# Patient Record
Sex: FEMALE | Race: WHITE | URBAN - METROPOLITAN AREA
[De-identification: names, ages, dates, MRNs, and addresses within clinical notes are randomized per-mention and may not be internally consistent; named-entity substitution may affect disease eponyms.]

---

## 2021-08-11 ENCOUNTER — INPATIENT (INPATIENT)
Facility: HOSPITAL | Age: 87
LOS: 4 days | Discharge: NURSING, REHAB OR LONG-TERM CARE | DRG: 183 | End: 2021-08-16
Attending: SURGERY | Admitting: SURGERY
Payer: MEDICARE

## 2021-08-11 VITALS — HEIGHT: 65 IN

## 2021-08-11 DIAGNOSIS — Z85.038 PERSONAL HISTORY OF OTHER MALIGNANT NEOPLASM OF LARGE INTESTINE: Chronic | ICD-10-CM

## 2021-08-11 DIAGNOSIS — Z87.898 PERSONAL HISTORY OF OTHER SPECIFIED CONDITIONS: Chronic | ICD-10-CM

## 2021-08-11 LAB
ALBUMIN SERPL ELPH-MCNC: 3.5 G/DL — SIGNIFICANT CHANGE UP (ref 3.3–5)
ALP SERPL-CCNC: 67 U/L — SIGNIFICANT CHANGE UP (ref 40–120)
ALT FLD-CCNC: 43 U/L — SIGNIFICANT CHANGE UP (ref 10–45)
ANION GAP SERPL CALC-SCNC: 14 MMOL/L — SIGNIFICANT CHANGE UP (ref 5–17)
APTT BLD: 27.7 SEC — SIGNIFICANT CHANGE UP (ref 27.5–35.5)
AST SERPL-CCNC: 112 U/L — HIGH (ref 10–40)
BASOPHILS # BLD AUTO: 0 K/UL — SIGNIFICANT CHANGE UP (ref 0–0.2)
BASOPHILS NFR BLD AUTO: 0 % — SIGNIFICANT CHANGE UP (ref 0–2)
BILIRUB SERPL-MCNC: 1.3 MG/DL — HIGH (ref 0.2–1.2)
BUN SERPL-MCNC: 22 MG/DL — SIGNIFICANT CHANGE UP (ref 7–23)
CALCIUM SERPL-MCNC: 10.3 MG/DL — SIGNIFICANT CHANGE UP (ref 8.4–10.5)
CHLORIDE SERPL-SCNC: 100 MMOL/L — SIGNIFICANT CHANGE UP (ref 96–108)
CK SERPL-CCNC: 4509 U/L — HIGH (ref 25–170)
CO2 SERPL-SCNC: 21 MMOL/L — LOW (ref 22–31)
CREAT SERPL-MCNC: 0.94 MG/DL — SIGNIFICANT CHANGE UP (ref 0.5–1.3)
EOSINOPHIL # BLD AUTO: 0 K/UL — SIGNIFICANT CHANGE UP (ref 0–0.5)
EOSINOPHIL NFR BLD AUTO: 0 % — SIGNIFICANT CHANGE UP (ref 0–6)
GAS PNL BLDV: SIGNIFICANT CHANGE UP
GLUCOSE SERPL-MCNC: 118 MG/DL — HIGH (ref 70–99)
HCT VFR BLD CALC: 39.1 % — SIGNIFICANT CHANGE UP (ref 34.5–45)
HGB BLD-MCNC: 12.9 G/DL — SIGNIFICANT CHANGE UP (ref 11.5–15.5)
INR BLD: 1.07 RATIO — SIGNIFICANT CHANGE UP (ref 0.88–1.16)
LYMPHOCYTES # BLD AUTO: 0.45 K/UL — LOW (ref 1–3.3)
LYMPHOCYTES # BLD AUTO: 3.5 % — LOW (ref 13–44)
MCHC RBC-ENTMCNC: 30.8 PG — SIGNIFICANT CHANGE UP (ref 27–34)
MCHC RBC-ENTMCNC: 33 GM/DL — SIGNIFICANT CHANGE UP (ref 32–36)
MCV RBC AUTO: 93.3 FL — SIGNIFICANT CHANGE UP (ref 80–100)
MONOCYTES # BLD AUTO: 0 K/UL — SIGNIFICANT CHANGE UP (ref 0–0.9)
MONOCYTES NFR BLD AUTO: 0 % — LOW (ref 2–14)
NEUTROPHILS # BLD AUTO: 12.52 K/UL — HIGH (ref 1.8–7.4)
NEUTROPHILS NFR BLD AUTO: 96.5 % — HIGH (ref 43–77)
PLATELET # BLD AUTO: 204 K/UL — SIGNIFICANT CHANGE UP (ref 150–400)
POTASSIUM SERPL-MCNC: 3.8 MMOL/L — SIGNIFICANT CHANGE UP (ref 3.5–5.3)
POTASSIUM SERPL-SCNC: 3.8 MMOL/L — SIGNIFICANT CHANGE UP (ref 3.5–5.3)
PROT SERPL-MCNC: 7.7 G/DL — SIGNIFICANT CHANGE UP (ref 6–8.3)
PROTHROM AB SERPL-ACNC: 12.8 SEC — SIGNIFICANT CHANGE UP (ref 10.6–13.6)
RBC # BLD: 4.19 M/UL — SIGNIFICANT CHANGE UP (ref 3.8–5.2)
RBC # FLD: 13.5 % — SIGNIFICANT CHANGE UP (ref 10.3–14.5)
SODIUM SERPL-SCNC: 135 MMOL/L — SIGNIFICANT CHANGE UP (ref 135–145)
WBC # BLD: 12.97 K/UL — HIGH (ref 3.8–10.5)
WBC # FLD AUTO: 12.97 K/UL — HIGH (ref 3.8–10.5)

## 2021-08-11 PROCEDURE — 72125 CT NECK SPINE W/O DYE: CPT | Mod: 26,MH

## 2021-08-11 PROCEDURE — 74177 CT ABD & PELVIS W/CONTRAST: CPT | Mod: 26,MA

## 2021-08-11 PROCEDURE — 70450 CT HEAD/BRAIN W/O DYE: CPT | Mod: 26,MH

## 2021-08-11 PROCEDURE — 99291 CRITICAL CARE FIRST HOUR: CPT | Mod: GC

## 2021-08-11 PROCEDURE — 93010 ELECTROCARDIOGRAM REPORT: CPT | Mod: GC

## 2021-08-11 PROCEDURE — 72170 X-RAY EXAM OF PELVIS: CPT | Mod: 26

## 2021-08-11 PROCEDURE — 71260 CT THORAX DX C+: CPT | Mod: 26,MA

## 2021-08-11 PROCEDURE — 71045 X-RAY EXAM CHEST 1 VIEW: CPT | Mod: 26

## 2021-08-11 RX ORDER — SODIUM CHLORIDE 9 MG/ML
1000 INJECTION INTRAMUSCULAR; INTRAVENOUS; SUBCUTANEOUS ONCE
Refills: 0 | Status: COMPLETED | OUTPATIENT
Start: 2021-08-11 | End: 2021-08-11

## 2021-08-11 RX ADMIN — SODIUM CHLORIDE 1000 MILLILITER(S): 9 INJECTION INTRAMUSCULAR; INTRAVENOUS; SUBCUTANEOUS at 22:44

## 2021-08-11 NOTE — ED ADULT NURSE NOTE - OBJECTIVE STATEMENT
89y F arrived to the ED via EMS s/p fall. As per EMS " pt was found down on the floor today in her apartment by Son after not being able to get in touch with her. Pt son last saw pt at 1 pm yesterday. Pt is currently altered and not her baseline." Upon arrival to ED Pt A&Ox1, Pt placed in c-collar prior to arrival. Pt able to move all extremities at present. Pt denies pain. Abd soft, nontender. VSS. Pt placed in position of comfort. Pt educated on call bell system and provided call bell. Bed in lowest position, wheels locked, appropriate side rails raised. Pt denies needs at this time.

## 2021-08-11 NOTE — ED PROVIDER NOTE - ATTENDING CONTRIBUTION TO CARE
attending Jigar: 89yF remote h/o brain cancer with resection BIBEMS from home after being found on the floor, unknown downtime. Family last spoke with patient yesterday >24 hours ago. Pt with increased confusion, unable to provide reliable history. No AC. Pt protecting her airway on initial exam. Will obtain FSG, ekg, place on tele, labs including CPK given concern for prolonged downtime, CT imaging eval for traumatic injury, UA, will require admission

## 2021-08-11 NOTE — ED ADULT NURSE NOTE - NSICDXPASTSURGICALHX_GEN_ALL_CORE_FT
PAST SURGICAL HISTORY:  H/O brain tumor History of brain tumor resection in 1961.    History of colon cancer s/p Resection in 2009.

## 2021-08-11 NOTE — ED PROVIDER NOTE - CLINICAL SUMMARY MEDICAL DECISION MAKING FREE TEXT BOX
Concern for rhabdo, brain mass, brain bleed, stroke, electrolyte abnormality, injuries secondary to fall. Labs, imaging, EKG, IVF. Will reassess.

## 2021-08-11 NOTE — ED PROVIDER NOTE - PHYSICAL EXAMINATION
Gen: non toxic appearing, NAD   Head: NC/NT  Eyes: MARIA VICTORIA, anicteric  ENT: airway patent, mmm  CV: RRR, +S1/S2   Resp: CTAB, symmetric breath sounds   GI:  abdomen soft non-distended, NTTP   Back: no spinal stepoffs/ttp  Extremities - FROM,  no edema  Skin: +mild erythema to LT elbow with ?old healing abrasion  Neuro: A&Ox0, not following commands,  moving all four extremities spontaneously

## 2021-08-11 NOTE — ED ADULT NURSE NOTE - NSIMPLEMENTINTERV_GEN_ALL_ED
Implemented All Fall with Harm Risk Interventions:  [place] to call system. Call bell, personal items and telephone within reach. Instruct patient to call for assistance. Room bathroom lighting operational. Non-slip footwear when patient is off stretcher. Physically safe environment: no spills, clutter or unnecessary equipment. Stretcher in lowest position, wheels locked, appropriate side rails in place. Provide visual cue, wrist band, yellow gown, etc. Monitor gait and stability. Monitor for mental status changes and reorient to person, place, and time. Review medications for side effects contributing to fall risk. Reinforce activity limits and safety measures with patient and family. Provide visual clues: red socks.

## 2021-08-11 NOTE — ED PROVIDER NOTE - OBJECTIVE STATEMENT
89 yo F with unk pmhx presents with EMS s/p fall. EMS states pts son saw the patient at 1pm yesterday. Attempted to call the patient few times today, no answer. Went to check on her. Found her on the floor, altered, with pants down to her knees. States the house was a mess compared to yesterday 89 yo F with  presents with EMS s/p fall. EMS states pts son saw the patient at 1pm yesterday. Attempted to call the patient few times today, no answer. Went to check on her. Found her on the floor, altered, with pants down to her knees. States the house was a mess compared to yesterday.   Pt is unable to provide any history  pmhx of brain cancer s/p resection as per the chart

## 2021-08-12 DIAGNOSIS — E03.9 HYPOTHYROIDISM, UNSPECIFIED: ICD-10-CM

## 2021-08-12 DIAGNOSIS — Z79.899 OTHER LONG TERM (CURRENT) DRUG THERAPY: ICD-10-CM

## 2021-08-12 DIAGNOSIS — R41.0 DISORIENTATION, UNSPECIFIED: ICD-10-CM

## 2021-08-12 DIAGNOSIS — Z29.9 ENCOUNTER FOR PROPHYLACTIC MEASURES, UNSPECIFIED: ICD-10-CM

## 2021-08-12 DIAGNOSIS — R55 SYNCOPE AND COLLAPSE: ICD-10-CM

## 2021-08-12 DIAGNOSIS — S22.41XA MULTIPLE FRACTURES OF RIBS, RIGHT SIDE, INITIAL ENCOUNTER FOR CLOSED FRACTURE: ICD-10-CM

## 2021-08-12 DIAGNOSIS — I10 ESSENTIAL (PRIMARY) HYPERTENSION: ICD-10-CM

## 2021-08-12 LAB
A1C WITH ESTIMATED AVERAGE GLUCOSE RESULT: 5.7 % — HIGH (ref 4–5.6)
ALBUMIN SERPL ELPH-MCNC: 2.8 G/DL — LOW (ref 3.3–5)
ALBUMIN SERPL ELPH-MCNC: 3.2 G/DL — LOW (ref 3.3–5)
ALP SERPL-CCNC: 57 U/L — SIGNIFICANT CHANGE UP (ref 40–120)
ALP SERPL-CCNC: 57 U/L — SIGNIFICANT CHANGE UP (ref 40–120)
ALT FLD-CCNC: 47 U/L — HIGH (ref 10–45)
ALT FLD-CCNC: 51 U/L — HIGH (ref 10–45)
ANION GAP SERPL CALC-SCNC: 12 MMOL/L — SIGNIFICANT CHANGE UP (ref 5–17)
ANION GAP SERPL CALC-SCNC: 12 MMOL/L — SIGNIFICANT CHANGE UP (ref 5–17)
APPEARANCE UR: CLEAR — SIGNIFICANT CHANGE UP
AST SERPL-CCNC: 103 U/L — HIGH (ref 10–40)
AST SERPL-CCNC: 120 U/L — HIGH (ref 10–40)
BACTERIA # UR AUTO: NEGATIVE — SIGNIFICANT CHANGE UP
BASOPHILS # BLD AUTO: 0.05 K/UL — SIGNIFICANT CHANGE UP (ref 0–0.2)
BASOPHILS NFR BLD AUTO: 0.4 % — SIGNIFICANT CHANGE UP (ref 0–2)
BILIRUB DIRECT SERPL-MCNC: 0.2 MG/DL — SIGNIFICANT CHANGE UP (ref 0–0.2)
BILIRUB INDIRECT FLD-MCNC: 0.7 MG/DL — SIGNIFICANT CHANGE UP (ref 0.2–1)
BILIRUB SERPL-MCNC: 0.9 MG/DL — SIGNIFICANT CHANGE UP (ref 0.2–1.2)
BILIRUB SERPL-MCNC: 1.2 MG/DL — SIGNIFICANT CHANGE UP (ref 0.2–1.2)
BILIRUB UR-MCNC: NEGATIVE — SIGNIFICANT CHANGE UP
BLD GP AB SCN SERPL QL: NEGATIVE — SIGNIFICANT CHANGE UP
BUN SERPL-MCNC: 18 MG/DL — SIGNIFICANT CHANGE UP (ref 7–23)
BUN SERPL-MCNC: 20 MG/DL — SIGNIFICANT CHANGE UP (ref 7–23)
CALCIUM SERPL-MCNC: 9.5 MG/DL — SIGNIFICANT CHANGE UP (ref 8.4–10.5)
CALCIUM SERPL-MCNC: 9.6 MG/DL — SIGNIFICANT CHANGE UP (ref 8.4–10.5)
CHLORIDE SERPL-SCNC: 101 MMOL/L — SIGNIFICANT CHANGE UP (ref 96–108)
CHLORIDE SERPL-SCNC: 102 MMOL/L — SIGNIFICANT CHANGE UP (ref 96–108)
CHOLEST SERPL-MCNC: 153 MG/DL — SIGNIFICANT CHANGE UP
CK MB BLD-MCNC: 1.2 % — SIGNIFICANT CHANGE UP (ref 0–3.5)
CK MB BLD-MCNC: 1.6 % — SIGNIFICANT CHANGE UP (ref 0–3.5)
CK MB CFR SERPL CALC: 27.1 NG/ML — HIGH (ref 0–3.8)
CK MB CFR SERPL CALC: 43.8 NG/ML — HIGH (ref 0–3.8)
CK MB CFR SERPL CALC: 44.7 NG/ML — HIGH (ref 0–3.8)
CK SERPL-CCNC: 1704 U/L — HIGH (ref 25–170)
CK SERPL-CCNC: 3688 U/L — HIGH (ref 25–170)
CO2 SERPL-SCNC: 18 MMOL/L — LOW (ref 22–31)
CO2 SERPL-SCNC: 20 MMOL/L — LOW (ref 22–31)
COLOR SPEC: SIGNIFICANT CHANGE UP
CREAT SERPL-MCNC: 0.85 MG/DL — SIGNIFICANT CHANGE UP (ref 0.5–1.3)
CREAT SERPL-MCNC: 0.89 MG/DL — SIGNIFICANT CHANGE UP (ref 0.5–1.3)
DIFF PNL FLD: ABNORMAL
EOSINOPHIL # BLD AUTO: 0.01 K/UL — SIGNIFICANT CHANGE UP (ref 0–0.5)
EOSINOPHIL NFR BLD AUTO: 0.1 % — SIGNIFICANT CHANGE UP (ref 0–6)
EPI CELLS # UR: 3 /HPF — SIGNIFICANT CHANGE UP
ESTIMATED AVERAGE GLUCOSE: 117 MG/DL — HIGH (ref 68–114)
GAS PNL BLDV: SIGNIFICANT CHANGE UP
GLUCOSE SERPL-MCNC: 100 MG/DL — HIGH (ref 70–99)
GLUCOSE SERPL-MCNC: 96 MG/DL — SIGNIFICANT CHANGE UP (ref 70–99)
GLUCOSE UR QL: NEGATIVE — SIGNIFICANT CHANGE UP
HCT VFR BLD CALC: 36.5 % — SIGNIFICANT CHANGE UP (ref 34.5–45)
HCT VFR BLD CALC: 37.4 % — SIGNIFICANT CHANGE UP (ref 34.5–45)
HDLC SERPL-MCNC: 68 MG/DL — SIGNIFICANT CHANGE UP
HGB BLD-MCNC: 12.1 G/DL — SIGNIFICANT CHANGE UP (ref 11.5–15.5)
HGB BLD-MCNC: 12.5 G/DL — SIGNIFICANT CHANGE UP (ref 11.5–15.5)
HYALINE CASTS # UR AUTO: 1 /LPF — SIGNIFICANT CHANGE UP (ref 0–2)
IMM GRANULOCYTES NFR BLD AUTO: 0.4 % — SIGNIFICANT CHANGE UP (ref 0–1.5)
KETONES UR-MCNC: ABNORMAL
LEUKOCYTE ESTERASE UR-ACNC: ABNORMAL
LIPID PNL WITH DIRECT LDL SERPL: 72 MG/DL — SIGNIFICANT CHANGE UP
LYMPHOCYTES # BLD AUTO: 1.02 K/UL — SIGNIFICANT CHANGE UP (ref 1–3.3)
LYMPHOCYTES # BLD AUTO: 8.7 % — LOW (ref 13–44)
MAGNESIUM SERPL-MCNC: 1.8 MG/DL — SIGNIFICANT CHANGE UP (ref 1.6–2.6)
MAGNESIUM SERPL-MCNC: 2.2 MG/DL — SIGNIFICANT CHANGE UP (ref 1.6–2.6)
MCHC RBC-ENTMCNC: 30.7 PG — SIGNIFICANT CHANGE UP (ref 27–34)
MCHC RBC-ENTMCNC: 30.9 PG — SIGNIFICANT CHANGE UP (ref 27–34)
MCHC RBC-ENTMCNC: 33.2 GM/DL — SIGNIFICANT CHANGE UP (ref 32–36)
MCHC RBC-ENTMCNC: 33.4 GM/DL — SIGNIFICANT CHANGE UP (ref 32–36)
MCV RBC AUTO: 91.9 FL — SIGNIFICANT CHANGE UP (ref 80–100)
MCV RBC AUTO: 93.4 FL — SIGNIFICANT CHANGE UP (ref 80–100)
MONOCYTES # BLD AUTO: 0.65 K/UL — SIGNIFICANT CHANGE UP (ref 0–0.9)
MONOCYTES NFR BLD AUTO: 5.5 % — SIGNIFICANT CHANGE UP (ref 2–14)
NEUTROPHILS # BLD AUTO: 10.01 K/UL — HIGH (ref 1.8–7.4)
NEUTROPHILS NFR BLD AUTO: 84.9 % — HIGH (ref 43–77)
NITRITE UR-MCNC: NEGATIVE — SIGNIFICANT CHANGE UP
NON HDL CHOLESTEROL: 85 MG/DL — SIGNIFICANT CHANGE UP
NRBC # BLD: 0 /100 WBCS — SIGNIFICANT CHANGE UP (ref 0–0)
NRBC # BLD: 0 /100 WBCS — SIGNIFICANT CHANGE UP (ref 0–0)
PH UR: 6.5 — SIGNIFICANT CHANGE UP (ref 5–8)
PHOSPHATE SERPL-MCNC: 2.4 MG/DL — LOW (ref 2.5–4.5)
PHOSPHATE SERPL-MCNC: 2.9 MG/DL — SIGNIFICANT CHANGE UP (ref 2.5–4.5)
PLATELET # BLD AUTO: 193 K/UL — SIGNIFICANT CHANGE UP (ref 150–400)
PLATELET # BLD AUTO: 222 K/UL — SIGNIFICANT CHANGE UP (ref 150–400)
POTASSIUM SERPL-MCNC: 3.9 MMOL/L — SIGNIFICANT CHANGE UP (ref 3.5–5.3)
POTASSIUM SERPL-MCNC: 4.3 MMOL/L — SIGNIFICANT CHANGE UP (ref 3.5–5.3)
POTASSIUM SERPL-SCNC: 3.9 MMOL/L — SIGNIFICANT CHANGE UP (ref 3.5–5.3)
POTASSIUM SERPL-SCNC: 4.3 MMOL/L — SIGNIFICANT CHANGE UP (ref 3.5–5.3)
PROT SERPL-MCNC: 6.8 G/DL — SIGNIFICANT CHANGE UP (ref 6–8.3)
PROT SERPL-MCNC: 7.2 G/DL — SIGNIFICANT CHANGE UP (ref 6–8.3)
PROT UR-MCNC: ABNORMAL
RBC # BLD: 3.91 M/UL — SIGNIFICANT CHANGE UP (ref 3.8–5.2)
RBC # BLD: 4.07 M/UL — SIGNIFICANT CHANGE UP (ref 3.8–5.2)
RBC # FLD: 13.6 % — SIGNIFICANT CHANGE UP (ref 10.3–14.5)
RBC # FLD: 13.6 % — SIGNIFICANT CHANGE UP (ref 10.3–14.5)
RBC CASTS # UR COMP ASSIST: 6 /HPF — HIGH (ref 0–4)
RH IG SCN BLD-IMP: POSITIVE — SIGNIFICANT CHANGE UP
SARS-COV-2 RNA SPEC QL NAA+PROBE: SIGNIFICANT CHANGE UP
SODIUM SERPL-SCNC: 132 MMOL/L — LOW (ref 135–145)
SODIUM SERPL-SCNC: 133 MMOL/L — LOW (ref 135–145)
SP GR SPEC: 1.04 — HIGH (ref 1.01–1.02)
T3 SERPL-MCNC: 58 NG/DL — LOW (ref 80–200)
T4 AB SER-ACNC: 5.9 UG/DL — SIGNIFICANT CHANGE UP (ref 4.6–12)
TRIGL SERPL-MCNC: 68 MG/DL — SIGNIFICANT CHANGE UP
TROPONIN T, HIGH SENSITIVITY RESULT: 58 NG/L — HIGH (ref 0–51)
TROPONIN T, HIGH SENSITIVITY RESULT: 65 NG/L — HIGH (ref 0–51)
TROPONIN T, HIGH SENSITIVITY RESULT: 69 NG/L — HIGH (ref 0–51)
TSH SERPL-MCNC: 4.66 UIU/ML — HIGH (ref 0.27–4.2)
UROBILINOGEN FLD QL: NEGATIVE — SIGNIFICANT CHANGE UP
WBC # BLD: 11.31 K/UL — HIGH (ref 3.8–10.5)
WBC # BLD: 11.79 K/UL — HIGH (ref 3.8–10.5)
WBC # FLD AUTO: 11.31 K/UL — HIGH (ref 3.8–10.5)
WBC # FLD AUTO: 11.79 K/UL — HIGH (ref 3.8–10.5)
WBC UR QL: 5 /HPF — SIGNIFICANT CHANGE UP (ref 0–5)

## 2021-08-12 PROCEDURE — 93306 TTE W/DOPPLER COMPLETE: CPT | Mod: 26

## 2021-08-12 PROCEDURE — 99222 1ST HOSP IP/OBS MODERATE 55: CPT

## 2021-08-12 PROCEDURE — 99223 1ST HOSP IP/OBS HIGH 75: CPT

## 2021-08-12 PROCEDURE — 51703 INSERT BLADDER CATH COMPLEX: CPT

## 2021-08-12 PROCEDURE — 72128 CT CHEST SPINE W/O DYE: CPT | Mod: 26

## 2021-08-12 PROCEDURE — 93010 ELECTROCARDIOGRAM REPORT: CPT

## 2021-08-12 PROCEDURE — 93880 EXTRACRANIAL BILAT STUDY: CPT | Mod: 26

## 2021-08-12 PROCEDURE — 93010 ELECTROCARDIOGRAM REPORT: CPT | Mod: 77

## 2021-08-12 RX ORDER — LIDOCAINE 4 G/100G
1 CREAM TOPICAL EVERY 24 HOURS
Refills: 0 | Status: DISCONTINUED | OUTPATIENT
Start: 2021-08-12 | End: 2021-08-16

## 2021-08-12 RX ORDER — METOPROLOL TARTRATE 50 MG
5 TABLET ORAL EVERY 6 HOURS
Refills: 0 | Status: DISCONTINUED | OUTPATIENT
Start: 2021-08-12 | End: 2021-08-13

## 2021-08-12 RX ORDER — SODIUM CHLORIDE 9 MG/ML
1000 INJECTION, SOLUTION INTRAVENOUS
Refills: 0 | Status: DISCONTINUED | OUTPATIENT
Start: 2021-08-12 | End: 2021-08-13

## 2021-08-12 RX ORDER — TRAMADOL HYDROCHLORIDE 50 MG/1
50 TABLET ORAL EVERY 4 HOURS
Refills: 0 | Status: DISCONTINUED | OUTPATIENT
Start: 2021-08-12 | End: 2021-08-12

## 2021-08-12 RX ORDER — OLANZAPINE 15 MG/1
5 TABLET, FILM COATED ORAL ONCE
Refills: 0 | Status: COMPLETED | OUTPATIENT
Start: 2021-08-12 | End: 2021-08-12

## 2021-08-12 RX ORDER — DILTIAZEM HCL 120 MG
5 CAPSULE, EXT RELEASE 24 HR ORAL ONCE
Refills: 0 | Status: COMPLETED | OUTPATIENT
Start: 2021-08-12 | End: 2021-08-12

## 2021-08-12 RX ORDER — TRAMADOL HYDROCHLORIDE 50 MG/1
25 TABLET ORAL EVERY 4 HOURS
Refills: 0 | Status: DISCONTINUED | OUTPATIENT
Start: 2021-08-12 | End: 2021-08-12

## 2021-08-12 RX ORDER — METOPROLOL TARTRATE 50 MG
5 TABLET ORAL ONCE
Refills: 0 | Status: COMPLETED | OUTPATIENT
Start: 2021-08-12 | End: 2021-08-12

## 2021-08-12 RX ORDER — LEVOTHYROXINE SODIUM 125 MCG
56 TABLET ORAL AT BEDTIME
Refills: 0 | Status: DISCONTINUED | OUTPATIENT
Start: 2021-08-12 | End: 2021-08-15

## 2021-08-12 RX ORDER — HALOPERIDOL DECANOATE 100 MG/ML
5 INJECTION INTRAMUSCULAR ONCE
Refills: 0 | Status: COMPLETED | OUTPATIENT
Start: 2021-08-12 | End: 2021-08-12

## 2021-08-12 RX ORDER — LOSARTAN POTASSIUM 100 MG/1
100 TABLET, FILM COATED ORAL DAILY
Refills: 0 | Status: DISCONTINUED | OUTPATIENT
Start: 2021-08-12 | End: 2021-08-12

## 2021-08-12 RX ORDER — MAGNESIUM SULFATE 500 MG/ML
2 VIAL (ML) INJECTION ONCE
Refills: 0 | Status: COMPLETED | OUTPATIENT
Start: 2021-08-12 | End: 2021-08-12

## 2021-08-12 RX ORDER — DILTIAZEM HCL 120 MG
5 CAPSULE, EXT RELEASE 24 HR ORAL
Qty: 125 | Refills: 0 | Status: DISCONTINUED | OUTPATIENT
Start: 2021-08-12 | End: 2021-08-13

## 2021-08-12 RX ORDER — CHLORHEXIDINE GLUCONATE 213 G/1000ML
1 SOLUTION TOPICAL
Refills: 0 | Status: DISCONTINUED | OUTPATIENT
Start: 2021-08-12 | End: 2021-08-16

## 2021-08-12 RX ORDER — HYDROMORPHONE HYDROCHLORIDE 2 MG/ML
0.5 INJECTION INTRAMUSCULAR; INTRAVENOUS; SUBCUTANEOUS EVERY 4 HOURS
Refills: 0 | Status: DISCONTINUED | OUTPATIENT
Start: 2021-08-12 | End: 2021-08-12

## 2021-08-12 RX ORDER — ACETAMINOPHEN 500 MG
1000 TABLET ORAL EVERY 6 HOURS
Refills: 0 | Status: COMPLETED | OUTPATIENT
Start: 2021-08-12 | End: 2021-08-12

## 2021-08-12 RX ORDER — AMLODIPINE BESYLATE 2.5 MG/1
10 TABLET ORAL
Refills: 0 | Status: DISCONTINUED | OUTPATIENT
Start: 2021-08-12 | End: 2021-08-12

## 2021-08-12 RX ORDER — LEVOTHYROXINE SODIUM 125 MCG
75 TABLET ORAL DAILY
Refills: 0 | Status: DISCONTINUED | OUTPATIENT
Start: 2021-08-12 | End: 2021-08-12

## 2021-08-12 RX ORDER — ENOXAPARIN SODIUM 100 MG/ML
40 INJECTION SUBCUTANEOUS EVERY 24 HOURS
Refills: 0 | Status: DISCONTINUED | OUTPATIENT
Start: 2021-08-12 | End: 2021-08-16

## 2021-08-12 RX ORDER — NEBIVOLOL HYDROCHLORIDE 5 MG/1
10 TABLET ORAL DAILY
Refills: 0 | Status: DISCONTINUED | OUTPATIENT
Start: 2021-08-12 | End: 2021-08-12

## 2021-08-12 RX ORDER — HYDROMORPHONE HYDROCHLORIDE 2 MG/ML
0.25 INJECTION INTRAMUSCULAR; INTRAVENOUS; SUBCUTANEOUS EVERY 4 HOURS
Refills: 0 | Status: DISCONTINUED | OUTPATIENT
Start: 2021-08-12 | End: 2021-08-12

## 2021-08-12 RX ORDER — ACETAMINOPHEN 500 MG
975 TABLET ORAL EVERY 6 HOURS
Refills: 0 | Status: DISCONTINUED | OUTPATIENT
Start: 2021-08-12 | End: 2021-08-12

## 2021-08-12 RX ADMIN — Medication 400 MILLIGRAM(S): at 12:08

## 2021-08-12 RX ADMIN — ENOXAPARIN SODIUM 40 MILLIGRAM(S): 100 INJECTION SUBCUTANEOUS at 09:16

## 2021-08-12 RX ADMIN — LIDOCAINE 1 PATCH: 4 CREAM TOPICAL at 15:29

## 2021-08-12 RX ADMIN — CHLORHEXIDINE GLUCONATE 1 APPLICATION(S): 213 SOLUTION TOPICAL at 06:35

## 2021-08-12 RX ADMIN — Medication 56 MICROGRAM(S): at 22:00

## 2021-08-12 RX ADMIN — Medication 5 MILLIGRAM(S): at 22:43

## 2021-08-12 RX ADMIN — Medication 5 MILLIGRAM(S): at 20:52

## 2021-08-12 RX ADMIN — Medication 5 MILLIGRAM(S): at 17:58

## 2021-08-12 RX ADMIN — LIDOCAINE 1 PATCH: 4 CREAM TOPICAL at 03:37

## 2021-08-12 RX ADMIN — HALOPERIDOL DECANOATE 5 MILLIGRAM(S): 100 INJECTION INTRAMUSCULAR at 21:59

## 2021-08-12 RX ADMIN — LIDOCAINE 1 PATCH: 4 CREAM TOPICAL at 07:20

## 2021-08-12 RX ADMIN — OLANZAPINE 5 MILLIGRAM(S): 15 TABLET, FILM COATED ORAL at 17:44

## 2021-08-12 RX ADMIN — Medication 1000 MILLIGRAM(S): at 12:08

## 2021-08-12 RX ADMIN — Medication 5 MILLIGRAM(S): at 17:16

## 2021-08-12 RX ADMIN — Medication 50 GRAM(S): at 07:11

## 2021-08-12 RX ADMIN — Medication 5 MILLIGRAM(S): at 17:10

## 2021-08-12 RX ADMIN — SODIUM CHLORIDE 100 MILLILITER(S): 9 INJECTION, SOLUTION INTRAVENOUS at 09:16

## 2021-08-12 RX ADMIN — Medication 5 MG/HR: at 20:53

## 2021-08-12 RX ADMIN — Medication 1000 MILLIGRAM(S): at 06:54

## 2021-08-12 RX ADMIN — AMLODIPINE BESYLATE 10 MILLIGRAM(S): 2.5 TABLET ORAL at 09:16

## 2021-08-12 RX ADMIN — Medication 400 MILLIGRAM(S): at 06:35

## 2021-08-12 RX ADMIN — Medication 62.5 MILLIMOLE(S): at 07:11

## 2021-08-12 RX ADMIN — Medication 400 MILLIGRAM(S): at 17:58

## 2021-08-12 RX ADMIN — Medication 1000 MILLIGRAM(S): at 17:58

## 2021-08-12 RX ADMIN — Medication 50 GRAM(S): at 17:10

## 2021-08-12 RX ADMIN — Medication 400 MILLIGRAM(S): at 23:37

## 2021-08-12 NOTE — H&P ADULT - NSICDXPASTSURGICALHX_GEN_ALL_CORE_FT
PAST SURGICAL HISTORY:  H/O brain tumor History of brain tumor resection in 1961.    History of colon cancer s/p Resection in 2009, Dr. David

## 2021-08-12 NOTE — CONSULT NOTE ADULT - SUBJECTIVE AND OBJECTIVE BOX
[facility] Surgical ICU Consult Note    HPI:  89 yo F with presents with EMS s/p fall. EMS states pts son saw the patient at 1pm yesterday. Attempted to call the patient few times today, no answer. Went to check on her. Found her on the floor, altered, with pants down to her knees. States the house was a mess compared to yesterday.     Allergies:   clindamycin (Unknown)  IV Contrast (Unknown)  shellfish (Unknown)  strawberry (Unknown)    PAST MEDICAL & SURGICAL HISTORY:  Hypertension  History of colon cancer s/p Resection in 2009.  H/O brain tumor History of brain tumor resection in 1961.      HOME MEDICATIONS:    amLODIPine 10 mg oral tablet: 1 tab(s) orally once a day (07 Jul 2016 11:16)  Bystolic 10 mg oral tablet: 1 tab(s) orally once a day (07 Jul 2016 11:16)  levothyroxine 75 mcg (0.075 mg) oral capsule: 1 cap(s) orally once a day (07 Jul 2016 11:16)  losartan 100 mg oral tablet: 1 tab(s) orally once a day (07 Jul 2016 11:16)      SUBJECTIVE/ROS:  A ten-point review of systems was otherwise negative except as noted.    NEURO  Exam: awake, alert, oriented x2, no acute distress, no acute focal deficits,  follows simple commands, moving all four extremities    Meds:     RESPIRATORY  RR: 16 (08-12-21 @ 00:55) (16 - 17)  SpO2: 98% (08-12-21 @ 00:55) (96% - 98%)  Wt(kg): --    Exam: clear to auscultation bilaterally    CARDIOVASCULAR  HR: 100 (08-12-21 @ 01:47) (89 - 100)  BP: 145/78 (08-12-21 @ 00:55) (145/78 - 153/98)  BP(mean): --  ABP: --  ABP(mean): --  Wt(kg): --  CVP(cm H2O): --  VBG - ( 11 Aug 2021 21:13 )  pH: 7.38  /  pCO2: 46    /  pO2: 26    / HCO3: 27    / Base Excess: 1.6   /  SaO2: 41     Lactate: 2.2      Exam: regular rate and rhythm  Perfusion     [ x]Adequate   [ ]Inadequate  Mentation   [ x]Normal       [ ]Reduced  Extremities  [x ]Warm         [ ]Cool  Volume Status [ ]Hypervolemic [ x]Euvolemic [ ]Hypovolemic    Meds:     GI/NUTRITION  Exam: soft, nontender, nondistended  Meds:     GENITOURINARY  I&O's Detail      08-11    135  |  100  |  22  ----------------------------<  118<H>  3.8   |  21<L>  |  0.94    Ca    10.3      11 Aug 2021 21:13  Mg     1.9     08-11    TPro  7.7  /  Alb  3.5  /  TBili  1.3<H>  /  DBili  x   /  AST  112<H>  /  ALT  43  /  AlkPhos  67  08-11    [ ] Crooks catheter  Meds:     HEMATOLOGIC  Meds:   [ ] VTE Prophylaxis - held due to                         12.9 12.97 )-----------( 204      ( 11 Aug 2021 21:13 )             39.1     PT/INR - ( 11 Aug 2021 21:13 )   PT: 12.8 sec;   INR: 1.07 ratio         PTT - ( 11 Aug 2021 21:13 )  PTT:27.7 sec    INFECTIOUS DISEASES  T(C): 36.7 (08-12-21 @ 01:47), Max: 36.7 (08-12-21 @ 00:55)  Wt(kg): --  WBC Count: 12.97 K/uL (08-11 @ 21:13)    Recent Cultures:    Meds:     ENDOCRINE  Capillary Blood Glucose    Meds:     ACCESS DEVICES:  [x ] Peripheral IV  [ ] Central Venous Line	[ ] R	[ ] L	[ ] IJ	[ ] Fem	[ ] SC	Placed:   [ ] Arterial Line		[ ] R	[ ] L	[ ] Fem	[ ] Rad	[ ] Ax	Placed:   [ ] PICC:					[ ] Mediport  [ ] Urinary Catheter, Date Placed:       IMAGING RESULTS  < from: CT Abdomen and Pelvis w/ IV Cont (08.11.21 @ 23:31) >  IMPRESSION:    Acute right fifth through eleventh rib fractures, as detailed above.    Trace rightpleural effusion. No pneumothorax.    No CT evidence of acute traumatic sequelae within the abdomen or pelvis.    Indeterminant left lower pole renal lesion. Further evaluation with nonemergent renal sonogram may be obtained.    These findings were discussed with Dr. Roberts at 8/12/2021 12:17 AM by Dr. Nova of Radiology with read back confirmation.    < end of copied text >  < from: CT Head No Cont (08.11.21 @ 23:24) >  IMPRESSION:    Head CT: No acute intracranial hemorrhage or calvarial fracture.    Cervical spine CT: No acute cervical spine fracture or evidence of traumatic malalignment.    < end of copied text >    < from: Xray Pelvis AP only (08.11.21 @ 21:29) >  IMPRESSION:    No acute fracture or dislocation.    < end of copied text >  < from: Xray Chest 1 View- PORTABLE-Urgent (Xray Chest 1 View- PORTABLE-Urgent .) (08.11.21 @ 21:29) >    IMPRESSION:    Few fracture deformity of the right lower lateral ribs, new since 7/2/2016. Dedicated rib series and/or cross-sectional imaging can be obtained for further evaluation as clinically indicated.    Clear lungs.    < end of copied text >    --------------------------------------------------------------------------------------       [facility] Surgical ICU Consult Note    HPI:  Patient is a 89yo F with a history of brain surgery (1960s with residual right-sided facial droop), R hemicolectomy (2009, Dr. David), HTN, hypothyroidism presenting after she was found down at home. Per patient's son, he saw her on Tuesday at 1pm then did not hear from her after repeated phone calls. Son went to home and found her down in the kitchen at around 7pm Wednesday. Patient had bruising over her lip and loss of urine. After event, patient has been AAOx1-2 but she is AAOx4 at baseline. Pulling 500ml on IS.    Allergies:   clindamycin (Unknown)  IV Contrast (Unknown)  shellfish (Unknown)  strawberry (Unknown)    PAST MEDICAL & SURGICAL HISTORY:  Hypertension  History of colon cancer s/p Resection in 2009.  H/O brain tumor History of brain tumor resection in 1961.      HOME MEDICATIONS:    amLODIPine 10 mg oral tablet: 1 tab(s) orally once a day (07 Jul 2016 11:16)  Bystolic 10 mg oral tablet: 1 tab(s) orally once a day (07 Jul 2016 11:16)  levothyroxine 75 mcg (0.075 mg) oral capsule: 1 cap(s) orally once a day (07 Jul 2016 11:16)  losartan 100 mg oral tablet: 1 tab(s) orally once a day (07 Jul 2016 11:16)      SUBJECTIVE/ROS:  A ten-point review of systems was otherwise negative except as noted.    NEURO  Exam: awake, alert, oriented x2, no acute distress, no acute focal deficits,  follows simple commands, moving all four extremities; disoriented but redirectable    Meds:     RESPIRATORY  RR: 16 (08-12-21 @ 00:55) (16 - 17)  SpO2: 98% (08-12-21 @ 00:55) (96% - 98%)  Wt(kg): --    Exam: clear to auscultation bilaterally    CARDIOVASCULAR  HR: 100 (08-12-21 @ 01:47) (89 - 100)  BP: 145/78 (08-12-21 @ 00:55) (145/78 - 153/98)  BP(mean): --  ABP: --  ABP(mean): --  Wt(kg): --  CVP(cm H2O): --  VBG - ( 11 Aug 2021 21:13 )  pH: 7.38  /  pCO2: 46    /  pO2: 26    / HCO3: 27    / Base Excess: 1.6   /  SaO2: 41     Lactate: 2.2      Exam: regular rate and rhythm  Perfusion     [ x]Adequate   [ ]Inadequate  Mentation   [ x]Normal       [ ]Reduced  Extremities  [x ]Warm         [ ]Cool  Volume Status [ ]Hypervolemic [ x]Euvolemic [ ]Hypovolemic    Meds:     GI/NUTRITION  Exam: soft, nontender, nondistended  Meds:     GENITOURINARY  I&O's Detail      08-11    135  |  100  |  22  ----------------------------<  118<H>  3.8   |  21<L>  |  0.94    Ca    10.3      11 Aug 2021 21:13  Mg     1.9     08-11    TPro  7.7  /  Alb  3.5  /  TBili  1.3<H>  /  DBili  x   /  AST  112<H>  /  ALT  43  /  AlkPhos  67  08-11    [ ] Crooks catheter  Meds:     HEMATOLOGIC  Meds:   [ ] VTE Prophylaxis - held due to                         12.9   12.97 )-----------( 204      ( 11 Aug 2021 21:13 )             39.1     PT/INR - ( 11 Aug 2021 21:13 )   PT: 12.8 sec;   INR: 1.07 ratio         PTT - ( 11 Aug 2021 21:13 )  PTT:27.7 sec    INFECTIOUS DISEASES  T(C): 36.7 (08-12-21 @ 01:47), Max: 36.7 (08-12-21 @ 00:55)  Wt(kg): --  WBC Count: 12.97 K/uL (08-11 @ 21:13)    Recent Cultures:    Meds:     ENDOCRINE  Capillary Blood Glucose    Meds:     ACCESS DEVICES:  [x ] Peripheral IV  [ ] Central Venous Line	[ ] R	[ ] L	[ ] IJ	[ ] Fem	[ ] SC	Placed:   [ ] Arterial Line		[ ] R	[ ] L	[ ] Fem	[ ] Rad	[ ] Ax	Placed:   [ ] PICC:					[ ] Mediport  [ ] Urinary Catheter, Date Placed:       IMAGING RESULTS  < from: CT Abdomen and Pelvis w/ IV Cont (08.11.21 @ 23:31) >  IMPRESSION:    Acute right fifth through eleventh rib fractures, as detailed above.    Trace rightpleural effusion. No pneumothorax.    No CT evidence of acute traumatic sequelae within the abdomen or pelvis.    Indeterminant left lower pole renal lesion. Further evaluation with nonemergent renal sonogram may be obtained.    These findings were discussed with Dr. Roberts at 8/12/2021 12:17 AM by Dr. Nova of Radiology with read back confirmation.    < end of copied text >  < from: CT Head No Cont (08.11.21 @ 23:24) >  IMPRESSION:    Head CT: No acute intracranial hemorrhage or calvarial fracture.    Cervical spine CT: No acute cervical spine fracture or evidence of traumatic malalignment.    < end of copied text >    < from: Xray Pelvis AP only (08.11.21 @ 21:29) >  IMPRESSION:    No acute fracture or dislocation.    < end of copied text >  < from: Xray Chest 1 View- PORTABLE-Urgent (Xray Chest 1 View- PORTABLE-Urgent .) (08.11.21 @ 21:29) >    IMPRESSION:    Few fracture deformity of the right lower lateral ribs, new since 7/2/2016. Dedicated rib series and/or cross-sectional imaging can be obtained for further evaluation as clinically indicated.    Clear lungs.    < end of copied text >    --------------------------------------------------------------------------------------

## 2021-08-12 NOTE — CONSULT NOTE ADULT - ASSESSMENT
89 yo F unwitnessed fall of unknown duration found by son who last saw her in baseline condition the day prior to presentation. Injuries include rib fractures Right 5-11. Rib score = 2.     Plan:  Neurologic:   - AAOx 1-2; monitor mental status for deterioration of condition  - Initial head imaging negative for any intracranial injuries  - Pain control as needed IV Tylenol    Respiratory:   - spo2 adequate; NC as needed; wean as tolerated    Cardiovascular:   - NSR  - No pressor requirements  - Monitor vitals     Gastrointestinal/Nutrition:   - NPO    Renal/Genitourinary:   - Monitor and replete electrolytes as needed  - Monitor UO  - IV Fluids     Hematologic:   - Treng H&H and transfuse Hgb < 7    Infectious Disease:   - No issues; monitor WBC    Endocrine:   - Monitor BMP  - F/U A1C    Tubes/Lines/Drains:     Disposition: SICU    --------------------------------------------------------------------------------------  Case Discussed with ICU attending Dr. Hernandez  89 yo F unwitnessed fall of unknown duration found by son who last saw her in baseline condition the day prior to presentation. Injuries include rib fractures Right 5-11. Rib score = 2.     Plan:  Neurologic:   - AAOx 1-2; monitor mental status for deterioration of condition  - Initial head imaging negative for any intracranial injuries  - Spinal imaging negative for injuries   - Pain control as needed IV Tylenol, Dilaudid, Lidocaine patch    Respiratory:   - spo2 adequate; NC as needed; wean as tolerated  - Rib fractures Right 5-11 lower lateral    Cardiovascular:   - NSR  - No pressor requirements  - Monitor vitals   - Troponin and CKMB elevation; monitor trend   - Lactate mildly elevated; monitor trend     Gastrointestinal/Nutrition:   - NPO    Renal/Genitourinary:   - Monitor and replete electrolytes as needed  - Monitor UO  - IV Fluids   - Creatine Kinase elevation; monitor trend and renal function for concern of  rhabdomyolysis     Hematologic:   - Treng H&H and transfuse Hgb < 7  - Hold DVT ppx following traumatic injury workup     Infectious Disease:   - No issues; monitor WBC    Endocrine:   - Monitor BMP  - F/U A1C  - Hx of Hypothyroid with elevated TSH; restart home medications when able     Tubes/Lines/Drains:     Disposition: SICU    --------------------------------------------------------------------------------------  Case Discussed with ICU attending Dr. Hernandez  89 yo F unwitnessed fall of unknown duration found by son who last saw her in baseline condition the day prior to presentation. Injuries include rib fractures Right 5-11. Rib score = 2. PSgx Brain surgery and R hemicolectomy     Plan:  Neurologic:   - AAOx 1-2; monitor mental status for deterioration of condition  - Initial head imaging negative for any intracranial injuries  - Spinal imaging negative for injuries   - Pain control as needed IV Tylenol, Dilaudid, Lidocaine patch    Respiratory:   - spo2 adequate; NC as needed; wean as tolerated  - Rib fractures Right 5-11 lower lateral Rib Score: First rib fracture, 7 rib fractures  -  Baseline ; encourage use     Cardiovascular:   - NSR  - No pressor requirements  - Monitor vitals   - Troponin and CKMB elevation; monitor trend   - Lactate mildly elevated; monitor trend     Gastrointestinal/Nutrition:   - NPO    Renal/Genitourinary:   - Monitor and replete electrolytes as needed  - Monitor UO  - IV Fluids   - Creatine Kinase elevation; monitor trend and renal function for concern of  rhabdomyolysis     Hematologic:   - Treng H&H and transfuse Hgb < 7  - Hold DVT ppx following traumatic injury workup     Infectious Disease:   - No issues; monitor WBC    Endocrine:   - Monitor BMP  - F/U A1C  - Hx of Hypothyroid with elevated TSH; restart home medications when able     Tubes/Lines/Drains:     Disposition: SICU  - PT Evaluation   --------------------------------------------------------------------------------------  Case Discussed with ICU attending Dr. Hernandez

## 2021-08-12 NOTE — CONSULT NOTE ADULT - SUBJECTIVE AND OBJECTIVE BOX
HPI:  Patient is a 89yo F with a history of brain surgery (1960s with residual right-sided facial droop), R hemicolectomy (, Dr. David), HTN, hypothyroidism presenting after she was found down at home. Per patient's son, he saw her on Tuesday at 1pm then did not hear from her after repeated phone calls. Son went to home and found her down in the kitchen at around 7pm Wednesday. Patient had bruising over her lip and loss of urine. After event, patient has been AAOx1-2 but she is AAOx4 at baseline. Pulling 500ml on IS.              Review of Systems:   CONSTITUTIONAL: No fever, weight loss, or fatigue  EYES: No eye pain, visual disturbances, or discharge  ENMT:  No difficulty hearing, tinnitus, vertigo; No sinus or throat pain  NECK: No pain or stiffness  BREASTS: No pain, masses, or nipple discharge  RESPIRATORY: No cough, wheezing, chills or hemoptysis; No shortness of breath  CARDIOVASCULAR: No chest pain, palpitations, dizziness, or leg swelling  GASTROINTESTINAL: No abdominal or epigastric pain. No nausea, vomiting, or hematemesis; No diarrhea or constipation. No melena or hematochezia.  GENITOURINARY: No dysuria, frequency, hematuria, or incontinence  NEUROLOGICAL: No headaches, memory loss, loss of strength, numbness, or tremors  SKIN: No itching, burning, rashes, or lesions   LYMPH NODES: No enlarged glands  ENDOCRINE: No heat or cold intolerance; No hair loss  MUSCULOSKELETAL: No joint pain or swelling; No muscle, back, or extremity pain  PSYCHIATRIC: No depression, anxiety, mood swings, or difficulty sleeping  HEME/LYMPH: No easy bruising, or bleeding gums  ALLERY AND IMMUNOLOGIC: No hives or eczema    Allergies    clindamycin (Unknown)  IV Contrast (Unknown)  shellfish (Unknown)  strawberry (Unknown)    Intolerances  PAST MEDICAL & SURGICAL HISTORY:  Hypertension    Hypothyroidism    History of colon cancer  s/p Resection in , Dr. David    H/O brain tumor  History of brain tumor resection in .          Social History:     FAMILY HISTORY:      MEDICATIONS  (STANDING):  acetaminophen  IVPB .. 1000 milliGRAM(s) IV Intermittent every 6 hours  chlorhexidine 2% Cloths 1 Application(s) Topical <User Schedule>  enoxaparin Injectable 40 milliGRAM(s) SubCutaneous every 24 hours  lactated ringers. 1000 milliLiter(s) (100 mL/Hr) IV Continuous <Continuous>  levothyroxine Injectable 56 MICROGram(s) IV Push at bedtime  lidocaine   4% Patch 1 Patch Transdermal every 24 hours    MEDICATIONS  (PRN):      Vital Signs Last 24 Hrs  T(C): 36.5 (12 Aug 2021 11:00), Max: 36.7 (12 Aug 2021 00:55)  T(F): 97.7 (12 Aug 2021 11:00), Max: 98.1 (12 Aug 2021 01:47)  HR: 85 (12 Aug 2021 13:00) (68 - 100)  BP: 120/87 (12 Aug 2021 13:00) (120/87 - 171/73)  BP(mean): 99 (12 Aug 2021 13:) (91 - 111)  RR: 18 (12 Aug 2021 13:00) (16 - 31)  SpO2: 100% (12 Aug 2021 13:00) (95% - 110%)  CAPILLARY BLOOD GLUCOSE      POCT Blood Glucose.: 112 mg/dL (11 Aug 2021 21:05)    I&O's Summary    11 Aug 2021 07:01  -  12 Aug 2021 07:00  --------------------------------------------------------  IN: 400 mL / OUT: 100 mL / NET: 300 mL    12 Aug 2021 07:01  -  12 Aug 2021 13:59  --------------------------------------------------------  IN: 700 mL / OUT: 400 mL / NET: 300 mL        PHYSICAL EXAM:  GENERAL: NAD, well-developed  HEAD:  Atraumatic, Normocephalic  EYES: EOMI, PERRLA, conjunctiva and sclera clear  NECK: Supple, No JVD  CHEST/LUNG: Clear to auscultation bilaterally; No wheeze  HEART: Regular rate and rhythm; No murmurs, rubs, or gallops  ABDOMEN: Soft, Nontender, Nondistended; Bowel sounds present  EXTREMITIES:  2+ Peripheral Pulses, No clubbing, cyanosis, or edema  PSYCH: AAOx3  NEUROLOGY: non-focal  SKIN: No rashes or lesions    LABS:                        12.5   11.79 )-----------( 222      ( 12 Aug 2021 03:43 )             37.4     08-12    133<L>  |  101  |  20  ----------------------------<  100<H>  4.3   |  20<L>  |  0.85    Ca    9.5      12 Aug 2021 03:43  Phos  2.9     08-12  Mg     1.8     08-12    TPro  7.2  /  Alb  3.2<L>  /  TBili  1.2  /  DBili  x   /  AST  120<H>  /  ALT  47<H>  /  AlkPhos  57  08-12    PT/INR - ( 11 Aug 2021 21:13 )   PT: 12.8 sec;   INR: 1.07 ratio         PTT - ( 11 Aug 2021 21:13 )  PTT:27.7 sec  CARDIAC MARKERS ( 12 Aug 2021 03:43 )  x     / x     / 3688 U/L / x     / 43.8 ng/mL  CARDIAC MARKERS ( 12 Aug 2021 01:27 )  x     / x     / x     / x     / 44.7 ng/mL  CARDIAC MARKERS ( 11 Aug 2021 21:13 )  x     / x     / 4509 U/L / x     / 54.0 ng/mL      Urinalysis Basic - ( 12 Aug 2021 01:44 )    Color: Light Yellow / Appearance: Clear / S.043 / pH: x  Gluc: x / Ketone: Small  / Bili: Negative / Urobili: Negative   Blood: x / Protein: 30 mg/dL / Nitrite: Negative   Leuk Esterase: Small / RBC: 6 /hpf / WBC 5 /HPF   Sq Epi: x / Non Sq Epi: 3 /hpf / Bacteria: Negative        RADIOLOGY & ADDITIONAL TESTS:    Imaging Personally Reviewed:    Consultant(s) Notes Reviewed:      Care Discussed with Consultants/Other Providers:   TRAUMA/ACUTE CARE SURGERY - HOSPITAL MEDICINE CO-MANAGEMENT INITIAL VISIT NOTE  Spectralink: [id number]    CHIEF COMPLAINT: Patient is a 89y old  Female who presents with a chief complaint of Found down at home with multiple rib fractures (12 Aug 2021 13:58)      HPI: 89F with h/o HTN, Hypothyroidism , history of brain surgery (1960s with residual right-sided facial droop) presenting after she was found down at home, found to have acute right 5-7th rib fractures with an altered mental status. Unclear how long pt was down ; may have been up to 24 hrs ; she was found conscious but incoherent. Per family baseline is AAOX 4  ; they have noticed some cognitive impairment over the past year.  Unable to obtain ROS on account of AMS.       History limited due to: [ ] Dementia  [ x ] Delirium  [ ] Condition    Allergies    clindamycin (Unknown)  IV Contrast (Unknown)  shellfish (Unknown)  strawberry (Unknown)    Intolerances        HOME MEDICATIONS: [ ] Reviewed  Home Medications:  amLODIPine 10 mg oral tablet: 1 tab(s) orally once a day (2016 11:16)  Bystolic 10 mg oral tablet: 1 tab(s) orally once a day (2016 11:16)  levothyroxine 75 mcg (0.075 mg) oral capsule: 1 cap(s) orally once a day (2016 11:16)  losartan 100 mg oral tablet: 1 tab(s) orally once a day (2016 11:16)      MEDICATIONS  (STANDING):  acetaminophen  IVPB .. 1000 milliGRAM(s) IV Intermittent every 6 hours  chlorhexidine 2% Cloths 1 Application(s) Topical <User Schedule>  enoxaparin Injectable 40 milliGRAM(s) SubCutaneous every 24 hours  lactated ringers. 1000 milliLiter(s) (100 mL/Hr) IV Continuous <Continuous>  levothyroxine Injectable 56 MICROGram(s) IV Push at bedtime  lidocaine   4% Patch 1 Patch Transdermal every 24 hours    MEDICATIONS  (PRN):      PAST MEDICAL & SURGICAL HISTORY:  Hypertension    Hypothyroidism    History of colon cancer  s/p Resection in , Dr. David    H/O brain tumor  History of brain tumor resection in .    [ ] Reviewed     Functional Assessment: [x ] Independent  [ ] Assistance  [ ] Total care  [ ] Non-ambulatory    SOCIAL HISTORY:  Residence: [ ] [facility]  [ ] SNF  [x ] Community  Lives alone ; fairly ADL independent  Family assists with iADLs    FAMILY HISTORY:  [x ] No pertinent family history in first degree relatives     REVIEW OF SYSTEMS:     [x ] Unable to obtain due to poor mental status    PHYSICAL EXAM:    Vital Signs Last 24 Hrs  T(C): 36.4 (12 Aug 2021 15:00), Max: 36.7 (12 Aug 2021 00:55)  T(F): 97.5 (12 Aug 2021 15:00), Max: 98.1 (12 Aug 2021 01:47)  HR: 91 (12 Aug 2021 15:00) (68 - 100)  BP: 153/72 (12 Aug 2021 15:00) (120/87 - 171/73)  BP(mean): 103 (12 Aug 2021 15:00) (88 - 111)  RR: 23 (12 Aug 2021 15:00) (16 - 31)  SpO2: 99% (12 Aug 2021 15:00) (95% - 110%)    GENERAL: NAD, frail  HEAD:  +lip swelling  EYES: conjunctiva and sclera clear  ENMT: Moist mucous membranes  NECK: Supple, No JVD  RESPIRATORY: Clear to auscultation bilaterally; No rales, rhonchi, wheezing, or rubs  CARDIOVASCULAR: Regular rate and rhythm; No murmurs, rubs, or gallops  GASTROINTESTINAL: Soft, Nontender, Nondistended; Bowel sounds present  GENITOURINARY: cruz in place draining clear urine  EXTREMITIES:  2+ Peripheral Pulses, No clubbing, cyanosis, or edema  NERVOUS SYSTEM:  Alert & Oriented X 1 ( self only) ; right sided facial droop (baseline), Moving all 4 extremities  SKIN: No rashes or lesions    LABS:                        12.5   11.79 )-----------( 222      ( 12 Aug 2021 03:43 )             37.4     Hemoglobin: 12.5 g/dL ( @ 03:43)  Hemoglobin: 12.9 g/dL ( @ 21:13)        133<L>  |  101  |  20  ----------------------------<  100<H>  4.3   |  20<L>  |  0.85    Ca    9.5      12 Aug 2021 03:43  Phos  2.9       Mg     1.8         TPro  7.2  /  Alb  3.2<L>  /  TBili  1.2  /  DBili  x   /  AST  120<H>  /  ALT  47<H>  /  AlkPhos  57  08-12    PT/INR - ( 11 Aug 2021 21:13 )   PT: 12.8 sec;   INR: 1.07 ratio         PTT - ( 11 Aug 2021 21:13 )  PTT:27.7 sec  Urinalysis Basic - ( 12 Aug 2021 01:44 )    Color: Light Yellow / Appearance: Clear / S.043 / pH: x  Gluc: x / Ketone: Small  / Bili: Negative / Urobili: Negative   Blood: x / Protein: 30 mg/dL / Nitrite: Negative   Leuk Esterase: Small / RBC: 6 /hpf / WBC 5 /HPF   Sq Epi: x / Non Sq Epi: 3 /hpf / Bacteria: Negative      CAPILLARY BLOOD GLUCOSE      POCT Blood Glucose.: 112 mg/dL (11 Aug 2021 21:05)        RADIOLOGY & ADDITIONAL STUDIES:    EKG:   Personally Reviewed:  [ x ] YES     Imaging:   Personally Reviewed:  [x ] YES               [ ] Consultant(s) Notes Reviewed  [x] Care Discussed with Consultants/Other Providers: Trauma Team    [x ] Fall risks identified:      [x ] Increased delirium risk    [x ] Delirium and other risks can be reduced by:          -early ambulation          -minimizing "tethers" - IV, oxygen, catheters, etc          -avoiding hypnotics and sedatives          -maintaining hydration/nutrition          -avoid anticholinergics - diphenhydramine, etc          -pain control          -supportive environment

## 2021-08-12 NOTE — PROVIDER CONTACT NOTE (CHANGE IN STATUS NOTIFICATION) - ASSESSMENT
Pt hemodynamically stable, saturating well on RA. Following commands (showing 2 fingers, moving feet, lifting legs). But incomprehensibly rambling, and not answering orientation questions, pupils 2mm R sluggish. Pt hemodynamically stable, saturating well on RA. Following commands. But incomprehensibly rambling, and not making eye contact. not answering orientation questions, pupils 2mm R sluggish equal.

## 2021-08-12 NOTE — PHYSICAL THERAPY INITIAL EVALUATION ADULT - TRANSFER TRAINING, PT EVAL
GOAL: Patient will transfer between sitting/standing with Mati with use of rolling walker within 2 weeks.

## 2021-08-12 NOTE — CONSULT NOTE ADULT - ASSESSMENT
89F with h/o HTN, Hypothyroidism presenting after she was found down at home, found to have acute right 5-7th rib fractures with an altered mental status. Unclear how long pt was down ; will need syncope work up.

## 2021-08-12 NOTE — PHYSICAL THERAPY INITIAL EVALUATION ADULT - ADDITIONAL COMMENTS
Per Care Coordination Assessment 8/12/21 by CHUCK, social hx/PLOF gathered from son (Maykel). Patient lives in an apt with +elevator. Independent with ADLs, ambulates with small shopping cart. No HHA. Son does grocery shopping, visits 1-2x/week. Patient goes to salon 1x/week around corner.

## 2021-08-12 NOTE — PHYSICAL THERAPY INITIAL EVALUATION ADULT - PERTINENT HX OF CURRENT PROBLEM, REHAB EVAL
Patient is a 89yo F with a history of brain surgery (1960s with residual right-sided facial droop), R hemicolectomy (2009, Dr. David), HTN, hypothyroidism presenting after she was found down at home, found to have acute right 5-7th rib fractures with an altered mental status. Patient hemodynamically stable not requiring supplemental oxygen.

## 2021-08-12 NOTE — OCCUPATIONAL THERAPY INITIAL EVALUATION ADULT - PERTINENT HX OF CURRENT PROBLEM, REHAB EVAL
89F with a history of brain surgery (1960s with residual right-sided facial droop), R hemicolectomy (2009, Dr. David), HTN, hypothyroidism presenting after she was found down at home. Per patient's son, he saw her on Tuesday at 1pm then did not hear from her after repeated phone calls. Son went to home and found her down in the kitchen at around 7pm Wednesday.

## 2021-08-12 NOTE — H&P ADULT - HISTORY OF PRESENT ILLNESS
Patient is a 89yo F with a history of brain surgery (1960s with residual right-sided facial droop), R hemicolectomy (2009, Dr. David), HTN, hypothyroidism presenting after she was found down at home. Per patient's son, he saw her on Tuesday at 1pm then did not hear from her after repeated phone calls. Son went to home and found her down in the kitchen at around 7pm Wednesday. Patient had bruising over her lip and loss of urine. After event, patient has been AAOx1-2 but she is AAOx4 at baseline. Pulling 500ml on IS.    VITALS:  Vital Signs Last 24 Hrs  T(C): 36.7 (12 Aug 2021 01:47), Max: 36.7 (12 Aug 2021 00:55)  T(F): 98.1 (12 Aug 2021 01:47), Max: 98.1 (12 Aug 2021 01:47)  HR: 100 (12 Aug 2021 01:47) (89 - 100)  BP: 145/78 (12 Aug 2021 00:55) (145/78 - 153/98)  RR: 16 (12 Aug 2021 00:55) (16 - 17)  SpO2: 98% (12 Aug 2021 00:55) (96% - 98%)    PRIMARY SURVEY:  A - Airway intact.  B - Bilateral breath sounds and equal chest rise. SpO2 100% on RA.  C - Initially BP: 145/78 (08-12-21 @ 00:55), palpable pulses in all extremities.  D - GCS 14 (E4 V4 M6)  E - Exposure obtained.    SECONDARY SURVEY:  Gen: confused, trying to get off stretcher but easily redirected  HEENT: Normocephalic, atraumatic. Small right lateral lip bruising. No midline cervical tenderness. Trachea midline. Cervical collar cleared.  Pulm: No respiratory distress.   Chest: No tenderness to palpation. No crepitus. No ecchymosis or abrasions.  Abd: Soft, nontender, nondistended, no rebound, no guarding.  Hips: Stable.   Ext: No gross deformities. Sensation and strength intact. Palpable radial pulses bilaterally, palpable dorsalis pedis pulses bilaterally. Chronic-appearing L ankle edema.  Back: mild tenderness of thoracic spine at level of T3-T4, no paraspinal tenderness, no palpable runoff, stepoff, or deformity.

## 2021-08-12 NOTE — H&P ADULT - ASSESSMENT
Patient is a 89yo F with a history of brain surgery (1960s with residual right-sided facial droop), R hemicolectomy (2009, Dr. David), HTN, hypothyroidism presenting after she was found down at home, found to have acute right 5-7th rib fractures with an altered mental status. Patient hemodynamically stable not requiring supplemental oxygen.    Plan:  - Admit to Acute Care Surgery, under Dr. Roque/Chelly, SICU bed  - RIB score 2 (>6 rib fractures; anterior, lateral and posterior rib fractures)  - Resume home meds  - Cardiac markers elevated but stable. Will consider TTE for further evaluation.  - PT eval  - Encourage IS    Seen and discussed with attending.    CHAPINCITO Baron, PGY2  Acute Care Surgery [id number]  Patient is a 89yo F with a history of brain surgery (1960s with residual right-sided facial droop), R hemicolectomy (2009, Dr. David), HTN, hypothyroidism presenting after she was found down at home, found to have acute right 5-7th rib fractures with an altered mental status. Patient hemodynamically stable not requiring supplemental oxygen.    Plan:  - Admit to Acute Care Surgery, under Dr. Roque/Chelly, SICU bed  - RIB score 2 (>6 rib fractures; anterior, lateral and posterior rib fractures)  - Resume home meds  - PT eval  - Encourage IS    Seen and discussed with attending.    CHAPINCITO Baron, PGY2  Acute Care Surgery [phone]

## 2021-08-12 NOTE — CONSULT NOTE ADULT - PROBLEM SELECTOR RECOMMENDATION 9
- found to have  acute right 5-7th rib fractures  - pain control  - would avoid NSAIDs and opiates in this patient  - can try Tramadol prn  - continue with  IS   - mgt per primary team

## 2021-08-12 NOTE — OCCUPATIONAL THERAPY INITIAL EVALUATION ADULT - LIVES WITH, PROFILE
Pt lives in apartment with elevator access. Pt was independent with self care and uses shopping cart for ambulation. Pt's son involved, visits 1-2x week and assists with IADLs.

## 2021-08-12 NOTE — CONSULT NOTE ADULT - PROBLEM SELECTOR RECOMMENDATION 3
- per son pt likely had a mechanical fall due to an unsteady shopping cart which she uses for balance ; has been refusing to use a walker  - given that pt was down for an unknown period of time w/possible LOC will need syncope w/up which is now in progress   - f/up TTE   - f/up Carotid duplex US  - per son pt likely had a mechanical fall due to an unsteady shopping cart which she uses for balance ; has been refusing to use a walker

## 2021-08-12 NOTE — OCCUPATIONAL THERAPY INITIAL EVALUATION ADULT - ADDITIONAL COMMENTS
CT Abdomen and Pelvis w/ IV Cont: IMPRESSION: Acute right fifth through eleventh rib fractures, as detailed above. Trace rightpleural effusion. No pneumothorax. No CT evidence of acute traumatic sequelae within the abdomen or pelvis. Indeterminant left lower pole renal lesion. Further evaluation with nonemergent renal sonogram may be obtained. CTH (-). Xray Pelvis AP: (-) Xray Chest: Few fracture deformity of the right lower lateral ribs, new since 7/2/2016. Dedicated rib series and/or cross-sectional imaging can be obtained for further evaluation as clinically indicated. Clear lungs.

## 2021-08-12 NOTE — CONSULT NOTE ADULT - ATTENDING COMMENTS
Patient seen and examined in SICU  89 year old s/p likely low level fall  Evaluation in ED showing 5 - 11 rib fractures  Brought to SICU for close monitoring, pain control.

## 2021-08-12 NOTE — H&P ADULT - ATTENDING COMMENTS
low energy fall - unwitnessed  trauma workup most significant for multiple rib fractures  altered mental status but no traumatic injury appreciated on CT   extreme of age at 89  rib score calculated to be 2 - SICU consultation obtained  multimodal pain control, medical co-management, physical therapy evaluation

## 2021-08-12 NOTE — OCCUPATIONAL THERAPY INITIAL EVALUATION ADULT - PRECAUTIONS/LIMITATIONS, REHAB EVAL
Patient had bruising over her lip and loss of urine. After event, patient has been AAOx1-2 but she is AAOx4 at baseline. Pulling 500ml on IS. Injuries include rib fractures R5-11

## 2021-08-12 NOTE — CONSULT NOTE ADULT - PROBLEM SELECTOR RECOMMENDATION 4
- on Losartan 100mg/Amlodipine 10mg / Bystolic 10mg po daily at home  - would restart Bystolic to prevent reflex tachycardia

## 2021-08-12 NOTE — OCCUPATIONAL THERAPY INITIAL EVALUATION ADULT - DIAGNOSIS, OT EVAL
Pt p/w impaired balance, strength, endurance, coordination, motor control, vision, cognition, safety awareness impacting ind with ADLs and fxnl mobility.

## 2021-08-12 NOTE — PROVIDER CONTACT NOTE (CHANGE IN STATUS NOTIFICATION) - ACTION/TREATMENT ORDERED:
Continue to monitor. CTA ordered and then cancelled by health care proxy. SICU team aware. Continue to monitor. CTA ordered. Consent not attained from health care proxy. CTA currently on hold. Continue to monitor pt mental status, SICU team aware.

## 2021-08-12 NOTE — CONSULT NOTE ADULT - PROBLEM SELECTOR RECOMMENDATION 2
- unclear etiology  - CT head unremarkable  - UA negative for UTI , CXR negative for infilterate  - can check RVP  - fall precautions  - avoid benzos, opiates  - pt is currently NPO  - would obtain SLP eval  - aspiration precautions

## 2021-08-12 NOTE — H&P ADULT - NSHPLABSRESULTS_GEN_ALL_CORE
LABS:                          12.9   12.97 )-----------( 204      ( 11 Aug 2021 21:13 )             39.1       08-    135  |  100  |  22  ----------------------------<  118<H>  3.8   |  21<L>  |  0.94    Ca    10.3      11 Aug 2021 21:13  Mg     1.9         TPro  7.7  /  Alb  3.5  /  TBili  1.3<H>  /  DBili  x   /  AST  112<H>  /  ALT  43  /  AlkPhos  67            Urinalysis Basic - ( 12 Aug 2021 01:44 )    Color: Light Yellow / Appearance: Clear / S.043 / pH: x  Gluc: x / Ketone: Small  / Bili: Negative / Urobili: Negative   Blood: x / Protein: 30 mg/dL / Nitrite: Negative   Leuk Esterase: Small / RBC: 6 /hpf / WBC 5 /HPF   Sq Epi: x / Non Sq Epi: 3 /hpf / Bacteria: Negative      PT/INR - ( 11 Aug 2021 21:13 )   PT: 12.8 sec;   INR: 1.07 ratio      PTT - ( 11 Aug 2021 21:13 )  PTT:27.7 sec      CARDIAC MARKERS ( 12 Aug 2021 01:27 )  x     / x     / x     / x     / 44.7 ng/mL  CARDIAC MARKERS ( 11 Aug 2021 21:13 )  x     / x     / 4509 U/L / x     / 54.0 ng/mL    CAPILLARY BLOOD GLUCOSE  POCT Blood Glucose.: 112 mg/dL (11 Aug 2021 21:05)    _______________________________________  RADIOLOGY & ADDITIONAL STUDIES:    < from: CT Head No Cont (21 @ 23:24) >    IMPRESSION:    Head CT: No acute intracranial hemorrhage or calvarial fracture.    Cervical spine CT: No acute cervical spine fracture or evidence of traumatic malalignment.    < end of copied text >    ===    < from: CT Chest w/ IV Cont (21 @ 23:31) >    IMPRESSION:    Acute right fifth through eleventh rib fractures, as detailed above.    Trace rightpleural effusion. No pneumothorax.    No CT evidence of acute traumatic sequelae within the abdomen or pelvis.    Indeterminant left lower pole renal lesion. Further evaluation with nonemergent renal sonogram may be obtained.    < end of copied text >    ===    < from: Xray Chest 1 View- PORTABLE-Urgent (Xray Chest 1 View- PORTABLE-Urgent .) (21 @ 21:29) >    IMPRESSION:    Few fracture deformity of the right lower lateral ribs, new since 2016. Dedicated rib series and/or cross-sectional imaging can be obtained for further evaluation as clinically indicated.    < end of copied text >    ===    < from: Xray Pelvis AP only (21 @ 21:29) >    IMPRESSION:    No acute fracture or dislocation.    < end of copied text >

## 2021-08-12 NOTE — PROCEDURE NOTE - ADDITIONAL PROCEDURE DETAILS
called for difficult cruz for patient with urinary retention. prior attempts by SICU RNs and PA unsuccessful, unable to visualize urethra.     14f coude placed using sterile technique using a sterile finger to guide cruz into place. Urethra unable to be visualized due to vaginal atrophy. clear urine drained. pt tolerated well.     cruz per primary team. please record output once done draining.

## 2021-08-12 NOTE — H&P ADULT - NSHPPHYSICALEXAM_GEN_ALL_CORE
ICU Vital Signs Last 24 Hrs  T(C): 36.7 (12 Aug 2021 02:46), Max: 36.7 (12 Aug 2021 00:55)  T(F): 98 (12 Aug 2021 02:46), Max: 98.1 (12 Aug 2021 01:47)  HR: 68 (12 Aug 2021 02:46) (68 - 100)  BP: 161/78 (12 Aug 2021 02:46) (145/78 - 161/78)  RR: 18 (12 Aug 2021 02:46) (16 - 18)  SpO2: 95% (12 Aug 2021 02:46) (95% - 98%)      SECONDARY SURVEY:  Gen: confused, trying to get off stretcher but easily redirected  HEENT: Normocephalic, atraumatic. Small right lateral lip bruising. No midline cervical tenderness. Trachea midline. Cervical collar cleared.  Pulm: No respiratory distress.   Chest: No tenderness to palpation. No crepitus. No ecchymosis or abrasions.  Abd: Soft, nontender, nondistended, no rebound, no guarding.  Hips: Stable.   Ext: No gross deformities. Sensation and strength intact. Palpable radial pulses bilaterally, palpable dorsalis pedis pulses bilaterally. Chronic-appearing L ankle edema.  Back: mild tenderness of thoracic spine at level of T3-T4, no paraspinal tenderness, no palpable runoff, stepoff, or deformity.

## 2021-08-13 ENCOUNTER — TRANSCRIPTION ENCOUNTER (OUTPATIENT)
Age: 87
End: 2021-08-13

## 2021-08-13 DIAGNOSIS — E04.1 NONTOXIC SINGLE THYROID NODULE: ICD-10-CM

## 2021-08-13 DIAGNOSIS — Z71.89 OTHER SPECIFIED COUNSELING: ICD-10-CM

## 2021-08-13 LAB
ALBUMIN SERPL ELPH-MCNC: 3 G/DL — LOW (ref 3.3–5)
ALP SERPL-CCNC: 54 U/L — SIGNIFICANT CHANGE UP (ref 40–120)
ALT FLD-CCNC: 50 U/L — HIGH (ref 10–45)
ANION GAP SERPL CALC-SCNC: 14 MMOL/L — SIGNIFICANT CHANGE UP (ref 5–17)
AST SERPL-CCNC: 94 U/L — HIGH (ref 10–40)
BILIRUB DIRECT SERPL-MCNC: 0.2 MG/DL — SIGNIFICANT CHANGE UP (ref 0–0.2)
BILIRUB INDIRECT FLD-MCNC: 0.6 MG/DL — SIGNIFICANT CHANGE UP (ref 0.2–1)
BILIRUB SERPL-MCNC: 0.8 MG/DL — SIGNIFICANT CHANGE UP (ref 0.2–1.2)
BUN SERPL-MCNC: 20 MG/DL — SIGNIFICANT CHANGE UP (ref 7–23)
CALCIUM SERPL-MCNC: 9.5 MG/DL — SIGNIFICANT CHANGE UP (ref 8.4–10.5)
CHLORIDE SERPL-SCNC: 101 MMOL/L — SIGNIFICANT CHANGE UP (ref 96–108)
CK SERPL-CCNC: 1391 U/L — HIGH (ref 25–170)
CO2 SERPL-SCNC: 20 MMOL/L — LOW (ref 22–31)
COVID-19 SPIKE DOMAIN AB INTERP: NEGATIVE — SIGNIFICANT CHANGE UP
COVID-19 SPIKE DOMAIN ANTIBODY RESULT: 0.4 U/ML — SIGNIFICANT CHANGE UP
CREAT SERPL-MCNC: 0.92 MG/DL — SIGNIFICANT CHANGE UP (ref 0.5–1.3)
CULTURE RESULTS: SIGNIFICANT CHANGE UP
FOLATE SERPL-MCNC: 4.6 NG/ML — LOW
GLUCOSE SERPL-MCNC: 102 MG/DL — HIGH (ref 70–99)
HCT VFR BLD CALC: 34.7 % — SIGNIFICANT CHANGE UP (ref 34.5–45)
HGB BLD-MCNC: 11.5 G/DL — SIGNIFICANT CHANGE UP (ref 11.5–15.5)
MAGNESIUM SERPL-MCNC: 2.2 MG/DL — SIGNIFICANT CHANGE UP (ref 1.6–2.6)
MCHC RBC-ENTMCNC: 30.9 PG — SIGNIFICANT CHANGE UP (ref 27–34)
MCHC RBC-ENTMCNC: 33.1 GM/DL — SIGNIFICANT CHANGE UP (ref 32–36)
MCV RBC AUTO: 93.3 FL — SIGNIFICANT CHANGE UP (ref 80–100)
NRBC # BLD: 0 /100 WBCS — SIGNIFICANT CHANGE UP (ref 0–0)
PHOSPHATE SERPL-MCNC: 2.4 MG/DL — LOW (ref 2.5–4.5)
PLATELET # BLD AUTO: 187 K/UL — SIGNIFICANT CHANGE UP (ref 150–400)
POTASSIUM SERPL-MCNC: 3.8 MMOL/L — SIGNIFICANT CHANGE UP (ref 3.5–5.3)
POTASSIUM SERPL-SCNC: 3.8 MMOL/L — SIGNIFICANT CHANGE UP (ref 3.5–5.3)
PROT SERPL-MCNC: 6.6 G/DL — SIGNIFICANT CHANGE UP (ref 6–8.3)
RBC # BLD: 3.72 M/UL — LOW (ref 3.8–5.2)
RBC # FLD: 13.7 % — SIGNIFICANT CHANGE UP (ref 10.3–14.5)
SARS-COV-2 IGG+IGM SERPL QL IA: 0.4 U/ML — SIGNIFICANT CHANGE UP
SARS-COV-2 IGG+IGM SERPL QL IA: NEGATIVE — SIGNIFICANT CHANGE UP
SODIUM SERPL-SCNC: 135 MMOL/L — SIGNIFICANT CHANGE UP (ref 135–145)
SPECIMEN SOURCE: SIGNIFICANT CHANGE UP
VIT B12 SERPL-MCNC: 295 PG/ML — SIGNIFICANT CHANGE UP (ref 232–1245)
WBC # BLD: 9.4 K/UL — SIGNIFICANT CHANGE UP (ref 3.8–10.5)
WBC # FLD AUTO: 9.4 K/UL — SIGNIFICANT CHANGE UP (ref 3.8–10.5)

## 2021-08-13 PROCEDURE — 71045 X-RAY EXAM CHEST 1 VIEW: CPT | Mod: 26

## 2021-08-13 PROCEDURE — 99233 SBSQ HOSP IP/OBS HIGH 50: CPT

## 2021-08-13 RX ORDER — OLANZAPINE 15 MG/1
5 TABLET, FILM COATED ORAL AT BEDTIME
Refills: 0 | Status: DISCONTINUED | OUTPATIENT
Start: 2021-08-13 | End: 2021-08-14

## 2021-08-13 RX ORDER — SODIUM CHLORIDE 9 MG/ML
500 INJECTION, SOLUTION INTRAVENOUS ONCE
Refills: 0 | Status: COMPLETED | OUTPATIENT
Start: 2021-08-13 | End: 2021-08-13

## 2021-08-13 RX ORDER — SODIUM CHLORIDE 9 MG/ML
1000 INJECTION, SOLUTION INTRAVENOUS
Refills: 0 | Status: DISCONTINUED | OUTPATIENT
Start: 2021-08-13 | End: 2021-08-15

## 2021-08-13 RX ORDER — FUROSEMIDE 40 MG
20 TABLET ORAL ONCE
Refills: 0 | Status: COMPLETED | OUTPATIENT
Start: 2021-08-13 | End: 2021-08-13

## 2021-08-13 RX ORDER — HALOPERIDOL DECANOATE 100 MG/ML
5 INJECTION INTRAMUSCULAR ONCE
Refills: 0 | Status: COMPLETED | OUTPATIENT
Start: 2021-08-13 | End: 2021-08-13

## 2021-08-13 RX ORDER — POTASSIUM PHOSPHATE, MONOBASIC POTASSIUM PHOSPHATE, DIBASIC 236; 224 MG/ML; MG/ML
30 INJECTION, SOLUTION INTRAVENOUS ONCE
Refills: 0 | Status: COMPLETED | OUTPATIENT
Start: 2021-08-13 | End: 2021-08-13

## 2021-08-13 RX ORDER — METOPROLOL TARTRATE 50 MG
5 TABLET ORAL EVERY 4 HOURS
Refills: 0 | Status: DISCONTINUED | OUTPATIENT
Start: 2021-08-13 | End: 2021-08-14

## 2021-08-13 RX ORDER — HALOPERIDOL DECANOATE 100 MG/ML
5 INJECTION INTRAMUSCULAR ONCE
Refills: 0 | Status: DISCONTINUED | OUTPATIENT
Start: 2021-08-13 | End: 2021-08-13

## 2021-08-13 RX ORDER — SODIUM CHLORIDE 9 MG/ML
1000 INJECTION, SOLUTION INTRAVENOUS
Refills: 0 | Status: DISCONTINUED | OUTPATIENT
Start: 2021-08-13 | End: 2021-08-13

## 2021-08-13 RX ADMIN — SODIUM CHLORIDE 30 MILLILITER(S): 9 INJECTION, SOLUTION INTRAVENOUS at 21:44

## 2021-08-13 RX ADMIN — Medication 20 MILLIGRAM(S): at 11:01

## 2021-08-13 RX ADMIN — LIDOCAINE 1 PATCH: 4 CREAM TOPICAL at 02:49

## 2021-08-13 RX ADMIN — Medication 5 MILLIGRAM(S): at 10:26

## 2021-08-13 RX ADMIN — POTASSIUM PHOSPHATE, MONOBASIC POTASSIUM PHOSPHATE, DIBASIC 83.33 MILLIMOLE(S): 236; 224 INJECTION, SOLUTION INTRAVENOUS at 03:14

## 2021-08-13 RX ADMIN — Medication 5 MILLIGRAM(S): at 02:49

## 2021-08-13 RX ADMIN — ENOXAPARIN SODIUM 40 MILLIGRAM(S): 100 INJECTION SUBCUTANEOUS at 10:26

## 2021-08-13 RX ADMIN — Medication 5 MILLIGRAM(S): at 19:50

## 2021-08-13 RX ADMIN — Medication 5 MILLIGRAM(S): at 22:58

## 2021-08-13 RX ADMIN — Medication 20 MILLIGRAM(S): at 13:10

## 2021-08-13 RX ADMIN — OLANZAPINE 5 MILLIGRAM(S): 15 TABLET, FILM COATED ORAL at 21:43

## 2021-08-13 RX ADMIN — SODIUM CHLORIDE 500 MILLILITER(S): 9 INJECTION, SOLUTION INTRAVENOUS at 02:48

## 2021-08-13 RX ADMIN — SODIUM CHLORIDE 100 MILLILITER(S): 9 INJECTION, SOLUTION INTRAVENOUS at 06:32

## 2021-08-13 RX ADMIN — Medication 5 MILLIGRAM(S): at 15:53

## 2021-08-13 RX ADMIN — CHLORHEXIDINE GLUCONATE 1 APPLICATION(S): 213 SOLUTION TOPICAL at 06:31

## 2021-08-13 RX ADMIN — LIDOCAINE 1 PATCH: 4 CREAM TOPICAL at 14:00

## 2021-08-13 RX ADMIN — Medication 56 MICROGRAM(S): at 21:44

## 2021-08-13 RX ADMIN — HALOPERIDOL DECANOATE 5 MILLIGRAM(S): 100 INJECTION INTRAMUSCULAR at 00:23

## 2021-08-13 RX ADMIN — Medication 5 MILLIGRAM(S): at 06:31

## 2021-08-13 NOTE — PROGRESS NOTE ADULT - PROBLEM SELECTOR PLAN 4
on Losartan 100mg/Amlodipine 10mg / Bystolic 10mg po daily at home  - started on IV Lopressor to prevent reflex tachycardia.

## 2021-08-13 NOTE — DISCHARGE NOTE PROVIDER - NSDCMRMEDTOKEN_GEN_ALL_CORE_FT
amLODIPine 10 mg oral tablet: 1 tab(s) orally once a day  Bystolic 10 mg oral tablet: 1 tab(s) orally once a day  levothyroxine 75 mcg (0.075 mg) oral capsule: 1 cap(s) orally once a day  losartan 100 mg oral tablet: 1 tab(s) orally once a day   acetaminophen 325 mg oral tablet: 2 tab(s) orally every 6 hours, As needed, Mild Pain (1 - 3)  ipratropium-albuterol 0.5 mg-2.5 mg/3 mL inhalation solution: 3 milliliter(s) inhaled every 6 hours  levothyroxine 75 mcg (0.075 mg) oral capsule: 1 cap(s) orally once a day  Metoprolol Tartrate 25 mg oral tablet: 1 tab(s) orally every 12 hours

## 2021-08-13 NOTE — PROGRESS NOTE ADULT - PROBLEM SELECTOR PLAN 5
On Synthroid 75mcg PO   - c/w IV Synthroid while NPO. On Synthroid 75mcg PO   - c/w IV Synthroid while NPO.  - Free T3 58 ; TSH 4.6  - would repeat TFTs in 4 weeks

## 2021-08-13 NOTE — PROVIDER CONTACT NOTE (CHANGE IN STATUS NOTIFICATION) - ASSESSMENT
VSS, pt appears restless, disoriented and delirious. Not following commands but reacting to stimulus and withdrawing from pain.

## 2021-08-13 NOTE — PROGRESS NOTE ADULT - PROBLEM SELECTOR PLAN 8
Discussed pt's advanced directives with her son Maykel. He is also her HCP ( will bring in the paperwork).   Pt is full code at this time and would not want a PEG tube.

## 2021-08-13 NOTE — CHART NOTE - NSCHARTNOTEFT_GEN_A_CORE
Tertiary Trauma Survey (TTS)    Date of TTS: 2021                             Time: 2:30 PM  Admit Date: 2021                             Trauma Activation: Consult  Admit GCS: E-4     V-4     M-6     HPI:  Patient is a 89yo F with a history of brain surgery ( with residual right-sided facial droop), R hemicolectomy (, Dr. David), HTN, hypothyroidism presenting after she was found down at home. Per patient's son, he saw her on Tuesday at 1pm then did not hear from her after repeated phone calls. Son went to home and found her down in the kitchen at around 7pm Wednesday. Patient had bruising over her lip and loss of urine. After event, patient has been AAOx1-2 but she is AAOx4 at baseline. Pulling 500ml on IS.      PAST MEDICAL & SURGICAL HISTORY:  Hypertension    Hypothyroidism    History of colon cancer  s/p Resection in , Dr. David    H/O brain tumor  History of brain tumor resection in .      PRIMARY SURVEY:  A - Airway intact.  B - Bilateral breath sounds and equal chest rise. SpO2 100% on RA.  C - Initially BP: 145/78 (21 @ 00:55), palpable pulses in all extremities.  D - GCS 14 (E4 V4 M6)  E - Exposure obtained.    SECONDARY SURVEY:  Gen: confused, trying to get off stretcher but easily redirected  HEENT: Normocephalic, atraumatic. Small right lateral lip bruising. No midline cervical tenderness. Trachea midline. Cervical collar cleared.  Pulm: No respiratory distress.   Chest: No tenderness to palpation. No crepitus. No ecchymosis or abrasions.  Abd: Soft, nontender, nondistended, no rebound, no guarding.  Hips: Stable.   Ext: No gross deformities. Sensation and strength intact. Palpable radial pulses bilaterally, palpable dorsalis pedis pulses bilaterally. Chronic-appearing L ankle edema.  Back: mild tenderness of thoracic spine at level of T3-T4, no paraspinal tenderness, no palpable runoff, stepoff, or deformity.   (12 Aug 2021 02:34)      TERTIARY SURVEY:   GEN: resting comfortably in bed, in NAD  HEENT: normocephalic, non-tender to palpation, no abrasions visible, no step-offs palpated  NECK: no JVD, non-tender to palpation at posterior midline, no pain with flexion, extension, and bilateral neck rotation  CHEST: non-tender to palpation across clavicles and b/l anterior ribs  BACK: non-tender to palpation along cervical, thoracic, lumbar spine midline and b/l posterior ribs; no palpable step-offs or hematomas  ABD: soft, non-distended, non-tender to palpation in all quadrants without rebound tenderness or guarding  LUE: non-tender to palpation across upper and lower arm, 5/5  strength, fingers warm, well-perfused, full ROM in shoulder, elbow, wrist, and fingers, palpable radial + ulnar pulses  RUE: non-tender to palpation across upper and lower arm, 5/5  strength, fingers warm, well-perfused, full ROM in shoulder, elbow, wrist, and fingers, palpable radial + ulnar pulses  LLE: non-tender to palpation across upper and lower leg; full ROM in hip, knee, ankle, and toes, 5/5 dorsiflexion + plantarflexion, palpable DP + PT pulses; warm, well-perfused  RLE: non-tender to palpation across upper and lower leg; full ROM in hip, knee, ankle, and toes, 5/5 dorsiflexion + plantarflexion, palpable DP + PT pulses; warm, well-perfused  NEURO: AAOx4, no focal neuro deficits; CN II-IX intact     Medications (inpatient): chlorhexidine 2% Cloths 1 Application(s) Topical <User Schedule>  dextrose 5% + lactated ringers. 1000 milliLiter(s) IV Continuous <Continuous>  enoxaparin Injectable 40 milliGRAM(s) SubCutaneous every 24 hours  levothyroxine Injectable 56 MICROGram(s) IV Push at bedtime  lidocaine   4% Patch 1 Patch Transdermal every 24 hours  metoprolol tartrate Injectable 5 milliGRAM(s) IV Push every 4 hours  OLANZapine Disintegrating Tablet 5 milliGRAM(s) Oral at bedtime    Medications (PRN):  Allergies: clindamycin (Unknown)  IV Contrast (Unknown)  shellfish (Unknown)  strawberry (Unknown)  (Intolerances: )    Vital Signs Last 24 Hrs  T(C): 36.5 (13 Aug 2021 11:00), Max: 37 (12 Aug 2021 19:00)  T(F): 97.7 (13 Aug 2021 11:00), Max: 98.6 (12 Aug 2021 19:00)  HR: 116 (13 Aug 2021 13:00) (81 - 154)  BP: 174/111 (13 Aug 2021 13:00) (92/47 - 183/88)  BP(mean): 130 (13 Aug 2021 13:00) (67 - 132)  RR: 40 (13 Aug 2021 13:00) (15 - 40)  SpO2: 97% (13 Aug 2021 13:00) (92% - 99%)  Drug Dosing Weight  Height (cm): 165.1 (11 Aug 2021 20:36)  Weight (kg): 68.3 (12 Aug 2021 03:10)  BMI (kg/m2): 25.1 (12 Aug 2021 03:10)  BSA (m2): 1.75 (12 Aug 2021 03:10)                          11.5   9.40  )-----------( 187      ( 13 Aug 2021 03:12 )             34.7     08-13    135  |  101  |  20  ----------------------------<  102<H>  3.8   |  20<L>  |  0.92    Ca    9.5      13 Aug 2021 03:12  Phos  2.4     08-13  Mg     2.2     08-13    TPro  6.6  /  Alb  3.0<L>  /  TBili  0.8  /  DBili  0.2  /  AST  94<H>  /  ALT  50<H>  /  AlkPhos  54  08-13    PT/INR - ( 11 Aug 2021 21:13 )   PT: 12.8 sec;   INR: 1.07 ratio         PTT - ( 11 Aug 2021 21:13 )  PTT:27.7 sec  Urinalysis Basic - ( 12 Aug 2021 01:44 )    Color: Light Yellow / Appearance: Clear / S.043 / pH: x  Gluc: x / Ketone: Small  / Bili: Negative / Urobili: Negative   Blood: x / Protein: 30 mg/dL / Nitrite: Negative   Leuk Esterase: Small / RBC: 6 /hpf / WBC 5 /HPF   Sq Epi: x / Non Sq Epi: 3 /hpf / Bacteria: Negative        List Operative and Interventional Radiological Procedures:     Consults (Date):  [  ] Neurosurgery   [  ] Orthopedics  [  ] Plastics  [  ] Urology  [  ] PM&R  [  ] Social Work    RADIOLOGICAL FINDINGS REVIEW:  CXR:    Pelvis Films:     C-Spine Films:    T/L/S Spine Films:    Extremity Films:    Head CT:    C-Spine CT:    Neck CT:    Chest CT:    ABD/Pelvis CT:    Other:    ASSESSMENT:   89yo Female with Hx     PLAN:       Known Injuries: Tertiary Trauma Survey (TTS)    Date of TTS: 2021                             Time: 2:30 PM  Admit Date: 2021                             Trauma Activation: Consult  Admit GCS: E-4     V-4     M-6     HPI:  Patient is a 89yo F with a history of brain surgery ( with residual right-sided facial droop), R hemicolectomy (, Dr. David), HTN, hypothyroidism presenting after she was found down at home. Per patient's son, he saw her on Tuesday at 1pm then did not hear from her after repeated phone calls. Son went to home and found her down in the kitchen at around 7pm Wednesday. Patient had bruising over her lip and loss of urine. After event, patient has been AAOx1-2 but she is AAOx4 at baseline. Pulling 500ml on IS.      PAST MEDICAL & SURGICAL HISTORY:  Hypertension    Hypothyroidism    History of colon cancer  s/p Resection in , Dr. David    H/O brain tumor  History of brain tumor resection in .      PRIMARY SURVEY:  A - Airway intact.  B - Bilateral breath sounds and equal chest rise. SpO2 100% on RA.  C - Initially BP: 145/78 (21 @ 00:55), palpable pulses in all extremities.  D - GCS 14 (E4 V4 M6)  E - Exposure obtained.    SECONDARY SURVEY:  Gen: confused, trying to get off stretcher but easily redirected  HEENT: Normocephalic, atraumatic. Small right lateral lip bruising. No midline cervical tenderness. Trachea midline. Cervical collar cleared.  Pulm: No respiratory distress.   Chest: No tenderness to palpation. No crepitus. No ecchymosis or abrasions.  Abd: Soft, nontender, nondistended, no rebound, no guarding.  Hips: Stable.   Ext: No gross deformities. Sensation and strength intact. Palpable radial pulses bilaterally, palpable dorsalis pedis pulses bilaterally. Chronic-appearing L ankle edema.  Back: mild tenderness of thoracic spine at level of T3-T4, no paraspinal tenderness, no palpable runoff, stepoff, or deformity.   (12 Aug 2021 02:34)      TERTIARY SURVEY:   GEN: resting comfortably in bed, in NAD  HEENT: normocephalic, non-tender to palpation, no abrasions visible, no step-offs palpated  NECK: no JVD, non-tender to palpation at posterior midline, no pain with flexion, extension, and bilateral neck rotation  CHEST: non-tender to palpation across clavicles and b/l anterior ribs  BACK: non-tender to palpation along cervical, thoracic, lumbar spine midline and b/l posterior ribs; no palpable step-offs or hematomas  ABD: soft, non-distended, non-tender to palpation in all quadrants without rebound tenderness or guarding  LUE: non-tender to palpation across upper and lower arm, 5/5  strength, fingers warm, well-perfused, full ROM in shoulder, elbow, wrist, and fingers, palpable radial + ulnar pulses  RUE: non-tender to palpation across upper and lower arm, 5/5  strength, fingers warm, well-perfused, full ROM in shoulder, elbow, wrist, and fingers, palpable radial + ulnar pulses  LLE: non-tender to palpation across upper and lower leg; full ROM in hip, knee, ankle, and toes, 5/5 dorsiflexion + plantarflexion, palpable DP + PT pulses; warm, well-perfused  RLE: non-tender to palpation across upper and lower leg; full ROM in hip, knee, ankle, and toes, 5/5 dorsiflexion + plantarflexion, palpable DP + PT pulses; warm, well-perfused  NEURO: AAOx4, no focal neuro deficits; CN II-IX intact     Medications (inpatient): chlorhexidine 2% Cloths 1 Application(s) Topical <User Schedule>  dextrose 5% + lactated ringers. 1000 milliLiter(s) IV Continuous <Continuous>  enoxaparin Injectable 40 milliGRAM(s) SubCutaneous every 24 hours  levothyroxine Injectable 56 MICROGram(s) IV Push at bedtime  lidocaine   4% Patch 1 Patch Transdermal every 24 hours  metoprolol tartrate Injectable 5 milliGRAM(s) IV Push every 4 hours  OLANZapine Disintegrating Tablet 5 milliGRAM(s) Oral at bedtime    Medications (PRN):  Allergies: clindamycin (Unknown)  IV Contrast (Unknown)  shellfish (Unknown)  strawberry (Unknown)  (Intolerances: )    Vital Signs Last 24 Hrs  T(C): 36.5 (13 Aug 2021 11:00), Max: 37 (12 Aug 2021 19:00)  T(F): 97.7 (13 Aug 2021 11:00), Max: 98.6 (12 Aug 2021 19:00)  HR: 116 (13 Aug 2021 13:00) (81 - 154)  BP: 174/111 (13 Aug 2021 13:00) (92/47 - 183/88)  BP(mean): 130 (13 Aug 2021 13:00) (67 - 132)  RR: 40 (13 Aug 2021 13:00) (15 - 40)  SpO2: 97% (13 Aug 2021 13:00) (92% - 99%)  Drug Dosing Weight  Height (cm): 165.1 (11 Aug 2021 20:36)  Weight (kg): 68.3 (12 Aug 2021 03:10)  BMI (kg/m2): 25.1 (12 Aug 2021 03:10)  BSA (m2): 1.75 (12 Aug 2021 03:10)                          11.5   9.40  )-----------( 187      ( 13 Aug 2021 03:12 )             34.7     08-13    135  |  101  |  20  ----------------------------<  102<H>  3.8   |  20<L>  |  0.92    Ca    9.5      13 Aug 2021 03:12  Phos  2.4     08-13  Mg     2.2     08-13    TPro  6.6  /  Alb  3.0<L>  /  TBili  0.8  /  DBili  0.2  /  AST  94<H>  /  ALT  50<H>  /  AlkPhos  54  08-13    PT/INR - ( 11 Aug 2021 21:13 )   PT: 12.8 sec;   INR: 1.07 ratio         PTT - ( 11 Aug 2021 21:13 )  PTT:27.7 sec  Urinalysis Basic - ( 12 Aug 2021 01:44 )    Color: Light Yellow / Appearance: Clear / S.043 / pH: x  Gluc: x / Ketone: Small  / Bili: Negative / Urobili: Negative   Blood: x / Protein: 30 mg/dL / Nitrite: Negative   Leuk Esterase: Small / RBC: 6 /hpf / WBC 5 /HPF   Sq Epi: x / Non Sq Epi: 3 /hpf / Bacteria: Negative        List Operative and Interventional Radiological Procedures:     Consults (Date):  [  ] Neurosurgery   [  ] Orthopedics  [  ] Plastics  [  ] Urology  [  ] PM&R  [  ] Social Work  [ x ] SICU 2021  [ x ] Hospitalist 2021    RADIOLOGICAL FINDINGS REVIEW:  EXAM:  XR CHEST PORTABLE URGENT 1V                            PROCEDURE DATE:  2021            INTERPRETATION:  CLINICAL INFORMATION: Status post fall.    EXAM: AP portable chest    COMPARISON: Chest radiograph from 2016.    FINDINGS:    The heart is not enlarged.    The lungs are clear. No pleural effusions or pneumothorax.    New fracture deformity of the right lower lateral ribs, new since 2016. Degenerative changes of the bones.    IMPRESSION:    New fracture deformity of the right lower lateral ribs, new since 2016. Dedicated rib series and/or cross-sectional imaging can be obtained for further evaluation as clinically indicated.    Clear lungs.      --- End of Report ---    EXAM:  PELVIS AP ONLY                            PROCEDURE DATE:  2021            INTERPRETATION:  CLINICAL INFORMATION: Status post fall. Pelvic pain.    TECHNIQUE: Frontal view of the pelvis..    COMPARISON: CT abdomen and pelvis 2016.    FINDINGS:    No acute fracture or dislocation. Mild bilateral hip arthrosis with joint space narrowing. Evaluation of the sacrum is limited due to overlying bowel gas. Unchanged sclerotic focus within the right intertrochanteric lesion, likely boneisland.    Vascular calcifications.    Soft tissues are intact.    IMPRESSION:    No acute fracture or dislocation.    --- End of Report ---    EXAM:  CT CERVICAL SPINE                          EXAM:  CT BRAIN                            PROCEDURE DATE:  2021            INTERPRETATION:  CLINICAL INFORMATION: Status post fall. Head injury.    TECHNIQUE: Noncontrast CT scan of the head and cervical spine was performed. The head CT portion was performed with 5 mm axial images with sagittal and coronal reformations. The cervical spine was performed with thin section axial images with sagittal and coronal reformations.    COMPARISON: CT head 2016..    FINDINGS:    HEAD:    Right cerebellar encephalomalacia/gliosis. No acute intracranial hemorrhage, extra axial fluid collection, hydrocephalus, mass effect or midline shift. The basal cisterns are patent.    Mild patchy hypodensities within the periventricular and subcortical white matter, although nonspecific, likely reflect chronic microvascular disease. Cerebral volume loss contributes to prominence of the ventricles and sulci.    Status post left lens replacement surgery. Status post right suboccipital craniectomy. Right parietal cuca hole. No calvarial fracture.    CERVICAL SPINE:    There is no evidence for acute fracture, subluxation, or prevertebral soft tissue widening. The craniocervical junction is unremarkable.Maintained atlantodental interval. Ossification of the transverse ligament. Preservation of the normal cervical lordosis.    Multilevel degenerative changes of the cervical spine with vertebral bodies/disc complexes osteophytes, uncovertebral/ligamentous hypertrophy and facet arthropathy with varying degrees of severe neuroforaminal and spinal canal narrowing. Severe intervertebral disc loss at C4-C5. Mild intervertebral disc loss at C5-C6 and C6-C7.    The lung apices are clear. Enlarged thyroidgland.    IMPRESSION:    Head CT: No acute intracranial hemorrhage or calvarial fracture.    Cervical spine CT: No acute cervical spine fracture or evidence of traumatic malalignment.    --- End of Report ---    EXAM:  CT ABDOMEN AND PELVIS IC                          EXAM:  CT CHEST IC                            PROCEDURE DATE:  2021            INTERPRETATION:  CLINICAL INFORMATION: Trauma. Status post fall.    COMPARISON: MR of the abdomen from 7/15/2016, CT abdomen/pelvis from 2016.    CONTRAST/COMPLICATIONS:  IV Contrast: Omnipaque 350  90 cc administered   10 cc discarded  Oral Contrast: None  Complications: None    PROCEDURE:  CT of the Chest, Abdomen and Pelvis was performed.  Imaging was performed through the chest in the arterial phase followed by imaging of the abdomen and pelvis in the portal venous phase.  Sagittal and coronal reformats were performed.    FINDINGS:  CHEST:  LUNGS AND LARGE AIRWAYS: Patent central airways. Bibasilar linear atelectasis.  PLEURA: Trace right-sided pleural effusion. No pneumothorax.  VESSELS: Atherosclerotic changes of the aorta and coronary arteries.  HEART: Heart size is normal. No pericardial effusion.  MEDIASTINUM AND SWETHA: No lymphadenopathy.  CHEST WALL AND LOWER NECK: Heterogeneous enlarged thyroid gland. 1.3 cm heterogeneously hypodense right thyroid lobe nodule not well characterized secondary to streak artifact.    ABDOMEN AND PELVIS:  LIVER: Within normal limits.  BILE DUCTS: Within normal limits.  GALLBLADDER: Cholelithiasis.  SPLEEN: Within normal limits.  PANCREAS: Within normal limits.  ADRENALS: Within normal limits.  KIDNEYS/URETERS: Subcentimeter hypodensities, too small to characterize. Left lower pole 1.1 cm hyperdense lesion, indeterminate.    BLADDER: Within normal limits.  REPRODUCTIVE ORGANS: Uterine calcifications which may be related to fibroids.    BOWEL: Small hiatal hernia. No bowel obstruction. Right hemicolectomy and ileocolic anastomosis.  PERITONEUM: No ascites. No retroperitoneal hematoma.  VESSELS: Atherosclerotic changes.  RETROPERITONEUM/LYMPH NODES: No lymphadenopathy.  ABDOMINAL WALL: Small fat-containing umbilical and nonobstructive small bowel loop containing right inguinal hernias. Right gluteal region subcutaneous calcified granulomas.  BONES: Dextroscoliosis of the lumbar spine with associated multilevel degenerative change. Acute nondisplaced fractures of the right anterior ribs 5-7th, minimally displaced lateral and posterior fracture of the right eighth and ninth ribs, mildly displaced fracture of the right 10th rib and  question nondisplaced fracture of the right 11th rib.    IMPRESSION:    Acute right fifth through eleventh rib fractures, as detailed above.    Trace rightpleural effusion. No pneumothorax.    No CT evidence of acute traumatic sequelae within the abdomen or pelvis.    Indeterminant left lower pole renal lesion. Further evaluation with nonemergent renal sonogram may be obtained.    These findings were discussed with Dr. Roberts at 2021 12:17 AM by Dr. Nova of Radiology with read back confirmation.          --- End of Report ---    EXAM:  CT REFORM SPINE T                            PROCEDURE DATE:  2021            INTERPRETATION:  .    CLINICAL INFORMATION: Reconstructed from the CT chest please. Tenderness to palpation.    TECHNIQUE: Better reformatted transaxial CT images of the thoracic spine was obtained from a concurrent contrast enhanced CT examination of the chest, abdomen, and pelvis. Axial, sagittal and coronal reformatted images were also reviewed.    COMPARISON: No prior thoracic spine CT studies are available for comparison.    FINDINGS: No acute fractures or dislocations are seen. There is mild exaggeration of the thoracic kyphotic curvature. A mild scoliotic curvature is seen to the thoracolumbar spine with a very mild leftward convex curvature tothe thoracic spine and a compensatory rightward convex curvature to the upper lumbar spine. No loss of vertebral body height is seen. No aggressive osteoblastic or osteolytic lesions are notable. Scattered degenerative lucencies and areas of sclerosis are seen throughout the vertebral bodies and facets. The thoracic disc spaces are grossly maintained.    Evaluation of the intraspinal contents is limited on CT modality. No gross spinal canal or foraminal narrowing is appreciated within the thoracic canal.    Please refer to the report for the concurrent dedicated chest, abdomen, and pelvis CT for findings regarding the thoracic, abdominal, and pelvic structures inclusive of right-sided rib fractures.    IMPRESSION: No acute fractures or dislocations within the thoracic spine.    Please refer to the report for the concurrent dedicated chest, abdomen, and pelvis CT for findings regarding the thoracic, abdominal, and pelvic structures inclusive of right-sided rib fractures.    --- End of Report ---    EXAM:  XR CHEST PORTABLE ROUTINE 1V                            PROCEDURE DATE:  2021            INTERPRETATION:  CLINICAL INFORMATION: Rib fractures status post fall.    TECHNIQUE: Single frontal view of the chest.    COMPARISON: Chest CT 1121    FINDINGS:    Multiple mildly displaced right rib fractures are better visualized on prior chest CT.  Right basilar subsegmental atelectasis. No pneumothorax.  The heart size is normal.    IMPRESSION:  Right basilar subsegmental atelectasis.  No pneumothorax.    --- End of Report ---        ASSESSMENT:   89yo Female with Hx     PLAN:       Known Injuries: Tertiary Trauma Survey (TTS)    Date of TTS: 2021                             Time: 2:30 PM  Admit Date: 2021                             Trauma Activation: Consult  Admit GCS: E-4     V-4     M-6     HPI:  Patient is a 89yo F with a history of brain surgery ( with residual right-sided facial droop), R hemicolectomy (, Dr. David), HTN, hypothyroidism presenting after she was found down at home. Per patient's son, he saw her on Tuesday at 1pm then did not hear from her after repeated phone calls. Son went to home and found her down in the kitchen at around 7pm Wednesday. Patient had bruising over her lip and loss of urine. After event, patient has been AAOx1-2 but she is AAOx4 at baseline. Pulling 500ml on IS.      PAST MEDICAL & SURGICAL HISTORY:  Hypertension    Hypothyroidism    History of colon cancer  s/p Resection in , Dr. David    H/O brain tumor  History of brain tumor resection in .      PRIMARY SURVEY:  A - Airway intact.  B - Bilateral breath sounds and equal chest rise. SpO2 100% on RA.  C - Initially BP: 145/78 (21 @ 00:55), palpable pulses in all extremities.  D - GCS 14 (E4 V4 M6)  E - Exposure obtained.    SECONDARY SURVEY:  Gen: confused, trying to get off stretcher but easily redirected  HEENT: Normocephalic, atraumatic. Small right lateral lip bruising. No midline cervical tenderness. Trachea midline. Cervical collar cleared.  Pulm: No respiratory distress.   Chest: No tenderness to palpation. No crepitus. No ecchymosis or abrasions.  Abd: Soft, nontender, nondistended, no rebound, no guarding.  Hips: Stable.   Ext: No gross deformities. Sensation and strength intact. Palpable radial pulses bilaterally, palpable dorsalis pedis pulses bilaterally. Chronic-appearing L ankle edema.  Back: mild tenderness of thoracic spine at level of T3-T4, no paraspinal tenderness, no palpable runoff, stepoff, or deformity.   (12 Aug 2021 02:34)      TERTIARY SURVEY:   GEN: resting comfortably in bed, in NAD  HEENT: normocephalic, non-tender to palpation, no abrasions visible, no step-offs palpated  NECK: no JVD, non-tender to palpation at posterior midline, no pain with flexion, extension, and bilateral neck rotation  CHEST: non-tender to palpation across clavicles and b/l anterior ribs  BACK: non-tender to palpation along cervical, thoracic, lumbar spine midline and b/l posterior ribs; no palpable step-offs or hematomas  ABD: soft, non-distended, non-tender to palpation in all quadrants without rebound tenderness or guarding  LUE: non-tender to palpation across upper and lower arm, 5/5  strength, fingers warm, well-perfused, full ROM in shoulder, elbow, wrist, and fingers, palpable radial + ulnar pulses  RUE: non-tender to palpation across upper and lower arm, 5/5  strength, fingers warm, well-perfused, full ROM in shoulder, elbow, wrist, and fingers, palpable radial + ulnar pulses  LLE: non-tender to palpation across upper and lower leg; full ROM in hip, knee, ankle, and toes, 5/5 dorsiflexion + plantarflexion, palpable DP + PT pulses; warm, well-perfused  RLE: non-tender to palpation across upper and lower leg; full ROM in hip, knee, ankle, and toes, 5/5 dorsiflexion + plantarflexion, palpable DP + PT pulses; warm, well-perfused  NEURO: AAOx4, no focal neuro deficits; CN II-IX intact     Medications (inpatient): chlorhexidine 2% Cloths 1 Application(s) Topical <User Schedule>  dextrose 5% + lactated ringers. 1000 milliLiter(s) IV Continuous <Continuous>  enoxaparin Injectable 40 milliGRAM(s) SubCutaneous every 24 hours  levothyroxine Injectable 56 MICROGram(s) IV Push at bedtime  lidocaine   4% Patch 1 Patch Transdermal every 24 hours  metoprolol tartrate Injectable 5 milliGRAM(s) IV Push every 4 hours  OLANZapine Disintegrating Tablet 5 milliGRAM(s) Oral at bedtime    Medications (PRN):  Allergies: clindamycin (Unknown)  IV Contrast (Unknown)  shellfish (Unknown)  strawberry (Unknown)  (Intolerances: )    Vital Signs Last 24 Hrs  T(C): 36.5 (13 Aug 2021 11:00), Max: 37 (12 Aug 2021 19:00)  T(F): 97.7 (13 Aug 2021 11:00), Max: 98.6 (12 Aug 2021 19:00)  HR: 116 (13 Aug 2021 13:00) (81 - 154)  BP: 174/111 (13 Aug 2021 13:00) (92/47 - 183/88)  BP(mean): 130 (13 Aug 2021 13:00) (67 - 132)  RR: 40 (13 Aug 2021 13:00) (15 - 40)  SpO2: 97% (13 Aug 2021 13:00) (92% - 99%)  Drug Dosing Weight  Height (cm): 165.1 (11 Aug 2021 20:36)  Weight (kg): 68.3 (12 Aug 2021 03:10)  BMI (kg/m2): 25.1 (12 Aug 2021 03:10)  BSA (m2): 1.75 (12 Aug 2021 03:10)                          11.5   9.40  )-----------( 187      ( 13 Aug 2021 03:12 )             34.7     08-13    135  |  101  |  20  ----------------------------<  102<H>  3.8   |  20<L>  |  0.92    Ca    9.5      13 Aug 2021 03:12  Phos  2.4     08-13  Mg     2.2     08-13    TPro  6.6  /  Alb  3.0<L>  /  TBili  0.8  /  DBili  0.2  /  AST  94<H>  /  ALT  50<H>  /  AlkPhos  54  08-13    PT/INR - ( 11 Aug 2021 21:13 )   PT: 12.8 sec;   INR: 1.07 ratio         PTT - ( 11 Aug 2021 21:13 )  PTT:27.7 sec  Urinalysis Basic - ( 12 Aug 2021 01:44 )    Color: Light Yellow / Appearance: Clear / S.043 / pH: x  Gluc: x / Ketone: Small  / Bili: Negative / Urobili: Negative   Blood: x / Protein: 30 mg/dL / Nitrite: Negative   Leuk Esterase: Small / RBC: 6 /hpf / WBC 5 /HPF   Sq Epi: x / Non Sq Epi: 3 /hpf / Bacteria: Negative        List Operative and Interventional Radiological Procedures:     Consults (Date):  [  ] Neurosurgery   [  ] Orthopedics  [  ] Plastics  [  ] Urology  [  ] PM&R  [  ] Social Work  [ x ] SICU 2021  [ x ] Hospitalist 2021    RADIOLOGICAL FINDINGS REVIEW:  EXAM:  XR CHEST PORTABLE URGENT 1V                            PROCEDURE DATE:  2021            INTERPRETATION:  CLINICAL INFORMATION: Status post fall.    EXAM: AP portable chest    COMPARISON: Chest radiograph from 2016.    FINDINGS:    The heart is not enlarged.    The lungs are clear. No pleural effusions or pneumothorax.    New fracture deformity of the right lower lateral ribs, new since 2016. Degenerative changes of the bones.    IMPRESSION:    New fracture deformity of the right lower lateral ribs, new since 2016. Dedicated rib series and/or cross-sectional imaging can be obtained for further evaluation as clinically indicated.    Clear lungs.      --- End of Report ---    EXAM:  PELVIS AP ONLY                            PROCEDURE DATE:  2021            INTERPRETATION:  CLINICAL INFORMATION: Status post fall. Pelvic pain.    TECHNIQUE: Frontal view of the pelvis..    COMPARISON: CT abdomen and pelvis 2016.    FINDINGS:    No acute fracture or dislocation. Mild bilateral hip arthrosis with joint space narrowing. Evaluation of the sacrum is limited due to overlying bowel gas. Unchanged sclerotic focus within the right intertrochanteric lesion, likely boneisland.    Vascular calcifications.    Soft tissues are intact.    IMPRESSION:    No acute fracture or dislocation.    --- End of Report ---    EXAM:  CT CERVICAL SPINE                          EXAM:  CT BRAIN                            PROCEDURE DATE:  2021            INTERPRETATION:  CLINICAL INFORMATION: Status post fall. Head injury.    TECHNIQUE: Noncontrast CT scan of the head and cervical spine was performed. The head CT portion was performed with 5 mm axial images with sagittal and coronal reformations. The cervical spine was performed with thin section axial images with sagittal and coronal reformations.    COMPARISON: CT head 2016..    FINDINGS:    HEAD:    Right cerebellar encephalomalacia/gliosis. No acute intracranial hemorrhage, extra axial fluid collection, hydrocephalus, mass effect or midline shift. The basal cisterns are patent.    Mild patchy hypodensities within the periventricular and subcortical white matter, although nonspecific, likely reflect chronic microvascular disease. Cerebral volume loss contributes to prominence of the ventricles and sulci.    Status post left lens replacement surgery. Status post right suboccipital craniectomy. Right parietal cuca hole. No calvarial fracture.    CERVICAL SPINE:    There is no evidence for acute fracture, subluxation, or prevertebral soft tissue widening. The craniocervical junction is unremarkable.Maintained atlantodental interval. Ossification of the transverse ligament. Preservation of the normal cervical lordosis.    Multilevel degenerative changes of the cervical spine with vertebral bodies/disc complexes osteophytes, uncovertebral/ligamentous hypertrophy and facet arthropathy with varying degrees of severe neuroforaminal and spinal canal narrowing. Severe intervertebral disc loss at C4-C5. Mild intervertebral disc loss at C5-C6 and C6-C7.    The lung apices are clear. Enlarged thyroidgland.    IMPRESSION:    Head CT: No acute intracranial hemorrhage or calvarial fracture.    Cervical spine CT: No acute cervical spine fracture or evidence of traumatic malalignment.    --- End of Report ---    EXAM:  CT ABDOMEN AND PELVIS IC                          EXAM:  CT CHEST IC                            PROCEDURE DATE:  2021            INTERPRETATION:  CLINICAL INFORMATION: Trauma. Status post fall.    COMPARISON: MR of the abdomen from 7/15/2016, CT abdomen/pelvis from 2016.    CONTRAST/COMPLICATIONS:  IV Contrast: Omnipaque 350  90 cc administered   10 cc discarded  Oral Contrast: None  Complications: None    PROCEDURE:  CT of the Chest, Abdomen and Pelvis was performed.  Imaging was performed through the chest in the arterial phase followed by imaging of the abdomen and pelvis in the portal venous phase.  Sagittal and coronal reformats were performed.    FINDINGS:  CHEST:  LUNGS AND LARGE AIRWAYS: Patent central airways. Bibasilar linear atelectasis.  PLEURA: Trace right-sided pleural effusion. No pneumothorax.  VESSELS: Atherosclerotic changes of the aorta and coronary arteries.  HEART: Heart size is normal. No pericardial effusion.  MEDIASTINUM AND SWETHA: No lymphadenopathy.  CHEST WALL AND LOWER NECK: Heterogeneous enlarged thyroid gland. 1.3 cm heterogeneously hypodense right thyroid lobe nodule not well characterized secondary to streak artifact.    ABDOMEN AND PELVIS:  LIVER: Within normal limits.  BILE DUCTS: Within normal limits.  GALLBLADDER: Cholelithiasis.  SPLEEN: Within normal limits.  PANCREAS: Within normal limits.  ADRENALS: Within normal limits.  KIDNEYS/URETERS: Subcentimeter hypodensities, too small to characterize. Left lower pole 1.1 cm hyperdense lesion, indeterminate.    BLADDER: Within normal limits.  REPRODUCTIVE ORGANS: Uterine calcifications which may be related to fibroids.    BOWEL: Small hiatal hernia. No bowel obstruction. Right hemicolectomy and ileocolic anastomosis.  PERITONEUM: No ascites. No retroperitoneal hematoma.  VESSELS: Atherosclerotic changes.  RETROPERITONEUM/LYMPH NODES: No lymphadenopathy.  ABDOMINAL WALL: Small fat-containing umbilical and nonobstructive small bowel loop containing right inguinal hernias. Right gluteal region subcutaneous calcified granulomas.  BONES: Dextroscoliosis of the lumbar spine with associated multilevel degenerative change. Acute nondisplaced fractures of the right anterior ribs 5-7th, minimally displaced lateral and posterior fracture of the right eighth and ninth ribs, mildly displaced fracture of the right 10th rib and  question nondisplaced fracture of the right 11th rib.    IMPRESSION:    Acute right fifth through eleventh rib fractures, as detailed above.    Trace rightpleural effusion. No pneumothorax.    No CT evidence of acute traumatic sequelae within the abdomen or pelvis.    Indeterminant left lower pole renal lesion. Further evaluation with nonemergent renal sonogram may be obtained.    These findings were discussed with Dr. Roberts at 2021 12:17 AM by Dr. Nova of Radiology with read back confirmation.          --- End of Report ---    EXAM:  CT REFORM SPINE T                            PROCEDURE DATE:  2021            INTERPRETATION:  .    CLINICAL INFORMATION: Reconstructed from the CT chest please. Tenderness to palpation.    TECHNIQUE: Better reformatted transaxial CT images of the thoracic spine was obtained from a concurrent contrast enhanced CT examination of the chest, abdomen, and pelvis. Axial, sagittal and coronal reformatted images were also reviewed.    COMPARISON: No prior thoracic spine CT studies are available for comparison.    FINDINGS: No acute fractures or dislocations are seen. There is mild exaggeration of the thoracic kyphotic curvature. A mild scoliotic curvature is seen to the thoracolumbar spine with a very mild leftward convex curvature tothe thoracic spine and a compensatory rightward convex curvature to the upper lumbar spine. No loss of vertebral body height is seen. No aggressive osteoblastic or osteolytic lesions are notable. Scattered degenerative lucencies and areas of sclerosis are seen throughout the vertebral bodies and facets. The thoracic disc spaces are grossly maintained.    Evaluation of the intraspinal contents is limited on CT modality. No gross spinal canal or foraminal narrowing is appreciated within the thoracic canal.    Please refer to the report for the concurrent dedicated chest, abdomen, and pelvis CT for findings regarding the thoracic, abdominal, and pelvic structures inclusive of right-sided rib fractures.    IMPRESSION: No acute fractures or dislocations within the thoracic spine.    Please refer to the report for the concurrent dedicated chest, abdomen, and pelvis CT for findings regarding the thoracic, abdominal, and pelvic structures inclusive of right-sided rib fractures.    --- End of Report ---    EXAM:  XR CHEST PORTABLE ROUTINE 1V                            PROCEDURE DATE:  2021            INTERPRETATION:  CLINICAL INFORMATION: Rib fractures status post fall.    TECHNIQUE: Single frontal view of the chest.    COMPARISON: Chest CT 1121    FINDINGS:    Multiple mildly displaced right rib fractures are better visualized on prior chest CT.  Right basilar subsegmental atelectasis. No pneumothorax.  The heart size is normal.    IMPRESSION:  Right basilar subsegmental atelectasis.  No pneumothorax.    --- End of Report ---        ASSESSMENT:   Patient is a 89yo F with a history of brain surgery (1960s with residual right-sided facial droop), R hemicolectomy (, Dr. David), HTN, hypothyroidism presenting after she was found down at home, found to have acute right 5-7th rib fractures with an altered mental status.    PLAN:   - Continue home meds  - Medicine comanagement  - PT consult and eval  - Continue to monitor urine output, Crooks in place  - Appreciate excellent SICU care    Known Injuries:  - Acute right fifth through eleventh rib fractures Tertiary Trauma Survey (TTS)    Date of TTS: 2021                             Time: 2:30 PM  Admit Date: 2021                             Trauma Activation: Consult  Admit GCS: E-4     V-4     M-6     HPI:  Patient is a 89yo F with a history of brain surgery ( with residual right-sided facial droop), R hemicolectomy (, Dr. David), HTN, hypothyroidism presenting after she was found down at home. Per patient's son, he saw her on Tuesday at 1pm then did not hear from her after repeated phone calls. Son went to home and found her down in the kitchen at around 7pm Wednesday. Patient had bruising over her lip and loss of urine. After event, patient has been AAOx1-2 but she is AAOx4 at baseline. Pulling 500ml on IS.      PAST MEDICAL & SURGICAL HISTORY:  Hypertension    Hypothyroidism    History of colon cancer  s/p Resection in , Dr. David    H/O brain tumor  History of brain tumor resection in .      PRIMARY SURVEY:  A - Airway intact.  B - Bilateral breath sounds and equal chest rise. SpO2 100% on RA.  C - Initially BP: 145/78 (21 @ 00:55), palpable pulses in all extremities.  D - GCS 14 (E4 V4 M6)  E - Exposure obtained.    SECONDARY SURVEY:  Gen: confused, trying to get off stretcher but easily redirected  HEENT: Normocephalic, atraumatic. Small right lateral lip bruising. No midline cervical tenderness. Trachea midline. Cervical collar cleared.  Pulm: No respiratory distress.   Chest: No tenderness to palpation. No crepitus. No ecchymosis or abrasions.  Abd: Soft, nontender, nondistended, no rebound, no guarding.  Hips: Stable.   Ext: No gross deformities. Sensation and strength intact. Palpable radial pulses bilaterally, palpable dorsalis pedis pulses bilaterally. Chronic-appearing L ankle edema.  Back: mild tenderness of thoracic spine at level of T3-T4, no paraspinal tenderness, no palpable runoff, stepoff, or deformity.   (12 Aug 2021 02:34)      TERTIARY SURVEY: Physical exam difficult to perform 2/2 patient mental status and agitation   GEN: jittery in bed  HEENT: normocephalic, non-tender to palpation, no abrasions visible, non-rebreather mask in place  NECK: no JVD, trachea midline  CHEST: non-tender to palpation across clavicles and left anterior ribs, TTP along R anterior ribs  ABD: soft, non-distended, non-tender to palpation in all quadrants without rebound tenderness or guarding  LUE: non-tender to palpation across upper and lower arm,  strength in tact, fingers warm, well-perfused, mittens in place, palpable radial pulse  RUE: non-tender to palpation across upper and lower arm,  strength in tact, fingers warm, well-perfused, mittens in place, palpable radial pulse  LLE: non-tender to palpation across upper and lower leg; unable to evaluate ROM of LLE; warm, well-perfused  RLE: non-tender to palpation across upper and lower leg; unable to evaluate ROM of RLE; warm, well-perfused  NEURO: unable to perform neuro exam as patient is agitated and not responsive to questions    Medications (inpatient): chlorhexidine 2% Cloths 1 Application(s) Topical <User Schedule>  dextrose 5% + lactated ringers. 1000 milliLiter(s) IV Continuous <Continuous>  enoxaparin Injectable 40 milliGRAM(s) SubCutaneous every 24 hours  levothyroxine Injectable 56 MICROGram(s) IV Push at bedtime  lidocaine   4% Patch 1 Patch Transdermal every 24 hours  metoprolol tartrate Injectable 5 milliGRAM(s) IV Push every 4 hours  OLANZapine Disintegrating Tablet 5 milliGRAM(s) Oral at bedtime    Medications (PRN):  Allergies: clindamycin (Unknown)  IV Contrast (Unknown)  shellfish (Unknown)  strawberry (Unknown)  (Intolerances: )    Vital Signs Last 24 Hrs  T(C): 36.5 (13 Aug 2021 11:00), Max: 37 (12 Aug 2021 19:00)  T(F): 97.7 (13 Aug 2021 11:00), Max: 98.6 (12 Aug 2021 19:00)  HR: 116 (13 Aug 2021 13:00) (81 - 154)  BP: 174/111 (13 Aug 2021 13:00) (92/47 - 183/88)  BP(mean): 130 (13 Aug 2021 13:00) (67 - 132)  RR: 40 (13 Aug 2021 13:00) (15 - 40)  SpO2: 97% (13 Aug 2021 13:00) (92% - 99%)  Drug Dosing Weight  Height (cm): 165.1 (11 Aug 2021 20:36)  Weight (kg): 68.3 (12 Aug 2021 03:10)  BMI (kg/m2): 25.1 (12 Aug 2021 03:10)  BSA (m2): 1.75 (12 Aug 2021 03:10)                          11.5   9.40  )-----------( 187      ( 13 Aug 2021 03:12 )             34.7     08-13    135  |  101  |  20  ----------------------------<  102<H>  3.8   |  20<L>  |  0.92    Ca    9.5      13 Aug 2021 03:12  Phos  2.4     08-13  Mg     2.2     08-13    TPro  6.6  /  Alb  3.0<L>  /  TBili  0.8  /  DBili  0.2  /  AST  94<H>  /  ALT  50<H>  /  AlkPhos  54  08-13    PT/INR - ( 11 Aug 2021 21:13 )   PT: 12.8 sec;   INR: 1.07 ratio         PTT - ( 11 Aug 2021 21:13 )  PTT:27.7 sec  Urinalysis Basic - ( 12 Aug 2021 01:44 )    Color: Light Yellow / Appearance: Clear / S.043 / pH: x  Gluc: x / Ketone: Small  / Bili: Negative / Urobili: Negative   Blood: x / Protein: 30 mg/dL / Nitrite: Negative   Leuk Esterase: Small / RBC: 6 /hpf / WBC 5 /HPF   Sq Epi: x / Non Sq Epi: 3 /hpf / Bacteria: Negative        List Operative and Interventional Radiological Procedures:     Consults (Date):  [  ] Neurosurgery   [  ] Orthopedics  [  ] Plastics  [  ] Urology  [  ] PM&R  [  ] Social Work  [ x ] SICU 2021  [ x ] Hospitalist 2021    RADIOLOGICAL FINDINGS REVIEW:  EXAM:  XR CHEST PORTABLE URGENT 1V                            PROCEDURE DATE:  2021            INTERPRETATION:  CLINICAL INFORMATION: Status post fall.    EXAM: AP portable chest    COMPARISON: Chest radiograph from 2016.    FINDINGS:    The heart is not enlarged.    The lungs are clear. No pleural effusions or pneumothorax.    New fracture deformity of the right lower lateral ribs, new since 2016. Degenerative changes of the bones.    IMPRESSION:    New fracture deformity of the right lower lateral ribs, new since 2016. Dedicated rib series and/or cross-sectional imaging can be obtained for further evaluation as clinically indicated.    Clear lungs.      --- End of Report ---    EXAM:  PELVIS AP ONLY                            PROCEDURE DATE:  2021            INTERPRETATION:  CLINICAL INFORMATION: Status post fall. Pelvic pain.    TECHNIQUE: Frontal view of the pelvis..    COMPARISON: CT abdomen and pelvis 2016.    FINDINGS:    No acute fracture or dislocation. Mild bilateral hip arthrosis with joint space narrowing. Evaluation of the sacrum is limited due to overlying bowel gas. Unchanged sclerotic focus within the right intertrochanteric lesion, likely boneisland.    Vascular calcifications.    Soft tissues are intact.    IMPRESSION:    No acute fracture or dislocation.    --- End of Report ---    EXAM:  CT CERVICAL SPINE                          EXAM:  CT BRAIN                            PROCEDURE DATE:  2021            INTERPRETATION:  CLINICAL INFORMATION: Status post fall. Head injury.    TECHNIQUE: Noncontrast CT scan of the head and cervical spine was performed. The head CT portion was performed with 5 mm axial images with sagittal and coronal reformations. The cervical spine was performed with thin section axial images with sagittal and coronal reformations.    COMPARISON: CT head 2016..    FINDINGS:    HEAD:    Right cerebellar encephalomalacia/gliosis. No acute intracranial hemorrhage, extra axial fluid collection, hydrocephalus, mass effect or midline shift. The basal cisterns are patent.    Mild patchy hypodensities within the periventricular and subcortical white matter, although nonspecific, likely reflect chronic microvascular disease. Cerebral volume loss contributes to prominence of the ventricles and sulci.    Status post left lens replacement surgery. Status post right suboccipital craniectomy. Right parietal cuca hole. No calvarial fracture.    CERVICAL SPINE:    There is no evidence for acute fracture, subluxation, or prevertebral soft tissue widening. The craniocervical junction is unremarkable.Maintained atlantodental interval. Ossification of the transverse ligament. Preservation of the normal cervical lordosis.    Multilevel degenerative changes of the cervical spine with vertebral bodies/disc complexes osteophytes, uncovertebral/ligamentous hypertrophy and facet arthropathy with varying degrees of severe neuroforaminal and spinal canal narrowing. Severe intervertebral disc loss at C4-C5. Mild intervertebral disc loss at C5-C6 and C6-C7.    The lung apices are clear. Enlarged thyroidgland.    IMPRESSION:    Head CT: No acute intracranial hemorrhage or calvarial fracture.    Cervical spine CT: No acute cervical spine fracture or evidence of traumatic malalignment.    --- End of Report ---    EXAM:  CT ABDOMEN AND PELVIS IC                          EXAM:  CT CHEST IC                            PROCEDURE DATE:  2021            INTERPRETATION:  CLINICAL INFORMATION: Trauma. Status post fall.    COMPARISON: MR of the abdomen from 7/15/2016, CT abdomen/pelvis from 2016.    CONTRAST/COMPLICATIONS:  IV Contrast: Omnipaque 350  90 cc administered   10 cc discarded  Oral Contrast: None  Complications: None    PROCEDURE:  CT of the Chest, Abdomen and Pelvis was performed.  Imaging was performed through the chest in the arterial phase followed by imaging of the abdomen and pelvis in the portal venous phase.  Sagittal and coronal reformats were performed.    FINDINGS:  CHEST:  LUNGS AND LARGE AIRWAYS: Patent central airways. Bibasilar linear atelectasis.  PLEURA: Trace right-sided pleural effusion. No pneumothorax.  VESSELS: Atherosclerotic changes of the aorta and coronary arteries.  HEART: Heart size is normal. No pericardial effusion.  MEDIASTINUM AND SWETHA: No lymphadenopathy.  CHEST WALL AND LOWER NECK: Heterogeneous enlarged thyroid gland. 1.3 cm heterogeneously hypodense right thyroid lobe nodule not well characterized secondary to streak artifact.    ABDOMEN AND PELVIS:  LIVER: Within normal limits.  BILE DUCTS: Within normal limits.  GALLBLADDER: Cholelithiasis.  SPLEEN: Within normal limits.  PANCREAS: Within normal limits.  ADRENALS: Within normal limits.  KIDNEYS/URETERS: Subcentimeter hypodensities, too small to characterize. Left lower pole 1.1 cm hyperdense lesion, indeterminate.    BLADDER: Within normal limits.  REPRODUCTIVE ORGANS: Uterine calcifications which may be related to fibroids.    BOWEL: Small hiatal hernia. No bowel obstruction. Right hemicolectomy and ileocolic anastomosis.  PERITONEUM: No ascites. No retroperitoneal hematoma.  VESSELS: Atherosclerotic changes.  RETROPERITONEUM/LYMPH NODES: No lymphadenopathy.  ABDOMINAL WALL: Small fat-containing umbilical and nonobstructive small bowel loop containing right inguinal hernias. Right gluteal region subcutaneous calcified granulomas.  BONES: Dextroscoliosis of the lumbar spine with associated multilevel degenerative change. Acute nondisplaced fractures of the right anterior ribs 5-7th, minimally displaced lateral and posterior fracture of the right eighth and ninth ribs, mildly displaced fracture of the right 10th rib and  question nondisplaced fracture of the right 11th rib.    IMPRESSION:    Acute right fifth through eleventh rib fractures, as detailed above.    Trace rightpleural effusion. No pneumothorax.    No CT evidence of acute traumatic sequelae within the abdomen or pelvis.    Indeterminant left lower pole renal lesion. Further evaluation with nonemergent renal sonogram may be obtained.    These findings were discussed with Dr. Roberts at 2021 12:17 AM by Dr. Nova of Radiology with read back confirmation.          --- End of Report ---    EXAM:  CT REFORM SPINE T                            PROCEDURE DATE:  2021            INTERPRETATION:  .    CLINICAL INFORMATION: Reconstructed from the CT chest please. Tenderness to palpation.    TECHNIQUE: Better reformatted transaxial CT images of the thoracic spine was obtained from a concurrent contrast enhanced CT examination of the chest, abdomen, and pelvis. Axial, sagittal and coronal reformatted images were also reviewed.    COMPARISON: No prior thoracic spine CT studies are available for comparison.    FINDINGS: No acute fractures or dislocations are seen. There is mild exaggeration of the thoracic kyphotic curvature. A mild scoliotic curvature is seen to the thoracolumbar spine with a very mild leftward convex curvature tothe thoracic spine and a compensatory rightward convex curvature to the upper lumbar spine. No loss of vertebral body height is seen. No aggressive osteoblastic or osteolytic lesions are notable. Scattered degenerative lucencies and areas of sclerosis are seen throughout the vertebral bodies and facets. The thoracic disc spaces are grossly maintained.    Evaluation of the intraspinal contents is limited on CT modality. No gross spinal canal or foraminal narrowing is appreciated within the thoracic canal.    Please refer to the report for the concurrent dedicated chest, abdomen, and pelvis CT for findings regarding the thoracic, abdominal, and pelvic structures inclusive of right-sided rib fractures.    IMPRESSION: No acute fractures or dislocations within the thoracic spine.    Please refer to the report for the concurrent dedicated chest, abdomen, and pelvis CT for findings regarding the thoracic, abdominal, and pelvic structures inclusive of right-sided rib fractures.    --- End of Report ---    EXAM:  XR CHEST PORTABLE ROUTINE 1V                            PROCEDURE DATE:  2021            INTERPRETATION:  CLINICAL INFORMATION: Rib fractures status post fall.    TECHNIQUE: Single frontal view of the chest.    COMPARISON: Chest CT 1121    FINDINGS:    Multiple mildly displaced right rib fractures are better visualized on prior chest CT.  Right basilar subsegmental atelectasis. No pneumothorax.  The heart size is normal.    IMPRESSION:  Right basilar subsegmental atelectasis.  No pneumothorax.    --- End of Report ---        ASSESSMENT:   Patient is a 89yo F with a history of brain surgery (1960s with residual right-sided facial droop), R hemicolectomy (, Dr. David), HTN, hypothyroidism presenting after she was found down at home, found to have acute right 5-7th rib fractures with an altered mental status.    PLAN:   - Continue home meds  - Medicine comanagement  - PT consult and eval  - Continue to monitor urine output, Crooks in place  - Appreciate excellent SICU care    Known Injuries:  - Acute right fifth through eleventh rib fractures

## 2021-08-13 NOTE — PROVIDER CONTACT NOTE (OTHER) - ASSESSMENT
Pt confused, AO0 VSS on dilitiazem gtt for Afib rate control. Crooks in place, bladder scan revealed decompressed bladder.

## 2021-08-13 NOTE — PROGRESS NOTE ADULT - SUBJECTIVE AND OBJECTIVE BOX
TRAUMA SURGERY DAILY PROGRESS NOTE:     24 HOUR EVENTS:  AAOx0  Agitated, given zyprexa and haldol  Cruz placed for retention  New onset A fib with RVR  Started on Diltiazem gtt    SUBJECTIVE/ROS: Patient unable to comment on ros/complaints.     MEDICATIONS  (STANDING):  chlorhexidine 2% Cloths 1 Application(s) Topical <User Schedule>  enoxaparin Injectable 40 milliGRAM(s) SubCutaneous every 24 hours  haloperidol    Injectable 5 milliGRAM(s) IV Push once  lactated ringers. 1000 milliLiter(s) (100 mL/Hr) IV Continuous <Continuous>  levothyroxine Injectable 56 MICROGram(s) IV Push at bedtime  lidocaine   4% Patch 1 Patch Transdermal every 24 hours  metoprolol tartrate Injectable 5 milliGRAM(s) IV Push every 4 hours  OLANZapine Disintegrating Tablet 5 milliGRAM(s) Oral at bedtime    MEDICATIONS  (PRN):    OBJECTIVE:  Vital Signs Last 24 Hrs  T(C): 36.6 (13 Aug 2021 07:00), Max: 37 (12 Aug 2021 19:00)  T(F): 97.9 (13 Aug 2021 07:00), Max: 98.6 (12 Aug 2021 19:00)  HR: 84 (13 Aug 2021 07:00) (78 - 154)  BP: 148/60 (13 Aug 2021 07:00) (92/47 - 183/88)  BP(mean): 86 (13 Aug 2021 07:00) (67 - 132)  RR: 15 (13 Aug 2021 07:00) (15 - 39)  SpO2: 97% (13 Aug 2021 07:00) (93% - 110%)      I&O's Detail  12 Aug 2021 07:01  -  13 Aug 2021 07:00  --------------------------------------------------------  IN:    Diltiazem: 22.5 mL    IV PiggyBack: 583.2 mL    Lactated Ringers: 2400 mL    Lactated Ringers Bolus: 500 mL  Total IN: 3505.7 mL    OUT:    Indwelling Catheter - Urethral (mL): 955 mL    Intermittent Catheterization - Urethral (mL): 0 mL  Total OUT: 955 mL  Total NET: 2550.7 mL      Daily     Daily Weight in k.6 (13 Aug 2021 00:10)    LABS:                        11.5   9.40  )-----------( 187      ( 13 Aug 2021 03:12 )             34.7     08-13    135  |  101  |  20  ----------------------------<  102<H>  3.8   |  20<L>  |  0.92    Ca    9.5      13 Aug 2021 03:12  Phos  2.4     08-13  Mg     2.2     08-13    TPro  6.6  /  Alb  3.0<L>  /  TBili  0.8  /  DBili  0.2  /  AST  94<H>  /  ALT  50<H>  /  AlkPhos  54  08-13  PT/INR - ( 11 Aug 2021 21:13 )   PT: 12.8 sec;   INR: 1.07 ratio    PTT - ( 11 Aug 2021 21:13 )  PTT:27.7 sec    Urinalysis Basic - ( 12 Aug 2021 01:44 )  Color: Light Yellow / Appearance: Clear / S.043 / pH: x  Gluc: x / Ketone: Small  / Bili: Negative / Urobili: Negative   Blood: x / Protein: 30 mg/dL / Nitrite: Negative   Leuk Esterase: Small / RBC: 6 /hpf / WBC 5 /HPF   Sq Epi: x / Non Sq Epi: 3 /hpf / Bacteria: Negative    Physical Exam:  GENERAL: NAD, frail  HEAD:  +lip swelling  NECK: Supple, No JVD  GASTROINTESTINAL: Soft, Nontender, Nondistended;   GENITOURINARY: cruz in place  EXTREMITIES:  2+ Peripheral Pulses, No clubbing, cyanosis, or edema  NERVOUS SYSTEM:  Alert & Oriented X 1 ( self only) ; right sided facial droop (baseline), Moving all 4 extremities  SKIN: No rashes or lesions

## 2021-08-13 NOTE — DISCHARGE NOTE PROVIDER - CARE PROVIDER_API CALL
Cathleen Roque (MD)  Surgery; Surgical Critical Care  [address] [address]  Phone: [phone]  Fax: [phone]  Follow Up Time:

## 2021-08-13 NOTE — PROGRESS NOTE ADULT - PROBLEM SELECTOR PLAN 7
- found to have heterogenously enlarged thyroid with a nodule in right thyroid lobe  - also found to have indeterminate left lower pole renal lesion  - will need outpt f/up  - pt's son Maykel updated  - Primary team will give him a copy of imaging and will inform the PCP Dr Jeffrey Montalvo. [phone]

## 2021-08-13 NOTE — PROGRESS NOTE ADULT - ASSESSMENT
Patient is a 89yo F with a history of brain surgery (1960s with residual right-sided facial droop), R hemicolectomy (2009, Dr. David), HTN, hypothyroidism presenting after she was found down at home, found to have acute right 5-7th rib fractures with an altered mental status. Patient hemodynamically stable not requiring supplemental oxygen.     Plan:  - RIB score 2 (>6 rib fractures; anterior, lateral and posterior rib fractures), pain control  - Resume home meds  - PT eval  - Medicine comanagement  - Crooks - continue to monitor urine output    [id number]  Trauma Surgery

## 2021-08-13 NOTE — PROGRESS NOTE ADULT - SUBJECTIVE AND OBJECTIVE BOX
HISTORY  Patient is a 89yo F with a history of brain surgery (1960s with residual right-sided facial droop), R hemicolectomy (2009, Dr. David), HTN, hypothyroidism presenting after she was found down at home. Per patient's son, he saw her on Tuesday at 1pm then did not hear from her after repeated phone calls. Son went to home and found her down in the kitchen at around 7pm Wednesday. Patient had bruising over her lip and loss of urine. After event, patient has been AAOx1-2 but she is AAOx4 at baseline. Pulling 500ml on IS.    24 HOUR EVENTS:  AAOx0  Agitated, given zyprexa and haldol  Crooks placed for retention  New onset A fib with RVR  Started on Diltiazem gtt    NEURO  RASS:  0   GCS:   13 (incomprehensible) CAM ICU: negative  Exam:   Meds: haloperidol    Injectable 5 milliGRAM(s) IV Push once    [x] Adequacy of sedation and pain control has been assessed and adjusted      RESPIRATORY  RR: 27 (08-13-21 @ 01:15) (17 - 39)  SpO2: 97% (08-13-21 @ 01:15) (93% - 110%)  Wt(kg): --  Exam: B/L chest wall rise, satting well rm air, no increased work of breathing  Meds:       CARDIOVASCULAR  HR: 95 (08-13-21 @ 01:15) (68 - 154)  BP: 104/63 (08-13-21 @ 01:15) (92/47 - 183/88)  BP(mean): 72 (08-13-21 @ 01:15) (67 - 132)  ABP: --  ABP(mean): --  Wt(kg): --  CVP(cm H2O): --  VBG - ( 12 Aug 2021 03:32 )  pH: 7.37  /  pCO2: 47    /  pO2: <20   / HCO3: 27    / Base Excess: 1.4   /  SaO2: 25     Lactate: 1.2                Exam: RRR, Appears well perfused  Perfusion     [x ]Adequate   [ ]Inadequate  Mentation   [ ]Normal       [x ]Reduced  Extremities  [x ]Warm         [ ]Cool  Volume Status [ ]Hypervolemic [x ]Euvolemic [ ]Hypovolemic  Meds: diltiazem Infusion 5 mG/Hr IV Continuous <Continuous>  metoprolol tartrate Injectable 5 milliGRAM(s) IV Push every 6 hours        GI/NUTRITION  Exam: Soft, NT, ND  Diet: NPO  Meds:     GENITOURINARY  I&O's Detail    08-11 @ 07:01  -  08-12 @ 07:00  --------------------------------------------------------  IN:    Lactated Ringers: 400 mL  Total IN: 400 mL    OUT:    Voided (mL): 100 mL  Total OUT: 100 mL    Total NET: 300 mL      08-12 @ 07:01 - 08-13 @ 01:43  --------------------------------------------------------  IN:    Diltiazem: 20 mL    IV PiggyBack: 250 mL    Lactated Ringers: 1800 mL  Total IN: 2070 mL    OUT:    Indwelling Catheter - Urethral (mL): 825 mL    Intermittent Catheterization - Urethral (mL): 0 mL  Total OUT: 825 mL    Total NET: 1245 mL        Weight (kg): 68.3 (08-12 @ 03:10)  08-12    132<L>  |  102  |  18  ----------------------------<  96  3.9   |  18<L>  |  0.89    Ca    9.6      12 Aug 2021 21:11  Phos  2.4     08-12  Mg     2.2     08-12    TPro  6.8  /  Alb  2.8<L>  /  TBili  0.9  /  DBili  0.2  /  AST  103<H>  /  ALT  51<H>  /  AlkPhos  57  08-12    [x ] Crooks catheter, indication:  Urinary Retention  Meds: lactated ringers Bolus 500 milliLiter(s) IV Bolus once  lactated ringers. 1000 milliLiter(s) IV Continuous <Continuous>        HEMATOLOGIC  Meds: enoxaparin Injectable 40 milliGRAM(s) SubCutaneous every 24 hours    [x] VTE Prophylaxis                        12.1   11.31 )-----------( 193      ( 12 Aug 2021 21:11 )             36.5     PT/INR - ( 11 Aug 2021 21:13 )   PT: 12.8 sec;   INR: 1.07 ratio         PTT - ( 11 Aug 2021 21:13 )  PTT:27.7 sec  Transfusion     [ ] PRBC   [ ] Platelets   [ ] FFP   [ ] Cryoprecipitate      INFECTIOUS DISEASES  T(C): 36 (08-12-21 @ 23:00), Max: 37 (08-12-21 @ 19:00)  Wt(kg): --  WBC Count: 11.31 K/uL (08-12 @ 21:11)  WBC Count: 11.79 K/uL (08-12 @ 03:43)    Recent Cultures:    Meds:       ENDOCRINE  Capillary Blood Glucose    Meds: levothyroxine Injectable 56 MICROGram(s) IV Push at bedtime        ACCESS DEVICES:  [x] Peripheral IV  [ ] Central Venous Line	[ ] R	[ ] L	[ ] IJ	[ ] Fem	[ ] SC	Placed:   [ ] Arterial Line		[ ] R	[ ] L	[ ] Fem	[ ] Rad	[ ] Ax	Placed:   [ ] PICC:					[ ] Mediport  [ ] Urinary Catheter, Date Placed:   [x] Necessity of urinary, arterial, and venous catheters discussed    OTHER MEDICATIONS:  chlorhexidine 2% Cloths 1 Application(s) Topical <User Schedule>  lidocaine   4% Patch 1 Patch Transdermal every 24 hours      CODE STATUS: Full Code    IMAGING:  EXAM:  CAROTID DUPLEX BILATERAL                            PROCEDURE DATE:  08/12/2021            INTERPRETATION:  CLINICAL INFORMATION: Syncope. Evaluate for carotid stenosis.    COMPARISON: None available.    TECHNIQUE: Grayscale, color and spectral Doppler examination of both carotid arteries was performed.    FINDINGS:    Mild calcific plaque is present in both carotid bulbs and bifurcations.    No elevated velocities or abnormal waveforms are encountered.    Peak systolic velocities are as follows:    RIGHT:  PROX CCA = 65 cm/s  DIST CCA = 64 cm/s  PROX ICA = 107 cm/s  DIST ICA = 115 cm/s  ECA = 132 cm/s    LEFT:  PROX CCA = 77 cm/s  DIST CCA = 77 cm/s  PROX ICA = 77 cm/s  DIST ICA = 68 cm/s  ECA = 94 cm/s    Antegrade flow is noted within both vertebral arteries.    IMPRESSION: Calcified atheromatous plaque without duplex evidence of hemodynamically significant stenosis of either carotid artery.    Measurement of carotid stenosis is based on velocity parameters that correlate the residual internal carotid diameter with that of the more distal vessel in accordance with a method such as the [employer] Symptomatic Carotid Endarterectomy Trial (NASCET).    EXAM:  CT REFORM SPINE T                            PROCEDURE DATE:  08/12/2021            INTERPRETATION:  .    CLINICAL INFORMATION: Reconstructed from the CT chest please. Tenderness to palpation.    TECHNIQUE: Better reformatted transaxial CT images of the thoracic spine was obtained from a concurrent contrast enhanced CT examination of the chest, abdomen, and pelvis. Axial, sagittal and coronal reformatted images were also reviewed.    COMPARISON: No prior thoracic spine CT studies are available for comparison.    FINDINGS: No acute fractures or dislocations are seen. There is mild exaggeration of the thoracic kyphotic curvature. A mild scoliotic curvature is seen to the thoracolumbar spine with a very mild leftward convex curvature to the thoracic spine and a compensatory rightward convex curvature to the upper lumbar spine. No loss of vertebral body height is seen. No aggressive osteoblastic or osteolytic lesions are notable. Scattered degenerative lucencies and areas of sclerosis are seen throughout the vertebral bodies and facets. The thoracic disc spaces are grossly maintained.    Evaluation of the intraspinal contents is limited on CT modality. No gross spinal canal or foraminal narrowing is appreciated within the thoracic canal.    Please refer to the report for the concurrent dedicated chest, abdomen, and pelvis CT for findings regarding the thoracic, abdominal, and pelvic structures inclusive of right-sided rib fractures.    IMPRESSION: No acute fractures or dislocations within the thoracic spine.    Please refer to the report for the concurrent dedicated chest, abdomen, and pelvis CT for findings regarding the thoracic, abdominal, and pelvic structures inclusive of right-sided rib fractures.

## 2021-08-13 NOTE — PROGRESS NOTE ADULT - PROBLEM SELECTOR PLAN 1
- found to have  acute right 5-7th rib fractures  - pain control  - would avoid NSAIDs and opiates in this patient  - continue with  IS   - mgt per primary team.

## 2021-08-13 NOTE — PROGRESS NOTE ADULT - ASSESSMENT
89 yo F unwitnessed fall of unknown duration found by son who last saw her in baseline condition the day prior to presentation. Injuries include rib fractures Right 5-11. Rib score = 2. PSgx Brain surgery and R hemicolectomy     Plan:  Neurologic:   - AAOx 0; monitor mental status   - Initial head imaging negative for any intracranial injuries  - Spinal imaging negative for injuries   - Pain control as needed IV Tylenol, Dilaudid, Lidocaine patch  - Haldol for agitation    Respiratory:   - spo2 adequate; NC as needed; wean as tolerated  - Rib fractures Right 5-11 lower lateral Rib Score: First rib fracture, 7 rib fractures  -  Baseline ; encourage use     Cardiovascular:   - A fib with RVR, diltiazem gtt  - No pressor requirements  - Monitor vitals   - Troponin and CKMB elevation; monitor trend   - Lactate mildly elevated; monitor trend     Gastrointestinal/Nutrition:   - NPO    Renal/Genitourinary:   - Monitor and replete electrolytes as needed  - Crooks  - Monitor UOP  - IV Fluids   - Creatine Kinase elevation; monitor trend and renal function for concern of  rhabdomyolysis     Hematologic:   - Treng H&H and transfuse Hgb < 7  - Hold DVT ppx following traumatic injury workup     Infectious Disease:   - No issues; monitor WBC    Endocrine:   - Monitor BMP  - F/U A1C  - Hx of Hypothyroid with elevated TSH; restart home medications when able     Tubes/Lines/Drains:   Crooks    Disposition: SICU  - PT Evaluation    89 yo F unwitnessed fall of unknown duration found by son who last saw her in baseline condition the day prior to presentation. Injuries include rib fractures Right 5-11. Rib score = 2. PSgx Brain surgery and R hemicolectomy     Plan:  Neurologic:   - AAOx 0; monitor mental status   - Initial head imaging negative for any intracranial injuries  - Spinal imaging negative for injuries   - Pain control as needed IV Tylenol, Dilaudid, Lidocaine patch  - Haldol for agitation    Respiratory:   - spo2 adequate; NC as needed; wean as tolerated  - Rib fractures Right 5-11 lower lateral Rib Score: First rib fracture, 7 rib fractures  -  Baseline ; encourage use     Cardiovascular:   - A fib with RVR, diltiazem gtt  - No pressor requirements  - Monitor vitals   - Troponin and CKMB elevation; monitor trend   - Lactate mildly elevated; monitor trend     Gastrointestinal/Nutrition:   - NPO    Renal/Genitourinary:   - Monitor and replete electrolytes as needed  - Crooks  - Monitor UOP  - IV Fluids   - Creatine Kinase elevation; monitor trend and renal function for concern of  rhabdomyolysis     Hematologic:   - Treng H&H and transfuse Hgb < 7  - Hold DVT ppx following traumatic injury workup     Infectious Disease:   - No issues; monitor WBC    Endocrine:   - Monitor BMP  - A1C 5.7  - Hx of Hypothyroid with elevated TSH; restart home medications when able     Tubes/Lines/Drains:   Crooks    Disposition: SICU  - PT Evaluation

## 2021-08-13 NOTE — DISCHARGE NOTE PROVIDER - NSDCFUADDINST_GEN_ALL_CORE_FT
Please follow up with your primary doctor as soon as possible for routine evaluation.   Avoid any anticoagulation medication due to your recent history of a fall.

## 2021-08-13 NOTE — PROVIDER CONTACT NOTE (CHANGE IN STATUS NOTIFICATION) - ACTION/TREATMENT ORDERED:
EKG, Electrolyte panel drawn. EKG shown to be AFib w/ RVR. Cardizem IVP + Cardizem gtt started and titrated as per protocol to HR < 120. Continue to monitor and assess as per protocol.

## 2021-08-13 NOTE — DISCHARGE NOTE PROVIDER - HOSPITAL COURSE
Incidental findings:   Heterogeneous enlarged thyroid gland. 1.3 cm heterogeneously hypodense right thyroid lobe nodule not well characterized secondary to streak artifact.    Indeterminant left lower pole renal lesion. Further evaluation with nonemergent renal sonogram may be obtained    Follow up with PMD about these findings 89 y/o female w/ a PMHx of brain surgery (1960s w/ residual right facial droop), colon CA s/p right hemicolectomy (2009 w/ Dr. David), HTN, and hypothyroidism who presented on 8/12 after being found down Per the son, patient was last seen normal on 8/10 and did not hear from her despite multiple phone calls so he went over and found her down in the kitchen confused and covered in urine w/ bruising over her lip. Labs significant for elevated CK in the 4500s. Imaging revealed right 5th-11th rib fractures, a 1.3 cm heterogeneously hypodense right thyroid lobe nodule, and an indeterminate left lower pole lesion. Patient was subsequently admitted to SICU for close respiratory monitoring. Hospital course has been complicated by delirium w/ a waxing & waning mental status requiring doses of olanzapine & haloperidol, new onset atrial fibrillation w/ RVR, and urinary retention requiring Crooks catheter placement. Patient was seen by PT and OT, who recommended subacute rehabilitation. Patient was transferred to the floors on 8/16. Crooks was discontinued and patient ? 89 y/o female w/ a PMHx of brain surgery (1960s w/ residual right facial droop), colon CA s/p right hemicolectomy (2009 w/ Dr. David), HTN, and hypothyroidism who presented on 8/12 after being found down Per the son, patient was last seen normal on 8/10 and did not hear from her despite multiple phone calls so he went over and found her down in the kitchen confused and covered in urine w/ bruising over her lip. Labs significant for elevated CK in the 4500s. Imaging revealed right 5th-11th rib fractures, a 1.3 cm heterogeneously hypodense right thyroid lobe nodule, and an indeterminate left lower pole lesion. Patient was subsequently admitted to SICU for close respiratory monitoring. Hospital course has been complicated by delirium w/ a waxing & waning mental status requiring doses of olanzapine & haloperidol, new onset atrial fibrillation w/ RVR, and urinary retention requiring Crooks catheter placement. Patient was seen by PT and OT, who recommended subacute rehabilitation. Patient was transferred to the subacute rehab on 8/16.          Crooks was discontinued and patient ? 89 y/o female w/ a PMHx of brain surgery (1960s w/ residual right facial droop), colon CA s/p right hemicolectomy (2009 w/ Dr. David), HTN, and hypothyroidism who presented on 8/12 after being found down Per the son, patient was last seen normal on 8/10 and did not hear from her despite multiple phone calls so he went over and found her down in the kitchen confused and covered in urine w/ bruising over her lip. Labs significant for elevated CK in the 4500s. Imaging revealed right 5th-11th rib fractures, a 1.3 cm heterogeneously hypodense right thyroid lobe nodule, and an indeterminate left lower pole lesion. Patient was subsequently admitted to SICU for close respiratory monitoring. Hospital course has been complicated by delirium w/ a waxing & waning mental status requiring doses of olanzapine & haloperidol, new onset atrial fibrillation w/ RVR, and urinary retention requiring Crooks catheter placement. Patients mental status subsequently improved to her baseline without intervention and Crooks was discontinued with adequate trial of void. Patient was seen by PT and OT, who recommended subacute rehabilitation. Patient was transferred to the subacute rehab on 8/16.

## 2021-08-13 NOTE — PROGRESS NOTE ADULT - SUBJECTIVE AND OBJECTIVE BOX
Patient is a 89y old  Female who presents with a chief complaint of Found down at home with multiple rib fractures (13 Aug 2021 08:45)      SUBJECTIVE / OVERNIGHT EVENTS: Pt seen and examined. Noted to be agitated overnight requiring Haldol 5mg IVP x 1. Also noted to have new AFib w/ RVR. Was given Cardizem IVP 5mg x 2  and Cardizem gtt started ; now d/ash.  Unable to obtain ROS on account of AMS.    MEDICATIONS  (STANDING):  chlorhexidine 2% Cloths 1 Application(s) Topical <User Schedule>  dextrose 5% + lactated ringers. 1000 milliLiter(s) (30 mL/Hr) IV Continuous <Continuous>  enoxaparin Injectable 40 milliGRAM(s) SubCutaneous every 24 hours  levothyroxine Injectable 56 MICROGram(s) IV Push at bedtime  lidocaine   4% Patch 1 Patch Transdermal every 24 hours  metoprolol tartrate Injectable 5 milliGRAM(s) IV Push every 4 hours  OLANZapine Disintegrating Tablet 5 milliGRAM(s) Oral at bedtime    MEDICATIONS  (PRN):      Vital Signs Last 24 Hrs  T(C): 36.5 (13 Aug 2021 11:00), Max: 37 (12 Aug 2021 19:00)  T(F): 97.7 (13 Aug 2021 11:00), Max: 98.6 (12 Aug 2021 19:00)  HR: 114 (13 Aug 2021 12:00) (80 - 154)  BP: 162/79 (13 Aug 2021 12:00) (92/47 - 183/88)  BP(mean): 113 (13 Aug 2021 12:00) (67 - 132)  RR: 34 (13 Aug 2021 12:00) (15 - 39)  SpO2: 92% (13 Aug 2021 12:00) (92% - 100%)  CAPILLARY BLOOD GLUCOSE        I&O's Summary    12 Aug 2021 07:01  -  13 Aug 2021 07:00  --------------------------------------------------------  IN: 3505.7 mL / OUT: 955 mL / NET: 2550.7 mL    13 Aug 2021 07:01  -  13 Aug 2021 12:47  --------------------------------------------------------  IN: 443.3 mL / OUT: 195 mL / NET: 248.3 mL        PHYSICAL EXAM:  GENERAL: NAD, frail  HEAD:  +lip swelling  EYES: conjunctiva and sclera clear  ENMT: Moist mucous membranes  NECK: Supple, No JVD  RESPIRATORY: Clear to auscultation bilaterally; No rales, rhonchi, wheezing, or rubs  CARDIOVASCULAR: Regular rate and rhythm; No murmurs, rubs, or gallops  GASTROINTESTINAL: Soft, Nontender, Nondistended; Bowel sounds present  GENITOURINARY: cruz in place draining clear urine  EXTREMITIES:  2+ Peripheral Pulses, No clubbing, cyanosis, or edema  NERVOUS SYSTEM:  somnolent but arousable,  Oriented X 0  ; right sided facial droop (baseline), Moving all 4 extremities  SKIN: No rashes or lesions      LABS:                        11.5   9.40  )-----------( 187      ( 13 Aug 2021 03:12 )             34.7     08-13    135  |  101  |  20  ----------------------------<  102<H>  3.8   |  20<L>  |  0.92    Ca    9.5      13 Aug 2021 03:12  Phos  2.4     08-13  Mg     2.2     08-13    TPro  6.6  /  Alb  3.0<L>  /  TBili  0.8  /  DBili  0.2  /  AST  94<H>  /  ALT  50<H>  /  AlkPhos  54  08-13    PT/INR - ( 11 Aug 2021 21:13 )   PT: 12.8 sec;   INR: 1.07 ratio         PTT - ( 11 Aug 2021 21:13 )  PTT:27.7 sec  CARDIAC MARKERS ( 13 Aug 2021 03:12 )  x     / x     / 1391 U/L / x     / x      CARDIAC MARKERS ( 12 Aug 2021 21:11 )  x     / x     / 1704 U/L / x     / 27.1 ng/mL  CARDIAC MARKERS ( 12 Aug 2021 03:43 )  x     / x     / 3688 U/L / x     / 43.8 ng/mL  CARDIAC MARKERS ( 12 Aug 2021 01:27 )  x     / x     / x     / x     / 44.7 ng/mL  CARDIAC MARKERS ( 11 Aug 2021 21:13 )  x     / x     / 4509 U/L / x     / 54.0 ng/mL      Urinalysis Basic - ( 12 Aug 2021 01:44 )    Color: Light Yellow / Appearance: Clear / S.043 / pH: x  Gluc: x / Ketone: Small  / Bili: Negative / Urobili: Negative   Blood: x / Protein: 30 mg/dL / Nitrite: Negative   Leuk Esterase: Small / RBC: 6 /hpf / WBC 5 /HPF   Sq Epi: x / Non Sq Epi: 3 /hpf / Bacteria: Negative

## 2021-08-13 NOTE — PROGRESS NOTE ADULT - PROBLEM SELECTOR PLAN 3
per son pt likely had a mechanical fall due to an unsteady shopping cart which she uses for balance ; has been refusing to use a walker  - given that pt was down for an unknown period of time w/possible LOC syncope w/up was done   -  TTE and Carotid duplex US unremarkable  - per son pt likely had a mechanical fall due to an unsteady shopping cart which she uses for balance ; has been refusing to use a walker. per son pt likely had a mechanical fall due to an unsteady shopping cart which she uses for balance ; has been refusing to use a walker  - given that pt was down for an unknown period of time w/possible LOC syncope w/up in progress  -  TTE and Carotid duplex US unremarkable  - per son pt likely had a mechanical fall due to an unsteady shopping cart which she uses for balance ; has been refusing to use a walker.

## 2021-08-13 NOTE — DISCHARGE NOTE PROVIDER - NSDCCPCAREPLAN_GEN_ALL_CORE_FT
PRINCIPAL DISCHARGE DIAGNOSIS  Diagnosis: Multiple closed fractures of ribs of right side  Assessment and Plan of Treatment:       SECONDARY DISCHARGE DIAGNOSES  Diagnosis: Thyroid nodule  Assessment and Plan of Treatment: Please follow up with your primary care provider regarding this finding seen on your CT scans.    Diagnosis: Renal lesion  Assessment and Plan of Treatment: Please follow up with your primary care provider regarding this finding seen on your CT scans.

## 2021-08-13 NOTE — PROGRESS NOTE ADULT - PROBLEM SELECTOR PLAN 2
unclear etiology  - noted to be agitated overnight requiring Haldol ; QTc 8/12 : 488 ; would avoid further Haldol  - c/w Zyprexa 5mg q bedtime ; can add 2.5mg q 6 prn for agitation  - CT head unremarkable  - UA negative for UTI , CXR negative for infilterate  - fall precautions  - avoid benzos, opiates  - pt is currently NPO on account of aspiration  - aspiration precautions  - can obtain SLP when mental status improves

## 2021-08-14 LAB
ALBUMIN SERPL ELPH-MCNC: 2.9 G/DL — LOW (ref 3.3–5)
ALP SERPL-CCNC: 44 U/L — SIGNIFICANT CHANGE UP (ref 40–120)
ALT FLD-CCNC: 51 U/L — HIGH (ref 10–45)
ANION GAP SERPL CALC-SCNC: 13 MMOL/L — SIGNIFICANT CHANGE UP (ref 5–17)
AST SERPL-CCNC: 72 U/L — HIGH (ref 10–40)
BILIRUB DIRECT SERPL-MCNC: 0.1 MG/DL — SIGNIFICANT CHANGE UP (ref 0–0.2)
BILIRUB INDIRECT FLD-MCNC: 0.4 MG/DL — SIGNIFICANT CHANGE UP (ref 0.2–1)
BILIRUB SERPL-MCNC: 0.5 MG/DL — SIGNIFICANT CHANGE UP (ref 0.2–1.2)
BUN SERPL-MCNC: 21 MG/DL — SIGNIFICANT CHANGE UP (ref 7–23)
CALCIUM SERPL-MCNC: 8.8 MG/DL — SIGNIFICANT CHANGE UP (ref 8.4–10.5)
CHLORIDE SERPL-SCNC: 100 MMOL/L — SIGNIFICANT CHANGE UP (ref 96–108)
CK SERPL-CCNC: 1109 U/L — HIGH (ref 25–170)
CO2 SERPL-SCNC: 23 MMOL/L — SIGNIFICANT CHANGE UP (ref 22–31)
CREAT SERPL-MCNC: 1.15 MG/DL — SIGNIFICANT CHANGE UP (ref 0.5–1.3)
GAS PNL BLDV: SIGNIFICANT CHANGE UP
GLUCOSE SERPL-MCNC: 140 MG/DL — HIGH (ref 70–99)
HCT VFR BLD CALC: 31.2 % — LOW (ref 34.5–45)
HGB BLD-MCNC: 10.4 G/DL — LOW (ref 11.5–15.5)
MAGNESIUM SERPL-MCNC: 1.8 MG/DL — SIGNIFICANT CHANGE UP (ref 1.6–2.6)
MCHC RBC-ENTMCNC: 31 PG — SIGNIFICANT CHANGE UP (ref 27–34)
MCHC RBC-ENTMCNC: 33.3 GM/DL — SIGNIFICANT CHANGE UP (ref 32–36)
MCV RBC AUTO: 92.9 FL — SIGNIFICANT CHANGE UP (ref 80–100)
NRBC # BLD: 0 /100 WBCS — SIGNIFICANT CHANGE UP (ref 0–0)
PHOSPHATE SERPL-MCNC: 2.8 MG/DL — SIGNIFICANT CHANGE UP (ref 2.5–4.5)
PLATELET # BLD AUTO: 152 K/UL — SIGNIFICANT CHANGE UP (ref 150–400)
POTASSIUM SERPL-MCNC: 3 MMOL/L — LOW (ref 3.5–5.3)
POTASSIUM SERPL-SCNC: 3 MMOL/L — LOW (ref 3.5–5.3)
PROCALCITONIN SERPL-MCNC: 0.67 NG/ML — HIGH (ref 0.02–0.1)
PROT SERPL-MCNC: 6.2 G/DL — SIGNIFICANT CHANGE UP (ref 6–8.3)
RBC # BLD: 3.36 M/UL — LOW (ref 3.8–5.2)
RBC # FLD: 13.7 % — SIGNIFICANT CHANGE UP (ref 10.3–14.5)
SODIUM SERPL-SCNC: 136 MMOL/L — SIGNIFICANT CHANGE UP (ref 135–145)
WBC # BLD: 8.09 K/UL — SIGNIFICANT CHANGE UP (ref 3.8–10.5)
WBC # FLD AUTO: 8.09 K/UL — SIGNIFICANT CHANGE UP (ref 3.8–10.5)

## 2021-08-14 PROCEDURE — 99233 SBSQ HOSP IP/OBS HIGH 50: CPT

## 2021-08-14 PROCEDURE — 71045 X-RAY EXAM CHEST 1 VIEW: CPT | Mod: 26

## 2021-08-14 RX ORDER — FUROSEMIDE 40 MG
20 TABLET ORAL ONCE
Refills: 0 | Status: COMPLETED | OUTPATIENT
Start: 2021-08-14 | End: 2021-08-14

## 2021-08-14 RX ORDER — METOPROLOL TARTRATE 50 MG
5 TABLET ORAL EVERY 6 HOURS
Refills: 0 | Status: DISCONTINUED | OUTPATIENT
Start: 2021-08-14 | End: 2021-08-15

## 2021-08-14 RX ORDER — POTASSIUM PHOSPHATE, MONOBASIC POTASSIUM PHOSPHATE, DIBASIC 236; 224 MG/ML; MG/ML
30 INJECTION, SOLUTION INTRAVENOUS ONCE
Refills: 0 | Status: COMPLETED | OUTPATIENT
Start: 2021-08-14 | End: 2021-08-14

## 2021-08-14 RX ORDER — MAGNESIUM SULFATE 500 MG/ML
2 VIAL (ML) INJECTION ONCE
Refills: 0 | Status: COMPLETED | OUTPATIENT
Start: 2021-08-14 | End: 2021-08-14

## 2021-08-14 RX ORDER — POTASSIUM CHLORIDE 20 MEQ
10 PACKET (EA) ORAL
Refills: 0 | Status: COMPLETED | OUTPATIENT
Start: 2021-08-14 | End: 2021-08-14

## 2021-08-14 RX ORDER — ACETYLCYSTEINE 200 MG/ML
3 VIAL (ML) MISCELLANEOUS EVERY 6 HOURS
Refills: 0 | Status: DISCONTINUED | OUTPATIENT
Start: 2021-08-14 | End: 2021-08-16

## 2021-08-14 RX ORDER — ACETAMINOPHEN 500 MG
500 TABLET ORAL EVERY 6 HOURS
Refills: 0 | Status: COMPLETED | OUTPATIENT
Start: 2021-08-14 | End: 2021-08-14

## 2021-08-14 RX ORDER — IPRATROPIUM/ALBUTEROL SULFATE 18-103MCG
3 AEROSOL WITH ADAPTER (GRAM) INHALATION EVERY 6 HOURS
Refills: 0 | Status: DISCONTINUED | OUTPATIENT
Start: 2021-08-14 | End: 2021-08-16

## 2021-08-14 RX ADMIN — Medication 5 MILLIGRAM(S): at 13:06

## 2021-08-14 RX ADMIN — Medication 5 MILLIGRAM(S): at 17:01

## 2021-08-14 RX ADMIN — Medication 5 MILLIGRAM(S): at 06:14

## 2021-08-14 RX ADMIN — Medication 200 MILLIGRAM(S): at 23:12

## 2021-08-14 RX ADMIN — POTASSIUM PHOSPHATE, MONOBASIC POTASSIUM PHOSPHATE, DIBASIC 83.33 MILLIMOLE(S): 236; 224 INJECTION, SOLUTION INTRAVENOUS at 06:07

## 2021-08-14 RX ADMIN — Medication 100 MILLIEQUIVALENT(S): at 04:31

## 2021-08-14 RX ADMIN — LIDOCAINE 1 PATCH: 4 CREAM TOPICAL at 03:27

## 2021-08-14 RX ADMIN — Medication 200 MILLIGRAM(S): at 13:06

## 2021-08-14 RX ADMIN — LIDOCAINE 1 PATCH: 4 CREAM TOPICAL at 15:58

## 2021-08-14 RX ADMIN — Medication 3 MILLILITER(S): at 11:11

## 2021-08-14 RX ADMIN — LIDOCAINE 1 PATCH: 4 CREAM TOPICAL at 03:54

## 2021-08-14 RX ADMIN — Medication 200 MILLIGRAM(S): at 06:07

## 2021-08-14 RX ADMIN — Medication 200 MILLIGRAM(S): at 17:43

## 2021-08-14 RX ADMIN — Medication 50 GRAM(S): at 06:14

## 2021-08-14 RX ADMIN — Medication 20 MILLIGRAM(S): at 17:43

## 2021-08-14 RX ADMIN — Medication 56 MICROGRAM(S): at 21:43

## 2021-08-14 RX ADMIN — Medication 5 MILLIGRAM(S): at 02:14

## 2021-08-14 RX ADMIN — CHLORHEXIDINE GLUCONATE 1 APPLICATION(S): 213 SOLUTION TOPICAL at 06:15

## 2021-08-14 RX ADMIN — LIDOCAINE 1 PATCH: 4 CREAM TOPICAL at 07:35

## 2021-08-14 RX ADMIN — Medication 3 MILLILITER(S): at 17:06

## 2021-08-14 RX ADMIN — Medication 100 MILLIEQUIVALENT(S): at 06:07

## 2021-08-14 RX ADMIN — ENOXAPARIN SODIUM 40 MILLIGRAM(S): 100 INJECTION SUBCUTANEOUS at 10:38

## 2021-08-14 RX ADMIN — Medication 500 MILLIGRAM(S): at 06:37

## 2021-08-14 RX ADMIN — SODIUM CHLORIDE 30 MILLILITER(S): 9 INJECTION, SOLUTION INTRAVENOUS at 19:45

## 2021-08-14 RX ADMIN — Medication 100 MILLIEQUIVALENT(S): at 02:41

## 2021-08-14 RX ADMIN — Medication 20 MILLIGRAM(S): at 11:30

## 2021-08-14 NOTE — PROGRESS NOTE ADULT - SUBJECTIVE AND OBJECTIVE BOX
Surgery Progress Note     Subjective/24hour Events:     Vital Signs:  Vital Signs Last 24 Hrs  T(C): 36.1 (13 Aug 2021 23:00), Max: 36.6 (13 Aug 2021 07:00)  T(F): 97 (13 Aug 2021 23:00), Max: 97.9 (13 Aug 2021 07:00)  HR: 79 (14 Aug 2021 01:00) (79 - 120)  BP: 112/55 (14 Aug 2021 01:00) (111/66 - 183/83)  BP(mean): 79 (14 Aug 2021 01:00) (79 - 130)  RR: 16 (14 Aug 2021 01:00) (15 - 40)  SpO2: 96% (14 Aug 2021 01:00) (92% - 100%)    Physical Exam:  General:   Lungs:   CV:   Abdomen:  Extremities:    Surgery Progress Note     Subjective/24hour Events: Patient seen and examined at the bedside this morning. No acute events overnight. Pain controlled.     Vital Signs:  Vital Signs Last 24 Hrs  T(C): 36.4 (14 Aug 2021 15:00), Max: 36.4 (14 Aug 2021 15:00)  T(F): 97.5 (14 Aug 2021 15:00), Max: 97.5 (14 Aug 2021 15:00)  HR: 71 (14 Aug 2021 16:00) (65 - 116)  BP: 116/56 (14 Aug 2021 16:00) (97/52 - 177/78)  BP(mean): 81 (14 Aug 2021 16:00) (69 - 112)  RR: 12 (14 Aug 2021 16:00) (12 - 34)  SpO2: 99% (14 Aug 2021 16:00) (93% - 100%)        I&O's Detail    13 Aug 2021 07:01  -  14 Aug 2021 07:00  --------------------------------------------------------  IN:    dextrose 5% + lactated ringers: 630 mL    IV PiggyBack: 483.3 mL    Lactated Ringers: 300 mL  Total IN: 1413.3 mL    OUT:    Indwelling Catheter - Urethral (mL): 2270 mL  Total OUT: 2270 mL    Total NET: -856.7 mL      14 Aug 2021 07:01  -  14 Aug 2021 17:25  --------------------------------------------------------  IN:    dextrose 5% + lactated ringers: 300 mL    IV PiggyBack: 549.8 mL  Total IN: 849.8 mL    OUT:    Indwelling Catheter - Urethral (mL): 835 mL  Total OUT: 835 mL    Total NET: 14.8 mL            Physical Exam:  Gen: NAD.  Lungs: Non labored breathing.   Ab: Soft, nontender, nondistended.   Ext: Moves all 4 spontaneously.     Labs:    08-14    136  |  100  |  21  ----------------------------<  140<H>  3.0<L>   |  23  |  1.15    Ca    8.8      14 Aug 2021 02:11  Phos  2.8     08-14  Mg     1.8     08-14    TPro  6.2  /  Alb  2.9<L>  /  TBili  0.5  /  DBili  0.1  /  AST  72<H>  /  ALT  51<H>  /  AlkPhos  44  08-14    CAPILLARY BLOOD GLUCOSE        LIVER FUNCTIONS - ( 14 Aug 2021 02:11 )  Alb: 2.9 g/dL / Pro: 6.2 g/dL / ALK PHOS: 44 U/L / ALT: 51 U/L / AST: 72 U/L / GGT: x                                 10.4   8.09  )-----------( 152      ( 14 Aug 2021 02:11 )             31.2

## 2021-08-14 NOTE — PROVIDER CONTACT NOTE (CHANGE IN STATUS NOTIFICATION) - SITUATION
PT AO-0, with incomprehensible rambling and upward gaze.
Pt has notable mental status deterioration. Previously alert, disoriented x4, not following commands but withdrawing from pain. Pt currently somnolent, without sustained eye opening, not following commands. No gag reflex when suctioned with yankaeur.
Pt disoriented x4, and not following commands. Acute change from yesterday where pt was following commands but remained disoriented x4.
Pt HR ~140 appearing as AFib w/ RVR

## 2021-08-14 NOTE — PROGRESS NOTE ADULT - ASSESSMENT
Assessment:      Plan:      ACS/Trauma Surgery  [id number] Patient is a 89yo F with a history of brain surgery (1960s with residual right-sided facial droop), R hemicolectomy (2009, Dr. David), HTN, hypothyroidism presenting after she was found down at home, found to have acute right 5-7th rib fractures with an altered mental status. Patient hemodynamically stable not requiring supplemental oxygen.     Plan:  - PT eval  - Crooks - continue to monitor urine output  - appreciate medicine recs  - appreciate excellent sicu care       ACS/Trauma Surgery  [id number]

## 2021-08-14 NOTE — CONSULT NOTE ADULT - ASSESSMENT
89 y/o female w/ a PMHx of brain surgery (1960s w/ residual right facial droop), colon CA s/p right hemicolectomy, HTN, and hypothyroidism presenting after being found down with an elevated CK and and right 5th-11th rib fractures with  a waxing & waning mental status and new onset atrial fibrillation w/ RVR       acute traumatic pain,    pain control with acetaminophen and lidocaine patch; avoiding narcotics and NSAIDs   right 5th-11th rib fractures  - Out of bed to chair and  PT   atrial fibrillation  - Metoprolol 5 mg q4hrs for rate control of atrial fibrillation  - TTE from 8/12 with LVH, normal LV systolic function w/ EF of 60%, and a small pericardial effusion  cards eval?     dysphagia  - NPO for now   CKD stage II  - Monitor I&Os and electrolytes   mild fluids    dvt proph    ID: no acute issues  - Monitor for clinical evidence of active infection    : hypothyroidism   levothyroxine for hypothyroidism  f/u tsh

## 2021-08-14 NOTE — CONSULT NOTE ADULT - SUBJECTIVE AND OBJECTIVE BOX
Patient is a 89y old  Female who presents with a chief complaint of Found down at home with multiple rib fractures (14 Aug 2021 01:55)    hpi reviewed  INTERVAL HPI/OVERNIGHT EVENTS:    Medications:MEDICATIONS  (STANDING):  acetaminophen  IVPB .. 500 milliGRAM(s) IV Intermittent every 6 hours  acetylcysteine 20%  Inhalation 3 milliLiter(s) Inhalation every 6 hours  albuterol/ipratropium for Nebulization 3 milliLiter(s) Nebulizer every 6 hours  chlorhexidine 2% Cloths 1 Application(s) Topical <User Schedule>  dextrose 5% + lactated ringers. 1000 milliLiter(s) (30 mL/Hr) IV Continuous <Continuous>  enoxaparin Injectable 40 milliGRAM(s) SubCutaneous every 24 hours  furosemide   Injectable 20 milliGRAM(s) IV Push once  levothyroxine Injectable 56 MICROGram(s) IV Push at bedtime  lidocaine   4% Patch 1 Patch Transdermal every 24 hours  metoprolol tartrate Injectable 5 milliGRAM(s) IV Push every 6 hours    MEDICATIONS  (PRN):      Allergies: Allergies    clindamycin (Unknown)  IV Contrast (Unknown)  shellfish (Unknown)  strawberry (Unknown)    Intolerances          FAMILY HISTORY:        PAST MEDICAL & SURGICAL HISTORY:  Hypertension    Hypothyroidism    History of colon cancer  s/p Resection in 2009, Dr. David    H/O brain tumor  History of brain tumor resection in 1961.        REVIEW OF SYSTEMS:  lethargic   no apparent cp or sob  no vomiting    T(C): 36.3 (08-14-21 @ 11:00), Max: 36.3 (08-14-21 @ 07:00)  HR: 92 (08-14-21 @ 12:00) (65 - 120)  BP: 140/65 (08-14-21 @ 12:00) (97/52 - 177/78)  RR: 17 (08-14-21 @ 12:00) (14 - 35)  SpO2: 98% (08-14-21 @ 12:00) (93% - 100%)  Wt(kg): --Vital Signs Last 24 Hrs  T(C): 36.3 (14 Aug 2021 11:00), Max: 36.3 (14 Aug 2021 07:00)  T(F): 97.3 (14 Aug 2021 11:00), Max: 97.3 (14 Aug 2021 07:00)  HR: 92 (14 Aug 2021 12:00) (65 - 120)  BP: 140/65 (14 Aug 2021 12:00) (97/52 - 177/78)  BP(mean): 93 (14 Aug 2021 12:00) (69 - 116)  RR: 17 (14 Aug 2021 12:00) (14 - 35)  SpO2: 98% (14 Aug 2021 12:00) (93% - 100%)  I&O's Summary    13 Aug 2021 07:01  -  14 Aug 2021 07:00  --------------------------------------------------------  IN: 1413.3 mL / OUT: 2270 mL / NET: -856.7 mL    14 Aug 2021 07:01  -  14 Aug 2021 13:10  --------------------------------------------------------  IN: 679.8 mL / OUT: 125 mL / NET: 554.8 mL        PHYSICAL EXAM:    EYES: EOMI, PERRLA,  NECK: Supple, No JVD,   NERVOUS SYSTEM:  unable to fully assess  CHEST/LUNG: Clear to percussion bilaterally; .dec b/l  HEART: Regular/irreg  ABDOMEN: Soft, Nontender, Nondistended; Bowel sounds present  EXTREMITIES: dec rom       Consultant(s) Notes Reviewed:  [x ] YES  [ ] NO  Care Discussed with Consultants/Other Providerscpk [ x] YES  [ ] NO    LABS:                    CBC Full  -  ( 14 Aug 2021 02:11 )  WBC Count : 8.09 K/uL  RBC Count : 3.36 M/uL  Hemoglobin : 10.4 g/dL  Hematocrit : 31.2 %  Platelet Count - Automated : 152 K/uL  Mean Cell Volume : 92.9 fl  Mean Cell Hemoglobin : 31.0 pg  Mean Cell Hemoglobin Concentration : 33.3 gm/dL  Auto Neutrophil # : x  Auto Lymphocyte # : x  Auto Monocyte # : x  Auto Eosinophil # : x  Auto Basophil # : x  Auto Neutrophil % : x  Auto Lymphocyte % : x  Auto Monocyte % : x  Auto Eosinophil % : x  Auto Basophil % : x      08-14    136  |  100  |  21  ----------------------------<  140<H>  3.0<L>   |  23  |  1.15    Ca    8.8      14 Aug 2021 02:11  Phos  2.8     08-14  Mg     1.8     08-14    TPro  6.2  /  Alb  2.9<L>  /  TBili  0.5  /  DBili  0.1  /  AST  72<H>  /  ALT  51<H>  /  AlkPhos  44  08-14            RADIOLOGY & ADDITIONAL TESTS:    Imaging Personally Reviewed:  [ ] YES  [ ] NO

## 2021-08-14 NOTE — PROVIDER CONTACT NOTE (CHANGE IN STATUS NOTIFICATION) - BACKGROUND
Pt admitted sp fall and rib fx.
Pt admitted sp fall and rib fx
Pt admitted sp fall, found down at home. Found to have R 5-11 rib fx, sent to SICU for hemodynamic and respiratory monitoring ISO rib fx.
Pt admitted sp fall and rib fx.

## 2021-08-14 NOTE — PROVIDER CONTACT NOTE (CHANGE IN STATUS NOTIFICATION) - ACTION/TREATMENT ORDERED:
VBG. BRITTA Peters, and MD Harmon at bedside. Decision made to hold off on intubation. Continue to monitor patient. SICU team aware.

## 2021-08-14 NOTE — PROGRESS NOTE ADULT - ASSESSMENT
89 y/o female w/ a PMHx of brain surgery (1960s w/ residual right facial droop), colon CA s/p right hemicolectomy, HTN, and hypothyroidism presenting after being found down with an elevated CK and and right 5th-11th rib fractures with a hospital course complicated by delirium w/ a waxing & waning mental status and new onset atrial fibrillation w/ RVR    PLAN:    Neuro: acute traumatic pain, delirium  - Frequent reorientation, sleep hygiene, and olanzapine 5 mg at bedtime for delirium  - Multimodal pain control with acetaminophen and lidocaine patch; avoiding narcotics and NSAIDs given her age    Resp: right 5th-11th rib fractures  - Out of bed to chair and aggressive chest PT to prevent atelectasis; patient too delirious to participate in incentive spirometry    CV: atrial fibrillation  - Metoprolol 5 mg q4hrs for rate control of atrial fibrillation  - TTE from 8/12 with LVH, normal LV systolic function w/ EF of 60%, and a small pericardial effusion    GI: dysphagia  - NPO as patient is too lethargic; will need SLP when mental status improves    Renal: CKD stage II  - Monitor I&Os and electrolytes w/ repletions as necessary  - D5LR at 30 mL/hr while NPO  - Will assess fluid status and need for diuresis daily    Heme: no acute issues  - Lovenox for VTE prophylaxis    ID: no acute issues  - Monitor for clinical evidence of active infection    Endo: hypothyroidism  - Home levothyroxine for hypothyroidism    Code Status: Full code; palliative consult placed given patient's advanced age and unknown cause of fall w/ delirium    Disposition: Will remain in SICU    Ofelia Peters PA-C     [id number]

## 2021-08-14 NOTE — PROGRESS NOTE ADULT - SUBJECTIVE AND OBJECTIVE BOX
HISTORY:  89 y/o female w/ a PMHx of brain surgery (1960s w/ residual right facial droop),  HISTORY:  89 y/o female w/ a PMHx of brain surgery (1960s w/ residual right facial droop),     HISTORY  Patient currently denies headache, dizziness, weakness, fevers, chills, shortness of breath, chest pain, abdominal pain, or nausea/vomiting.    24 HOUR EVENTS:  - Added olanzapine 5 mg at bedtime for agitated delirium  - Changed LR at 100 mL/hr -> D5LR at 30 mL/hr  - Diuresis w/ Lasix 20 mg IV x1 dose  - Incidental findings reported    SUBJECTIVE/ROS: Due to altered mental status/intubation, subjective information were not able to be obtained from the patient. History was obtained, to the extent possible, from review of the chart and collateral sources of information.    NEURO  Exam: lethargic, garbled speech, moving all four extremities, intermittently following commands  Meds:  - lidocaine   4% Patch 1 Patch Transdermal every 24 hours  - OLANZapine Disintegrating Tablet 5 milliGRAM(s) Oral at bedtime    RESPIRATORY  RR: 24 (08-14-21 @ 03:00) (15 - 40)  SpO2: 98% (08-14-21 @ 03:00) (92% - 100%)  Exam: coarse rhonchi in all lung fields    CARDIOVASCULAR  HR: 80 (08-14-21 @ 03:00) (79 - 120)  BP: 141/74 (08-14-21 @ 03:00) (111/66 - 183/83)  BP(mean): 102 (08-14-21 @ 03:00) (79 - 130)  VBG - ( 14 Aug 2021 02:03 )  pH: 7.41  /  pCO2: 45    /  pO2: 40    / HCO3: 28    / Base Excess: 3.1   /  SaO2: 72   /    Lactate: 1.2    Exam: regular rate and rhythm  Cardiac Rhythm: sinus  Perfusion    [x]Adequate    [ ]Inadequate  Mentation   [x]Normal       [ ]Reduced  Extremities  [x]Warm         [ ]Cool  Volume Status [ ]Hypervolemic [x]Euvolemic [ ]Hypovolemic  Meds: metoprolol tartrate Injectable 5 milliGRAM(s) IV Push every 4 hours    GI/NUTRITION  Exam: soft, nontender, nondistended  Diet: NPO    GENITOURINARY  I&O's Detail  08-13 @ 07:01  -  08-14 @ 05:02  --------------------------------------------------------  IN:    dextrose 5% + lactated ringers: 480 mL    IV PiggyBack: 83.3 mL    Lactated Ringers: 300 mL  Total IN: 863.3 mL    OUT:    Indwelling Catheter - Urethral (mL): 2105 mL  Total OUT: 2105 mL    Total NET: -1241.7 mL    136  |  100  |  21  ----------------------------<  140<H>  3.0<L>   |  23  |  1.15    Ca    8.8      14 Aug 2021 02:11  Phos  2.8  Mg     1.8  TPro  6.2  /  Alb  2.9<L>  /  TBili  0.5  /  DBili  0.1  /  AST  72<H>  /  ALT  51<H>  /  AlkPhos  44    [x] Crooks catheter, indication: Urinary Retention / Obstruction  Meds: dextrose 5% + lactated ringers infuse at 30 mL/hr    HEMATOLOGIC  Meds: enoxaparin Injectable 40 milliGRAM(s) SubCutaneous every 24 hours                       10.4   8.09  )-----------( 152      ( 14 Aug 2021 02:11 )             31.2     INFECTIOUS DISEASES  T(C): 36 (08-14-21 @ 03:00), Max: 36.6 (08-13-21 @ 07:00)  WBC Count: 8.09 K/uL (08-14 @ 02:11)  Recent Cultures:  - Specimen Source: Catheterized Catheterized, 08-12 @ 04:36  Results: >=3 organisms. Probable collection contamination.  Gram Stain: --  Organism: --    ENDOCRINE  Capillary Blood Glucose: 140 mg/dL (08-14-21 @ 02:11)  Meds: levothyroxine Injectable 56 MICROGram(s) IV Push at bedtime    ACCESS DEVICES:  [x] Peripheral IV  [ ] Central Venous Line	[ ] R	[ ] L	[ ] IJ	[ ] Fem	[ ] SC	Placed:   [ ] Arterial Line		[ ] R	[ ] L	[ ] Fem	[ ] Rad	[ ] Ax	Placed:   [ ] PICC:					[ ] Mediport  [x] Urinary Catheter, Date Placed:   [x] Necessity of urinary, arterial, and venous catheters discussed    OTHER MEDICATIONS: chlorhexidine 2% Cloths 1 Application(s) Topical <User Schedule>    IMAGING: HISTORY:  89 y/o female w/ a PMHx of brain surgery (1960s w/ residual right facial droop), colon CA s/p right hemicolectomy (2009 w/ Dr. David), HTN, and hypothyroidism who presented on 8/12 after being found down Per the son, patient was last seen normal on 8/10 and did not hear from her despite multiple phone calls so he went over and found her down in the kitchen confused and covered in urine w/ bruising over her lip. Labs significant for elevated CK in the 4500s. Imaging revealed right 5th-11th rib fractures. Patient was subsequently admitted to SICU for close respiratory monitoring. Hospital course has been complicated by delirium w/ a waxing & waning mental status and new onset atrial fibrillation w/ RVR. Unable to obtain ROS as patient is confused & somnolent    24 HOUR EVENTS:  - Added olanzapine 5 mg at bedtime for agitated delirium  - Changed LR at 100 mL/hr -> D5LR at 30 mL/hr  - Diuresis w/ Lasix 20 mg IV x1 dose  - Incidental findings reported    SUBJECTIVE/ROS: Due to altered mental status/intubation, subjective information were not able to be obtained from the patient. History was obtained, to the extent possible, from review of the chart and collateral sources of information.    NEURO  Exam: lethargic, garbled speech, moving all four extremities, intermittently following commands  Meds:  - acetaminophen  IVPB .. 500 milliGRAM(s) IV Intermittent every 6 hours  - lidocaine   4% Patch 1 Patch Transdermal every 24 hours  - OLANZapine Disintegrating Tablet 5 milliGRAM(s) Oral at bedtime    RESPIRATORY  RR: 24 (08-14-21 @ 03:00) (15 - 40)  SpO2: 98% (08-14-21 @ 03:00) (92% - 100%)  Exam: coarse rhonchi in all lung fields    CARDIOVASCULAR  HR: 80 (08-14-21 @ 03:00) (79 - 120)  BP: 141/74 (08-14-21 @ 03:00) (111/66 - 183/83)  BP(mean): 102 (08-14-21 @ 03:00) (79 - 130)  VBG - ( 14 Aug 2021 02:03 )  pH: 7.41  /  pCO2: 45    /  pO2: 40    / HCO3: 28    / Base Excess: 3.1   /  SaO2: 72   /    Lactate: 1.2    Exam: regular rate and rhythm  Cardiac Rhythm: sinus  Perfusion    [x]Adequate    [ ]Inadequate  Mentation   [x]Normal       [ ]Reduced  Extremities  [x]Warm         [ ]Cool  Volume Status [ ]Hypervolemic [x]Euvolemic [ ]Hypovolemic  Meds: metoprolol tartrate Injectable 5 milliGRAM(s) IV Push every 4 hours    GI/NUTRITION  Exam: soft, nontender, nondistended  Diet: NPO    GENITOURINARY  I&O's Detail  08-13 @ 07:01  -  08-14 @ 05:02  --------------------------------------------------------  IN:    dextrose 5% + lactated ringers: 480 mL    IV PiggyBack: 83.3 mL    Lactated Ringers: 300 mL  Total IN: 863.3 mL    OUT:    Indwelling Catheter - Urethral (mL): 2105 mL  Total OUT: 2105 mL    Total NET: -1241.7 mL    136  |  100  |  21  ----------------------------<  140<H>  3.0<L>   |  23  |  1.15    Ca    8.8      14 Aug 2021 02:11  Phos  2.8  Mg     1.8  TPro  6.2  /  Alb  2.9<L>  /  TBili  0.5  /  DBili  0.1  /  AST  72<H>  /  ALT  51<H>  /  AlkPhos  44    [x] Crooks catheter, indication: Urinary Retention / Obstruction  Meds: dextrose 5% + lactated ringers infuse at 30 mL/hr    HEMATOLOGIC  Meds: enoxaparin Injectable 40 milliGRAM(s) SubCutaneous every 24 hours                       10.4   8.09  )-----------( 152      ( 14 Aug 2021 02:11 )             31.2     INFECTIOUS DISEASES  T(C): 36 (08-14-21 @ 03:00), Max: 36.6 (08-13-21 @ 07:00)  WBC Count: 8.09 K/uL (08-14 @ 02:11)  Recent Cultures:  - Specimen Source: Catheterized Catheterized, 08-12 @ 04:36  Results: >=3 organisms. Probable collection contamination.  Gram Stain: --  Organism: --    ENDOCRINE  Capillary Blood Glucose: 140 mg/dL (08-14-21 @ 02:11)  Meds: levothyroxine Injectable 56 MICROGram(s) IV Push at bedtime    ACCESS DEVICES:  [x] Peripheral IV  [ ] Central Venous Line	[ ] R	[ ] L	[ ] IJ	[ ] Fem	[ ] SC	Placed:   [ ] Arterial Line		[ ] R	[ ] L	[ ] Fem	[ ] Rad	[ ] Ax	Placed:   [ ] PICC:					[ ] Mediport  [x] Urinary Catheter, Date Placed:   [x] Necessity of urinary, arterial, and venous catheters discussed    OTHER MEDICATIONS: chlorhexidine 2% Cloths 1 Application(s) Topical <User Schedule>    IMAGING:

## 2021-08-14 NOTE — PROVIDER CONTACT NOTE (CHANGE IN STATUS NOTIFICATION) - ASSESSMENT
PT VSS, on NC + NRB for oxygenation. Pt somnolent without any sustained eye opening or response to vigorous stimulation. Brief eye opening and movement. Not following commands. No gag reflex when suctioned. Acute change noted.

## 2021-08-14 NOTE — PROVIDER CONTACT NOTE (CHANGE IN STATUS NOTIFICATION) - RECOMMENDATIONS
Provider assessment
Provider assessment.
Provider assessment. VBG, possible intubation if clinically indicated.
Provider assessment

## 2021-08-15 LAB
ALBUMIN SERPL ELPH-MCNC: 2.6 G/DL — LOW (ref 3.3–5)
ALBUMIN SERPL ELPH-MCNC: 2.9 G/DL — LOW (ref 3.3–5)
ALP SERPL-CCNC: 53 U/L — SIGNIFICANT CHANGE UP (ref 40–120)
ALP SERPL-CCNC: 55 U/L — SIGNIFICANT CHANGE UP (ref 40–120)
ALT FLD-CCNC: 39 U/L — SIGNIFICANT CHANGE UP (ref 10–45)
ALT FLD-CCNC: 49 U/L — HIGH (ref 10–45)
ANION GAP SERPL CALC-SCNC: 11 MMOL/L — SIGNIFICANT CHANGE UP (ref 5–17)
ANION GAP SERPL CALC-SCNC: 14 MMOL/L — SIGNIFICANT CHANGE UP (ref 5–17)
AST SERPL-CCNC: 32 U/L — SIGNIFICANT CHANGE UP (ref 10–40)
AST SERPL-CCNC: 56 U/L — HIGH (ref 10–40)
BILIRUB DIRECT SERPL-MCNC: 0.1 MG/DL — SIGNIFICANT CHANGE UP (ref 0–0.2)
BILIRUB DIRECT SERPL-MCNC: <0.1 MG/DL — SIGNIFICANT CHANGE UP (ref 0–0.2)
BILIRUB INDIRECT FLD-MCNC: 0.3 MG/DL — SIGNIFICANT CHANGE UP (ref 0.2–1)
BILIRUB INDIRECT FLD-MCNC: >0.4 MG/DL — SIGNIFICANT CHANGE UP (ref 0.2–1)
BILIRUB SERPL-MCNC: 0.4 MG/DL — SIGNIFICANT CHANGE UP (ref 0.2–1.2)
BILIRUB SERPL-MCNC: 0.5 MG/DL — SIGNIFICANT CHANGE UP (ref 0.2–1.2)
BUN SERPL-MCNC: 19 MG/DL — SIGNIFICANT CHANGE UP (ref 7–23)
BUN SERPL-MCNC: 24 MG/DL — HIGH (ref 7–23)
CALCIUM SERPL-MCNC: 8.5 MG/DL — SIGNIFICANT CHANGE UP (ref 8.4–10.5)
CALCIUM SERPL-MCNC: 8.9 MG/DL — SIGNIFICANT CHANGE UP (ref 8.4–10.5)
CHLORIDE SERPL-SCNC: 100 MMOL/L — SIGNIFICANT CHANGE UP (ref 96–108)
CHLORIDE SERPL-SCNC: 100 MMOL/L — SIGNIFICANT CHANGE UP (ref 96–108)
CO2 SERPL-SCNC: 25 MMOL/L — SIGNIFICANT CHANGE UP (ref 22–31)
CO2 SERPL-SCNC: 25 MMOL/L — SIGNIFICANT CHANGE UP (ref 22–31)
CREAT SERPL-MCNC: 1.01 MG/DL — SIGNIFICANT CHANGE UP (ref 0.5–1.3)
CREAT SERPL-MCNC: 1.15 MG/DL — SIGNIFICANT CHANGE UP (ref 0.5–1.3)
GLUCOSE SERPL-MCNC: 106 MG/DL — HIGH (ref 70–99)
GLUCOSE SERPL-MCNC: 106 MG/DL — HIGH (ref 70–99)
HCT VFR BLD CALC: 30 % — LOW (ref 34.5–45)
HCT VFR BLD CALC: 34.7 % — SIGNIFICANT CHANGE UP (ref 34.5–45)
HGB BLD-MCNC: 10 G/DL — LOW (ref 11.5–15.5)
HGB BLD-MCNC: 11.5 G/DL — SIGNIFICANT CHANGE UP (ref 11.5–15.5)
MAGNESIUM SERPL-MCNC: 2.1 MG/DL — SIGNIFICANT CHANGE UP (ref 1.6–2.6)
MAGNESIUM SERPL-MCNC: 2.1 MG/DL — SIGNIFICANT CHANGE UP (ref 1.6–2.6)
MCHC RBC-ENTMCNC: 30.9 PG — SIGNIFICANT CHANGE UP (ref 27–34)
MCHC RBC-ENTMCNC: 31.2 PG — SIGNIFICANT CHANGE UP (ref 27–34)
MCHC RBC-ENTMCNC: 33.1 GM/DL — SIGNIFICANT CHANGE UP (ref 32–36)
MCHC RBC-ENTMCNC: 33.3 GM/DL — SIGNIFICANT CHANGE UP (ref 32–36)
MCV RBC AUTO: 92.6 FL — SIGNIFICANT CHANGE UP (ref 80–100)
MCV RBC AUTO: 94 FL — SIGNIFICANT CHANGE UP (ref 80–100)
NRBC # BLD: 0 /100 WBCS — SIGNIFICANT CHANGE UP (ref 0–0)
NRBC # BLD: 0 /100 WBCS — SIGNIFICANT CHANGE UP (ref 0–0)
PHOSPHATE SERPL-MCNC: 2 MG/DL — LOW (ref 2.5–4.5)
PHOSPHATE SERPL-MCNC: 2.8 MG/DL — SIGNIFICANT CHANGE UP (ref 2.5–4.5)
PLATELET # BLD AUTO: 171 K/UL — SIGNIFICANT CHANGE UP (ref 150–400)
PLATELET # BLD AUTO: 181 K/UL — SIGNIFICANT CHANGE UP (ref 150–400)
POTASSIUM SERPL-MCNC: 3.2 MMOL/L — LOW (ref 3.5–5.3)
POTASSIUM SERPL-MCNC: 3.8 MMOL/L — SIGNIFICANT CHANGE UP (ref 3.5–5.3)
POTASSIUM SERPL-SCNC: 3.2 MMOL/L — LOW (ref 3.5–5.3)
POTASSIUM SERPL-SCNC: 3.8 MMOL/L — SIGNIFICANT CHANGE UP (ref 3.5–5.3)
PROT SERPL-MCNC: 6 G/DL — SIGNIFICANT CHANGE UP (ref 6–8.3)
PROT SERPL-MCNC: 7 G/DL — SIGNIFICANT CHANGE UP (ref 6–8.3)
RBC # BLD: 3.24 M/UL — LOW (ref 3.8–5.2)
RBC # BLD: 3.69 M/UL — LOW (ref 3.8–5.2)
RBC # FLD: 13.6 % — SIGNIFICANT CHANGE UP (ref 10.3–14.5)
RBC # FLD: 13.7 % — SIGNIFICANT CHANGE UP (ref 10.3–14.5)
SODIUM SERPL-SCNC: 136 MMOL/L — SIGNIFICANT CHANGE UP (ref 135–145)
SODIUM SERPL-SCNC: 139 MMOL/L — SIGNIFICANT CHANGE UP (ref 135–145)
WBC # BLD: 7.42 K/UL — SIGNIFICANT CHANGE UP (ref 3.8–10.5)
WBC # BLD: 9.3 K/UL — SIGNIFICANT CHANGE UP (ref 3.8–10.5)
WBC # FLD AUTO: 7.42 K/UL — SIGNIFICANT CHANGE UP (ref 3.8–10.5)
WBC # FLD AUTO: 9.3 K/UL — SIGNIFICANT CHANGE UP (ref 3.8–10.5)

## 2021-08-15 PROCEDURE — 99232 SBSQ HOSP IP/OBS MODERATE 35: CPT

## 2021-08-15 PROCEDURE — 71045 X-RAY EXAM CHEST 1 VIEW: CPT | Mod: 26

## 2021-08-15 RX ORDER — POTASSIUM CHLORIDE 20 MEQ
40 PACKET (EA) ORAL ONCE
Refills: 0 | Status: COMPLETED | OUTPATIENT
Start: 2021-08-15 | End: 2021-08-16

## 2021-08-15 RX ORDER — POTASSIUM CHLORIDE 20 MEQ
20 PACKET (EA) ORAL ONCE
Refills: 0 | Status: DISCONTINUED | OUTPATIENT
Start: 2021-08-15 | End: 2021-08-15

## 2021-08-15 RX ORDER — FOLIC ACID 0.8 MG
1 TABLET ORAL DAILY
Refills: 0 | Status: DISCONTINUED | OUTPATIENT
Start: 2021-08-15 | End: 2021-08-16

## 2021-08-15 RX ORDER — POTASSIUM CHLORIDE 20 MEQ
20 PACKET (EA) ORAL ONCE
Refills: 0 | Status: COMPLETED | OUTPATIENT
Start: 2021-08-15 | End: 2021-08-15

## 2021-08-15 RX ORDER — LEVOTHYROXINE SODIUM 125 MCG
75 TABLET ORAL DAILY
Refills: 0 | Status: DISCONTINUED | OUTPATIENT
Start: 2021-08-15 | End: 2021-08-16

## 2021-08-15 RX ORDER — POTASSIUM PHOSPHATE, MONOBASIC POTASSIUM PHOSPHATE, DIBASIC 236; 224 MG/ML; MG/ML
15 INJECTION, SOLUTION INTRAVENOUS ONCE
Refills: 0 | Status: COMPLETED | OUTPATIENT
Start: 2021-08-15 | End: 2021-08-15

## 2021-08-15 RX ORDER — METOPROLOL TARTRATE 50 MG
25 TABLET ORAL EVERY 12 HOURS
Refills: 0 | Status: DISCONTINUED | OUTPATIENT
Start: 2021-08-15 | End: 2021-08-16

## 2021-08-15 RX ORDER — ACETAMINOPHEN 500 MG
650 TABLET ORAL EVERY 6 HOURS
Refills: 0 | Status: DISCONTINUED | OUTPATIENT
Start: 2021-08-15 | End: 2021-08-16

## 2021-08-15 RX ORDER — METOPROLOL TARTRATE 50 MG
25 TABLET ORAL EVERY 12 HOURS
Refills: 0 | Status: DISCONTINUED | OUTPATIENT
Start: 2021-08-15 | End: 2021-08-15

## 2021-08-15 RX ORDER — POTASSIUM PHOSPHATE, MONOBASIC POTASSIUM PHOSPHATE, DIBASIC 236; 224 MG/ML; MG/ML
30 INJECTION, SOLUTION INTRAVENOUS ONCE
Refills: 0 | Status: COMPLETED | OUTPATIENT
Start: 2021-08-15 | End: 2021-08-16

## 2021-08-15 RX ORDER — HYDROMORPHONE HYDROCHLORIDE 2 MG/ML
0.25 INJECTION INTRAMUSCULAR; INTRAVENOUS; SUBCUTANEOUS ONCE
Refills: 0 | Status: DISCONTINUED | OUTPATIENT
Start: 2021-08-15 | End: 2021-08-15

## 2021-08-15 RX ADMIN — SODIUM CHLORIDE 30 MILLILITER(S): 9 INJECTION, SOLUTION INTRAVENOUS at 02:55

## 2021-08-15 RX ADMIN — Medication 650 MILLIGRAM(S): at 19:45

## 2021-08-15 RX ADMIN — Medication 650 MILLIGRAM(S): at 11:37

## 2021-08-15 RX ADMIN — HYDROMORPHONE HYDROCHLORIDE 0.25 MILLIGRAM(S): 2 INJECTION INTRAMUSCULAR; INTRAVENOUS; SUBCUTANEOUS at 00:35

## 2021-08-15 RX ADMIN — LIDOCAINE 1 PATCH: 4 CREAM TOPICAL at 02:55

## 2021-08-15 RX ADMIN — HYDROMORPHONE HYDROCHLORIDE 0.25 MILLIGRAM(S): 2 INJECTION INTRAMUSCULAR; INTRAVENOUS; SUBCUTANEOUS at 00:19

## 2021-08-15 RX ADMIN — LIDOCAINE 1 PATCH: 4 CREAM TOPICAL at 13:41

## 2021-08-15 RX ADMIN — Medication 3 MILLILITER(S): at 18:37

## 2021-08-15 RX ADMIN — LIDOCAINE 1 PATCH: 4 CREAM TOPICAL at 06:51

## 2021-08-15 RX ADMIN — Medication 5 MILLIGRAM(S): at 00:05

## 2021-08-15 RX ADMIN — ENOXAPARIN SODIUM 40 MILLIGRAM(S): 100 INJECTION SUBCUTANEOUS at 08:49

## 2021-08-15 RX ADMIN — Medication 1 MILLIGRAM(S): at 11:09

## 2021-08-15 RX ADMIN — SODIUM CHLORIDE 30 MILLILITER(S): 9 INJECTION, SOLUTION INTRAVENOUS at 07:24

## 2021-08-15 RX ADMIN — Medication 650 MILLIGRAM(S): at 19:16

## 2021-08-15 RX ADMIN — Medication 25 MILLIGRAM(S): at 11:10

## 2021-08-15 RX ADMIN — Medication 3 MILLILITER(S): at 00:33

## 2021-08-15 RX ADMIN — Medication 3 MILLILITER(S): at 11:56

## 2021-08-15 RX ADMIN — Medication 3 MILLILITER(S): at 05:13

## 2021-08-15 RX ADMIN — Medication 650 MILLIGRAM(S): at 11:09

## 2021-08-15 RX ADMIN — POTASSIUM PHOSPHATE, MONOBASIC POTASSIUM PHOSPHATE, DIBASIC 62.5 MILLIMOLE(S): 236; 224 INJECTION, SOLUTION INTRAVENOUS at 04:02

## 2021-08-15 RX ADMIN — Medication 3 MILLILITER(S): at 18:36

## 2021-08-15 RX ADMIN — CHLORHEXIDINE GLUCONATE 1 APPLICATION(S): 213 SOLUTION TOPICAL at 02:30

## 2021-08-15 RX ADMIN — Medication 20 MILLIEQUIVALENT(S): at 11:09

## 2021-08-15 RX ADMIN — Medication 25 MILLIGRAM(S): at 17:40

## 2021-08-15 RX ADMIN — Medication 75 MICROGRAM(S): at 11:09

## 2021-08-15 NOTE — PROGRESS NOTE ADULT - SUBJECTIVE AND OBJECTIVE BOX
DATE OF SERVICE: 08-15-21 @ 09:55  CHIEF COMPLAINT:Patient is a 89y old  Female who presents with a chief complaint of Found down at home with multiple rib fractures (15 Aug 2021 04:40)    	        PAST MEDICAL & SURGICAL HISTORY:  Hypertension    Hypothyroidism    History of colon cancer  s/p Resection in 2009,     H/O brain tumor  History of brain tumor resection in 1961.            REVIEW OF SYSTEMS:  awake alert  some confusion  RESPIRATORY: No cough, wheezing, chills or hemoptysis; No Shortness of Breath  CARDIOVASCULAR: No chest pain, palpitations, passing out,   GASTROINTESTINAL: No abdominal or epigastric pain.   GENITOURINARY: No dysuria, frequency,  Medications:  MEDICATIONS  (STANDING):  acetylcysteine 20%  Inhalation 3 milliLiter(s) Inhalation every 6 hours  albuterol/ipratropium for Nebulization 3 milliLiter(s) Nebulizer every 6 hours  chlorhexidine 2% Cloths 1 Application(s) Topical <User Schedule>  dextrose 5% + lactated ringers. 1000 milliLiter(s) (30 mL/Hr) IV Continuous <Continuous>  enoxaparin Injectable 40 milliGRAM(s) SubCutaneous every 24 hours  levothyroxine Injectable 56 MICROGram(s) IV Push at bedtime  lidocaine   4% Patch 1 Patch Transdermal every 24 hours  metoprolol tartrate Injectable 5 milliGRAM(s) IV Push every 6 hours    MEDICATIONS  (PRN):  acetaminophen   Tablet .. 650 milliGRAM(s) Oral every 6 hours PRN Mild Pain (1 - 3)    	    PHYSICAL EXAM:  T(C): 36.4 (08-15-21 @ 07:00), Max: 37.8 (08-14-21 @ 23:00)  HR: 90 (08-15-21 @ 09:00) (68 - 156)  BP: 90/42 (08-15-21 @ 09:00) (80/55 - 164/88)  RR: 10 (08-15-21 @ 09:00) (10 - 36)  SpO2: 98% (08-15-21 @ 09:00) (94% - 100%)  Wt(kg): --  I&O's Summary    14 Aug 2021 07:01  -  15 Aug 2021 07:00  --------------------------------------------------------  IN: 1569.8 mL / OUT: 1665 mL / NET: -95.2 mL    15 Aug 2021 07:01  -  15 Aug 2021 09:55  --------------------------------------------------------  IN: 285 mL / OUT: 20 mL / NET: 265 mL        Appearance: Normal	  HEENT:   Normal oral mucosa, PERRL, EOMI	  Cardiovascular: Normal S1 S2, No JVD,  Respiratory: dec bs   Psychiatry: A & O  Gastrointestinal:  Soft, Non-tender, + BS	    Neurologic: Non-focal  Extremities: dec rom   Vascular: Peripheral pulses palpable     TELEMETRY: 	    ECG:  	  RADIOLOGY:  OTHER: 	  	  LABS:	 	    CARDIAC MARKERS:  CARDIAC MARKERS ( 14 Aug 2021 02:11 )  x     / x     / 1109 U/L / x     / x                                    11.5   9.30  )-----------( 181      ( 15 Aug 2021 02:52 )             34.7     08-15    139  |  100  |  19  ----------------------------<  106<H>  3.8   |  25  |  1.01    Ca    8.9      15 Aug 2021 02:52  Phos  2.8     08-15  Mg     2.1     08-15    TPro  7.0  /  Alb  2.9<L>  /  TBili  0.5  /  DBili  <0.1  /  AST  56<H>  /  ALT  49<H>  /  AlkPhos  55  08-15    proBNP:   Lipid Profile:   HgA1c:   TSH:

## 2021-08-15 NOTE — PROGRESS NOTE ADULT - ASSESSMENT
89 y/o female w/ a PMHx of brain surgery (1960s w/ residual right facial droop), colon CA s/p right hemicolectomy (2009 w/ Dr. David), HTN, and hypothyroidism who presented on 8/12 after being found down, s/p fall unwitnessed. Patient with  right 5th-11th rib fractures. Patient was subsequently admitted to SICU for close respiratory monitoring. Hospital course has been complicated by delirium w/ a waxing & waning mental status and new onset atrial fibrillation w/ RVR.     Neuro:  acute traumatic pain 2/2 rib fx, delirium  - Frequent reorientation, sleep hygiene,   -  olanzapine 5 mg at bedtime for delirium is d/c'd  - Multimodal pain control with acetaminophen and lidocaine patch; avoiding narcotics and NSAIDs given her age    Resp: right 5th-11th rib fractures  -CXR: LLL infilrates, monitor  - Out of bed to chair and aggressive chest PT to prevent atelectasis;  - patient too delirious to participate in incentive spirometry  -will try to encourage IS use    CV: atrial fibrillation (new onset)  - Metoprolol 5 mg q4hrs for rate control of atrial fibrillation  -remain off cardizem drip  - TTE from 8/12 with LVH, normal LV systolic function w/ EF of 60%, and a small pericardial effusion  -holding AC, recent fall with rib fx    GI: dysphagia  - NPO as patient is too lethargic; will need swallow eval when mental status improves    Renal: CKD stage II  - Monitor I&Os and electrolytes w/ repletions as necessary  - D5LR at 30 mL/hr while NPO    Heme: H/H stable  - Lovenox for VTE prophylaxis    ID: no abx  - Monitor for clinical evidence of active infection    Endo: Hx of  hypothyroidism  - Home levothyroxine IVP for hypothyroidism    GOC:  - son wants full code  -f/u palliative consult

## 2021-08-15 NOTE — PROGRESS NOTE ADULT - ASSESSMENT
Patient is a 89yo F with a history of brain surgery (1960s with residual right-sided facial droop), R hemicolectomy (2009, Dr. David), HTN, hypothyroidism presenting after she was found down at home, found to have acute right 5-7th rib fractures with an altered mental status. Patient hemodynamically stable not requiring supplemental oxygen.     Plan:      ACS/Trauma Surgery  [id number] Patient is a 89yo F with a history of brain surgery (1960s with residual right-sided facial droop), R hemicolectomy (2009, Dr. David), HTN, hypothyroidism presenting after she was found down at home, found to have acute right 5-7th rib fractures with an altered mental status. Patient hemodynamically stable not requiring supplemental oxygen.     Plan:  - multimodal pain control   - PT eval   - I&O monitoring   - DVT ppx: lovenox   - appreciate excellent SICU care     ACS/Trauma Surgery  [id number]

## 2021-08-15 NOTE — PROGRESS NOTE ADULT - ASSESSMENT
89 y/o female w/ a PMHx of brain surgery (1960s w/ residual right facial droop), colon CA s/p right hemicolectomy, HTN, and hypothyroidism presenting after being found down with an elevated CK and and right 5th-11th rib fractures with  a waxing & waning mental status and new onset atrial fibrillation w/ RVR      delerium /much improved     acute traumatic pain,    pain control with acetaminophen and lidocaine patch; avoiding narcotics and NSAIDs   right 5th-11th rib fractures  - Out of bed to chair and  PT   atrial fibrillation  meds for a fib  - TTE from 8/12 with LVH, normal LV systolic function w/ EF of 60%, and a small pericardial effusion     dysphagia  diet as per sicu   CKD stage II  - Monitor I&Os and electrolytes   mild fluids prn    dvt proph    ID: no acute issues  - Monitor for clinical evidence of active infection    : hypothyroidism   levothyroxine for hypothyroidism

## 2021-08-15 NOTE — PROGRESS NOTE ADULT - SUBJECTIVE AND OBJECTIVE BOX
HISTORY  89 y/o female w/ a PMHx of brain surgery (1960s w/ residual right facial droop), colon CA s/p right hemicolectomy (2009 w/ Dr. David), HTN, and hypothyroidism who presented on 8/12 after being found down Per the son, patient was last seen normal on 8/10 and did not hear from her despite multiple phone calls so he went over and found her down in the kitchen confused and covered in urine w/ bruising over her lip. Labs significant for elevated CK in the 4500s. Imaging revealed right 5th-11th rib fractures. Patient was subsequently admitted to SICU for close respiratory monitoring. Hospital course has been complicated by delirium w/ a waxing & waning mental status and new onset atrial fibrillation w/ RVR. Unable to obtain ROS as patient is confused & somnolent    24 HOUR EVENTS:  D/C'd Zyprexa  3L NC  Passed bedside swallow eval  Lasix X2, responded  Began Lovenox  Duonebs  NEURO  RASS:     GCS:     CAM ICU:  Exam:   Meds:   [x] Adequacy of sedation and pain control has been assessed and adjusted      RESPIRATORY  RR: 24 (08-15-21 @ 05:00) (12 - 36)  SpO2: 97% (08-15-21 @ 05:00) (94% - 100%)  Wt(kg): --  Exam:   Mechanical Ventilation:     [ ] Extubation Readiness Assessed  Meds: acetylcysteine 20%  Inhalation 3 milliLiter(s) Inhalation every 6 hours  albuterol/ipratropium for Nebulization 3 milliLiter(s) Nebulizer every 6 hours        CARDIOVASCULAR  HR: 96 (08-15-21 @ 05:00) (65 - 156)  BP: 96/54 (08-15-21 @ 05:00) (96/54 - 164/88)  BP(mean): 68 (08-15-21 @ 05:00) (68 - 120)  ABP: --  ABP(mean): --  Wt(kg): --  CVP(cm H2O): --  VBG - ( 14 Aug 2021 02:03 )  pH: 7.41  /  pCO2: 45    /  pO2: 40    / HCO3: 28    / Base Excess: 3.1   /  SaO2: 72     Lactate: 1.2                Exam:  Cardiac Rhythm:  Perfusion     [ ]Adequate   [ ]Inadequate  Mentation   [ ]Normal       [ ]Reduced  Extremities  [ ]Warm         [ ]Cool  Volume Status [ ]Hypervolemic [ ]Euvolemic [ ]Hypovolemic  Meds: metoprolol tartrate Injectable 5 milliGRAM(s) IV Push every 6 hours        GI/NUTRITION  Exam:  Diet:  Meds:     GENITOURINARY  I&O's Detail    08-13 @ 07:01 - 08-14 @ 07:00  --------------------------------------------------------  IN:    dextrose 5% + lactated ringers: 630 mL    IV PiggyBack: 483.3 mL    Lactated Ringers: 300 mL  Total IN: 1413.3 mL    OUT:    Indwelling Catheter - Urethral (mL): 2270 mL  Total OUT: 2270 mL    Total NET: -856.7 mL      08-14 @ 07:01  -  08-15 @ 05:27  --------------------------------------------------------  IN:    dextrose 5% + lactated ringers: 630 mL    IV PiggyBack: 662.3 mL  Total IN: 1292.3 mL    OUT:    Indwelling Catheter - Urethral (mL): 1595 mL  Total OUT: 1595 mL    Total NET: -302.7 mL          08-15    139  |  100  |  19  ----------------------------<  106<H>  3.8   |  25  |  1.01    Ca    8.9      15 Aug 2021 02:52  Phos  2.8     08-15  Mg     2.1     08-15    TPro  7.0  /  Alb  2.9<L>  /  TBili  0.5  /  DBili  <0.1  /  AST  56<H>  /  ALT  49<H>  /  AlkPhos  55  08-15    [ ] Crooks catheter, indication:   Meds: dextrose 5% + lactated ringers. 1000 milliLiter(s) IV Continuous <Continuous>        HEMATOLOGIC  Meds: enoxaparin Injectable 40 milliGRAM(s) SubCutaneous every 24 hours    [x] VTE Prophylaxis                        11.5   9.30  )-----------( 181      ( 15 Aug 2021 02:52 )             34.7       Transfusion     [ ] PRBC   [ ] Platelets   [ ] FFP   [ ] Cryoprecipitate      INFECTIOUS DISEASES  T(C): 36.4 (08-15-21 @ 03:00), Max: 37.8 (08-14-21 @ 23:00)  Wt(kg): --  WBC Count: 9.30 K/uL (08-15 @ 02:52)    Recent Cultures:  Specimen Source: Catheterized Catheterized, 08-12 @ 04:36; Results   >=3 organisms. Probable collection contamination.; Gram Stain: --; Organism: --    Meds:       ENDOCRINE  Capillary Blood Glucose    Meds: levothyroxine Injectable 56 MICROGram(s) IV Push at bedtime        ACCESS DEVICES:  [x] Peripheral IV  [ ] Central Venous Line	[ ] R	[ ] L	[ ] IJ	[ ] Fem	[ ] SC	Placed:   [ ] Arterial Line		[ ] R	[ ] L	[ ] Fem	[ ] Rad	[ ] Ax	Placed:   [ ] PICC:					[ ] Mediport  [ ] Urinary Catheter, Date Placed:   [x] Necessity of urinary, arterial, and venous catheters discussed    OTHER MEDICATIONS:  chlorhexidine 2% Cloths 1 Application(s) Topical <User Schedule>  lidocaine   4% Patch 1 Patch Transdermal every 24 hours      CODE STATUS:     IMAGING:

## 2021-08-15 NOTE — PROGRESS NOTE ADULT - SUBJECTIVE AND OBJECTIVE BOX
Surgery Progress Note     Subjective/24hour Events: Patient seen and examined at the bedside this morning. No acute events overnight. Pain controlled.     Vital Signs:  Vital Signs Last 24 Hrs  T(C): 36.4 (15 Aug 2021 03:00), Max: 37.8 (14 Aug 2021 23:00)  T(F): 97.5 (15 Aug 2021 03:00), Max: 100 (14 Aug 2021 23:00)  HR: 156 (15 Aug 2021 04:00) (65 - 156)  BP: 164/88 (15 Aug 2021 04:00) (97/52 - 164/88)  BP(mean): 114 (15 Aug 2021 04:00) (69 - 120)  RR: 36 (15 Aug 2021 04:00) (12 - 36)  SpO2: 95% (15 Aug 2021 04:00) (94% - 100%)        I&O's Detail    13 Aug 2021 07:01  -  14 Aug 2021 07:00  --------------------------------------------------------  IN:    dextrose 5% + lactated ringers: 630 mL    IV PiggyBack: 483.3 mL    Lactated Ringers: 300 mL  Total IN: 1413.3 mL    OUT:    Indwelling Catheter - Urethral (mL): 2270 mL  Total OUT: 2270 mL    Total NET: -856.7 mL      14 Aug 2021 07:01  -  15 Aug 2021 04:41  --------------------------------------------------------  IN:    dextrose 5% + lactated ringers: 630 mL    IV PiggyBack: 662.3 mL  Total IN: 1292.3 mL    OUT:    Indwelling Catheter - Urethral (mL): 1595 mL  Total OUT: 1595 mL    Total NET: -302.7 mL            Physical Exam:  Gen: NAD.  Lungs: Non labored breathing.   Ab: Soft, nontender, nondistended.   Ext: Moves all 4 spontaneously.     Labs:    08-15    139  |  100  |  19  ----------------------------<  106<H>  3.8   |  25  |  1.01    Ca    8.9      15 Aug 2021 02:52  Phos  2.8     08-15  Mg     2.1     08-15    TPro  7.0  /  Alb  2.9<L>  /  TBili  0.5  /  DBili  <0.1  /  AST  56<H>  /  ALT  49<H>  /  AlkPhos  55  08-15    CAPILLARY BLOOD GLUCOSE        LIVER FUNCTIONS - ( 15 Aug 2021 02:52 )  Alb: 2.9 g/dL / Pro: 7.0 g/dL / ALK PHOS: 55 U/L / ALT: 49 U/L / AST: 56 U/L / GGT: x                                 11.5   9.30  )-----------( 181      ( 15 Aug 2021 02:52 )             34.7

## 2021-08-16 ENCOUNTER — TRANSCRIPTION ENCOUNTER (OUTPATIENT)
Age: 87
End: 2021-08-16

## 2021-08-16 VITALS
HEART RATE: 92 BPM | DIASTOLIC BLOOD PRESSURE: 78 MMHG | SYSTOLIC BLOOD PRESSURE: 169 MMHG | RESPIRATION RATE: 23 BRPM | OXYGEN SATURATION: 94 %

## 2021-08-16 LAB — SARS-COV-2 RNA SPEC QL NAA+PROBE: SIGNIFICANT CHANGE UP

## 2021-08-16 PROCEDURE — 82553 CREATINE MB FRACTION: CPT

## 2021-08-16 PROCEDURE — 86900 BLOOD TYPING SEROLOGIC ABO: CPT

## 2021-08-16 PROCEDURE — 84436 ASSAY OF TOTAL THYROXINE: CPT

## 2021-08-16 PROCEDURE — 71260 CT THORAX DX C+: CPT | Mod: MA

## 2021-08-16 PROCEDURE — 72125 CT NECK SPINE W/O DYE: CPT

## 2021-08-16 PROCEDURE — 84480 ASSAY TRIIODOTHYRONINE (T3): CPT

## 2021-08-16 PROCEDURE — 97110 THERAPEUTIC EXERCISES: CPT

## 2021-08-16 PROCEDURE — 84295 ASSAY OF SERUM SODIUM: CPT

## 2021-08-16 PROCEDURE — 97166 OT EVAL MOD COMPLEX 45 MIN: CPT

## 2021-08-16 PROCEDURE — 82565 ASSAY OF CREATININE: CPT

## 2021-08-16 PROCEDURE — 82746 ASSAY OF FOLIC ACID SERUM: CPT

## 2021-08-16 PROCEDURE — 74177 CT ABD & PELVIS W/CONTRAST: CPT | Mod: MA

## 2021-08-16 PROCEDURE — 99223 1ST HOSP IP/OBS HIGH 75: CPT

## 2021-08-16 PROCEDURE — U0005: CPT

## 2021-08-16 PROCEDURE — 83605 ASSAY OF LACTIC ACID: CPT

## 2021-08-16 PROCEDURE — 86769 SARS-COV-2 COVID-19 ANTIBODY: CPT

## 2021-08-16 PROCEDURE — 99291 CRITICAL CARE FIRST HOUR: CPT

## 2021-08-16 PROCEDURE — 82962 GLUCOSE BLOOD TEST: CPT

## 2021-08-16 PROCEDURE — 82607 VITAMIN B-12: CPT

## 2021-08-16 PROCEDURE — 83880 ASSAY OF NATRIURETIC PEPTIDE: CPT

## 2021-08-16 PROCEDURE — 85610 PROTHROMBIN TIME: CPT

## 2021-08-16 PROCEDURE — 85014 HEMATOCRIT: CPT

## 2021-08-16 PROCEDURE — 71045 X-RAY EXAM CHEST 1 VIEW: CPT

## 2021-08-16 PROCEDURE — 84132 ASSAY OF SERUM POTASSIUM: CPT

## 2021-08-16 PROCEDURE — 82550 ASSAY OF CK (CPK): CPT

## 2021-08-16 PROCEDURE — 85027 COMPLETE CBC AUTOMATED: CPT

## 2021-08-16 PROCEDURE — 80061 LIPID PANEL: CPT

## 2021-08-16 PROCEDURE — 80053 COMPREHEN METABOLIC PANEL: CPT

## 2021-08-16 PROCEDURE — G1004: CPT

## 2021-08-16 PROCEDURE — 83036 HEMOGLOBIN GLYCOSYLATED A1C: CPT

## 2021-08-16 PROCEDURE — 97162 PT EVAL MOD COMPLEX 30 MIN: CPT

## 2021-08-16 PROCEDURE — 84443 ASSAY THYROID STIM HORMONE: CPT

## 2021-08-16 PROCEDURE — 87086 URINE CULTURE/COLONY COUNT: CPT

## 2021-08-16 PROCEDURE — 80048 BASIC METABOLIC PNL TOTAL CA: CPT

## 2021-08-16 PROCEDURE — 71045 X-RAY EXAM CHEST 1 VIEW: CPT | Mod: 26

## 2021-08-16 PROCEDURE — 84100 ASSAY OF PHOSPHORUS: CPT

## 2021-08-16 PROCEDURE — 85730 THROMBOPLASTIN TIME PARTIAL: CPT

## 2021-08-16 PROCEDURE — 70450 CT HEAD/BRAIN W/O DYE: CPT

## 2021-08-16 PROCEDURE — U0003: CPT

## 2021-08-16 PROCEDURE — 86901 BLOOD TYPING SEROLOGIC RH(D): CPT

## 2021-08-16 PROCEDURE — 72170 X-RAY EXAM OF PELVIS: CPT

## 2021-08-16 PROCEDURE — 93880 EXTRACRANIAL BILAT STUDY: CPT

## 2021-08-16 PROCEDURE — 82803 BLOOD GASES ANY COMBINATION: CPT

## 2021-08-16 PROCEDURE — 85025 COMPLETE CBC W/AUTO DIFF WBC: CPT

## 2021-08-16 PROCEDURE — 83735 ASSAY OF MAGNESIUM: CPT

## 2021-08-16 PROCEDURE — 82947 ASSAY GLUCOSE BLOOD QUANT: CPT

## 2021-08-16 PROCEDURE — 94640 AIRWAY INHALATION TREATMENT: CPT

## 2021-08-16 PROCEDURE — 84145 PROCALCITONIN (PCT): CPT

## 2021-08-16 PROCEDURE — 85018 HEMOGLOBIN: CPT

## 2021-08-16 PROCEDURE — 86850 RBC ANTIBODY SCREEN: CPT

## 2021-08-16 PROCEDURE — 93005 ELECTROCARDIOGRAM TRACING: CPT

## 2021-08-16 PROCEDURE — 82330 ASSAY OF CALCIUM: CPT

## 2021-08-16 PROCEDURE — 84484 ASSAY OF TROPONIN QUANT: CPT

## 2021-08-16 PROCEDURE — 82435 ASSAY OF BLOOD CHLORIDE: CPT

## 2021-08-16 PROCEDURE — 93306 TTE W/DOPPLER COMPLETE: CPT

## 2021-08-16 PROCEDURE — 80076 HEPATIC FUNCTION PANEL: CPT

## 2021-08-16 PROCEDURE — 81001 URINALYSIS AUTO W/SCOPE: CPT

## 2021-08-16 RX ORDER — AMLODIPINE BESYLATE 2.5 MG/1
10 TABLET ORAL ONCE
Refills: 0 | Status: COMPLETED | OUTPATIENT
Start: 2021-08-16 | End: 2021-08-16

## 2021-08-16 RX ORDER — IPRATROPIUM/ALBUTEROL SULFATE 18-103MCG
3 AEROSOL WITH ADAPTER (GRAM) INHALATION
Qty: 0 | Refills: 0 | DISCHARGE
Start: 2021-08-16

## 2021-08-16 RX ORDER — FUROSEMIDE 40 MG
20 TABLET ORAL ONCE
Refills: 0 | Status: COMPLETED | OUTPATIENT
Start: 2021-08-16 | End: 2021-08-16

## 2021-08-16 RX ORDER — METOPROLOL TARTRATE 50 MG
1 TABLET ORAL
Qty: 0 | Refills: 0 | DISCHARGE
Start: 2021-08-16

## 2021-08-16 RX ORDER — ACETAMINOPHEN 500 MG
2 TABLET ORAL
Qty: 0 | Refills: 0 | DISCHARGE
Start: 2021-08-16

## 2021-08-16 RX ORDER — LANOLIN ALCOHOL/MO/W.PET/CERES
3 CREAM (GRAM) TOPICAL ONCE
Refills: 0 | Status: DISCONTINUED | OUTPATIENT
Start: 2021-08-16 | End: 2021-08-16

## 2021-08-16 RX ORDER — LANOLIN ALCOHOL/MO/W.PET/CERES
3 CREAM (GRAM) TOPICAL ONCE
Refills: 0 | Status: COMPLETED | OUTPATIENT
Start: 2021-08-16 | End: 2021-08-16

## 2021-08-16 RX ADMIN — Medication 20 MILLIGRAM(S): at 07:19

## 2021-08-16 RX ADMIN — Medication 3 MILLILITER(S): at 00:51

## 2021-08-16 RX ADMIN — Medication 40 MILLIEQUIVALENT(S): at 05:04

## 2021-08-16 RX ADMIN — LIDOCAINE 1 PATCH: 4 CREAM TOPICAL at 05:06

## 2021-08-16 RX ADMIN — POTASSIUM PHOSPHATE, MONOBASIC POTASSIUM PHOSPHATE, DIBASIC 83.33 MILLIMOLE(S): 236; 224 INJECTION, SOLUTION INTRAVENOUS at 00:09

## 2021-08-16 RX ADMIN — LIDOCAINE 1 PATCH: 4 CREAM TOPICAL at 17:03

## 2021-08-16 RX ADMIN — LIDOCAINE 1 PATCH: 4 CREAM TOPICAL at 07:39

## 2021-08-16 RX ADMIN — Medication 1 MILLIGRAM(S): at 11:42

## 2021-08-16 RX ADMIN — Medication 25 MILLIGRAM(S): at 17:03

## 2021-08-16 RX ADMIN — Medication 3 MILLILITER(S): at 05:46

## 2021-08-16 RX ADMIN — ENOXAPARIN SODIUM 40 MILLIGRAM(S): 100 INJECTION SUBCUTANEOUS at 08:31

## 2021-08-16 RX ADMIN — Medication 3 MILLIGRAM(S): at 02:25

## 2021-08-16 RX ADMIN — CHLORHEXIDINE GLUCONATE 1 APPLICATION(S): 213 SOLUTION TOPICAL at 05:05

## 2021-08-16 RX ADMIN — Medication 75 MICROGRAM(S): at 05:05

## 2021-08-16 RX ADMIN — Medication 25 MILLIGRAM(S): at 05:05

## 2021-08-16 NOTE — PROGRESS NOTE ADULT - ASSESSMENT
89 y/o female w/ a PMHx of brain surgery (1960s w/ residual right facial droop), colon CA s/p right hemicolectomy, HTN, and hypothyroidism presenting after being found down with an elevated CK and and right 5th-11th rib fractures with  a waxing & waning mental status and new onset atrial fibrillation w/ RVR      delerium /much improved     acute traumatic pain,    pain control with acetaminophen and lidocaine patch; avoiding narcotics and NSAIDs   right 5th-11th rib fractures  - Out of bed to chair and  PT   atrial fibrillation  cards to ana / dr manoj gallo called  ?a/c  - TTE from 8/12 with LVH, normal LV systolic function w/ EF of 60%, and a small pericardial effusion     dysphagia  diet as per sicu   CKD stage II  - Monitor I&Os and electrolytes   mild fluids prn    dvt proph    ID: no acute issues  - Monitor for clinical evidence of active infection    : hypothyroidism   levothyroxine for hypothyroidism

## 2021-08-16 NOTE — PROGRESS NOTE ADULT - ASSESSMENT
Assessment:      Plan:      ACS/Trauma Surgery  [id number] Patient is a 89yo F with a history of brain surgery (1960s with residual right-sided facial droop), R hemicolectomy (2009, Dr. David), HTN, hypothyroidism presenting after she was found down at home, found to have acute right 5-7th rib fractures with an altered mental status. Patient hemodynamically stable not requiring supplemental oxygen.     Plan:  - multimodal pain control   - I&O monitoring   - DVT ppx: lovenox   - encourage IS use  - appreciate excellent SICU care       ACS/Trauma Surgery  [id number]

## 2021-08-16 NOTE — CONSULT NOTE ADULT - ASSESSMENT
Patient s/p probable mechanical fall rib fxhad PAF now in NSR  There are no acute cardiac issues and no further afib    Recommend  proceed as per trauma service  baseline echo  monitor HR    Wu gallo

## 2021-08-16 NOTE — PROGRESS NOTE ADULT - SUBJECTIVE AND OBJECTIVE BOX
DATE OF SERVICE: 08-16-21 @ 11:37  CHIEF COMPLAINT:Patient is a 89y old  Female who presents with a chief complaint of Multiple rib fractures (16 Aug 2021 03:53)    	        PAST MEDICAL & SURGICAL HISTORY:  Hypertension    Hypothyroidism    History of colon cancer  s/p Resection in 2009, Dr. David    H/O brain tumor  History of brain tumor resection in 1961.            REVIEW OF SYSTEMS:    awake  alert  occ pain  no sob  no abd pain    Medications:  MEDICATIONS  (STANDING):  acetylcysteine 20%  Inhalation 3 milliLiter(s) Inhalation every 6 hours  albuterol/ipratropium for Nebulization 3 milliLiter(s) Nebulizer every 6 hours  chlorhexidine 2% Cloths 1 Application(s) Topical <User Schedule>  enoxaparin Injectable 40 milliGRAM(s) SubCutaneous every 24 hours  folic acid 1 milliGRAM(s) Oral daily  levothyroxine 75 MICROGram(s) Oral daily  lidocaine   4% Patch 1 Patch Transdermal every 24 hours  metoprolol tartrate 25 milliGRAM(s) Oral every 12 hours    MEDICATIONS  (PRN):  acetaminophen   Tablet .. 650 milliGRAM(s) Oral every 6 hours PRN Mild Pain (1 - 3)    	    PHYSICAL EXAM:  T(C): 36.4 (08-16-21 @ 11:00), Max: 36.6 (08-16-21 @ 07:00)  HR: 94 (08-16-21 @ 11:00) (75 - 106)  BP: 139/76 (08-16-21 @ 11:00) (90/54 - 171/79)  RR: 26 (08-16-21 @ 11:00) (8 - 32)  SpO2: 97% (08-16-21 @ 11:00) (92% - 100%)  Wt(kg): --  I&O's Summary    15 Aug 2021 07:01  -  16 Aug 2021 07:00  --------------------------------------------------------  IN: 1314.8 mL / OUT: 175 mL / NET: 1139.8 mL    16 Aug 2021 07:01  -  16 Aug 2021 11:37  --------------------------------------------------------  IN: 0 mL / OUT: 200 mL / NET: -200 mL        Appearance: Normal	  HEENT:   Normal oral mucosa, PERRL, EOMI	  Lymphatic: No lymphadenopathy  Cardiovascular:reg irreg  Respiratory: dec bs   Gastrointestinal:  Soft, Non-tender, + BS	    Neurologic: Non-focal  Extremities: dec rom    TELEMETRY: 	    ECG:  	  RADIOLOGY:  OTHER: 	  	  LABS:	 	    CARDIAC MARKERS:                                10.0   7.42  )-----------( 171      ( 15 Aug 2021 22:31 )             30.0     08-15    136  |  100  |  24<H>  ----------------------------<  106<H>  3.2<L>   |  25  |  1.15    Ca    8.5      15 Aug 2021 22:31  Phos  2.0     08-15  Mg     2.1     08-15    TPro  6.0  /  Alb  2.6<L>  /  TBili  0.4  /  DBili  0.1  /  AST  32  /  ALT  39  /  AlkPhos  53  08-15    proBNP:   Lipid Profile:   HgA1c:   TSH:

## 2021-08-16 NOTE — CONSULT NOTE ADULT - ASSESSMENT
Impression:    Sacral/bilateral buttocks suspected deep tissue injury in evolution present on admission  Incontinence of bowel  Incontinence dermatitis    Recommend:  1.) topical therapy: sacral/bilateral buttocks - cleanse with incontinence cleanser, pat dry, apply cavilon advanced daily  2.) Incontinence management - incontinence cleanser, pads, pericare BID  3.) Maintain on an alternating air with low air loss surface  4.) Turn and reposition q 2 hours  5.) Nutrition optimization  6.) Offload heels/feet with pillows  7.) Chair cushion for chair sitting    Care as per medicine. Will not actively follow but will remain available. Please recall for deterioration or new issues.  Upon [facility] [address] [place] [address] [phone]  Seen and discussed with clinical nurse  Thank you for this consult  Marina Lam, GERARD-C, CWOCN [id number]

## 2021-08-16 NOTE — DISCHARGE NOTE NURSING/CASE MANAGEMENT/SOCIAL WORK - PATIENT PORTAL LINK FT
You can access the FollowMyHealth Patient Portal offered by [facility] by registering at the following website: [email or web address]. By joining [employer]’s FollowMyHealth portal, you will also be able to view your health information using other applications (apps) compatible with our system.

## 2021-08-16 NOTE — PROGRESS NOTE ADULT - REASON FOR ADMISSION
Found down at home with multiple rib fractures
Multiple rib fractures
Found down at home with multiple rib fractures
Multiple rib fractures
Found down at home with multiple rib fractures

## 2021-08-16 NOTE — CONSULT NOTE ADULT - SUBJECTIVE AND OBJECTIVE BOX
Wound Surgery Consult Note:    HPI:  Patient is a 89yo F with a history of brain surgery (1960s with residual right-sided facial droop), R hemicolectomy (2009, Dr. David), HTN, hypothyroidism presenting after she was found down at home. Per patient's son, he saw her on Tuesday at 1pm then did not hear from her after repeated phone calls. Son went to home and found her down in the kitchen at around 7pm Wednesday. Patient had bruising over her lip and loss of urine. After event, patient has been AAOx1-2 but she is AAOx4 at baseline. Pulling 500ml on IS.    Request for wound care consult for sacral/bilateral buttocks skin received. Ms. Carvalho was encountered on an alternating air with low air loss surface. She was seen with her clinical nurse and was pleasantly confused. She denied pain or discomfort in the buttock/sacral area. However, in the setting of being found down for an unknown length of time, the skin discoloration and erosion may represent the cutaneous manifestation of deep tissue damage incurred prior to admission and now in evolution. Therefore, this injury is considered present on admission since we know that deep tissue damage may take up to 72 hours or longer to become visible at skin level.    PAST MEDICAL & SURGICAL HISTORY:  Hypertension  Hypothyroidism  History of colon cancer, s/p Resection in 2009, Dr. David  H/O brain tumor, History of brain tumor resection in 1961.    MEDICATIONS  (STANDING):  acetylcysteine 20%  Inhalation 3 milliLiter(s) Inhalation every 6 hours  albuterol/ipratropium for Nebulization 3 milliLiter(s) Nebulizer every 6 hours  chlorhexidine 2% Cloths 1 Application(s) Topical <User Schedule>  enoxaparin Injectable 40 milliGRAM(s) SubCutaneous every 24 hours  folic acid 1 milliGRAM(s) Oral daily  levothyroxine 75 MICROGram(s) Oral daily  lidocaine   4% Patch 1 Patch Transdermal every 24 hours  metoprolol tartrate 25 milliGRAM(s) Oral every 12 hours    MEDICATIONS  (PRN):  acetaminophen   Tablet .. 650 milliGRAM(s) Oral every 6 hours PRN Mild Pain (1 - 3)    Allergies    clindamycin (Unknown)  IV Contrast (Unknown)  shellfish (Unknown)  strawberry (Unknown)    Intolerances    Vital Signs Last 24 Hrs  T(C): 36.6 (16 Aug 2021 15:00), Max: 36.6 (16 Aug 2021 07:00)  T(F): 97.9 (16 Aug 2021 15:00), Max: 97.9 (16 Aug 2021 07:00)  HR: 98 (16 Aug 2021 15:00) (75 - 106)  BP: 163/82 (16 Aug 2021 15:00) (90/54 - 171/79)  BP(mean): 115 (16 Aug 2021 15:00) (67 - 118)  RR: 19 (16 Aug 2021 15:00) (9 - 32)  SpO2: 97% (16 Aug 2021 15:00) (92% - 100%)    Physical Exam:  General: Alert, thin, frail   Respiratory: no SOB on room air  Gastrointestinal: soft NT/ND   Neurology: weakened strength & sensation grossly intact  Musculoskeletal: no contractures  Vascular: BLE edema equal  Skin: Sacral/bilateral buttocks with dusky red discoloration and small superficially eroded area L 2cm x W 1cm at upper aspect of sacral/gluteal,  cleft, scant serosanguinous drainage, No odor, erythema, increased warmth, tenderness, induration, fluctuance    LABS:  08-15    136  |  100  |  24<H>  ----------------------------<  106<H>  3.2<L>   |  25  |  1.15    Ca    8.5      15 Aug 2021 22:31  Phos  2.0     08-15  Mg     2.1     08-15    TPro  6.0  /  Alb  2.6<L>  /  TBili  0.4  /  DBili  0.1  /  AST  32  /  ALT  39  /  AlkPhos  53  08-15                          10.0   7.42  )-----------( 171      ( 15 Aug 2021 22:31 )             30.0           RADIOLOGY & ADDITIONAL STUDIES:  Cultures:

## 2021-08-16 NOTE — CONSULT NOTE ADULT - REASON FOR ADMISSION
Found down at home with multiple rib fractures

## 2021-08-16 NOTE — CONSULT NOTE ADULT - SUBJECTIVE AND OBJECTIVE BOX
CHIEF COMPLAINT:  fall PAF  HISTORY OF PRESENT ILLNESS:  Patient is poor historian and somwhat confused  HPI:  Patient is a 89yo F with a history of brain surgery (1960s with residual right-sided facial droop), R hemicolectomy (2009, Dr. David), HTN, hypothyroidism presenting after she was found down at home. Per patient's son, he saw her on Tuesday at 1pm then did not hear from her after repeated phone calls. Son went to home and found her down in the kitchen at around 7pm Wednesday. Patient had bruising over her lip and loss of urine. After event, patient has been AAOx1-2 but she is AAOx4 at baseline. Pulling 500ml on IS.    Patient ua2niol any prior cardiac history  on admission afib rapid VR now NSR    VITALS:  Vital Signs Last 24 Hrs  T(C): 36.7 (12 Aug 2021 01:47), Max: 36.7 (12 Aug 2021 00:55)  T(F): 98.1 (12 Aug 2021 01:47), Max: 98.1 (12 Aug 2021 01:47)  HR: 100 (12 Aug 2021 01:47) (89 - 100)  BP: 145/78 (12 Aug 2021 00:55) (145/78 - 153/98)  RR: 16 (12 Aug 2021 00:55) (16 - 17)  SpO2: 98% (12 Aug 2021 00:55) (96% - 98%)          PAST MEDICAL & SURGICAL HISTORY:  Hypertension    Hypothyroidism    History of colon cancer  s/p Resection in 2009, Dr. David    H/O brain tumor  History of brain tumor resection in 1961.            MEDICATIONS:  enoxaparin Injectable 40 milliGRAM(s) SubCutaneous every 24 hours  metoprolol tartrate 25 milliGRAM(s) Oral every 12 hours      acetylcysteine 20%  Inhalation 3 milliLiter(s) Inhalation every 6 hours  albuterol/ipratropium for Nebulization 3 milliLiter(s) Nebulizer every 6 hours    acetaminophen   Tablet .. 650 milliGRAM(s) Oral every 6 hours PRN      levothyroxine 75 MICROGram(s) Oral daily    chlorhexidine 2% Cloths 1 Application(s) Topical <User Schedule>  folic acid 1 milliGRAM(s) Oral daily  lidocaine   4% Patch 1 Patch Transdermal every 24 hours      FAMILY HISTORY:      SOCIAL HISTORY:    [ ] Non-smoker  [ ] Smoker  [ ] Alcohol    Allergies    clindamycin (Unknown)  IV Contrast (Unknown)  shellfish (Unknown)  strawberry (Unknown)    Intolerances    PHYSICAL EXAM:  T(C): 36.4 (08-16-21 @ 11:00), Max: 36.6 (08-16-21 @ 07:00)  HR: 106 (08-16-21 @ 12:20) (75 - 106)  BP: 138/85 (08-16-21 @ 12:20) (90/54 - 171/79)  RR: 25 (08-16-21 @ 12:20) (8 - 32)  SpO2: 96% (08-16-21 @ 12:20) (92% - 100%)  Wt(kg): --  I&O's Summary    15 Aug 2021 07:01  -  16 Aug 2021 07:00  --------------------------------------------------------  IN: 1314.8 mL / OUT: 175 mL / NET: 1139.8 mL    16 Aug 2021 07:01  -  16 Aug 2021 12:38  --------------------------------------------------------  IN: 100 mL / OUT: 300 mL / NET: -200 mL        Appearance: Normal	confused NAD  HEENT:   Normal oral mucosa, PERRL, EOMI	  Cardiovascular: Normal S1 S2, No JVD, No murmurs no s3  Respiratory: Lungs clear to auscultation	  Gastrointestinal:  Soft, Non-tender, + BS	    Vascular: Peripheral pulses palpable 2+ bilaterally    TELEMETRY: 	    ECG:  NSR now	< from: 12 Lead ECG (08.12.21 @ 20:48) >  Diagnosis Line ATRIAL FIBRILLATION WITH RAPID VENTRICULAR RESPONSE  LOW VOLTAGE QRS  ABNORMAL ECG  WHEN COMPARED WITH ECG OF 12-AUG-2021 20:46, (UNCONFIRMED)    < from: 12 Lead ECG (08.12.21 @ 05:47) >  Diagnosis Line NORMAL SINUS RHYTHM  SEPTAL INFARCT , AGE UNDETERMINED  ABNORMAL ECG  WHEN COMPARED WITH ECG OF 11-AUG-2021 22:06, (UNCONFIRMED)  NO SIGNIFICANT CHANGE WAS FOUND  Confirmed by MD Adam Jeffrey ([id number]) on 8/12/2021 6:13:42 PM    < end of copied text >    THE RATE IS SLIGHTLY LOWER NOW  Confirmed by MD DOOLEY CHRISTOS ([id number]) on 8/13/2021 9:09:57 PM      RADIOLOGY:  < from: CT Head No Cont (08.11.21 @ 23:24) >  HEAD:    Right cerebellar encephalomalacia/gliosis. No acute intracranial hemorrhage, extra axial fluid collection, hydrocephalus, mass effect or midline shift. The basal cisterns are patent.    Mild patchy hypodensities within the periventricular and subcortical white matter, although nonspecific, likely reflect chronic microvascular disease. Cerebral volume loss contributes to prominence of the ventricles and sulci.    Status post left lens replacement surgery. Status post right suboccipital craniectomy. Right parietal cuca hole. No calvarial fracture.    CERVICAL SPINE:    There is no evidence for acute fracture, subluxation, or prevertebral soft tissue widening. The craniocervical junction is unremarkable.Maintained atlantodental interval. Ossification of the transverse ligament. Preservation of the normal cervical lordosis.    Multilevel degenerative changes of the cervical spine with vertebral bodies/disc complexes osteophytes, uncovertebral/ligamentous hypertrophy and facet arthropathy with varying degrees of severe neuroforaminal and spinal canal narrowing. Severe intervertebral disc loss at C4-C5. Mild intervertebral disc loss at C5-C6 and C6-C7.    The lung apices are clear. Enlarged thyroidgland.    IMPRESSION:    Head CT: No acute intracranial hemorrhage or calvarial fracture.    Cervical spine CT: No acute cervical spine fracture or evidence of traumatic malalignment.    	  	  LABS:	 	    CARDIAC MARKERS:      c                            10.0   7.42  )-----------( 171      ( 15 Aug 2021 22:31 )             30.0     08-15    136  |  100  |  24<H>  ----------------------------<  106<H>  3.2<L>   |  25  |  1.15    Ca    8.5      15 Aug 2021 22:31  Phos  2.0     08-15  Mg     2.1     08-15    TPro  6.0  /  Alb  2.6<L>  /  TBili  0.4  /  DBili  0.1  /  AST  32  /  ALT  39  /  AlkPhos  53  08-15    proBNP: Serum Pro-Brain Natriuretic Peptide: 3124 pg/mL (08-11 @ 21:13)    Lipid Profile:   HgA1c:   TSH:

## 2021-08-16 NOTE — CHART NOTE - NSCHARTNOTEFT_GEN_A_CORE
spoke with 8ICU PA. Consult cancelled. Patient being considered for d/c to GE[facility].  please reconsult if needed.  [phone]

## 2021-08-16 NOTE — PROGRESS NOTE ADULT - PROVIDER SPECIALTY LIST ADULT
Internal Medicine
Internal Medicine
SICU
SICU
Trauma Surgery
SICU
SICU
Trauma Surgery
Hospitalist

## 2021-08-16 NOTE — PROGRESS NOTE ADULT - SUBJECTIVE AND OBJECTIVE BOX
Surgery Progress Note     Subjective/24hour Events:     Vital Signs:  Vital Signs Last 24 Hrs  T(C): 36.1 (15 Aug 2021 23:00), Max: 36.6 (15 Aug 2021 11:00)  T(F): 97 (15 Aug 2021 23:00), Max: 97.9 (15 Aug 2021 11:00)  HR: 83 (16 Aug 2021 03:00) (75 - 156)  BP: 149/66 (16 Aug 2021 03:00) (80/55 - 167/61)  BP(mean): 95 (16 Aug 2021 03:00) (60 - 114)  RR: 16 (16 Aug 2021 03:00) (8 - 36)  SpO2: 95% (16 Aug 2021 03:00) (92% - 100%)    Physical Exam:  General:   Lungs:   CV:   Abdomen:  Extremities:      Surgery Progress Note     Subjective/24hour Events: Patient seen and examined at the bedside this morning. No acute events overnight. Pain controlled.     Vital Signs:  Vital Signs Last 24 Hrs  T(C): 36.6 (16 Aug 2021 15:00), Max: 36.6 (16 Aug 2021 07:00)  T(F): 97.9 (16 Aug 2021 15:00), Max: 97.9 (16 Aug 2021 07:00)  HR: 98 (16 Aug 2021 15:00) (75 - 106)  BP: 163/82 (16 Aug 2021 15:00) (90/54 - 171/79)  BP(mean): 115 (16 Aug 2021 15:00) (67 - 118)  RR: 19 (16 Aug 2021 15:00) (9 - 32)  SpO2: 97% (16 Aug 2021 15:00) (92% - 100%)        I&O's Detail    15 Aug 2021 07:01  -  16 Aug 2021 07:00  --------------------------------------------------------  IN:    dextrose 5% + lactated ringers: 90 mL    IV PiggyBack: 624.8 mL    Oral Fluid: 600 mL  Total IN: 1314.8 mL    OUT:    Indwelling Catheter - Urethral (mL): 175 mL    Voided (mL): 0 mL  Total OUT: 175 mL    Total NET: 1139.8 mL      16 Aug 2021 07:01  -  16 Aug 2021 16:24  --------------------------------------------------------  IN:    Oral Fluid: 200 mL  Total IN: 200 mL    OUT:    Voided (mL): 700 mL  Total OUT: 700 mL    Total NET: -500 mL            Physical Exam:  Gen: NAD.  Lungs: Non labored breathing.   Ab: Soft, nontender, nondistended.   Ext: Moves all 4 spontaneously.     Labs:    08-15    136  |  100  |  24<H>  ----------------------------<  106<H>  3.2<L>   |  25  |  1.15    Ca    8.5      15 Aug 2021 22:31  Phos  2.0     08-15  Mg     2.1     08-15    TPro  6.0  /  Alb  2.6<L>  /  TBili  0.4  /  DBili  0.1  /  AST  32  /  ALT  39  /  AlkPhos  53  08-15    CAPILLARY BLOOD GLUCOSE        LIVER FUNCTIONS - ( 15 Aug 2021 22:31 )  Alb: 2.6 g/dL / Pro: 6.0 g/dL / ALK PHOS: 53 U/L / ALT: 39 U/L / AST: 32 U/L / GGT: x                                 10.0   7.42  )-----------( 171      ( 15 Aug 2021 22:31 )             30.0

## 2021-08-16 NOTE — PROGRESS NOTE ADULT - SUBJECTIVE AND OBJECTIVE BOX
HISTORY  89 y/o female w/ a PMHx of brain surgery (1960s w/ residual right facial droop), colon CA s/p right hemicolectomy (2009 w/ Dr. David), HTN, and hypothyroidism who presented on 8/12 after being found down Per the son, patient was last seen normal on 8/10 and did not hear from her despite multiple phone calls so he went over and found her down in the kitchen confused and covered in urine w/ bruising over her lip. Labs significant for elevated CK in the 4500s. Imaging revealed right 5th-11th rib fractures. Patient was subsequently admitted to SICU for close respiratory monitoring. Hospital course has been complicated by delirium w/ a waxing & waning mental status and new onset atrial fibrillation w/ RVR. Unable to obtain ROS as patient is confused & somnolent.0    Patient unable to participate in subjective exam given mental status.     24 HOUR EVENTS:  - IV lopressor transitioned to PO Lopressor 25 Q12H  - Home Synthroid initiated  - Valeriy cuellar'ed, f/u TOV    SUBJECTIVE/ROS:  A ten-point review of systems was otherwise negative except as noted.  Due to altered mental status/intubation, subjective information were not able to be obtained from the patient. History was obtained, to the extent possible, from review of the chart and collateral sources of information.    NEURO  RASS: -1     GCS: 13     CAM ICU: Negative  Exam: awake, alert, oriented x1, no acute distress, no acute focal deficits, occasionally follows simple commands, moving all four extremities  Meds: acetaminophen   Tablet .. 650 milliGRAM(s) Oral every 6 hours PRN Mild Pain (1 - 3)  melatonin 3 milliGRAM(s) Oral once      RESPIRATORY  RR: 13 (08-16-21 @ 01:00) (8 - 36)  SpO2: 97% (08-16-21 @ 01:00) (92% - 100%)  Wt(kg): --  Exam: nonlabored respirations on RA    Meds: acetylcysteine 20%  Inhalation 3 milliLiter(s) Inhalation every 6 hours  albuterol/ipratropium for Nebulization 3 milliLiter(s) Nebulizer every 6 hours      CARDIOVASCULAR  HR: 81 (08-16-21 @ 01:00) (75 - 156)  BP: 137/63 (08-16-21 @ 01:00) (80/55 - 167/61)  BP(mean): 91 (08-16-21 @ 01:00) (60 - 114)  ABP: --  ABP(mean): --  Wt(kg): --  CVP(cm H2O): --  VBG - ( 14 Aug 2021 02:03 )  pH: 7.41  /  pCO2: 45    /  pO2: 40    / HCO3: 28    / Base Excess: 3.1   /  SaO2: 72     Lactate: 1.2                Exam: regular rate and rhythm  Cardiac Rhythm: sinus  Perfusion     [x]Adequate   [ ]Inadequate  Mentation   [x]Normal       [ ]Reduced  Extremities  [x]Warm         [ ]Cool  Volume Status [ ]Hypervolemic [x]Euvolemic [ ]Hypovolemic  Meds: metoprolol tartrate 25 milliGRAM(s) Oral every 12 hours      GI/NUTRITION  Exam: soft, nondistended  Diet: Dysphagia  Meds: None    GENITOURINARY  I&O's Detail    08-14 @ 07:01  -  08-15 @ 07:00  --------------------------------------------------------  IN:    dextrose 5% + lactated ringers: 720 mL    IV PiggyBack: 849.8 mL  Total IN: 1569.8 mL    OUT:    Indwelling Catheter - Urethral (mL): 1665 mL  Total OUT: 1665 mL    Total NET: -95.2 mL      08-15 @ 07:01  -  08-16 @ 01:29  --------------------------------------------------------  IN:    dextrose 5% + lactated ringers: 90 mL    IV PiggyBack: 125 mL    Oral Fluid: 300 mL  Total IN: 515 mL    OUT:    Indwelling Catheter - Urethral (mL): 175 mL  Total OUT: 175 mL    Total NET: 340 mL          08-15    136  |  100  |  24<H>  ----------------------------<  106<H>  3.2<L>   |  25  |  1.15    Ca    8.5      15 Aug 2021 22:31  Phos  2.0     08-15  Mg     2.1     08-15    TPro  6.0  /  Alb  2.6<L>  /  TBili  0.4  /  DBili  0.1  /  AST  32  /  ALT  39  /  AlkPhos  53  08-15    [ ] Crooks catheter, indication:   Meds: folic acid 1 milliGRAM(s) Oral daily  potassium chloride   Powder 40 milliEquivalent(s) Oral once      HEMATOLOGIC  Meds: enoxaparin Injectable 40 milliGRAM(s) SubCutaneous every 24 hours                          10.0   7.42  )-----------( 171      ( 15 Aug 2021 22:31 )             30.0         INFECTIOUS DISEASES  T(C): 36.1 (08-15-21 @ 23:00), Max: 36.6 (08-15-21 @ 11:00)  Wt(kg): --  WBC Count: 7.42 K/uL (08-15 @ 22:31)  WBC Count: 9.30 K/uL (08-15 @ 02:52)    Recent Cultures:  Specimen Source: Catheterized Catheterized, 08-12 @ 04:36; Results   >=3 organisms. Probable collection contamination.; Gram Stain: --; Organism: --    Meds:     ENDOCRINE  Capillary Blood Glucose    Meds: levothyroxine 75 MICROGram(s) Oral daily      ACCESS DEVICES:  [x] Peripheral IV  [ ] Central Venous Line	[ ] R	[ ] L	[ ] IJ	[ ] Fem	[ ] SC	Placed:   [ ] Arterial Line		[ ] R	[ ] L	[ ] Fem	[ ] Rad	[ ] Ax	Placed:   [ ] PICC:					[ ] Mediport  [ ] Urinary Catheter, Date Placed:   [x] Necessity of urinary, arterial, and venous catheters discussed    OTHER MEDICATIONS:  chlorhexidine 2% Cloths 1 Application(s) Topical <User Schedule>  lidocaine   4% Patch 1 Patch Transdermal every 24 hours      IMAGING:  < from: Xray Chest 1 View- PORTABLE-Routine (Xray Chest 1 View- PORTABLE-Routine in AM.) (08.15.21 @ 06:46) >    Findings/  Impression: Heart size unremarkable. Left base patchy opacity, slightly improved compared to prior.    --- End of Report ---    < end of copied text >

## 2021-08-16 NOTE — PROGRESS NOTE ADULT - ATTENDING COMMENTS
89 yo F unwitnessed fall of unknown duration found by son who last saw her in baseline condition the day prior to presentation. Injuries include rib fractures Right 5-11. Rib score = 2. PSgx Brain surgery and R hemicolectomy     Awake and awake. No significant carotid stenosis on b/l carotid duplex. Head CT neg  Resolving Acute hypoxemic resp failure, will wean down FiO2 from HF to NC as tolerated.. CXR reviewed, further decrease in effusions congestion LLL infiltrate. Hold diuresis,  ECHO wnl  A fib with RVR resolved, in NSR, continue BB   Passed swallow eval on dysphasia diet  Spoke to her son in detail, updated him with her condition and plan. Pt is full code  PT
Patient seen and examined and agree with above.  s/p Right rib fractures 5-11  Patient has been delirious which has improved.   Pain controlled with current regimen and will continue.   Bilateral pleural effusion - lasix 20 mg  given today and will monitor diuresis.  Currently on room air.  Atrial fibrillation with RVR- continue metoprolol.  Passed trial of void.   Hypothyroidism - continue levothyroxine.   Pending subacute rehab placement and will also be seen by our trauma team hospitalist as her current antihypertensives are held due to BP 97/54 mmHg.
89 yo F unwitnessed fall of unknown duration found by son who last saw her in baseline condition the day prior to presentation. Injuries include rib fractures Right 5-11. Rib score = 2. PSgx Brain surgery and R hemicolectomy     Pt confused, CTA to r/o CVA on hold sec to worsening hypoxemia. No significant carotid stenosis on b/l carotid duplex. Initial head CT neg  Acute hypoxemic resp failure, CXR reviewed, developing b/l effusion, will Rx with IV Lasix, decrease IVF rate, O2 support requirement increasing  Dev A fib with RVR on Cardizem drip gtt, start intermittent BB and add digoxin if HR remains uncontrolled  NPO, IVF with dextrose for borderline hypoglycemia  Palliative care called to established goal of care. Spoke to her son on phone, will have a face to face discussion
89 yo F unwitnessed fall of unknown duration found by son who last saw her in baseline condition the day prior to presentation. Injuries include rib fractures Right 5-11. Rib score = 2. PSgx Brain surgery and R hemicolectomy     Slightly more awakeNo significant carotid stenosis on b/l carotid duplex. Initial head CT neg  Resolving Acute hypoxemic resp failure, will wean down FiO2 from HF to NC as tolerated.. CXR reviewed, mild decrease in effusions congestion, ?developing LLL infiltrate. Continue gentle diuresis today. ECHO wnl  A fib with RVR HR controlled in 60s, decrease intermittent BB dose, remains off Cardizem drip  NPO, IVF with dextrose. Renal function electrolytes wnl.  Hypoglycemia resolved  Palliative care called to established goal of care.   Spoke to her son in detail yesterday, updated him with her condition and plan. He wants her to be full code  PT

## 2021-08-16 NOTE — PROGRESS NOTE ADULT - ASSESSMENT
89 yo F unwitnessed fall of unknown duration found by son who last saw her in baseline condition the day prior to presentation. Injuries include rib fractures Right 5-11. Rib score = 2. PSgx Brain surgery and R hemicolectomy       PLAN:  Neuro: acute traumatic pain, delirium  - AAOx1 on arrival   - Frequent reorientation, sleep hygiene  - Multimodal pain control with acetaminophen and lidocaine patch; avoiding narcotics and NSAIDs given her age  - Folic acid    Resp: Right 5th-11th rib fractures  - Out of bed to chair and aggressive chest PT to prevent atelectasis; patient too delirious to participate in incentive spirometry  - Duonebs and Mucomyst    CV: new-onset atrial fibrillation, now rate controlled  - Lopressor 25mg PO Q12H  - TTE from 8/12 with LVH, normal LV systolic function w/ EF of 60%, and a small pericardial effusion    GI: dysphagia, passed bedside s/s on 08/14  - Dysphagia diet      Renal: CKD stage II, oliguric  - IVL  - Valeriy cuellar'ed, follow-up TOV  - Will assess fluid and need for diuresis daily  - Monitor I&Os and electrolytes w/ repletions as necessary       Heme: no acute issues  - Lovenox for VTE prophylaxis    ID: no acute issues  - Monitor for clinical evidence of active infection    Endo: hypothyroidism  - Home levothyroxine for hypothyroidism    Code Status: Full code per son    Disposition: Will remain in SICU

## 2021-08-17 PROBLEM — E03.9 HYPOTHYROIDISM, UNSPECIFIED: Chronic | Status: ACTIVE | Noted: 2021-08-12

## 2021-11-04 ENCOUNTER — OUTPATIENT (OUTPATIENT)
Dept: OUTPATIENT SERVICES | Facility: HOSPITAL | Age: 87
LOS: 1 days | Discharge: HOME | End: 2021-11-04

## 2021-11-04 DIAGNOSIS — Z87.898 PERSONAL HISTORY OF OTHER SPECIFIED CONDITIONS: Chronic | ICD-10-CM

## 2021-11-04 DIAGNOSIS — Z85.038 PERSONAL HISTORY OF OTHER MALIGNANT NEOPLASM OF LARGE INTESTINE: Chronic | ICD-10-CM

## 2021-11-10 ENCOUNTER — INPATIENT (INPATIENT)
Facility: HOSPITAL | Age: 87
LOS: 6 days | Discharge: NURSING, REHAB OR LONG-TERM CARE | DRG: 884 | End: 2021-11-17
Attending: INTERNAL MEDICINE | Admitting: STUDENT IN AN ORGANIZED HEALTH CARE EDUCATION/TRAINING PROGRAM
Payer: MEDICARE

## 2021-11-10 VITALS
TEMPERATURE: 98 F | HEART RATE: 95 BPM | RESPIRATION RATE: 18 BRPM | DIASTOLIC BLOOD PRESSURE: 86 MMHG | OXYGEN SATURATION: 93 % | HEIGHT: 65 IN | WEIGHT: 169.98 LBS | SYSTOLIC BLOOD PRESSURE: 155 MMHG

## 2021-11-10 DIAGNOSIS — Z87.898 PERSONAL HISTORY OF OTHER SPECIFIED CONDITIONS: Chronic | ICD-10-CM

## 2021-11-10 DIAGNOSIS — Z85.038 PERSONAL HISTORY OF OTHER MALIGNANT NEOPLASM OF LARGE INTESTINE: Chronic | ICD-10-CM

## 2021-11-10 LAB
BASE EXCESS BLDV CALC-SCNC: -1 MMOL/L — SIGNIFICANT CHANGE UP (ref -2–2)
CA-I SERPL-SCNC: 1.31 MMOL/L — SIGNIFICANT CHANGE UP (ref 1.15–1.33)
CHLORIDE BLDV-SCNC: 104 MMOL/L — SIGNIFICANT CHANGE UP (ref 96–108)
CO2 BLDV-SCNC: 26 MMOL/L — SIGNIFICANT CHANGE UP (ref 22–26)
GAS PNL BLDV: 135 MMOL/L — LOW (ref 136–145)
GAS PNL BLDV: SIGNIFICANT CHANGE UP
GAS PNL BLDV: SIGNIFICANT CHANGE UP
GLUCOSE BLDV-MCNC: 89 MG/DL — SIGNIFICANT CHANGE UP (ref 70–99)
HCO3 BLDV-SCNC: 25 MMOL/L — SIGNIFICANT CHANGE UP (ref 22–29)
HCT VFR BLDA CALC: 32 % — LOW (ref 34.5–46.5)
HGB BLD CALC-MCNC: 10.5 G/DL — LOW (ref 11.7–16.1)
LACTATE BLDV-MCNC: 0.9 MMOL/L — SIGNIFICANT CHANGE UP (ref 0.7–2)
PCO2 BLDV: 45 MMHG — HIGH (ref 39–42)
PH BLDV: 7.35 — SIGNIFICANT CHANGE UP (ref 7.32–7.43)
PO2 BLDV: 26 MMHG — SIGNIFICANT CHANGE UP (ref 25–45)
POTASSIUM BLDV-SCNC: 4.1 MMOL/L — SIGNIFICANT CHANGE UP (ref 3.5–5.1)
SAO2 % BLDV: 45 % — LOW (ref 67–88)

## 2021-11-10 PROCEDURE — 99285 EMERGENCY DEPT VISIT HI MDM: CPT

## 2021-11-10 PROCEDURE — 93308 TTE F-UP OR LMTD: CPT | Mod: 26

## 2021-11-10 PROCEDURE — 93010 ELECTROCARDIOGRAM REPORT: CPT

## 2021-11-10 PROCEDURE — 70450 CT HEAD/BRAIN W/O DYE: CPT | Mod: 26,MH

## 2021-11-10 PROCEDURE — 71045 X-RAY EXAM CHEST 1 VIEW: CPT | Mod: 26

## 2021-11-10 NOTE — ED PROVIDER NOTE - PRO INTERPRETER NEED 2
Problem: Adult Inpatient Plan of Care  Goal: Plan of Care Review  Outcome: Ongoing (interventions implemented as appropriate)  POC reviewed with patient and family. All questions and concerns reviewed. VSS throughout shift. Fall/safety precautions implemented and maintained. In good spirits. Bed locked in lowest position. Call bell within reach. Will continue to monitor.       English

## 2021-11-10 NOTE — ED PROVIDER NOTE - CLINICAL SUMMARY MEDICAL DECISION MAKING FREE TEXT BOX
paper chart Attending Bushra Collazo: 89 yo female with multiple medical issues presenting with AMS. upon arrival pt altered and confused. per family h/o dementia but more confused then baseline. no reports of falls or trauma. consider polypharmacy. no fever on exam. abdomen soft and nontender. unclear cause of symptoms. will check thyroid studies, evaluate for infectious or metabolic derrangements. pt moving all extremities, no reports of falls or trauma. d dimer sent and mildly elevated. RV does not appear grossly enlarged on pocus. will obtain ct scans to further evaluate. less likely cva however limited neuro exam.labs showed evidence of mild troponin leak. ekg without st elevation. will continue tele. plan to admit

## 2021-11-10 NOTE — PROCEDURE NOTE - ADDITIONAL PROCEDURE DETAILS
Urology called to place cruz after unsuccessful attempts in ED. 14f coude placed, cruz plan per ED or primary team if admitted.

## 2021-11-10 NOTE — ED PROVIDER NOTE - PHYSICAL EXAMINATION
Attending Bushra Collazo: Gen: frail appearing, heent: atrauamtic, eomi, perrla, dry  mucous memrbanes, op pink, uvula midline, neck; nttp, no nuchal rigidity, chest: nttp, no crepitus, cv: rrr, no murmurs, lungs: ctab, abd: soft, nontender, nondistended, no guarding, ext: wwp, bl leededma, skin: no rash, neuro: awake intermittently following commands

## 2021-11-10 NOTE — ED PROVIDER NOTE - OBJECTIVE STATEMENT
Attending Bushra Collazo: 89 yo female with h/o hypotension, and hypothyroid presenting with AMS. per family and ems pt normally ao3 and now more confused. symptoms started 2 days ago. no new medications. no reports of falls. has been on quitiapine and mirtazpine for months. did have a couple of episodes of diarrhea. Is eating and drinking. no vomiting. Attending Bushra Collazo: 89 yo female with h/o hypotension,dementia,  and hypothyroid presenting with AMS. per family pt does have a h/o dementia. fall a few months ago. has been slowly declining.  and now more confused. symptoms started 2 days ago.was started on abx for possible lower extremity edema a few days ago and had duplex performed that were negative for clots. per family did have an increase dose of remeron due to to not sleeping as much.  no new falls. has been on quitiapine and mirtazpine for months. did have a couple of episodes of diarrhea. Is eating and drinking. no vomiting. is moving all extremities

## 2021-11-10 NOTE — ED PROVIDER NOTE - PROGRESS NOTE DETAILS
Attending Bushra Collazo: blood work performed without clear etiology of infectious or metabolic abnormalities. troponin is elevated. pt with sig dementia, altered. ekg without st elevation, is on a monitor. will continue to trend. pt is afebrile. less likely meningitis. no rash on exam. Attending Bushra Collazo: ct ordered and negative. will continue to trend troponin

## 2021-11-10 NOTE — ED PROVIDER NOTE - ATTENDING CONTRIBUTION TO CARE
Attending MD Bushra Collazo:  I personally have seen and examined this patient.  Resident note reviewed and agree on plan of care and except where noted.  See HPI, PE, and MDM for details.

## 2021-11-11 DIAGNOSIS — J98.11 ATELECTASIS: ICD-10-CM

## 2021-11-11 DIAGNOSIS — R41.82 ALTERED MENTAL STATUS, UNSPECIFIED: ICD-10-CM

## 2021-11-11 LAB
AMMONIA BLD-MCNC: 12 UMOL/L — SIGNIFICANT CHANGE UP (ref 11–55)
FOLATE SERPL-MCNC: 3.2 NG/ML — LOW
TSH SERPL-MCNC: 7.96 UIU/ML — HIGH (ref 0.27–4.2)
VIT B12 SERPL-MCNC: 241 PG/ML — SIGNIFICANT CHANGE UP (ref 232–1245)

## 2021-11-11 PROCEDURE — 93970 EXTREMITY STUDY: CPT | Mod: 26

## 2021-11-11 PROCEDURE — 74176 CT ABD & PELVIS W/O CONTRAST: CPT | Mod: 26,MA

## 2021-11-11 PROCEDURE — 71250 CT THORAX DX C-: CPT | Mod: 26,MA

## 2021-11-11 RX ORDER — SODIUM CHLORIDE 9 MG/ML
1000 INJECTION, SOLUTION INTRAVENOUS
Refills: 0 | Status: DISCONTINUED | OUTPATIENT
Start: 2021-11-11 | End: 2021-11-17

## 2021-11-11 RX ORDER — HEPARIN SODIUM 5000 [USP'U]/ML
5000 INJECTION INTRAVENOUS; SUBCUTANEOUS EVERY 12 HOURS
Refills: 0 | Status: DISCONTINUED | OUTPATIENT
Start: 2021-11-11 | End: 2021-11-17

## 2021-11-11 RX ORDER — QUETIAPINE FUMARATE 200 MG/1
25 TABLET, FILM COATED ORAL AT BEDTIME
Refills: 0 | Status: DISCONTINUED | OUTPATIENT
Start: 2021-11-11 | End: 2021-11-11

## 2021-11-11 RX ORDER — ALBUTEROL 90 UG/1
1.25 AEROSOL, METERED ORAL EVERY 8 HOURS
Refills: 0 | Status: DISCONTINUED | OUTPATIENT
Start: 2021-11-11 | End: 2021-11-17

## 2021-11-11 RX ORDER — QUETIAPINE FUMARATE 200 MG/1
25 TABLET, FILM COATED ORAL ONCE
Refills: 0 | Status: COMPLETED | OUTPATIENT
Start: 2021-11-11 | End: 2021-11-11

## 2021-11-11 RX ORDER — SENNA PLUS 8.6 MG/1
2 TABLET ORAL AT BEDTIME
Refills: 0 | Status: DISCONTINUED | OUTPATIENT
Start: 2021-11-11 | End: 2021-11-17

## 2021-11-11 RX ADMIN — QUETIAPINE FUMARATE 25 MILLIGRAM(S): 200 TABLET, FILM COATED ORAL at 17:55

## 2021-11-11 RX ADMIN — HEPARIN SODIUM 5000 UNIT(S): 5000 INJECTION INTRAVENOUS; SUBCUTANEOUS at 16:51

## 2021-11-11 RX ADMIN — SODIUM CHLORIDE 50 MILLILITER(S): 9 INJECTION, SOLUTION INTRAVENOUS at 12:13

## 2021-11-11 RX ADMIN — ALBUTEROL 1.25 MILLIGRAM(S): 90 AEROSOL, METERED ORAL at 22:58

## 2021-11-11 RX ADMIN — ALBUTEROL 1.25 MILLIGRAM(S): 90 AEROSOL, METERED ORAL at 16:51

## 2021-11-11 NOTE — SWALLOW BEDSIDE ASSESSMENT ADULT - ORAL PREPARATORY PHASE
Refuses to accept bolus into oral cavity/Reduced oral grading mild/Reduced oral grading improved oral labial seal with straw drinking; limited trials given decreased Pt acceptance/Refuses to accept bolus into oral cavity/Reduced oral grading/Anterior loss of bolus

## 2021-11-11 NOTE — CONSULT NOTE ADULT - SUBJECTIVE AND OBJECTIVE BOX
PULMONARY CONSULT    HPI: 89 y/o F with PMH HTN, hypothyroid, dementia. Presents with AMS. Per neuro recs, pt had a fall out of bed 2-3 months ago, was confused when went to the hospital then to [facility]. While she improved, she started to be combative agitated and placed on Seroquel. She was sent home in Oct but starting having episodes where she would leave the house at night, knock on neighbors door, look for her dead . She then had a 24 hr aide and was placed on Remeron. Saw geriatric psych at [facility] - reportedly had leg edema and was given Bactrim and Remeron dosage was changed. Called to consult for CT chest findings with RLL atelectasis and possible mucoid impacted distal airways RLL. No fevers or leukocytosis. ROS unable to be obtained 2nd to mental status - no reported congested cough or coughing with PO intake per staff.       PAST MEDICAL & SURGICAL HISTORY:  HTN (hypertension)  Dementia  Other dementia    Allergies  penicillin (Unknown)    FAMILY HISTORY: unable to obtain 2nd to mental status     Social history: unable to obtain 2nd to mental status     Review of Systems: unable to obtain 2nd to mental status     Medications:  MEDICATIONS  (STANDING):  ALBUTerol   0.042% 1.25 milliGRAM(s) Nebulizer every 8 hours  dextrose 5% + sodium chloride 0.9%. 1000 milliLiter(s) (50 mL/Hr) IV Continuous <Continuous>  heparin   Injectable 5000 Unit(s) SubCutaneous every 12 hours  senna 2 Tablet(s) Oral at bedtime        Vital Signs Last 24 Hrs  T(C): 36.3 (2021 08:00), Max: 36.8 (2021 00:32)  T(F): 97.4 (2021 08:00), Max: 98.2 (2021 00:32)  HR: 86 (2021 08:00) (86 - 100)  BP: 147/79 (2021 08:00) (144/78 - 156/88)  BP(mean): --  RR: 18 (2021 08:00) (18 - 19)  SpO2: 97% (2021 08:00) (93% - 97%)      VBG pH 7.35 -10 @ 23:06  VBG pCO2 45 -10 @ 23:06  VBG O2 sat 45.0 11-10 @ 23:06  VBG lactate 0.9 11-10 @ 23:06  VBG pH 7.35 11-10 @ 18:10  VBG pCO2 48 11-10 @ 18:10  VBG O2 sat 27.6 11-10 @ 18:10  VBG lactate 1.4 11-10 @ 18:10          LABS:                        10.4   8.15  )-----------( 229      ( 10 Nov 2021 23:07 )             31.0     11-10    138  |  103  |  23  ----------------------------<  90  4.1   |  23  |  1.81<H>    Ca    9.8      10 Nov 2021 23:07  Mg     2.0     11-10    TPro  6.9  /  Alb  3.6  /  TBili  0.6  /  DBili  x   /  AST  25  /  ALT  24  /  AlkPhos  80  11-10      CARDIAC MARKERS ( 10 Nov 2021 23:07 )  x     / x     / 378 U/L / x     / 8.4 ng/mL      CAPILLARY BLOOD GLUCOSE      POCT Blood Glucose.: 134 mg/dL (10 Nov 2021 16:39)    PT/INR - ( 10 Nov 2021 18:24 )   PT: 12.4 sec;   INR: 1.04 ratio         PTT - ( 10 Nov 2021 18:24 )  PTT:30.1 sec  Urinalysis Basic - ( 10 Nov 2021 21:09 )    Color: Light Yellow / Appearance: Clear / S.009 / pH: x  Gluc: x / Ketone: Negative  / Bili: Negative / Urobili: Negative   Blood: x / Protein: Negative / Nitrite: Negative   Leuk Esterase: Negative / RBC: x / WBC x   Sq Epi: x / Non Sq Epi: x / Bacteria: x        Serum Pro-Brain Natriuretic Peptide: 894 pg/mL (11-10-21 @ 23:07)  Serum Pro-Brain Natriuretic Peptide: 1004 pg/mL (11-10-21 @ 18:25)            Physical Examination:    General: No acute distress.      HEENT: Pupils equal, reactive to light.  Symmetric.    PULM: Clear to auscultation bilaterally, no significant sputum production    CVS: S1, S2    ABD: Soft, nondistended, nontender, normoactive bowel sounds, no masses    EXT: No edema, nontender    SKIN: Warm and well perfused, no rashes noted.    NEURO: A&O x1, follows some commands     RADIOLOGY REVIEWED  CT chest: < from: CT Chest No Cont (21 @ 00:12) >  FINDINGS:  CHEST:  LUNGS AND LARGE AIRWAYS: Evaluation limited by motion artifact. Right lower lung atelectasis. Questionable mucoid impacted distal airways right lower lobe. No definite evidence of pneumonia. No central endobronchial lesion.  PLEURA: Trace right pleural effusion question.  VESSELS: Aortic valve and coronary artery calcifications.  HEART: Heart size is normal. Trace pericardial effusion.  MEDIASTINUM AND SWETHA: No lymphadenopathy.  CHEST WALL AND LOWER NECK: 1 heterogeneous enlargement of the right lobe of thyroid gland with a dominant heterogeneous 2 cm nodule.    ABDOMEN AND PELVIS:  LIVER: Within normal limits.  BILE DUCTS: Normal caliber.  GALLBLADDER: Cholelithiasis.  SPLEEN: Within normal limits.  PANCREAS: Within normal limits.  ADRENALS: Within normal limits.  KIDNEYS/URETERS: Within normal limits.    BLADDER: Collapsed around a Crooks balloon catheter limiting evaluation.  REPRODUCTIVE ORGANS: Uterus and adnexa within normal limits. Nonspecific fluid in the vagina.    BOWEL: Partial right hemicolectomy. No bowel obstruction. Moderately distended fecal filled rectum with slight wall thickening and hazy appearance to the perirectal and presacral fat suggesting a component of inflammation. No additional bowel wall thickening.  PERITONEUM: No ascitesor free air. No abscess.  VESSELS: Atherosclerotic changes.  RETROPERITONEUM/LYMPH NODES: No lymphadenopathy.  ABDOMINAL WALL: Within normal limits.  BONES: Degenerative changes. Scoliosis. Multiple chronic posterior right-sided ribs including the 8th through 10th ribs.    IMPRESSION:  CT chest: Motion limited study. No definite evidence of pneumonia. Questionable mucoid impacted right lower lobe distal airways.    CT abdomen and pelvis: Motion degraded study. Distended fecal filled rectum with surrounding inflammatory change is suggested concerning for an early stercoral colitis.    Cholelithiasis. No secondary signs of acute cholecystitis.        < end of copied text >    
[facility]  Jourdan Tatum PA-C  [address] [place] [address]  [phone]      Chief Complaint:  Patient is a 89y old  Female who presents with a chief complaint of AMS (2021 08:53)      HPI:  89 yo female with h/o hypotension, and hypothyroid presenting with AMS. per family and ems pt normally ao3 and now more confused. symptoms started 2 days ago. no new medications. no reports of falls. has been on quitiapine and mirtazpine for months. Did have a couple of episodes of diarrhea. Is eating and drinking. no vomiting    Allergies:  penicillin (Unknown)      Medications:  ALBUTerol   0.042% 1.25 milliGRAM(s) Nebulizer every 8 hours  dextrose 5% + sodium chloride 0.9%. 1000 milliLiter(s) IV Continuous <Continuous>  heparin   Injectable 5000 Unit(s) SubCutaneous every 12 hours  senna 2 Tablet(s) Oral at bedtime  sorbitol 70%/mineral oil/magnesium hydroxide/glycerin Enema 120 milliLiter(s) Rectal once      PMHX/PSHX:  HTN (hypertension)    Dementia    Other dementia        Family history:      Social History:     ROS:     unable to obtain     PHYSICAL EXAM:   Vital Signs:  Vital Signs Last 24 Hrs  T(C): 36.3 (2021 08:00), Max: 36.8 (2021 00:32)  T(F): 97.4 (2021 08:00), Max: 98.2 (2021 00:32)  HR: 86 (2021 08:00) (86 - 100)  BP: 147/79 (2021 08:00) (144/78 - 156/88)  BP(mean): --  RR: 18 (2021 08:00) (18 - 19)  SpO2: 97% (2021 08:00) (93% - 97%)  Daily Height in cm: 165.1 (10 Nov 2021 16:38)    Daily     GENERAL:  Appears stated age,   HEENT:  NC/AT,    CHEST:  Full & symmetric excursion,   HEART:  Regular rhythm  ABDOMEN:  Soft, non-tender, non-distended,   EXTEREMITIES:  no cyanosis,clubbing or edema  SKIN:  No rash  NEURO:  Alert,    LABS:                        10.4   8.15  )-----------( 229      ( 10 Nov 2021 23:07 )             31.0     11-10    138  |  103  |  23  ----------------------------<  90  4.1   |  23  |  1.81<H>    Ca    9.8      10 Nov 2021 23:07  Mg     2.0     11-10    TPro  6.9  /  Alb  3.6  /  TBili  0.6  /  DBili  x   /  AST  25  /  ALT  24  /  AlkPhos  80  11-10    LIVER FUNCTIONS - ( 10 Nov 2021 23:07 )  Alb: 3.6 g/dL / Pro: 6.9 g/dL / ALK PHOS: 80 U/L / ALT: 24 U/L / AST: 25 U/L / GGT: x           PT/INR - ( 10 Nov 2021 18:24 )   PT: 12.4 sec;   INR: 1.04 ratio         PTT - ( 10 Nov 2021 18:24 )  PTT:30.1 sec  Urinalysis Basic - ( 10 Nov 2021 21:09 )    Color: Light Yellow / Appearance: Clear / S.009 / pH: x  Gluc: x / Ketone: Negative  / Bili: Negative / Urobili: Negative   Blood: x / Protein: Negative / Nitrite: Negative   Leuk Esterase: Negative / RBC: x / WBC x   Sq Epi: x / Non Sq Epi: x / Bacteria: x      Amylase Serum--      Lipase serum--       [id number]      Imaging:    < from: CT Abdomen and Pelvis No Cont (21 @ 00:12) >    EXAM:  CT ABDOMEN AND PELVIS                          EXAM:  CT CHEST                            PROCEDURE DATE:  2021            INTERPRETATION:  CLINICAL INFORMATION: Cough and abdominal pain.    COMPARISON: None.    CONTRAST/COMPLICATIONS:  IV Contrast: NONE  Oral Contrast: NONE  Complications: None reported at time of study completion    PROCEDURE:  CT of the Chest, Abdomen and Pelvis was performed.  Sagittal and coronal reformats were performed.    FINDINGS:  CHEST:  LUNGS AND LARGE AIRWAYS: Evaluation limited by motion artifact. Right lower lung atelectasis. Questionable mucoid impacted distal airways right lower lobe. No definite evidence of pneumonia. No central endobronchial lesion.  PLEURA: Trace right pleural effusion question.  VESSELS: Aortic valve and coronary artery calcifications.  HEART: Heart size is normal. Trace pericardial effusion.  MEDIASTINUM AND SWETHA: No lymphadenopathy.  CHEST WALL AND LOWER NECK: 1 heterogeneous enlargement of the right lobe of thyroid gland with a dominant heterogeneous 2 cm nodule.    ABDOMEN AND PELVIS:  LIVER: Within normal limits.  BILE DUCTS: Normal caliber.  GALLBLADDER: Cholelithiasis.  SPLEEN: Within normal limits.  PANCREAS: Within normal limits.  ADRENALS: Within normal limits.  KIDNEYS/URETERS: Within normal limits.    BLADDER: Collapsed around a Crooks balloon catheter limiting evaluation.  REPRODUCTIVE ORGANS: Uterus and adnexa within normal limits. Nonspecific fluid in the vagina.    BOWEL: Partial right hemicolectomy. No bowel obstruction. Moderately distended fecal filled rectum with slight wall thickening and hazy appearance to the perirectal and presacral fat suggesting a component of inflammation. No additional bowel wall thickening.  PERITONEUM: No ascitesor free air. No abscess.  VESSELS: Atherosclerotic changes.  RETROPERITONEUM/LYMPH NODES: No lymphadenopathy.  ABDOMINAL WALL: Within normal limits.  BONES: Degenerative changes. Scoliosis. Multiple chronic posterior right-sided ribs including the 8th through 10th ribs.    IMPRESSION:  CT chest: Motion limited study. No definite evidence of pneumonia. Questionable mucoid impacted right lower lobe distal airways.    CT abdomen and pelvis: Motion degraded study. Distended fecal filled rectum with surrounding inflammatory change is suggested concerning for an early stercoral colitis.    Cholelithiasis. No secondary signs of acute cholecystitis.          
Admitting Diagnosis:  Altered mental status [R41.82]  ALTERED MENTAL STATUS, UNSPECIFIED        HPI:  This is a 89y year old Female with the below past medical history who presents with the chief complaint of AMS.  She had a fall out of bed 2-3 months ago, had trauma, was confused when went to the hospital then to [facility].  While she improved, she started to be combative agitated and placed on seroquel.  Sent home in Oct but starting having episodes where she would leave the house at night, knock on neighbors door, look for her dead .  Son got 24 hr help and went to dr franks with dr sebastian office, placed on remeron.  On that she was calmer, eating more.  Saw dr barraza, geriatric psych at [facility].  One week prior to admission, had leg edema, given bactrim.  There was also a change in remeron dosage.  As per son, was not sleeping at night, could not get up, falling down and came to ER.    Pt cannot provide hx        Past Medical History:      Past Surgical History:      Social History:  No toxic habits    Family History:  FAMILY HISTORY:      Allergies:  penicillin (Unknown)      ROS:  Constitutional: Patient offers no complaints of fevers or significant weight loss  Ears, Nose, Mouth and Throat: The patient presents with no abnormalities of the head, ears, eyes, nose or throat  Skin: Patient offers no concerns of new rashes or lesions  Respiratory: The patient presents with no abnormalities of the respiratory tract  Cardiovascular: The patient presents with no cardiac abnormalities  Gastrointestinal: The patient presents with no abnormalities of the GI system  Genitourinary: The patient presents with no dysuria, hematuria or frequent urination  Neurological: See HPI  Endocrine: Patient offers no complaints of excessive thirst, urination, or heat/cold intolerance    Advanced care planning reviewed and noted in the chart.    Medications:  ALBUTerol   0.042% 1.25 milliGRAM(s) Nebulizer every 8 hours  dextrose 5% + sodium chloride 0.9%. 1000 milliLiter(s) IV Continuous <Continuous>  heparin   Injectable 5000 Unit(s) SubCutaneous every 12 hours  QUEtiapine 25 milliGRAM(s) Oral at bedtime  senna 2 Tablet(s) Oral at bedtime      Labs:  CBC Full  -  ( 10 Nov 2021 23:07 )  WBC Count : 8.15 K/uL  RBC Count : 3.33 M/uL  Hemoglobin : 10.4 g/dL  Hematocrit : 31.0 %  Platelet Count - Automated : 229 K/uL  Mean Cell Volume : 93.1 fl  Mean Cell Hemoglobin : 31.2 pg  Mean Cell Hemoglobin Concentration : 33.5 gm/dL  Auto Neutrophil # : x  Auto Lymphocyte # : x  Auto Monocyte # : x  Auto Eosinophil # : x  Auto Basophil # : x  Auto Neutrophil % : x  Auto Lymphocyte % : x  Auto Monocyte % : x  Auto Eosinophil % : x  Auto Basophil % : x    11-10    138  |  103  |  23  ----------------------------<  90  4.1   |  23  |  1.81<H>    Ca    9.8      10 Nov 2021 23:07  Mg     2.0     11-10    TPro  6.9  /  Alb  3.6  /  TBili  0.6  /  DBili  x   /  AST  25  /  ALT  24  /  AlkPhos  80  11-10    CAPILLARY BLOOD GLUCOSE      POCT Blood Glucose.: 134 mg/dL (10 Nov 2021 16:39)    LIVER FUNCTIONS - ( 10 Nov 2021 23:07 )  Alb: 3.6 g/dL / Pro: 6.9 g/dL / ALK PHOS: 80 U/L / ALT: 24 U/L / AST: 25 U/L / GGT: x           PT/INR - ( 10 Nov 2021 18:24 )   PT: 12.4 sec;   INR: 1.04 ratio         PTT - ( 10 Nov 2021 18:24 )  PTT:30.1 sec  Urinalysis Basic - ( 10 Nov 2021 21:09 )    Color: Light Yellow / Appearance: Clear / S.009 / pH: x  Gluc: x / Ketone: Negative  / Bili: Negative / Urobili: Negative   Blood: x / Protein: Negative / Nitrite: Negative   Leuk Esterase: Negative / RBC: x / WBC x   Sq Epi: x / Non Sq Epi: x / Bacteria: x      Female    Vitals:  Vital Signs Last 24 Hrs  T(C): 36.3 (2021 08:00), Max: 36.8 (2021 00:32)  T(F): 97.4 (2021 08:00), Max: 98.2 (2021 00:32)  HR: 86 (2021 08:00) (86 - 100)  BP: 147/79 (2021 08:00) (144/78 - 156/88)  BP(mean): --  RR: 18 (2021 08:00) (18 - 19)  SpO2: 97% (2021 08:00) (93% - 97%)    NEUROLOGICAL EXAM:    Mental status: opens eyes to stimuli, not look much, not follow, not verbal.     Cranial Nerves: Pupils were equal, round, reactive to light. Extraocular movements were intact. Visual field to confrontation.  Fundoscopic exam was deferred. Facial sensation was intact to light touch. There was no facial asymmetry.cannot asssess palate, tongue, hearing    Motor exam: Bulk and tone were normal. moves all ext to stimuli    Reflexes: 2+ in the bilateral upper extremities. 1 in the bilateral lower extremities. Toes were downgoing bilaterally.     Sensation: moves to stimuli    Coordination: cannot assess    Gait: cannot assess    < from: CT Head No Cont (11.10.21 @ 18:51) >    EXAM:  CT BRAIN                            PROCEDURE DATE:  11/10/2021            INTERPRETATION:  CLINICAL INFORMATION: Altered mental status. Evaluate for CVA.    TECHNIQUE: Noncontrast axial CT images were acquired through the head. Two-dimensional sagittal and coronal reformats were obtained    COMPARISON: None    FINDINGS:    Status post right suboccipital craniectomy. There is an underlying right posterior fossa resection cavity.  There has been prior right parietal cuca hole. There is underlying right parietal encephalomalacia/gliosis.      No acute intracranial hemorrhage, abnormal extra axial fluid collection, distal first, mass effect or midline shift.    Ventricles and sulci are normal in size for the patient's age. Basal cisterns are patent.    Minimal mucosal thickening of the bilateral ethmoid and maxillary sinuses. Right frontal sinus appears underpneumatized. Remaining paranasal sinuses and mastoid air cells appear clear.  Status post left lens replacement surgery.    IMPRESSION:  No acute intracranial hemorrhage or acute territorial infarction. Chronic findings as above.    --- End of Report ---              ATIYA GRIFFIN MD; Resident Radiology  This document has been electronically signed.  JOHNNY SALTER MD; Attending Radiologist  This document has been electronically signed. Nov 10 2021  7:38PM    < end of copied text >

## 2021-11-11 NOTE — SWALLOW BEDSIDE ASSESSMENT ADULT - SLP PERTINENT HISTORY OF CURRENT PROBLEM
89y year old Female with the below past medical history who presents with the chief complaint of AMS.  She had a fall out of bed 2-3 months ago, had trauma, was confused when went to the hospital then to [facility].  While she improved, she started to be combative agitated and placed on seroquel.  Sent home in Oct but starting having episodes where she would leave the house at night, knock on neighbors door, look for her dead .  Son got 24 hr help and went to dr franks with dr sebastian office, placed on remeron.  On that she was calmer, eating more.  Saw dr barraza, geriatric psych at [facility].  One week prior to admission, had leg edema, given bactrim.  There was also a change in remeron dosage.  As per son, was not sleeping at night, could not get up, falling down and came to ER. 89y year old Femal with the chief complaint of AMS.  She had a fall out of bed 2-3 months ago, had trauma, was confused when went to the hospital then to [facility].  While she improved, she started to be combative agitated and placed on seroquel.  Sent home in Oct but starting having episodes where she would leave the house at night, knock on neighbors door, look for her dead .  Son got 24 hr help and went to dr franks with dr sebastian office, placed on remeron.  On that she was calmer, eating more.  Saw dr barraza, geriatric psych at [facility].  One week prior to admission, had leg edema, given bactrim.  There was also a change in remeron dosage.  As per son, was not sleeping at night, could not get up, falling down and came to ER.

## 2021-11-11 NOTE — PHYSICAL THERAPY INITIAL EVALUATION ADULT - GAIT TRAINING, PT EVAL
GOAL: Patient will ambulate 100 feet with appropriate assistive device as needed with min Ax1, in 4 weeks.

## 2021-11-11 NOTE — SWALLOW BEDSIDE ASSESSMENT ADULT - ASR SWALLOW LABIAL MOBILITY
R>L; +asymmetrical movements with informal tasks/impaired retraction/impaired pursing/impaired seal/impaired coordination

## 2021-11-11 NOTE — PHYSICAL THERAPY INITIAL EVALUATION ADULT - PERTINENT HX OF CURRENT PROBLEM, REHAB EVAL
Pt is a 89 y.o. female with h/o hypotension, and hypothyroid presenting with AMS. per family and ems pt normally ao3 and now more confused. symptoms started 2 days ago. (-) CXR 11/10/21. (-) Head CT 11/10/21. Continued below.

## 2021-11-11 NOTE — SWALLOW BEDSIDE ASSESSMENT ADULT - SWALLOW EVAL: THERAPY FREQUENCY
tbd; consider reevaluation of swallowing to determine candidacy for diet upgrade should Pt's status improve

## 2021-11-11 NOTE — H&P ADULT - HISTORY OF PRESENT ILLNESS
89 yo female with h/o hypertension /hypothyroid presenting with AMS. per family and ems pt normally ao3 and now more confused. symptoms started 2 days ago  . no reports of falls  pt as been on quitiapine and mirtazpine for months. did have a couple of episodes of diarrhea

## 2021-11-11 NOTE — H&P ADULT - NSHPLABSRESULTS_GEN_ALL_CORE
10.4   8.15  )-----------( 229      ( 10 Nov 2021 23:07 )             31.0       11-10    138  |  103  |  23  ----------------------------<  90  4.1   |  23  |  1.81<H>    Ca    9.8      10 Nov 2021 23:07  Mg     2.0     11-10    TPro  6.9  /  Alb  3.6  /  TBili  0.6  /  DBili  x   /  AST  25  /  ALT  24  /  AlkPhos  80  11-10              Urinalysis Basic - ( 10 Nov 2021 21:09 )    Color: Light Yellow / Appearance: Clear / S.009 / pH: x  Gluc: x / Ketone: Negative  / Bili: Negative / Urobili: Negative   Blood: x / Protein: Negative / Nitrite: Negative   Leuk Esterase: Negative / RBC: x / WBC x   Sq Epi: x / Non Sq Epi: x / Bacteria: x        PT/INR - ( 10 Nov 2021 18:24 )   PT: 12.4 sec;   INR: 1.04 ratio         PTT - ( 10 Nov 2021 18:24 )  PTT:30.1 sec    Lactate Trend      CARDIAC MARKERS ( 10 Nov 2021 23:07 )  x     / x     / 378 U/L / x     / 8.4 ng/mL        CAPILLARY BLOOD GLUCOSE      POCT Blood Glucose.: 134 mg/dL (10 Nov 2021 16:39)

## 2021-11-11 NOTE — SWALLOW BEDSIDE ASSESSMENT ADULT - COMMENTS
In ED: exam notable for diminished state of mentation, but no gross focality; ct neg of head for acute pathology; Per MD d/w son, appears many changes of medications in last week, this could be a consequence of that.    11/11/21 GI Consult: CT a/p suggesting early stercoral colitis; no BM since admitted; bowel regimen; will order smog enema x 1.  Pulm consult: CTA with RLL atelectasis and possible mucoid impacted RLL distal airways; -No clear PNA. 11/10/21 In ED: exam notable for diminished state of mentation, but no gross focality; ct neg of head for acute pathology; Per MD d/w son, appears many changes of medications in last week, this could be a consequence of that.    CT Head: Status post right suboccipital craniectomy. There is an underlying right posterior fossa resection cavity.  There has been prior right parietal cuca hole. There is underlying right parietal encephalomalacia/gliosis.  No acute intracranial hemorrhage, abnormal extra axial fluid collection, distal first, mass effect or midline shift.  Ventricles and sulci are normal in size for the patient's age. Basal cisterns are patent.  Minimal mucosal thickening of the bilateral ethmoid and maxillary sinuses. Right frontal sinus appears underpneumatized. Remaining paranasal sinuses and mastoid air cells appear clear.  Status post left lens replacement surgery.  IMPRESSION:  No acute intracranial hemorrhage or acute territorial infarction. Chronic findings as above.      11/11/21 GI Consult: CT a/p suggesting early stercoral colitis; no BM since admitted; bowel regimen; will order smog enema x 1.  Pulm consult: CTA with RLL atelectasis and possible mucoid impacted RLL distal airways; -No clear PNA.

## 2021-11-11 NOTE — SWALLOW BEDSIDE ASSESSMENT ADULT - PHARYNGEAL PHASE
n/a no s/s of aspiration; clear vocal quality post swallow; timely trigger no s/s of aspiration; timely pharyngeal swallow; adequate laryngeal elevation; clear vocal quality post swallow

## 2021-11-11 NOTE — ED ADULT NURSE NOTE - CHIEF COMPLAINT QUOTE
CHIEF COMPLAINT:  Chief Complaint   Patient presents with   • Generalized Body Aches   • fatigue         HPI  Petra Lehman is a 43 year old female who presents with a little over a week of intermittent headaches, fatigue, chills, and body aches. She has a history of mild anemia and asthma with seasonal allergies. She has not seen her primary care provider since the symptoms started. No chest pain or trouble breathing. Headache waxes and wanes, not sudden onset.  When she gets the headache she feels like her thoughts are slowed, but then they come back to normal.      ALLERGIES:  ALLERGIES:   Allergen Reactions   • Hydrocortisone RASH   • Penicillin G HIVES       CURRENT MEDICATIONS:  Current Facility-Administered Medications   Medication Dose Route Frequency Provider Last Rate Last Dose   • sodium chloride (NORMAL SALINE) 0.9 % bolus 1,000 mL  1,000 mL Intravenous Once Rich Gracia MD         Current Outpatient Prescriptions   Medication Sig Dispense Refill   • Loratadine (CLARITIN PO)      • citalopram (CELEXA) 20 MG tablet Take 20 mg by mouth daily.     • Iron Combinations (IRON COMPLEX) Cap      • FOLIC ACID PO      • fluticasone (FLONASE) 50 MCG/ACT nasal spray Spray 2 sprays in each nostril 2 times daily. 25 g 5   • topiramate (TOPAMAX) 50 MG tablet TAKE 1 TABLET BY MOUTH EVERY  tablet 0   • levothyroxine (SYNTHROID, LEVOTHROID) 25 MCG tablet Take 1 tablet by mouth daily. 30 tablet 3   • aspirin (ASPIRIN LOW DOSE) 81 MG tablet Take 1 tablet by mouth daily. 30 tablet 0   • benzonatate (TESSALON) 200 MG capsule Take 1 capsule by mouth 3 times daily as needed for Cough. 21 capsule 0   • DULoxetine (CYMBALTA) 60 MG capsule Take 1 capsule by mouth 2 times daily. 60 capsule 6   • fexofenadine (ALLEGRA) 180 MG tablet Take 180 mg by mouth daily. OTC         PAST MEDICAL HISTORY:  Past Medical History   Diagnosis Date   • Allergy      seasonal    • Anxiety    • Depressive disorder    • History of hypoglycemia     • RAD (reactive airway disease)    • Stroke      mild end of    • Thyroid disease        SURGICAL HISTORY:  Past Surgical History   Procedure Laterality Date   •  section, classic       4 of them    • Appendectomy         SOCIAL HISTORY:  Social History     Social History   • Marital status:      Spouse name: N/A   • Number of children: N/A   • Years of education: N/A     Social History Main Topics   • Smoking status: Never Smoker   • Smokeless tobacco: None   • Alcohol use Yes      Comment: \"seldom\"   • Drug use: No   • Sexual activity: Not Asked     Other Topics Concern   • None     Social History Narrative       REVIEW OF SYSTEMS:    Constitutional:  Denies fever, + chills.   Eyes:  Denies change in visual acuity. No eye pain  HENT:  Denies nasal congestion or sore throat.   Respiratory:  Denies cough or shortness of breath.   Cardiovascular:  Denies chest pain or edema.   GI:  No abdominal pain, no nausea or vomiting, no diarrhea  :  No dysuria/hematuria, no frequency/urgency  Musculoskeletal:  Denies back pain or joint pain. +muscle aches  Integument:  Denies rash or skin lesions  Neurologic:  +headache, denies focal weakness or sensory changes.        PHYSICAL EXAM:  ED Triage Vitals   BP 17 0728 132/83   Pulse 17 0728 76   Resp 17 0728 16   Temp 17 0728 98.4 °F (36.9 °C)   SpO2 17 0728 100 %     Vitals reviewed per nursing notes.    General:  Patient is in no acute distress, non toxic appearing, alert, pleasant & appropriate.  Head: Atraumatic, normal cephalic.  Eyes: No ptosis, edema or lesions of the lid.  Mouth:   Clear. No pooled secretions, uvular deviation or trismus.  No erythema or tonsillar exudate.  Neck: Supple, no lymphadenopathy.  Lungs:   Clear to auscultation bilaterally.  There are no chest wall lesions or tenderness.  Heart: Regular rate without appreciable murmur  Abdomen:  Soft, nontender, nondistended, without rebound, guarding or  rigidity.  Skin: Warm, dry and intact without rash or petechiae.  Extremities:  Without clubbing, cyanosis, or edema.  Musculoskeletal:  No focal bony or joint tenderness.  Neuro:  Moves all extremities spontaneously. Able to understand and respond normally to questions.  Psych:  Mood and affect are anxious, judgement is normal.    Lab Results  Results for orders placed or performed during the hospital encounter of 01/16/17   CBC & Auto Differential   Result Value Ref Range    WBC 7.9 4.2 - 11.0 K/mcL    RBC 4.17 4.00 - 5.20 mil/mcL    HGB 12.7 12.0 - 15.5 g/dL    HCT 37.6 36.0 - 46.5 %    MCV 90.2 78.0 - 100.0 fl    MCH 30.5 26.0 - 34.0 pg    MCHC 33.8 32.0 - 36.5 g/dL    RDW-CV 12.7 11.0 - 15.0 %     140 - 450 K/mcL    DIFF TYPE AUTOMATED DIFFERENTIAL     Neutrophil 63 %    LYMPH 25 %    MONO 9 %    EOSIN 3 %    BASO 0 %    Absolute Neutrophil 5.0 1.8 - 7.7 K/mcL    Absolute Lymph 2.0 1.0 - 4.8 K/mcL    Absolute Mono 0.7 0.3 - 0.9 K/mcL    Absolute Eos 0.2 0.1 - 0.5 K/mcL    Absolute Baso 0.0 0.0 - 0.3 K/mcL   Comprehensive Metabolic Panel   Result Value Ref Range    Sodium 135 135 - 145 mmol/L    Potassium 3.9 3.4 - 5.1 mmol/L    Chloride 101 98 - 107 mmol/L    Carbon Dioxide 27 21 - 32 mmol/L    Anion Gap 11 10 - 20 mmol/L    Glucose 80 65 - 99 mg/dL    BUN 9 (L) 10 - 20 mg/dL    Creatinine 0.59 0.51 - 0.95 mg/dL    GFR Estimate,  >90     GFR Estimate, Non African American >90     BUN/Creatinine Ratio 15 7 - 25    CALCIUM 8.5 8.4 - 10.2 mg/dL    TOTAL BILIRUBIN 0.3 0.2 - 1.0 mg/dL    AST/SGOT 23 <38 Units/L    ALT/SGPT 31 <79 Units/L    ALK PHOSPHATASE 46 45 - 117 Units/L    TOTAL PROTEIN 7.8 6.4 - 8.2 g/dL    Albumin 3.9 3.6 - 5.1 g/dL    GLOBULIN 3.9 2.0 - 4.0 g/dL    A/G Ratio, Serum 1.0 1.0 - 2.4   Urinalysis with Micro & Culture if Indicated   Result Value Ref Range    COLOR YELLOW YELLOW    APPEARANCE CLEAR     GLUCOSE(URINE) NEGATIVE NEGATIVE mg/dL    BILIRUBIN NEGATIVE NEGATIVE     KETONES NEGATIVE NEGATIVE mg/dL    SPECIFIC GRAVITY 1.015 1.005 - 1.030    BLOOD NEGATIVE NEGATIVE    pH 6.0 5.0 - 7.0 Units    PROTEIN(URINE) NEGATIVE NEGATIVE mg/dL    UROBILINOGEN 0.2 0.0 - 1.0 mg/dL    NITRITE NEGATIVE NEGATIVE    LEUKOCYTE ESTERASE NEGATIVE NEGATIVE    Squamous EPI'S 1 to 5 0 - 5 /hpf    RBC NONE SEEN 0 - 3 /hpf    WBC NONE SEEN 0 - 5 /hpf    BACTERIA NONE SEEN NONE SEEN /hpf    Hyaline Casts NONE SEEN 0 - 5 /lpf    SPECIMEN TYPE URINE, CLEAN CATCH/MIDSTREAM    Thyroid Stimulating Hormone Reflex   Result Value Ref Range    TSH 2.819 0.350 - 5.000 mcUnits/mL   Carbon Monoxide   Result Value Ref Range    Carbon Monoxide 0.7 <1.5 %       Radiology  XR Chest PA and Lateral    (Results Pending)   CXR without acute process    Last Vital Signs  Vitals:    01/16/17 0728   BP: 132/83   Pulse: 76   Resp: 16   Temp: 98.4 °F (36.9 °C)   TempSrc: Oral   SpO2: 100%   Weight: 87.1 kg   Height: 5' 6\" (1.676 m)       Treatment in ED:  ED Medication Orders     Start Ordered     Status Ordering Provider    01/16/17 0738 01/16/17 0738  sodium chloride (NORMAL SALINE) 0.9 % bolus 1,000 mL  ONCE      Acknowledged MICHAEL GRACIA S          Patient possibly with viral syndrome causing a week of aches and fatigue, labs as this point are normal, recommend close follow-up with her primary physician/APNP for further evaluation.    Diagnosis:  1. Viral syndrome        Follow Up:  No Pcp           Please understand this is a provisional diagnosis that can and may change. The diagnosis that you are discharged with is based on the symptoms you described and the diagnostic information available today. If any new symptoms occur or worsen, you should seek immediate re-evaluation at the nearest ED. If any symptoms persist, please follow up for reassessment.    Treatment:  New Prescriptions    No medications on file          Michael Gracia MD  01/16/17 0853     confusion x2 days. speaking 'gibberish' as per family

## 2021-11-11 NOTE — SWALLOW BEDSIDE ASSESSMENT ADULT - SWALLOW EVAL: RECOMMENDED FEEDING/EATING TECHNIQUES
encourage PO intake/crush medication (when feasible)/maintain upright posture during/after eating for 30 mins/oral hygiene

## 2021-11-11 NOTE — SWALLOW BEDSIDE ASSESSMENT ADULT - SLP GENERAL OBSERVATIONS
Pt asleep; easily aroused although confused with impaired orientation; speech is dysarthric with decreased speech intelligibility vs jargon; Pt required max cues to accept PO trials; +cognitive linguistic deficits.

## 2021-11-11 NOTE — CONSULT NOTE ADULT - ASSESSMENT
fecal impaction   stercoral colitis  AMS    CT a/p suggesting early stercoral colitis  no BM since admitted  bowel regimen  will order smog enema x 1  monitor stool counts  neuro following   will follow      Advanced care planning was discussed with patient and family.  Advanced care planning forms were reviewed and discussed.  Risks, benefits and alternatives of gastroenterologic procedures were discussed in detail and all questions were answered.    30 minutes spent.   
89 y/o F with PMH HTN, hypothyroid, dementia. Presents with AMS. Recently saw geriatric psych at [facility] - reportedly had leg edema and was given Bactrim and Remeron dosage was changed. Called to consult for CT chest findings with RLL atelectasis and possible mucoid impacted distal airways RLL. Currently breathing comfortably on RA. 
This is a 89y year old Female with the below past medical history who presents with the chief complaint of AMS.  She had a fall out of bed 2-3 months ago, had trauma, was confused when went to the hospital then to [facility].  While she improved, she started to be combative agitated and placed on seroquel.  Sent home in Oct but starting having episodes where she would leave the house at night, knock on neighbors door, look for her dead .  Son got 24 hr help and went to dr franks with dr sebastian office, placed on remeron.  On that she was calmer, eating more.  Saw dr barraza, geriatric psych at [facility].  One week prior to admission, had leg edema, given bactrim.  There was also a change in remeron dosage.  As per son, was not sleeping at night, could not get up, falling down and came to ER.    exam notable for diminsihed state of mentation, but no gross focality  ct neg of head for acute pathology  in speaking to son, appears many changes of medications in last week, this could be a consequence of that  She has no fever, no elevated wbc, less likely infection, no meningeal sign  would monitor closely, clarify meds taken but hold on meds for now  fluids  close monitoring    spent over 50min, over 1/2 the time discussing case with son on phone  reviewed with dr gayle

## 2021-11-11 NOTE — H&P ADULT - ASSESSMENT
pt w/ hx htn / hypothyroidism / dementia / recent trauma and d/c a couple of months ago / w/ altered mental status  /? med related   mucoid plug   pulm eval  nebs  colitis / sterococal  gi eval  stop all psych meds for now   dvt / gi proph   pt  neuro checks  iv fluids  swallow eval  discussed w/ son

## 2021-11-11 NOTE — CONSULT NOTE ADULT - PROBLEM SELECTOR RECOMMENDATION 9
CTA with RLL atelectasis and possible mucoid impacted RLL distal airways  -No clear PNA  -Normoxic   -No congested cough or coughing while eating per staff  -Unable to use incentive spirometry at this time   -C/w nebs q8h for now

## 2021-11-11 NOTE — SWALLOW BEDSIDE ASSESSMENT ADULT - ORAL PHASE
Pt expelled bolus from oral cavity./Decreased anterior-posterior movement of the bolus/Delayed oral transit time mild/Delayed oral transit time Within functional limits

## 2021-11-11 NOTE — SWALLOW BEDSIDE ASSESSMENT ADULT - ASR SWALLOW ASPIRATION MONITOR
*If evident, report to MD immediately and d/c oral diet./change of breathing pattern/cough/gurgly voice/fever/pneumonia/throat clearing/upper respiratory infection

## 2021-11-11 NOTE — SWALLOW BEDSIDE ASSESSMENT ADULT - SWALLOW EVAL: DIAGNOSIS
Pt is a 89 y/o female with h/o right suboccipital craniectomy and dementia who presents with moderate oral stage dysphagia which appears to be superimposed upon by underlying cognitive linguistic deficits.   Acceptance, formation and control of bolus are impaired.  Improved labial seal noted with straw drinking.  Pt expelled minced and pieces of hard solids from oral cavity.   No signs or symptoms of laryngeal penetration/aspiration evident with purees or thin liquids.  Pharyngeal swallow judged to be timely with adequate laryngeal elevation.

## 2021-11-11 NOTE — CHART NOTE - NSCHARTNOTEFT_GEN_A_CORE
Maykel Carvalho (son)    Home phone ([phone])  Cell phone ([phone]) Received phone call from Son, requests that his contact information be placed in chart. To be emergency contact.    Maykel Carvalho (son)    Home phone ([phone])  Cell phone ([phone])

## 2021-11-11 NOTE — PHYSICAL THERAPY INITIAL EVALUATION ADULT - PRECAUTIONS/LIMITATIONS, REHAB EVAL
CT chest 11/11/21: No definite evidence of pneumonia. Questionable mucoid impacted right lower lobe distal airways. CT abdomen and pelvis 11/11/21: Distended fecal filled rectum with surrounding inflammatory change is suggested concerning for an early stercoral colitis. Cholelithiasis. No secondary signs of acute cholecystitis. TTE US 11/10/21: A trace Pericardial Effusion was present. IVC was flat./fall precautions (-) VA Duplex BLE Vein Scan 11/11/21. CT chest 11/11/21: No definite evidence of pneumonia. Questionable mucoid impacted right lower lobe distal airways. CT abdomen and pelvis 11/11/21: Distended fecal filled rectum with surrounding inflammatory change is suggested concerning for an early stercoral colitis. Cholelithiasis. No secondary signs of acute cholecystitis. TTE US 11/10/21: A trace Pericardial Effusion was present. IVC was flat./fall precautions

## 2021-11-11 NOTE — SWALLOW BEDSIDE ASSESSMENT ADULT - SWALLOW EVAL: ORAL MUSCULATURE
Pt's aide reports difficulty with dentures at times/unable to assess due to poor participation/comprehension

## 2021-11-11 NOTE — ED ADULT NURSE REASSESSMENT NOTE - NS ED NURSE REASSESS COMMENT FT1
Attempted to give reports to the floor twice. Initially was told that the nurse is busy and to call back- after calling back over 10 mins later rep not answering call. During initial call nurse made aware by rep that transport has been put in

## 2021-11-11 NOTE — CONSULT NOTE ADULT - NSCONSULTADDITIONALINFOA_GEN_ALL_CORE
I have seen and examine the pt and agree with above: likely mucus plugging : pt has no fever and no wbc: high risk for aspiration; follow speech and swallow:     tenzin np

## 2021-11-11 NOTE — PHYSICAL THERAPY INITIAL EVALUATION ADULT - IMPAIRMENTS CONTRIBUTING IMPAIRED BED MOBILITY, REHAB EVAL
impaired balance/cognition/decreased flexibility/narrow base of support/impaired postural control/decreased strength

## 2021-11-11 NOTE — CONSULT NOTE ADULT - PROBLEM SELECTOR RECOMMENDATION 2
-Possibly 2nd to recent medication adjustment  -Normal WBC, afebrile   -UA normal, no clear PNA on CT chest - although is aspiration risk given mental status.   -No CO2 retention on VBG

## 2021-11-11 NOTE — PHYSICAL THERAPY INITIAL EVALUATION ADULT - ADDITIONAL COMMENTS
Pt lives alone in a apt with +elevator. Pt used a RW for ambulation PTA. Pt has 24 hrs/7 day HHA who assists with all functional mobility and ADLs PTA.

## 2021-11-11 NOTE — H&P ADULT - ALLERGIC/IMMUNOLOGIC
[facility] Ophthalmology Consult Note    HPI: 69 yo male with hx of left cataract surgery in [facility] on 11/20 Dr Garcia. Seen in office for follow up 11/21 and doing well. Yesterday, he developed redness, swelling, and itching the left eye. Today the swelling worsened, and the patient presented to the ED. He denies pain, blurry vision, pain on eye movements, diplopia, flashes/floaters, recent trauma, fever, nausea/vomiting. He states that using the bromfenec     PMH: Anemia    Diverticulitis    DM (diabetes mellitus)  type 11  HTN (hypertension)    Hyperlipidemia    Obesity    Osteoarthritis    Vitamin D deficiency.  Meds: None  POcHx (including surgeries/lasers/trauma):  Recent left cataract surgery left eye, currently on   Drops: None  FamHx: None  Social Hx: None  Allergies: NKDA    ROS:  General (neg), Vision (per HPI), Head and Neck (neg), Pulm (neg), CV (neg), GI (neg),  (neg), Musculoskeletal (neg), Skin/Integ (neg), Neuro (neg), Endocrine (neg), Heme (neg), All/Immuno (neg)    Mood and Affect Appropriate ( x ),  Oriented to Time, Place, and Person x 3 ( x )    Ophthalmology Exam    Visual acuity (sc): 20/40 OU via 3.00 D reading glasses and near-card  Pupils: PERRL OU, no APD  Ttono: 15, 19  Extraocular movements (EOMs): Full OU, no pain, no diplopia  Color Plates: 11/12 OU    Slit Lamp Exam (SLE)  External:  2-3+ perioribtal edema of left eye   Lids/Lashes/Lacrimal Ducts: 2-3+ edema of CUCO and LLL, no proptosis, enopthalmos  Sclera/Conjunctiva:  W+Q OU  Cornea: Cl OU  Anterior Chamber: 2+ cell OS, no hypopyon, quiet OD  Iris:  Flat OU  Lens:  2+ NS OD, PCIOL OD    Fundus Exam: dilated with 1% tropicamide and 2.5% phenylephrine  Approval obtained from primary team for dilation  Patient aware that pupils can remained dilated for at least 4-6 hours  Exam performed with 20D lens    Vitreous: wnl OU  Disc, cup/disc: sharp and pink, 0.4 OU  Macula:  wnl OU  Vessels:  wnl OU  Periphery: wnl OU    Diagnostic Testing:    Assessment:      Plan:  - Anti-histamine per primary team  - Stop bromfenac (NSAID eye drop)  - Switch Ofloxacin gtt to Polytrim gtt QID left eye  - c/w Pred Forte QID left eye  - Cool compresses to affected area QID  - Follow up in Dr. Barbosa's office this Monday      Follow-Up:  Patient should follow up his/her ophthalmologist or in the [facility] within 1 week of discharge  [address] [place] [address]  [phone]    D/W Dr. Barbosa [facility] Ophthalmology Consult Note    HPI: 69 yo male with hx of left cataract surgery in [facility] on 11/20 Dr Barbosa. Seen in office for follow up 11/21 and doing well. Yesterday, he developed redness, swelling, and itching the left eye. Today the swelling worsened, and the patient presented to the ED. He denies pain, blurry vision, pain on eye movements, diplopia, flashes/floaters, recent trauma, fever, nausea/vomiting. He states that using the bromfenac BID has caused him discomfort for the past two days.     PMH: Anemia    Diverticulitis    DM (diabetes mellitus)  type 11  HTN (hypertension)    Hyperlipidemia    Obesity    Osteoarthritis    Vitamin D deficiency.  Meds: None  POcHx (including surgeries/lasers/trauma):  Recent left cataract surgery left eye, currently on Bromfenac BID; Pred Forte QID; Ofloxacin QID - all left eye  FamHx: None  Social Hx: None  Allergies: NKDA    ROS:  General (neg), Vision (per HPI), Head and Neck (neg), Pulm (neg), CV (neg), GI (neg),  (neg), Musculoskeletal (neg), Skin/Integ (neg), Neuro (neg), Endocrine (neg), Heme (neg), All/Immuno (neg)    Mood and Affect Appropriate ( x ),  Oriented to Time, Place, and Person x 3 ( x )    Ophthalmology Exam    Visual acuity (sc): 20/40 OU via 3.00 D reading glasses and near-card  Pupils: PERRL OU, no APD  Ttono: 15, 19  Extraocular movements (EOMs): Full OU, no pain, no diplopia  Color Plates: 11/12 OU    Slit Lamp Exam (SLE)  External:  2-3+ perioribtal edema of left eye   Lids/Lashes/Lacrimal Ducts: 1+ edema of CUCO; 2-3+ LLL, no proptosis, enopthalmos, hypesthesia. Gentle massage of left medial canthal area yields no fluid or pus.   Sclera/Conjunctiva:  W+Q OU  Cornea: Cl OU.  Anterior Chamber: 2+ cell OS, no hypopyon, quiet OD  Iris:  Flat OU  Lens:  2+NS, 2+ CS, 2+ PSC OD, PCIOL OD    Fundus Exam: dilated with 1% tropicamide and 2.5% phenylephrine  Approval obtained from primary team for dilation  Patient aware that pupils can remained dilated for at least 4-6 hours  Exam performed with 20D lens    Vitreous: wnl OU  Disc, cup/disc: sharp and pink, 0.3 OU  Macula:  wnl OU  Vessels:  wnl OU  Periphery: wnl OU    Diagnostic Testing: None    Assessment: 67 y/o M recent cataract surgery OS presents with erythema, edema, itching of the left eye. VA 20/40 OU, PERRLA, IOP WNL, no pain on eye movements or restriction. Exam notable for 1+ edema of CUCO; 2-3+ LLL, no proptosis, enopthalmos, hypesthesia. Gentle massage of left medial canthal area yields no fluid or pus. Bruno negative. Anterior chamber OS reveals no hypopyon. Posterior segment exam unremarkable. Differential includes allergic reaction, likely 2/2 to NSAID eye drop vs tape used to hold eye-shield in place.       Plan:  - Anti-histamine per primary team  - Stop bromfenac (NSAID eye drop)  - Switch Ofloxacin gtt to Polytrim gtt QID left eye  - c/w Pred Forte QID left eye  - Cool compresses to affected area QID  - Follow up in Dr. Barbosa's office this Monday  - d/w primary team and patient    Follow-Up:  Patient should follow up his/her ophthalmologist or in the [facility] Practice within 1 week of discharge  [address] [place] [id number] [phone]    D/W Dr. Barbosa negative

## 2021-11-12 LAB
ANION GAP SERPL CALC-SCNC: 9 MMOL/L — SIGNIFICANT CHANGE UP (ref 5–17)
BUN SERPL-MCNC: 14 MG/DL — SIGNIFICANT CHANGE UP (ref 7–23)
CALCIUM SERPL-MCNC: 9 MG/DL — SIGNIFICANT CHANGE UP (ref 8.4–10.5)
CHLORIDE SERPL-SCNC: 105 MMOL/L — SIGNIFICANT CHANGE UP (ref 96–108)
CO2 SERPL-SCNC: 22 MMOL/L — SIGNIFICANT CHANGE UP (ref 22–31)
COVID-19 NUCLEOCAPSID GAM AB INTERP: NEGATIVE — SIGNIFICANT CHANGE UP
COVID-19 NUCLEOCAPSID TOTAL GAM ANTIBODY RESULT: 0.1 INDEX — SIGNIFICANT CHANGE UP
COVID-19 SPIKE DOMAIN AB INTERP: NEGATIVE — SIGNIFICANT CHANGE UP
COVID-19 SPIKE DOMAIN ANTIBODY RESULT: 0.4 U/ML — SIGNIFICANT CHANGE UP
CREAT SERPL-MCNC: 1.18 MG/DL — SIGNIFICANT CHANGE UP (ref 0.5–1.3)
GLUCOSE SERPL-MCNC: 99 MG/DL — SIGNIFICANT CHANGE UP (ref 70–99)
HCT VFR BLD CALC: 30.9 % — LOW (ref 34.5–45)
HGB BLD-MCNC: 10.1 G/DL — LOW (ref 11.5–15.5)
MCHC RBC-ENTMCNC: 31.6 PG — SIGNIFICANT CHANGE UP (ref 27–34)
MCHC RBC-ENTMCNC: 32.7 GM/DL — SIGNIFICANT CHANGE UP (ref 32–36)
MCV RBC AUTO: 96.6 FL — SIGNIFICANT CHANGE UP (ref 80–100)
NRBC # BLD: 0 /100 WBCS — SIGNIFICANT CHANGE UP (ref 0–0)
PLATELET # BLD AUTO: 206 K/UL — SIGNIFICANT CHANGE UP (ref 150–400)
POTASSIUM SERPL-MCNC: 3.9 MMOL/L — SIGNIFICANT CHANGE UP (ref 3.5–5.3)
POTASSIUM SERPL-SCNC: 3.9 MMOL/L — SIGNIFICANT CHANGE UP (ref 3.5–5.3)
RBC # BLD: 3.2 M/UL — LOW (ref 3.8–5.2)
RBC # FLD: 14.6 % — HIGH (ref 10.3–14.5)
SARS-COV-2 IGG+IGM SERPL QL IA: 0.1 INDEX — SIGNIFICANT CHANGE UP
SARS-COV-2 IGG+IGM SERPL QL IA: 0.4 U/ML — SIGNIFICANT CHANGE UP
SARS-COV-2 IGG+IGM SERPL QL IA: NEGATIVE — SIGNIFICANT CHANGE UP
SARS-COV-2 IGG+IGM SERPL QL IA: NEGATIVE — SIGNIFICANT CHANGE UP
SODIUM SERPL-SCNC: 136 MMOL/L — SIGNIFICANT CHANGE UP (ref 135–145)
WBC # BLD: 6.37 K/UL — SIGNIFICANT CHANGE UP (ref 3.8–10.5)
WBC # FLD AUTO: 6.37 K/UL — SIGNIFICANT CHANGE UP (ref 3.8–10.5)

## 2021-11-12 PROCEDURE — 99223 1ST HOSP IP/OBS HIGH 75: CPT

## 2021-11-12 RX ORDER — QUETIAPINE FUMARATE 200 MG/1
12.5 TABLET, FILM COATED ORAL AT BEDTIME
Refills: 0 | Status: DISCONTINUED | OUTPATIENT
Start: 2021-11-12 | End: 2021-11-17

## 2021-11-12 RX ORDER — POLYETHYLENE GLYCOL 3350 17 G/17G
17 POWDER, FOR SOLUTION ORAL
Refills: 0 | Status: DISCONTINUED | OUTPATIENT
Start: 2021-11-12 | End: 2021-11-17

## 2021-11-12 RX ORDER — FOLIC ACID 0.8 MG
1 TABLET ORAL DAILY
Refills: 0 | Status: DISCONTINUED | OUTPATIENT
Start: 2021-11-12 | End: 2021-11-17

## 2021-11-12 RX ADMIN — HEPARIN SODIUM 5000 UNIT(S): 5000 INJECTION INTRAVENOUS; SUBCUTANEOUS at 18:29

## 2021-11-12 RX ADMIN — ALBUTEROL 1.25 MILLIGRAM(S): 90 AEROSOL, METERED ORAL at 13:31

## 2021-11-12 RX ADMIN — Medication 1 MILLIGRAM(S): at 13:31

## 2021-11-12 RX ADMIN — SENNA PLUS 2 TABLET(S): 8.6 TABLET ORAL at 21:43

## 2021-11-12 RX ADMIN — HEPARIN SODIUM 5000 UNIT(S): 5000 INJECTION INTRAVENOUS; SUBCUTANEOUS at 06:50

## 2021-11-12 RX ADMIN — QUETIAPINE FUMARATE 12.5 MILLIGRAM(S): 200 TABLET, FILM COATED ORAL at 21:43

## 2021-11-12 RX ADMIN — ALBUTEROL 1.25 MILLIGRAM(S): 90 AEROSOL, METERED ORAL at 21:44

## 2021-11-12 RX ADMIN — ALBUTEROL 1.25 MILLIGRAM(S): 90 AEROSOL, METERED ORAL at 06:50

## 2021-11-12 NOTE — GOALS OF CARE CONVERSATION - ADVANCED CARE PLANNING - CONVERSATION DETAILS
Due to capacity son Maykel Carvalho was called at [phone].  Per family conversation documents have been on file.  Alpha search found documents in MR [id number] 5/4/2009 beginning page 224.  Son discussed mother is healthy in her mind and body.  Her admission is for confusion.  She will remain a full code even at advanced age.  Son discussed use of Mertazipine as a new medication that may have caused confusion and would like followup call from Physicians.  Concerns given to Ofelia Huizar NP and Unit Manager Aye haile on MR #s.  Son does not want video codes or MOLST form at this time.  He will participate in caregiver survey.

## 2021-11-12 NOTE — BH CONSULTATION LIAISON ASSESSMENT NOTE - ORIENTATION
The patient is altered and cannot tell me where she is located, who the president is, the day of the week, and whether or not she has a son.

## 2021-11-12 NOTE — BH CONSULTATION LIAISON ASSESSMENT NOTE - CASE SUMMARY
This is a 89-y.o. CF patient, domiciled alone with a 24/7 aid, with a pphx of dementia and a pmhx of hypothyroidism presenting with AMS. Per son pt does had a fall a few months ago (August) and since the fall her cognition has been slowly declining. Per son she was able to walk and hold a conversation up until 2 days ago when she became very altered and couldn't even stand. In addition, she didn't sleep for 48 hours. She was started on Bactrim for lower extremity edema a few days ago and had duplex performed that were negative for clots. In addition, she had some recent changes in her Remeron scheduling. She normally took it during the day, however, within the last couple weeks she has been taking it before bed. The patient is continuing her quetiapine dose as normal. No new falls. Did have a couple of episodes of diarrhea. Is eating and drinking. no vomiting. is moving all extremities. Psych was consulted because the patient seemed agitated earlier in the day and for a medication recommendation.    I have seen and evaluated this patient myself. Chart, labs, meds reviewed. I agree with trainee's assessment and plan.

## 2021-11-12 NOTE — PROGRESS NOTE ADULT - ASSESSMENT
89 y/o F with PMH HTN, hypothyroid, dementia. Presents with AMS. Recently saw geriatric psych at [facility] - reportedly had leg edema and was given Bactrim and Remeron dosage was changed. Called to consult for CT chest findings with RLL atelectasis and possible mucoid impacted distal airways RLL. Currently breathing comfortably on RA.

## 2021-11-12 NOTE — BH CONSULTATION LIAISON ASSESSMENT NOTE - HPI (INCLUDE ILLNESS QUALITY, SEVERITY, DURATION, TIMING, CONTEXT, MODIFYING FACTORS, ASSOCIATED SIGNS AND SYMPTOMS)
The patient is a 89 yo female domiciled alone with a 24/7 aid, with a pphx of dementia and a pmhx of hypothyroidism presenting with AMS. Per son pt does had a fall a few months ago (august) and since the fall her cognition has been slowly declining. Per son she was able to walk and hold a conversation up until 2 days ago when she became very altered and couldn't even stand. In addition, she didn't sleep for 48 hours. She was started on Bactrim for lower extremity edema a few days ago and had duplex performed that were negative for clots. In addition, she had some recent changes in her Remeron scheduling. She normally took it during the day, however, within the last couple weeks she has been taking it before bed. The patient is continuing her quetiapine dose as normal. No new falls. Did have a couple of episodes of diarrhea. Is eating and drinking. no vomiting. is moving all extremities. Psych was consulted because the patietn seemed agitated earlier in the day and for a medication recommendation.    Collateral (son) [phone]: Her son Maykel states that she has a history of agoraphobia when she was younger, but no other psychiatric history and has never received treatment. She has no history of substance abuse including alcohol of tobacco. She has no auditory or visual hallucinations and no symptoms of vega. She has no intent to hurt herself or others.

## 2021-11-12 NOTE — BH CONSULTATION LIAISON ASSESSMENT NOTE - SUMMARY
The patient is a 89 yo female domiciled alone with a 24/7 aid, with a pphx of dementia and a pmhx of hypothyroidism presenting with AMS. Per son pt does had a fall a few months ago (august) and since the fall her cognition has been slowly declining. Per son she was able to walk and hold a conversation up until 2 days ago when she became very altered and couldn't even stand. In addition, she didn't sleep for 48 hours. She was started on Bactrim for lower extremity edema a few days ago and had duplex performed that were negative for clots. In addition, she had some recent changes in her Remeron scheduling. She normally took it during the day, however, within the last couple weeks she has been taking it before bed. The patient is continuing her quetiapine dose as normal. No new falls. Did have a couple of episodes of diarrhea. Is eating and drinking. no vomiting. is moving all extremities. Psych was consulted because the patient seemed agitated earlier in the day and for a medication recommendation.    Collateral (son) [phone]: Her son Maykel states that she has a history of agoraphobia when she was younger, but no other psychiatric history and has never received treatment. She has no history of substance abuse including alcohol of tobacco. She has no auditory or visual hallucinations and no symptoms of vega. She has no intent to hurt herself or others.

## 2021-11-12 NOTE — BH CONSULTATION LIAISON ASSESSMENT NOTE - CURRENT MEDICATION
MEDICATIONS  (STANDING):  ALBUTerol   0.042% 1.25 milliGRAM(s) Nebulizer every 8 hours  dextrose 5% + sodium chloride 0.9%. 1000 milliLiter(s) (50 mL/Hr) IV Continuous <Continuous>  folic acid 1 milliGRAM(s) Oral daily  heparin   Injectable 5000 Unit(s) SubCutaneous every 12 hours  polyethylene glycol 3350 17 Gram(s) Oral two times a day  QUEtiapine 12.5 milliGRAM(s) Oral at bedtime  senna 2 Tablet(s) Oral at bedtime    MEDICATIONS  (PRN):

## 2021-11-12 NOTE — BH CONSULTATION LIAISON ASSESSMENT NOTE - ADDITIONAL DETAILS / COMMENTS
The patient has a history of dementia, and the patient is very altered today. She cannot tell me where she is located, the date, the president, and who her family members are. The patient repeatedly states "I am very tired".

## 2021-11-12 NOTE — CHART NOTE - NSCHARTNOTEFT_GEN_A_CORE
Pt is a 89 y/o female with h/o HTN, hypothyroid, right suboccipital craniectomy and dementia who presents with  dysphagia which appears to be superimposed upon by underlying cognitive linguistic deficits.    PMH dementia. Presents with AMS. Recently saw geriatric psych at [facility] - reportedly had leg edema and was given Bactrim and Remeron dosage was changed. Pulm consult for CT chest findings with RLL atelectasis and possible mucoid impacted distal airways RLL; CTA with RLL atelectasis and possible mucoid impacted RLL distal airways; -No clear PNA  -Normoxic ; -No congested cough or coughing while eating per staff.  Per Neuro: exam notable for diminished state of mentation, but no gross focality; ct neg of head for acute pathology  in speaking to son, appears many changes of medications in last week, this could be a consequence of that; She has no fever, no elevated wbc, less likely infection, no meningeal sign; substantial improvement from prior exam on 11/11 am, will continue to monitor.  Per GI f/u: CT a/p suggesting early stercoral colitis; no BM since admitted; cont bowel regimen; monitor stool counts.    Pt seen for reevaluation of swallowing to determine candidacy for diet upgrade.  Pt asleep in bed and upon encounter private aide reports the Pt has not been eating or drinking much today as Pt has been sleeping.  Pt aroused to clinician for brief period and stated name; Pt inconsistently responding to y/n questions.  Pt presented with tspn of purees and thin/mildly thick liquids however anterior spillage noted with liquids and Pt intentionally expelled pureed cheesecake from mouth. Pharyngeal swallow was not triggered.  Pt presents with oral stage dysphagia which appears to be superimposed upon by fluctuation in mentation and possible behavioral preferences.   Per initial assessment, no s/s of aspiration evident with purees or thin liquids.  Recommendations; Feed only when totally awake/alert; puree with thin liquids as tolerated; total assistance with strict aspiration precautions. Consider MD f/u for GOC discussion vs possible need for non-oral means of nutrition, hydration and meds. Suggest Dietitian consult.      Sybil Santos, MS CCC-SLP   Pgr # [phone]

## 2021-11-12 NOTE — BH CONSULTATION LIAISON ASSESSMENT NOTE - RISK ASSESSMENT
Risk factors: unable to care for self 2/2 psychiatric illness    Protective factors: no current SIIP/HIIP, no h/o SA/SIB, no h/o psych admissions, no access to weapons, no active substance abuse, engaged in work or school, dependent children, spirituality, domiciled, intact marriage, social supports, positive therapeutic relationship, engaged in treatment, compliant with treatment, help-seeking behaviors    Overall, pt is at low risk of harm and she does not meet criteria for psychiatric admission.

## 2021-11-12 NOTE — BH CONSULTATION LIAISON ASSESSMENT NOTE - NSBHCONSULTFOLLOWAFTERCARE_PSY_A_CORE FT
English
Patient may not require follow up if at baseline at discharge. Otherwise, refer to [facility] [phone].

## 2021-11-12 NOTE — BH CONSULTATION LIAISON ASSESSMENT NOTE - NSBHCHARTREVIEWVS_PSY_A_CORE FT
Vital Signs Last 24 Hrs  T(C): 36.7 (12 Nov 2021 15:45), Max: 37.1 (11 Nov 2021 22:20)  T(F): 98.1 (12 Nov 2021 15:45), Max: 98.8 (11 Nov 2021 22:20)  HR: 93 (12 Nov 2021 15:45) (88 - 98)  BP: 162/87 (12 Nov 2021 15:45) (159/77 - 170/76)  BP(mean): --  RR: 18 (12 Nov 2021 15:45) (18 - 18)  SpO2: 97% (12 Nov 2021 15:45) (96% - 97%)

## 2021-11-12 NOTE — PROGRESS NOTE ADULT - ASSESSMENT
fecal impaction   stercoral colitis  AMS    CT a/p suggesting early stercoral colitis  no BM since admitted  cont bowel regimen  monitor stool counts  neuro following   will follow      Advanced care planning was discussed with patient and family.  Advanced care planning forms were reviewed and discussed.  Risks, benefits and alternatives of gastroenterologic procedures were discussed in detail and all questions were answered.    30 minutes spent.

## 2021-11-12 NOTE — PROGRESS NOTE ADULT - ASSESSMENT
pt w/ hx htn / hypothyroidism / dementia / recent trauma and d/c a couple of months ago / w/ altered mental status  /? med related   mucoid plug   pulm eval noted  nebs  colitis / sterococal  gi eval noted  stop all psych meds for now   dvt / gi proph   pt  neuro checks  iv fluids prn  swallow eval noted  c/w puree diet  sun downing ?  psych f/u  discussed w/ son

## 2021-11-12 NOTE — BH CONSULTATION LIAISON ASSESSMENT NOTE - NSBHCHARTREVIEWLAB_PSY_A_CORE FT
.  LABS:                         10.1   6.37  )-----------( 206      ( 2021 06:57 )             30.9     11-12    136  |  105  |  14  ----------------------------<  99  3.9   |  22  |  1.18    Ca    9.0      2021 06:57  Mg     2.0     11-10    TPro  6.9  /  Alb  3.6  /  TBili  0.6  /  DBili  x   /  AST  25  /  ALT  24  /  AlkPhos  80  11-10    PT/INR - ( 10 Nov 2021 18:24 )   PT: 12.4 sec;   INR: 1.04 ratio         PTT - ( 10 Nov 2021 18:24 )  PTT:30.1 sec  Urinalysis Basic - ( 10 Nov 2021 21:09 )    Color: Light Yellow / Appearance: Clear / S.009 / pH: x  Gluc: x / Ketone: Negative  / Bili: Negative / Urobili: Negative   Blood: x / Protein: Negative / Nitrite: Negative   Leuk Esterase: Negative / RBC: x / WBC x   Sq Epi: x / Non Sq Epi: x / Bacteria: x

## 2021-11-12 NOTE — BH CONSULTATION LIAISON ASSESSMENT NOTE - DESCRIPTION
The patient lives alone in an apartment in [place] and has a 24/7 nurse aid. Her son visits her every 3 days and helps with ADLs. The patient has no history of any substance abuse.

## 2021-11-12 NOTE — PROGRESS NOTE ADULT - ASSESSMENT
This is a 89y year old Female with the below past medical history who presents with the chief complaint of AMS.  She had a fall out of bed 2-3 months ago, had trauma, was confused when went to the hospital then to [facility].  While she improved, she started to be combative agitated and placed on seroquel.  Sent home in Oct but starting having episodes where she would leave the house at night, knock on neighbors door, look for her dead .  Son got 24 hr help and went to dr franks with dr sebastian office, placed on remeron.  On that she was calmer, eating more.  Saw dr barraza, geriatric psych at [facility].  One week prior to admission, had leg edema, given bactrim.  There was also a change in remeron dosage.  As per son, was not sleeping at night, could not get up, falling down and came to ER.    exam notable for diminsihed state of mentation, but no gross focality  ct neg of head for acute pathology  in speaking to son, appears many changes of medications in last week, this could be a consequence of that  She has no fever, no elevated wbc, less likely infection, no meningeal sign    there is substantial improvement from prior exam on 11/11 am, will continue to monitor

## 2021-11-13 RX ORDER — HALOPERIDOL DECANOATE 100 MG/ML
1 INJECTION INTRAMUSCULAR EVERY 6 HOURS
Refills: 0 | Status: DISCONTINUED | OUTPATIENT
Start: 2021-11-13 | End: 2021-11-17

## 2021-11-13 RX ORDER — CHLORHEXIDINE GLUCONATE 213 G/1000ML
1 SOLUTION TOPICAL DAILY
Refills: 0 | Status: DISCONTINUED | OUTPATIENT
Start: 2021-11-13 | End: 2021-11-17

## 2021-11-13 RX ADMIN — SENNA PLUS 2 TABLET(S): 8.6 TABLET ORAL at 20:58

## 2021-11-13 RX ADMIN — HEPARIN SODIUM 5000 UNIT(S): 5000 INJECTION INTRAVENOUS; SUBCUTANEOUS at 17:59

## 2021-11-13 RX ADMIN — HEPARIN SODIUM 5000 UNIT(S): 5000 INJECTION INTRAVENOUS; SUBCUTANEOUS at 06:13

## 2021-11-13 RX ADMIN — SODIUM CHLORIDE 50 MILLILITER(S): 9 INJECTION, SOLUTION INTRAVENOUS at 06:14

## 2021-11-13 RX ADMIN — ALBUTEROL 1.25 MILLIGRAM(S): 90 AEROSOL, METERED ORAL at 13:31

## 2021-11-13 RX ADMIN — Medication 1 MILLIGRAM(S): at 12:50

## 2021-11-13 RX ADMIN — ALBUTEROL 1.25 MILLIGRAM(S): 90 AEROSOL, METERED ORAL at 20:59

## 2021-11-13 RX ADMIN — SODIUM CHLORIDE 50 MILLILITER(S): 9 INJECTION, SOLUTION INTRAVENOUS at 12:50

## 2021-11-13 RX ADMIN — ALBUTEROL 1.25 MILLIGRAM(S): 90 AEROSOL, METERED ORAL at 06:13

## 2021-11-13 RX ADMIN — QUETIAPINE FUMARATE 12.5 MILLIGRAM(S): 200 TABLET, FILM COATED ORAL at 20:58

## 2021-11-13 RX ADMIN — POLYETHYLENE GLYCOL 3350 17 GRAM(S): 17 POWDER, FOR SOLUTION ORAL at 17:59

## 2021-11-13 RX ADMIN — CHLORHEXIDINE GLUCONATE 1 APPLICATION(S): 213 SOLUTION TOPICAL at 18:02

## 2021-11-13 NOTE — PROGRESS NOTE ADULT - ASSESSMENT
pt w/ hx htn / hypothyroidism / dementia / recent trauma and d/c a couple of months ago / w/ altered mental status  /? med related /worsening dementia  mucoid plug   pulm eval noted  nebs  colitis / sterococal  gi eval noted  dvt / gi proph   pt  neuro checks  iv fluids prn  swallow eval noted  c/w puree diet  psych f/u noted    discussed w/ son   pt is full code   left message for outpt pmd

## 2021-11-13 NOTE — PROGRESS NOTE ADULT - ASSESSMENT
fecal impaction   stercoral colitis  AMS    CT a/p suggesting early stercoral colitis  3 BMs after smog  cont bowel regimen  monitor stool counts  neuro following   will follow      Advanced care planning was discussed with patient and family.  Advanced care planning forms were reviewed and discussed.  Risks, benefits and alternatives of gastroenterologic procedures were discussed in detail and all questions were answered.    30 minutes spent.  fecal impaction   stercoral colitis  AMS    CT a/p suggesting early stercoral colitis  3 BMs after smog  cont bowel regimen  monitor stool counts  diet per SLP  GOC noted  neuro following   will follow      Advanced care planning was discussed with patient and family.  Advanced care planning forms were reviewed and discussed.  Risks, benefits and alternatives of gastroenterologic procedures were discussed in detail and all questions were answered.    30 minutes spent.

## 2021-11-14 RX ADMIN — HEPARIN SODIUM 5000 UNIT(S): 5000 INJECTION INTRAVENOUS; SUBCUTANEOUS at 05:32

## 2021-11-14 RX ADMIN — ALBUTEROL 1.25 MILLIGRAM(S): 90 AEROSOL, METERED ORAL at 13:13

## 2021-11-14 RX ADMIN — POLYETHYLENE GLYCOL 3350 17 GRAM(S): 17 POWDER, FOR SOLUTION ORAL at 05:33

## 2021-11-14 RX ADMIN — HEPARIN SODIUM 5000 UNIT(S): 5000 INJECTION INTRAVENOUS; SUBCUTANEOUS at 18:06

## 2021-11-14 RX ADMIN — SENNA PLUS 2 TABLET(S): 8.6 TABLET ORAL at 22:56

## 2021-11-14 RX ADMIN — POLYETHYLENE GLYCOL 3350 17 GRAM(S): 17 POWDER, FOR SOLUTION ORAL at 18:06

## 2021-11-14 RX ADMIN — Medication 1 MILLIGRAM(S): at 13:13

## 2021-11-14 RX ADMIN — ALBUTEROL 1.25 MILLIGRAM(S): 90 AEROSOL, METERED ORAL at 05:33

## 2021-11-14 RX ADMIN — SODIUM CHLORIDE 50 MILLILITER(S): 9 INJECTION, SOLUTION INTRAVENOUS at 05:33

## 2021-11-14 RX ADMIN — QUETIAPINE FUMARATE 12.5 MILLIGRAM(S): 200 TABLET, FILM COATED ORAL at 22:55

## 2021-11-14 RX ADMIN — HALOPERIDOL DECANOATE 1 MILLIGRAM(S): 100 INJECTION INTRAMUSCULAR at 19:30

## 2021-11-14 RX ADMIN — ALBUTEROL 1.25 MILLIGRAM(S): 90 AEROSOL, METERED ORAL at 22:57

## 2021-11-14 NOTE — DIETITIAN INITIAL EVALUATION ADULT. - CHIEF COMPLAINT
The patient is a 89y F with PMH: hypertension, hypothyroid, dementia, recent trama. Presented with AMS and increased confusion. CT A/P suggesting early stercoral colitis. S/P SMOG enema.

## 2021-11-14 NOTE — PROGRESS NOTE ADULT - ASSESSMENT
pt w/ hx htn / hypothyroidism / dementia / recent trauma and d/c a couple of months ago / w/ altered mental status  /? med related /worsening dementia  mucoid plug   pulm eval noted  nebs  colitis / sterococal  gi eval noted  dvt / gi proph   pt  iv fluids prn  swallow eval noted  c/w puree diet  psych f/u noted    pt is full code   left message for outpt pmd   d/c planning in am

## 2021-11-14 NOTE — PROGRESS NOTE ADULT - ASSESSMENT
fecal impaction   stercoral colitis  AMS    CT a/p suggesting early stercoral colitis  3 BMs after smog  cont bowel regimen  monitor stool counts  diet per SLP  GOC noted  neuro following   will follow      Advanced care planning was discussed with patient and family.  Advanced care planning forms were reviewed and discussed.  Risks, benefits and alternatives of gastroenterologic procedures were discussed in detail and all questions were answered.    30 minutes spent.

## 2021-11-14 NOTE — DIETITIAN INITIAL EVALUATION ADULT. - OBTAIN CURRENT WEIGHT
Problem: Communication  Goal: The ability to communicate needs accurately and effectively will improve  Outcome: PROGRESSING AS EXPECTED  Patient has been alert and oriented x4, able to make needs known.  Patient has been sitting up at edge of bed all day today.  She has had some back pain today, given oxycodone prn x2, effective.  PICC line flushing well with brisk blood return.  Patient has had no complaints of nava today, draining with no issues.  No nausea/vomiting today.         yes

## 2021-11-14 NOTE — DIETITIAN INITIAL EVALUATION ADULT. - OTHER INFO
Per SLP note 11/12, "upon encounter private aide reports the Pt has not been eating or drinking much today as Pt has been sleeping". SLP recommended for pt to continue on a Pureed diet. About 25% of breakfast meal consumed at time of RD visit. Pt able to endorse preference for coffee. Otherwise, non-communicable re: dietary preferences.  Continues on D5+NaCl 0.9% for hydration. Continues on folic acid as ordered.     Dosing wt (11/11): 141.6 lbs.   UBW unclear. Will monitor.     Skin: no pressure injuries noted  Edema: 1+ left ankle; right ankle  GI: (11/14): x 1; (11/13): x 1. On bowel regimen (senna, Miralax). S/P enema 11/11.

## 2021-11-14 NOTE — DIETITIAN INITIAL EVALUATION ADULT. - ADD RECOMMEND
1. Continue PO diet as per discretion of medical team. Consider no therapeutic restrictions and defer consistency to medical team, SLP. 2. Staff to provide encouragement and assistance with meals. Honor dietary preferences as expressed as able. 3. Monitor wt trends/labs/skin integrity/hydration status. 4. Consider multivitamin if no medication contraindications noted. 5. RD to add Mighty Shakes TID with meals; Magic Cups 2x daily. Monitor acceptance/tolerance.

## 2021-11-14 NOTE — DIETITIAN INITIAL EVALUATION ADULT. - ORAL INTAKE PTA/DIET HISTORY
Pt with confusion/altered mental status; illogical responses to questions asked by RD despite redirection. Baseline tolerance to chewing/swallowing, baseline provision of energy/nutrient intake unclear. Noted with allergy to shellfish and strawberry. No indication of vitamin/supplement intake PTA.

## 2021-11-15 LAB — SARS-COV-2 RNA SPEC QL NAA+PROBE: SIGNIFICANT CHANGE UP

## 2021-11-15 RX ORDER — LOSARTAN POTASSIUM 100 MG/1
50 TABLET, FILM COATED ORAL DAILY
Refills: 0 | Status: DISCONTINUED | OUTPATIENT
Start: 2021-11-15 | End: 2021-11-17

## 2021-11-15 RX ADMIN — SENNA PLUS 2 TABLET(S): 8.6 TABLET ORAL at 22:47

## 2021-11-15 RX ADMIN — ALBUTEROL 1.25 MILLIGRAM(S): 90 AEROSOL, METERED ORAL at 15:28

## 2021-11-15 RX ADMIN — HALOPERIDOL DECANOATE 1 MILLIGRAM(S): 100 INJECTION INTRAMUSCULAR at 09:16

## 2021-11-15 RX ADMIN — ALBUTEROL 1.25 MILLIGRAM(S): 90 AEROSOL, METERED ORAL at 22:47

## 2021-11-15 RX ADMIN — Medication 1 MILLIGRAM(S): at 12:23

## 2021-11-15 RX ADMIN — ALBUTEROL 1.25 MILLIGRAM(S): 90 AEROSOL, METERED ORAL at 06:03

## 2021-11-15 RX ADMIN — HEPARIN SODIUM 5000 UNIT(S): 5000 INJECTION INTRAVENOUS; SUBCUTANEOUS at 06:02

## 2021-11-15 RX ADMIN — QUETIAPINE FUMARATE 12.5 MILLIGRAM(S): 200 TABLET, FILM COATED ORAL at 22:46

## 2021-11-15 RX ADMIN — SODIUM CHLORIDE 50 MILLILITER(S): 9 INJECTION, SOLUTION INTRAVENOUS at 09:16

## 2021-11-15 RX ADMIN — POLYETHYLENE GLYCOL 3350 17 GRAM(S): 17 POWDER, FOR SOLUTION ORAL at 06:02

## 2021-11-15 RX ADMIN — HEPARIN SODIUM 5000 UNIT(S): 5000 INJECTION INTRAVENOUS; SUBCUTANEOUS at 17:32

## 2021-11-15 RX ADMIN — POLYETHYLENE GLYCOL 3350 17 GRAM(S): 17 POWDER, FOR SOLUTION ORAL at 17:32

## 2021-11-15 RX ADMIN — CHLORHEXIDINE GLUCONATE 1 APPLICATION(S): 213 SOLUTION TOPICAL at 12:24

## 2021-11-15 RX ADMIN — LOSARTAN POTASSIUM 50 MILLIGRAM(S): 100 TABLET, FILM COATED ORAL at 15:28

## 2021-11-15 RX ADMIN — HALOPERIDOL DECANOATE 1 MILLIGRAM(S): 100 INJECTION INTRAMUSCULAR at 17:36

## 2021-11-15 NOTE — PROGRESS NOTE ADULT - ASSESSMENT
89 y/o F with PMH HTN, hypothyroid, dementia. Presents with AMS. Recently saw geriatric psych at [facility] - reportedly had leg edema and was given Bactrim and Remeron dosage was changed. Called to consult for CT chest findings with RLL atelectasis and possible mucoid impacted distal airways RLL. Currently breathing comfortably on RA.

## 2021-11-15 NOTE — PROGRESS NOTE ADULT - ASSESSMENT
fecal impaction   stercoral colitis  AMS    CT a/p suggesting early stercoral colitis  3 BMs after smog  cont bowel regimen  +daily BMs  monitor stool counts  diet per SLP  GOC noted  neuro following   dc planning as per primary   will follow      Advanced care planning was discussed with patient and family.  Advanced care planning forms were reviewed and discussed.  Risks, benefits and alternatives of gastroenterologic procedures were discussed in detail and all questions were answered.    30 minutes spent.

## 2021-11-15 NOTE — PROGRESS NOTE ADULT - ASSESSMENT
pt w/ hx htn / hypothyroidism / dementia / recent trauma and d/c a couple of months ago / w/ altered mental status  /? med related /worsening dementia  mucoid plug   pulm eval noted  nebs  colitis / sterococal  gi eval noted  dvt / gi proph   pt  iv fluids prn  swallow eval noted  c/w puree diet  psych f/u noted    pt is full code   spoke w/ son at length  d/c planning

## 2021-11-15 NOTE — PROGRESS NOTE ADULT - ASSESSMENT
This is a 89y year old Female with the below past medical history who presents with the chief complaint of AMS.  She had a fall out of bed 2-3 months ago, had trauma, was confused when went to the hospital then to [facility].  While she improved, she started to be combative agitated and placed on seroquel.  Sent home in Oct but starting having episodes where she would leave the house at night, knock on neighbors door, look for her dead .  Son got 24 hr help and went to dr franks with dr sebastian office, placed on remeron.  On that she was calmer, eating more.  Saw dr barraza, geriatric psych at [facility].  One week prior to admission, had leg edema, given bactrim.  There was also a change in remeron dosage.  As per son, was not sleeping at night, could not get up, falling down and came to ER.    exam notable for diminsihed state of mentation, but no gross focality  ct neg of head for acute pathology  in speaking to son, appears many changes of medications in last week, this could be a consequence of that  She has no fever, no elevated wbc, less likely infection, no meningeal sign    there is substantial improvement from prior exam on 11/11 am  more oriented than on 11/12  appeciate GI note  will follow

## 2021-11-16 LAB
ANION GAP SERPL CALC-SCNC: 11 MMOL/L — SIGNIFICANT CHANGE UP (ref 5–17)
BUN SERPL-MCNC: 11 MG/DL — SIGNIFICANT CHANGE UP (ref 7–23)
CALCIUM SERPL-MCNC: 9.5 MG/DL — SIGNIFICANT CHANGE UP (ref 8.4–10.5)
CHLORIDE SERPL-SCNC: 104 MMOL/L — SIGNIFICANT CHANGE UP (ref 96–108)
CO2 SERPL-SCNC: 22 MMOL/L — SIGNIFICANT CHANGE UP (ref 22–31)
CREAT SERPL-MCNC: 1.03 MG/DL — SIGNIFICANT CHANGE UP (ref 0.5–1.3)
GLUCOSE SERPL-MCNC: 103 MG/DL — HIGH (ref 70–99)
HCT VFR BLD CALC: 32.9 % — LOW (ref 34.5–45)
HGB BLD-MCNC: 10.9 G/DL — LOW (ref 11.5–15.5)
MCHC RBC-ENTMCNC: 31.1 PG — SIGNIFICANT CHANGE UP (ref 27–34)
MCHC RBC-ENTMCNC: 33.1 GM/DL — SIGNIFICANT CHANGE UP (ref 32–36)
MCV RBC AUTO: 94 FL — SIGNIFICANT CHANGE UP (ref 80–100)
NRBC # BLD: 0 /100 WBCS — SIGNIFICANT CHANGE UP (ref 0–0)
PLATELET # BLD AUTO: 259 K/UL — SIGNIFICANT CHANGE UP (ref 150–400)
POTASSIUM SERPL-MCNC: 3.6 MMOL/L — SIGNIFICANT CHANGE UP (ref 3.5–5.3)
POTASSIUM SERPL-SCNC: 3.6 MMOL/L — SIGNIFICANT CHANGE UP (ref 3.5–5.3)
RBC # BLD: 3.5 M/UL — LOW (ref 3.8–5.2)
RBC # FLD: 14.9 % — HIGH (ref 10.3–14.5)
SODIUM SERPL-SCNC: 137 MMOL/L — SIGNIFICANT CHANGE UP (ref 135–145)
WBC # BLD: 6.91 K/UL — SIGNIFICANT CHANGE UP (ref 3.8–10.5)
WBC # FLD AUTO: 6.91 K/UL — SIGNIFICANT CHANGE UP (ref 3.8–10.5)

## 2021-11-16 RX ORDER — HYDRALAZINE HCL 50 MG
10 TABLET ORAL ONCE
Refills: 0 | Status: COMPLETED | OUTPATIENT
Start: 2021-11-16 | End: 2021-11-16

## 2021-11-16 RX ADMIN — LOSARTAN POTASSIUM 50 MILLIGRAM(S): 100 TABLET, FILM COATED ORAL at 06:31

## 2021-11-16 RX ADMIN — HEPARIN SODIUM 5000 UNIT(S): 5000 INJECTION INTRAVENOUS; SUBCUTANEOUS at 06:31

## 2021-11-16 RX ADMIN — ALBUTEROL 1.25 MILLIGRAM(S): 90 AEROSOL, METERED ORAL at 13:18

## 2021-11-16 RX ADMIN — ALBUTEROL 1.25 MILLIGRAM(S): 90 AEROSOL, METERED ORAL at 06:31

## 2021-11-16 RX ADMIN — SODIUM CHLORIDE 50 MILLILITER(S): 9 INJECTION, SOLUTION INTRAVENOUS at 21:24

## 2021-11-16 RX ADMIN — HEPARIN SODIUM 5000 UNIT(S): 5000 INJECTION INTRAVENOUS; SUBCUTANEOUS at 17:41

## 2021-11-16 RX ADMIN — CHLORHEXIDINE GLUCONATE 1 APPLICATION(S): 213 SOLUTION TOPICAL at 13:18

## 2021-11-16 RX ADMIN — ALBUTEROL 1.25 MILLIGRAM(S): 90 AEROSOL, METERED ORAL at 21:21

## 2021-11-16 RX ADMIN — Medication 1 MILLIGRAM(S): at 13:18

## 2021-11-16 RX ADMIN — QUETIAPINE FUMARATE 12.5 MILLIGRAM(S): 200 TABLET, FILM COATED ORAL at 21:21

## 2021-11-16 RX ADMIN — SENNA PLUS 2 TABLET(S): 8.6 TABLET ORAL at 21:21

## 2021-11-16 RX ADMIN — POLYETHYLENE GLYCOL 3350 17 GRAM(S): 17 POWDER, FOR SOLUTION ORAL at 06:31

## 2021-11-16 RX ADMIN — POLYETHYLENE GLYCOL 3350 17 GRAM(S): 17 POWDER, FOR SOLUTION ORAL at 17:41

## 2021-11-16 RX ADMIN — Medication 10 MILLIGRAM(S): at 21:20

## 2021-11-16 NOTE — PROVIDER CONTACT NOTE (OTHER) - ASSESSMENT
Patient in bed confused with no visible s/s of distress. Noted with elevated B/P provider made aware, she said to repeat B/P manually. Manual B/P still elevated patient is asymptomatic, provider made aware she said she will review patient's chart and order will follow.

## 2021-11-16 NOTE — PROGRESS NOTE ADULT - ASSESSMENT
Impression:  This is a 89 year old woman with the below past medical history who presents with the chief complaint of AMS.  She had a fall out of bed 2-3 months ago, had trauma, was confused when went to the hospital then to [facility].  While she improved, she started to be combative agitated and placed on seroquel.  Sent home in Oct but starting having episodes where she would leave the house at night, knock on neighbors door, look for her dead .  Son got 24 hr help and went to dr franks with dr sebastian office, placed on remeron.  On that she was calmer, eating more.  Saw dr barraza, geriatric psych at [facility].  One week prior to admission, had leg edema, given bactrim.  There was also a change in remeron dosage.  As per son, was not sleeping at night, could not get up, falling down and came to ER.    Diagnosis:  exam notable for confusion and disorientation, but no gross focality  ct neg of head for acute pathology  appears many changes of medications in last week, this could be a consequence of that  She has no fever, no elevated wbc, less likely infection, no meningeal signs    Recommendations:  continue excellent medical management including of her early stercoral colitis  frequent re-orientation  blinds open  optimize sleep/wake cycle  minimize polypharmacy as able  OOB

## 2021-11-16 NOTE — PROGRESS NOTE ADULT - ASSESSMENT
89 y/o F with PMH HTN, hypothyroid, dementia. Presents with AMS. Recently saw geriatric psych at [facility] - reportedly had leg edema and was given Bactrim and Remeron dosage was changed. Called to consult for CT chest findings with RLL atelectasis and possible mucoid impacted distal airways RLL. Currently breathing comfortably on RA.

## 2021-11-17 ENCOUNTER — TRANSCRIPTION ENCOUNTER (OUTPATIENT)
Age: 87
End: 2021-11-17

## 2021-11-17 VITALS
HEART RATE: 108 BPM | OXYGEN SATURATION: 97 % | TEMPERATURE: 98 F | RESPIRATION RATE: 18 BRPM | SYSTOLIC BLOOD PRESSURE: 148 MMHG | DIASTOLIC BLOOD PRESSURE: 77 MMHG

## 2021-11-17 DIAGNOSIS — K52.89 OTHER SPECIFIED NONINFECTIVE GASTROENTERITIS AND COLITIS: ICD-10-CM

## 2021-11-17 DIAGNOSIS — E03.9 HYPOTHYROIDISM, UNSPECIFIED: ICD-10-CM

## 2021-11-17 DIAGNOSIS — R33.9 RETENTION OF URINE, UNSPECIFIED: ICD-10-CM

## 2021-11-17 DIAGNOSIS — F03.90 UNSPECIFIED DEMENTIA, UNSPECIFIED SEVERITY, WITHOUT BEHAVIORAL DISTURBANCE, PSYCHOTIC DISTURBANCE, MOOD DISTURBANCE, AND ANXIETY: ICD-10-CM

## 2021-11-17 RX ORDER — QUETIAPINE FUMARATE 200 MG/1
1 TABLET, FILM COATED ORAL
Qty: 0 | Refills: 0 | DISCHARGE
Start: 2021-11-17

## 2021-11-17 RX ORDER — POLYETHYLENE GLYCOL 3350 17 G/17G
17 POWDER, FOR SOLUTION ORAL
Qty: 0 | Refills: 0 | DISCHARGE
Start: 2021-11-17

## 2021-11-17 RX ORDER — SENNA PLUS 8.6 MG/1
2 TABLET ORAL
Qty: 0 | Refills: 0 | DISCHARGE
Start: 2021-11-17

## 2021-11-17 RX ORDER — FOLIC ACID 0.8 MG
1 TABLET ORAL
Qty: 0 | Refills: 0 | DISCHARGE
Start: 2021-11-17

## 2021-11-17 RX ORDER — ALBUTEROL 90 UG/1
1.25 AEROSOL, METERED ORAL
Qty: 0 | Refills: 0 | DISCHARGE
Start: 2021-11-17

## 2021-11-17 RX ORDER — LOSARTAN POTASSIUM 100 MG/1
1 TABLET, FILM COATED ORAL
Qty: 0 | Refills: 0 | DISCHARGE
Start: 2021-11-17

## 2021-11-17 RX ORDER — MIRTAZAPINE 45 MG/1
1 TABLET, ORALLY DISINTEGRATING ORAL
Qty: 0 | Refills: 0 | DISCHARGE
Start: 2021-11-17

## 2021-11-17 RX ORDER — QUETIAPINE FUMARATE 200 MG/1
12.5 TABLET, FILM COATED ORAL
Qty: 0 | Refills: 0 | DISCHARGE
Start: 2021-11-17

## 2021-11-17 RX ADMIN — ALBUTEROL 1.25 MILLIGRAM(S): 90 AEROSOL, METERED ORAL at 05:27

## 2021-11-17 RX ADMIN — Medication 1 MILLIGRAM(S): at 12:13

## 2021-11-17 RX ADMIN — HEPARIN SODIUM 5000 UNIT(S): 5000 INJECTION INTRAVENOUS; SUBCUTANEOUS at 05:27

## 2021-11-17 RX ADMIN — LOSARTAN POTASSIUM 50 MILLIGRAM(S): 100 TABLET, FILM COATED ORAL at 05:26

## 2021-11-17 RX ADMIN — POLYETHYLENE GLYCOL 3350 17 GRAM(S): 17 POWDER, FOR SOLUTION ORAL at 05:27

## 2021-11-17 RX ADMIN — CHLORHEXIDINE GLUCONATE 1 APPLICATION(S): 213 SOLUTION TOPICAL at 12:14

## 2021-11-17 NOTE — PROGRESS NOTE ADULT - PROBLEM SELECTOR PROBLEM 2
AMS (altered mental status)

## 2021-11-17 NOTE — PROGRESS NOTE ADULT - ASSESSMENT
Impression:  This is a 89 year old woman with the below past medical history who presents with the chief complaint of AMS.  She had a fall out of bed 2-3 months ago, had trauma, was confused when went to the hospital then to [facility].  While she improved, she started to be combative agitated and placed on seroquel.  Sent home in Oct but starting having episodes where she would leave the house at night, knock on neighbors door, look for her dead .  Son got 24 hr help and went to dr franks with dr sebastian office, placed on remeron.  On that she was calmer, eating more.  Saw dr barraza, geriatric psych at [facility].  One week prior to admission, had leg edema, given bactrim.  There was also a change in remeron dosage.  As per son, was not sleeping at night, could not get up, falling down and came to ER.    Diagnosis:  exam notable for confusion and disorientation, but no gross focality  ct neg of head for acute pathology  appears many changes of medications in last week, this could be a consequence of that  She has no fever, no elevated wbc, less likely infection, no meningeal signs    Recommendations:  pt exhibiting delirium, some fluctation of mentation, this is common in demented patients who are hospitalized  frequent re-orientation  blinds open  optimize sleep/wake cycle  minimize polypharmacy as able  OOB  would advocate for as quick a discharge as possible

## 2021-11-17 NOTE — DISCHARGE NOTE NURSING/CASE MANAGEMENT/SOCIAL WORK - PATIENT PORTAL LINK FT
You can access the FollowMyHealth Patient Portal offered by [facility] by registering at the following website: [email or web address]. By joining [employer]’s FollowMyHealth portal, you will also be able to view your health information using other applications (apps) compatible with our system.

## 2021-11-17 NOTE — DISCHARGE NOTE PROVIDER - HOSPITAL COURSE
89y year old Female with the below past medical history who presents with the chief complaint of AMS.  She had a fall out of bed 2-3 months ago, had trauma, was confused when went to the hospital then to [facility].  While she improved, she started to be combative agitated and placed on seroquel.  Sent home in Oct but starting having episodes where she would leave the house at night, knock on neighbors door, look for her dead .  Son got 24 hr help and went to dr franks with dr sebastian office, placed on remeron.  On that she was calmer, eating more.  Saw dr barraza, geriatric psych at [facility].  One week prior to admission, had leg edema, given bactrim.  There was also a change in remeron dosage.  As per son, was not sleeping at night, could not get up, falling down and came to ER.  She was seen by neurol, pulm, GI, S/S and  PT who recomm GEO. She is hemodynamically stable to be discharged to rehab today, spoke to Attending.

## 2021-11-17 NOTE — DISCHARGE NOTE PROVIDER - PROVIDER TOKENS
PROVIDER:[TOKEN:[[id number]]],PROVIDER:[TOKEN:[[id number]]],PROVIDER:[TOKEN:[[id number]]]

## 2021-11-17 NOTE — PROGRESS NOTE ADULT - PROBLEM SELECTOR PLAN 1
CTA with RLL atelectasis and possible mucoid impacted RLL distal airways  -No clear PNA  -Normoxic   -No congested cough or coughing while eating per staff  -Unable to use incentive spirometry at this time   -C/w nebs q8h for now
CTA with RLL atelectasis and possible mucoid impacted RLL distal airways  -No clear PNA  -Normoxic   -No congested cough or coughing while eating per staff  -Unable to use incentive spirometry at this time   -C/w nebs q8h for now, can d/c nebs on discharge  -D/c planning to [facility] per primary team
CTA with RLL atelectasis and possible mucoid impacted RLL distal airways  -No clear PNA  -Normoxic   -No congested cough or coughing while eating per staff  -Unable to use incentive spirometry at this time   -C/w nebs q8h for now, can d/c nebs on discharge  -D/c planning per primary team
CTA with RLL atelectasis and possible mucoid impacted RLL distal airways  -No clear PNA  -Normoxic   -No congested cough or coughing while eating per staff  -Unable to use incentive spirometry at this time   -C/w nebs q8h for now, can d/c nebs on discharge  -D/c planning per primary team

## 2021-11-17 NOTE — PROGRESS NOTE ADULT - PROVIDER SPECIALTY LIST ADULT
Gastroenterology
Gastroenterology
Internal Medicine
Internal Medicine
Neurology
Gastroenterology
Internal Medicine
Neurology
Gastroenterology
Internal Medicine
Pulmonology

## 2021-11-17 NOTE — PROGRESS NOTE ADULT - ASSESSMENT
89 y/o F with PMH HTN, hypothyroid, dementia. Presents with AMS. Recently saw geriatric psych at [facility] - reportedly had leg edema and was given Bactrim and Remeron dosage was changed. Called to consult for CT chest findings with RLL atelectasis and possible mucoid impacted distal airways RLL. Currently breathing comfortably on RA.

## 2021-11-17 NOTE — PROGRESS NOTE ADULT - PROBLEM SELECTOR PLAN 2
-Possibly 2nd to recent medication adjustment  -Normal WBC, afebrile   -UA normal, no clear PNA on CT chest - although is aspiration risk given mental status.   -No CO2 retention on VBG.  -Seems to be more alert today
-Possibly 2nd to recent medication adjustment  -Normal WBC, afebrile   -UA normal, no clear PNA on CT chest - although is aspiration risk given mental status.   -No CO2 retention on VBG.  -Pt more alert now

## 2021-11-17 NOTE — DISCHARGE NOTE PROVIDER - NSDCCPCAREPLAN_GEN_ALL_CORE_FT
PRINCIPAL DISCHARGE DIAGNOSIS  Diagnosis: Altered mental status  Assessment and Plan of Treatment: resolved, follow up with Neurologist      SECONDARY DISCHARGE DIAGNOSES  Diagnosis: Urinary retention  Assessment and Plan of Treatment: resolved, Crooks cath removed    Diagnosis: Dementia  Assessment and Plan of Treatment: stable, cont seroquel as ordered    Diagnosis: Stercoral colitis  Assessment and Plan of Treatment: resolved, treated with ABs     PRINCIPAL DISCHARGE DIAGNOSIS  Diagnosis: Altered mental status  Assessment and Plan of Treatment: resolved, follow up with Neurologist      SECONDARY DISCHARGE DIAGNOSES  Diagnosis: Hypothyroidism  Assessment and Plan of Treatment: stable, cont current home med    Diagnosis: Urinary retention  Assessment and Plan of Treatment: resolved, Crooks cath removed    Diagnosis: Dementia  Assessment and Plan of Treatment: stable, cont seroquel as ordered    Diagnosis: Stercoral colitis  Assessment and Plan of Treatment: resolved, treated with ABs

## 2021-11-17 NOTE — PROGRESS NOTE ADULT - SUBJECTIVE AND OBJECTIVE BOX
Admitting Diagnosis:  Altered mental status [R41.82]  ALTERED MENTAL STATUS, UNSPECIFIED        HPI:  This is a 89y year old Female with the below past medical history who presents with the chief complaint of AMS.  She had a fall out of bed 2-3 months ago, had trauma, was confused when went to the hospital then to [facility].  While she improved, she started to be combative agitated and placed on seroquel.  Sent home in Oct but starting having episodes where she would leave the house at night, knock on neighbors door, look for her dead .  Son got 24 hr help and went to dr franks with dr sebastian office, placed on remeron.  On that she was calmer, eating more.  Saw dr barraza, geriatric psych at [facility].  One week prior to admission, had leg edema, given bactrim.  There was also a change in remeron dosage.  As per son, was not sleeping at night, could not get up, falling down and came to ER.    more awake today          Past Medical History:      Past Surgical History:      Social History:  No toxic habits    Family History:  FAMILY HISTORY:      Allergies:  penicillin (Unknown)      ROS:  Constitutional: Patient offers no complaints of fevers or significant weight loss  Ears, Nose, Mouth and Throat: The patient presents with no abnormalities of the head, ears, eyes, nose or throat  Skin: Patient offers no concerns of new rashes or lesions  Respiratory: The patient presents with no abnormalities of the respiratory tract  Cardiovascular: The patient presents with no cardiac abnormalities  Gastrointestinal: The patient presents with no abnormalities of the GI system  Genitourinary: The patient presents with no dysuria, hematuria or frequent urination  Neurological: See HPI  Endocrine: Patient offers no complaints of excessive thirst, urination, or heat/cold intolerance    Advanced care planning reviewed and noted in the chart.    Medications:  ALBUTerol   0.042% 1.25 milliGRAM(s) Nebulizer every 8 hours  dextrose 5% + sodium chloride 0.9%. 1000 milliLiter(s) IV Continuous <Continuous>  heparin   Injectable 5000 Unit(s) SubCutaneous every 12 hours  senna 2 Tablet(s) Oral at bedtime      Labs:  CBC Full  -  ( 2021 06:57 )  WBC Count : 6.37 K/uL  RBC Count : 3.20 M/uL  Hemoglobin : 10.1 g/dL  Hematocrit : 30.9 %  Platelet Count - Automated : 206 K/uL  Mean Cell Volume : 96.6 fl  Mean Cell Hemoglobin : 31.6 pg  Mean Cell Hemoglobin Concentration : 32.7 gm/dL  Auto Neutrophil # : x  Auto Lymphocyte # : x  Auto Monocyte # : x  Auto Eosinophil # : x  Auto Basophil # : x  Auto Neutrophil % : x  Auto Lymphocyte % : x  Auto Monocyte % : x  Auto Eosinophil % : x  Auto Basophil % : x        136  |  105  |  14  ----------------------------<  99  3.9   |  22  |  1.18    Ca    9.0      2021 06:57  Mg     2.0     11-10    TPro  6.9  /  Alb  3.6  /  TBili  0.6  /  DBili  x   /  AST  25  /  ALT  24  /  AlkPhos  80  11-10    CAPILLARY BLOOD GLUCOSE        LIVER FUNCTIONS - ( 10 Nov 2021 23:07 )  Alb: 3.6 g/dL / Pro: 6.9 g/dL / ALK PHOS: 80 U/L / ALT: 24 U/L / AST: 25 U/L / GGT: x           PT/INR - ( 10 Nov 2021 18:24 )   PT: 12.4 sec;   INR: 1.04 ratio         PTT - ( 10 Nov 2021 18:24 )  PTT:30.1 sec  Urinalysis Basic - ( 10 Nov 2021 21:09 )    Color: Light Yellow / Appearance: Clear / S.009 / pH: x  Gluc: x / Ketone: Negative  / Bili: Negative / Urobili: Negative   Blood: x / Protein: Negative / Nitrite: Negative   Leuk Esterase: Negative / RBC: x / WBC x   Sq Epi: x / Non Sq Epi: x / Bacteria: x      Vitamin B12: 241 pg/mL [232 - 1245] 21 @ 14:45  Folate: 3.2 ng/mL<L> 21 @ 14:45  Thyroid Function: -- 21 @ 14:45  Ammonia: -- 21 @ 14:45  Dilantin: -- 21 @ 14:45  Vitamin B12: -- 11-10-21 @ 23:04  Folate: -- 11-10-21 @ 23:04  Thyroid Function: -- 11-10-21 @ 23:04  Ammonia: 12 umol/L [11 - 55] 11-10-21 @ 23:04  Dilantin: -- 11-10-21 @ 23:04      Vitals:  Vital Signs Last 24 Hrs  T(C): 36.4 (2021 06:29), Max: 37.1 (2021 22:20)  T(F): 97.6 (2021 06:29), Max: 98.8 (2021 22:20)  HR: 88 (2021 06:29) (88 - 105)  BP: 159/77 (2021 06:29) (103/65 - 170/76)  BP(mean): --  RR: 18 (2021 06:29) (18 - 18)  SpO2: 96% (2021 06:29) (94% - 97%)    NEUROLOGICAL EXAM:    Mental status: opens eyes looks when called, says her name, answers other questions by saying I do not know, can follow simple commands such as stick out tongue    Cranial Nerves: Pupils were equal, round, reactive to light. Extraocular movements were intact. Visual field to confrontation.  Fundoscopic exam was deferred. Facial sensation was intact to light touch. There was no facial asymmetry. cannot asssess palate, tongue, hearing    Motor exam: Bulk and tone were normal. moves all ext to stimuli    Reflexes: 2+ in the bilateral upper extremities. 1 in the bilateral lower extremities. Toes were downgoing bilaterally.     Sensation: moves to stimuli    Coordination: no gross dysmetria    Gait: cannot assess    < from: CT Head No Cont (11.10.21 @ 18:51) >    EXAM:  CT BRAIN                            PROCEDURE DATE:  11/10/2021            INTERPRETATION:  CLINICAL INFORMATION: Altered mental status. Evaluate for CVA.    TECHNIQUE: Noncontrast axial CT images were acquired through the head. Two-dimensional sagittal and coronal reformats were obtained    COMPARISON: None    FINDINGS:    Status post right suboccipital craniectomy. There is an underlying right posterior fossa resection cavity.  There has been prior right parietal cuca hole. There is underlying right parietal encephalomalacia/gliosis.      No acute intracranial hemorrhage, abnormal extra axial fluid collection, distal first, mass effect or midline shift.    Ventricles and sulci are normal in size for the patient's age. Basal cisterns are patent.    Minimal mucosal thickening of the bilateral ethmoid and maxillary sinuses. Right frontal sinus appears underpneumatized. Remaining paranasal sinuses and mastoid air cells appear clear.  Status post left lens replacement surgery.    IMPRESSION:  No acute intracranial hemorrhage or acute territorial infarction. Chronic findings as above.    --- End of Report ---              ATIYA GRIFFIN MD; Resident Radiology  This document has been electronically signed.  JOHNNY SALTER MD; Attending Radiologist  This document has been electronically signed. Nov 10 2021  7:38PM    < end of copied text >    
[facility]  Jourdan Tatum PA-C  [facility] [place] [address]  [phone]      INTERVAL HPI/OVERNIGHT EVENTS:  pt seen and examined, no new events  +BMs daily     MEDICATIONS  (STANDING):  ALBUTerol   0.042% 1.25 milliGRAM(s) Nebulizer every 8 hours  dextrose 5% + sodium chloride 0.9%. 1000 milliLiter(s) (50 mL/Hr) IV Continuous <Continuous>  folic acid 1 milliGRAM(s) Oral daily  heparin   Injectable 5000 Unit(s) SubCutaneous every 12 hours  senna 2 Tablet(s) Oral at bedtime    MEDICATIONS  (PRN):      Allergies    penicillin (Unknown)    Intolerances        ROS:   General:  No wt loss, fevers, chills, night sweats, fatigue,   Eyes:  Good vision, no reported pain  ENT:  No sore throat, pain, runny nose, dysphagia  CV:  No pain, palpitations, hypo/hypertension  Resp:  No dyspnea, cough, tachypnea, wheezing  GI:  No pain, No nausea, No vomiting, No diarrhea, No constipation, No weight loss, No fever, No pruritis, No rectal bleeding, No tarry stools, No dysphagia,  :  No pain, bleeding, incontinence, nocturia  Muscle:  No pain, weakness  Neuro:  No weakness, tingling, memory problems  Psych:  No fatigue, insomnia, mood problems, depression  Endocrine:  No polyuria, polydipsia, cold/heat intolerance  Heme:  No petechiae, ecchymosis, easy bruisability  Skin:  No rash, tattoos, scars, edema      PHYSICAL EXAM:   Vital Signs:  Vital Signs Last 24 Hrs  T(C): 36.4 (2021 06:29), Max: 37.1 (2021 22:20)  T(F): 97.6 (2021 06:29), Max: 98.8 (2021 22:20)  HR: 88 (2021 06:29) (88 - 105)  BP: 159/77 (2021 06:29) (103/65 - 170/76)  BP(mean): --  RR: 18 (2021 06:29) (18 - 18)  SpO2: 96% (2021 06:29) (94% - 97%)  Daily     Daily     GENERAL:  Appears stated age,   HEENT:  NC/AT,    CHEST:  Full & symmetric excursion,   HEART:  Regular rhythm,  ABDOMEN:  Soft, non-tender, non-distended,  EXTEREMITIES:  no cyanosis  SKIN:  No rash  NEURO:  Alert,       LABS:                        10.1   6.37  )-----------( 206      ( 2021 06:57 )             30.9     11-12    136  |  105  |  14  ----------------------------<  99  3.9   |  22  |  1.18    Ca    9.0      2021 06:57  Mg     2.0     11-10    TPro  6.9  /  Alb  3.6  /  TBili  0.6  /  DBili  x   /  AST  25  /  ALT  24  /  AlkPhos  80  11-10    PT/INR - ( 10 Nov 2021 18:24 )   PT: 12.4 sec;   INR: 1.04 ratio         PTT - ( 10 Nov 2021 18:24 )  PTT:30.1 sec  Urinalysis Basic - ( 10 Nov 2021 21:09 )    Color: Light Yellow / Appearance: Clear / S.009 / pH: x  Gluc: x / Ketone: Negative  / Bili: Negative / Urobili: Negative   Blood: x / Protein: Negative / Nitrite: Negative   Leuk Esterase: Negative / RBC: x / WBC x   Sq Epi: x / Non Sq Epi: x / Bacteria: x        RADIOLOGY & ADDITIONAL TESTS:  
DATE OF SERVICE: 11-13-21 @ 09:20  CHIEF COMPLAINT:Patient is a 89y old  Female who presents with a chief complaint of AMS (12 Nov 2021 08:54)    	        PAST MEDICAL & SURGICAL HISTORY:  HTN (hypertension)    Dementia    Other dementia            sleepy  arousable  no cp or sob  no chills  no vomiting    Medications:  MEDICATIONS  (STANDING):  ALBUTerol   0.042% 1.25 milliGRAM(s) Nebulizer every 8 hours  dextrose 5% + sodium chloride 0.9%. 1000 milliLiter(s) (50 mL/Hr) IV Continuous <Continuous>  folic acid 1 milliGRAM(s) Oral daily  heparin   Injectable 5000 Unit(s) SubCutaneous every 12 hours  polyethylene glycol 3350 17 Gram(s) Oral two times a day  QUEtiapine 12.5 milliGRAM(s) Oral at bedtime  senna 2 Tablet(s) Oral at bedtime    MEDICATIONS  (PRN):    	    PHYSICAL EXAM:  T(C): 36.5 (11-13-21 @ 05:26), Max: 36.9 (11-12-21 @ 23:48)  HR: 81 (11-13-21 @ 05:26) (81 - 93)  BP: 156/79 (11-13-21 @ 05:26) (118/65 - 162/87)  RR: 18 (11-13-21 @ 05:26) (18 - 18)  SpO2: 97% (11-13-21 @ 05:26) (95% - 97%)  Wt(kg): --  I&O's Summary    12 Nov 2021 07:01  -  13 Nov 2021 07:00  --------------------------------------------------------  IN: 1270 mL / OUT: 1300 mL / NET: -30 mL        Appearance: Normal	  HEENT:   Normal oral mucosa, PERRL, EOMI	  Lymphatic: No lymphadenopathy  Cardiovascular: Normal S1 S2, No JVD, No murmurs, No edema  Respiratory: dec bs  Gastrointestinal:  Soft, Non-tender, + BS	  	  dec rom      TELEMETRY: 	    ECG:  	  RADIOLOGY:  OTHER: 	  	  LABS:	 	    CARDIAC MARKERS:                                10.1   6.37  )-----------( 206      ( 12 Nov 2021 06:57 )             30.9     11-12    136  |  105  |  14  ----------------------------<  99  3.9   |  22  |  1.18    Ca    9.0      12 Nov 2021 06:57      proBNP:   Lipid Profile:   HgA1c:   TSH:     	        
DATE OF SERVICE: 11-14-21 @ 13:20  CHIEF COMPLAINT:Patient is a 89y old  Female who presents with a chief complaint of AMS (14 Nov 2021 10:50)    	        PAST MEDICAL & SURGICAL HISTORY:  HTN (hypertension)    Dementia    Other dementia            awake  alert  calm  some confusion  no cp or sob  no abd pain   no fever or chills    Medications:  MEDICATIONS  (STANDING):  ALBUTerol   0.042% 1.25 milliGRAM(s) Nebulizer every 8 hours  chlorhexidine 2% Cloths 1 Application(s) Topical daily  dextrose 5% + sodium chloride 0.9%. 1000 milliLiter(s) (50 mL/Hr) IV Continuous <Continuous>  folic acid 1 milliGRAM(s) Oral daily  heparin   Injectable 5000 Unit(s) SubCutaneous every 12 hours  polyethylene glycol 3350 17 Gram(s) Oral two times a day  QUEtiapine 12.5 milliGRAM(s) Oral at bedtime  senna 2 Tablet(s) Oral at bedtime    MEDICATIONS  (PRN):  haloperidol    Injectable 1 milliGRAM(s) IV Push every 6 hours PRN Agitation    	    PHYSICAL EXAM:  T(C): 36.4 (11-14-21 @ 10:24), Max: 37.3 (11-13-21 @ 16:12)  HR: 94 (11-14-21 @ 10:24) (94 - 97)  BP: 171/84 (11-14-21 @ 10:24) (141/88 - 171/84)  RR: 18 (11-14-21 @ 10:24) (18 - 18)  SpO2: 97% (11-14-21 @ 10:24) (93% - 98%)  Wt(kg): --  I&O's Summary    13 Nov 2021 07:01  -  14 Nov 2021 07:00  --------------------------------------------------------  IN: 1550 mL / OUT: 1450 mL / NET: 100 mL        Appearance: Normal	  HEENT:   Normal oral mucosa, PERRL, EOMI	    Cardiovascular: Normal S1 S2, No JVD, No murmurs, No edema  Respiratory: Lungs clear to auscultation	    Gastrointestinal:  Soft, Non-tender, + BS	    Neurologic: Non-focal  Extremities: dec rom     TELEMETRY: 	    ECG:  	  RADIOLOGY:  OTHER: 	  	  LABS:	 	    CARDIAC MARKERS:                  proBNP:   Lipid Profile:   HgA1c:   TSH:     	        
DATE OF SERVICE: 11-17-21 @ 12:22  CHIEF COMPLAINT:Patient is a 89y old  Female who presents with a chief complaint of AMS (17 Nov 2021 08:29)    	        PAST MEDICAL & SURGICAL HISTORY:  HTN (hypertension)    Dementia    Other dementia            REVIEW OF SYSTEMS:  alert   confused at times  RESPIRATORY: No cough, wheezing, chills or hemoptysis; No Shortness of Breath  CARDIOVASCULAR: No chest pain, palpitations, passing out, dizziness,  GASTROINTESTINAL: No abdominal or epigastric pain. No nausea, vomiting, or hematemesis;  GENITOURINARY: No dysuria, frequency, hematuria, or incontinence  NEUROLOGICAL: No headaches,    Medications:  MEDICATIONS  (STANDING):  ALBUTerol   0.042% 1.25 milliGRAM(s) Nebulizer every 8 hours  chlorhexidine 2% Cloths 1 Application(s) Topical daily  folic acid 1 milliGRAM(s) Oral daily  heparin   Injectable 5000 Unit(s) SubCutaneous every 12 hours  losartan 50 milliGRAM(s) Oral daily  polyethylene glycol 3350 17 Gram(s) Oral two times a day  QUEtiapine 12.5 milliGRAM(s) Oral at bedtime  senna 2 Tablet(s) Oral at bedtime    MEDICATIONS  (PRN):  haloperidol    Injectable 1 milliGRAM(s) IV Push every 6 hours PRN Agitation    	    PHYSICAL EXAM:  T(C): 37 (11-17-21 @ 05:16), Max: 37 (11-17-21 @ 05:16)  HR: 104 (11-17-21 @ 05:16) (99 - 118)  BP: 131/74 (11-17-21 @ 05:16) (131/74 - 186/83)  RR: 18 (11-17-21 @ 05:16) (18 - 18)  SpO2: 96% (11-17-21 @ 05:16) (96% - 96%)  Wt(kg): --  I&O's Summary    16 Nov 2021 07:01  -  17 Nov 2021 07:00  --------------------------------------------------------  IN: 0 mL / OUT: 400 mL / NET: -400 mL        Appearance: Normal	  HEENT:   Normal oral mucosa, PERRL, EOMI	  Lymphatic: No lymphadenopathy  Cardiovascular: Normal S1 S2, No JVD, No murmurs, No edema  Respiratory: Lungs clear to auscultation	    Gastrointestinal:  Soft, Non-tender, + BS	  Skin: No rashes, No ecchymoses, No cyanosis	  Neurologic: Non-focal  Extremities: dec rom  TELEMETRY: 	    ECG:  	  RADIOLOGY:  OTHER: 	  	  LABS:	 	    CARDIAC MARKERS:                                10.9   6.91  )-----------( 259      ( 16 Nov 2021 16:06 )             32.9     11-16    137  |  104  |  11  ----------------------------<  103<H>  3.6   |  22  |  1.03    Ca    9.5      16 Nov 2021 16:06      proBNP:   Lipid Profile:   HgA1c:   TSH:     	        
[facility]  Jourdan Tatum PA-C  [facility], [facility] [place] [address]  [phone]      INTERVAL HPI/OVERNIGHT EVENTS:  pt seen and examined, no new events, more awake today   +BMs daily     MEDICATIONS  (STANDING):  ALBUTerol   0.042% 1.25 milliGRAM(s) Nebulizer every 8 hours  dextrose 5% + sodium chloride 0.9%. 1000 milliLiter(s) (50 mL/Hr) IV Continuous <Continuous>  folic acid 1 milliGRAM(s) Oral daily  heparin   Injectable 5000 Unit(s) SubCutaneous every 12 hours  senna 2 Tablet(s) Oral at bedtime    MEDICATIONS  (PRN):      Allergies    penicillin (Unknown)    Intolerances        ROS:   General:  No wt loss, fevers, chills, night sweats, fatigue,   Eyes:  Good vision, no reported pain  ENT:  No sore throat, pain, runny nose, dysphagia  CV:  No pain, palpitations, hypo/hypertension  Resp:  No dyspnea, cough, tachypnea, wheezing  GI:  No pain, No nausea, No vomiting, No diarrhea, No constipation, No weight loss, No fever, No pruritis, No rectal bleeding, No tarry stools, No dysphagia,  :  No pain, bleeding, incontinence, nocturia  Muscle:  No pain, weakness  Neuro:  No weakness, tingling, memory problems  Psych:  No fatigue, insomnia, mood problems, depression  Endocrine:  No polyuria, polydipsia, cold/heat intolerance  Heme:  No petechiae, ecchymosis, easy bruisability  Skin:  No rash, tattoos, scars, edema      PHYSICAL EXAM:   Vital Signs:  Vital Signs Last 24 Hrs  T(C): 36.4 (2021 06:29), Max: 37.1 (2021 22:20)  T(F): 97.6 (2021 06:29), Max: 98.8 (2021 22:20)  HR: 88 (2021 06:29) (88 - 105)  BP: 159/77 (2021 06:29) (103/65 - 170/76)  BP(mean): --  RR: 18 (2021 06:29) (18 - 18)  SpO2: 96% (2021 06:29) (94% - 97%)  Daily     Daily     GENERAL:  Appears stated age,   HEENT:  NC/AT,    CHEST:  Full & symmetric excursion,   HEART:  Regular rhythm,  ABDOMEN:  Soft, non-tender, non-distended,  EXTEREMITIES:  no cyanosis  SKIN:  No rash  NEURO:  Alert,       LABS:                        10.1   6.37  )-----------( 206      ( 2021 06:57 )             30.9     11-12    136  |  105  |  14  ----------------------------<  99  3.9   |  22  |  1.18    Ca    9.0      2021 06:57  Mg     2.0     11-10    TPro  6.9  /  Alb  3.6  /  TBili  0.6  /  DBili  x   /  AST  25  /  ALT  24  /  AlkPhos  80  11-10    PT/INR - ( 10 Nov 2021 18:24 )   PT: 12.4 sec;   INR: 1.04 ratio         PTT - ( 10 Nov 2021 18:24 )  PTT:30.1 sec  Urinalysis Basic - ( 10 Nov 2021 21:09 )    Color: Light Yellow / Appearance: Clear / S.009 / pH: x  Gluc: x / Ketone: Negative  / Bili: Negative / Urobili: Negative   Blood: x / Protein: Negative / Nitrite: Negative   Leuk Esterase: Negative / RBC: x / WBC x   Sq Epi: x / Non Sq Epi: x / Bacteria: x        RADIOLOGY & ADDITIONAL TESTS:  
[facility]  Jourdan Tatum PA-C  [facility] [place] [address]  [phone]      INTERVAL HPI/OVERNIGHT EVENTS:  pt seen and examined, no new events, continue with +BMs daily     MEDICATIONS  (STANDING):  ALBUTerol   0.042% 1.25 milliGRAM(s) Nebulizer every 8 hours  dextrose 5% + sodium chloride 0.9%. 1000 milliLiter(s) (50 mL/Hr) IV Continuous <Continuous>  folic acid 1 milliGRAM(s) Oral daily  heparin   Injectable 5000 Unit(s) SubCutaneous every 12 hours  senna 2 Tablet(s) Oral at bedtime    MEDICATIONS  (PRN):      Allergies    penicillin (Unknown)    Intolerances        ROS:   General:  No wt loss, fevers, chills, night sweats, fatigue,   Eyes:  Good vision, no reported pain  ENT:  No sore throat, pain, runny nose, dysphagia  CV:  No pain, palpitations, hypo/hypertension  Resp:  No dyspnea, cough, tachypnea, wheezing  GI:  No pain, No nausea, No vomiting, No diarrhea, No constipation, No weight loss, No fever, No pruritis, No rectal bleeding, No tarry stools, No dysphagia,  :  No pain, bleeding, incontinence, nocturia  Muscle:  No pain, weakness  Neuro:  No weakness, tingling, memory problems  Psych:  No fatigue, insomnia, mood problems, depression  Endocrine:  No polyuria, polydipsia, cold/heat intolerance  Heme:  No petechiae, ecchymosis, easy bruisability  Skin:  No rash, tattoos, scars, edema      PHYSICAL EXAM:   Vital Signs:  Vital Signs Last 24 Hrs  T(C): 36.4 (2021 06:29), Max: 37.1 (2021 22:20)  T(F): 97.6 (2021 06:29), Max: 98.8 (2021 22:20)  HR: 88 (2021 06:29) (88 - 105)  BP: 159/77 (2021 06:29) (103/65 - 170/76)  BP(mean): --  RR: 18 (2021 06:29) (18 - 18)  SpO2: 96% (2021 06:29) (94% - 97%)  Daily     Daily     GENERAL:  Appears stated age,   HEENT:  NC/AT,    CHEST:  Full & symmetric excursion,   HEART:  Regular rhythm,  ABDOMEN:  Soft, non-tender, non-distended,  EXTEREMITIES:  no cyanosis  SKIN:  No rash  NEURO:  Alert,       LABS:                        10.1   6.37  )-----------( 206      ( 2021 06:57 )             30.9     11-12    136  |  105  |  14  ----------------------------<  99  3.9   |  22  |  1.18    Ca    9.0      2021 06:57  Mg     2.0     11-10    TPro  6.9  /  Alb  3.6  /  TBili  0.6  /  DBili  x   /  AST  25  /  ALT  24  /  AlkPhos  80  11-10    PT/INR - ( 10 Nov 2021 18:24 )   PT: 12.4 sec;   INR: 1.04 ratio         PTT - ( 10 Nov 2021 18:24 )  PTT:30.1 sec  Urinalysis Basic - ( 10 Nov 2021 21:09 )    Color: Light Yellow / Appearance: Clear / S.009 / pH: x  Gluc: x / Ketone: Negative  / Bili: Negative / Urobili: Negative   Blood: x / Protein: Negative / Nitrite: Negative   Leuk Esterase: Negative / RBC: x / WBC x   Sq Epi: x / Non Sq Epi: x / Bacteria: x        RADIOLOGY & ADDITIONAL TESTS:  
Admitting Diagnosis:  Altered mental status [R41.82]  ALTERED MENTAL STATUS, UNSPECIFIED        HPI:  This is a 89y year old Female with the below past medical history who presents with the chief complaint of AMS.  She had a fall out of bed 2-3 months ago, had trauma, was confused when went to the hospital then to [facility].  While she improved, she started to be combative agitated and placed on seroquel.  Sent home in Oct but starting having episodes where she would leave the house at night, knock on neighbors door, look for her dead .  Son got 24 hr help and went to dr franks with dr sebastian office, placed on remeron.  On that she was calmer, eating more.  Saw dr barraza, geriatric psych at [facility].  One week prior to admission, had leg edema, given bactrim.  There was also a change in remeron dosage.  As per son, was not sleeping at night, could not get up, falling down and came to ER.    more awake today  somewhat upset but able to calm down          Past Medical History:      Past Surgical History:      Social History:  No toxic habits    Family History:  FAMILY HISTORY:      Allergies:  penicillin (Unknown)      ROS:  Constitutional: Patient offers no complaints of fevers or significant weight loss  Ears, Nose, Mouth and Throat: The patient presents with no abnormalities of the head, ears, eyes, nose or throat  Skin: Patient offers no concerns of new rashes or lesions  Respiratory: The patient presents with no abnormalities of the respiratory tract  Cardiovascular: The patient presents with no cardiac abnormalities  Gastrointestinal: The patient presents with no abnormalities of the GI system  Genitourinary: The patient presents with no dysuria, hematuria or frequent urination  Neurological: See HPI  Endocrine: Patient offers no complaints of excessive thirst, urination, or heat/cold intolerance    Advanced care planning reviewed and noted in the chart.    Medications:  ALBUTerol   0.042% 1.25 milliGRAM(s) Nebulizer every 8 hours  chlorhexidine 2% Cloths 1 Application(s) Topical daily  dextrose 5% + sodium chloride 0.9%. 1000 milliLiter(s) IV Continuous <Continuous>  folic acid 1 milliGRAM(s) Oral daily  haloperidol    Injectable 1 milliGRAM(s) IV Push every 6 hours PRN  heparin   Injectable 5000 Unit(s) SubCutaneous every 12 hours  polyethylene glycol 3350 17 Gram(s) Oral two times a day  QUEtiapine 12.5 milliGRAM(s) Oral at bedtime  senna 2 Tablet(s) Oral at bedtime      Labs:          CAPILLARY BLOOD GLUCOSE                  Vitals:  Vital Signs Last 24 Hrs  T(C): 36.4 (15 Nov 2021 04:45), Max: 36.7 (14 Nov 2021 15:35)  T(F): 97.6 (15 Nov 2021 04:45), Max: 98 (14 Nov 2021 15:35)  HR: 100 (15 Nov 2021 04:46) (86 - 100)  BP: 179/98 (15 Nov 2021 04:46) (136/79 - 185/89)  BP(mean): --  RR: 18 (15 Nov 2021 04:45) (18 - 18)  SpO2: 95% (15 Nov 2021 04:45) (95% - 98%)  NEUROLOGICAL EXAM:    Mental status: opens eyes looks when called, says her name, said was home but agreed was in hospital, got presidents from a list can follow simple commands such as stick out tongue    Cranial Nerves: Pupils were equal, round, reactive to light. Extraocular movements were intact. Visual field to confrontation.  Fundoscopic exam was deferred. Facial sensation was intact to light touch. There was no facial asymmetry. cannot asssess palate, tongue, hearing    Motor exam: Bulk and tone were normal. moves all ext to stimuli    Reflexes: 2+ in the bilateral upper extremities. 1 in the bilateral lower extremities. Toes were downgoing bilaterally.     Sensation: moves to stimuli    Coordination: no gross dysmetria    Gait: cannot assess    < from: CT Head No Cont (11.10.21 @ 18:51) >    EXAM:  CT BRAIN                            PROCEDURE DATE:  11/10/2021            INTERPRETATION:  CLINICAL INFORMATION: Altered mental status. Evaluate for CVA.    TECHNIQUE: Noncontrast axial CT images were acquired through the head. Two-dimensional sagittal and coronal reformats were obtained    COMPARISON: None    FINDINGS:    Status post right suboccipital craniectomy. There is an underlying right posterior fossa resection cavity.  There has been prior right parietal cuca hole. There is underlying right parietal encephalomalacia/gliosis.      No acute intracranial hemorrhage, abnormal extra axial fluid collection, distal first, mass effect or midline shift.    Ventricles and sulci are normal in size for the patient's age. Basal cisterns are patent.    Minimal mucosal thickening of the bilateral ethmoid and maxillary sinuses. Right frontal sinus appears underpneumatized. Remaining paranasal sinuses and mastoid air cells appear clear.  Status post left lens replacement surgery.    IMPRESSION:  No acute intracranial hemorrhage or acute territorial infarction. Chronic findings as above.    --- End of Report ---              ATIYA GRIFFIN MD; Resident Radiology  This document has been electronically signed.  JOHNNY SALTER MD; Attending Radiologist  This document has been electronically signed. Nov 10 2021  7:38PM    < end of copied text >    
DATE OF SERVICE: 11-12-21 @ 14:10  CHIEF COMPLAINT:Patient is a 89y old  Female who presents with a chief complaint of AMS (12 Nov 2021 08:54)    	        PAST MEDICAL & SURGICAL HISTORY:  HTN (hypertension)    Dementia    Other dementia        pt more alert  responsive  no cp or sob  no fever or chills  no vomiting    Medications:  MEDICATIONS  (STANDING):  ALBUTerol   0.042% 1.25 milliGRAM(s) Nebulizer every 8 hours  dextrose 5% + sodium chloride 0.9%. 1000 milliLiter(s) (50 mL/Hr) IV Continuous <Continuous>  folic acid 1 milliGRAM(s) Oral daily  heparin   Injectable 5000 Unit(s) SubCutaneous every 12 hours  polyethylene glycol 3350 17 Gram(s) Oral two times a day  QUEtiapine 12.5 milliGRAM(s) Oral at bedtime  senna 2 Tablet(s) Oral at bedtime    MEDICATIONS  (PRN):    	    PHYSICAL EXAM:  T(C): 36.4 (11-12-21 @ 06:29), Max: 37.1 (11-11-21 @ 22:20)  HR: 88 (11-12-21 @ 06:29) (88 - 105)  BP: 159/77 (11-12-21 @ 06:29) (103/65 - 170/76)  RR: 18 (11-12-21 @ 06:29) (18 - 18)  SpO2: 96% (11-12-21 @ 06:29) (94% - 97%)  Wt(kg): --  I&O's Summary    11 Nov 2021 07:01  -  12 Nov 2021 07:00  --------------------------------------------------------  IN: 1299 mL / OUT: 1950 mL / NET: -651 mL    12 Nov 2021 07:01  -  12 Nov 2021 14:10  --------------------------------------------------------  IN: 0 mL / OUT: 300 mL / NET: -300 mL        Appearance: Normal	  HEENT:   Normal oral mucosa,   Cardiovascular: Normal S1 S2, No JVD, No murmurs, No edema  Respiratory: dec bs   Gastrointestinal:  Soft, Non-tender, + BS	  Skin: No rashes, No ecchymoses, No cyanosis	  Neurologic: Non-focal/unable to fully assess  Extremities: dec rom  TELEMETRY: 	    ECG:  	  RADIOLOGY:  OTHER: 	  	  LABS:	 	    CARDIAC MARKERS:  CARDIAC MARKERS ( 10 Nov 2021 23:07 )  x     / x     / 378 U/L / x     / 8.4 ng/mL                                10.1   6.37  )-----------( 206      ( 12 Nov 2021 06:57 )             30.9     11-12    136  |  105  |  14  ----------------------------<  99  3.9   |  22  |  1.18    Ca    9.0      12 Nov 2021 06:57  Mg     2.0     11-10    TPro  6.9  /  Alb  3.6  /  TBili  0.6  /  DBili  x   /  AST  25  /  ALT  24  /  AlkPhos  80  11-10    proBNP:   Lipid Profile:   HgA1c:   TSH: Thyroid Stimulating Hormone, Serum: 7.96 uIU/mL (11-11 @ 14:45)      	        
DATE OF SERVICE: 11-15-21 @ 15:32  CHIEF COMPLAINT:Patient is a 89y old  Female who presents with a chief complaint of AMS (15 Nov 2021 09:13)    	        PAST MEDICAL & SURGICAL HISTORY:  HTN (hypertension)    Dementia    Other dementia            REVIEW OF SYSTEMS:    NECK: No pain or stiffness  RESPIRATORY: No cough, wheezing, chills or hemoptysis; No Shortness of Breath  CARDIOVASCULAR: No chest pain, palpitations, passing out, d  GASTROINTESTINAL: No abdominal or epigastric pain. No nausea, vomiting, or hematemesis;  NEUROLOGICAL: No headaches,     Medications:  MEDICATIONS  (STANDING):  ALBUTerol   0.042% 1.25 milliGRAM(s) Nebulizer every 8 hours  chlorhexidine 2% Cloths 1 Application(s) Topical daily  dextrose 5% + sodium chloride 0.9%. 1000 milliLiter(s) (50 mL/Hr) IV Continuous <Continuous>  folic acid 1 milliGRAM(s) Oral daily  heparin   Injectable 5000 Unit(s) SubCutaneous every 12 hours  losartan 50 milliGRAM(s) Oral daily  polyethylene glycol 3350 17 Gram(s) Oral two times a day  QUEtiapine 12.5 milliGRAM(s) Oral at bedtime  senna 2 Tablet(s) Oral at bedtime    MEDICATIONS  (PRN):  haloperidol    Injectable 1 milliGRAM(s) IV Push every 6 hours PRN Agitation    	    PHYSICAL EXAM:  T(C): 36.7 (11-15-21 @ 14:45), Max: 36.8 (11-15-21 @ 08:54)  HR: 100 (11-15-21 @ 14:45) (95 - 100)  BP: 191/84 (11-15-21 @ 14:45) (154/79 - 191/84)  RR: 18 (11-15-21 @ 14:45) (18 - 18)  SpO2: 95% (11-15-21 @ 14:45) (95% - 98%)  Wt(kg): --  I&O's Summary    14 Nov 2021 07:01  -  15 Nov 2021 07:00  --------------------------------------------------------  IN: 600 mL / OUT: 1800 mL / NET: -1200 mL    15 Nov 2021 07:01  -  15 Nov 2021 15:32  --------------------------------------------------------  IN: 237 mL / OUT: 900 mL / NET: -663 mL        Appearance: Normal	  HEENT:   Normal oral mucosa, PERRL, EOMI	  Lymphatic: No lymphadenopathy  Cardiovascular: Normal S1 S2, No JVD, No murmurs, No edema  Respiratory: Lungs clear to auscultation	  Gastrointestinal:  Soft, Non-tender, + BS	  	  Neurologic: Non-focal  Extremities: dec rom  Vascular: Peripheral pulses palpable 2+ bilaterally    TELEMETRY: 	    ECG:  	  RADIOLOGY:  OTHER: 	  	  LABS:	 	    CARDIAC MARKERS:                  proBNP:   Lipid Profile:   HgA1c:   TSH:     	        
DATE OF SERVICE: 11-16-21 @ 12:08  CHIEF COMPLAINT:Patient is a 89y old  Female who presents with a chief complaint of AMS (16 Nov 2021 11:29)    	        PAST MEDICAL & SURGICAL HISTORY:  HTN (hypertension)    Dementia    Other dementia            REVIEW OF SYSTEMS:  alert/ occasional  confusion  RESPIRATORY: No cough, wheezing, chills or hemoptysis; No Shortness of Breath  CARDIOVASCULAR: No chest pain, palpitations, passing out,   GASTROINTESTINAL: No abdominal or epigastric pain. No nausea, vomiting, or hematemesis;       Medications:  MEDICATIONS  (STANDING):  ALBUTerol   0.042% 1.25 milliGRAM(s) Nebulizer every 8 hours  chlorhexidine 2% Cloths 1 Application(s) Topical daily  dextrose 5% + sodium chloride 0.9%. 1000 milliLiter(s) (50 mL/Hr) IV Continuous <Continuous>  folic acid 1 milliGRAM(s) Oral daily  heparin   Injectable 5000 Unit(s) SubCutaneous every 12 hours  losartan 50 milliGRAM(s) Oral daily  polyethylene glycol 3350 17 Gram(s) Oral two times a day  QUEtiapine 12.5 milliGRAM(s) Oral at bedtime  senna 2 Tablet(s) Oral at bedtime    MEDICATIONS  (PRN):  haloperidol    Injectable 1 milliGRAM(s) IV Push every 6 hours PRN Agitation    	    PHYSICAL EXAM:  T(C): 36.4 (11-16-21 @ 08:52), Max: 36.7 (11-15-21 @ 14:45)  HR: 98 (11-16-21 @ 08:52) (98 - 102)  BP: 133/81 (11-16-21 @ 08:52) (133/81 - 191/84)  RR: 18 (11-16-21 @ 08:52) (18 - 18)  SpO2: 96% (11-16-21 @ 08:52) (95% - 99%)  Wt(kg): --  I&O's Summary    15 Nov 2021 07:01  -  16 Nov 2021 07:00  --------------------------------------------------------  IN: 437 mL / OUT: 950 mL / NET: -513 mL        Appearance: Normal	  HEENT:   Normal oral mucosa, PERRL, EOMI	  Lymphatic: No lymphadenopathy  Cardiovascular: Normal S1 S2, No JVD, No murmurs, No edema  Respiratory: Lungs clear to auscultation	    Gastrointestinal:  Soft, Non-tender, + BS	  Skin: No rashes, No ecchymoses, No cyanosis	  Neurologic: Non-focal  Extremities: dec rom    TELEMETRY: 	    ECG:  	  RADIOLOGY:  OTHER: 	  	  LABS:	 	    CARDIAC MARKERS:                  proBNP:   Lipid Profile:   HgA1c:   TSH:     	        
Follow-up Pulm Progress Note    Alert, confused  No new respiratory events overnight     Medications:  MEDICATIONS  (STANDING):  ALBUTerol   0.042% 1.25 milliGRAM(s) Nebulizer every 8 hours  chlorhexidine 2% Cloths 1 Application(s) Topical daily  dextrose 5% + sodium chloride 0.9%. 1000 milliLiter(s) (50 mL/Hr) IV Continuous <Continuous>  folic acid 1 milliGRAM(s) Oral daily  heparin   Injectable 5000 Unit(s) SubCutaneous every 12 hours  losartan 50 milliGRAM(s) Oral daily  polyethylene glycol 3350 17 Gram(s) Oral two times a day  QUEtiapine 12.5 milliGRAM(s) Oral at bedtime  senna 2 Tablet(s) Oral at bedtime    MEDICATIONS  (PRN):  haloperidol    Injectable 1 milliGRAM(s) IV Push every 6 hours PRN Agitation          Vital Signs Last 24 Hrs  T(C): 36.4 (16 Nov 2021 08:52), Max: 36.7 (15 Nov 2021 14:45)  T(F): 97.6 (16 Nov 2021 08:52), Max: 98 (15 Nov 2021 14:45)  HR: 98 (16 Nov 2021 08:52) (98 - 102)  BP: 133/81 (16 Nov 2021 08:52) (133/81 - 191/84)  BP(mean): --  RR: 18 (16 Nov 2021 08:52) (18 - 18)  SpO2: 96% (16 Nov 2021 08:52) (95% - 99%)          11-15 @ 07:01  -  11-16 @ 07:00  --------------------------------------------------------  IN: 437 mL / OUT: 950 mL / NET: -513 mL                CULTURES:     Culture - Blood (collected 11-11-21 @ 00:30)  Source: .Blood  Final Report (11-16-21 @ 01:00):    No Growth Final    Culture - Blood (collected 11-11-21 @ 00:30)  Source: .Blood  Final Report (11-16-21 @ 01:00):    No Growth Final        Culture - Urine (collected 11-11-21 @ 00:41)  Source: .Urine catheterization  Final Report (11-11-21 @ 22:28):    No growth            Physical Examination:  PULM: Clear to auscultation bilaterally, no significant sputum production  CVS: S1, S2 heard        RADIOLOGY REVIEWED  CT chest: < from: CT Chest No Cont (11.11.21 @ 00:12) >    FINDINGS:  CHEST:  LUNGS AND LARGE AIRWAYS: Evaluation limited by motion artifact. Right lower lung atelectasis. Questionable mucoid impacted distal airways right lower lobe. No definite evidence of pneumonia. No central endobronchial lesion.  PLEURA: Trace right pleural effusion question.  VESSELS: Aortic valve and coronary artery calcifications.  HEART: Heart size is normal. Trace pericardial effusion.  MEDIASTINUM AND SWETHA: No lymphadenopathy.  CHEST WALL AND LOWER NECK: 1 heterogeneous enlargement of the right lobe of thyroid gland with a dominant heterogeneous 2 cm nodule.    ABDOMEN AND PELVIS:  LIVER: Within normal limits.  BILE DUCTS: Normal caliber.  GALLBLADDER: Cholelithiasis.  SPLEEN: Within normal limits.  PANCREAS: Within normal limits.  ADRENALS: Within normal limits.  KIDNEYS/URETERS: Within normal limits.    BLADDER: Collapsed around a Crooks balloon catheter limiting evaluation.  REPRODUCTIVE ORGANS: Uterus and adnexa within normal limits. Nonspecific fluid in the vagina.    BOWEL: Partial right hemicolectomy. No bowel obstruction. Moderately distended fecal filled rectum with slight wall thickening and hazy appearance to the perirectal and presacral fat suggesting a component of inflammation. No additional bowel wall thickening.  PERITONEUM: No ascitesor free air. No abscess.  VESSELS: Atherosclerotic changes.  RETROPERITONEUM/LYMPH NODES: No lymphadenopathy.  ABDOMINAL WALL: Within normal limits.  BONES: Degenerative changes. Scoliosis. Multiple chronic posterior right-sided ribs including the 8th through 10th ribs.    IMPRESSION:  CT chest: Motion limited study. No definite evidence of pneumonia. Questionable mucoid impacted right lower lobe distal airways.    CT abdomen and pelvis: Motion degraded study. Distended fecal filled rectum with surrounding inflammatory change is suggested concerning for an early stercoral colitis.    Cholelithiasis. No secondary signs of acute cholecystitis.    < end of copied text >  
[facility]  Jourdan Tatum PA-C  [facility] [place] [address]  [phone]      INTERVAL HPI/OVERNIGHT EVENTS:  pt seen and examined, no new events  + 3Bms after smog enema     MEDICATIONS  (STANDING):  ALBUTerol   0.042% 1.25 milliGRAM(s) Nebulizer every 8 hours  dextrose 5% + sodium chloride 0.9%. 1000 milliLiter(s) (50 mL/Hr) IV Continuous <Continuous>  folic acid 1 milliGRAM(s) Oral daily  heparin   Injectable 5000 Unit(s) SubCutaneous every 12 hours  senna 2 Tablet(s) Oral at bedtime    MEDICATIONS  (PRN):      Allergies    penicillin (Unknown)    Intolerances        ROS:   General:  No wt loss, fevers, chills, night sweats, fatigue,   Eyes:  Good vision, no reported pain  ENT:  No sore throat, pain, runny nose, dysphagia  CV:  No pain, palpitations, hypo/hypertension  Resp:  No dyspnea, cough, tachypnea, wheezing  GI:  No pain, No nausea, No vomiting, No diarrhea, No constipation, No weight loss, No fever, No pruritis, No rectal bleeding, No tarry stools, No dysphagia,  :  No pain, bleeding, incontinence, nocturia  Muscle:  No pain, weakness  Neuro:  No weakness, tingling, memory problems  Psych:  No fatigue, insomnia, mood problems, depression  Endocrine:  No polyuria, polydipsia, cold/heat intolerance  Heme:  No petechiae, ecchymosis, easy bruisability  Skin:  No rash, tattoos, scars, edema      PHYSICAL EXAM:   Vital Signs:  Vital Signs Last 24 Hrs  T(C): 36.4 (2021 06:29), Max: 37.1 (2021 22:20)  T(F): 97.6 (2021 06:29), Max: 98.8 (2021 22:20)  HR: 88 (2021 06:29) (88 - 105)  BP: 159/77 (2021 06:29) (103/65 - 170/76)  BP(mean): --  RR: 18 (2021 06:29) (18 - 18)  SpO2: 96% (2021 06:29) (94% - 97%)  Daily     Daily     GENERAL:  Appears stated age,   HEENT:  NC/AT,    CHEST:  Full & symmetric excursion,   HEART:  Regular rhythm,  ABDOMEN:  Soft, non-tender, non-distended,  EXTEREMITIES:  no cyanosis  SKIN:  No rash  NEURO:  Alert,       LABS:                        10.1   6.37  )-----------( 206      ( 2021 06:57 )             30.9     11-12    136  |  105  |  14  ----------------------------<  99  3.9   |  22  |  1.18    Ca    9.0      2021 06:57  Mg     2.0     11-10    TPro  6.9  /  Alb  3.6  /  TBili  0.6  /  DBili  x   /  AST  25  /  ALT  24  /  AlkPhos  80  11-10    PT/INR - ( 10 Nov 2021 18:24 )   PT: 12.4 sec;   INR: 1.04 ratio         PTT - ( 10 Nov 2021 18:24 )  PTT:30.1 sec  Urinalysis Basic - ( 10 Nov 2021 21:09 )    Color: Light Yellow / Appearance: Clear / S.009 / pH: x  Gluc: x / Ketone: Negative  / Bili: Negative / Urobili: Negative   Blood: x / Protein: Negative / Nitrite: Negative   Leuk Esterase: Negative / RBC: x / WBC x   Sq Epi: x / Non Sq Epi: x / Bacteria: x        RADIOLOGY & ADDITIONAL TESTS:  
Admitting Diagnosis:  Altered mental status [R41.82]  ALTERED MENTAL STATUS, UNSPECIFIED      Background:  This is a 89 year old woman with the below past medical history who presents with the chief complaint of AMS.  She had a fall out of bed 2-3 months ago, had trauma, was confused when went to the hospital then to [facility].  While she improved, she started to be combative agitated and placed on seroquel.  Sent home in Oct but starting having episodes where she would leave the house at night, knock on neighbors door, look for her dead .  Son got 24 hr help and went to dr franks with dr sebastian office, placed on remeron.  On that she was calmer, eating more.  Saw dr barraza, geriatric psych at [facility].  One week prior to admission, had leg edema, given bactrim.  There was also a change in remeron dosage.  As per son, was not sleeping at night, could not get up, falling down and came to ER.    Interval Hx:  more awake today, at times speaking gibberish but other times answers questions      Past Medical History:      Past Surgical History:      Social History:  No toxic habits    Family History:  FAMILY HISTORY:      Allergies:  penicillin (Unknown)      ROS:  Constitutional: Patient offers no complaints of fevers or significant weight loss  Ears, Nose, Mouth and Throat: The patient presents with no abnormalities of the head, ears, eyes, nose or throat  Skin: Patient offers no concerns of new rashes or lesions  Respiratory: The patient presents with no abnormalities of the respiratory tract  Cardiovascular: The patient presents with no cardiac abnormalities  Gastrointestinal: The patient presents with no abnormalities of the GI system  Genitourinary: The patient presents with no dysuria, hematuria or frequent urination  Neurological: See HPI  Endocrine: Patient offers no complaints of excessive thirst, urination, or heat/cold intolerance    Advanced care planning reviewed and noted in the chart.      Medications:  ALBUTerol   0.042% 1.25 milliGRAM(s) Nebulizer every 8 hours  chlorhexidine 2% Cloths 1 Application(s) Topical daily  dextrose 5% + sodium chloride 0.9%. 1000 milliLiter(s) IV Continuous <Continuous>  folic acid 1 milliGRAM(s) Oral daily  haloperidol    Injectable 1 milliGRAM(s) IV Push every 6 hours PRN  heparin   Injectable 5000 Unit(s) SubCutaneous every 12 hours  losartan 50 milliGRAM(s) Oral daily  polyethylene glycol 3350 17 Gram(s) Oral two times a day  QUEtiapine 12.5 milliGRAM(s) Oral at bedtime  senna 2 Tablet(s) Oral at bedtime      Labs:  CBC Full  -  ( 16 Nov 2021 16:06 )  WBC Count : 6.91 K/uL  RBC Count : 3.50 M/uL  Hemoglobin : 10.9 g/dL  Hematocrit : 32.9 %  Platelet Count - Automated : 259 K/uL  Mean Cell Volume : 94.0 fl  Mean Cell Hemoglobin : 31.1 pg  Mean Cell Hemoglobin Concentration : 33.1 gm/dL  Auto Neutrophil # : x  Auto Lymphocyte # : x  Auto Monocyte # : x  Auto Eosinophil # : x  Auto Basophil # : x  Auto Neutrophil % : x  Auto Lymphocyte % : x  Auto Monocyte % : x  Auto Eosinophil % : x  Auto Basophil % : x    11-16    137  |  104  |  11  ----------------------------<  103<H>  3.6   |  22  |  1.03    Ca    9.5      16 Nov 2021 16:06      CAPILLARY BLOOD GLUCOSE                  Vitals:  Vital Signs Last 24 Hrs  T(C): 37 (17 Nov 2021 05:16), Max: 37 (17 Nov 2021 05:16)  T(F): 98.6 (17 Nov 2021 05:16), Max: 98.6 (17 Nov 2021 05:16)  HR: 104 (17 Nov 2021 05:16) (98 - 118)  BP: 131/74 (17 Nov 2021 05:16) (131/74 - 186/83)  BP(mean): --  RR: 18 (17 Nov 2021 05:16) (18 - 18)  SpO2: 96% (17 Nov 2021 05:16) (96% - 96%)    NEUROLOGICAL EXAM:    Mental status: Awakens to voice.  Oriented to person only but re-directable to understand she is in a hospital.  Names simple objects, follows simple commands, repeats simple phrases.  No obvious neglect.  at times speaks gibberish    Cranial Nerves: Pupils were equal, round, reactive to light. Extraocular movements were intact. Visual field to confrontation.  Fundoscopic exam was deferred. Facial sensation was intact to light touch. There was no facial asymmetry. cannot asssess palate, tongue, hearing    Motor exam: Bulk and tone were normal. moves all ext to stimuli    Reflexes: 2+ in the bilateral upper extremities. 1 in the bilateral lower extremities. Toes were downgoing bilaterally.     Sensation: moves to stimuli    Coordination: no gross dysmetria    Gait: cannot assess    < from: CT Head No Cont (11.10.21 @ 18:51) >    EXAM:  CT BRAIN                            PROCEDURE DATE:  11/10/2021            INTERPRETATION:  CLINICAL INFORMATION: Altered mental status. Evaluate for CVA.    TECHNIQUE: Noncontrast axial CT images were acquired through the head. Two-dimensional sagittal and coronal reformats were obtained    COMPARISON: None    FINDINGS:    Status post right suboccipital craniectomy. There is an underlying right posterior fossa resection cavity.  There has been prior right parietal cuca hole. There is underlying right parietal encephalomalacia/gliosis.      No acute intracranial hemorrhage, abnormal extra axial fluid collection, distal first, mass effect or midline shift.    Ventricles and sulci are normal in size for the patient's age. Basal cisterns are patent.    Minimal mucosal thickening of the bilateral ethmoid and maxillary sinuses. Right frontal sinus appears underpneumatized. Remaining paranasal sinuses and mastoid air cells appear clear.  Status post left lens replacement surgery.    IMPRESSION:  No acute intracranial hemorrhage or acute territorial infarction. Chronic findings as above.    --- End of Report ---              ATIYA GRIFFIN MD; Resident Radiology  This document has been electronically signed.  JOHNNY SALTER MD; Attending Radiologist  This document has been electronically signed. Nov 10 2021  7:38PM    < end of copied text >    
[facility]  Jourdan Tatum PA-C  [facility] [place] [address]  [phone]      INTERVAL HPI/OVERNIGHT EVENTS:  pt seen and examined, no new events, continues to have +BMs daily     MEDICATIONS  (STANDING):  ALBUTerol   0.042% 1.25 milliGRAM(s) Nebulizer every 8 hours  dextrose 5% + sodium chloride 0.9%. 1000 milliLiter(s) (50 mL/Hr) IV Continuous <Continuous>  folic acid 1 milliGRAM(s) Oral daily  heparin   Injectable 5000 Unit(s) SubCutaneous every 12 hours  senna 2 Tablet(s) Oral at bedtime    MEDICATIONS  (PRN):      Allergies    penicillin (Unknown)    Intolerances        ROS:   General:  No wt loss, fevers, chills, night sweats, fatigue,   Eyes:  Good vision, no reported pain  ENT:  No sore throat, pain, runny nose, dysphagia  CV:  No pain, palpitations, hypo/hypertension  Resp:  No dyspnea, cough, tachypnea, wheezing  GI:  No pain, No nausea, No vomiting, No diarrhea, No constipation, No weight loss, No fever, No pruritis, No rectal bleeding, No tarry stools, No dysphagia,  :  No pain, bleeding, incontinence, nocturia  Muscle:  No pain, weakness  Neuro:  No weakness, tingling, memory problems  Psych:  No fatigue, insomnia, mood problems, depression  Endocrine:  No polyuria, polydipsia, cold/heat intolerance  Heme:  No petechiae, ecchymosis, easy bruisability  Skin:  No rash, tattoos, scars, edema      PHYSICAL EXAM:   Vital Signs:  Vital Signs Last 24 Hrs  T(C): 36.4 (2021 06:29), Max: 37.1 (2021 22:20)  T(F): 97.6 (2021 06:29), Max: 98.8 (2021 22:20)  HR: 88 (2021 06:29) (88 - 105)  BP: 159/77 (2021 06:29) (103/65 - 170/76)  BP(mean): --  RR: 18 (2021 06:29) (18 - 18)  SpO2: 96% (2021 06:29) (94% - 97%)  Daily     Daily     GENERAL:  Appears stated age,   HEENT:  NC/AT,    CHEST:  Full & symmetric excursion,   HEART:  Regular rhythm,  ABDOMEN:  Soft, non-tender, non-distended,  EXTEREMITIES:  no cyanosis  SKIN:  No rash  NEURO:  Alert,       LABS:                        10.1   6.37  )-----------( 206      ( 2021 06:57 )             30.9     11-12    136  |  105  |  14  ----------------------------<  99  3.9   |  22  |  1.18    Ca    9.0      2021 06:57  Mg     2.0     11-10    TPro  6.9  /  Alb  3.6  /  TBili  0.6  /  DBili  x   /  AST  25  /  ALT  24  /  AlkPhos  80  11-10    PT/INR - ( 10 Nov 2021 18:24 )   PT: 12.4 sec;   INR: 1.04 ratio         PTT - ( 10 Nov 2021 18:24 )  PTT:30.1 sec  Urinalysis Basic - ( 10 Nov 2021 21:09 )    Color: Light Yellow / Appearance: Clear / S.009 / pH: x  Gluc: x / Ketone: Negative  / Bili: Negative / Urobili: Negative   Blood: x / Protein: Negative / Nitrite: Negative   Leuk Esterase: Negative / RBC: x / WBC x   Sq Epi: x / Non Sq Epi: x / Bacteria: x        RADIOLOGY & ADDITIONAL TESTS:  
[facility]  Jourdan Tatum PA-C  [facility] [place] [address]  [phone]      INTERVAL HPI/OVERNIGHT EVENTS:  pt seen and examined, no new events  1 BM yesterday     MEDICATIONS  (STANDING):  ALBUTerol   0.042% 1.25 milliGRAM(s) Nebulizer every 8 hours  dextrose 5% + sodium chloride 0.9%. 1000 milliLiter(s) (50 mL/Hr) IV Continuous <Continuous>  folic acid 1 milliGRAM(s) Oral daily  heparin   Injectable 5000 Unit(s) SubCutaneous every 12 hours  senna 2 Tablet(s) Oral at bedtime    MEDICATIONS  (PRN):      Allergies    penicillin (Unknown)    Intolerances        ROS:   General:  No wt loss, fevers, chills, night sweats, fatigue,   Eyes:  Good vision, no reported pain  ENT:  No sore throat, pain, runny nose, dysphagia  CV:  No pain, palpitations, hypo/hypertension  Resp:  No dyspnea, cough, tachypnea, wheezing  GI:  No pain, No nausea, No vomiting, No diarrhea, No constipation, No weight loss, No fever, No pruritis, No rectal bleeding, No tarry stools, No dysphagia,  :  No pain, bleeding, incontinence, nocturia  Muscle:  No pain, weakness  Neuro:  No weakness, tingling, memory problems  Psych:  No fatigue, insomnia, mood problems, depression  Endocrine:  No polyuria, polydipsia, cold/heat intolerance  Heme:  No petechiae, ecchymosis, easy bruisability  Skin:  No rash, tattoos, scars, edema      PHYSICAL EXAM:   Vital Signs:  Vital Signs Last 24 Hrs  T(C): 36.4 (2021 06:29), Max: 37.1 (2021 22:20)  T(F): 97.6 (2021 06:29), Max: 98.8 (2021 22:20)  HR: 88 (2021 06:29) (88 - 105)  BP: 159/77 (2021 06:29) (103/65 - 170/76)  BP(mean): --  RR: 18 (2021 06:29) (18 - 18)  SpO2: 96% (2021 06:29) (94% - 97%)  Daily     Daily     GENERAL:  Appears stated age,   HEENT:  NC/AT,    CHEST:  Full & symmetric excursion,   HEART:  Regular rhythm,  ABDOMEN:  Soft, non-tender, non-distended,  EXTEREMITIES:  no cyanosis  SKIN:  No rash  NEURO:  Alert,       LABS:                        10.1   6.37  )-----------( 206      ( 2021 06:57 )             30.9     11-12    136  |  105  |  14  ----------------------------<  99  3.9   |  22  |  1.18    Ca    9.0      2021 06:57  Mg     2.0     11-10    TPro  6.9  /  Alb  3.6  /  TBili  0.6  /  DBili  x   /  AST  25  /  ALT  24  /  AlkPhos  80  11-10    PT/INR - ( 10 Nov 2021 18:24 )   PT: 12.4 sec;   INR: 1.04 ratio         PTT - ( 10 Nov 2021 18:24 )  PTT:30.1 sec  Urinalysis Basic - ( 10 Nov 2021 21:09 )    Color: Light Yellow / Appearance: Clear / S.009 / pH: x  Gluc: x / Ketone: Negative  / Bili: Negative / Urobili: Negative   Blood: x / Protein: Negative / Nitrite: Negative   Leuk Esterase: Negative / RBC: x / WBC x   Sq Epi: x / Non Sq Epi: x / Bacteria: x        RADIOLOGY & ADDITIONAL TESTS:  
Admitting Diagnosis:  Altered mental status [R41.82]  ALTERED MENTAL STATUS, UNSPECIFIED      Background:  This is a 89 year old woman with the below past medical history who presents with the chief complaint of AMS.  She had a fall out of bed 2-3 months ago, had trauma, was confused when went to the hospital then to [facility].  While she improved, she started to be combative agitated and placed on seroquel.  Sent home in Oct but starting having episodes where she would leave the house at night, knock on neighbors door, look for her dead .  Son got 24 hr help and went to dr franks with dr sebastian office, placed on remeron.  On that she was calmer, eating more.  Saw dr barraza, geriatric psych at [facility].  One week prior to admission, had leg edema, given bactrim.  There was also a change in remeron dosage.  As per son, was not sleeping at night, could not get up, falling down and came to ER.    Interval Hx:  more awake today, cooperative, oriented to person only.  [place] and she agreed.       Past Medical History:      Past Surgical History:      Social History:  No toxic habits    Family History:  FAMILY HISTORY:      Allergies:  penicillin (Unknown)      ROS:  Constitutional: Patient offers no complaints of fevers or significant weight loss  Ears, Nose, Mouth and Throat: The patient presents with no abnormalities of the head, ears, eyes, nose or throat  Skin: Patient offers no concerns of new rashes or lesions  Respiratory: The patient presents with no abnormalities of the respiratory tract  Cardiovascular: The patient presents with no cardiac abnormalities  Gastrointestinal: The patient presents with no abnormalities of the GI system  Genitourinary: The patient presents with no dysuria, hematuria or frequent urination  Neurological: See HPI  Endocrine: Patient offers no complaints of excessive thirst, urination, or heat/cold intolerance    Advanced care planning reviewed and noted in the chart.    Medications:  ALBUTerol   0.042% 1.25 milliGRAM(s) Nebulizer every 8 hours  chlorhexidine 2% Cloths 1 Application(s) Topical daily  dextrose 5% + sodium chloride 0.9%. 1000 milliLiter(s) IV Continuous <Continuous>  folic acid 1 milliGRAM(s) Oral daily  haloperidol    Injectable 1 milliGRAM(s) IV Push every 6 hours PRN  heparin   Injectable 5000 Unit(s) SubCutaneous every 12 hours  losartan 50 milliGRAM(s) Oral daily  polyethylene glycol 3350 17 Gram(s) Oral two times a day  QUEtiapine 12.5 milliGRAM(s) Oral at bedtime  senna 2 Tablet(s) Oral at bedtime      Labs:          CAPILLARY BLOOD GLUCOSE                  Vitals:  Vital Signs Last 24 Hrs  T(C): 36.4 (16 Nov 2021 08:52), Max: 36.7 (15 Nov 2021 14:45)  T(F): 97.6 (16 Nov 2021 08:52), Max: 98 (15 Nov 2021 14:45)  HR: 98 (16 Nov 2021 08:52) (98 - 102)  BP: 133/81 (16 Nov 2021 08:52) (133/81 - 191/84)  BP(mean): --  RR: 18 (16 Nov 2021 08:52) (18 - 18)  SpO2: 96% (16 Nov 2021 08:52) (95% - 99%)    NEUROLOGICAL EXAM:    Mental status: Awakens to voice.  Oriented to person only but re-directable to understand she is in a hospital.  Names simple objects, follows simple commands, repeats simple phrases.  No obvious neglect.  Poor attention    Cranial Nerves: Pupils were equal, round, reactive to light. Extraocular movements were intact. Visual field to confrontation.  Fundoscopic exam was deferred. Facial sensation was intact to light touch. There was no facial asymmetry. cannot asssess palate, tongue, hearing    Motor exam: Bulk and tone were normal. moves all ext to stimuli    Reflexes: 2+ in the bilateral upper extremities. 1 in the bilateral lower extremities. Toes were downgoing bilaterally.     Sensation: moves to stimuli    Coordination: no gross dysmetria    Gait: cannot assess    < from: CT Head No Cont (11.10.21 @ 18:51) >    EXAM:  CT BRAIN                            PROCEDURE DATE:  11/10/2021            INTERPRETATION:  CLINICAL INFORMATION: Altered mental status. Evaluate for CVA.    TECHNIQUE: Noncontrast axial CT images were acquired through the head. Two-dimensional sagittal and coronal reformats were obtained    COMPARISON: None    FINDINGS:    Status post right suboccipital craniectomy. There is an underlying right posterior fossa resection cavity.  There has been prior right parietal cuca hole. There is underlying right parietal encephalomalacia/gliosis.      No acute intracranial hemorrhage, abnormal extra axial fluid collection, distal first, mass effect or midline shift.    Ventricles and sulci are normal in size for the patient's age. Basal cisterns are patent.    Minimal mucosal thickening of the bilateral ethmoid and maxillary sinuses. Right frontal sinus appears underpneumatized. Remaining paranasal sinuses and mastoid air cells appear clear.  Status post left lens replacement surgery.    IMPRESSION:  No acute intracranial hemorrhage or acute territorial infarction. Chronic findings as above.    --- End of Report ---              ATIYA GRIFFIN MD; Resident Radiology  This document has been electronically signed.  JOHNNY SALTER MD; Attending Radiologist  This document has been electronically signed. Nov 10 2021  7:38PM    < end of copied text >    
Follow-up Pulm Progress Note    Alert, confused   Sats 97% RA    Medications:  MEDICATIONS  (STANDING):  ALBUTerol   0.042% 1.25 milliGRAM(s) Nebulizer every 8 hours  dextrose 5% + sodium chloride 0.9%. 1000 milliLiter(s) (50 mL/Hr) IV Continuous <Continuous>  folic acid 1 milliGRAM(s) Oral daily  heparin   Injectable 5000 Unit(s) SubCutaneous every 12 hours  senna 2 Tablet(s) Oral at bedtime        Vital Signs Last 24 Hrs  T(C): 36.4 (2021 06:29), Max: 37.1 (2021 22:20)  T(F): 97.6 (:), Max: 98.8 (2021 22:20)  HR: 88 (:) (88 - 105)  BP: 159/77 (:29) (103/65 - 170/76)  BP(mean): --  RR: 18 (:29) (18 - 18)  SpO2: 96% (2021 06:29) (94% - 97%)      VBG pH 7.35 11-10 @ 23:06    VBG pCO2 45 11-10 @ 23:06    VBG O2 sat 45.0 -10 @ 23:06    VBG lactate 0.9 -10 @ 23:06  VBG pH 7.35 11-10 @ 18:10    VBG pCO2 48 11-10 @ 18:10    VBG O2 sat 27.6 -10 @ 18:10    VBG lactate 1.4 11-10 @ 18:10      11 @ 07:01  -  12 @ 07:00  --------------------------------------------------------  IN: 1299 mL / OUT: 1950 mL / NET: -651 mL          LABS:                        10.1   6.37  )-----------( 206      ( 2021 06:57 )             30.9         136  |  105  |  14  ----------------------------<  99  3.9   |  22  |  1.18    Ca    9.0      2021 06:57  Mg     2.0     -10    TPro  6.9  /  Alb  3.6  /  TBili  0.6  /  DBili  x   /  AST  25  /  ALT  24  /  AlkPhos  80  11-10      CARDIAC MARKERS ( 10 Nov 2021 23:07 )  x     / x     / 378 U/L / x     / 8.4 ng/mL      CAPILLARY BLOOD GLUCOSE      POCT Blood Glucose.: 134 mg/dL (10 Nov 2021 16:39)    PT/INR - ( 10 Nov 2021 18:24 )   PT: 12.4 sec;   INR: 1.04 ratio         PTT - ( 10 Nov 2021 18:24 )  PTT:30.1 sec  Urinalysis Basic - ( 10 Nov 2021 21:09 )    Color: Light Yellow / Appearance: Clear / S.009 / pH: x  Gluc: x / Ketone: Negative  / Bili: Negative / Urobili: Negative   Blood: x / Protein: Negative / Nitrite: Negative   Leuk Esterase: Negative / RBC: x / WBC x   Sq Epi: x / Non Sq Epi: x / Bacteria: x        Serum Pro-Brain Natriuretic Peptide: 894 pg/mL (11-10-21 @ 23:07)  Serum Pro-Brain Natriuretic Peptide: 1004 pg/mL (11-10-21 @ 18:25)                CULTURES:     Culture - Blood (collected 21 @ 00:30)  Source: .Blood  Preliminary Report (21 @ 01:02):    No growth to date.    Culture - Blood (collected 21 @ 00:30)  Source: .Blood  Preliminary Report (21 @ 01:02):    No growth to date.        Culture - Urine (collected 21 @ 00:41)  Source: .Urine catheterization  Final Report (21 @ 22:28):    No growth                Physical Examination:  PULM: Clear to auscultation bilaterally, no significant sputum production  CVS: S1, S2 heard    RADIOLOGY REVIEWED  CT chest: < from: CT Chest No Cont (21 @ 00:12) >    FINDINGS:  CHEST:  LUNGS AND LARGE AIRWAYS: Evaluation limited by motion artifact. Right lower lung atelectasis. Questionable mucoid impacted distal airways right lower lobe. No definite evidence of pneumonia. No central endobronchial lesion.  PLEURA: Trace right pleural effusion question.  VESSELS: Aortic valve and coronary artery calcifications.  HEART: Heart size is normal. Trace pericardial effusion.  MEDIASTINUM AND SWETHA: No lymphadenopathy.  CHEST WALL AND LOWER NECK: 1 heterogeneous enlargement of the right lobe of thyroid gland with a dominant heterogeneous 2 cm nodule.    ABDOMEN AND PELVIS:  LIVER: Within normal limits.  BILE DUCTS: Normal caliber.  GALLBLADDER: Cholelithiasis.  SPLEEN: Within normal limits.  PANCREAS: Within normal limits.  ADRENALS: Within normal limits.  KIDNEYS/URETERS: Within normal limits.    BLADDER: Collapsed around a Crooks balloon catheter limiting evaluation.  REPRODUCTIVE ORGANS: Uterus and adnexa within normal limits. Nonspecific fluid in the vagina.    BOWEL: Partial right hemicolectomy. No bowel obstruction. Moderately distended fecal filled rectum with slight wall thickening and hazy appearance to the perirectal and presacral fat suggesting a component of inflammation. No additional bowel wall thickening.  PERITONEUM: No ascitesor free air. No abscess.  VESSELS: Atherosclerotic changes.  RETROPERITONEUM/LYMPH NODES: No lymphadenopathy.  ABDOMINAL WALL: Within normal limits.  BONES: Degenerative changes. Scoliosis. Multiple chronic posterior right-sided ribs including the 8th through 10th ribs.    IMPRESSION:  CT chest: Motion limited study. No definite evidence of pneumonia. Questionable mucoid impacted right lower lobe distal airways.    CT abdomen and pelvis: Motion degraded study. Distended fecal filled rectum with surrounding inflammatory change is suggested concerning for an early stercoral colitis.    Cholelithiasis. No secondary signs of acute cholecystitis.    < end of copied text >  
Follow-up Pulm Progress Note    Alert, restless      Medications:  MEDICATIONS  (STANDING):  ALBUTerol   0.042% 1.25 milliGRAM(s) Nebulizer every 8 hours  chlorhexidine 2% Cloths 1 Application(s) Topical daily  dextrose 5% + sodium chloride 0.9%. 1000 milliLiter(s) (50 mL/Hr) IV Continuous <Continuous>  folic acid 1 milliGRAM(s) Oral daily  heparin   Injectable 5000 Unit(s) SubCutaneous every 12 hours  polyethylene glycol 3350 17 Gram(s) Oral two times a day  QUEtiapine 12.5 milliGRAM(s) Oral at bedtime  senna 2 Tablet(s) Oral at bedtime    MEDICATIONS  (PRN):  haloperidol    Injectable 1 milliGRAM(s) IV Push every 6 hours PRN Agitation          Vital Signs Last 24 Hrs  T(C): 36.4 (15 Nov 2021 04:45), Max: 36.7 (14 Nov 2021 15:35)  T(F): 97.6 (15 Nov 2021 04:45), Max: 98 (14 Nov 2021 15:35)  HR: 100 (15 Nov 2021 04:46) (86 - 100)  BP: 179/98 (15 Nov 2021 04:46) (136/79 - 185/89)  BP(mean): --  RR: 18 (15 Nov 2021 04:45) (18 - 18)  SpO2: 95% (15 Nov 2021 04:45) (95% - 98%)          11-14 @ 07:01  -  11-15 @ 07:00  --------------------------------------------------------  IN: 600 mL / OUT: 1800 mL / NET: -1200 mL            CULTURES:    Culture - Blood (collected 11-11-21 @ 00:30)  Source: .Blood  Preliminary Report (11-12-21 @ 01:02):    No growth to date.    Culture - Blood (collected 11-11-21 @ 00:30)  Source: .Blood  Preliminary Report (11-12-21 @ 01:02):    No growth to date.        Culture - Urine (collected 11-11-21 @ 00:41)  Source: .Urine catheterization  Final Report (11-11-21 @ 22:28):    No growth          Physical Examination:  PULM: Clear to auscultation bilaterally, no significant sputum production  CVS: S1, S2 heard    RADIOLOGY REVIEWED  CT chest: < from: CT Chest No Cont (11.11.21 @ 00:12) >    FINDINGS:  CHEST:  LUNGS AND LARGE AIRWAYS: Evaluation limited by motion artifact. Right lower lung atelectasis. Questionable mucoid impacted distal airways right lower lobe. No definite evidence of pneumonia. No central endobronchial lesion.  PLEURA: Trace right pleural effusion question.  VESSELS: Aortic valve and coronary artery calcifications.  HEART: Heart size is normal. Trace pericardial effusion.  MEDIASTINUM AND SWETHA: No lymphadenopathy.  CHEST WALL AND LOWER NECK: 1 heterogeneous enlargement of the right lobe of thyroid gland with a dominant heterogeneous 2 cm nodule.    ABDOMEN AND PELVIS:  LIVER: Within normal limits.  BILE DUCTS: Normal caliber.  GALLBLADDER: Cholelithiasis.  SPLEEN: Within normal limits.  PANCREAS: Within normal limits.  ADRENALS: Within normal limits.  KIDNEYS/URETERS: Within normal limits.    BLADDER: Collapsed around a Crooks balloon catheter limiting evaluation.  REPRODUCTIVE ORGANS: Uterus and adnexa within normal limits. Nonspecific fluid in the vagina.    BOWEL: Partial right hemicolectomy. No bowel obstruction. Moderately distended fecal filled rectum with slight wall thickening and hazy appearance to the perirectal and presacral fat suggesting a component of inflammation. No additional bowel wall thickening.  PERITONEUM: No ascitesor free air. No abscess.  VESSELS: Atherosclerotic changes.  RETROPERITONEUM/LYMPH NODES: No lymphadenopathy.  ABDOMINAL WALL: Within normal limits.  BONES: Degenerative changes. Scoliosis. Multiple chronic posterior right-sided ribs including the 8th through 10th ribs.    IMPRESSION:  CT chest: Motion limited study. No definite evidence of pneumonia. Questionable mucoid impacted right lower lobe distal airways.    CT abdomen and pelvis: Motion degraded study. Distended fecal filled rectum with surrounding inflammatory change is suggested concerning for an early stercoral colitis.    Cholelithiasis. No secondary signs of acute cholecystitis.    < end of copied text >  
Follow-up Pulm Progress Note    Alert, confused  Sats 96% RA    Medications:  MEDICATIONS  (STANDING):  ALBUTerol   0.042% 1.25 milliGRAM(s) Nebulizer every 8 hours  chlorhexidine 2% Cloths 1 Application(s) Topical daily  folic acid 1 milliGRAM(s) Oral daily  heparin   Injectable 5000 Unit(s) SubCutaneous every 12 hours  losartan 50 milliGRAM(s) Oral daily  polyethylene glycol 3350 17 Gram(s) Oral two times a day  QUEtiapine 12.5 milliGRAM(s) Oral at bedtime  senna 2 Tablet(s) Oral at bedtime    MEDICATIONS  (PRN):  haloperidol    Injectable 1 milliGRAM(s) IV Push every 6 hours PRN Agitation          Vital Signs Last 24 Hrs  T(C): 37 (17 Nov 2021 05:16), Max: 37 (17 Nov 2021 05:16)  T(F): 98.6 (17 Nov 2021 05:16), Max: 98.6 (17 Nov 2021 05:16)  HR: 104 (17 Nov 2021 05:16) (99 - 118)  BP: 131/74 (17 Nov 2021 05:16) (131/74 - 186/83)  BP(mean): --  RR: 18 (17 Nov 2021 05:16) (18 - 18)  SpO2: 96% (17 Nov 2021 05:16) (96% - 96%)          11-16 @ 07:01  -  11-17 @ 07:00  --------------------------------------------------------  IN: 0 mL / OUT: 400 mL / NET: -400 mL          LABS:                        10.9   6.91  )-----------( 259      ( 16 Nov 2021 16:06 )             32.9     11-16    137  |  104  |  11  ----------------------------<  103<H>  3.6   |  22  |  1.03    Ca    9.5      16 Nov 2021 16:06                CULTURES:     Culture - Blood (collected 11-11-21 @ 00:30)  Source: .Blood  Final Report (11-16-21 @ 01:00):    No Growth Final    Culture - Blood (collected 11-11-21 @ 00:30)  Source: .Blood  Final Report (11-16-21 @ 01:00):    No Growth Final        Culture - Urine (collected 11-11-21 @ 00:41)  Source: .Urine catheterization  Final Report (11-11-21 @ 22:28):    No growth            Physical Examination:  PULM: Clear to auscultation bilaterally, no significant sputum production  CVS: S1, S2 heard        RADIOLOGY REVIEWED  CT chest: < from: CT Chest No Cont (11.11.21 @ 00:12) >    FINDINGS:  CHEST:  LUNGS AND LARGE AIRWAYS: Evaluation limited by motion artifact. Right lower lung atelectasis. Questionable mucoid impacted distal airways right lower lobe. No definite evidence of pneumonia. No central endobronchial lesion.  PLEURA: Trace right pleural effusion question.  VESSELS: Aortic valve and coronary artery calcifications.  HEART: Heart size is normal. Trace pericardial effusion.  MEDIASTINUM AND SWETHA: No lymphadenopathy.  CHEST WALL AND LOWER NECK: 1 heterogeneous enlargement of the right lobe of thyroid gland with a dominant heterogeneous 2 cm nodule.    ABDOMEN AND PELVIS:  LIVER: Within normal limits.  BILE DUCTS: Normal caliber.  GALLBLADDER: Cholelithiasis.  SPLEEN: Within normal limits.  PANCREAS: Within normal limits.  ADRENALS: Within normal limits.  KIDNEYS/URETERS: Within normal limits.    BLADDER: Collapsed around a Crooks balloon catheter limiting evaluation.  REPRODUCTIVE ORGANS: Uterus and adnexa within normal limits. Nonspecific fluid in the vagina.    BOWEL: Partial right hemicolectomy. No bowel obstruction. Moderately distended fecal filled rectum with slight wall thickening and hazy appearance to the perirectal and presacral fat suggesting a component of inflammation. No additional bowel wall thickening.  PERITONEUM: No ascitesor free air. No abscess.  VESSELS: Atherosclerotic changes.  RETROPERITONEUM/LYMPH NODES: No lymphadenopathy.  ABDOMINAL WALL: Within normal limits.  BONES: Degenerative changes. Scoliosis. Multiple chronic posterior right-sided ribs including the 8th through 10th ribs.    IMPRESSION:  CT chest: Motion limited study. No definite evidence of pneumonia. Questionable mucoid impacted right lower lobe distal airways.    CT abdomen and pelvis: Motion degraded study. Distended fecal filled rectum with surrounding inflammatory change is suggested concerning for an early stercoral colitis.    Cholelithiasis. No secondary signs of acute cholecystitis.    < end of copied text >

## 2021-11-17 NOTE — DISCHARGE NOTE PROVIDER - CARE PROVIDER_API CALL
Willard Hathaway  ELECTRODIAGNOSTIC MEDICINE  [address], [address]  [place] [address]  Phone: [phone]  Fax: [phone]  Follow Up Time:     Rusaln Jim ()  Gastroenterology; Internal Medicine  [address] [place] [address]  Phone: [phone]  Fax: [phone]  Follow Up Time:     Eliud Torres)  Critical Care Medicine; Internal Medicine; Pulmonary Disease  [address] [place] [address]  Phone: [phone]  Fax: [phone]  Follow Up Time:

## 2021-11-17 NOTE — PROGRESS NOTE ADULT - ASSESSMENT
pt w/ hx htn / hypothyroidism / dementia / recent trauma and d/c a couple of months ago / w/ altered mental status  /? med related /worsening dementia  mucoid plug   pulm eval noted  nebs  colitis / sterococal  gi eval noted  dvt / gi proph   pt  swallow eval noted  c/w puree diet  psych f/u noted    pt is full code   spoke w/ son at length  d/c planning

## 2021-11-17 NOTE — DISCHARGE NOTE PROVIDER - NSDCMRMEDTOKEN_GEN_ALL_CORE_FT
albuterol 1.25 mg/3 mL (0.042%) inhalation solution: 1.25 milligram(s) inhaled every 8 hours  folic acid 1 mg oral tablet: 1 tab(s) orally once a day  levothyroxine 75 mcg (0.075 mg) oral tablet: 1 tab(s) orally once a day  losartan 50 mg oral tablet: 1 tab(s) orally once a day  polyethylene glycol 3350 oral powder for reconstitution: 17 gram(s) orally 2 times a day  QUEtiapine: 12.5 milligram(s) orally once a day (at bedtime)  senna oral tablet: 2 tab(s) orally once a day (at bedtime)   albuterol 1.25 mg/3 mL (0.042%) inhalation solution: 1.25 milligram(s) inhaled every 8 hours  folic acid 1 mg oral tablet: 1 tab(s) orally once a day  levothyroxine 75 mcg (0.075 mg) oral tablet: 1 tab(s) orally once a day  losartan 50 mg oral tablet: 1 tab(s) orally once a day  Melatonin 3 mg oral tablet: 1 tab(s) orally once a day (at bedtime)  mirtazapine 15 mg oral tablet: 1 tab(s) orally once a day (at bedtime)  polyethylene glycol 3350 oral powder for reconstitution: 17 gram(s) orally 2 times a day  QUEtiapine: 12.5 milligram(s) orally once a day (at bedtime)  senna oral tablet: 2 tab(s) orally once a day (at bedtime)   albuterol 1.25 mg/3 mL (0.042%) inhalation solution: 1.25 milligram(s) inhaled every 8 hours  folic acid 1 mg oral tablet: 1 tab(s) orally once a day  levothyroxine 75 mcg (0.075 mg) oral tablet: 1 tab(s) orally once a day  losartan 50 mg oral tablet: 1 tab(s) orally once a day  Melatonin 3 mg oral tablet: 1 tab(s) orally once a day (at bedtime)  mirtazapine 15 mg oral tablet: 1 tab(s) orally once a day (at bedtime)  polyethylene glycol 3350 oral powder for reconstitution: 17 gram(s) orally 2 times a day  QUEtiapine: 12.5 milligram(s) orally once a day (at bedtime)  senna oral tablet: 2 tab(s) orally once a day (at bedtime)  SEROquel 25 mg oral tablet: 1 tab(s) orally every 6 hours, As Needed agitation

## 2021-12-20 DIAGNOSIS — F02.80 DEMENTIA IN OTHER DISEASES CLASSIFIED ELSEWHERE, UNSPECIFIED SEVERITY, WITHOUT BEHAVIORAL DISTURBANCE, PSYCHOTIC DISTURBANCE, MOOD DISTURBANCE, AND ANXIETY: ICD-10-CM

## 2021-12-20 DIAGNOSIS — F33.3 MAJOR DEPRESSIVE DISORDER, RECURRENT, SEVERE WITH PSYCHOTIC SYMPTOMS: ICD-10-CM

## 2022-03-07 NOTE — ED PROVIDER NOTE - INTERNATIONAL TRAVEL
np swab obtained using droplet plus precautions. Pt tolerated well.  Specimen to lab and pt ambulatory out in stable condtiion No

## 2022-05-18 NOTE — H&P ADULT - NEUROLOGICAL
What to expect when recovering from your EGD (esophagogastroduodenoscopy)    For your procedure today you received Anesthesia. Please refer to the handout About Your Monitored Anesthesia Care      Possible side-effects after your EGD:    Nausea may occur. If you have nausea:   â¢ Take sips of white soda, tea, juice or water. â¢ Eat smaller meals (avoid any fatty or deep fried foods). â¢ If nausea lasts more than 24 hours, call your doctor that performed the procedure. A sore throat is a common occurrence after your procedure. If you have a sore throat, pain or discomfort:  â¢ Gargle with a warm salt water rinse. â¢ Try throat lozenges. â¢ You may take acetaminophen (Tylenol) unless instructed otherwise  â¢ If you had an EGD with dilatation, you may have pain behind your breastbone for a short period of time after the procedure. Call your doctor if you have any of the following:  â¢ Signs of bleeding include:  o Vomiting blood or coffee ground-like material.  o Dark, black, or maroon colored stools. â¢ Signs of infection include:  o Temperature over 101 degrees F. and/or chills. o Redness or warmth around IV site. â¢ If you have increased abdominal or chest pain. Diet Instructions:  â¢ Diet Instructions: SOFT DIET REST OF DAY TODAY  â¢ You may resume your normal diet TOMORROW (Thursday). â¢ Continue to drink extra fluids today such as water, juice, Gatorade, etc.   â¢ You may notice more belching or burping than usual; this is normal and should go away within a day or so. The medication you received today may cause dizziness, drowsiness and impaired judgment. â¢ Take it easy for the remainder of today. â¢ You should not drive, operate heavy or potentially harmful equipment, make important legal decisions, or drink alcohol for 24 hours after receiving sedation. â¢ Rise slowly from a sitting or lying position. The medications you received can cause you to become lightheaded or dizzy.   â¢ You may feel sore, stiff, or have slight bruising on your arm(s) from tape, blood pressure cuffs, or the IV site. This is normal and should go away in a few days. You may continue taking the medication that you usually take at home. Findings:   Â· Gastroesophageal reflux disease. Â· Upper esophageal narrowing. Dilated to 18 mm using a Savary dilator. Â· Mild gastritis. RECOMMENDATIONS:  Â· Follow-up biopsies duodenum, antrum, Z-line/mid esophagus. Â· continue PPIs. Handouts given: gastritis, dilation, soft diet    Additional information/questions:   Â· Dr. Mariia Carr office will call you with your biopsy results within 7 days. Please call your doctorâs office if you have not heard from them in 7 days. Â· If you have any questions/concerns before this time, please feel free to call the GI lab at [facility] [phone] In case of emergency, please go to the nearest emergency room for care. Esophageal Dilation     A balloon dilator may be used to widen a stricture in the esophagus. AnÂ esophageal dilation is a procedureÂ used to widen a narrowed section of your esophagus. This is the tube that leads from your throat to your stomach. Narrowing (stricture) of the esophagus can cause problems. These include trouble swallowing. This sheet explains what to expect with esophageal dilation. Yazmin Morn dilation is needed  Several problems can be treated withÂ esophageal dilation. They include:  Â· Peptic stricture. Â This is caused by reflux esophagitis. With this problem, the esophagus is irritated by acid reflux (heartburn). This occurs when acid from your stomach flows back up into the esophagus. Stomach acid damages the lining of the esophagus. This leads to a buildup of scar tissue. As a result, the esophagus is narrowed. Â· Schatzkiâs ring. Â This is an abnormal ring of tissue. It forms where the esophagus meets the stomach. It can cause trouble swallowing.  It can also cause food to get stuck in the esophagus. The cause of this condition is not known. Â· Achalasia. Â This condition stops food and liquids from moving into your stomach from the esophagus. It affects the lower esophageal sphincter (LES). The LES is a muscular ring that opens (relaxes) when you swallow. With achalasia, the LES does not relax. This condition can also cause problems with peristalsis. This is the normal muscular action of the esophagus that moves food into the stomach. Â· Eosinophilic esophagitis. This is a redness and swelling (inflammation) in the esophagus. It's caused by an environmental trigger such as a food allergy. It can lead to pain, trouble swallowing, and strictures. Â· Less common causes of stricture. Other causes of stricture include radiation treatment and cancer. Before you have esophageal dilation  Â· Tell your provider about any medicines you take. This includes prescription medicines, over-the-counter medicines, herbs, vitamins, and other supplements. Be sure to mention aspirin or any blood thinners youâre taking. Â· Let your provider know if you need to take antibiotics before dental procedures. You may need to take them beforeÂ esophageal dilation as well. Â· Tell your provider about any health conditions you have, such as heart or lung disease. Also mention any allergies to medicines. Â· Youâll need to have an empty stomach for the procedure. Follow your providerâs instructions for not eating and drinking before the procedure. Â· Arrange to have a family member or friend drive you home after the procedure. During the procedure  Â· You may be given local anesthesia to numb your throat. Youâll also likely be given medicine to relax you. The procedure takes aboutÂ 15Â minutes. It does not cause trouble breathing. Â· A tube called an endoscope (scope)Â is used. This is a narrow tube with a tiny light and camera at the end. The scope is inserted through your mouth and into your esophagus.  It lets your provider see details… inside your esophagus. To help guide your provider, an imaging method called fluoroscopy may also be used. This creates a moving X-ray image on a computer screen. Â   Â· Next, special tiny tools are carefully guided through your mouth and down into the esophagus. TheyÂ widen the stricture and are then removed. Different types of instruments are used. The type used depends on the size and cause of the stricture. Types include:  ? Balloon dilator. Â A tiny empty balloon is put into the stricture using an endoscope. The balloon is slowly filled with air. The air is removed from the balloon when the stricture is widened to the right size. Balloon dilators are used to treat many types of strictures. ? Guided wire dilator. Â A thin wire is eased down the esophagus. A small tube thatâs wider on one end is guided down the wire. It's put into the stricture to stretch it. These dilators are used to treat all kinds of strictures. ? Bougies. Â These are weighted, cone-shaped tubes. Starting with smaller cones, your provider uses increasingly larger cones until the stricture is stretched the right amount. Bougies are often used to treat strictures that are simple (short, straight, and not very narrow). After the procedure  Â· Youâll be watched closely until your provider says youâre ready to go home. Youâll need to have a friend or family member drive you home. Â· You may have a sore throat for the rest of the day. Â· You may have pain behind your breastbone for a short time afterwards. Â· You can start drinking fluids again after the numbness in your throat goes away. You can resume eating theÂ same day or theÂ next day. Risks and possible complications  Risks and possible complications for esophageal dilation include:  Â· Infection  Â· A tear or hole in the esophagus lining, causing bleeding and possibly needing surgery to fix  Â· Risks of anesthesia  Follow-up  You may need to have the procedure repeated one or more times.  This depends on the cause and extent of the narrowing. Repeat proceduresÂ can allow the dilation to take place more slowly. This reduces the risks of the procedure. If your stricture was caused by reflux esophagitis, youâll likely need to take medicine to treat that condition. Your provider will tell you more. When to call your provider  Call your healthcare provider right away if you have any of the following after the procedure:   Â· Fever of 100.4Â°F (38.0Â°C) or higher, or as directed by your provider  Â· Chest pain  Â· Trouble swallowing  Â· Vomiting blood or material that looks like coffee grounds  Â· Bleeding  Â· Black, tarry, or bloodyÂ stools  Curly last reviewed this educational content on 6/1/2019  Â© 1230-7065 [employer] [address]. All rights reserved. This information is not intended as a substitute for professional medical care. Always follow your healthcare professional's instructions. Soft DietÂ   Your healthcare provider has prescribed a soft diet. This means eating foods that are soft, low in fiber, and easy to digest. This diet is for people with digestive problems. This should not be confused with a soft diet that is prescribed for people with issues chewing and swallowing. A soft diet provides foods that are easy to chew and swallow. It will reduce or prevent stomach pain or discomfort. Foods should be bite-sized and very soft or moist. Follow your healthcare providerâs specific instructions about what foods and drinks you may have. The general guidelines below can help you get started on this diet. Beverages  OK: Milk, tea, coffee, fruit juices, carbonated beverages, nutrition shakes, and drinks (Note: Thin liquids may be hard to swallow. They may need to be thickened.)  Don't have:  All are OK, unless they need to be thickened  Breads and crackers  OK: Refined white, wheat, or seedless rye bread; sherri or soda crackers that have been moistened; plain rolls or bagels; very soft tortillas  Don't have: Whole-grain breads, rolls, or bagels with nuts, raisins, or seeds; crackers, croutons, taco shells  Cereals and grains  OK: Cooked cereals, plain dry cereals that have been moistened, plain macaroni, spaghetti, noodles, rice  Don't have: Whole-grain cereals and granola, or cereals containing bran, raisins, seeds or nuts; coconut; brown or wild rice  Desserts and sweets  OK: Moist cake; soft fruit pie with bottom crust only; soft cookies moistened in milk or other liquid; gelatin, custard, pudding, plain ice cream, plain sherbet, sugar, honey, clear jelly  Don't have: Pastries, desserts, andÂ ice cream that have nuts, coconut, seeds, or dried fruit; popcorn; chips of any kind including potato chips and tortilla chips; jam, marmalade  Eggs and cheese  OK: Poached, soft boiled, or scrambled eggs; cottage cheese, ricotta cheese, cream cheese, cheese sauces, or cheese melted in other dishes  Don't have: Crisp fried eggs, cheese slices and cubes  Fruits  OK: Avocado, banana, baked peeled apple, applesauce, peeled ripe peaches or pears, canned fruit (apricots, cherries, peaches, pears), melons  Don't have: Raw apple, dried fruits, coconut, pineapple, grapes, fruit simran, fruit snacks  Meat and fish  OK: All fresh meat, poultry, or fish that is cookedÂ until tender  Don't have: Meat, fish, or poultry that is fried; tough or stringy meat including shultz, sausage, bratwurst, jerky, corned beef  Other protein foods  OK: Tofu, baked beans,  Don't have: Deep-fried tofu; crunchy peanut or other nut or seed butters; nuts or seeds that are whole or chopped  Soups  OK: All soups, but they may need to be thickened. Thin liquid may be too hard to swallow.   Don't have: Soups made with stringy meat pieces or chunky vegetables  Vegetables  OK: Peeled and well-cooked potatoes or sweet potatoes; fresh, cooked, canned, or frozen vegetables without seeds, skin, or coarse fiber  Don't have: Raw vegetables, deep-fried vegetables (such as tempura), and corn  Curly last reviewed this educational content on 11/1/2019  Â© 8775-8651 The [facility]. All rights reserved. This information is not intended as a substitute for professional medical care. Always follow your healthcare professional's instructions. Gastritis (Adult)    Gastritis isÂ inflammation andÂ irritation of the stomach lining. You can have it for a short time (acute) or it can be long lasting (chronic). Infection with bacteria calledÂ H. pylori most often causes gastritis. Â More than 1 out of 3 people in the [place] have these bacteria in their bodies. In many cases,Â H. pyloriÂ causes no problems or symptoms. But in some people, the infection irritates the stomach lining and causes gastritis. H. pylori may be diagnosed through blood, stool, or breath tests, or by a biopsy during an endoscopy. Other causes of stomach irritation include drinking alcohol, smoking or chewing tobacco, or taking pain-relieving medicines called nonsteroidal anti-inflammatory drugs (NSAIDs), such as aspirin or ibuprofen. Some illegal drugs (such as cocaine) and immune conditions can also cause gastritis. Symptoms of gastritis can include:  Â· Belly pain or bloating  Â· Feeling full quickly  Â· Loss of appetite  Â· Nausea or vomiting  Â· Vomiting blood or having black stools  Â· Feeling more tired than normal  An inflamed and irritated stomach lining is more likely to develop a sore called an ulcer. To help prevent this, gastritis should be evaluated and treated as soon as symptoms occur. Home care  If needed, your healthcare provider may prescribe medicines. If you haveÂ H. pyloriÂ infection, treating it will likely ease your symptoms. Other changes can help reduce stomach irritation and help it heal.   Â· Take prescription medicines as directed. If you have been prescribed medicines forÂ H. pyloriÂ infection, take them as directed.  Take all of the medicine until it's finished or until your provider tells you to stop taking it, even if you start to feel better. Â· Follow your healthcare provider's advice on NSAIDs. Your provider may advise you not to take NSAIDs. If you take daily aspirin for your heart or other health reasons, don't stop without talking with your provider first.  Â· Don't drink alcohol. If you need help stopping your use of alcohol, ask your provider for treatment resources. Â· Stop smoking. Smoking can irritate the stomach and delay healing. As much as possible, stay away from secondhand smoke. If you smoke and have trouble stopping, ask your provider for help. Follow-up care  Follow up with your healthcare provider, or as advised. You may need testing to check for inflammation or an ulcer. When to get medical advice  Call your healthcare provider for any of the following:  Â· Stomach pain that gets worse or moves to the lower right belly (appendix area)  Â· Chest pain that suddenly appears or gets worse, or spreads to the back, neck, shoulder, or arm  Â· Frequent vomiting (canât keep down liquids)  Â· Blood in the stool or vomit (red or black in color)  Â· Feeling weak or dizzy  Â· Shortness of breath  Â· Unexplained weight loss  Â· Fever of 100.4ÂºF (38ÂºC) or higher, or as directed by your healthcare provider  Â· Symptoms that get worse, or new symptoms  Curly last reviewed this educational content on 2/1/2021    Â© 0606-0264 [facility]. All rights reserved. This information is not intended as a substitute for professional medical care. Always follow your healthcare professional's instructions. detailed exam

## 2022-11-25 NOTE — BH CONSULTATION LIAISON ASSESSMENT NOTE - NSBHCONSULTWORKUP_PSY_A_CORE
-CT Chest/Neck showed Multinodular goiter causing rightward deviation of the trachea, without significant airway narrowing. The esophagus appears within normal limits  -ENT consult appreciated.    - currently pt NPO, await official S&S eval   -Continue to monitor BMP and replete electrolyte and needed -CT Chest/Neck showed Multinodular goiter causing rightward deviation of the trachea, without significant airway narrowing. The esophagus appears within normal limits  -ENT consult appreciated.    - pt NPO, official S&S eval: resume diet:  Puree with Thin Liquids  -Continue to monitor BMP and replete electrolyte and needed -CT Chest/Neck showed Multinodular goiter causing rightward deviation of the trachea, without significant airway narrowing. The esophagus appears within normal limits  -ENT consult appreciated.    - pt NPO, official S&S eval: resume diet:  Puree with Thin Liquids  -Continue to monitor BMP and replete electrolyte and needed yes

## 2023-01-01 NOTE — CHART NOTE - NSCHARTNOTEFT_GEN_A_CORE
Incidental findings are findings on history, physical examination, lab work, and imaging that are not directly related to the patient's chief complaint and cause for hospital admission.     1. The incidental findings for this patient are (please list):  - Heterogeneous enlarged thyroid gland. 1.3 cm heterogeneously hypodense right thyroid lobe nodule not well characterized secondary to streak artifact.    - Indeterminant left lower pole renal lesion. Further evaluation with nonemergent renal sonogram may be obtained    2. Were these findings explained to the patient and/or their family (in the event that either the patient requests communication with their family or the patient is unable to understand or remember the findings and plan)?  Yes, incidental findings discussed with patient's family by Hospitalist, Dr. Bustillo.    3. Was a copy of the lab work/ imaging report given to the patient and/or their family?  Yes, CT report printout was provided to patient's son at bedside.    4. Was the patient asked whether they can follow up with their PMD regarding this finding? If the patient says no, or does not have a PMD, you must identify a provider (i.e. Medicine Clinic or specialist) with whom the patient will follow up.  Yes, patient will follow up with their primary care provider: Dr Jeffrey Montalvo [phone]      5. Was the finding documented on the discharge summary?  Yes, this finding will be documented on the discharge summary. 20.86

## 2023-08-29 NOTE — DISCHARGE NOTE PROVIDER - REASON FOR ADMISSION
Found down at home with multiple rib fractures Double Z Plasty Text: The lesion was extirpated to the level of the fat with a #15 scalpel blade. Given the location of the defect, shape of the defect and the proximity to free margins a double Z-plasty was deemed most appropriate for repair. Using a sterile surgical marker, the appropriate transposition arms of the double Z-plasty were drawn incorporating the defect and placing the expected incisions within the relaxed skin tension lines where possible. The area thus outlined was incised deep to adipose tissue with a #15 scalpel blade. The skin margins were undermined to an appropriate distance in all directions utilizing iris scissors. The opposing transposition arms were then transposed and carried over into place in opposite direction and anchored with interrupted buried subcutaneous sutures.

## 2024-02-28 NOTE — PATIENT PROFILE ADULT - NSPRESCRALCFREQ_GEN_A_NUR
Received secure chat patient was returning call.    Spoke with patient relayed below results and recommendations.    Reviewed previous sodium results per patient request.    Patient verbalized understanding and had no other questions or concerns at this time.    Nothing further needed, closing this encounter.   Never

## 2024-04-19 RX ORDER — QUETIAPINE FUMARATE 200 MG/1
1 TABLET, FILM COATED ORAL
Qty: 0 | Refills: 0 | DISCHARGE

## 2024-04-19 RX ORDER — MIRTAZAPINE 45 MG/1
1 TABLET, ORALLY DISINTEGRATING ORAL
Qty: 0 | Refills: 0 | DISCHARGE

## 2024-04-19 RX ORDER — LANOLIN ALCOHOL/MO/W.PET/CERES
1 CREAM (GRAM) TOPICAL
Qty: 0 | Refills: 0 | DISCHARGE

## 2024-04-19 RX ORDER — FUROSEMIDE 40 MG
1 TABLET ORAL
Qty: 0 | Refills: 0 | DISCHARGE

## 2024-04-19 RX ORDER — LOSARTAN POTASSIUM 100 MG/1
1 TABLET, FILM COATED ORAL
Qty: 0 | Refills: 0 | DISCHARGE

## 2024-04-19 RX ORDER — LEVOTHYROXINE SODIUM 125 MCG
1 TABLET ORAL
Qty: 0 | Refills: 0 | DISCHARGE

## 2025-01-11 ENCOUNTER — INPATIENT (INPATIENT)
Facility: HOSPITAL | Age: 89
LOS: 31 days | Discharge: NURSING, REHAB OR LONG-TERM CARE | DRG: 872 | End: 2025-02-12
Attending: INTERNAL MEDICINE | Admitting: STUDENT IN AN ORGANIZED HEALTH CARE EDUCATION/TRAINING PROGRAM
Payer: MEDICARE

## 2025-01-11 VITALS
DIASTOLIC BLOOD PRESSURE: 59 MMHG | RESPIRATION RATE: 26 BRPM | HEART RATE: 113 BPM | HEIGHT: 63 IN | WEIGHT: 130.07 LBS | SYSTOLIC BLOOD PRESSURE: 109 MMHG

## 2025-01-11 DIAGNOSIS — Z87.898 PERSONAL HISTORY OF OTHER SPECIFIED CONDITIONS: Chronic | ICD-10-CM

## 2025-01-11 DIAGNOSIS — Z85.038 PERSONAL HISTORY OF OTHER MALIGNANT NEOPLASM OF LARGE INTESTINE: Chronic | ICD-10-CM

## 2025-01-11 DIAGNOSIS — A41.9 SEPSIS, UNSPECIFIED ORGANISM: ICD-10-CM

## 2025-01-11 PROBLEM — F03.90 UNSPECIFIED DEMENTIA, UNSPECIFIED SEVERITY, WITHOUT BEHAVIORAL DISTURBANCE, PSYCHOTIC DISTURBANCE, MOOD DISTURBANCE, AND ANXIETY: Chronic | Status: ACTIVE | Noted: 2021-11-11

## 2025-01-11 PROBLEM — I10 ESSENTIAL (PRIMARY) HYPERTENSION: Chronic | Status: ACTIVE | Noted: 2021-11-11

## 2025-01-11 LAB
ALBUMIN SERPL ELPH-MCNC: 2.8 G/DL — LOW (ref 3.3–5)
ALBUMIN SERPL ELPH-MCNC: 3 G/DL — LOW (ref 3.3–5)
ALP SERPL-CCNC: 524 U/L — HIGH (ref 40–120)
ALP SERPL-CCNC: 577 U/L — HIGH (ref 40–120)
ALT FLD-CCNC: 104 U/L — HIGH (ref 10–45)
ALT FLD-CCNC: 121 U/L — HIGH (ref 10–45)
ANION GAP SERPL CALC-SCNC: 13 MMOL/L — SIGNIFICANT CHANGE UP (ref 5–17)
ANION GAP SERPL CALC-SCNC: 13 MMOL/L — SIGNIFICANT CHANGE UP (ref 5–17)
APPEARANCE UR: ABNORMAL
APTT BLD: 29.3 SEC — SIGNIFICANT CHANGE UP (ref 24.5–35.6)
APTT BLD: 29.7 SEC — SIGNIFICANT CHANGE UP (ref 24.5–35.6)
AST SERPL-CCNC: 101 U/L — HIGH (ref 10–40)
AST SERPL-CCNC: 79 U/L — HIGH (ref 10–40)
BACTERIA # UR AUTO: ABNORMAL /HPF
BASOPHILS # BLD AUTO: 0.03 K/UL — SIGNIFICANT CHANGE UP (ref 0–0.2)
BASOPHILS # BLD AUTO: 0.03 K/UL — SIGNIFICANT CHANGE UP (ref 0–0.2)
BASOPHILS NFR BLD AUTO: 0.2 % — SIGNIFICANT CHANGE UP (ref 0–2)
BASOPHILS NFR BLD AUTO: 0.3 % — SIGNIFICANT CHANGE UP (ref 0–2)
BILIRUB SERPL-MCNC: 0.5 MG/DL — SIGNIFICANT CHANGE UP (ref 0.2–1.2)
BILIRUB SERPL-MCNC: 0.6 MG/DL — SIGNIFICANT CHANGE UP (ref 0.2–1.2)
BILIRUB UR-MCNC: ABNORMAL
BUN SERPL-MCNC: 31 MG/DL — HIGH (ref 7–23)
BUN SERPL-MCNC: 32 MG/DL — HIGH (ref 7–23)
CALCIUM SERPL-MCNC: 9.3 MG/DL — SIGNIFICANT CHANGE UP (ref 8.4–10.5)
CALCIUM SERPL-MCNC: 9.3 MG/DL — SIGNIFICANT CHANGE UP (ref 8.4–10.5)
CAST: >63 /LPF — HIGH (ref 0–4)
CHLORIDE SERPL-SCNC: 108 MMOL/L — SIGNIFICANT CHANGE UP (ref 96–108)
CHLORIDE SERPL-SCNC: 112 MMOL/L — HIGH (ref 96–108)
CK MB CFR SERPL CALC: 7.8 NG/ML — HIGH (ref 0–3.8)
CK SERPL-CCNC: 70 U/L — SIGNIFICANT CHANGE UP (ref 25–170)
CO2 SERPL-SCNC: 15 MMOL/L — LOW (ref 22–31)
CO2 SERPL-SCNC: 23 MMOL/L — SIGNIFICANT CHANGE UP (ref 22–31)
COLOR SPEC: SIGNIFICANT CHANGE UP
CREAT SERPL-MCNC: 1.62 MG/DL — HIGH (ref 0.5–1.3)
CREAT SERPL-MCNC: 1.72 MG/DL — HIGH (ref 0.5–1.3)
DIFF PNL FLD: ABNORMAL
EGFR: 27 ML/MIN/1.73M2 — LOW
EGFR: 29 ML/MIN/1.73M2 — LOW
EOSINOPHIL # BLD AUTO: 0 K/UL — SIGNIFICANT CHANGE UP (ref 0–0.5)
EOSINOPHIL # BLD AUTO: 0 K/UL — SIGNIFICANT CHANGE UP (ref 0–0.5)
EOSINOPHIL NFR BLD AUTO: 0 % — SIGNIFICANT CHANGE UP (ref 0–6)
EOSINOPHIL NFR BLD AUTO: 0 % — SIGNIFICANT CHANGE UP (ref 0–6)
FLUAV AG NPH QL: DETECTED
FLUBV AG NPH QL: SIGNIFICANT CHANGE UP
GAS PNL BLDA: SIGNIFICANT CHANGE UP
GAS PNL BLDA: SIGNIFICANT CHANGE UP
GAS PNL BLDV: SIGNIFICANT CHANGE UP
GAS PNL BLDV: SIGNIFICANT CHANGE UP
GLUCOSE SERPL-MCNC: 131 MG/DL — HIGH (ref 70–99)
GLUCOSE SERPL-MCNC: 157 MG/DL — HIGH (ref 70–99)
GLUCOSE UR QL: NEGATIVE MG/DL — SIGNIFICANT CHANGE UP
HCT VFR BLD CALC: 37.5 % — SIGNIFICANT CHANGE UP (ref 34.5–45)
HCT VFR BLD CALC: 38.3 % — SIGNIFICANT CHANGE UP (ref 34.5–45)
HGB BLD-MCNC: 11.5 G/DL — SIGNIFICANT CHANGE UP (ref 11.5–15.5)
HGB BLD-MCNC: 11.8 G/DL — SIGNIFICANT CHANGE UP (ref 11.5–15.5)
IMM GRANULOCYTES NFR BLD AUTO: 1.9 % — HIGH (ref 0–0.9)
IMM GRANULOCYTES NFR BLD AUTO: 4.8 % — HIGH (ref 0–0.9)
INR BLD: 1.14 RATIO — SIGNIFICANT CHANGE UP (ref 0.85–1.16)
INR BLD: 1.37 RATIO — HIGH (ref 0.85–1.16)
KETONES UR-MCNC: NEGATIVE MG/DL — SIGNIFICANT CHANGE UP
LEUKOCYTE ESTERASE UR-ACNC: ABNORMAL
LYMPHOCYTES # BLD AUTO: 0.53 K/UL — LOW (ref 1–3.3)
LYMPHOCYTES # BLD AUTO: 0.59 K/UL — LOW (ref 1–3.3)
LYMPHOCYTES # BLD AUTO: 4.7 % — LOW (ref 13–44)
LYMPHOCYTES # BLD AUTO: 4.9 % — LOW (ref 13–44)
MAGNESIUM SERPL-MCNC: 2 MG/DL — SIGNIFICANT CHANGE UP (ref 1.6–2.6)
MCHC RBC-ENTMCNC: 29.9 PG — SIGNIFICANT CHANGE UP (ref 27–34)
MCHC RBC-ENTMCNC: 30.7 G/DL — LOW (ref 32–36)
MCHC RBC-ENTMCNC: 30.8 G/DL — LOW (ref 32–36)
MCHC RBC-ENTMCNC: 30.9 PG — SIGNIFICANT CHANGE UP (ref 27–34)
MCV RBC AUTO: 100.3 FL — HIGH (ref 80–100)
MCV RBC AUTO: 97.7 FL — SIGNIFICANT CHANGE UP (ref 80–100)
MONOCYTES # BLD AUTO: 0.67 K/UL — SIGNIFICANT CHANGE UP (ref 0–0.9)
MONOCYTES # BLD AUTO: 0.72 K/UL — SIGNIFICANT CHANGE UP (ref 0–0.9)
MONOCYTES NFR BLD AUTO: 5.9 % — SIGNIFICANT CHANGE UP (ref 2–14)
MONOCYTES NFR BLD AUTO: 6 % — SIGNIFICANT CHANGE UP (ref 2–14)
MRSA PCR RESULT.: SIGNIFICANT CHANGE UP
NEUTROPHILS # BLD AUTO: 10.59 K/UL — HIGH (ref 1.8–7.4)
NEUTROPHILS # BLD AUTO: 9.41 K/UL — HIGH (ref 1.8–7.4)
NEUTROPHILS NFR BLD AUTO: 84.2 % — HIGH (ref 43–77)
NEUTROPHILS NFR BLD AUTO: 87.1 % — HIGH (ref 43–77)
NITRITE UR-MCNC: NEGATIVE — SIGNIFICANT CHANGE UP
NRBC # BLD: 0 /100 WBCS — SIGNIFICANT CHANGE UP (ref 0–0)
NRBC # BLD: 0 /100 WBCS — SIGNIFICANT CHANGE UP (ref 0–0)
NRBC BLD-RTO: 0 /100 WBCS — SIGNIFICANT CHANGE UP (ref 0–0)
NRBC BLD-RTO: 0 /100 WBCS — SIGNIFICANT CHANGE UP (ref 0–0)
PH UR: 6 — SIGNIFICANT CHANGE UP (ref 5–8)
PHOSPHATE SERPL-MCNC: 4.7 MG/DL — HIGH (ref 2.5–4.5)
PLATELET # BLD AUTO: 289 K/UL — SIGNIFICANT CHANGE UP (ref 150–400)
PLATELET # BLD AUTO: 301 K/UL — SIGNIFICANT CHANGE UP (ref 150–400)
POTASSIUM SERPL-MCNC: 4.3 MMOL/L — SIGNIFICANT CHANGE UP (ref 3.5–5.3)
POTASSIUM SERPL-MCNC: 4.4 MMOL/L — SIGNIFICANT CHANGE UP (ref 3.5–5.3)
POTASSIUM SERPL-SCNC: 4.3 MMOL/L — SIGNIFICANT CHANGE UP (ref 3.5–5.3)
POTASSIUM SERPL-SCNC: 4.4 MMOL/L — SIGNIFICANT CHANGE UP (ref 3.5–5.3)
PROCALCITONIN SERPL-MCNC: 1.67 NG/ML — HIGH (ref 0.02–0.1)
PROT SERPL-MCNC: 7.2 G/DL — SIGNIFICANT CHANGE UP (ref 6–8.3)
PROT SERPL-MCNC: 7.8 G/DL — SIGNIFICANT CHANGE UP (ref 6–8.3)
PROT UR-MCNC: 100 MG/DL
PROTHROM AB SERPL-ACNC: 13.1 SEC — SIGNIFICANT CHANGE UP (ref 9.9–13.4)
PROTHROM AB SERPL-ACNC: 15.7 SEC — HIGH (ref 9.9–13.4)
RBC # BLD: 3.82 M/UL — SIGNIFICANT CHANGE UP (ref 3.8–5.2)
RBC # BLD: 3.84 M/UL — SIGNIFICANT CHANGE UP (ref 3.8–5.2)
RBC # FLD: 14.7 % — HIGH (ref 10.3–14.5)
RBC # FLD: 14.8 % — HIGH (ref 10.3–14.5)
RBC CASTS # UR COMP ASSIST: 45 /HPF — HIGH (ref 0–4)
REVIEW: SIGNIFICANT CHANGE UP
RSV RNA NPH QL NAA+NON-PROBE: SIGNIFICANT CHANGE UP
S AUREUS DNA NOSE QL NAA+PROBE: SIGNIFICANT CHANGE UP
SARS-COV-2 RNA SPEC QL NAA+PROBE: SIGNIFICANT CHANGE UP
SODIUM SERPL-SCNC: 140 MMOL/L — SIGNIFICANT CHANGE UP (ref 135–145)
SODIUM SERPL-SCNC: 144 MMOL/L — SIGNIFICANT CHANGE UP (ref 135–145)
SP GR SPEC: 1.02 — SIGNIFICANT CHANGE UP (ref 1–1.03)
SQUAMOUS # UR AUTO: >36 /HPF — HIGH (ref 0–5)
T3 SERPL-MCNC: 38 NG/DL — LOW (ref 80–200)
TROPONIN T, HIGH SENSITIVITY RESULT: 121 NG/L — HIGH (ref 0–51)
TSH SERPL-MCNC: 6.45 UIU/ML — HIGH (ref 0.27–4.2)
UROBILINOGEN FLD QL: 1 MG/DL — SIGNIFICANT CHANGE UP (ref 0.2–1)
WBC # BLD: 11.18 K/UL — HIGH (ref 3.8–10.5)
WBC # BLD: 12.16 K/UL — HIGH (ref 3.8–10.5)
WBC # FLD AUTO: 11.18 K/UL — HIGH (ref 3.8–10.5)
WBC # FLD AUTO: 12.16 K/UL — HIGH (ref 3.8–10.5)
WBC UR QL: >998 /HPF — HIGH (ref 0–5)

## 2025-01-11 PROCEDURE — 71045 X-RAY EXAM CHEST 1 VIEW: CPT | Mod: 26

## 2025-01-11 PROCEDURE — 99222 1ST HOSP IP/OBS MODERATE 55: CPT

## 2025-01-11 PROCEDURE — 76604 US EXAM CHEST: CPT | Mod: 26

## 2025-01-11 PROCEDURE — 99291 CRITICAL CARE FIRST HOUR: CPT

## 2025-01-11 PROCEDURE — 93010 ELECTROCARDIOGRAM REPORT: CPT

## 2025-01-11 PROCEDURE — 70450 CT HEAD/BRAIN W/O DYE: CPT | Mod: 26

## 2025-01-11 RX ORDER — BUMETANIDE 2 MG/1
4 TABLET ORAL ONCE
Refills: 0 | Status: COMPLETED | OUTPATIENT
Start: 2025-01-11 | End: 2025-01-11

## 2025-01-11 RX ORDER — PIPERACILLIN SODIUM AND TAZOBACTAM SODIUM 2; 250 G/50ML; MG/50ML
3.38 INJECTION, POWDER, FOR SOLUTION INTRAVENOUS ONCE
Refills: 0 | Status: COMPLETED | OUTPATIENT
Start: 2025-01-11 | End: 2025-01-11

## 2025-01-11 RX ORDER — HEPARIN SODIUM,PORCINE 10000/ML
5000 VIAL (ML) INJECTION EVERY 12 HOURS
Refills: 0 | Status: DISCONTINUED | OUTPATIENT
Start: 2025-01-11 | End: 2025-02-12

## 2025-01-11 RX ORDER — NOREPINEPHRINE BITARTRATE 1 MG/ML
0.05 INJECTION, SOLUTION, CONCENTRATE INTRAVENOUS
Qty: 8 | Refills: 0 | Status: DISCONTINUED | OUTPATIENT
Start: 2025-01-11 | End: 2025-01-12

## 2025-01-11 RX ORDER — POLYETHYLENE GLYCOL 3350 17 G/17G
17 POWDER, FOR SOLUTION ORAL DAILY
Refills: 0 | Status: DISCONTINUED | OUTPATIENT
Start: 2025-01-11 | End: 2025-01-12

## 2025-01-11 RX ORDER — VANCOMYCIN HYDROCHLORIDE 50 MG/ML
1000 KIT ORAL ONCE
Refills: 0 | Status: COMPLETED | OUTPATIENT
Start: 2025-01-11 | End: 2025-01-11

## 2025-01-11 RX ORDER — ACETAMINOPHEN 160 MG/5ML
1000 SUSPENSION ORAL ONCE
Refills: 0 | Status: COMPLETED | OUTPATIENT
Start: 2025-01-11 | End: 2025-01-11

## 2025-01-11 RX ORDER — OSELTAMIVIR PHOSPHATE 75 MG/1
30 CAPSULE ORAL DAILY
Refills: 0 | Status: COMPLETED | OUTPATIENT
Start: 2025-01-11 | End: 2025-01-16

## 2025-01-11 RX ORDER — ANTISEPTIC SURGICAL SCRUB 0.04 MG/ML
1 SOLUTION TOPICAL
Refills: 0 | Status: DISCONTINUED | OUTPATIENT
Start: 2025-01-11 | End: 2025-02-12

## 2025-01-11 RX ORDER — LEVOTHYROXINE SODIUM 25 UG/1
60 TABLET ORAL AT BEDTIME
Refills: 0 | Status: DISCONTINUED | OUTPATIENT
Start: 2025-01-11 | End: 2025-02-09

## 2025-01-11 RX ORDER — NALOXONE HYDROCHLORIDE 3 MG/.1ML
2 SPRAY NASAL ONCE
Refills: 0 | Status: COMPLETED | OUTPATIENT
Start: 2025-01-11 | End: 2025-01-11

## 2025-01-11 RX ORDER — IPRATROPIUM BROMIDE AND ALBUTEROL SULFATE .5; 2.5 MG/3ML; MG/3ML
3 SOLUTION RESPIRATORY (INHALATION)
Refills: 0 | Status: COMPLETED | OUTPATIENT
Start: 2025-01-11 | End: 2025-01-11

## 2025-01-11 RX ORDER — NOREPINEPHRINE BITARTRATE 1 MG/ML
0.05 INJECTION, SOLUTION, CONCENTRATE INTRAVENOUS
Qty: 8 | Refills: 0 | Status: DISCONTINUED | OUTPATIENT
Start: 2025-01-11 | End: 2025-01-11

## 2025-01-11 RX ORDER — ALBUMIN HUMAN 50 G/1000ML
100 SOLUTION INTRAVENOUS ONCE
Refills: 0 | Status: COMPLETED | OUTPATIENT
Start: 2025-01-11 | End: 2025-01-11

## 2025-01-11 RX ORDER — OSELTAMIVIR PHOSPHATE 75 MG/1
75 CAPSULE ORAL
Refills: 0 | Status: DISCONTINUED | OUTPATIENT
Start: 2025-01-11 | End: 2025-01-11

## 2025-01-11 RX ORDER — PHENYLEPHRINE HYDROCHLORIDE 10 MG/ML
100 INJECTION INTRAVENOUS ONCE
Refills: 0 | Status: COMPLETED | OUTPATIENT
Start: 2025-01-11 | End: 2025-01-11

## 2025-01-11 RX ORDER — PIPERACILLIN SODIUM AND TAZOBACTAM SODIUM 2; 250 G/50ML; MG/50ML
3.38 INJECTION, POWDER, FOR SOLUTION INTRAVENOUS EVERY 12 HOURS
Refills: 0 | Status: DISCONTINUED | OUTPATIENT
Start: 2025-01-11 | End: 2025-01-13

## 2025-01-11 RX ORDER — IPRATROPIUM BROMIDE AND ALBUTEROL SULFATE .5; 2.5 MG/3ML; MG/3ML
3 SOLUTION RESPIRATORY (INHALATION) EVERY 6 HOURS
Refills: 0 | Status: DISCONTINUED | OUTPATIENT
Start: 2025-01-11 | End: 2025-02-12

## 2025-01-11 RX ORDER — SODIUM CHLORIDE 9 G/ML
1000 INJECTION, SOLUTION INTRAVENOUS
Refills: 0 | Status: DISCONTINUED | OUTPATIENT
Start: 2025-01-11 | End: 2025-01-11

## 2025-01-11 RX ORDER — VANCOMYCIN HYDROCHLORIDE 50 MG/ML
500 KIT ORAL EVERY 24 HOURS
Refills: 0 | Status: DISCONTINUED | OUTPATIENT
Start: 2025-01-12 | End: 2025-01-11

## 2025-01-11 RX ORDER — BACTERIOSTATIC SODIUM CHLORIDE 0.9 %
1000 VIAL (ML) INJECTION ONCE
Refills: 0 | Status: COMPLETED | OUTPATIENT
Start: 2025-01-11 | End: 2025-01-11

## 2025-01-11 RX ADMIN — PIPERACILLIN SODIUM AND TAZOBACTAM SODIUM 25 GRAM(S): 2; 250 INJECTION, POWDER, FOR SOLUTION INTRAVENOUS at 17:22

## 2025-01-11 RX ADMIN — BUMETANIDE 132 MILLIGRAM(S): 2 TABLET ORAL at 21:12

## 2025-01-11 RX ADMIN — Medication 1000 MILLILITER(S): at 09:31

## 2025-01-11 RX ADMIN — NOREPINEPHRINE BITARTRATE 5.53 MICROGRAM(S)/KG/MIN: 1 INJECTION, SOLUTION, CONCENTRATE INTRAVENOUS at 09:59

## 2025-01-11 RX ADMIN — ALBUMIN HUMAN 50 MILLILITER(S): 50 SOLUTION INTRAVENOUS at 20:42

## 2025-01-11 RX ADMIN — PIPERACILLIN SODIUM AND TAZOBACTAM SODIUM 200 GRAM(S): 2; 250 INJECTION, POWDER, FOR SOLUTION INTRAVENOUS at 10:10

## 2025-01-11 RX ADMIN — IPRATROPIUM BROMIDE AND ALBUTEROL SULFATE 3 MILLILITER(S): .5; 2.5 SOLUTION RESPIRATORY (INHALATION) at 09:45

## 2025-01-11 RX ADMIN — PHENYLEPHRINE HYDROCHLORIDE 100 MICROGRAM(S): 10 INJECTION INTRAVENOUS at 09:35

## 2025-01-11 RX ADMIN — VANCOMYCIN HYDROCHLORIDE 250 MILLIGRAM(S): KIT at 10:10

## 2025-01-11 RX ADMIN — IPRATROPIUM BROMIDE AND ALBUTEROL SULFATE 3 MILLILITER(S): .5; 2.5 SOLUTION RESPIRATORY (INHALATION) at 23:56

## 2025-01-11 RX ADMIN — LEVOTHYROXINE SODIUM 60 MICROGRAM(S): 25 TABLET ORAL at 21:12

## 2025-01-11 RX ADMIN — ACETAMINOPHEN 400 MILLIGRAM(S): 160 SUSPENSION ORAL at 09:41

## 2025-01-11 RX ADMIN — IPRATROPIUM BROMIDE AND ALBUTEROL SULFATE 3 MILLILITER(S): .5; 2.5 SOLUTION RESPIRATORY (INHALATION) at 18:00

## 2025-01-11 RX ADMIN — SODIUM CHLORIDE 100 MILLILITER(S): 9 INJECTION, SOLUTION INTRAVENOUS at 15:34

## 2025-01-11 RX ADMIN — NALOXONE HYDROCHLORIDE 2 MILLIGRAM(S): 3 SPRAY NASAL at 09:31

## 2025-01-11 RX ADMIN — IPRATROPIUM BROMIDE AND ALBUTEROL SULFATE 3 MILLILITER(S): .5; 2.5 SOLUTION RESPIRATORY (INHALATION) at 09:50

## 2025-01-11 RX ADMIN — IPRATROPIUM BROMIDE AND ALBUTEROL SULFATE 3 MILLILITER(S): .5; 2.5 SOLUTION RESPIRATORY (INHALATION) at 10:00

## 2025-01-11 RX ADMIN — Medication 5000 UNIT(S): at 17:22

## 2025-01-11 NOTE — H&P ADULT - ASSESSMENT
Assessment:     89y old Female with stated hx significant for dementia, s/p brain tumor resection 1961, HTN, HYPOthyroidism, colon Ca s/p Rt hemicolectomy ('09), 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 Pt found to have DWAIN on CKD, Influenza A +, UTI +, possible RLL pneum with consolidation appreciated on POCUS        Consult called for and pt admitted to MICU for hypercapnic/hypoxic resp failure/septic shock in the setting of possible RLL pneum and UTI      Plan:    ####Neuro####  #==unresponsive in setting of hypoxic/hypercapnic resp failure  #==dementia hx  -Neuro checks q 2 hrs and prn for changes  -activity as tolerated  -physical therapy consult when stable  -obtain CT head to r/o pathology  -hold home meds of quetiapine 12.5 mg qhs; mirtazepine 15 mg qhs  -GOC pt is DNR/DNI with trial of NIV  -obtain palliative consult    ####Pulm####  #==Hypoxic/hypercapnic resp failure  #==possible RLL pneum  -AVAPS therapy -titrate to maintain ph 7.35-7.45; PCO2 35-45; SPO2>92%  -Bronchodilator  therapy q 6 hrs and prn SOB/Wheezes  -Lung protective therapy  -HOB >/= 30 degrees     ####CV####  #==septic shock   -ECG now and q am x3  -Cardiac Enzymes now and q 8 hrs x 3  -Norepi gtt - titrate to MAP >/= 65-70 mmHg  -hold Beta-blockers/Ca+ channel blockers at present    ####GI/####  #==DWAIN on CKD  #==stercoral colitis hx  -npo at present  -strict I & O's - keep even  -Lactated ringers @ 100 cc's/hr x 10 hrs  -bowel regimen with Miralax qd  -senna qd       ####ID####  #==possible RLL pneum  #==UTI  -pan culture  -obtain urine for legionella  -obtain urine for strep pneumoniae  -SARS-CoV-2 1/11/25  - ND  -Influ A 1/11/25 - Detected  -Influ B 1/11/25 - ND  -RSV 1/11/25 - ND  -continue vanco 1 gm IV qd - started in E.D. 1/11/25  -continue zosyn 3.375 gms IV q 8 hrs - started in E.D. 1/11/25  -consider adding azithromycin     ####FEN/ENDO/HEME####  #==transaminitis in setting of septic shock  #==HYPOthyroidism hx  -obtain CMP/Mg++/PO--4/CBC w diff/PT/PTT/INR now and q. a.m.  -abg prn  -trend LFT's  -obtain TSH/Thyroxine/T3  -home med of levothyroxine 75 mcg po qhs - will change to 60 mcg IVP qhs  -DVT prophylaxis with - heparin 5000 units subq q 12 hrs    ####LINES/DRAINS/TUBES/DEVICES####  currently none, except nancy     Assessment:     89y old Female with stated hx significant for dementia, s/p brain tumor resection 1961, HTN, HYPOthyroidism, colon Ca s/p Rt hemicolectomy ('09), 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 Pt found to have DWAIN on CKD, Influenza A +, UTI +, possible RLL pneum with consolidation appreciated on POCUS        Consult called for and pt admitted to MICU for hypercapnic/hypoxic resp failure/septic shock in the setting of possible RLL pneum and UTI      Plan:    ####Neuro####  #==unresponsive in setting of hypoxic/hypercapnic resp failure  #==dementia hx  -Neuro checks q 2 hrs and prn for changes  -activity as tolerated  -physical therapy consult when stable  -obtain CT head to r/o pathology  -hold home meds of quetiapine 12.5 mg qhs; mirtazepine 15 mg qhs  -GOC pt is DNR/DNI with trial of NIV  -obtain palliative consult    ####Pulm####  #==Hypoxic/hypercapnic resp failure  #==possible RLL pneum  -AVAPS therapy -titrate to maintain ph 7.35-7.45; PCO2 35-45; SPO2>92%  -Bronchodilator  therapy q 6 hrs and prn SOB/Wheezes  -Lung protective therapy  -HOB >/= 30 degrees     ####CV####  #==septic shock   -ECG now and q am x3  -Cardiac Enzymes now and q 8 hrs x 3  -Norepi gtt - titrate to MAP >/= 65-70 mmHg  -hold Beta-blockers/Ca+ channel blockers at present    ####GI/####  #==DWAIN on CKD  #==stercoral colitis hx  -npo at present  -strict I & O's - keep even  -Lactated ringers @ 100 cc's/hr x 10 hrs  -bowel regimen with Miralax qd  -senna qd   -consider lactulose for bowel regimen      ####ID####  #==possible RLL pneum  #==UTI  -pan culture  -obtain urine for legionella  -obtain urine for strep pneumoniae  -SARS-CoV-2 1/11/25  - ND  -Influ A 1/11/25 - Detected  -Influ B 1/11/25 - ND  -RSV 1/11/25 - ND  -continue vanco 1 gm IV qd - started in E.D. 1/11/25  -continue zosyn 3.375 gms IV q 8 hrs - started in E.D. 1/11/25  -will add tamiflu 75 mg po q 12 hrs x 5days  -consider adding azithromycin     ####FEN/ENDO/HEME####  #==transaminitis in setting of septic shock  #==HYPOthyroidism hx  -obtain CMP/Mg++/PO--4/CBC w diff/PT/PTT/INR now and q. a.m.  -abg prn  -trend LFT's  -obtain TSH/Thyroxine/T3  -home med of levothyroxine 75 mcg po qhs - will change to 60 mcg IVP qhs  -DVT prophylaxis with - heparin 5000 units subq q 12 hrs    ####LINES/DRAINS/TUBES/DEVICES####  currently none, except nancy     Assessment:     89y old Female with stated hx significant for dementia, s/p brain tumor resection 1961, HTN, HYPOthyroidism, colon Ca s/p Rt hemicolectomy ('09), 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 Pt found to have DWAIN on CKD, Influenza A +, UTI +, possible RLL pneum with consolidation appreciated on POCUS        Consult called for and pt admitted to MICU for hypercapnic/hypoxic resp failure/septic shock in the setting of possible RLL pneum and UTI      Plan:    ####Neuro####  #==unresponsive in setting of hypoxic/hypercapnic resp failure  #==dementia hx  -Neuro checks q 2 hrs and prn for changes  -activity as tolerated  -physical therapy consult when stable  -obtain CT head to r/o pathology  -hold home meds of quetiapine 12.5 mg qhs; mirtazepine 15 mg qhs  -GOC pt is DNR/DNI with trial of NIV  -obtain palliative consult    ####Pulm####  #==Hypoxic/hypercapnic resp failure  #==possible RLL pneum  -AVAPS therapy -titrate to maintain ph 7.35-7.45; PCO2 35-45; SPO2>92%  -Bronchodilator  therapy q 6 hrs and prn SOB/Wheezes  -Lung protective therapy  -HOB >/= 30 degrees     ####CV####  #==septic shock   -ECG now and q am x3  -Cardiac Enzymes now and q 8 hrs x 3  -Norepi gtt - titrate to MAP >/= 65-70 mmHg  -hold Beta-blockers/Ca+ channel blockers at present    ####GI/####  #==DWAIN on CKD  #==stercoral colitis hx  -npo at present  -strict I & O's - keep even  -Lactated ringers @ 100 cc's/hr x 10 hrs  -bowel regimen with Miralax qd  -senna qd   -consider lactulose for bowel regimen      ####ID####  #==possible RLL pneum  #==UTI  -pan culture  -obtain urine for legionella  -obtain urine for strep pneumoniae  -obtain MRSA/MSSA PCR  -SARS-CoV-2 1/11/25  - ND  -Influ A 1/11/25 - Detected  -Influ B 1/11/25 - ND  -RSV 1/11/25 - ND  -continue vanco 1 gm IV qd - started in E.D. 1/11/25  -continue zosyn 3.375 gms IV q 8 hrs - started in E.D. 1/11/25  -will add tamiflu 75 mg po q 12 hrs x 5days  -consider adding azithromycin     ####FEN/ENDO/HEME####  #==transaminitis in setting of septic shock  #==HYPOthyroidism hx  -obtain CMP/Mg++/PO--4/CBC w diff/PT/PTT/INR now and q. a.m.  -abg prn  -trend LFT's  -obtain TSH/Thyroxine/T3  -home med of levothyroxine 75 mcg po qhs - will change to 60 mcg IVP qhs  -DVT prophylaxis with - heparin 5000 units subq q 12 hrs    ####LINES/DRAINS/TUBES/DEVICES####  currently none, except nancy

## 2025-01-11 NOTE — ED PROVIDER NOTE - ATTENDING CONTRIBUTION TO CARE
Chief Complaint:    - Hypoxic respiratory failure     HPI:    - The patient presented to the emergency department with hypoxia secondary to a respiratory failure. The issue started on Thursday when the patient started experiencing symptoms of an upper respiratory infection. The situation worsened leading to hypoxia requiring 15 liters non-rebreather oxygen at her living facility. This morning, due to poor oxygenation, EMS were called for assistance. Upon arrival, EMS found the oxygen tank was depleted and the patient's oxygen saturation was in the 80s. The patient's son, Maykel, mentioned that the patient also has DNI status but would consider resuscitation with cpr.   Past Medical History:    -  Hypothyroidism    -  Hypertension    -  Altered mental status in the past   Family History:    - The patient's son, Maykel, was mentioned in the conversation but no relevant medical conditions in the family were discussed.   Review of Systems:    -  Respiratory: Breath sounds, mild coarseness present, no wheezing    -  Cardiovascular: Likely normotensive    -  Neurological: Altered mental status, unresponsive to sternal rub, non-reactive pupils   Physical Examination:    -  Patient is normocephalic . Pupils measured 1-2 mm and were non-reactive. There is no evidence of trauma. The trachea is midline. Breath sounds were present bilaterally with mild coarseness, no wheezing. Respiratory rate was elevated at 28 to 35 breaths per minute. Patient was not responsive to sternal rub or pain. No deformities of extremities. The patient was not withdrawn to pain in his extremity. They displayed no movement or grimacing to pain. Patient exhibited a tactile fever.   Labs and Studies:    -  IV, CBC, CMP, Chest x-ray, VBG, Blood cultures, Urine culture, Urine analysis   Medical Decision Making:    -  The patient presented with hypoxic respiratory failure likely secondary to an upper respiratory infection. The decision was made to start patient on high flow oxygen, give IV antibiotics including Vancomycin & Zosyn, and complete a comprehensive workup with CBC, CMP, chest x-ray, VBG, blood cultures, urine culture and urine analysis. Patient's severe hypoxia and altered mental status warranted critical care consultation and consideration for vasoactive medication, Norepinephrine, to maintain a mean arterial pressure (MAP) of 60. I plan to have detailed discussion with patient's son Maykel and grandson about the status and goals of patient's care. As the patient needs continued intensive care, a decision was made to admit the patient to the hospital.     Impression:    -  Hypoxic respiratory failure    -  Potential Sepsis

## 2025-01-11 NOTE — ED ADULT NURSE NOTE - OBJECTIVE STATEMENT
89 y/o F BIBEMS complaining of low oxygen and fever. EMS reports coming from nursing home, being treated with antibiotics for pneumonia. EMS reports that the pt was having low oxygen levels in the 80s with good waveform. Pt arrived on nonrebreather with SpO2  in the 80s, unresponsive to stimuli. AAOx0. Breathing spontaneously, labored, and tachypneic. Skin warm to touch and dry.

## 2025-01-11 NOTE — H&P ADULT - CRITICAL CARE ATTENDING COMMENT
89-year-old female history of dementia, brain tumor s/p resection, hypothyroidism, and colon cancer s/p hemicolectomy, who presented from nursing home 1/11/2025 with hypoxemic respiratory failure.  She was initially started on antibiotics at her nursing home due to suspected pneumonia however as it began to worsen she was directed to ED.  On presentation she was found to be minimally responsive with hypoxemic and hypercapnic respiratory failure requiring supplemental oxygen.  VBG 7.07/90.  After patient was on HFNC repeat VBG showing 6.93/119.  MICU was consulted for acute hypoxemic and hypercapnic respiratory failure.  Patient was going to be intubated in the ED however family elected for DNR/DNI with trial of NIV.  Patient was started on AVAPS.  Labs notable for leukocytosis 12.16, Hb 11.5, CR 1.62 (baseline 1.08), Pro-Casper 1.67, UA large LE, many bacteria.  Patient was found RVP showing positive influenza A.  CT head was negative for acute intracranial pathology.  CT chest performed with right lower lobe groundglass and motion artifact potentially representing pneumonia with mucous noted in the airways.      # Encephalopathy due to hypercapnia  # Hypercapnic respiratory failure in the setting of?  PNA, influenza A positive  # UTI  # Septic shock  #DWAIN on CKD  –Continue with AVAPS repeat ABG 7.28/43/174  – Start Tamiflu  – Start Zosyn for possible superimposed bacterial pneumonia  – MRSA PCR will start vancomycin and discontinue if MRSA PCR negative  – Continue with Levophed    DVT PPx: Paren subcu  CODE STATUS: DNR/DNI trial of NIV

## 2025-01-11 NOTE — ED PROVIDER NOTE - CLINICAL SUMMARY MEDICAL DECISION MAKING FREE TEXT BOX
89 brain surgery (1960s w/ residual right facial droop), colon CA s/p right hemicolectomy (2009 w/ Dr. David), HTN, and hypothyroidism presents for hypoxic respiratory failure on high flow nasal canula since thursday evening as per nursing facility. pt arrived to ed not responsive to sternal rub, pupils 2 equal not reactive, 85% on NRB  MAP 60 orally febrile. as per ems, when they arrived to facility the oxygen tank was turned off. concern for anoxic brain injury from hypoxic respiratory failure and sepsis. spoke with son Maykel who is HCP and explained to him that she has been in the nursing home on o2 since thursday, and that when EMS arrived O2 tank was not on, we do not know exactly for how long the tank was off, and our concern is that she is not getting proper oxygenation to her brain, and given she is hypotensive, and not responsive pre intubation, we are not sure that she would one be able to ssurvive the intubation given her hemodynamics, and two would not ensure come off the breathing tube. he states at that time he does nto want her intubated, but wants to wait to get to the hospital to see her before he decides on chest compression. pt receives IVF, tylenol, antibiotics, duonebs. 89 brain surgery (1960s w/ residual right facial droop), colon CA s/p right hemicolectomy (2009 w/ Dr. David), HTN, and hypothyroidism presents for hypoxic respiratory failure on high flow nasal canula since thursday evening as per nursing facility. pt arrived to ed not responsive to sternal rub, pupils 2 equal not reactive, 85% on NRB  MAP 60 orally febrile. as per ems, when they arrived to facility the oxygen tank was turned off. concern for anoxic brain injury from hypoxic and likely hypercarbic respiratory failure and sepsis. spoke with son Maykel who is HCP and explained to him that she has been in the nursing home on o2 since thursday, and that when EMS arrived O2 tank was not on, we do not know exactly for how long the tank was off, and our concern is that she is not getting proper oxygenation to her brain, and given she is hypotensive, and not responsive pre intubation, we are not sure that she would one be able to ssurvive the intubation given her hemodynamics, and two would not ensure come off the breathing tube. he states at that time he does nto want her intubated, but wants to wait to get to the hospital to see her before he decides on chest compression. pt receives IVF, tylenol, antibiotics, duonebs. 89 brain surgery (1960s w/ residual right facial droop), colon CA s/p right hemicolectomy (2009 w/ Dr. David), HTN, and hypothyroidism presents for hypoxic respiratory failure on high flow nasal canula since thursday evening as per nursing facility. pt arrived to ed obtunded, not responsive to sternal rub, pupils 2 equal not reactive, 85% on NRB  MAP 60 orally febrile. as per ems, when they arrived to facility the oxygen tank was turned off. as per EMS pt was full code. preparation for intubation w preoxygenation, levophed given hypotension, and peripheral access was obtained. given pt state and comorbidities, spoke with son Maykel PATINO prior to proceeding. explained concern for anoxic brain injury from hypoxic and likely hypercarbic respiratory failure and sepsis  and explained to him that she has been in the nursing home on o2 since Thursday, and that when EMS arrived O2 tank was not on, we do not know exactly for how long the tank was off, and our concern is that she is not getting proper oxygenation to her brain, and given she is hypotensive, and not responsive pre intubation, she is not hemodynamically stable for intubation and we cannot guarantee if we intubate her she will be able to be extubated.  . he states at that time he does nto want her intubated also states she had prior surgery on her airway, but wants to wait to get to the hospital to see her before he decides on chest compression. pt receives IVF, tylenol, antibiotics, duonebs, is on high flow NC, levophed. MICU consulted.

## 2025-01-11 NOTE — ED PROVIDER NOTE - CONVERSATION DETAILS
Spoke with patric caldwell: son bedside. pt remains obtunded, ph 7.07 hypercarbic resp failure. pt on high flow and levophed. explained to son that despite her oxygen number getting better (now 92%), and her BP being normal, which is normal because of the blood pressure assisting medications, we still do not know how much time patient was not receiving oxygen to her brain, and therefore her brain function on a meaningful level beyond basic reflexes. he states he understands and would still not want intubation but wants to talk to his son prior to making decision about chest compressions. on second discussion with son and grandson, who agreed DNR/DNI, want to proceed with IV pressors and antibiotics at this time.

## 2025-01-11 NOTE — H&P ADULT - RESPIRATORY COMMENTS
POCUS with A line predominant, few focal B lines mostly in Left lung field, small area of consolidation in RLL field, mod/large Lt pleural effusion with LLL field atelectasis.

## 2025-01-11 NOTE — ED PROVIDER NOTE - PROGRESS NOTE DETAILS
Caitlin Fishman PGY3  son bedside. explained to son that despite her oxygen number getting better (now 92%), and her BP being normal, which is normal because of the blood pressure assisting medications, we still do not know how much time patient was not receiving oxygen to her brain, and therefore her brain function on a meaningful level beyond basic reflexes. he states he understands and would still nto want intubation but wants to talk to his son prior to making decision about chest compressions Caitlin Fishman PGY3  son bedside. pt remains obtunded, ph 7.07 hypercarbic resp failure. pt on high flow and levophed. explained to son that despite her oxygen number getting better (now 92%), and her BP being normal, which is normal because of the blood pressure assisting medications, we still do not know how much time patient was not receiving oxygen to her brain, and therefore her brain function on a meaningful level beyond basic reflexes. he states he understands and would still nto want intubation but wants to talk to his son prior to making decision about chest compressions

## 2025-01-11 NOTE — ED ADULT NURSE NOTE - NSFALLHARMRISKINTERV_ED_ALL_ED

## 2025-01-11 NOTE — H&P ADULT - NSICDXPASTMEDICALHX_GEN_ALL_CORE_FT
PAST MEDICAL HISTORY:  Dementia     HTN (hypertension)     Hypertension     Hypothyroidism     Other dementia

## 2025-01-11 NOTE — ED ADULT NURSE NOTE - NS ED NOTE ABUSE RESPONSE YN
[facility] SOCIAL WORK NOTE     Patient ID: Karina is a 64 year old female.seen for: CHF, difficulty walking, transient cerebral ischemic attack.     Primary Care Giver: Eduardo Alan Phone number: [phone]  Address: [address] [place] [address]        ADVANCE DIRECTIVES:  Power of  Status:  Not Activated  Code Status:  not specified  Advanced Directive Forms: discussed     REASON FOR VISIT/CC: Supportive counseling/community resources     History of Present Illness      Coping status: good  Mental Status: good  Support System: good     Financial      Financial Status: adequate  Comments: States income is adequate to monthly budget     Goals     Patient's/Family Goals: Improve mobility to regain independence  Assessment: Writer called to follow-up with patient regarding progress with resolving insurance issue.    States that she is working on switching Medicare insurance to an HMO that [facility] would accept.     States that she is also going to be calling insurance tomorrow to discuss possible solutions to reactivate her insurance.      Writer encouraged patient to due that prior to [facility]'s discharge due to no active insurance.     Reviewed patient's goal of wanting to take CT with an without contrast. States that she is still nervous to discover results, but is happier knowing more information regarding her current medical status.    Actively listened to patient as she disclosed holiday and visits with her son, daughter-in-laws, and grandchildren. Commended patient on practicing positive self-talk in order to help decrease anxious mood during her visits and being social with family. Encouraged patient to continue actively practicing positive self-talk. Commended patient on continuing to acknowledge positives during each day.    Will continue to follow via telephone to provide supportive counseling regarding anxious mood.    Telephone Visit: 12//26/19 1:45p-2p     Plan  Needs: Continue to follow to  provide supportive counseling and ensure patient is compliant with medical advice from APN and RN.   Referral:  None, at this time.  :  Allyson Justice LCSW  Phone Number: [phone]      Allyson Justice LCSW   Unable to assess due to medical condition

## 2025-01-11 NOTE — ED PROVIDER NOTE - PHYSICAL EXAMINATION
GENERAL: well appearing in no acute distress, non-toxic appearing  HEAD: normocephalic, atraumatic  HEENT: pupils 2 equal not reactive  CARDIAC: fast irregular  PULM: diminished breath sounds b/l, inspiratory wheezing, abdominal breathing  GI: abdomen nondistended, soft, nontender, no guarding, rebound tenderness  NEURO: not responsive to sternal rub, not responsive to pain in extremities  MSK: no peripheral edema, no calf tenderness b/l

## 2025-01-11 NOTE — CONSULT NOTE ADULT - SUBJECTIVE AND OBJECTIVE BOX
CHIEF COMPLAINT:    HPI:      89 yr old female with PMHx dementia, s/p brain tumor resection 1961, HTN, HYPOthyroidism, colon Ca s/p Rt hemicolectomy ('09), 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), who [facility] home via EMS with hypoxic resp failure since 1/9/25  treated with HFNC, pt with recent treatment for pneum. EMS endorsed nursing home facility O2 tank found off, and placed pt on NRB. In E.D. pt found to have hypoxic/hypercapnic resp failure with SPO2 of 86-88, Vph of 7.07, VPCO2 90.             PAST MEDICAL & SURGICAL HISTORY:  Hypertension    Hypothyroidism    HTN (hypertension)    Dementia    Other dementia    History of colon cancer  s/p Resection in 2009, Dr. David    H/O brain tumor  History of brain tumor resection in 1961.    FAMILY HISTORY:      SOCIAL HISTORY:  Smoking: [ ] Never Smoked [ ] Former Smoker (__ packs x ___ years) [ ] Current Smoker  (__ packs x ___ years)  Substance Use: [ ] Never Used [ ] Used ____  EtOH Use:  Marital Status: [ ] Single [ ]  [ ]  [ ]   Sexual History:   Occupation:  Recent Travel:  Country of Birth:  Advance Directives:    Allergies    clindamycin (Unknown)  shellfish (Unknown)  strawberry (Rash)  penicillin (Unknown)  clindamycin (Other)  strawberry (Unknown)  IV Contrast (Other)  IV Contrast (Unknown)    Intolerances        HOME MEDICATIONS:    REVIEW OF SYSTEMS:            OBJECTIVE:  ICU Vital Signs Last 24 Hrs  T(C): --  T(F): --  HR: 112 (11 Jan 2025 10:06) (112 - 113)  BP: 109/59 (11 Jan 2025 09:26) (109/59 - 109/59)  BP(mean): --  ABP: --  ABP(mean): --  RR: 24 (11 Jan 2025 10:06) (24 - 26)  SpO2: 92% (11 Jan 2025 10:06) (92% - 92%)    O2 Parameters below as of 11 Jan 2025 10:06  Patient On (Oxygen Delivery Method): nasal cannula, high flow  O2 Flow (L/min): 50  O2 Concentration (%): 100          CAPILLARY BLOOD GLUCOSE      POCT Blood Glucose.: 116 mg/dL (11 Jan 2025 09:44)      PHYSICAL EXAM:        HOSPITAL MEDICATIONS:  MEDICATIONS  (STANDING):  acetaminophen   IVPB .. 1000 milliGRAM(s) IV Intermittent Once  albuterol/ipratropium for Nebulization.. 3 milliLiter(s) Nebulizer every 20 minutes  naloxone Injectable 2 milliGRAM(s) IV Push Once  norepinephrine Infusion 0.05 MICROgram(s)/kG/Min (5.53 mL/Hr) IV Continuous <Continuous>  piperacillin/tazobactam IVPB... 3.375 Gram(s) IV Intermittent once  vancomycin  IVPB. 1000 milliGRAM(s) IV Intermittent once    MEDICATIONS  (PRN):      LABS:                        11.8   11.18 )-----------( 301      ( 11 Jan 2025 10:06 )             38.3     Hgb Trend: 11.8<--        Creatinine Trend:         Venous Blood Gas:  01-11 @ 10:06  7.07/90/67/26/89.2  VBG Lactate: 0.8      MICROBIOLOGY:     RADIOLOGY:  [ ] Reviewed and interpreted by me    EKG:        Abigail ANP-BC (ext. 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 from nursing home via EMS with hypoxic resp failure since 1/9/25  treated with HFNC, pt with recent treatment for pneum. EMS endorsed nursing home facility O2 tank found off, and placed pt on NRB. In E.D. pt found to have hypoxic/hypercapnic resp failure with SPO2 of 86-88, Vph of 7.07, VPCO2 90. In addition found to have Influenza A positive, DWAIN with sCr of 1.62 on CKD2 (baseline 1.08-1.23 [8/2024]), transaminitis - alk-phos 577, ,             PAST MEDICAL & SURGICAL HISTORY:  Hypertension    Hypothyroidism    HTN (hypertension)    Dementia    Other dementia    History of colon cancer  s/p Resection in 2009, Dr. David    H/O brain tumor  History of brain tumor resection in 1961.    FAMILY HISTORY:      SOCIAL HISTORY:  Smoking: [ ] Never Smoked [ ] Former Smoker (__ packs x ___ years) [ ] Current Smoker  (__ packs x ___ years)  Substance Use: [ ] Never Used [ ] Used ____  EtOH Use:  Marital Status: [ ] Single [ ]  [ ]  [ ]   Sexual History:   Occupation:  Recent Travel:  Country of Birth:  Advance Directives:    Allergies    clindamycin (Unknown)  shellfish (Unknown)  strawberry (Rash)  penicillin (Unknown)  clindamycin (Other)  strawberry (Unknown)  IV Contrast (Other)  IV Contrast (Unknown)    Intolerances        HOME MEDICATIONS:    REVIEW OF SYSTEMS:            OBJECTIVE:  ICU Vital Signs Last 24 Hrs  T(C): --  T(F): --  HR: 112 (11 Jan 2025 10:06) (112 - 113)  BP: 109/59 (11 Jan 2025 09:26) (109/59 - 109/59)  BP(mean): --  ABP: --  ABP(mean): --  RR: 24 (11 Jan 2025 10:06) (24 - 26)  SpO2: 92% (11 Jan 2025 10:06) (92% - 92%)    O2 Parameters below as of 11 Jan 2025 10:06  Patient On (Oxygen Delivery Method): nasal cannula, high flow  O2 Flow (L/min): 50  O2 Concentration (%): 100          CAPILLARY BLOOD GLUCOSE      POCT Blood Glucose.: 116 mg/dL (11 Jan 2025 09:44)      PHYSICAL EXAM:        HOSPITAL MEDICATIONS:  MEDICATIONS  (STANDING):  acetaminophen   IVPB .. 1000 milliGRAM(s) IV Intermittent Once  albuterol/ipratropium for Nebulization.. 3 milliLiter(s) Nebulizer every 20 minutes  naloxone Injectable 2 milliGRAM(s) IV Push Once  norepinephrine Infusion 0.05 MICROgram(s)/kG/Min (5.53 mL/Hr) IV Continuous <Continuous>  piperacillin/tazobactam IVPB... 3.375 Gram(s) IV Intermittent once  vancomycin  IVPB. 1000 milliGRAM(s) IV Intermittent once    MEDICATIONS  (PRN):      LABS:                        11.8   11.18 )-----------( 301      ( 11 Jan 2025 10:06 )             38.3     Hgb Trend: 11.8<--        Creatinine Trend:         Venous Blood Gas:  01-11 @ 10:06  7.07/90/67/26/89.2  VBG Lactate: 0.8      MICROBIOLOGY:     RADIOLOGY:  [ ] Reviewed and interpreted by me    EKG:        Abigail ANP-BC (ext. 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 from nursing home via EMS with hypoxic resp failure since 1/9/25  treated with HFNC, son endorsed pt on 1/9/25 with suspected pneum and was ordered for antibx treatment. EMS endorsed nursing home facility O2 tank found off, and placed pt on NRB.        In E.D. pt found to be obtunded and to have hypoxic/hypercapnic resp failure with SPO2 of 86-88, Vph of 7.07, VPCO2 90, placed on HFNC with FIO2 of 100%, 50 liter flow. In addition found to have Influenza A positive, DWAIN with sCr of 1.62 on CKD2 (baseline 1.08-1.23 [8/2024]), transaminitis - alk-phos 577, , , +UA with large leuk-est, neg nitrite. SBP of 98/46 - 107/46. Pt received vanco/zosyn, 1 liter NS IVF bolus, pt placed on norepi gtt. Pt received narcan 0.2 mg IVP x1, albuterol nebs x3.      Consult called for hypercapnic/hypoxic resp failure    upon consult POCUS with A line predominant, few focal B lines mostly in Left lung field, small area of consolidation in RLL field, mod/large Lt pleural effusion with LLL field atelectasis. Pt with initial norepi at 0.13 mcg/min, titrated down to 0.05 mcg/min over 35 minutes (130/64 -> 112/64       PAST MEDICAL & SURGICAL HISTORY:  Hypertension    Hypothyroidism    HTN (hypertension)    Dementia    Other dementia    History of colon cancer  s/p Resection in 2009, Dr. David    H/O brain tumor  History of brain tumor resection in 1961.    FAMILY HISTORY:      SOCIAL HISTORY:  Smoking: [ ] Never Smoked [ ] Former Smoker (__ packs x ___ years) [ ] Current Smoker  (__ packs x ___ years)  Substance Use: [ ] Never Used [ ] Used ____  EtOH Use:  Marital Status: [ ] Single [ ]  [ ]  [ ]   Sexual History:   Occupation:  Recent Travel:  Country of Birth:  Advance Directives:    Allergies    clindamycin (Unknown)  shellfish (Unknown)  strawberry (Rash)  penicillin (Unknown)  clindamycin (Other)  strawberry (Unknown)  IV Contrast (Other)  IV Contrast (Unknown)    Intolerances        HOME MEDICATIONS:    REVIEW OF SYSTEMS:  unable due to unresponsiveness        OBJECTIVE:  ICU Vital Signs Last 24 Hrs  T(C): --  T(F): --  HR: 112 (11 Jan 2025 10:06) (112 - 113)  BP: 109/59 (11 Jan 2025 09:26) (109/59 - 109/59)  BP(mean): --  ABP: --  ABP(mean): --  RR: 24 (11 Jan 2025 10:06) (24 - 26)  SpO2: 92% (11 Jan 2025 10:06) (92% - 92%)    O2 Parameters below as of 11 Jan 2025 10:06  Patient On (Oxygen Delivery Method): nasal cannula, high flow  O2 Flow (L/min): 50  O2 Concentration (%): 100    CAPILLARY BLOOD GLUCOSE      POCT Blood Glucose.: 116 mg/dL (11 Jan 2025 09:44)      PHYSICAL EXAM:  GENERAL:     unresponsive, tachypneic, currently protecting airway well-groomed, well-developed    HEAD:       Atraumatic, Normocephalic    EYES:        PERRL 2 sluggish, conjunctiva and sclera clear    ENMT:      No oropharyngeal exudates, erythema or lesions,  Moist mucous membranes    NECK:       Supple, no cervical lymphadenopathy, no JVD    NERVOUS SYSTEM:     unresponsive to painful/tactile/verbal stimuli    CHEST/LUNG:       orally intubated, triggers vent by    Breaths, Pip    Ppl   DP   .Breath sounds bilaterally,  No crackles, rhonchi, wheezing, or rubs. POCUS     HEART:       cardiac monitor    ; S1/S2 No murmurs, rubs, or gallops, POCUS    ABDOMEN:       Soft, Nontender, Nondistended; Bowel sounds present, Bladder non distended, non palpable    EXTREMITIES:       Pulses palpable radial pulses 2+ bilat, DP/PT 1+/1+ bilat, without clubbing, cyanosis. Digits warm to touch with good cap refill < 3 secs    SKIN:      warm, dry, intact, normal color, no rash or abnormal lesions, without palpable nodes      HOSPITAL MEDICATIONS:  MEDICATIONS  (STANDING):  acetaminophen   IVPB .. 1000 milliGRAM(s) IV Intermittent Once  albuterol/ipratropium for Nebulization.. 3 milliLiter(s) Nebulizer every 20 minutes  naloxone Injectable 2 milliGRAM(s) IV Push Once  norepinephrine Infusion 0.05 MICROgram(s)/kG/Min (5.53 mL/Hr) IV Continuous <Continuous>  piperacillin/tazobactam IVPB... 3.375 Gram(s) IV Intermittent once  vancomycin  IVPB. 1000 milliGRAM(s) IV Intermittent once    MEDICATIONS  (PRN):      LABS:                        11.8   11.18 )-----------( 301      ( 11 Jan 2025 10:06 )             38.3     Hgb Trend: 11.8<--        Creatinine Trend:         Venous Blood Gas:  01-11 @ 10:06  7.07/90/67/26/89.2  VBG Lactate: 0.8      MICROBIOLOGY:     RADIOLOGY:  [ ] Reviewed and interpreted by me    EKG:        Abigail ANP-BC (ext. [id number])

## 2025-01-11 NOTE — CHART NOTE - NSCHARTNOTEFT_GEN_A_CORE
:  Willard Rangel    INDICATION: hypoxic/hypercapnic resp failure/septic shock in setting of possible RLL pneum/UTI    PROCEDURE:  [xx ] LIMITED ECHO  [xx ] LIMITED CHEST  [ ] LIMITED RETROPERITONEAL  [ ] LIMITED ABDOMINAL  [ ] LIMITED DVT  [ ] NEEDLE GUIDANCE VASCULAR  [ ] NEEDLE GUIDANCE THORACENTESIS  [ ] NEEDLE GUIDANCE PARACENTESIS  [ ] NEEDLE GUIDANCE PERICARDIOCENTESIS  [ ] OTHER    FINDINGS:  A line predominant with focal B lines primarily in Lt lung field, small Rt pleural effusion, mod Lt to large left pleural effusion, small area of consolidation in RLL field    difficult cardiac windows, however with apical 4 chamber, slight diminished LVFx, RV<LV, VTI of 13.6, IVC 1.55 with minimal variability    INTERPRETATION:  Corina predominant  few focal B lines  small Rt, mod-large Lt pleural effusions  small area of consolidation in RLL field    difficult cardiac windows  apical 4 view  slight diminished LVFx  VTI 13.6  IVC 1.55 with minimal variability  RV<LV    in setting of hypoxic/hypercapnic resp failure  septic shock  possible RLL pneum  positve UTI :  Willard Rangel    INDICATION: hypoxic/hypercapnic resp failure/septic shock in setting of possible RLL pneum/UTI    PROCEDURE:  [xx ] LIMITED ECHO  [xx ] LIMITED CHEST  [ ] LIMITED RETROPERITONEAL  [ ] LIMITED ABDOMINAL  [ ] LIMITED DVT  [ ] NEEDLE GUIDANCE VASCULAR  [ ] NEEDLE GUIDANCE THORACENTESIS  [ ] NEEDLE GUIDANCE PARACENTESIS  [ ] NEEDLE GUIDANCE PERICARDIOCENTESIS  [ ] OTHER    FINDINGS:  A line predominant with focal B lines primarily in Lt lung field, small Rt pleural effusion, mod Lt to large left pleural effusion, small area of consolidation in RLL field    difficult cardiac windows, however with apical 4 chamber, slight diminished LVFx, RV<LV, VTI of 13.6, IVC 1.55 with minimal variability    INTERPRETATION:  Corina predominant  few focal B lines  small Rt, mod-large Lt pleural effusions  small area of consolidation in RLL field    difficult cardiac windows  apical 4 view  slight diminished LVFx  VTI 13.6  IVC 1.55 with minimal variability  RV<LV    in setting of hypoxic/hypercapnic resp failure  septic shock  possible RLL pneum  positve UTI    Images uploaded to [employer]    PROCEDURE SUPERVISORY NOTE:    Supervisory Statement:  I was immediately available during the procedure.

## 2025-01-11 NOTE — PATIENT PROFILE ADULT - FALL HARM RISK - HARM RISK INTERVENTIONS

## 2025-01-11 NOTE — H&P ADULT - HISTORY OF PRESENT ILLNESS
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 from nursing home via EMS with hypoxic resp failure since 1/9/25 secondary to suspected pneum, treated with HFNC, son endorsed pt was ordered for antibx treatment at time as well. EMS endorsed nursing home facility O2 tank found off, and placed pt on NRB.        In E.D. pt found to be obtunded/unresponsive and to have hypoxic/hypercapnic resp failure with SPO2 of 86-88, Vph of 7.07, VPCO2 90, placed on HFNC with FIO2 of 100%, 50 liter flow with improvement to 92-94% . In addition found to have Influenza A positive, DWAIN with sCr of 1.62 on CKD2 (baseline 1.08-1.23 [8/2024]), transaminitis - alk-phos 577, , , +UA with large leuk-est, neg nitrite. SBP of 98/46 - 107/46. Pt received vanco/zosyn, 1 liter NS IVF bolus, pt placed on norepi gtt. Pt received narcan 0.2 mg IVP x1, albuterol nebs x3.        upon consult POCUS with A line predominant, few focal B lines mostly in Left lung field, small area of consolidation in RLL field, mod/large Lt pleural effusion with LLL field atelectasis. Pt with initial norepi at 0.13 mcg/min, titrated down to 0.05 mcg/min over 35 minutes (130/64 -> 112/64),       Consult called for and pt admitted to MICU for hypercapnic/hypoxic resp failure/septic shock in the setting of possible RLL pneum and UTI      89 yr old female with PMHx dementia, s/p brain tumor resection 1961 with residual Lt sided facial droop, HTN, HYPOthyroidism, colon Ca s/p Rt hemicolectomy ('09), 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 from nursing home via EMS with hypoxic resp failure since 1/9/25 secondary to suspected pneum, treated with HFNC, son endorsed pt was ordered for antibx treatment at time as well. EMS endorsed nursing home facility O2 tank found off, and placed pt on NRB.        In E.D. pt found to be obtunded/unresponsive and to have hypoxic/hypercapnic resp failure with SPO2 of 86-88, Vph of 7.07, VPCO2 90, placed on HFNC with FIO2 of 100%, 50 liter flow with improvement to 92-94% . In addition found to have Influenza A positive, DWAIN with sCr of 1.62 on CKD2 (baseline 1.08-1.23 [8/2024]), transaminitis - alk-phos 577, , , +UA with large leuk-est, neg nitrite. SBP of 98/46 - 107/46. Pt received vanco/zosyn, 1 liter NS IVF bolus, pt placed on norepi gtt. Pt received narcan 0.2 mg IVP x1, albuterol nebs x3.        upon consult POCUS with A line predominant, few focal B lines mostly in Left lung field, small area of consolidation in RLL field, mod/large Lt pleural effusion with LLL field atelectasis. Pt with initial norepi at 0.13 mcg/min, titrated down to 0.05 mcg/min over 35 minutes (130/64 -> 112/64),       Consult called for and pt admitted to MICU for hypercapnic/hypoxic resp failure/septic shock in the setting of possible RLL pneum and UTI

## 2025-01-12 LAB
ALBUMIN SERPL ELPH-MCNC: 2.7 G/DL — LOW (ref 3.3–5)
ALBUMIN SERPL ELPH-MCNC: 2.9 G/DL — LOW (ref 3.3–5)
ALP SERPL-CCNC: 319 U/L — HIGH (ref 40–120)
ALP SERPL-CCNC: 341 U/L — HIGH (ref 40–120)
ALT FLD-CCNC: 63 U/L — HIGH (ref 10–45)
ALT FLD-CCNC: 67 U/L — HIGH (ref 10–45)
ANION GAP SERPL CALC-SCNC: 12 MMOL/L — SIGNIFICANT CHANGE UP (ref 5–17)
ANION GAP SERPL CALC-SCNC: 15 MMOL/L — SIGNIFICANT CHANGE UP (ref 5–17)
APTT BLD: 27.2 SEC — SIGNIFICANT CHANGE UP (ref 24.5–35.6)
AST SERPL-CCNC: 34 U/L — SIGNIFICANT CHANGE UP (ref 10–40)
AST SERPL-CCNC: 44 U/L — HIGH (ref 10–40)
BASOPHILS # BLD AUTO: 0.01 K/UL — SIGNIFICANT CHANGE UP (ref 0–0.2)
BASOPHILS NFR BLD AUTO: 0.1 % — SIGNIFICANT CHANGE UP (ref 0–2)
BILIRUB SERPL-MCNC: 0.6 MG/DL — SIGNIFICANT CHANGE UP (ref 0.2–1.2)
BILIRUB SERPL-MCNC: 0.6 MG/DL — SIGNIFICANT CHANGE UP (ref 0.2–1.2)
BLD GP AB SCN SERPL QL: NEGATIVE — SIGNIFICANT CHANGE UP
BUN SERPL-MCNC: 36 MG/DL — HIGH (ref 7–23)
BUN SERPL-MCNC: 37 MG/DL — HIGH (ref 7–23)
CALCIUM SERPL-MCNC: 9.1 MG/DL — SIGNIFICANT CHANGE UP (ref 8.4–10.5)
CALCIUM SERPL-MCNC: 9.5 MG/DL — SIGNIFICANT CHANGE UP (ref 8.4–10.5)
CHLORIDE SERPL-SCNC: 107 MMOL/L — SIGNIFICANT CHANGE UP (ref 96–108)
CHLORIDE SERPL-SCNC: 110 MMOL/L — HIGH (ref 96–108)
CK MB BLD-MCNC: 4.6 % — HIGH (ref 0–3.5)
CK MB BLD-MCNC: 5.1 % — HIGH (ref 0–3.5)
CK MB CFR SERPL CALC: 6.6 NG/ML — HIGH (ref 0–3.8)
CK MB CFR SERPL CALC: 6.6 NG/ML — HIGH (ref 0–3.8)
CK MB CFR SERPL CALC: 7.6 NG/ML — HIGH (ref 0–3.8)
CK SERPL-CCNC: 142 U/L — SIGNIFICANT CHANGE UP (ref 25–170)
CK SERPL-CCNC: 148 U/L — SIGNIFICANT CHANGE UP (ref 25–170)
CK SERPL-CCNC: 39 U/L — SIGNIFICANT CHANGE UP (ref 25–170)
CO2 SERPL-SCNC: 17 MMOL/L — LOW (ref 22–31)
CO2 SERPL-SCNC: 20 MMOL/L — LOW (ref 22–31)
CREAT SERPL-MCNC: 1.96 MG/DL — HIGH (ref 0.5–1.3)
CREAT SERPL-MCNC: 2.09 MG/DL — HIGH (ref 0.5–1.3)
CULTURE RESULTS: SIGNIFICANT CHANGE UP
EGFR: 21 ML/MIN/1.73M2 — LOW
EGFR: 23 ML/MIN/1.73M2 — LOW
EOSINOPHIL # BLD AUTO: 0 K/UL — SIGNIFICANT CHANGE UP (ref 0–0.5)
EOSINOPHIL NFR BLD AUTO: 0 % — SIGNIFICANT CHANGE UP (ref 0–6)
GAS PNL BLDA: SIGNIFICANT CHANGE UP
GAS PNL BLDA: SIGNIFICANT CHANGE UP
GLUCOSE BLDC GLUCOMTR-MCNC: 129 MG/DL — HIGH (ref 70–99)
GLUCOSE SERPL-MCNC: 106 MG/DL — HIGH (ref 70–99)
GLUCOSE SERPL-MCNC: 140 MG/DL — HIGH (ref 70–99)
HCT VFR BLD CALC: 31.1 % — LOW (ref 34.5–45)
HGB BLD-MCNC: 9.9 G/DL — LOW (ref 11.5–15.5)
IMM GRANULOCYTES NFR BLD AUTO: 0.7 % — SIGNIFICANT CHANGE UP (ref 0–0.9)
INR BLD: 1.31 RATIO — HIGH (ref 0.85–1.16)
LEGIONELLA AG UR QL: NEGATIVE — SIGNIFICANT CHANGE UP
LYMPHOCYTES # BLD AUTO: 0.52 K/UL — LOW (ref 1–3.3)
LYMPHOCYTES # BLD AUTO: 4.2 % — LOW (ref 13–44)
MAGNESIUM SERPL-MCNC: 1.8 MG/DL — SIGNIFICANT CHANGE UP (ref 1.6–2.6)
MAGNESIUM SERPL-MCNC: 2.1 MG/DL — SIGNIFICANT CHANGE UP (ref 1.6–2.6)
MCHC RBC-ENTMCNC: 31.1 PG — SIGNIFICANT CHANGE UP (ref 27–34)
MCHC RBC-ENTMCNC: 31.8 G/DL — LOW (ref 32–36)
MCV RBC AUTO: 97.8 FL — SIGNIFICANT CHANGE UP (ref 80–100)
MONOCYTES # BLD AUTO: 0.36 K/UL — SIGNIFICANT CHANGE UP (ref 0–0.9)
MONOCYTES NFR BLD AUTO: 2.9 % — SIGNIFICANT CHANGE UP (ref 2–14)
NEUTROPHILS # BLD AUTO: 11.33 K/UL — HIGH (ref 1.8–7.4)
NEUTROPHILS NFR BLD AUTO: 92.1 % — HIGH (ref 43–77)
NRBC # BLD: 0 /100 WBCS — SIGNIFICANT CHANGE UP (ref 0–0)
NRBC BLD-RTO: 0 /100 WBCS — SIGNIFICANT CHANGE UP (ref 0–0)
PHOSPHATE SERPL-MCNC: 2 MG/DL — LOW (ref 2.5–4.5)
PHOSPHATE SERPL-MCNC: 2.7 MG/DL — SIGNIFICANT CHANGE UP (ref 2.5–4.5)
PLATELET # BLD AUTO: 213 K/UL — SIGNIFICANT CHANGE UP (ref 150–400)
POTASSIUM SERPL-MCNC: 3.4 MMOL/L — LOW (ref 3.5–5.3)
POTASSIUM SERPL-MCNC: 3.8 MMOL/L — SIGNIFICANT CHANGE UP (ref 3.5–5.3)
POTASSIUM SERPL-SCNC: 3.4 MMOL/L — LOW (ref 3.5–5.3)
POTASSIUM SERPL-SCNC: 3.8 MMOL/L — SIGNIFICANT CHANGE UP (ref 3.5–5.3)
PROT SERPL-MCNC: 6.4 G/DL — SIGNIFICANT CHANGE UP (ref 6–8.3)
PROT SERPL-MCNC: 6.8 G/DL — SIGNIFICANT CHANGE UP (ref 6–8.3)
PROTHROM AB SERPL-ACNC: 15 SEC — HIGH (ref 9.9–13.4)
RBC # BLD: 3.18 M/UL — LOW (ref 3.8–5.2)
RBC # FLD: 14.7 % — HIGH (ref 10.3–14.5)
RH IG SCN BLD-IMP: POSITIVE — SIGNIFICANT CHANGE UP
S PNEUM AG UR QL: NEGATIVE — SIGNIFICANT CHANGE UP
SODIUM SERPL-SCNC: 139 MMOL/L — SIGNIFICANT CHANGE UP (ref 135–145)
SODIUM SERPL-SCNC: 142 MMOL/L — SIGNIFICANT CHANGE UP (ref 135–145)
SPECIMEN SOURCE: SIGNIFICANT CHANGE UP
TROPONIN T, HIGH SENSITIVITY RESULT: 127 NG/L — HIGH (ref 0–51)
TROPONIN T, HIGH SENSITIVITY RESULT: 140 NG/L — HIGH (ref 0–51)
TROPONIN T, HIGH SENSITIVITY RESULT: 145 NG/L — HIGH (ref 0–51)
VANCOMYCIN TROUGH SERPL-MCNC: 8.5 UG/ML — LOW (ref 10–20)
WBC # BLD: 12.3 K/UL — HIGH (ref 3.8–10.5)
WBC # FLD AUTO: 12.3 K/UL — HIGH (ref 3.8–10.5)

## 2025-01-12 PROCEDURE — 99232 SBSQ HOSP IP/OBS MODERATE 35: CPT

## 2025-01-12 RX ORDER — AMIODARONE HYDROCHLORIDE 50 MG/ML
150 INJECTION, SOLUTION INTRAVENOUS ONCE
Refills: 0 | Status: COMPLETED | OUTPATIENT
Start: 2025-01-12 | End: 2025-01-12

## 2025-01-12 RX ORDER — MAGNESIUM SULFATE 0.8 MEQ/ML
1 AMPUL (ML) INJECTION ONCE
Refills: 0 | Status: COMPLETED | OUTPATIENT
Start: 2025-01-12 | End: 2025-01-12

## 2025-01-12 RX ORDER — BISACODYL 5 MG
10 TABLET, DELAYED RELEASE (ENTERIC COATED) ORAL DAILY
Refills: 0 | Status: DISCONTINUED | OUTPATIENT
Start: 2025-01-12 | End: 2025-01-12

## 2025-01-12 RX ORDER — VANCOMYCIN HYDROCHLORIDE 50 MG/ML
1000 KIT ORAL EVERY 24 HOURS
Refills: 0 | Status: DISCONTINUED | OUTPATIENT
Start: 2025-01-12 | End: 2025-01-12

## 2025-01-12 RX ORDER — SODIUM CHLORIDE 9 G/ML
1000 INJECTION, SOLUTION INTRAVENOUS
Refills: 0 | Status: DISCONTINUED | OUTPATIENT
Start: 2025-01-12 | End: 2025-01-13

## 2025-01-12 RX ORDER — METOPROLOL SUCCINATE 25 MG
5 TABLET, EXTENDED RELEASE 24 HR ORAL ONCE
Refills: 0 | Status: COMPLETED | OUTPATIENT
Start: 2025-01-12 | End: 2025-01-12

## 2025-01-12 RX ORDER — POTASSIUM CHLORIDE 750 MG/1
10 TABLET, EXTENDED RELEASE ORAL
Refills: 0 | Status: DISCONTINUED | OUTPATIENT
Start: 2025-01-12 | End: 2025-01-12

## 2025-01-12 RX ORDER — VANCOMYCIN HYDROCHLORIDE 50 MG/ML
750 KIT ORAL EVERY 24 HOURS
Refills: 0 | Status: DISCONTINUED | OUTPATIENT
Start: 2025-01-12 | End: 2025-01-12

## 2025-01-12 RX ORDER — VANCOMYCIN HYDROCHLORIDE 50 MG/ML
750 KIT ORAL ONCE
Refills: 0 | Status: COMPLETED | OUTPATIENT
Start: 2025-01-12 | End: 2025-01-12

## 2025-01-12 RX ORDER — METOPROLOL SUCCINATE 25 MG
5 TABLET, EXTENDED RELEASE 24 HR ORAL EVERY 6 HOURS
Refills: 0 | Status: DISCONTINUED | OUTPATIENT
Start: 2025-01-12 | End: 2025-01-14

## 2025-01-12 RX ORDER — SODIUM CHLORIDE 9 G/ML
1000 INJECTION, SOLUTION INTRAVENOUS
Refills: 0 | Status: DISCONTINUED | OUTPATIENT
Start: 2025-01-12 | End: 2025-01-12

## 2025-01-12 RX ORDER — METOPROLOL SUCCINATE 25 MG
5 TABLET, EXTENDED RELEASE 24 HR ORAL EVERY 4 HOURS
Refills: 0 | Status: DISCONTINUED | OUTPATIENT
Start: 2025-01-12 | End: 2025-01-12

## 2025-01-12 RX ORDER — POTASSIUM CHLORIDE 750 MG/1
10 TABLET, EXTENDED RELEASE ORAL
Refills: 0 | Status: COMPLETED | OUTPATIENT
Start: 2025-01-12 | End: 2025-01-12

## 2025-01-12 RX ORDER — BISACODYL 5 MG
10 TABLET, DELAYED RELEASE (ENTERIC COATED) ORAL DAILY
Refills: 0 | Status: DISCONTINUED | OUTPATIENT
Start: 2025-01-12 | End: 2025-02-12

## 2025-01-12 RX ORDER — POTASSIUM PHOSPHATE, MONOBASIC POTASSIUM PHOSPHATE, DIBASIC 236; 224 MG/ML; MG/ML
30 INJECTION, SOLUTION INTRAVENOUS ONCE
Refills: 0 | Status: COMPLETED | OUTPATIENT
Start: 2025-01-12 | End: 2025-01-12

## 2025-01-12 RX ADMIN — Medication 5 MILLIGRAM(S): at 23:50

## 2025-01-12 RX ADMIN — Medication 5000 UNIT(S): at 05:09

## 2025-01-12 RX ADMIN — LEVOTHYROXINE SODIUM 60 MICROGRAM(S): 25 TABLET ORAL at 21:21

## 2025-01-12 RX ADMIN — VANCOMYCIN HYDROCHLORIDE 250 MILLIGRAM(S): KIT at 10:09

## 2025-01-12 RX ADMIN — POTASSIUM CHLORIDE 100 MILLIEQUIVALENT(S): 750 TABLET, EXTENDED RELEASE ORAL at 16:08

## 2025-01-12 RX ADMIN — Medication 5 MILLIGRAM(S): at 06:23

## 2025-01-12 RX ADMIN — IPRATROPIUM BROMIDE AND ALBUTEROL SULFATE 3 MILLILITER(S): .5; 2.5 SOLUTION RESPIRATORY (INHALATION) at 17:19

## 2025-01-12 RX ADMIN — IPRATROPIUM BROMIDE AND ALBUTEROL SULFATE 3 MILLILITER(S): .5; 2.5 SOLUTION RESPIRATORY (INHALATION) at 11:37

## 2025-01-12 RX ADMIN — PIPERACILLIN SODIUM AND TAZOBACTAM SODIUM 25 GRAM(S): 2; 250 INJECTION, POWDER, FOR SOLUTION INTRAVENOUS at 17:07

## 2025-01-12 RX ADMIN — Medication 5000 UNIT(S): at 17:06

## 2025-01-12 RX ADMIN — AMIODARONE HYDROCHLORIDE 200 MILLIGRAM(S): 50 INJECTION, SOLUTION INTRAVENOUS at 10:30

## 2025-01-12 RX ADMIN — SODIUM CHLORIDE 100 MILLILITER(S): 9 INJECTION, SOLUTION INTRAVENOUS at 21:21

## 2025-01-12 RX ADMIN — POTASSIUM PHOSPHATE, MONOBASIC POTASSIUM PHOSPHATE, DIBASIC 83.33 MILLIMOLE(S): 236; 224 INJECTION, SOLUTION INTRAVENOUS at 16:08

## 2025-01-12 RX ADMIN — AMIODARONE HYDROCHLORIDE 600 MILLIGRAM(S): 50 INJECTION, SOLUTION INTRAVENOUS at 02:06

## 2025-01-12 RX ADMIN — ANTISEPTIC SURGICAL SCRUB 1 APPLICATION(S): 0.04 SOLUTION TOPICAL at 05:27

## 2025-01-12 RX ADMIN — IPRATROPIUM BROMIDE AND ALBUTEROL SULFATE 3 MILLILITER(S): .5; 2.5 SOLUTION RESPIRATORY (INHALATION) at 05:21

## 2025-01-12 RX ADMIN — POTASSIUM CHLORIDE 100 MILLIEQUIVALENT(S): 750 TABLET, EXTENDED RELEASE ORAL at 16:50

## 2025-01-12 RX ADMIN — Medication 100 GRAM(S): at 03:19

## 2025-01-12 RX ADMIN — Medication 5 MILLIGRAM(S): at 09:39

## 2025-01-12 RX ADMIN — Medication 5 MILLIGRAM(S): at 17:07

## 2025-01-12 RX ADMIN — PIPERACILLIN SODIUM AND TAZOBACTAM SODIUM 25 GRAM(S): 2; 250 INJECTION, POWDER, FOR SOLUTION INTRAVENOUS at 05:09

## 2025-01-12 RX ADMIN — IPRATROPIUM BROMIDE AND ALBUTEROL SULFATE 3 MILLILITER(S): .5; 2.5 SOLUTION RESPIRATORY (INHALATION) at 23:05

## 2025-01-12 RX ADMIN — SODIUM CHLORIDE 100 MILLILITER(S): 9 INJECTION, SOLUTION INTRAVENOUS at 16:52

## 2025-01-12 NOTE — DIETITIAN INITIAL EVALUATION ADULT - PERTINENT MEDS FT
MEDICATIONS  (STANDING):  albuterol/ipratropium for Nebulization 3 milliLiter(s) Nebulizer every 6 hours  chlorhexidine 2% Cloths 1 Application(s) Topical <User Schedule>  heparin   Injectable 5000 Unit(s) SubCutaneous every 12 hours  levothyroxine Injectable 60 MICROGram(s) IV Push at bedtime  oseltamivir 30 milliGRAM(s) Oral daily  piperacillin/tazobactam IVPB.. 3.375 Gram(s) IV Intermittent every 12 hours    MEDICATIONS  (PRN):  bisacodyl Suppository 10 milliGRAM(s) Rectal daily PRN Constipation

## 2025-01-12 NOTE — DIETITIAN INITIAL EVALUATION ADULT - ADD RECOMMEND
1) Recommend Jevity 1.5 @ 60mL x 18hrs providing 1080mL of formula, 1620kcal/day (31kcal/kg), 69g protein/day (1.3g/kg), 821mL free water - based on 52.6kg   -Defer free water to Team   2) multivitamin, vitamin C daily   3) Monitor diet tolerance, weight trends, labs, GI function, and skin integrity  4) Monitor for malnutrition     Aye Wilkes MS, RDN, CDN (Te[employer])

## 2025-01-12 NOTE — DIETITIAN INITIAL EVALUATION ADULT - OTHER INFO
GI/Intake:   -NPO   -No BM documented thus far; Dulcolax ordered   -Elevated LFTs in setting of septic shock    Renal:  -DWAIN on CKD     Pulm:   -Hypoxic respiratory failure  -Started on HiFlo NC   -Possible RLL pneum     Neuro:   -Hx of dementia   -Unresponsive in setting of resp failure     Weight Hx:   -Current dosin pounds   -No recent weights noted in chart

## 2025-01-12 NOTE — DIETITIAN INITIAL EVALUATION ADULT - ORAL INTAKE PTA/DIET HISTORY
Unable to obtain subjective information at this time.    Presenting from nursing home    Per chart:  -Allergies: strawberries, shellfish

## 2025-01-12 NOTE — PROGRESS NOTE ADULT - SUBJECTIVE AND OBJECTIVE BOX
CHIEF COMPLAINT:  Patient is a 89y old  Female who presents with a chief complaint of Hypoxic/hypercapnic resp failure (11 Jan 2025 12:40)    HPI:   89 yr old female with PMHx dementia, s/p brain tumor resection 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 from nursing home via EMS with hypoxic resp failure since 1/9/25 secondary to suspected pneum, treated with HFNC, son endorsed pt was ordered for antibx treatment at time as well. EMS endorsed nursing home facility O2 tank found off, and placed pt on NRB.        In E.D. pt found to be obtunded/unresponsive and to have hypoxic/hypercapnic resp failure with SPO2 of 86-88, Vph of 7.07, VPCO2 90, placed on HFNC with FIO2 of 100%, 50 liter flow with improvement to 92-94% . In addition found to have Influenza A positive, DWAIN with sCr of 1.62 on CKD2 (baseline 1.08-1.23 [8/2024]), transaminitis - alk-phos 577, , , +UA with large leuk-est, neg nitrite. SBP of 98/46 - 107/46. Pt received vanco/zosyn, 1 liter NS IVF bolus, pt placed on norepi gtt. Pt received narcan 0.2 mg IVP x1, albuterol nebs x3.        upon consult POCUS with A line predominant, few focal B lines mostly in Left lung field, small area of consolidation in RLL field, mod/large Lt pleural effusion with LLL field atelectasis. Pt with initial norepi at 0.13 mcg/min, titrated down to 0.05 mcg/min over 35 minutes (130/64 -> 112/64),       Consult called for and pt admitted to MICU for hypercapnic/hypoxic resp failure/septic shock in the setting of possible RLL pneum and UTI     (11 Jan 2025 12:40)      Interval Events:      REVIEW OF SYSTEMS:          OBJECTIVE:  ICU Vital Signs Last 24 Hrs  T(C): 36.8 (12 Jan 2025 00:00), Max: 38.1 (11 Jan 2025 09:57)  T(F): 98.2 (12 Jan 2025 00:00), Max: 100.5 (11 Jan 2025 09:57)  HR: 100 (12 Jan 2025 03:45) (70 - 166)  BP: 154/83 (12 Jan 2025 03:45) (75/37 - 167/79)  BP(mean): 111 (12 Jan 2025 03:45) (51 - 121)  ABP: --  ABP(mean): --  RR: 30 (12 Jan 2025 03:45) (16 - 34)  SpO2: 100% (12 Jan 2025 03:45) (85% - 100%)    O2 Parameters below as of 11 Jan 2025 23:58  Patient On (Oxygen Delivery Method): BiPAP/CPAP              01-11 @ 07:01  -  01-12 @ 04:52  --------------------------------------------------------  IN: 984 mL / OUT: 285 mL / NET: 699 mL      CAPILLARY BLOOD GLUCOSE      POCT Blood Glucose.: 116 mg/dL (11 Jan 2025 09:44)          PHYSICAL EXAM:          HOSPITAL MEDICATIONS:  MEDICATIONS  (STANDING):  albuterol/ipratropium for Nebulization 3 milliLiter(s) Nebulizer every 6 hours  chlorhexidine 2% Cloths 1 Application(s) Topical <User Schedule>  heparin   Injectable 5000 Unit(s) SubCutaneous every 12 hours  levothyroxine Injectable 60 MICROGram(s) IV Push at bedtime  norepinephrine Infusion 0.05 MICROgram(s)/kG/Min (5.53 mL/Hr) IV Continuous <Continuous>  oseltamivir 30 milliGRAM(s) Oral daily  piperacillin/tazobactam IVPB.. 3.375 Gram(s) IV Intermittent every 12 hours    MEDICATIONS  (PRN):  bisacodyl Suppository 10 milliGRAM(s) Rectal daily PRN Constipation      LABS:                        9.9    12.30 )-----------( 213      ( 12 Jan 2025 00:40 )             31.1     Hgb Trend: 9.9<--, 11.5<--, 11.8<--  01-12    139  |  110[H]  |  36[H]  ----------------------------<  140[H]  3.8   |  17[L]  |  1.96[H]    Ca    9.1      12 Jan 2025 00:45  Phos  2.7     01-12  Mg     1.8     01-12    TPro  6.4  /  Alb  2.7[L]  /  TBili  0.6  /  DBili  x   /  AST  44[H]  /  ALT  67[H]  /  AlkPhos  341[H]  01-12    LIVER FUNCTIONS - ( 12 Jan 2025 00:45 )  Alb: 2.7 g/dL / Pro: 6.4 g/dL / ALK PHOS: 341 U/L / ALT: 67 U/L / AST: 44 U/L / GGT: x           Creatinine Trend: 1.96<--, 1.72<--, 1.62<--  PT/INR - ( 12 Jan 2025 00:40 )   PT: 15.0 sec;   INR: 1.31 ratio         PTT - ( 12 Jan 2025 00:40 )  PTT:27.2 sec  Urinalysis Basic - ( 12 Jan 2025 00:45 )    Color: x / Appearance: x / SG: x / pH: x  Gluc: 140 mg/dL / Ketone: x  / Bili: x / Urobili: x   Blood: x / Protein: x / Nitrite: x   Leuk Esterase: x / RBC: x / WBC x   Sq Epi: x / Non Sq Epi: x / Bacteria: x      Arterial Blood Gas:  01-12 @ 00:30  7.36/38/131/22/99.5/-3.5  ABG lactate: --  Arterial Blood Gas:  01-11 @ 16:48  7.28/43/174/20/99.7/-6.3  ABG lactate: --  Arterial Blood Gas:  01-11 @ 14:06  7.25/45/193/20/99.5/-7.4  ABG lactate: --    Venous Blood Gas:  01-11 @ 12:28  6.93/119/46/25/69.4  VBG Lactate: 1.1  Venous Blood Gas:  01-11 @ 10:06  7.07/90/67/26/89.2  VBG Lactate: 0.8      MICROBIOLOGY:     RADIOLOGY:  [ ] Reviewed and interpreted by me    EKG:      Abigail ANP-BC (ext 1910) CHIEF COMPLAINT:  Patient is a 89y old  Female who presents with a chief complaint of Hypoxic/hypercapnic resp failure (11 Jan 2025 12:40)    HPI:   89 yr old female with PMHx dementia, s/p brain tumor resection 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 from nursing home via EMS morning of 1/11/25 with hypoxic resp failure since 1/9/25 secondary to suspected pneum, treated with HFNC, son endorsed pt was ordered for antibx treatment at time as well. EMS endorsed nursing home facility O2 tank found off, and placed pt on NRB.        In E.D. pt found to be obtunded/unresponsive and to have hypoxic/hypercapnic resp failure initially placed on HFNC with FIO2 of 100%, 50 liter ->AVAPS . In addition found to have Influenza A positive, DWIAN with sCr of 1.62 on CKD2 (baseline 1.08-1.23 [8/2024]), transaminitis, +UA with large leuk-est, neg nitrite. SBP of 98/46 - 107/46. Pt received vanco/zosyn, 1 liter NS IVF bolus, pt placed on norepi gtt. Pt received narcan 0.2 mg IVP x1, albuterol nebs x3.            pt admitted to MICU for hypercapnic/hypoxic resp failure/septic shock in the setting of possible RLL pneum and UTI        Interval Events:      REVIEW OF SYSTEMS:          OBJECTIVE:  ICU Vital Signs Last 24 Hrs  T(C): 36.8 (12 Jan 2025 00:00), Max: 38.1 (11 Jan 2025 09:57)  T(F): 98.2 (12 Jan 2025 00:00), Max: 100.5 (11 Jan 2025 09:57)  HR: 100 (12 Jan 2025 03:45) (70 - 166)  BP: 154/83 (12 Jan 2025 03:45) (75/37 - 167/79)  BP(mean): 111 (12 Jan 2025 03:45) (51 - 121)  ABP: --  ABP(mean): --  RR: 30 (12 Jan 2025 03:45) (16 - 34)  SpO2: 100% (12 Jan 2025 03:45) (85% - 100%)    O2 Parameters below as of 11 Jan 2025 23:58  Patient On (Oxygen Delivery Method): BiPAP/CPAP              01-11 @ 07:01  -  01-12 @ 04:52  --------------------------------------------------------  IN: 984 mL / OUT: 285 mL / NET: 699 mL      CAPILLARY BLOOD GLUCOSE      POCT Blood Glucose.: 116 mg/dL (11 Jan 2025 09:44)          PHYSICAL EXAM:          HOSPITAL MEDICATIONS:  MEDICATIONS  (STANDING):  albuterol/ipratropium for Nebulization 3 milliLiter(s) Nebulizer every 6 hours  chlorhexidine 2% Cloths 1 Application(s) Topical <User Schedule>  heparin   Injectable 5000 Unit(s) SubCutaneous every 12 hours  levothyroxine Injectable 60 MICROGram(s) IV Push at bedtime  norepinephrine Infusion 0.05 MICROgram(s)/kG/Min (5.53 mL/Hr) IV Continuous <Continuous>  oseltamivir 30 milliGRAM(s) Oral daily  piperacillin/tazobactam IVPB.. 3.375 Gram(s) IV Intermittent every 12 hours    MEDICATIONS  (PRN):  bisacodyl Suppository 10 milliGRAM(s) Rectal daily PRN Constipation      LABS:                        9.9    12.30 )-----------( 213      ( 12 Jan 2025 00:40 )             31.1     Hgb Trend: 9.9<--, 11.5<--, 11.8<--  01-12    139  |  110[H]  |  36[H]  ----------------------------<  140[H]  3.8   |  17[L]  |  1.96[H]    Ca    9.1      12 Jan 2025 00:45  Phos  2.7     01-12  Mg     1.8     01-12    TPro  6.4  /  Alb  2.7[L]  /  TBili  0.6  /  DBili  x   /  AST  44[H]  /  ALT  67[H]  /  AlkPhos  341[H]  01-12    LIVER FUNCTIONS - ( 12 Jan 2025 00:45 )  Alb: 2.7 g/dL / Pro: 6.4 g/dL / ALK PHOS: 341 U/L / ALT: 67 U/L / AST: 44 U/L / GGT: x           Creatinine Trend: 1.96<--, 1.72<--, 1.62<--  PT/INR - ( 12 Jan 2025 00:40 )   PT: 15.0 sec;   INR: 1.31 ratio         PTT - ( 12 Jan 2025 00:40 )  PTT:27.2 sec  Urinalysis Basic - ( 12 Jan 2025 00:45 )    Color: x / Appearance: x / SG: x / pH: x  Gluc: 140 mg/dL / Ketone: x  / Bili: x / Urobili: x   Blood: x / Protein: x / Nitrite: x   Leuk Esterase: x / RBC: x / WBC x   Sq Epi: x / Non Sq Epi: x / Bacteria: x      Arterial Blood Gas:  01-12 @ 00:30  7.36/38/131/22/99.5/-3.5  ABG lactate: --  Arterial Blood Gas:  01-11 @ 16:48  7.28/43/174/20/99.7/-6.3  ABG lactate: --  Arterial Blood Gas:  01-11 @ 14:06  7.25/45/193/20/99.5/-7.4  ABG lactate: --    Venous Blood Gas:  01-11 @ 12:28  6.93/119/46/25/69.4  VBG Lactate: 1.1  Venous Blood Gas:  01-11 @ 10:06  7.07/90/67/26/89.2  VBG Lactate: 0.8      MICROBIOLOGY:     RADIOLOGY:  [ ] Reviewed and interpreted by me    EKG:      Abigail ANP-BC (ext [id number]) CHIEF COMPLAINT:  Patient is a 89y old  Female who presents with a chief complaint of Hypoxic/hypercapnic resp failure (11 Jan 2025 12:40)    HPI:   89 yr old female with PMHx dementia, s/p brain tumor resection 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 from nursing home via EMS morning of 1/11/25 with hypoxic resp failure since 1/9/25 secondary to suspected pneum, treated with HFNC, son endorsed pt was ordered for antibx treatment at time as well.        In E.D. pt found to be obtunded/unresponsive and to have hypoxic/hypercapnic resp failure initially placed on HFNC with FIO2 of 100%, 50 liter ->AVAPS . In addition found to have Influenza A positive, DWAIN with sCr of 1.62 on CKD2 (baseline 1.08-1.23 [8/2024]), transaminitis, +UA with large leuk-est, neg nitrite. SBP of 98/46 - 107/46. Pt received vanco/zosyn, 1 liter NS IVF bolus, pt placed on norepi gtt. Pt received narcan 0.2 mg IVP x1, albuterol nebs x3. Pt admitted to MICU for hypercapnic/hypoxic resp failure/septic shock in the setting of possible RLL pneum and UTI.     MICU course, pt with improved hypoxic/hypercapnic resp failure, improved mental status, c/b development of recurrent Afib with RVR, (1/12/25) received amiodarone 150 IV x1, metoprolol 5 mg IVP x1 with conversion back to NSR.      Interval Events:      REVIEW OF SYSTEMS:          OBJECTIVE:  ICU Vital Signs Last 24 Hrs  T(C): 36.8 (12 Jan 2025 00:00), Max: 38.1 (11 Jan 2025 09:57)  T(F): 98.2 (12 Jan 2025 00:00), Max: 100.5 (11 Jan 2025 09:57)  HR: 100 (12 Jan 2025 03:45) (70 - 166)  BP: 154/83 (12 Jan 2025 03:45) (75/37 - 167/79)  BP(mean): 111 (12 Jan 2025 03:45) (51 - 121)  ABP: --  ABP(mean): --  RR: 30 (12 Jan 2025 03:45) (16 - 34)  SpO2: 100% (12 Jan 2025 03:45) (85% - 100%)    O2 Parameters below as of 11 Jan 2025 23:58  Patient On (Oxygen Delivery Method): BiPAP/CPAP              01-11 @ 07:01  -  01-12 @ 04:52  --------------------------------------------------------  IN: 984 mL / OUT: 285 mL / NET: 699 mL      CAPILLARY BLOOD GLUCOSE      POCT Blood Glucose.: 116 mg/dL (11 Jan 2025 09:44)          PHYSICAL EXAM:          HOSPITAL MEDICATIONS:  MEDICATIONS  (STANDING):  albuterol/ipratropium for Nebulization 3 milliLiter(s) Nebulizer every 6 hours  chlorhexidine 2% Cloths 1 Application(s) Topical <User Schedule>  heparin   Injectable 5000 Unit(s) SubCutaneous every 12 hours  levothyroxine Injectable 60 MICROGram(s) IV Push at bedtime  norepinephrine Infusion 0.05 MICROgram(s)/kG/Min (5.53 mL/Hr) IV Continuous <Continuous>  oseltamivir 30 milliGRAM(s) Oral daily  piperacillin/tazobactam IVPB.. 3.375 Gram(s) IV Intermittent every 12 hours    MEDICATIONS  (PRN):  bisacodyl Suppository 10 milliGRAM(s) Rectal daily PRN Constipation      LABS:                        9.9    12.30 )-----------( 213      ( 12 Jan 2025 00:40 )             31.1     Hgb Trend: 9.9<--, 11.5<--, 11.8<--  01-12    139  |  110[H]  |  36[H]  ----------------------------<  140[H]  3.8   |  17[L]  |  1.96[H]    Ca    9.1      12 Jan 2025 00:45  Phos  2.7     01-12  Mg     1.8     01-12    TPro  6.4  /  Alb  2.7[L]  /  TBili  0.6  /  DBili  x   /  AST  44[H]  /  ALT  67[H]  /  AlkPhos  341[H]  01-12    LIVER FUNCTIONS - ( 12 Jan 2025 00:45 )  Alb: 2.7 g/dL / Pro: 6.4 g/dL / ALK PHOS: 341 U/L / ALT: 67 U/L / AST: 44 U/L / GGT: x           Creatinine Trend: 1.96<--, 1.72<--, 1.62<--  PT/INR - ( 12 Jan 2025 00:40 )   PT: 15.0 sec;   INR: 1.31 ratio         PTT - ( 12 Jan 2025 00:40 )  PTT:27.2 sec  Urinalysis Basic - ( 12 Jan 2025 00:45 )    Color: x / Appearance: x / SG: x / pH: x  Gluc: 140 mg/dL / Ketone: x  / Bili: x / Urobili: x   Blood: x / Protein: x / Nitrite: x   Leuk Esterase: x / RBC: x / WBC x   Sq Epi: x / Non Sq Epi: x / Bacteria: x      Arterial Blood Gas:  01-12 @ 00:30  7.36/38/131/22/99.5/-3.5  ABG lactate: --  Arterial Blood Gas:  01-11 @ 16:48  7.28/43/174/20/99.7/-6.3  ABG lactate: --  Arterial Blood Gas:  01-11 @ 14:06  7.25/45/193/20/99.5/-7.4  ABG lactate: --    Venous Blood Gas:  01-11 @ 12:28  6.93/119/46/25/69.4  VBG Lactate: 1.1  Venous Blood Gas:  01-11 @ 10:06  7.07/90/67/26/89.2  VBG Lactate: 0.8      MICROBIOLOGY:     RADIOLOGY:  [ ] Reviewed and interpreted by me    EKG:      Abigail [facility]-BC (ext [id number]) CHIEF COMPLAINT:  Patient is a 89y old  Female who presents with a chief complaint of Hypoxic/hypercapnic resp failure (11 Jan 2025 12:40)    HPI:   89 yr old female with PMHx dementia, s/p brain tumor resection 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 from nursing home via EMS morning of 1/11/25 with hypoxic resp failure since 1/9/25 secondary to suspected pneum, treated with HFNC, son endorsed pt was ordered for antibx treatment at time as well.        In E.D. pt found to be obtunded/unresponsive and to have hypoxic/hypercapnic resp failure initially placed on HFNC with FIO2 of 100%, 50 liter ->AVAPS . In addition found to have Influenza A positive, DWAIN with sCr of 1.62 on CKD2 (baseline 1.08-1.23 [8/2024]), transaminitis, +UA with large leuk-est, neg nitrite. SBP of 98/46 - 107/46. Pt received vanco/zosyn, 1 liter NS IVF bolus, pt placed on norepi gtt. Pt received narcan 0.2 mg IVP x1, albuterol nebs x3. Pt admitted to MICU for hypercapnic/hypoxic resp failure/septic shock in the setting of possible RLL pneum and UTI.     MICU course, pt with improved hypoxic/hypercapnic resp failure, improved mental status, c/b development of recurrent Afib with RVR, (1/12/25) received amiodarone 150 IV x1, metoprolol 5 mg IVP x1 with conversion back to NSR.      Interval Events:      REVIEW OF SYSTEMS:  lethargic, moaning, inconsistently answering questions with yes/no, shaking head yes no   verbalizes no to pain        OBJECTIVE:  ICU Vital Signs Last 24 Hrs  T(C): 36.8 (12 Jan 2025 00:00), Max: 38.1 (11 Jan 2025 09:57)  T(F): 98.2 (12 Jan 2025 00:00), Max: 100.5 (11 Jan 2025 09:57)  HR: 100 (12 Jan 2025 03:45) (70 - 166)  BP: 154/83 (12 Jan 2025 03:45) (75/37 - 167/79)  BP(mean): 111 (12 Jan 2025 03:45) (51 - 121)  ABP: --  ABP(mean): --  RR: 30 (12 Jan 2025 03:45) (16 - 34)  SpO2: 100% (12 Jan 2025 03:45) (85% - 100%)    O2 Parameters below as of 11 Jan 2025 23:58  Patient On (Oxygen Delivery Method): BiPAP/CPAP              01-11 @ 07:01  -  01-12 @ 04:52  --------------------------------------------------------  IN: 984 mL / OUT: 285 mL / NET: 699 mL      CAPILLARY BLOOD GLUCOSE      POCT Blood Glucose.: 116 mg/dL (11 Jan 2025 09:44)    PHYSICAL EXAM:    Neuro:  lethargic, inconsistently focuses upon examiner, moaning, inconsistently answering questions with yes/no, shaking head yes no, oriented x 1, inconsistently follows commands well and appropriately. CN intact. SQUIRES well 5/5. Pupils 3 mm reactive equal    Pulm:  utilizing 2 liters N/C breath sounds bilat, diminished in lower lung fields bases to 1/4 up. Clear throughout, able to take deep breaths spontaneously and upon command. SPO2 98%.      CV:  cardiac monitor sinus tach without ectopy, s1/s2 I/VI sys murmur appreciated , peripheral pulses palpable with radial 2+ bilat, dp/pt 1+/1+ bilat, digits warm to touch with good cap refill < 3 secs      GI/:  abd  soft  non distended non tender , + hypoactive bowel sounds. cruz patent to bsd bladder non distended non palpable    Skin:   warm dry intact. without palpable nodes. without JVD appreciated        HOSPITAL MEDICATIONS:  MEDICATIONS  (STANDING):  albuterol/ipratropium for Nebulization 3 milliLiter(s) Nebulizer every 6 hours  chlorhexidine 2% Cloths 1 Application(s) Topical <User Schedule>  heparin   Injectable 5000 Unit(s) SubCutaneous every 12 hours  levothyroxine Injectable 60 MICROGram(s) IV Push at bedtime  norepinephrine Infusion 0.05 MICROgram(s)/kG/Min (5.53 mL/Hr) IV Continuous <Continuous>  oseltamivir 30 milliGRAM(s) Oral daily  piperacillin/tazobactam IVPB.. 3.375 Gram(s) IV Intermittent every 12 hours    MEDICATIONS  (PRN):  bisacodyl Suppository 10 milliGRAM(s) Rectal daily PRN Constipation      LABS:                        9.9    12.30 )-----------( 213      ( 12 Jan 2025 00:40 )             31.1     Hgb Trend: 9.9<--, 11.5<--, 11.8<--  01-12    139  |  110[H]  |  36[H]  ----------------------------<  140[H]  3.8   |  17[L]  |  1.96[H]    Ca    9.1      12 Jan 2025 00:45  Phos  2.7     01-12  Mg     1.8     01-12    TPro  6.4  /  Alb  2.7[L]  /  TBili  0.6  /  DBili  x   /  AST  44[H]  /  ALT  67[H]  /  AlkPhos  341[H]  01-12    LIVER FUNCTIONS - ( 12 Jan 2025 00:45 )  Alb: 2.7 g/dL / Pro: 6.4 g/dL / ALK PHOS: 341 U/L / ALT: 67 U/L / AST: 44 U/L / GGT: x           Creatinine Trend: 1.96<--, 1.72<--, 1.62<--  PT/INR - ( 12 Jan 2025 00:40 )   PT: 15.0 sec;   INR: 1.31 ratio         PTT - ( 12 Jan 2025 00:40 )  PTT:27.2 sec  Urinalysis Basic - ( 12 Jan 2025 00:45 )    Color: x / Appearance: x / SG: x / pH: x  Gluc: 140 mg/dL / Ketone: x  / Bili: x / Urobili: x   Blood: x / Protein: x / Nitrite: x   Leuk Esterase: x / RBC: x / WBC x   Sq Epi: x / Non Sq Epi: x / Bacteria: x      Arterial Blood Gas:  01-12 @ 00:30  7.36/38/131/22/99.5/-3.5  ABG lactate: --  Arterial Blood Gas:  01-11 @ 16:48  7.28/43/174/20/99.7/-6.3  ABG lactate: --  Arterial Blood Gas:  01-11 @ 14:06  7.25/45/193/20/99.5/-7.4  ABG lactate: --    Venous Blood Gas:  01-11 @ 12:28  6.93/119/46/25/69.4  VBG Lactate: 1.1  Venous Blood Gas:  01-11 @ 10:06  7.07/90/67/26/89.2  VBG Lactate: 0.8      MICROBIOLOGY:     RADIOLOGY:  [ ] Reviewed and interpreted by me    EKG:      Abigail [facility]-BC (ext [id number]) CHIEF COMPLAINT:  Patient is a 89y old  Female who presents with a chief complaint of Hypoxic/hypercapnic resp failure (11 Jan 2025 12:40)    HPI:   89 yr old female with PMHx dementia, s/p brain tumor resection 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 from nursing home via EMS morning of 1/11/25 with hypoxic resp failure since 1/9/25 secondary to suspected pneum, treated with HFNC, son endorsed pt was ordered for antibx treatment at time as well.        In E.D. pt found to be obtunded/unresponsive and to have hypoxic/hypercapnic resp failure initially placed on HFNC with FIO2 of 100%, 50 liter ->AVAPS . In addition found to have Influenza A positive, DWAIN with sCr of 1.62 on CKD2 (baseline 1.08-1.23 [8/2024]), transaminitis, +UA with large leuk-est, neg nitrite. SBP of 98/46 - 107/46. Pt received vanco/zosyn, 1 liter NS IVF bolus, pt placed on norepi gtt. Pt received narcan 0.2 mg IVP x1, albuterol nebs x3. Pt admitted to MICU for hypercapnic/hypoxic resp failure/septic shock in the setting of possible RLL pneum and UTI.     MICU course, pt with improved hypoxic/hypercapnic resp failure, improved mental status, c/b development of recurrent Afib with RVR, (1/12/25) received amiodarone 150 IV x1, metoprolol 5 mg IVP x1 with conversion back to NSR.      Interval Events:      REVIEW OF SYSTEMS:  lethargic, moaning, inconsistently answering questions with yes/no, shaking head yes no   verbalizes no to pain        OBJECTIVE:  ICU Vital Signs Last 24 Hrs  T(C): 36.8 (12 Jan 2025 00:00), Max: 38.1 (11 Jan 2025 09:57)  T(F): 98.2 (12 Jan 2025 00:00), Max: 100.5 (11 Jan 2025 09:57)  HR: 100 (12 Jan 2025 03:45) (70 - 166)  BP: 154/83 (12 Jan 2025 03:45) (75/37 - 167/79)  BP(mean): 111 (12 Jan 2025 03:45) (51 - 121)  ABP: --  ABP(mean): --  RR: 30 (12 Jan 2025 03:45) (16 - 34)  SpO2: 100% (12 Jan 2025 03:45) (85% - 100%)    O2 Parameters below as of 11 Jan 2025 23:58  Patient On (Oxygen Delivery Method): BiPAP/CPAP              01-11 @ 07:01  -  01-12 @ 04:52  --------------------------------------------------------  IN: 984 mL / OUT: 285 mL / NET: 699 mL      CAPILLARY BLOOD GLUCOSE      POCT Blood Glucose.: 116 mg/dL (11 Jan 2025 09:44)    PHYSICAL EXAM:    Neuro:  lethargic, inconsistently focuses upon examiner, moaning, inconsistently answering questions with yes/no, shaking head yes no, oriented x 1, inconsistently follows commands, has spontaneous purposeful movement of all 4 extremities upper 4-5/5 lower 3-4/5, CN intact. Pupils 3 mm reactive equal    Pulm:  utilizing AVAPS RR 20, Vt 380, Epap 10, MinP 12, MaxP 30, FIO2 21%, RR 26-30 with SpVt of 400-410 cc's, breath sounds bilat, diminished in lower lung fields bases to 1/4 up. few bibasilar crackles. SPO2 98%. POCUS with A line predominant anteriorly with few focal B, bilateral pleural effusions, Rt mod, Lt mod to large, RLL consolidations, bilat lower lung field atelectasis     CV:  cardiac monitor sinus tach without ectopy, s1/s2 I/VI sys murmur appreciated , peripheral pulses palpable with radial 2+ bilat, dp/pt 1+/1+ bilat, digits warm to touch with good cap refill < 3 secs. POCUS with diminished LVFx RV<LV, VTI 14, IVC 1.8 with ~50% variability     GI/:  abd  soft  non distended non tender , + bowel sounds. cruz patent to bedside drainage, bladder non distended non palpable    Skin:   warm dry intact. Without palpable nodes. without JVD appreciated        HOSPITAL MEDICATIONS:  MEDICATIONS  (STANDING):  albuterol/ipratropium for Nebulization 3 milliLiter(s) Nebulizer every 6 hours  chlorhexidine 2% Cloths 1 Application(s) Topical <User Schedule>  heparin   Injectable 5000 Unit(s) SubCutaneous every 12 hours  levothyroxine Injectable 60 MICROGram(s) IV Push at bedtime  norepinephrine Infusion 0.05 MICROgram(s)/kG/Min (5.53 mL/Hr) IV Continuous <Continuous>  oseltamivir 30 milliGRAM(s) Oral daily  piperacillin/tazobactam IVPB.. 3.375 Gram(s) IV Intermittent every 12 hours    MEDICATIONS  (PRN):  bisacodyl Suppository 10 milliGRAM(s) Rectal daily PRN Constipation      LABS:                        9.9    12.30 )-----------( 213      ( 12 Jan 2025 00:40 )             31.1     Hgb Trend: 9.9<--, 11.5<--, 11.8<--  01-12    139  |  110[H]  |  36[H]  ----------------------------<  140[H]  3.8   |  17[L]  |  1.96[H]    Ca    9.1      12 Jan 2025 00:45  Phos  2.7     01-12  Mg     1.8     01-12    TPro  6.4  /  Alb  2.7[L]  /  TBili  0.6  /  DBili  x   /  AST  44[H]  /  ALT  67[H]  /  AlkPhos  341[H]  01-12    LIVER FUNCTIONS - ( 12 Jan 2025 00:45 )  Alb: 2.7 g/dL / Pro: 6.4 g/dL / ALK PHOS: 341 U/L / ALT: 67 U/L / AST: 44 U/L / GGT: x           Creatinine Trend: 1.96<--, 1.72<--, 1.62<--  PT/INR - ( 12 Jan 2025 00:40 )   PT: 15.0 sec;   INR: 1.31 ratio         PTT - ( 12 Jan 2025 00:40 )  PTT:27.2 sec  Urinalysis Basic - ( 12 Jan 2025 00:45 )    Color: x / Appearance: x / SG: x / pH: x  Gluc: 140 mg/dL / Ketone: x  / Bili: x / Urobili: x   Blood: x / Protein: x / Nitrite: x   Leuk Esterase: x / RBC: x / WBC x   Sq Epi: x / Non Sq Epi: x / Bacteria: x      Arterial Blood Gas:  01-12 @ 00:30  7.36/38/131/22/99.5/-3.5  ABG lactate: --  Arterial Blood Gas:  01-11 @ 16:48  7.28/43/174/20/99.7/-6.3  ABG lactate: --  Arterial Blood Gas:  01-11 @ 14:06  7.25/45/193/20/99.5/-7.4  ABG lactate: --    Venous Blood Gas:  01-11 @ 12:28  6.93/119/46/25/69.4  VBG Lactate: 1.1  Venous Blood Gas:  01-11 @ 10:06  7.07/90/67/26/89.2  VBG Lactate: 0.8      MICROBIOLOGY:     RADIOLOGY:  [ ] Reviewed and interpreted by me    EKG:      [facility] (ext [id number])

## 2025-01-12 NOTE — PROGRESS NOTE ADULT - CRITICAL CARE ATTENDING COMMENT
89-year-old female history of dementia, brain tumor s/p resection, hypothyroidism, and colon cancer s/p hemicolectomy, who presented from nursing home 1/11/2025 with hypoxemic respiratory failure.  She was initially started on antibiotics at her nursing home due to suspected pneumonia however as it began to worsen she was directed to ED.  On presentation she was found to be minimally responsive with hypoxemic and hypercapnic respiratory failure requiring supplemental oxygen.  VBG 7.07/90.  After patient was on HFNC repeat VBG showing 6.93/119.  MICU was consulted for acute hypoxemic and hypercapnic respiratory failure.  Patient was going to be intubated in the ED however family elected for DNR/DNI with trial of NIV.  Patient was started on AVAPS.  Labs notable for leukocytosis 12.16, Hb 11.5, CR 1.62 (baseline 1.08), Pro-Casper 1.67, UA large LE, many bacteria.  Patient was found RVP showing positive influenza A.  CT head was negative for acute intracranial pathology.  CT chest performed with right lower lobe groundglass and motion artifact potentially representing pneumonia with mucous noted in the airways.      # Encephalopathy due to hypercapnia (resolved)  # Hypercapnic respiratory failure in the setting of ?PNA, influenza A positive  # UTI  # Septic shock (resolved)  #DWAIN on CKD possibly due to ATN  – Hypercapnia resolved7.36/38/131 transitioned from AVAPS to HFNC to now nasal canula  – C/w Tamiflu  – C/w Zosyn for possible superimposed bacterial pneumonia  - Following up culture results  – MRSA PCR negative will hold further doses of vancomycin   - Was given Bumex 4mg overnight with improving urine output however Cr continues to worsen suspect ischemic ATN in the setting of shock state  – Shock state resolved now off vasopressors    DVT PPx: Paren subcu  CODE STATUS: DNR/DNI trial of NIV  Dispo: Transfer to General Medicine

## 2025-01-12 NOTE — PROGRESS NOTE ADULT - ASSESSMENT
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 Pt found to have DWAIN on CKD, Influenza A +, UTI +, possible RLL pneum with consolidation appreciated on POCUS        Consult called for and pt admitted to MICU for hypercapnic/hypoxic resp failure/septic shock in the setting of possible RLL pneum and UTI      Plan:    ####Neuro####  #==unresponsive in setting of hypoxic/hypercapnic resp failure  #==dementia hx  -Neuro checks q 2 hrs and prn for changes  -activity as tolerated  -physical therapy consult when stable  -obtain CT head to r/o pathology  -hold home meds of quetiapine 12.5 mg qhs; mirtazepine 15 mg qhs  -GOC pt is DNR/DNI with trial of NIV  -obtain palliative consult    ####Pulm####  #==Hypoxic/hypercapnic resp failure  #==possible RLL pneum  -AVAPS therapy -titrate to maintain ph 7.35-7.45; PCO2 35-45; SPO2>92%  -consider change to HFNC  -Bronchodilator  therapy q 6 hrs and prn SOB/Wheezes  -Lung protective therapy  -HOB >/= 30 degrees     ####CV####  #==resolved septic shock   #==episode of Afib with RVR  #==elevated hsTrop - most likely due to demand ischemia  -ECG now and q am x3  -Cardiac Enzymes now and q 8 hrs x 3  -s/p Norepi gtt - d/c'ed 0200 1/12/25  -metoprolol 5 mg IVP prn     ####GI/####  #==DWAIN on CKD  #==stercoral colitis hx  -npo at present  -strict I & O's - keep even  -bowel regimen with Miralax qd  -senna qd   -consider lactulose for bowel regimen    ####ID####  #==possible RLL pneum  #==UTI  -pan culture  -f/u blood culture from 1/11/25  -f/u urine for legionella from 1/11/25  -f/u urine for strep pneumoniae from 1/11/25  -MRSA/MSSA PCR 1/11/25 - ND/ND  -SARS-CoV-2 1/11/25  - ND  -Influ A 1/11/25 - Detected  -Influ B 1/11/25 - ND  -RSV 1/11/25 - ND  -continue vanco 1 gm IV qd - started in E.D. 1/11/25  -continue zosyn 3.375 gms IV q 8 hrs - started in E.D. 1/11/25  -tamiflu 75 mg po q 12 hrs x 5days - started 1/11/25    ####FEN/ENDO/HEME####  #==transaminitis in setting of septic shock  #==HYPOthyroidism hx  -obtain CMP/Mg++/PO--4/CBC w diff/PT/PTT/INR now and q. a.m.  -abg prn  -trend LFT's  -obtain TSH/Thyroxine/T3  -home med of levothyroxine 75 mcg po qhs - changed to 60 mcg IVP qhs  -DVT prophylaxis with - heparin 5000 units subq q 12 hrs    ####LINES/DRAINS/TUBES/DEVICES####  currently none, except nancy     89y old Female with stated hx significant for dementia, s/p brain tumor resection 1961, HTN, HYPOthyroidism, colon Ca s/p Rt hemicolectomy ('09), 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 Pt found to have DWAIN on CKD, Influenza A +, UTI +, possible RLL pneum with consolidation appreciated on POCUS        Consult called for and pt admitted to MICU for hypercapnic/hypoxic resp failure/septic shock in the setting of possible RLL pneum and UTI      Plan:    ####Neuro####  #==unresponsive in setting of hypoxic/hypercapnic resp failure  #==dementia hx  -Neuro checks q 2 hrs and prn for changes  -activity as tolerated  -physical therapy consult when stable  -obtain CT head to r/o pathology  -hold home meds of quetiapine 12.5 mg qhs; mirtazepine 15 mg qhs  -GOC pt is DNR/DNI with trial of NIV  -obtain palliative consult    ####Pulm####  #==Hypoxic/hypercapnic resp failure  #==possible RLL pneum  -AVAPS therapy -titrate to maintain ph 7.35-7.45; PCO2 35-45; SPO2>92%  -consider change to HFNC  -Bronchodilator  therapy q 6 hrs and prn SOB/Wheezes  -Lung protective therapy  -HOB >/= 30 degrees     ####CV####  #==resolved septic shock   #==episode of Afib with RVR  #==elevated hsTrop - most likely due to demand ischemia  -ECG now and q am x3  -Cardiac Enzymes now and q 8 hrs x 3  -s/p Norepi gtt - d/c'ed 0200 1/12/25  -metoprolol 5 mg IVP prn     ####GI/####  #==DWAIN on CKD  #==stercoral colitis hx  -npo at present  -strict I & O's - keep even  -will place CHET tube   -bowel regimen with Miralax qd  -senna qd   -consider lactulose for bowel regimen    ####ID####  #==possible RLL pneum  #==UTI  -pan culture  -f/u blood culture from 1/11/25  -f/u urine for legionella from 1/11/25  -f/u urine for strep pneumoniae from 1/11/25  -MRSA/MSSA PCR 1/11/25 - ND/ND  -SARS-CoV-2 1/11/25  - ND  -Influ A 1/11/25 - Detected  -Influ B 1/11/25 - ND  -RSV 1/11/25 - ND  -continue vanco 1 gm IV qd - started in E.D. 1/11/25  -continue zosyn 3.375 gms IV q 8 hrs - started in E.D. 1/11/25  -tamiflu 75 mg po q 12 hrs x 5days - started 1/11/25    ####FEN/ENDO/HEME####  #==transaminitis in setting of septic shock  #==HYPOthyroidism hx  -obtain CMP/Mg++/PO--4/CBC w diff/PT/PTT/INR now and q. a.m.  -abg prn  -trend LFT's  -obtain TSH/Thyroxine/T3  -home med of levothyroxine 75 mcg po qhs - changed to 60 mcg IVP qhs  -DVT prophylaxis with - heparin 5000 units subq q 12 hrs    ####LINES/DRAINS/TUBES/DEVICES####  currently none, except nancy     89y old Female with stated hx significant for dementia, s/p brain tumor resection 1961, HTN, HYPOthyroidism, colon Ca s/p Rt hemicolectomy ('09), 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 Pt found to have DWAIN on CKD, Influenza A +, UTI +, possible RLL pneum with consolidation appreciated on POCUS        Consult called for and pt admitted to MICU for hypercapnic/hypoxic resp failure/septic shock in the setting of possible RLL pneum and UTI      Plan:    ####Neuro####  #==unresponsive in setting of hypoxic/hypercapnic resp failure  #==dementia hx  -Neuro checks q 2 hrs and prn for changes  -activity as tolerated  -physical therapy consult when stable  -obtain CT head to r/o pathology  -hold home meds of quetiapine 12.5 mg qhs; mirtazepine 15 mg qhs  -GOC pt is DNR/DNI with trial of NIV  -obtain palliative consult    ####Pulm####  #==Hypoxic/hypercapnic resp failure  #==possible RLL pneum  -AVAPS therapy -titrate to maintain ph 7.35-7.45; PCO2 35-45; SPO2>92%  -consider change to HFNC  -Bronchodilator  therapy q 6 hrs and prn SOB/Wheezes  -Lung protective therapy  -HOB >/= 30 degrees     ####CV####  #==resolved septic shock   #==episode of Afib with RVR  #==elevated hsTrop - most likely due to demand ischemia  -ECG now and q am x3  -Cardiac Enzymes now and q 8 hrs x 3  -s/p Norepi gtt - d/c'ed 0200 1/12/25  -metoprolol 5 mg IVP prn     ####GI/####  #==DWAIN on CKD  #==stercoral colitis hx  -npo at present  -strict I & O's - keep even  -will place CHET tube   -bowel regimen with Miralax qd  -senna qd   -consider lactulose for bowel regimen    ####ID####  #==possible RLL pneum  #==UTI  -pan culture  -f/u blood culture from 1/11/25  -f/u urine for legionella from 1/11/25  -f/u urine for strep pneumoniae from 1/11/25  -MRSA/MSSA PCR 1/11/25 - ND/ND  -SARS-CoV-2 1/11/25  - ND  -Influ A 1/11/25 - Detected  -Influ B 1/11/25 - ND  -RSV 1/11/25 - ND  -continue vanco by level - started in E.D. 1/11/25  -titrate vanco to tough of 15 - 20  -continue zosyn 3.375 gms IV q 8 hrs - started in E.D. 1/11/25  -tamiflu 75 mg po q 12 hrs x 5days - started 1/11/25    ####FEN/ENDO/HEME####  #==transaminitis in setting of septic shock  #==HYPOthyroidism hx  -obtain CMP/Mg++/PO--4/CBC w diff/PT/PTT/INR now and q. a.m.  -abg prn  -trend LFT's  -obtain TSH/Thyroxine/T3  -home med of levothyroxine 75 mcg po qhs - changed to 60 mcg IVP qhs  -DVT prophylaxis with - heparin 5000 units subq q 12 hrs    ####LINES/DRAINS/TUBES/DEVICES####  currently none, except nancy     89y old Female with stated hx significant for dementia, s/p brain tumor resection 1961, HTN, HYPOthyroidism, colon Ca s/p Rt hemicolectomy ('09), 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 Pt found to have DWAIN on CKD, Influenza A +, UTI +, possible RLL pneum with consolidation appreciated on POCUS        Consult called for and pt admitted to MICU for hypercapnic/hypoxic resp failure/septic shock in the setting of possible RLL pneum and UTI      Plan:    ####Neuro####  #==unresponsive in setting of hypoxic/hypercapnic resp failure  #==dementia hx  -Neuro checks q 2 hrs and prn for changes  -activity as tolerated  -physical therapy consult when stable  -obtain CT head to r/o pathology  -hold home meds of quetiapine 12.5 mg qhs; mirtazepine 15 mg qhs  -GOC pt is DNR/DNI with trial of NIV  -obtain palliative consult    ####Pulm####  #==Hypoxic/hypercapnic resp failure  #==possible RLL pneum  -AVAPS therapy -titrate to maintain ph 7.35-7.45; PCO2 35-45; SPO2>92%  -consider change to HFNC  -Bronchodilator  therapy q 6 hrs and prn SOB/Wheezes  -Lung protective therapy  -HOB >/= 30 degrees     ####CV####  #==resolved septic shock   #==episode of Afib with RVR  #==elevated hsTrop - most likely due to demand ischemia  -ECG now and q am x3  -Cardiac Enzymes now and q 8 hrs x 3  -s/p Norepi gtt - d/c'ed 0200 1/12/25  -consider heparin gtt  -metoprolol 5 mg IVP prn     ####GI/####  #==DWAIN on CKD  #==stercoral colitis hx  -npo at present  -strict I & O's - keep even  -will place CHET tube   -bowel regimen with Miralax qd  -senna qd   -consider lactulose for bowel regimen    ####ID####  #==possible RLL pneum  #==UTI  -pan culture  -f/u blood culture from 1/11/25  -f/u urine for legionella from 1/11/25  -f/u urine for strep pneumoniae from 1/11/25  -MRSA/MSSA PCR 1/11/25 - ND/ND  -SARS-CoV-2 1/11/25  - ND  -Influ A 1/11/25 - Detected  -Influ B 1/11/25 - ND  -RSV 1/11/25 - ND  -continue vanco by level - started in E.D. 1/11/25  -titrate vanco to tough of 15 - 20  -continue zosyn 3.375 gms IV q 8 hrs - started in E.D. 1/11/25  -tamiflu 75 mg po q 12 hrs x 5days - started 1/11/25    ####FEN/ENDO/HEME####  #==transaminitis in setting of septic shock  #==HYPOthyroidism hx  -obtain CMP/Mg++/PO--4/CBC w diff/PT/PTT/INR now and q. a.m.  -abg prn  -trend LFT's  -obtain TSH/Thyroxine/T3  -home med of levothyroxine 75 mcg po qhs - changed to 60 mcg IVP qhs  -DVT prophylaxis with - heparin 5000 units subq q 12 hrs    ####LINES/DRAINS/TUBES/DEVICES####  currently none, except nancy     89y old Female with stated hx significant for dementia, s/p brain tumor resection 1961, HTN, HYPOthyroidism, colon Ca s/p Rt hemicolectomy ('09), 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 Pt found to have DWAIN on CKD, Influenza A +, UTI +, possible RLL pneum with consolidation appreciated on POCUS        Consult called for and pt admitted to MICU for hypercapnic/hypoxic resp failure/septic shock in the setting of possible RLL pneum and UTI      Plan:    ####Neuro####  #==unresponsive in setting of hypoxic/hypercapnic resp failure  #==dementia hx  -Neuro checks q 2 hrs and prn for changes  -activity as tolerated  -physical therapy consult when stable  -obtain CT head to r/o pathology  -hold home meds of quetiapine 12.5 mg qhs; mirtazepine 15 mg qhs  -GOC pt is DNR/DNI with trial of NIV  -obtain palliative consult    ####Pulm####  #==Hypoxic/hypercapnic resp failure  #==possible RLL pneum  -AVAPS therapy -titrate to maintain ph 7.35-7.45; PCO2 35-45; SPO2>92%  -consider change to HFNC  -Bronchodilator  therapy q 6 hrs and prn SOB/Wheezes  -Lung protective therapy  -HOB >/= 30 degrees     ####CV####  #==resolved septic shock   #==episodes of Afib with RVR  #==elevated hsTrop - most likely due to demand ischemia  -ECG now and q am x3  -Cardiac Enzymes now and q 8 hrs x 3  -s/p Norepi gtt - d/c'ed 0200 1/12/25  -consider heparin gtt  -s/p metoprolol 5 mg IVP x 2 separate episodes (1/12/25  -s/p amiodarone 150 mg IV x 2 separate episodes (1/12/25)  -consider amiodarone load    ####GI/####  #==DWAIN on CKD  #==stercoral colitis hx  -npo at present  -strict I & O's - keep even  -will place CHET tube   -bowel regimen with Miralax qd  -senna qd   -consider lactulose for bowel regimen    ####ID####  #==possible RLL pneum  #==UTI  -pan culture  -f/u blood culture from 1/11/25  -f/u urine for legionella from 1/11/25  -f/u urine for strep pneumoniae from 1/11/25  -MRSA/MSSA PCR 1/11/25 - ND/ND  -SARS-CoV-2 1/11/25  - ND  -Influ A 1/11/25 - Detected  -Influ B 1/11/25 - ND  -RSV 1/11/25 - ND  -continue vanco by level - started in E.D. 1/11/25  -titrate vanco to tough of 15 - 20  -continue zosyn 3.375 gms IV q 8 hrs - started in E.D. 1/11/25  -tamiflu 75 mg po q 12 hrs x 5days - started 1/11/25    ####FEN/ENDO/HEME####  #==transaminitis in setting of septic shock  #==HYPOthyroidism hx  -obtain CMP/Mg++/PO--4/CBC w diff/PT/PTT/INR now and q. a.m.  -abg prn  -trend LFT's  -obtain TSH/Thyroxine/T3  -home med of levothyroxine 75 mcg po qhs - changed to 60 mcg IVP qhs  -DVT prophylaxis with - heparin 5000 units subq q 12 hrs    ####LINES/DRAINS/TUBES/DEVICES####  currently none, except nancy     89y old Female with stated hx significant for dementia, s/p brain tumor resection 1961, HTN, HYPOthyroidism, colon Ca s/p Rt hemicolectomy ('09), 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 Pt found to have DWAIN on CKD, Influenza A +, UTI +, possible RLL pneum with consolidation appreciated on POCUS        Consult called for and pt admitted to MICU for hypercapnic/hypoxic resp failure/septic shock in the setting of possible RLL pneum and UTI      Plan:    ####Neuro####  #==unresponsive in setting of hypoxic/hypercapnic resp failure  #==dementia hx  -Neuro checks q 2 hrs and prn for changes  -activity as tolerated  -physical therapy consult when stable  -obtain CT head to r/o pathology  -hold home meds of quetiapine 12.5 mg qhs; mirtazepine 15 mg qhs  -GOC pt is DNR/DNI with trial of NIV - MOLST obtained  -obtain palliative consult    ####Pulm####  #==Hypoxic/hypercapnic resp failure  #==possible RLL pneum  -AVAPS therapy -titrate to maintain ph 7.35-7.45; PCO2 35-45; SPO2>92%  -consider change to HFNC  -Bronchodilator  therapy q 6 hrs and prn SOB/Wheezes  -Lung protective therapy  -HOB >/= 30 degrees     ####CV####  #==resolved septic shock   #==episodes of Afib with RVR  #==elevated hsTrop - most likely due to demand ischemia  -ECG now and q am x3  -Cardiac Enzymes now and q 8 hrs x 3  -s/p Norepi gtt - d/c'ed 0200 1/12/25  -consider heparin gtt  -s/p metoprolol 5 mg IVP x 2 separate episodes (1/12/25  -s/p amiodarone 150 mg IV x 2 separate episodes (1/12/25)  -consider amiodarone load    ####GI/####  #==DWAIN on CKD  #==stercoral colitis hx  -npo at present  -strict I & O's - keep even  -will place CHET tube   -bowel regimen with Miralax qd  -senna qd   -consider lactulose for bowel regimen    ####ID####  #==possible RLL pneum  #==UTI  -pan culture  -f/u blood culture from 1/11/25  -f/u urine for legionella from 1/11/25  -f/u urine for strep pneumoniae from 1/11/25  -MRSA/MSSA PCR 1/11/25 - ND/ND  -SARS-CoV-2 1/11/25  - ND  -Influ A 1/11/25 - Detected  -Influ B 1/11/25 - ND  -RSV 1/11/25 - ND  -continue vanco by level - started in E.D. 1/11/25  -titrate vanco to tough of 15 - 20  -continue zosyn 3.375 gms IV q 8 hrs - started in E.D. 1/11/25  -tamiflu 75 mg po q 12 hrs x 5days - started 1/11/25    ####FEN/ENDO/HEME####  #==transaminitis in setting of septic shock  #==HYPOthyroidism hx  -obtain CMP/Mg++/PO--4/CBC w diff/PT/PTT/INR now and q. a.m.  -abg prn  -trend LFT's  -obtain TSH/Thyroxine/T3  -home med of levothyroxine 75 mcg po qhs - changed to 60 mcg IVP qhs  -DVT prophylaxis with - heparin 5000 units subq q 12 hrs    ####LINES/DRAINS/TUBES/DEVICES####  currently none, except nancy

## 2025-01-12 NOTE — DIETITIAN INITIAL EVALUATION ADULT - REASON FOR ADMISSION
"89y old Female with stated hx significant for dementia, s/p brain tumor resection 1961, HTN, HYPOthyroidism, colon Ca s/p Rt hemicolectomy ('09), 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 Pt found to have DWAIN on CKD, Influenza A +, UTI +, possible RLL pneum:

## 2025-01-12 NOTE — DIETITIAN INITIAL EVALUATION ADULT - NSFNSPHYEXAMSKINFT_GEN_A_CORE
Pressure Injury 1: sacrum, Suspected deep tissue injury  Pressure Injury 2: Left:, heel, Suspected deep tissue injury  Pressure Injury 3: Right:, heel, Suspected deep tissue injury

## 2025-01-13 LAB
ALBUMIN SERPL ELPH-MCNC: 2.8 G/DL — LOW (ref 3.3–5)
ALP SERPL-CCNC: 318 U/L — HIGH (ref 40–120)
ALT FLD-CCNC: 57 U/L — HIGH (ref 10–45)
ANION GAP SERPL CALC-SCNC: 16 MMOL/L — SIGNIFICANT CHANGE UP (ref 5–17)
APTT BLD: 26.6 SEC — SIGNIFICANT CHANGE UP (ref 24.5–35.6)
AST SERPL-CCNC: 35 U/L — SIGNIFICANT CHANGE UP (ref 10–40)
BASOPHILS # BLD AUTO: 0.02 K/UL — SIGNIFICANT CHANGE UP (ref 0–0.2)
BASOPHILS NFR BLD AUTO: 0.2 % — SIGNIFICANT CHANGE UP (ref 0–2)
BILIRUB SERPL-MCNC: 0.6 MG/DL — SIGNIFICANT CHANGE UP (ref 0.2–1.2)
BUN SERPL-MCNC: 36 MG/DL — HIGH (ref 7–23)
CALCIUM SERPL-MCNC: 9.3 MG/DL — SIGNIFICANT CHANGE UP (ref 8.4–10.5)
CHLORIDE SERPL-SCNC: 106 MMOL/L — SIGNIFICANT CHANGE UP (ref 96–108)
CO2 SERPL-SCNC: 18 MMOL/L — LOW (ref 22–31)
CREAT SERPL-MCNC: 2.14 MG/DL — HIGH (ref 0.5–1.3)
EGFR: 21 ML/MIN/1.73M2 — LOW
EOSINOPHIL # BLD AUTO: 0.02 K/UL — SIGNIFICANT CHANGE UP (ref 0–0.5)
EOSINOPHIL NFR BLD AUTO: 0.2 % — SIGNIFICANT CHANGE UP (ref 0–6)
GAS PNL BLDV: SIGNIFICANT CHANGE UP
GLUCOSE BLDC GLUCOMTR-MCNC: 101 MG/DL — HIGH (ref 70–99)
GLUCOSE BLDC GLUCOMTR-MCNC: 118 MG/DL — HIGH (ref 70–99)
GLUCOSE BLDC GLUCOMTR-MCNC: 124 MG/DL — HIGH (ref 70–99)
GLUCOSE SERPL-MCNC: 125 MG/DL — HIGH (ref 70–99)
HCT VFR BLD CALC: 33.8 % — LOW (ref 34.5–45)
HGB BLD-MCNC: 11 G/DL — LOW (ref 11.5–15.5)
IMM GRANULOCYTES NFR BLD AUTO: 0.8 % — SIGNIFICANT CHANGE UP (ref 0–0.9)
INR BLD: 1.14 RATIO — SIGNIFICANT CHANGE UP (ref 0.85–1.16)
LYMPHOCYTES # BLD AUTO: 0.92 K/UL — LOW (ref 1–3.3)
LYMPHOCYTES # BLD AUTO: 7.8 % — LOW (ref 13–44)
MAGNESIUM SERPL-MCNC: 2 MG/DL — SIGNIFICANT CHANGE UP (ref 1.6–2.6)
MCHC RBC-ENTMCNC: 31 PG — SIGNIFICANT CHANGE UP (ref 27–34)
MCHC RBC-ENTMCNC: 32.5 G/DL — SIGNIFICANT CHANGE UP (ref 32–36)
MCV RBC AUTO: 95.2 FL — SIGNIFICANT CHANGE UP (ref 80–100)
MONOCYTES # BLD AUTO: 0.52 K/UL — SIGNIFICANT CHANGE UP (ref 0–0.9)
MONOCYTES NFR BLD AUTO: 4.4 % — SIGNIFICANT CHANGE UP (ref 2–14)
NEUTROPHILS # BLD AUTO: 10.26 K/UL — HIGH (ref 1.8–7.4)
NEUTROPHILS NFR BLD AUTO: 86.6 % — HIGH (ref 43–77)
NRBC # BLD: 0 /100 WBCS — SIGNIFICANT CHANGE UP (ref 0–0)
NRBC BLD-RTO: 0 /100 WBCS — SIGNIFICANT CHANGE UP (ref 0–0)
PHOSPHATE SERPL-MCNC: 4.1 MG/DL — SIGNIFICANT CHANGE UP (ref 2.5–4.5)
PLATELET # BLD AUTO: 242 K/UL — SIGNIFICANT CHANGE UP (ref 150–400)
POTASSIUM SERPL-MCNC: 4.1 MMOL/L — SIGNIFICANT CHANGE UP (ref 3.5–5.3)
POTASSIUM SERPL-SCNC: 4.1 MMOL/L — SIGNIFICANT CHANGE UP (ref 3.5–5.3)
PROT SERPL-MCNC: 6.7 G/DL — SIGNIFICANT CHANGE UP (ref 6–8.3)
PROTHROM AB SERPL-ACNC: 13 SEC — SIGNIFICANT CHANGE UP (ref 9.9–13.4)
RBC # BLD: 3.55 M/UL — LOW (ref 3.8–5.2)
RBC # FLD: 14.5 % — SIGNIFICANT CHANGE UP (ref 10.3–14.5)
SODIUM SERPL-SCNC: 140 MMOL/L — SIGNIFICANT CHANGE UP (ref 135–145)
TROPONIN T, HIGH SENSITIVITY RESULT: 126 NG/L — HIGH (ref 0–51)
TROPONIN T, HIGH SENSITIVITY RESULT: 131 NG/L — HIGH (ref 0–51)
WBC # BLD: 11.83 K/UL — HIGH (ref 3.8–10.5)
WBC # FLD AUTO: 11.83 K/UL — HIGH (ref 3.8–10.5)

## 2025-01-13 PROCEDURE — 71045 X-RAY EXAM CHEST 1 VIEW: CPT | Mod: 26

## 2025-01-13 PROCEDURE — 99232 SBSQ HOSP IP/OBS MODERATE 35: CPT | Mod: GC

## 2025-01-13 RX ORDER — FENTANYL CITRATE 50 UG/ML
50 INJECTION INTRAMUSCULAR; INTRAVENOUS ONCE
Refills: 0 | Status: DISCONTINUED | OUTPATIENT
Start: 2025-01-13 | End: 2025-01-13

## 2025-01-13 RX ORDER — PIPERACILLIN SODIUM AND TAZOBACTAM SODIUM 2; 250 G/50ML; MG/50ML
3.38 INJECTION, POWDER, FOR SOLUTION INTRAVENOUS EVERY 12 HOURS
Refills: 0 | Status: COMPLETED | OUTPATIENT
Start: 2025-01-13 | End: 2025-01-15

## 2025-01-13 RX ORDER — SODIUM CHLORIDE 9 G/ML
1000 INJECTION, SOLUTION INTRAVENOUS
Refills: 0 | Status: DISCONTINUED | OUTPATIENT
Start: 2025-01-13 | End: 2025-01-18

## 2025-01-13 RX ORDER — ASCORBIC ACID 500 MG/ML
500 VIAL (ML) INJECTION DAILY
Refills: 0 | Status: DISCONTINUED | OUTPATIENT
Start: 2025-01-13 | End: 2025-02-12

## 2025-01-13 RX ORDER — MECOBAL/LEVOMEFOLAT CA/B6 PHOS 2-3-35 MG
1 TABLET ORAL DAILY
Refills: 0 | Status: DISCONTINUED | OUTPATIENT
Start: 2025-01-14 | End: 2025-02-12

## 2025-01-13 RX ADMIN — IPRATROPIUM BROMIDE AND ALBUTEROL SULFATE 3 MILLILITER(S): .5; 2.5 SOLUTION RESPIRATORY (INHALATION) at 11:22

## 2025-01-13 RX ADMIN — PIPERACILLIN SODIUM AND TAZOBACTAM SODIUM 25 GRAM(S): 2; 250 INJECTION, POWDER, FOR SOLUTION INTRAVENOUS at 18:53

## 2025-01-13 RX ADMIN — PIPERACILLIN SODIUM AND TAZOBACTAM SODIUM 25 GRAM(S): 2; 250 INJECTION, POWDER, FOR SOLUTION INTRAVENOUS at 05:36

## 2025-01-13 RX ADMIN — Medication 10 MILLIGRAM(S): at 12:07

## 2025-01-13 RX ADMIN — Medication 5 MILLIGRAM(S): at 12:07

## 2025-01-13 RX ADMIN — SODIUM CHLORIDE 50 MILLILITER(S): 9 INJECTION, SOLUTION INTRAVENOUS at 12:07

## 2025-01-13 RX ADMIN — SODIUM CHLORIDE 50 MILLILITER(S): 9 INJECTION, SOLUTION INTRAVENOUS at 03:00

## 2025-01-13 RX ADMIN — Medication 5000 UNIT(S): at 05:33

## 2025-01-13 RX ADMIN — IPRATROPIUM BROMIDE AND ALBUTEROL SULFATE 3 MILLILITER(S): .5; 2.5 SOLUTION RESPIRATORY (INHALATION) at 05:11

## 2025-01-13 RX ADMIN — Medication 5000 UNIT(S): at 17:12

## 2025-01-13 RX ADMIN — ANTISEPTIC SURGICAL SCRUB 1 APPLICATION(S): 0.04 SOLUTION TOPICAL at 05:34

## 2025-01-13 RX ADMIN — LEVOTHYROXINE SODIUM 60 MICROGRAM(S): 25 TABLET ORAL at 22:51

## 2025-01-13 RX ADMIN — SODIUM CHLORIDE 50 MILLILITER(S): 9 INJECTION, SOLUTION INTRAVENOUS at 20:47

## 2025-01-13 RX ADMIN — Medication 5 MILLIGRAM(S): at 05:33

## 2025-01-13 RX ADMIN — IPRATROPIUM BROMIDE AND ALBUTEROL SULFATE 3 MILLILITER(S): .5; 2.5 SOLUTION RESPIRATORY (INHALATION) at 17:03

## 2025-01-13 RX ADMIN — Medication 5 MILLIGRAM(S): at 17:12

## 2025-01-13 NOTE — SWALLOW BEDSIDE ASSESSMENT ADULT - COMMENTS
DNR/DNI; trial of NIV  MICU course, pt with improved hypoxic/hypercapnic resp failure, improved mental status, c/b development of recurrent Afib with RVR, (1/12/25) received amiodarone 150 IV x1, metoprolol 5 mg IVP x1 with conversion back to NSR.  1/13: Pressors weaned off.  Titrated off high flow nasal cannula.  Currently tolerating room air.    Failed bedside swallow (RN screen), official speech and swallow pending.   possible RLL pneumonia; +UTI    CXR 1/11: ...Opacification of the right lower and bilateral middle lung fields. Small pleural effusions. IMPRESSION: Lung opacities may represent pneumonia. Small effusions. DNR/DNI; trial of NIV  MICU course, pt with improved hypoxic/hypercapnic resp failure, improved mental status, c/b development of recurrent Afib with RVR, (1/12/25) received amiodarone 150 IV x1, metoprolol 5 mg IVP x1 with conversion back to NSR.  1/13: Pressors weaned off.  Titrated off high flow nasal cannula.  Currently tolerating room air.    Failed bedside swallow (RN screen), official speech and swallow pending.   possible RLL pneumonia; +UTI    CXR 1/11: ...Opacification of the right lower and bilateral middle lung fields. Small pleural effusions. IMPRESSION: Lung opacities may represent pneumonia. Small effusions.  Isolation precautions: droplet; FLU A+  Swallow hx: seen for bedside swallow evaluation 11/2021 during admission s/p fall; recommended for puree/thin due to cognitive impairment

## 2025-01-13 NOTE — PHYSICAL THERAPY INITIAL EVALUATION ADULT - ACTIVE RANGE OF MOTION EXAMINATION, REHAB EVAL
PROM elbow flex/ext, shld flex to 90*, PROM with incr resistance; BLE PROM WFL, mild resistance to RLE

## 2025-01-13 NOTE — PROCEDURE NOTE - NSINFORMCONSENT_GEN_A_CORE
from son/Benefits, risks, and possible complications of procedure explained to patient/caregiver who verbalized understanding and gave verbal consent.

## 2025-01-13 NOTE — ADVANCED PRACTICE NURSE CONSULT - REASON FOR CONSULT
Wound care consult initiated by RN to assess patient's skin for a possible skin injury on sacrum and B/L heels present on admission     Reason for Admission: Hypoxic/hypercapnic resp failure  History of Present Illness:    89 yr old female with PMHx dementia, s/p brain tumor resection 1961 with residual Lt sided facial droop, HTN, HYPOthyroidism, colon Ca s/p Rt hemicolectomy ('09), 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 from nursing home via EMS with hypoxic resp failure since 1/9/25 secondary to suspected pneum, treated with HFNC, son endorsed pt was ordered for antibx treatment at time as well. EMS endorsed nursing home facility O2 tank found off, and placed pt on NRB.        In E.D. pt found to be obtunded/unresponsive and to have hypoxic/hypercapnic resp failure with SPO2 of 86-88, Vph of 7.07, VPCO2 90, placed on HFNC with FIO2 of 100%, 50 liter flow with improvement to 92-94% . In addition found to have Influenza A positive, DWAIN with sCr of 1.62 on CKD2 (baseline 1.08-1.23 [8/2024]), transaminitis - alk-phos 577, , , +UA with large leuk-est, neg nitrite. SBP of 98/46 - 107/46. Pt received vanco/zosyn, 1 liter NS IVF bolus, pt placed on norepi gtt. Pt received narcan 0.2 mg IVP x1, albuterol nebs x3.        upon consult POCUS with A line predominant, few focal B lines mostly in Left lung field, small area of consolidation in RLL field, mod/large Lt pleural effusion with LLL field atelectasis. Pt with initial norepi at 0.13 mcg/min, titrated down to 0.05 mcg/min over 35 minutes (130/64 -> 112/64),       Consult called for and pt admitted to MICU for hypercapnic/hypoxic resp failure/septic shock in the setting of possible RLL pneum and UTI

## 2025-01-13 NOTE — CHART NOTE - NSCHARTNOTEFT_GEN_A_CORE
MICU Transfer Note    Transfer from: MICU    Transfer to: (  ) Medicine    (  ) Telemetry     (   ) RCU        (    ) Palliative         (   ) Stroke Unit          (   ) __________________    Accepting Physician:      MICU COURSE:      89 yo F PMH dementia, s/p brain tumor resection, HTN, hypothyroidism, Colon Ca s/p Rt hemicolectomy, stercoral colitis who presented to E.D. 1/11 with hypoxic respiratory failure secondary to suspected pneumonia.  In ED, pt unresponsive found to be in hypoxic/hypercapnic respiratory failure. Started on high flow nasal cannula. Found to be influenza A positive. Continued hypotension requiring pressor support. Started on Levo and transferred to MICU for management of septic shock in the setting of influenza A complicated by possible aspiration pneumonia and UTI.  Treated with Tamiflu and Zosyn. MICU course complicated by AF w/ RVR s/p amio bolus, now controlled on standing Metoprolol.     Pressors weaned off.  Titrated off high flow nasal cannula.  Currently tolerating room air.    Failed bedside swallow, official speech and swallow pending.       ASSESSMENT & PLAN:         89 yo F PMH dementia, s/p brain tumor resection, HTN, Colon Ca s/p Rt hemicolectomy, stercoral colitis admitted to MICU for hypoxic/hypercapnic respiratory failure and septic shock secondary to Influenza A, aspiration pna, and UTI requiring high flow and vasopressors. Course complicated by AF with RVR, now rate controlled.     Plan:    ####Neuro####  #==unresponsive on admission--improved, patient confused, but responsive  #==dementia hx  -Neuro checks per routine  -activity as tolerated  -physical therapy consult when stable  -obtain CT head to r/o pathology  -hold home meds of quetiapine 12.5 mg qhs; mirtazepine 15 mg qhs  -[facility] pt is DNR/DNI with trial of NIV - MOLST obtained  -obtain palliative consult    ####Pulm####  #==Hypoxic/hypercapnic resp failure---DNI, okay to use NIV  #==possible RLL pneum  -AVAPS therapy -titrate to maintain ph 7.35-7.45; PCO2 35-45; SPO2>92%  -consider change to HFNC  -Bronchodilator  therapy q 6 hrs and prn SOB/Wheezes  -Lung protective therapy  -HOB >/= 30 degrees   -Weaned to room air--venous ph 7.32    ####CV####  #==resolved septic shock   #==episodes of Afib with RVR  #==elevated hsTrop - most likely due to demand ischemia  -downtrended, no longer trending  -s/p Norepi gtt - d/c'ed 0200 1/12/25  -consider heparin gtt for AF  -s/p metoprolol 5 mg IVP x 2 separate episodes (1/12/25  -s/p amiodarone 150 mg IV x 2 separate episodes (1/12/25)  -currently rate controlled on Metroprolol 5 IV q6h   -continue telemetry monitoring    ####GI/####  #==DWAIN on CKD  #==stercoral colitis hx  -npo at present  -failed bedside swallow  -official swallow study pending  -continue low dose IVF  -if fails, explore if NGT in line with GOC    ####ID####  #==possible RLL pneum  #==UTI  -pan culture  -f/u blood culture from 1/11/25  -f/u urine for legionella from 1/11/25  -f/u urine for strep pneumoniae from 1/11/25  -MRSA/MSSA PCR 1/11/25 - ND/ND  -SARS-CoV-2 1/11/25  - ND  -Influ A 1/11/25 - Detected  -Influ B 1/11/25 - ND  -RSV 1/11/25 - ND  -continue vanco by level - started in E.D. 1/11/25--d/c--MRSA swab negative  -continue zosyn 3.375 gms IV q 8 hrs - started in E.D. 1/11/25  -tamiflu 75 mg po q 12 hrs x 5days - started 1/11/25    ####FEN/ENDO/HEME####  #==transaminitis in setting of septic shock  #==HYPOthyroidism hx  -trend LFT's  -obtain TSH/Thyroxine/T3  -home med of levothyroxine 75 mcg po qhs - changed to 60 mcg IVP qhs  -DVT prophylaxis with - heparin 5000 units subq q 12 hrs    ####LINES/DRAINS/TUBES/DEVICES####  currently none, except cruz      For Followup:  --Speech and Swallow  --Transition to PO metoprolol if able  --Consider AC  --Complete Zosyn course for aspiration pna/UTI        Vital Signs Last 24 Hrs  T(C): 37 (13 Jan 2025 00:00), Max: 37.3 (12 Jan 2025 16:00)  T(F): 98.6 (13 Jan 2025 00:00), Max: 99.1 (12 Jan 2025 16:00)  HR: 92 (13 Jan 2025 03:00) (68 - 141)  BP: 156/86 (13 Jan 2025 03:00) (94/52 - 165/79)  BP(mean): 114 (13 Jan 2025 03:00) (65 - 115)  RR: 31 (13 Jan 2025 03:00) (16 - 31)  SpO2: 98% (13 Jan 2025 03:00) (93% - 100%)    Parameters below as of 13 Jan 2025 00:00  Patient On (Oxygen Delivery Method): room air      I&O's Summary    11 Jan 2025 07:01  -  12 Jan 2025 07:00  --------------------------------------------------------  IN: 1059 mL / OUT: 595 mL / NET: 464 mL    12 Jan 2025 07:01  -  13 Jan 2025 04:24  --------------------------------------------------------  IN: 2124.8 mL / OUT: 1180 mL / NET: 944.8 mL        MEDICATIONS  (STANDING):  albuterol/ipratropium for Nebulization 3 milliLiter(s) Nebulizer every 6 hours  bisacodyl Suppository 10 milliGRAM(s) Rectal daily  chlorhexidine 2% Cloths 1 Application(s) Topical <User Schedule>  dextrose 5% + lactated ringers. 1000 milliLiter(s) (50 mL/Hr) IV Continuous <Continuous>  heparin   Injectable 5000 Unit(s) SubCutaneous every 12 hours  levothyroxine Injectable 60 MICROGram(s) IV Push at bedtime  metoprolol tartrate Injectable 5 milliGRAM(s) IV Push every 6 hours  oseltamivir 30 milliGRAM(s) Oral daily  piperacillin/tazobactam IVPB.. 3.375 Gram(s) IV Intermittent every 12 hours    MEDICATIONS  (PRN):        LABS                                            11.0                  Neurophils% (auto):   86.6   (01-13 @ 00:36):    11.83)-----------(242          Lymphocytes% (auto):  7.8                                           33.8                   Eosinphils% (auto):   0.2      Manual%: Neutrophils x    ; Lymphocytes x    ; Eosinophils x    ; Bands%: x    ; Blasts x                                    140    |  106    |  36                  Calcium: 9.3   / iCa: x      (01-13 @ 00:35)    ----------------------------<  125       Magnesium: 2.0                              4.1     |  18     |  2.14             Phosphorous: 4.1      TPro  6.7    /  Alb  2.8    /  TBili  0.6    /  DBili  x      /  AST  35     /  ALT  57     /  AlkPhos  318    13 Jan 2025 00:35    ( 01-13 @ 00:37 )   PT: 13.0 sec;   INR: 1.14 ratio  aPTT: 26.6 sec MICU Transfer Note    Transfer from: MICU    Transfer to: (  ) Medicine    (x) Telemetry     (   ) RCU        (    ) Palliative         (   ) Stroke Unit          (   ) __________________    Accepting Physician:      MICU COURSE:      89 yo F PMH dementia, s/p brain tumor resection, HTN, hypothyroidism, Colon Ca s/p Rt hemicolectomy, stercoral colitis who presented to E.D. 1/11 with hypoxic respiratory failure secondary to suspected pneumonia.  In ED, pt unresponsive found to be in hypoxic/hypercapnic respiratory failure. Started on high flow nasal cannula. Found to be influenza A positive. Continued hypotension requiring pressor support. Started on Levo and transferred to MICU for management of septic shock in the setting of influenza A complicated by possible aspiration pneumonia and UTI.  Treated with Tamiflu and Zosyn. MICU course complicated by AF w/ RVR s/p amio bolus, now controlled on standing Metoprolol.     Pressors weaned off.  Titrated off high flow nasal cannula.  Currently tolerating room air.    Failed bedside swallow, official speech and swallow pending.       ASSESSMENT & PLAN:         89 yo F PMH dementia, s/p brain tumor resection, HTN, Colon Ca s/p Rt hemicolectomy, stercoral colitis admitted to MICU for hypoxic/hypercapnic respiratory failure and septic shock secondary to Influenza A, aspiration pna, and UTI requiring high flow and vasopressors. Course complicated by AF with RVR, now rate controlled.     Plan:    ####Neuro####  #==unresponsive on admission--improved, patient confused, but responsive  #==dementia hx  -Neuro checks per routine  -activity as tolerated  -physical therapy consult when stable  -obtain CT head to r/o pathology  -hold home meds of quetiapine 12.5 mg qhs; mirtazepine 15 mg qhs  -[facility] pt is DNR/DNI with trial of NIV - MOLST obtained  -obtain palliative consult    ####Pulm####  #==Hypoxic/hypercapnic resp failure---DNI, okay to use NIV  #==possible RLL pneum  -AVAPS therapy -titrate to maintain ph 7.35-7.45; PCO2 35-45; SPO2>92%  -consider change to HFNC  -Bronchodilator  therapy q 6 hrs and prn SOB/Wheezes  -Lung protective therapy  -HOB >/= 30 degrees   -Weaned to room air--venous ph 7.32    ####CV####  #==resolved septic shock   #==episodes of Afib with RVR  #==elevated hsTrop - most likely due to demand ischemia  -downtrended, no longer trending  -s/p Norepi gtt - d/c'ed 0200 1/12/25  -consider heparin gtt for AF  -s/p metoprolol 5 mg IVP x 2 separate episodes (1/12/25  -s/p amiodarone 150 mg IV x 2 separate episodes (1/12/25)  -currently rate controlled on Metroprolol 5 IV q6h   -continue telemetry monitoring    ####GI/####  #==DWAIN on CKD  #==stercoral colitis hx  -npo at present  -failed bedside swallow  -official swallow study pending  -continue low dose IVF  -if fails, explore if NGT in line with GOC    ####ID####  #==possible RLL pneum  #==UTI  -pan culture  -f/u blood culture from 1/11/25  -f/u urine for legionella from 1/11/25  -f/u urine for strep pneumoniae from 1/11/25  -MRSA/MSSA PCR 1/11/25 - ND/ND  -SARS-CoV-2 1/11/25  - ND  -Influ A 1/11/25 - Detected  -Influ B 1/11/25 - ND  -RSV 1/11/25 - ND  -continue vanco by level - started in E.D. 1/11/25--d/c--MRSA swab negative  -continue zosyn 3.375 gms IV q 8 hrs - started in E.D. 1/11/25  -tamiflu 75 mg po q 12 hrs x 5days - started 1/11/25    ####FEN/ENDO/HEME####  #==transaminitis in setting of septic shock  #==HYPOthyroidism hx  -trend LFT's  -obtain TSH/Thyroxine/T3  -home med of levothyroxine 75 mcg po qhs - changed to 60 mcg IVP qhs  -DVT prophylaxis with - heparin 5000 units subq q 12 hrs    ####LINES/DRAINS/TUBES/DEVICES####  currently none, except cruz      For Followup:  --Speech and Swallow  --Transition to PO metoprolol if able  --Consider AC  --Complete Zosyn course for aspiration pna/UTI        Vital Signs Last 24 Hrs  T(C): 37 (13 Jan 2025 00:00), Max: 37.3 (12 Jan 2025 16:00)  T(F): 98.6 (13 Jan 2025 00:00), Max: 99.1 (12 Jan 2025 16:00)  HR: 92 (13 Jan 2025 03:00) (68 - 141)  BP: 156/86 (13 Jan 2025 03:00) (94/52 - 165/79)  BP(mean): 114 (13 Jan 2025 03:00) (65 - 115)  RR: 31 (13 Jan 2025 03:00) (16 - 31)  SpO2: 98% (13 Jan 2025 03:00) (93% - 100%)    Parameters below as of 13 Jan 2025 00:00  Patient On (Oxygen Delivery Method): room air      I&O's Summary    11 Jan 2025 07:01  -  12 Jan 2025 07:00  --------------------------------------------------------  IN: 1059 mL / OUT: 595 mL / NET: 464 mL    12 Jan 2025 07:01  -  13 Jan 2025 04:24  --------------------------------------------------------  IN: 2124.8 mL / OUT: 1180 mL / NET: 944.8 mL        MEDICATIONS  (STANDING):  albuterol/ipratropium for Nebulization 3 milliLiter(s) Nebulizer every 6 hours  bisacodyl Suppository 10 milliGRAM(s) Rectal daily  chlorhexidine 2% Cloths 1 Application(s) Topical <User Schedule>  dextrose 5% + lactated ringers. 1000 milliLiter(s) (50 mL/Hr) IV Continuous <Continuous>  heparin   Injectable 5000 Unit(s) SubCutaneous every 12 hours  levothyroxine Injectable 60 MICROGram(s) IV Push at bedtime  metoprolol tartrate Injectable 5 milliGRAM(s) IV Push every 6 hours  oseltamivir 30 milliGRAM(s) Oral daily  piperacillin/tazobactam IVPB.. 3.375 Gram(s) IV Intermittent every 12 hours    MEDICATIONS  (PRN):        LABS                                            11.0                  Neurophils% (auto):   86.6   (01-13 @ 00:36):    11.83)-----------(242          Lymphocytes% (auto):  7.8                                           33.8                   Eosinphils% (auto):   0.2      Manual%: Neutrophils x    ; Lymphocytes x    ; Eosinophils x    ; Bands%: x    ; Blasts x                                    140    |  106    |  36                  Calcium: 9.3   / iCa: x      (01-13 @ 00:35)    ----------------------------<  125       Magnesium: 2.0                              4.1     |  18     |  2.14             Phosphorous: 4.1      TPro  6.7    /  Alb  2.8    /  TBili  0.6    /  DBili  x      /  AST  35     /  ALT  57     /  AlkPhos  318    13 Jan 2025 00:35    ( 01-13 @ 00:37 )   PT: 13.0 sec;   INR: 1.14 ratio  aPTT: 26.6 sec MICU Transfer Note    Transfer from: MICU    Transfer to: (  ) Medicine    (x) Telemetry     (   ) RCU        (    ) Palliative         (   ) Stroke Unit          (   ) __________________    Accepting Physician: Dr. Ayo Link       MICU COURSE:      89 yo F PMH dementia, s/p brain tumor resection, HTN, hypothyroidism, Colon Ca s/p Rt hemicolectomy, stercoral colitis who presented to E.D. 1/11 with hypoxic respiratory failure secondary to suspected pneumonia.  In ED, pt unresponsive found to be in hypoxic/hypercapnic respiratory failure. Started on high flow nasal cannula. Found to be influenza A positive. Continued hypotension requiring pressor support. Started on Levo and transferred to MICU for management of septic shock in the setting of influenza A complicated by possible aspiration pneumonia and UTI.  Treated with Tamiflu and Zosyn. MICU course complicated by AF w/ RVR s/p amio bolus, now controlled on standing Metoprolol.     Pressors weaned off.  Titrated off high flow nasal cannula.  Currently tolerating room air.    Failed bedside swallow, official speech and swallow pending.       ASSESSMENT & PLAN:         89 yo F PMH dementia, s/p brain tumor resection, HTN, Colon Ca s/p Rt hemicolectomy, stercoral colitis admitted to MICU for hypoxic/hypercapnic respiratory failure and septic shock secondary to Influenza A, aspiration pna, and UTI requiring high flow and vasopressors. Course complicated by AF with RVR, now rate controlled.     Plan:    ####Neuro####  #==unresponsive on admission--improved, patient confused, but responsive  #==dementia hx  -Neuro checks per routine  -activity as tolerated  -physical therapy consult when stable  -obtain CT head to r/o pathology  -hold home meds of quetiapine 12.5 mg qhs; mirtazepine 15 mg qhs  -C pt is DNR/DNI with trial of NIV - MOLST obtained  -obtain palliative consult    ####Pulm####  #==Hypoxic/hypercapnic resp failure---DNI, okay to use NIV  #==possible RLL pneum  -AVAPS therapy -titrate to maintain ph 7.35-7.45; PCO2 35-45; SPO2>92%  -consider change to HFNC  -Bronchodilator  therapy q 6 hrs and prn SOB/Wheezes  -Lung protective therapy  -HOB >/= 30 degrees   -Weaned to room air--venous ph 7.32    ####CV####  #==resolved septic shock   #==episodes of Afib with RVR  #==elevated hsTrop - most likely due to demand ischemia  -downtrended, no longer trending  -s/p Norepi gtt - d/c'ed 0200 1/12/25  -consider heparin gtt for AF  -s/p metoprolol 5 mg IVP x 2 separate episodes (1/12/25  -s/p amiodarone 150 mg IV x 2 separate episodes (1/12/25)  -currently rate controlled on Metroprolol 5 IV q6h   -continue telemetry monitoring    ####GI/####  #==DWAIN on CKD  #==stercoral colitis hx  -npo at present  -failed bedside swallow  -official swallow study pending  -continue low dose IVF  -C    ####ID####  #==possible RLL pneum  #==UTI  -pan culture  -f/u blood culture from 1/11/25  -f/u urine for legionella from 1/11/25  -f/u urine for strep pneumoniae from 1/11/25  -MRSA/MSSA PCR 1/11/25 - ND/ND  -SARS-CoV-2 1/11/25  - ND  -Influ A 1/11/25 - Detected  -Influ B 1/11/25 - ND  -RSV 1/11/25 - ND  -continue vanco by level - started in E.D. 1/11/25--d/c--MRSA swab negative  -continue zosyn 3.375 gms IV q 8 hrs - started in E.D. 1/11/25  -tamiflu 75 mg po q 12 hrs x 5days - started 1/11/25    ####FEN/ENDO/HEME####  #==transaminitis in setting of septic shock  #==HYPOthyroidism hx  -trend LFT's  -obtain TSH/Thyroxine/T3  -home med of levothyroxine 75 mcg po qhs - changed to 60 mcg IVP qhs  -DVT prophylaxis with - heparin 5000 units subq q 12 hrs    ####LINES/DRAINS/TUBES/DEVICES####  currently none, except nancy      For Followup:  --Speech and Swallow, needs re-eval, too disorganized to evaluate  --Transition to PO metoprolol if able  --Consider AC  --Complete Zosyn course for aspiration pna/UTI  --f/u w/ Palliative consult recs      Vital Signs Last 24 Hrs  T(C): 37 (13 Jan 2025 00:00), Max: 37.3 (12 Jan 2025 16:00)  T(F): 98.6 (13 Jan 2025 00:00), Max: 99.1 (12 Jan 2025 16:00)  HR: 92 (13 Jan 2025 03:00) (68 - 141)  BP: 156/86 (13 Jan 2025 03:00) (94/52 - 165/79)  BP(mean): 114 (13 Jan 2025 03:00) (65 - 115)  RR: 31 (13 Jan 2025 03:00) (16 - 31)  SpO2: 98% (13 Jan 2025 03:00) (93% - 100%)    Parameters below as of 13 Jan 2025 00:00  Patient On (Oxygen Delivery Method): room air      I&O's Summary    11 Jan 2025 07:01  -  12 Jan 2025 07:00  --------------------------------------------------------  IN: 1059 mL / OUT: 595 mL / NET: 464 mL    12 Jan 2025 07:01  -  13 Jan 2025 04:24  --------------------------------------------------------  IN: 2124.8 mL / OUT: 1180 mL / NET: 944.8 mL        MEDICATIONS  (STANDING):  albuterol/ipratropium for Nebulization 3 milliLiter(s) Nebulizer every 6 hours  bisacodyl Suppository 10 milliGRAM(s) Rectal daily  chlorhexidine 2% Cloths 1 Application(s) Topical <User Schedule>  dextrose 5% + lactated ringers. 1000 milliLiter(s) (50 mL/Hr) IV Continuous <Continuous>  heparin   Injectable 5000 Unit(s) SubCutaneous every 12 hours  levothyroxine Injectable 60 MICROGram(s) IV Push at bedtime  metoprolol tartrate Injectable 5 milliGRAM(s) IV Push every 6 hours  oseltamivir 30 milliGRAM(s) Oral daily  piperacillin/tazobactam IVPB.. 3.375 Gram(s) IV Intermittent every 12 hours    MEDICATIONS  (PRN):        LABS                                            11.0                  Neurophils% (auto):   86.6   (01-13 @ 00:36):    11.83)-----------(242          Lymphocytes% (auto):  7.8                                           33.8                   Eosinphils% (auto):   0.2      Manual%: Neutrophils x    ; Lymphocytes x    ; Eosinophils x    ; Bands%: x    ; Blasts x                                    140    |  106    |  36                  Calcium: 9.3   / iCa: x      (01-13 @ 00:35)    ----------------------------<  125       Magnesium: 2.0                              4.1     |  18     |  2.14             Phosphorous: 4.1      TPro  6.7    /  Alb  2.8    /  TBili  0.6    /  DBili  x      /  AST  35     /  ALT  57     /  AlkPhos  318    13 Jan 2025 00:35    ( 01-13 @ 00:37 )   PT: 13.0 sec;   INR: 1.14 ratio  aPTT: 26.6 sec MICU Transfer Note    Transfer from: MICU    Transfer to: (  ) Medicine    (x) Telemetry     (   ) RCU        (    ) Palliative         (   ) Stroke Unit          (   ) __________________    Accepting Physician: Dr. Ayo Link       MICU COURSE:      89 yo F PMH dementia, s/p brain tumor resection, HTN, hypothyroidism, Colon Ca s/p Rt hemicolectomy, stercoral colitis who presented to E.D. 1/11 with hypoxic respiratory failure secondary to suspected pneumonia.  In ED, pt unresponsive found to be in hypoxic/hypercapnic respiratory failure. Started on high flow nasal cannula. Found to be influenza A positive. Continued hypotension requiring pressor support. Started on Levo and transferred to MICU for management of septic shock in the setting of influenza A complicated by possible aspiration pneumonia and UTI.  Treated with Tamiflu and Zosyn. MICU course complicated by AF w/ RVR s/p amio bolus, now controlled on standing Metoprolol.     Pressors weaned off.  Titrated off high flow nasal cannula.  Currently tolerating room air.    Failed bedside swallow, official speech and swallow pending.       ASSESSMENT & PLAN:         89 yo F PMH dementia, s/p brain tumor resection, HTN, Colon Ca s/p Rt hemicolectomy, stercoral colitis admitted to MICU for hypoxic/hypercapnic respiratory failure and septic shock secondary to Influenza A, aspiration pna, and UTI requiring high flow and vasopressors. Course complicated by AF with RVR, now rate controlled.     Plan:    ####Neuro####  #==unresponsive on admission--improved, patient confused, but responsive  #==dementia hx  -Neuro checks per routine  -activity as tolerated  -physical therapy consult when stable  -obtain CT head to r/o pathology  -hold home meds of quetiapine 12.5 mg qhs; mirtazepine 15 mg qhs  -C pt is DNR/DNI with trial of NIV - MOLST obtained  -obtain palliative consult    ####Pulm####  #==Hypoxic/hypercapnic resp failure---DNI, okay to use NIV  #==possible RLL pneum  -AVAPS therapy -titrate to maintain ph 7.35-7.45; PCO2 35-45; SPO2>92%  -consider change to HFNC  -Bronchodilator  therapy q 6 hrs and prn SOB/Wheezes  -Lung protective therapy  -HOB >/= 30 degrees   -Weaned to room air--venous ph 7.32    ####CV####  #==resolved septic shock   #==episodes of Afib with RVR  #==elevated hsTrop - most likely due to demand ischemia  -downtrended, no longer trending  -s/p Norepi gtt - d/c'ed 0200 1/12/25  -consider heparin gtt for AF  -s/p metoprolol 5 mg IVP x 2 separate episodes (1/12/25  -s/p amiodarone 150 mg IV x 2 separate episodes (1/12/25)  -currently rate controlled on Metroprolol 5 IV q6h   -continue telemetry monitoring    ####GI/####  #==DWAIN on CKD  #==stercoral colitis hx  -npo at present  -failed bedside swallow  -official swallow study pending  -continue low dose IVF  -C    ####ID####  #==possible RLL pneum  #==UTI  -pan culture  -f/u blood culture from 1/11/25  -f/u urine for legionella from 1/11/25  -f/u urine for strep pneumoniae from 1/11/25  -MRSA/MSSA PCR 1/11/25 - ND/ND  -SARS-CoV-2 1/11/25  - ND  -Influ A 1/11/25 - Detected  -Influ B 1/11/25 - ND  -RSV 1/11/25 - ND  -continue vanco by level - started in E.D. 1/11/25--d/c--MRSA swab negative  -continue zosyn 3.375 gms IV q 8 hrs - started in E.D. 1/11/25  -tamiflu 75 mg po q 12 hrs x 5days - started 1/11/25    ####FEN/ENDO/HEME####  #==transaminitis in setting of septic shock  #==HYPOthyroidism hx  -trend LFT's  -obtain TSH/Thyroxine/T3  -home med of levothyroxine 75 mcg po qhs - changed to 60 mcg IVP qhs  -DVT prophylaxis with - heparin 5000 units subq q 12 hrs    ####LINES/DRAINS/TUBES/DEVICES####  currently none, except nancy      For Followup:  --Speech and Swallow, needs re-eval, too disorganized to evaluate  --Transition to PO metoprolol if able  --Consider AC  --Complete Zosyn course for aspiration pna/UTI  --f/u w/ Palliative consult recs  --will need quetiapine 12.5 mg qhs via NGT restarted   --will need mirtazepine 15 mg NGT restarted                     Vital Signs Last 24 Hrs  T(C): 37 (13 Jan 2025 00:00), Max: 37.3 (12 Jan 2025 16:00)  T(F): 98.6 (13 Jan 2025 00:00), Max: 99.1 (12 Jan 2025 16:00)  HR: 92 (13 Jan 2025 03:00) (68 - 141)  BP: 156/86 (13 Jan 2025 03:00) (94/52 - 165/79)  BP(mean): 114 (13 Jan 2025 03:00) (65 - 115)  RR: 31 (13 Jan 2025 03:00) (16 - 31)  SpO2: 98% (13 Jan 2025 03:00) (93% - 100%)    Parameters below as of 13 Jan 2025 00:00  Patient On (Oxygen Delivery Method): room air      I&O's Summary    11 Jan 2025 07:01  -  12 Jan 2025 07:00  --------------------------------------------------------  IN: 1059 mL / OUT: 595 mL / NET: 464 mL    12 Jan 2025 07:01  -  13 Jan 2025 04:24  --------------------------------------------------------  IN: 2124.8 mL / OUT: 1180 mL / NET: 944.8 mL        MEDICATIONS  (STANDING):  albuterol/ipratropium for Nebulization 3 milliLiter(s) Nebulizer every 6 hours  bisacodyl Suppository 10 milliGRAM(s) Rectal daily  chlorhexidine 2% Cloths 1 Application(s) Topical <User Schedule>  dextrose 5% + lactated ringers. 1000 milliLiter(s) (50 mL/Hr) IV Continuous <Continuous>  heparin   Injectable 5000 Unit(s) SubCutaneous every 12 hours  levothyroxine Injectable 60 MICROGram(s) IV Push at bedtime  metoprolol tartrate Injectable 5 milliGRAM(s) IV Push every 6 hours  oseltamivir 30 milliGRAM(s) Oral daily  piperacillin/tazobactam IVPB.. 3.375 Gram(s) IV Intermittent every 12 hours    MEDICATIONS  (PRN):        LABS                                            11.0                  Neurophils% (auto):   86.6   (01-13 @ 00:36):    11.83)-----------(242          Lymphocytes% (auto):  7.8                                           33.8                   Eosinphils% (auto):   0.2      Manual%: Neutrophils x    ; Lymphocytes x    ; Eosinophils x    ; Bands%: x    ; Blasts x                                    140    |  106    |  36                  Calcium: 9.3   / iCa: x      (01-13 @ 00:35)    ----------------------------<  125       Magnesium: 2.0                              4.1     |  18     |  2.14             Phosphorous: 4.1      TPro  6.7    /  Alb  2.8    /  TBili  0.6    /  DBili  x      /  AST  35     /  ALT  57     /  AlkPhos  318    13 Jan 2025 00:35    ( 01-13 @ 00:37 )   PT: 13.0 sec;   INR: 1.14 ratio  aPTT: 26.6 sec

## 2025-01-13 NOTE — OCCUPATIONAL THERAPY INITIAL EVALUATION ADULT - LIVES WITH, PROFILE
Pt poor historian, as per SW pt lives in Nursing home, requires assistance for ADLs, uses WC. Per CHUCK unclear how pt transfers to WC

## 2025-01-13 NOTE — PROGRESS NOTE ADULT - ATTENDING COMMENTS
1. Acute hypoxemic respiratory failure resolved. Pt oxygenation well on room air.  2. ID: Influenza A infection, possible pneumonia. Continue Tamiflu via NGT. Zosyn for possible bacterial pneumonia.   3. Hypothyroidism; Continue home synthroid.  4. DVT prophylaxis:  SQ heparin  5. GOC: DNR/DNI 1. Acute hypoxemic respiratory failure resolved. Pt oxygenation well on room air.  2. ID: Influenza A infection, possible pneumonia. Continue Tamiflu via NGT. Zosyn for possible bacterial pneumonia.   3. Hypothyroidism; Continue home synthroid.  4. DVT prophylaxis:  SQ heparin  5. GOC: DNR/DNI  6. Alzheimer's  dementia

## 2025-01-13 NOTE — ADVANCED PRACTICE NURSE CONSULT - RECOMMEDATIONS
Impression:     B/L heel bogginess  B/L heel hyperpigmentation, cannot rule out a deep tissue injury present on admission   B/L buttocks/sacral hyperpigmentation, cannot rule out a deep tissue injury present on admission     Recommendations:     1) turn and position q2 and PRN utilizing offloading assistive devices  2) routine pericare daily and PRN soiling  3) encourage optimal nutrition  4) waffle cushion when oob to chair  5) B/L LE complete cair air fluidized boots or max-lock pillow to offload heels/feet  6) triad protective barrier cream to B/L buttocks/sacrum daily and PRN soiling  7) incontinence management - consider external urinary catheter to divert urine from skin if incontinent  8) sween 24 moisturizer to dry skin daily     Plan discussed with CARINA Hernandez at bedside     For questions/comments regarding the recommendations in this consult, please contact Valerie Quintero via teams. Wound care will not actively follow, please place future consults for new concerns. Thank you!

## 2025-01-13 NOTE — OCCUPATIONAL THERAPY INITIAL EVALUATION ADULT - PERTINENT HX OF CURRENT PROBLEM, REHAB EVAL
89 yr old female with PMHx dementia, s/p brain tumor resection 1961, HTN, HYPOthyroidism, colon Ca s/p Rt hemicolectomy ('09), stercoral colitis(11/2021), who presented to E.D. from nursing home via EMS with hypoxic resp failure since 1/9/25 secondary to suspected pneum, treated with HFNC, son endorsed pt was ordered for antibx treatment at time as well. EMS endorsed nursing home facility O2 tank found off, and placed pt on NRB. In E.D. pt found to be obtunded/unresponsive and to have hypoxic/hypercapnic resp failure with SPO2 of 86-88, Vph of 7.07, VPCO2 90, placed on HFNC with FIO2 of 100%, 50 liter flow with improvement to 92-94% . In addition found to have Influenza A positive, DWAIN with sCr of 1.62 on CKD2 (baseline 1.08-1.23 [8/2024]), transaminitis - alk-phos 577, , , +UA with large leuk-est, neg nitrite. SBP of 98/46 - 107/46. Pt received vanco/zosyn, 1 liter NS IVF bolus, pt placed on norepi gtt. Pt received narcan 0.2 mg IVP x1, albuterol nebs x3. upon consult POCUS with A line predominant, few focal B lines mostly in Left lung field, small area of consolidation in RLL field, mod/large Lt pleural effusion with LLL field atelectasis. Pt with initial norepi at 0.13 mcg/min, titrated down to 0.05 mcg/min over 35 minutes (130/64 -> 112/64),

## 2025-01-13 NOTE — PROGRESS NOTE ADULT - SUBJECTIVE AND OBJECTIVE BOX
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 from nursing home via EMS morning of 1/11/25 with hypoxic resp failure since 1/9/25 secondary to suspected pneum, treated with HFNC, son endorsed pt was ordered for antibx treatment at time as well.        In E.D. pt found to be obtunded/unresponsive and to have hypoxic/hypercapnic resp failure initially placed on HFNC with FIO2 of 100%, 50 liter ->AVAPS . In addition found to have Influenza A positive, DWAIN with sCr of 1.62 on CKD2 (baseline 1.08-1.23 [8/2024]), transaminitis, +UA with large leuk-est, neg nitrite. SBP of 98/46 - 107/46. Pt received vanco/zosyn, 1 liter NS IVF bolus, pt placed on norepi gtt. Pt received narcan 0.2 mg IVP x1, albuterol nebs x3. Pt admitted to MICU for hypercapnic/hypoxic resp failure/septic shock in the setting of possible RLL pneum and UTI.     MICU course, pt with improved hypoxic/hypercapnic resp failure, improved mental status, c/b development of recurrent Afib with RVR, (1/12/25) received amiodarone 150 IV/ metoprolol 5 mg IVP x2 separate episodes with conversion back to NSR. placed on metoprolol 5 mg IV q 6 hrs.       Interval Events:      REVIEW OF SYSTEMS:          OBJECTIVE:  ICU Vital Signs Last 24 Hrs  T(C): 37 (13 Jan 2025 00:00), Max: 37.3 (12 Jan 2025 16:00)  T(F): 98.6 (13 Jan 2025 00:00), Max: 99.1 (12 Jan 2025 16:00)  HR: 92 (13 Jan 2025 03:00) (68 - 141)  BP: 156/86 (13 Jan 2025 03:00) (94/52 - 165/79)  BP(mean): 114 (13 Jan 2025 03:00) (65 - 115)  ABP: --  ABP(mean): --  RR: 31 (13 Jan 2025 03:00) (16 - 31)  SpO2: 98% (13 Jan 2025 03:00) (93% - 100%)    O2 Parameters below as of 13 Jan 2025 00:00  Patient On (Oxygen Delivery Method): room air              01-11 @ 07:01 - 01-12 @ 07:00  --------------------------------------------------------  IN: 1059 mL / OUT: 595 mL / NET: 464 mL    01-12 @ 07:01 - 01-13 @ 04:49  --------------------------------------------------------  IN: 2124.8 mL / OUT: 1180 mL / NET: 944.8 mL      CAPILLARY BLOOD GLUCOSE      POCT Blood Glucose.: 129 mg/dL (12 Jan 2025 12:22)          PHYSICAL EXAM:          HOSPITAL MEDICATIONS:  MEDICATIONS  (STANDING):  albuterol/ipratropium for Nebulization 3 milliLiter(s) Nebulizer every 6 hours  bisacodyl Suppository 10 milliGRAM(s) Rectal daily  chlorhexidine 2% Cloths 1 Application(s) Topical <User Schedule>  dextrose 5% + lactated ringers. 1000 milliLiter(s) (50 mL/Hr) IV Continuous <Continuous>  heparin   Injectable 5000 Unit(s) SubCutaneous every 12 hours  levothyroxine Injectable 60 MICROGram(s) IV Push at bedtime  metoprolol tartrate Injectable 5 milliGRAM(s) IV Push every 6 hours  oseltamivir 30 milliGRAM(s) Oral daily  piperacillin/tazobactam IVPB.. 3.375 Gram(s) IV Intermittent every 12 hours    MEDICATIONS  (PRN):      LABS:                        11.0   11.83 )-----------( 242      ( 13 Jan 2025 00:36 )             33.8     Hgb Trend: 11.0<--, 9.9<--, 11.5<--, 11.8<--  01-13    140  |  106  |  36[H]  ----------------------------<  125[H]  4.1   |  18[L]  |  2.14[H]    Ca    9.3      13 Jan 2025 00:35  Phos  4.1     01-13  Mg     2.0     01-13    TPro  6.7  /  Alb  2.8[L]  /  TBili  0.6  /  DBili  x   /  AST  35  /  ALT  57[H]  /  AlkPhos  318[H]  01-13    LIVER FUNCTIONS - ( 13 Jan 2025 00:35 )  Alb: 2.8 g/dL / Pro: 6.7 g/dL / ALK PHOS: 318 U/L / ALT: 57 U/L / AST: 35 U/L / GGT: x           Creatinine Trend: 2.14<--, 2.09<--, 1.96<--, 1.72<--, 1.62<--  PT/INR - ( 13 Jan 2025 00:37 )   PT: 13.0 sec;   INR: 1.14 ratio         PTT - ( 13 Jan 2025 00:37 )  PTT:26.6 sec  Urinalysis Basic - ( 13 Jan 2025 00:35 )    Color: x / Appearance: x / SG: x / pH: x  Gluc: 125 mg/dL / Ketone: x  / Bili: x / Urobili: x   Blood: x / Protein: x / Nitrite: x   Leuk Esterase: x / RBC: x / WBC x   Sq Epi: x / Non Sq Epi: x / Bacteria: x      Arterial Blood Gas:  01-12 @ 14:39  7.41/38/158/24/98.8/-0.4  ABG lactate: --  Arterial Blood Gas:  01-12 @ 00:30  7.36/38/131/22/99.5/-3.5  ABG lactate: --  Arterial Blood Gas:  01-11 @ 16:48  7.28/43/174/20/99.7/-6.3  ABG lactate: --  Arterial Blood Gas:  01-11 @ 14:06  7.25/45/193/20/99.5/-7.4  ABG lactate: --    Venous Blood Gas:  01-13 @ 00:17  7.32/46/30/24/46.0  VBG Lactate: 1.9  Venous Blood Gas:  01-11 @ 12:28  6.93/119/46/25/69.4  VBG Lactate: 1.1  Venous Blood Gas:  01-11 @ 10:06  7.07/90/67/26/89.2  VBG Lactate: 0.8      MICROBIOLOGY:     RADIOLOGY:  [ ] Reviewed and interpreted by me    EKG:      [facility]bigail [facility] (ext [phone]) CHIEF COMPLAINT:  AMS, hypoxic resp failure    HPI:   89 yr old female with PMHx dementia, s/p brain tumor resection  with residual left sided facial droop, HTN, HYPOthyroidism, colon Ca s/p Rt hemicolectomy (), stercoral colitis (2021), who presented to [facility] from nursing home via EMS morning of 25 with hypoxic resp failure since 25 secondary to suspected pneum, treated with HFNC, son endorsed pt was ordered for antibx treatment at time as well.        In E.D. pt found to be obtunded/unresponsive and to have hypoxic/hypercapnic resp failure initially placed on HFNC with FIO2 of 100%, 50 liter ->AVAPS . In addition found to have Influenza A positive, DWAIN with sCr of 1.62 on CKD2 (baseline 1.08-1.23 [2024]), transaminitis, +UA with large leuk-est, neg nitrite. SBP of 98/46 - 107/46. Pt received vanco/zosyn, 1 liter NS IVF bolus, pt placed on norepi gtt. Pt received narcan 0.2 mg IVP x1, albuterol nebs x3. Pt admitted to MICU for hypercapnic/hypoxic resp failure/septic shock in the setting of possible RLL pneum and UTI.     MICU course, pt with improved hypoxic/hypercapnic resp failure, improved mental status, c/b development of recurrent Afib with RVR, (25) received amiodarone 150 IV/ metoprolol 5 mg IVP x2 separate episodes with conversion back to NSR. placed on metoprolol 5 mg IV q 6 hrs.       Interval Events: Overnight: LR stopped 2/2 no urine output>4mg of IV bumex; cruz replaced 2/2 clogged; afib w/ RVR>amiodarone 150 IV x1, metoprolol 5 mg IVP x1 w/ conversion to NSR      REVIEW OF SYSTEMS: unable to to obtain, pt. oriented to self only          OBJECTIVE:  ICU Vital Signs Last 24 Hrs  T(C): 37 (2025 00:00), Max: 37.3 (2025 16:00)  T(F): 98.6 (2025 00:00), Max: 99.1 (2025 16:00)  HR: 92 (2025 03:00) (68 - 141)  BP: 156/86 (2025 03:00) (94/52 - 165/79)  BP(mean): 114 (2025 03:00) (65 - 115)  ABP: --  ABP(mean): --  RR: 31 (2025 03:00) (16 - 31)  SpO2: 98% (2025 03:00) (93% - 100%)    O2 Parameters below as of 2025 00:00  Patient On (Oxygen Delivery Method): room air               @ 07:  -  - @ 07:00  --------------------------------------------------------  IN: 1059 mL / OUT: 595 mL / NET: 464 mL     @ 07: @ 04:49  --------------------------------------------------------  IN: 2124.8 mL / OUT: 1180 mL / NET: 944.8 mL      CAPILLARY BLOOD GLUCOSE      POCT Blood Glucose.: 129 mg/dL (2025 12:22)          PHYSICAL EXAM:  General: Alert, elderly, thin  EENT: No obvious hearing abnormality, uncooperative w/ pupil assessment, Lips dry, uncooperative w/ assessment of oral mucous membranes  Neuro: Alert, clear but mumbling and at times incoherent speech  CV: HR SR 70s-80s, RRR without MRG  no edema, 2+ radial and DP pulses, no heave or lift noted  Pulm: Lungs diminished at b/l bases, no cough, Sp02 97% RA  GI: Abd soft, nontender, nondistened, BS +x4 quadrants, no abdominal lesions or masses noted  : Cruz in place, draining cloudy, yellow urine  Skin: appropriate for ethnicity, visible areas D&I, unable to assess posterior skin surfaces at time of assessment       HOSPITAL MEDICATIONS:  MEDICATIONS  (STANDING):  albuterol/ipratropium for Nebulization 3 milliLiter(s) Nebulizer every 6 hours  bisacodyl Suppository 10 milliGRAM(s) Rectal daily  chlorhexidine 2% Cloths 1 Application(s) Topical <User Schedule>  dextrose 5% + lactated ringers. 1000 milliLiter(s) (50 mL/Hr) IV Continuous <Continuous>  heparin   Injectable 5000 Unit(s) SubCutaneous every 12 hours  levothyroxine Injectable 60 MICROGram(s) IV Push at bedtime  metoprolol tartrate Injectable 5 milliGRAM(s) IV Push every 6 hours  oseltamivir 30 milliGRAM(s) Oral daily  piperacillin/tazobactam IVPB.. 3.375 Gram(s) IV Intermittent every 12 hours    MEDICATIONS  (PRN):      LABS:                        11.0   11.83 )-----------( 242      ( 2025 00:36 )             33.8     Hgb Trend: 11.0<--, 9.9<--, 11.5<--, 11.8<--  01-13    140  |  106  |  36[H]  ----------------------------<  125[H]  4.1   |  18[L]  |  2.14[H]    Ca    9.3      2025 00:35  Phos  4.1     -  Mg     2.0         TPro  6.7  /  Alb  2.8[L]  /  TBili  0.6  /  DBili  x   /  AST  35  /  ALT  57[H]  /  AlkPhos  318[H]  13    LIVER FUNCTIONS - ( 2025 00:35 )  Alb: 2.8 g/dL / Pro: 6.7 g/dL / ALK PHOS: 318 U/L / ALT: 57 U/L / AST: 35 U/L / GGT: x           Creatinine Trend: 2.14<--, 2.09<--, 1.96<--, 1.72<--, 1.62<--  PT/INR - ( 2025 00:37 )   PT: 13.0 sec;   INR: 1.14 ratio         PTT - ( 2025 00:37 )  PTT:26.6 sec  Urinalysis Basic - ( 2025 00:35 )    Color: x / Appearance: x / SG: x / pH: x  Gluc: 125 mg/dL / Ketone: x  / Bili: x / Urobili: x   Blood: x / Protein: x / Nitrite: x   Leuk Esterase: x / RBC: x / WBC x   Sq Epi: x / Non Sq Epi: x / Bacteria: x      Arterial Blood Gas:   @ 14:39  7.41/38/158/24/98.8/-0.4  ABG lactate: --  Arterial Blood Gas:   @ 00:30  7.36/38/131/22/99.5/-3.5  ABG lactate: --  Arterial Blood Gas:   @ 16:48  7.28/43/174/20/99.7/-6.3  ABG lactate: --  Arterial Blood Gas:   @ 14:06  7.25/45/193/20/99.5/-7.4  ABG lactate: --    Venous Blood Gas:   @ 00:17  7.32/46/30/24/46.0  VBG Lactate: 1.9  Venous Blood Gas:   @ 12:28  6.93/119/46/25/69.4  VBG Lactate: 1.1  Venous Blood Gas:   @ 10:06  7.07/90/67/26/89.2  VBG Lactate: 0.8      MICROBIOLOGY:  RVP + Influenza A;  BCx NGTD, MRSA/MSSA negative, legionella and Strep PNA negative    RADIOLOGY:  [ ] Reviewed and interpreted by me    EKmm ST elevation in V1, V2 unchanged after multiple ECGs      Elizabeth Sawant NP, AGACNP-BC (ext [phone]) CHIEF COMPLAINT:  AMS, hypoxic resp failure    HPI:   89 yr old female with PMHx dementia, s/p brain tumor resection  with residual left sided facial droop, HTN, HYPOthyroidism, colon Ca s/p Rt hemicolectomy (), stercoral colitis (2021), who presented to E[facility]D. from nursing home via EMS morning of 25 with hypoxic resp failure since 25 secondary to suspected pneum, treated with HFNC, son endorsed pt was ordered for antibx treatment at time as well.        In E.D. pt found to be obtunded/unresponsive and to have hypoxic/hypercapnic resp failure initially placed on HFNC with FIO2 of 100%, 50 liter ->AVAPS . In addition found to have Influenza A positive, DWAIN with sCr of 1.62 on CKD2 (baseline 1.08-1.23 [2024]), transaminitis, +UA with large leuk-est, neg nitrite. SBP of 98/46 - 107/46. Pt received vanco/zosyn, 1 liter NS IVF bolus, pt placed on norepi gtt. Pt received narcan 0.2 mg IVP x1, albuterol nebs x3. Pt admitted to MICU for hypercapnic/hypoxic resp failure/septic shock in the setting of possible RLL pneum and UTI.     MICU course, pt with improved hypoxic/hypercapnic resp failure, improved mental status, c/b development of recurrent Afib with RVR, (25) received amiodarone 150 IV/ metoprolol 5 mg IVP x2 separate episodes with conversion back to NSR. placed on metoprolol 5 mg IV q 6 hrs.       Interval Events: Overnight: LR stopped 2/2 no urine output>4mg of IV bumex; cruz replaced 2/2 clogged, IVF restarted; afib w/ RVR>amiodarone 150 IV x1, metoprolol 5 mg IVP x1 w/ conversion to NSR,       REVIEW OF SYSTEMS: unable to to obtain, pt. oriented to self only          OBJECTIVE:  ICU Vital Signs Last 24 Hrs  T(C): 37 (2025 00:00), Max: 37.3 (2025 16:00)  T(F): 98.6 (2025 00:00), Max: 99.1 (2025 16:00)  HR: 92 (2025 03:00) (68 - 141)  BP: 156/86 (2025 03:00) (94/52 - 165/79)  BP(mean): 114 (2025 03:00) (65 - 115)  ABP: --  ABP(mean): --  RR: 31 (2025 03:00) (16 - 31)  SpO2: 98% (2025 03:00) (93% - 100%)    O2 Parameters below as of 2025 00:00  Patient On (Oxygen Delivery Method): room air               @ 07:  -   @ 07:00  --------------------------------------------------------  IN: 1059 mL / OUT: 595 mL / NET: 464 mL     @ 07: @ 04:49  --------------------------------------------------------  IN: 2124.8 mL / OUT: 1180 mL / NET: 944.8 mL      CAPILLARY BLOOD GLUCOSE      POCT Blood Glucose.: 129 mg/dL (2025 12:22)          PHYSICAL EXAM:  General: Alert, thin appearing elderly female  EENT: No obvious hearing abnormality, uncooperative w/ pupil assessment, lips dry, uncooperative w/ assessment of oral mucous membranes  Neuro: Alert, clear speech but mumbling and at times incoherent speech  CV: HR SR 70s-80s, RRR without MRG  no edema, 2+ radial and DP pulses, no heave or lift noted  Pulm: Lungs diminished at b/l bases, no cough, Sp02 97% RA  GI: Abd soft, nontender, nondistended, BS +x4 quadrants, no abdominal lesions or masses noted  : Cruz in place, draining cloudy, yellow urine  Skin: appropriate for ethnicity, visible areas D&I, unable to assess posterior skin surfaces at time of assessment       HOSPITAL MEDICATIONS:  MEDICATIONS  (STANDING):  albuterol/ipratropium for Nebulization 3 milliLiter(s) Nebulizer every 6 hours  bisacodyl Suppository 10 milliGRAM(s) Rectal daily  chlorhexidine 2% Cloths 1 Application(s) Topical <User Schedule>  dextrose 5% + lactated ringers. 1000 milliLiter(s) (50 mL/Hr) IV Continuous <Continuous>  heparin   Injectable 5000 Unit(s) SubCutaneous every 12 hours  levothyroxine Injectable 60 MICROGram(s) IV Push at bedtime  metoprolol tartrate Injectable 5 milliGRAM(s) IV Push every 6 hours  oseltamivir 30 milliGRAM(s) Oral daily  piperacillin/tazobactam IVPB.. 3.375 Gram(s) IV Intermittent every 12 hours    MEDICATIONS  (PRN):      LABS:                        11.0   11.83 )-----------( 242      ( 2025 00:36 )             33.8     Hgb Trend: 11.0<--, 9.9<--, 11.5<--, 11.8<--  01    140  |  106  |  36[H]  ----------------------------<  125[H]  4.1   |  18[L]  |  2.14[H]    Ca    9.3      2025 00:35  Phos  4.1       Mg     2.0         TPro  6.7  /  Alb  2.8[L]  /  TBili  0.6  /  DBili  x   /  AST  35  /  ALT  57[H]  /  AlkPhos  318[H]      LIVER FUNCTIONS - ( 2025 00:35 )  Alb: 2.8 g/dL / Pro: 6.7 g/dL / ALK PHOS: 318 U/L / ALT: 57 U/L / AST: 35 U/L / GGT: x           Creatinine Trend: 2.14<--, 2.09<--, 1.96<--, 1.72<--, 1.62<--  PT/INR - ( 2025 00:37 )   PT: 13.0 sec;   INR: 1.14 ratio         PTT - ( 2025 00:37 )  PTT:26.6 sec  Urinalysis Basic - ( 2025 00:35 )    Color: x / Appearance: x / SG: x / pH: x  Gluc: 125 mg/dL / Ketone: x  / Bili: x / Urobili: x   Blood: x / Protein: x / Nitrite: x   Leuk Esterase: x / RBC: x / WBC x   Sq Epi: x / Non Sq Epi: x / Bacteria: x      Arterial Blood Gas:   @ 14:39  7.41/38/158/24/98.8/-0.4  ABG lactate: --  Arterial Blood Gas:   @ 00:30  7.36/38/131/22/99.5/-3.5  ABG lactate: --  Arterial Blood Gas:   @ 16:48  7.28/43/174/20/99.7/-6.3  ABG lactate: --  Arterial Blood Gas:   @ 14:06  7.25/45/193/20/99.5/-7.4  ABG lactate: --    Venous Blood Gas:   @ 00:17  7.32/46/30/24/46.0  VBG Lactate: 1.9  Venous Blood Gas:   @ 12:28  6.93/119/46/25/69.4  VBG Lactate: 1.1  Venous Blood Gas:   @ 10:06  7.07/90/67/26/89.2  VBG Lactate: 0.8      MICROBIOLOGY:  RVP + Influenza A;  BCx NGTD, MRSA/MSSA negative, legionella and Strep PNA negative    RADIOLOGY:  [ ] Reviewed and interpreted by me    EKmm ST elevation in V1, V2 unchanged over multiple ECGs       Elizabeth Sawant NP, AGACNP-BC (ext [phone])

## 2025-01-13 NOTE — SWALLOW BEDSIDE ASSESSMENT ADULT - SWALLOW EVAL: DIAGNOSIS
89 yo F pmhx dementia, s/p brain tumor resection 1961 with residual Lt sided facial droop, admitted for respiratory failure; c/f pneumonia; +UTI. Patient is found awake, non-verbal, does not actively follow commands. Wet, congested cough at baseline. Poor orientation to food/utensil; biting on spoon; absent stripping of bolus. PO trials deferred at this time; mentation does not support oral trials or feeding. 89 yo F pmhx dementia, s/p brain tumor resection 1961 with residual Lt sided facial droop, admitted for respiratory failure; c/f pneumonia; +UTI. Patient is found awake, non-verbal, does not actively follow commands. Presents with an oral and suspected pharyngeal dysphagia. Wet, congested cough at baseline. Poor orientation to food/utensil; biting on spoon; absent stripping of bolus. PO trials deferred at this time; mentation does not support oral trials or feeding.

## 2025-01-13 NOTE — PROGRESS NOTE ADULT - ASSESSMENT
Assessment:  · Assessment	  Assessment:     89y old Female with stated hx significant for dementia, s/p brain tumor resection 1961, HTN, HYPOthyroidism, colon Ca s/p Rt hemicolectomy ('09), 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 Pt found to have DWAIN on CKD, Influenza A +, UTI +, possible RLL pneum with consolidation appreciated on POCUS        Consult called for and pt admitted to MICU for hypercapnic/hypoxic resp failure/septic shock in the setting of possible RLL pneum and UTI      Plan:    ####Neuro####  #==unresponsive in setting of hypoxic/hypercapnic resp failure  #==dementia hx  -Neuro checks q 2 hrs and prn for changes  -activity as tolerated  -physical therapy consult when stable  -obtain CT head to r/o pathology  -hold home meds of quetiapine 12.5 mg qhs; mirtazepine 15 mg qhs  -GOC pt is DNR/DNI with trial of NIV  -obtain palliative consult    ####Pulm####  #==Hypoxic/hypercapnic resp failure  #==possible RLL pneum  -AVAPS therapy -titrate to maintain ph 7.35-7.45; PCO2 35-45; SPO2>92%  -Bronchodilator  therapy q 6 hrs and prn SOB/Wheezes  -Lung protective therapy  -HOB >/= 30 degrees     ####CV####  #==septic shock   -ECG now and q am x3  -Cardiac Enzymes now and q 8 hrs x 3  -Norepi gtt - titrate to MAP >/= 65-70 mmHg  -hold Beta-blockers/Ca+ channel blockers at present    ####GI/####  #==DWAIN on CKD  #==stercoral colitis hx  -npo at present  -strict I & O's - keep even  -Lactated ringers @ 100 cc's/hr x 10 hrs  -bowel regimen with Miralax qd  -senna qd   -consider lactulose for bowel regimen      ####ID####  #==possible RLL pneum  #==UTI  -pan culture  -obtain urine for legionella  -obtain urine for strep pneumoniae  -obtain MRSA/MSSA PCR  -SARS-CoV-2 1/11/25  - ND  -Influ A 1/11/25 - Detected  -Influ B 1/11/25 - ND  -RSV 1/11/25 - ND  -continue vanco 1 gm IV qd - started in E.D. 1/11/25  -continue zosyn 3.375 gms IV q 8 hrs - started in E.D. 1/11/25  -will add tamiflu 75 mg po q 12 hrs x 5days  -consider adding azithromycin     ####FEN/ENDO/HEME####  #==transaminitis in setting of septic shock  #==HYPOthyroidism hx  -obtain CMP/Mg++/PO--4/CBC w diff/PT/PTT/INR now and q. a.m.  -abg prn  -trend LFT's  -obtain TSH/Thyroxine/T3  -home med of levothyroxine 75 mcg po qhs - will change to 60 mcg IVP qhs  -DVT prophylaxis with - heparin 5000 units subq q 12 hrs    ####LINES/DRAINS/TUBES/DEVICES####  currently none, except cruz             Attestation Statements:  Patient is critically ill, requiring critical care services.     Attending: I have personally and independently provided 40 minutes of critical care services.  This excludes any time spent on separate procedures or teaching.     Attending comments: 89-year-old female history of dementia, brain tumor s/p resection, hypothyroidism, and colon cancer s/p hemicolectomy, who presented from nursing home 1/11/2025 with hypoxemic respiratory failure.  She was initially started on antibiotics at her nursing home due to suspected pneumonia however as it began to worsen she was directed to ED.  On presentation she was found to be minimally responsive with hypoxemic and hypercapnic respiratory failure requiring supplemental oxygen.  VBG 7.07/90.  After patient was on HFNC repeat VBG showing 6.93/119.  MICU was consulted for acute hypoxemic and hypercapnic respiratory failure.  Patient was going to be intubated in the ED however family elected for DNR/DNI with trial of NIV.  Patient was started on AVAPS.  Labs notable for leukocytosis 12.16, Hb 11.5, CR 1.62 (baseline 1.08), Pro-Casper 1.67, UA large LE, many bacteria.  Patient was found RVP showing positive influenza A.  CT head was negative for acute intracranial pathology.  CT chest performed with right lower lobe groundglass and motion artifact potentially representing pneumonia with mucous noted in the airways.      # Encephalopathy due to hypercapnia  # Hypercapnic respiratory failure in the setting of?  PNA, influenza A positive  # UTI  # Septic shock  #DWAIN on CKD  –Continue with AVAPS repeat ABG 7.28/43/174  – Start Tamiflu  – Start Zosyn for possible superimposed bacterial pneumonia  – MRSA PCR will start vancomycin and discontinue if MRSA PCR negative  – Continue with Levophed    DVT PPx: Ethel subcu  CODE STATUS: DNR/DNI trial of NIV.     Assessment:     89y old Female with stated hx significant for dementia, s/p brain tumor resection 1961, HTN, HYPOthyroidism, colon Ca s/p Rt hemicolectomy ('09), 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 Pt found to have DWAIN on CKD, Influenza A +, UTI +, possible RLL pneum with consolidation appreciated on POCUS. Pt admitted to MICU for hypercapnic/hypoxic resp failure/septic shock in the setting of possible RLL pneum and UTI      Plan:    ####Neuro####  #==mental status improved, now alert, oriented to self only, conversant  #==CT head: tiny acute ICH cannot be excluded, otherwise no abnormality  #==dementia hx  -Neuro checks q 4 hrs and prn for changes  -activity as tolerated  -physical therapy consult   -hold home meds of quetiapine 12.5 mg qhs; mirtazepine 15 mg qhs  -GOC pt is DNR/DNI with trial of NIV  -obtain palliative consult    ####Pulm####  #==Hypoxic/hypercapnic resp failure  #==possible RLL pneum  #==Improved, currently on RA  -Monitor VBG daily 7.35-7.45; PCO2 35-45; SPO2>92%  -Bronchodilator  therapy q 6 hrs and prn SOB/Wheezes  -HOB >/= 30 degrees   -Failed bedside swallow>SLP eval  -Keyo feed placement/GI access needed    ####CV####  #==septic shock   #==  -Cardiac Enzymes now and q 8 hrs x 3  -Norepi gtt - titrate to MAP >/= 65-70 mmHg  -hold Beta-blockers/Ca+ channel blockers at present    ####GI/####  #==DWAIN on CKD  #==stercoral colitis hx  -npo at present  -strict I & O's - keep even  -Lactated ringers @ 100 cc's/hr x 10 hrs  -bowel regimen with Miralax qd  -senna qd   -consider lactulose for bowel regimen      ####ID####  #==possible RLL pneum  #==UTI  -pan culture  -obtain urine for legionella  -obtain urine for strep pneumoniae  -obtain MRSA/MSSA PCR  -SARS-CoV-2 1/11/25  - ND  -Influ A 1/11/25 - Detected  -Influ B 1/11/25 - ND  -RSV 1/11/25 - ND  -continue vanco 1 gm IV qd - started in E.D. 1/11/25  -continue zosyn 3.375 gms IV q 8 hrs - started in E.D. 1/11/25  -will add tamiflu 75 mg po q 12 hrs x 5days  -consider adding azithromycin     ####FEN/ENDO/HEME####  #==transaminitis in setting of septic shock  #==HYPOthyroidism hx  -obtain CMP/Mg++/PO--4/CBC w diff/PT/PTT/INR now and q. a.m.  -abg prn  -trend LFT's  -obtain TSH/Thyroxine/T3  -home med of levothyroxine 75 mcg po qhs - will change to 60 mcg IVP qhs  -DVT prophylaxis with - heparin 5000 units subq q 12 hrs    ####LINES/DRAINS/TUBES/DEVICES####  currently none, except rcuz             Attestation Statements:  Patient is critically ill, requiring critical care services.     Attending: I have personally and independently provided 40 minutes of critical care services.  This excludes any time spent on separate procedures or teaching.     Attending comments: 89-year-old female history of dementia, brain tumor s/p resection, hypothyroidism, and colon cancer s/p hemicolectomy, who presented from nursing home 1/11/2025 with hypoxemic respiratory failure.  She was initially started on antibiotics at her nursing home due to suspected pneumonia however as it began to worsen she was directed to ED.  On presentation she was found to be minimally responsive with hypoxemic and hypercapnic respiratory failure requiring supplemental oxygen.  VBG 7.07/90.  After patient was on HFNC repeat VBG showing 6.93/119.  MICU was consulted for acute hypoxemic and hypercapnic respiratory failure.  Patient was going to be intubated in the ED however family elected for DNR/DNI with trial of NIV.  Patient was started on AVAPS.  Labs notable for leukocytosis 12.16, Hb 11.5, CR 1.62 (baseline 1.08), Pro-Casper 1.67, UA large LE, many bacteria.  Patient was found RVP showing positive influenza A.  CT head was negative for acute intracranial pathology.  CT chest performed with right lower lobe groundglass and motion artifact potentially representing pneumonia with mucous noted in the airways.      # Encephalopathy due to hypercapnia  # Hypercapnic respiratory failure in the setting of?  PNA, influenza A positive  # UTI  # Septic shock  #DWAIN on CKD  –Continue with AVAPS repeat ABG 7.28/43/174  – Start Tamiflu  – Start Zosyn for possible superimposed bacterial pneumonia  – MRSA PCR will start vancomycin and discontinue if MRSA PCR negative  – Continue with Levophed    DVT PPx: Paren subcu  CODE STATUS: DNR/DNI trial of NIV.     Assessment:     89y old Female with stated hx significant for dementia, s/p brain tumor resection 1961, HTN, HYPOthyroidism, colon Ca s/p Rt hemicolectomy ('09), 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 Pt found to have DWAIN on CKD, Influenza A +, UTI +, possible RLL pneum with consolidation appreciated on POCUS. Pt admitted to MICU for hypercapnic/hypoxic resp failure/septic shock in the setting of possible RLL pneum and UTI      Plan:    ####Neuro####  #==mental status improved, now alert, oriented to self only, conversant  #==CT head: tiny acute ICH cannot be excluded, otherwise no abnormality  #==dementia hx  -Neuro checks q 4 hrs and prn for changes  -activity as tolerated  -physical therapy consult   -hold home meds of quetiapine 12.5 mg qhs; mirtazepine 15 mg qhs  -GOC pt is DNR/DNI with trial of NIV  -obtain palliative consult    ####Pulm####  #==Hypoxic/hypercapnic resp failure  #==possible RLL pneum  #==Improved, currently on RA  -Monitor VBG daily, goal 7.35-7.45; PCO2 35-45; SPO2>92%  -Bronchodilator  therapy q 6 hrs and prn SOB/Wheezes  -HOB >/= 30 degrees   -Failed bedside swallow>SLP eval  -Keyo feed placement/GI access needed    ####CV####  #==septic shock   #==afib w/ RVR  #==hypertroponinemia  -Cardiac Enzymes peaked at 145, now downtrending, MAP greater than 65 without pressor support  -MAP greater than 65  -Strict I&O  -hold Beta-blockers/Ca+ channel blockers at present        ####GI/####  #==DWAIN on CKD  #==stercoral colitis hx  -npo at present  -strict I & O's - keep even  -SCr uptrending, continue lactated ringers @ 50 cc's/hr   -bowel regimen with Miralax qd  -senna qd   -consider lactulose for bowel regimen      ####ID####  #==possible RLL pneum  #==UTI  -Influenza A positive, RVP otherwise negative; legionella, strep PNA, MRSA/MSSA negative, 1/11 BCx NGTD    -MSSA/MRSA negative, Vanco d/c'd  -continue zosyn 3.375 gms IV q 8 hrs - started in E.D. (1/11/25-1/19/25)  -continue tamiflu  -consider adding azithromycin     ####FEN/ENDO/HEME####  #==transaminitis in setting of septic shock  #==HYPOthyroidism hx  -obtain CMP/Mg++/PO--4/CBC w diff/PT/PTT/INR q. a.m.  -abg prn  -trend LFT's  -obtain TSH/Thyroxine/T3  -home med of levothyroxine 75 mcg po qhs - will change to 60 mcg IVP qhs  -DVT prophylaxis with - heparin 5000 units subq q 12 hrs    ####LINES/DRAINS/TUBES/DEVICES####  PIV x2, cruz    GOC: DNR/DNI, trial of NIV                Assessment:  89y old Female with stated hx significant for dementia, s/p brain tumor resection 1961, HTN, HYPOthyroidism, colon Ca s/p Rt hemicolectomy ('09), 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 Pt found to have DWAIN on CKD, Influenza A +, UTI +, possible RLL pneum with consolidation appreciated on POCUS. Pt admitted to MICU for hypercapnic/hypoxic resp failure/septic shock in the setting of possible RLL pneum and UTI      Plan:    ####Neuro####  #==mental status improved, now alert, oriented to self only, conversant  #==CT head: tiny acute ICH cannot be excluded, otherwise no abnormality  #==dementia hx  -Neuro checks q 4 hrs and prn for changes  -activity as tolerated  -physical therapy consult   -hold home meds of quetiapine 12.5 mg qhs; mirtazepine 15 mg qhs  -GOC pt is DNR/DNI with trial of NIV  -obtain palliative consult    ####Pulm####  #==Hypoxic/hypercapnic resp failure  #==possible RLL pneum  -Respiratory status Improved, currently on RA  -Monitor VBG daily, goal 7.35-7.45; PCO2 35-45; SPO2>92%  -Bronchodilator  therapy q 6 hrs and prn SOB/Wheezes  -HOB >/= 30 degrees   -Failed bedside swallow>SLP eval  -Keyo feed placement for GI access     ####CV####  #==septic shock   #==afib w/ RVR  #==hypertroponinemia  -Cardiac Enzymes peaked at 145, now downtrending, MAP greater than 65 without pressor support  -maintain MAP greater than 65  -Strict I&O  -hold Beta-blockers/Ca+ channel blockers at present        ####GI/####  #==DWAIN on CKD  #==stercoral colitis hx  -npo at present  -strict I & O's - keep even  -SCr uptrending, continue lactated ringers @ 50 cc's/hr   -bowel regimen with Miralax qd  -senna qd   -consider lactulose for bowel regimen      ####ID####  #==possible RLL pneum  #==UTI  -Influenza A positive, RVP otherwise negative; legionella, strep PNA, MRSA/MSSA negative, 1/11 BCx NGTD  -MSSA/MRSA negative> Vanco d/c'd  -continue zosyn 3.375 gms IV q 8 hrs - started in E.D. (1/11/25-1/19/25)  -continue tamiflu  -consider adding azithromycin     ####FEN/ENDO/HEME####  #==transaminitis in setting of septic shock  #==HYPOthyroidism hx  -obtain CMP/Mg++/PO--4/CBC w diff/PT/PTT/INR q. a.m.  -abg prn  -trend LFT's  -f/u T4, TSH elevated likely iso of sepsis, however T3 wnl,   -home med of levothyroxine 75 mcg po qhs - will change to 60 mcg IVP qhs  -DVT prophylaxis with - heparin 5000 units subq q 12 hrs  -FSG <180    ####LINES/DRAINS/TUBES/DEVICES####  PIV x2, cruz    GOC: DNR/DNI, trial of NIV                Assessment:  89y old Female with stated hx significant for dementia, s/p brain tumor resection 1961, HTN, HYPOthyroidism, colon Ca s/p Rt hemicolectomy ('09), 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 Pt found to have DWAIN on CKD, Influenza A +, UTI +, possible RLL pneum with consolidation appreciated on POCUS. Pt admitted to MICU for hypercapnic/hypoxic resp failure/septic shock in the setting of possible RLL pneum and UTI      Plan:    ####Neuro####  #==mental status improved, now alert, oriented to self only, conversant  #==CT head: tiny acute ICH cannot be excluded, otherwise no abnormality  #==dementia hx  -Neuro checks q 4 hrs and prn for changes  -activity as tolerated  -physical therapy consult   -hold home meds of quetiapine 12.5 mg qhs; mirtazepine 15 mg qhs  -GOC pt is DNR/DNI with trial of NIV  -obtain palliative consult    ####Pulm####  #==Hypoxic/hypercapnic resp failure  #==possible RLL pneum  -Respiratory status Improved, currently on RA  -Monitor VBG daily, goal 7.35-7.45; PCO2 35-45; SPO2>92%  -Bronchodilator  therapy q 6 hrs and prn SOB/Wheezes  -HOB >/= 30 degrees   -Failed bedside swallow>SLP eval  -Keyo feed placement for GI access     ####CV####  #==septic shock   #==afib w/ RVR  #==hypertroponinemia  -Cardiac Enzymes peaked at 145, now downtrending, MAP greater than 65 without pressor support  -maintain MAP greater than 65  -Strict I&O  -hold Beta-blockers/Ca+ channel blockers at present        ####GI/####  #==DWAIN on CKD  #==stercoral colitis hx  #==hypoalbuminemia  -npo at present  -Keyo feed for GI access  -TF per Nutrition recs  -strict I & O's - keep even  -SCr uptrending, continue lactated ringers @ 50 cc's/hr   -bowel regimen with Miralax qd  -senna qd   -consider lactulose for bowel regimen      ####ID####  #==possible RLL pneum  #==UTI  -CBC daily  -Influenza A positive, RVP otherwise negative; legionella, strep PNA, MRSA/MSSA negative, 1/11 BCx NGTD  -MSSA/MRSA negative> Vanco d/c'd  -continue zosyn 3.375 gms IV q 8 hrs - started in E.D. (1/11/25-1/19/25)  -continue tamiflu  -consider adding azithromycin     ####FEN/ENDO/HEME####  #==transaminitis in setting of septic shock  #==HYPOthyroidism hx  -obtain CMP/Mg++/PO--4/CBC w diff/PT/PTT/INR q. a.m.  -abg prn  -trend LFT's  -f/u T4, TSH elevated likely iso of sepsis, however T3 wnl,   -home med of levothyroxine 75 mcg po qhs - will change to 60 mcg IVP qhs  -DVT prophylaxis with - heparin 5000 units subq q 12 hrs  -FSG <180    ####Skin/MSK####  #P/U to b/l heels, sacrum  -f/u wound care consult  -skin care per protocol  -TF, MVI, Vit. C per Nutrition recs    ####LINES/DRAINS/TUBES/DEVICES####  PIV x2, cruz    GOC: DNR/DNI, trial of NIV                Assessment:  89y old Female with stated hx significant for dementia, s/p brain tumor resection 1961, HTN, HYPOthyroidism, colon Ca s/p Rt hemicolectomy ('09), 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 Pt found to have DWAIN on CKD, Influenza A +, UTI +, possible RLL pneum with consolidation appreciated on POCUS. Pt admitted to MICU for hypercapnic/hypoxic resp failure/septic shock in the setting of possible RLL pneum and UTI      Plan:    ####Neuro####  #==mental status improved, now alert, oriented to self only, conversant  #==CT head: tiny acute ICH cannot be excluded, otherwise no abnormality  #==dementia hx  -Neuro checks q 4 hrs and prn for changes  -activity as tolerated  -physical therapy consult   -hold home meds of quetiapine 12.5 mg qhs; mirtazepine 15 mg qhs  -GOC pt is DNR/DNI with trial of NIV  -obtain palliative consult    ####Pulm####  #==Hypoxic/hypercapnic resp failure  #==possible RLL pneum  -Respiratory status Improved, currently on RA  -Monitor VBG daily, goal 7.35-7.45; PCO2 35-45; SPO2>92%  -Bronchodilator  therapy q 6 hrs and prn SOB/Wheezes  -HOB >/= 30 degrees   -Failed bedside swallow>SLP eval, f/u recs  -Keyo feed placement for GI access     ####CV####  #==septic shock   #==afib w/ RVR  #==hypertroponinemia  -Cardiac Enzymes peaked at 145, now downtrending, MAP greater than 65 without pressor support  -maintain MAP greater than 65  -Strict I&O  -hold Beta-blockers/Ca+ channel blockers at present        ####GI/####  #==DWAIN on CKD  #==stercoral colitis hx  #==hypoalbuminemia  -npo at present  -Keyo feed for GI access  -TF per Nutrition recs  -strict I & O's - keep even  -SCr uptrending, continue lactated ringers @ 50 cc's/hr   -bowel regimen with Miralax qd  -senna qd   -consider lactulose for bowel regimen      ####ID####  #==possible RLL pneum  #==UTI  -CBC daily  -Influenza A positive, RVP otherwise negative; legionella, strep PNA, MRSA/MSSA negative, 1/11 BCx NGTD  -MSSA/MRSA negative> Vanco d/c'd  -continue zosyn 3.375 gms IV q 8 hrs - started in E.D. (1/11/25-1/19/25)  -continue tamiflu  -consider adding azithromycin     ####FEN/ENDO/HEME####  #==transaminitis in setting of septic shock  #==HYPOthyroidism hx  -obtain CMP/Mg++/PO--4/CBC w diff/PT/PTT/INR q. a.m.  -abg prn  -trend LFT's  -f/u T4, TSH elevated likely iso of sepsis, however T3 wnl,   -home med of levothyroxine 75 mcg po qhs - will change to 60 mcg IVP qhs  -DVT prophylaxis with - heparin 5000 units subq q 12 hrs  -FSG <180    ####Skin/MSK####  #P/U to b/l heels, sacrum  -f/u wound care consult  -skin care per protocol  -TF, MVI, Vit. C per Nutrition recs    ####LINES/DRAINS/TUBES/DEVICES####  PIV x2, cruz    GOC: DNR/DNI, trial of NIV                Assessment:  89y old Female with stated hx significant for dementia, s/p brain tumor resection 1961, HTN, HYPOthyroidism, colon Ca s/p Rt hemicolectomy ('09), 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 Pt found to have DWAIN on CKD, Influenza A +, UTI +, possible RLL pneum with consolidation appreciated on POCUS. Pt admitted to MICU for hypercapnic/hypoxic resp failure/septic shock in the setting of possible RLL pneum and UTI      Plan:    ####Neuro####  #==mental status improved, now alert, oriented to self only, conversant  #==CT head: tiny acute ICH cannot be excluded, otherwise no abnormality  #==dementia hx  -Neuro checks q 4 hrs and prn for changes  -activity as tolerated  -physical therapy consult   -hold home meds of quetiapine 12.5 mg qhs; mirtazepine 15 mg qhs  -GOC pt is DNR/DNI with trial of NIV      ####Pulm####  #==Hypoxic/hypercapnic resp failure  #==possible RLL pneum  -Respiratory status Improved, currently on RA  -Monitor VBG daily, goal 7.35-7.45; PCO2 35-45; SPO2>92%  -Bronchodilator  therapy q 6 hrs and prn SOB/Wheezes  -HOB >/= 30 degrees   -Failed bedside swallow>SLP eval, f/u recs  -Keyo feed placement for GI access     ####CV####  #==septic shock   #==afib w/ RVR  #==hypertroponinemia  -Cardiac Enzymes peaked at 145, now downtrending, hemodynamically stable without pressor support  -maintain MAP greater than 65  -Strict I&O  -hold Beta-blockers/Ca+ channel blockers at present        ####GI/####  #==DWAIN on CKD  #==stercoral colitis hx  #==hypoalbuminemia  -npo at present  -Keyo feed for GI access  -TF per Nutrition recs  -strict I & O's - keep even  -SCr uptrending, continue lactated ringers @ 50 cc's/hr   -bowel regimen with Miralax qd  -senna qd   -consider lactulose for bowel regimen      ####ID####  #==possible RLL pneum  #==UTI  -CBC daily  -Influenza A positive, RVP otherwise negative; legionella, strep PNA, MRSA/MSSA negative, 1/11 BCx NGTD  -MSSA/MRSA negative> Vanco d/c'd  -continue zosyn 3.375 gms IV q 8 hrs - started in E.D. (1/11/25-1/15/25)  -continue tamiflu  -consider adding azithromycin     ####FEN/ENDO/HEME####  #==transaminitis in setting of septic shock  #==HYPOthyroidism hx  -obtain CMP/Mg++/PO--4/CBC w diff/PT/PTT/INR q. a.m.  -abg prn  -trend LFT's  -f/u T4, TSH elevated likely iso of sepsis, however T3 wnl,   -home med of levothyroxine 75 mcg po qhs - will change to 60 mcg IVP qhs  -DVT prophylaxis with - heparin 5000 units subq q 12 hrs  -FSG <180    ####Skin/MSK####  #P/U to b/l heels, sacrum  -f/u wound care consult  -skin care per protocol  -TF, MVI, Vit. C per Nutrition recs    ####LINES/DRAINS/TUBES/DEVICES####  PIV x2, cruz    GOC: DNR/DNI, trial of NIV

## 2025-01-13 NOTE — PROCEDURE NOTE - ADDITIONAL PROCEDURE DETAILS
Pt with resolving hypoxic/hypercapnic resp failure, failed bedside dysphagia and official speech swallow eval, CHET feed tube placed for feeding and medication

## 2025-01-13 NOTE — ADVANCED PRACTICE NURSE CONSULT - ASSESSMENT
Patient encountered on 5MIC. When wound care RN arrived on unit, patient was found lying in a low air loss pressure redistribution support surface style bed. Kyphosis noted. Patient Confino is unable to turn independently and staff assistance x 2 was provided. Once turned, the wound care RN was able to visualize an area of persistent nonblanchable erythema over B/L buttocks/sacral skin, area measures approximately 6cm x 3cm x 0cm- cannot rule out a deep tissue injury present on admission. B/L heels with hyperpigmentation measuring approximately 3cm x 3cm x 0cm with bogginess present, cannot rule out a deep tissue injury present on admission. Once consult was complete, patient was educated regarding the need for routine turning and positioning to prevent pressure injuries and patient was placed in a left side-lying position utilizing pillow positioner assistive devices.

## 2025-01-14 DIAGNOSIS — A41.9 SEPSIS, UNSPECIFIED ORGANISM: ICD-10-CM

## 2025-01-14 DIAGNOSIS — J96.01 ACUTE RESPIRATORY FAILURE WITH HYPOXIA: ICD-10-CM

## 2025-01-14 DIAGNOSIS — I48.91 UNSPECIFIED ATRIAL FIBRILLATION: ICD-10-CM

## 2025-01-14 DIAGNOSIS — E03.9 HYPOTHYROIDISM, UNSPECIFIED: ICD-10-CM

## 2025-01-14 DIAGNOSIS — I10 ESSENTIAL (PRIMARY) HYPERTENSION: ICD-10-CM

## 2025-01-14 LAB
GLUCOSE BLDC GLUCOMTR-MCNC: 103 MG/DL — HIGH (ref 70–99)
GLUCOSE BLDC GLUCOMTR-MCNC: 105 MG/DL — HIGH (ref 70–99)
GLUCOSE BLDC GLUCOMTR-MCNC: 108 MG/DL — HIGH (ref 70–99)
GLUCOSE BLDC GLUCOMTR-MCNC: 92 MG/DL — SIGNIFICANT CHANGE UP (ref 70–99)

## 2025-01-14 PROCEDURE — 99223 1ST HOSP IP/OBS HIGH 75: CPT | Mod: GC

## 2025-01-14 PROCEDURE — 71045 X-RAY EXAM CHEST 1 VIEW: CPT | Mod: 26

## 2025-01-14 PROCEDURE — 93010 ELECTROCARDIOGRAM REPORT: CPT

## 2025-01-14 RX ORDER — MIRTAZAPINE 30 MG/1
15 TABLET, FILM COATED ORAL AT BEDTIME
Refills: 0 | Status: DISCONTINUED | OUTPATIENT
Start: 2025-01-14 | End: 2025-02-12

## 2025-01-14 RX ORDER — METOPROLOL SUCCINATE 25 MG
50 TABLET, EXTENDED RELEASE 24 HR ORAL
Refills: 0 | Status: DISCONTINUED | OUTPATIENT
Start: 2025-01-14 | End: 2025-01-14

## 2025-01-14 RX ORDER — METOPROLOL SUCCINATE 25 MG
50 TABLET, EXTENDED RELEASE 24 HR ORAL
Refills: 0 | Status: DISCONTINUED | OUTPATIENT
Start: 2025-01-14 | End: 2025-01-24

## 2025-01-14 RX ORDER — QUETIAPINE FUMARATE 300 MG/1
12.5 TABLET ORAL AT BEDTIME
Refills: 0 | Status: DISCONTINUED | OUTPATIENT
Start: 2025-01-14 | End: 2025-02-12

## 2025-01-14 RX ADMIN — IPRATROPIUM BROMIDE AND ALBUTEROL SULFATE 3 MILLILITER(S): .5; 2.5 SOLUTION RESPIRATORY (INHALATION) at 23:41

## 2025-01-14 RX ADMIN — IPRATROPIUM BROMIDE AND ALBUTEROL SULFATE 3 MILLILITER(S): .5; 2.5 SOLUTION RESPIRATORY (INHALATION) at 12:59

## 2025-01-14 RX ADMIN — Medication 5000 UNIT(S): at 18:51

## 2025-01-14 RX ADMIN — PIPERACILLIN SODIUM AND TAZOBACTAM SODIUM 25 GRAM(S): 2; 250 INJECTION, POWDER, FOR SOLUTION INTRAVENOUS at 07:15

## 2025-01-14 RX ADMIN — Medication 5 MILLIGRAM(S): at 07:15

## 2025-01-14 RX ADMIN — MIRTAZAPINE 15 MILLIGRAM(S): 30 TABLET, FILM COATED ORAL at 22:25

## 2025-01-14 RX ADMIN — Medication 50 MILLIGRAM(S): at 22:24

## 2025-01-14 RX ADMIN — Medication 5 MILLIGRAM(S): at 00:22

## 2025-01-14 RX ADMIN — ANTISEPTIC SURGICAL SCRUB 1 APPLICATION(S): 0.04 SOLUTION TOPICAL at 07:15

## 2025-01-14 RX ADMIN — Medication 5 MILLIGRAM(S): at 12:58

## 2025-01-14 RX ADMIN — IPRATROPIUM BROMIDE AND ALBUTEROL SULFATE 3 MILLILITER(S): .5; 2.5 SOLUTION RESPIRATORY (INHALATION) at 00:22

## 2025-01-14 RX ADMIN — QUETIAPINE FUMARATE 12.5 MILLIGRAM(S): 300 TABLET ORAL at 22:32

## 2025-01-14 RX ADMIN — Medication 5000 UNIT(S): at 07:15

## 2025-01-14 RX ADMIN — IPRATROPIUM BROMIDE AND ALBUTEROL SULFATE 3 MILLILITER(S): .5; 2.5 SOLUTION RESPIRATORY (INHALATION) at 18:50

## 2025-01-14 RX ADMIN — PIPERACILLIN SODIUM AND TAZOBACTAM SODIUM 25 GRAM(S): 2; 250 INJECTION, POWDER, FOR SOLUTION INTRAVENOUS at 18:52

## 2025-01-14 RX ADMIN — LEVOTHYROXINE SODIUM 60 MICROGRAM(S): 25 TABLET ORAL at 22:32

## 2025-01-14 NOTE — BH CONSULTATION LIAISON ASSESSMENT NOTE - NSBHCONSULTRECOMMENDOTHER_PSY_A_CORE FT
please resume home Seroquel and mirtazapine (NGT)  please avoid Ativan due to patient's elderly age and underlying dementia  delirium precautions  f/u palliative consult recs no indication for inpatient psychiatric admission  please resume home Seroquel and mirtazapine (NGT)  please avoid Ativan due to patient's elderly age and underlying dementia  delirium precautions  f/u palliative consult recs

## 2025-01-14 NOTE — BH CONSULTATION LIAISON ASSESSMENT NOTE - CURRENT MEDICATION
MEDICATIONS  (STANDING):  albuterol/ipratropium for Nebulization 3 milliLiter(s) Nebulizer every 6 hours  ascorbic acid 500 milliGRAM(s) Oral daily  bisacodyl Suppository 10 milliGRAM(s) Rectal daily  chlorhexidine 2% Cloths 1 Application(s) Topical <User Schedule>  dextrose 5% + lactated ringers. 1000 milliLiter(s) (50 mL/Hr) IV Continuous <Continuous>  dextrose 5% + lactated ringers. 1000 milliLiter(s) (50 mL/Hr) IV Continuous <Continuous>  dextrose 5% + lactated ringers. 1000 milliLiter(s) (50 mL/Hr) IV Continuous <Continuous>  heparin   Injectable 5000 Unit(s) SubCutaneous every 12 hours  levothyroxine Injectable 60 MICROGram(s) IV Push at bedtime  metoprolol tartrate Injectable 5 milliGRAM(s) IV Push every 6 hours  multivitamin 1 Tablet(s) Oral daily  oseltamivir 30 milliGRAM(s) Oral daily  piperacillin/tazobactam IVPB.. 3.375 Gram(s) IV Intermittent every 12 hours    MEDICATIONS  (PRN):

## 2025-01-14 NOTE — BH CONSULTATION LIAISON ASSESSMENT NOTE - NSBHMSEINSIGHT_PSY_A_CORE
Hpi Title: Evaluation of Skin Lesions How Severe Are Your Spot(S)?: mild Have Your Spot(S) Been Treated In The Past?: has not been treated Family Member: Father Unable to assess

## 2025-01-14 NOTE — BH CONSULTATION LIAISON ASSESSMENT NOTE - NSBHCHARTREVIEWINVESTIGATE_PSY_A_CORE FT
EKG with afib, QTc 484  [facility] 1/11 with no acute pathology

## 2025-01-14 NOTE — PROGRESS NOTE ADULT - ASSESSMENT
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 Pt found to have DWAIN on CKD, Influenza A +, UTI +, possible RLL pneum with consolidation appreciated on POCUS. Pt admitted to MICU for hypercapnic/hypoxic resp failure/septic shock in the setting of possible RLL pneum and UTI now on medical floor

## 2025-01-14 NOTE — PROGRESS NOTE ADULT - SUBJECTIVE AND OBJECTIVE BOX
Patient is a 89y old  Female who presents with a chief complaint of Hypoxic/hypercapnic resp failure (13 Jan 2025 04:49)  patient not known to me, assigned this AM to assume care  chart reviewed and events thus far noted   transferred out to my care today      DATE OF SERVICE: 01-14-25 @ 14:46    SUBJECTIVE / OVERNIGHT EVENTS: overnight events noted    ROS:  not available       MEDICATIONS  (STANDING):  albuterol/ipratropium for Nebulization 3 milliLiter(s) Nebulizer every 6 hours  ascorbic acid 500 milliGRAM(s) Oral daily  bisacodyl Suppository 10 milliGRAM(s) Rectal daily  chlorhexidine 2% Cloths 1 Application(s) Topical <User Schedule>  dextrose 5% + lactated ringers. 1000 milliLiter(s) (50 mL/Hr) IV Continuous <Continuous>  dextrose 5% + lactated ringers. 1000 milliLiter(s) (50 mL/Hr) IV Continuous <Continuous>  dextrose 5% + lactated ringers. 1000 milliLiter(s) (50 mL/Hr) IV Continuous <Continuous>  heparin   Injectable 5000 Unit(s) SubCutaneous every 12 hours  levothyroxine Injectable 60 MICROGram(s) IV Push at bedtime  metoprolol tartrate Injectable 5 milliGRAM(s) IV Push every 6 hours  multivitamin 1 Tablet(s) Oral daily  oseltamivir 30 milliGRAM(s) Oral daily  piperacillin/tazobactam IVPB.. 3.375 Gram(s) IV Intermittent every 12 hours    MEDICATIONS  (PRN):        CAPILLARY BLOOD GLUCOSE      POCT Blood Glucose.: 103 mg/dL (14 Jan 2025 11:11)  POCT Blood Glucose.: 92 mg/dL (14 Jan 2025 08:23)  POCT Blood Glucose.: 105 mg/dL (14 Jan 2025 00:38)  POCT Blood Glucose.: 101 mg/dL (13 Jan 2025 17:27)    I&O's Summary    13 Jan 2025 07:01  -  14 Jan 2025 07:00  --------------------------------------------------------  IN: 400 mL / OUT: 370 mL / NET: 30 mL        Vital Signs Last 24 Hrs  T(C): 36.3 (14 Jan 2025 12:30), Max: 37.5 (13 Jan 2025 16:00)  T(F): 97.4 (14 Jan 2025 12:30), Max: 99.5 (13 Jan 2025 16:00)  HR: 92 (14 Jan 2025 12:30) (77 - 97)  BP: 163/91 (14 Jan 2025 12:30) (140/86 - 163/91)  BP(mean): 109 (13 Jan 2025 19:00) (109 - 116)  RR: 18 (14 Jan 2025 12:30) (18 - 27)  SpO2: 96% (14 Jan 2025 12:30) (94% - 96%)    PHYSICAL EXAM: confused  CHEST/LUNG: scattered expiratory wheeze   HEART: S1 S2; no murmurs   ABDOMEN: Soft, Nontender  EXTREMITIES: no edema  NEUROLOGY: agitated non-focal  SKIN: No rashes or lesions    LABS:                        11.0   11.83 )-----------( 242      ( 13 Jan 2025 00:36 )             33.8     01-13    140  |  106  |  36[H]  ----------------------------<  125[H]  4.1   |  18[L]  |  2.14[H]    Ca    9.3      13 Jan 2025 00:35  Phos  4.1     01-13  Mg     2.0     01-13    TPro  6.7  /  Alb  2.8[L]  /  TBili  0.6  /  DBili  x   /  AST  35  /  ALT  57[H]  /  AlkPhos  318[H]  01-13    PT/INR - ( 13 Jan 2025 00:37 )   PT: 13.0 sec;   INR: 1.14 ratio         PTT - ( 13 Jan 2025 00:37 )  PTT:26.6 sec  CARDIAC MARKERS ( 12 Jan 2025 15:05 )  x     / x     / x     / x     / 6.6 ng/mL      Urinalysis Basic - ( 13 Jan 2025 00:35 )    Color: x / Appearance: x / SG: x / pH: x  Gluc: 125 mg/dL / Ketone: x  / Bili: x / Urobili: x   Blood: x / Protein: x / Nitrite: x   Leuk Esterase: x / RBC: x / WBC x   Sq Epi: x / Non Sq Epi: x / Bacteria: x          All consultant(s) notes reviewed and care discussed with other providers        Contact Number, Dr Link [phone]

## 2025-01-14 NOTE — PROGRESS NOTE ADULT - PROBLEM SELECTOR PLAN 1
likely secondary to pneumonia precipitated by influenza A on admission  UTI ruled OUT after admission   continue piperacillin/tazobactam day 3/5 today  complete Tamiflu  aspiration pneumonia   continue NG Tube feedings  NGT is within the GOC per son

## 2025-01-14 NOTE — BH CONSULTATION LIAISON ASSESSMENT NOTE - NSBHCHARTREVIEWLAB_PSY_A_CORE FT
CBC significant for WBC 11.8, Hgb 11.0  Coag panel WNL  Troponin plateaued at 126  CMP significant for Cr 2.1, alk phos 318, AST nl, ALT 57  CK nl

## 2025-01-14 NOTE — BH CONSULTATION LIAISON ASSESSMENT NOTE - HPI (INCLUDE ILLNESS QUALITY, SEVERITY, DURATION, TIMING, CONTEXT, MODIFYING FACTORS, ASSOCIATED SIGNS AND SYMPTOMS)
89y F with Hx dementia, brain tumor resection (1961), HTN, hypothyroidism, colon cancer (s/p hemicolectomy), who presented from SNF for respiratory failure. Per chart review, patient became ill on 1/9 and was receiving supportive care at SNF, including antibiotics. Transferred to ED on 1/11 and found to be in hypercapnic respiratory failure. Presented with pH 7.07, obtunded mental status, required HFNC and Levophed. Received vancomycin, Zosyn, Tamiflu, and Levophed. Influenza A positive. US chest showing L pleural effusion. Concern for aspiration pneumonia. Lab derangements also included DWAIN on CKD (initial Cr 1.62, baseline 1.0-1.2 per 8/2024) and transaminitis (alk phos 577, , ). Initially managed on MICU, downgraded to medicine floors (weaned to RA). Leukocytosis downtrending to 11.8. Transaminitis improving. 89y F with Hx dementia, brain tumor resection (, no chemo/rad, chronic L facial droop), HTN, hypothyroidism, colon cancer (s/p hemicolectomy), who presented from SNF for respiratory failure. Per chart review, patient became ill on  and was receiving supportive care at SNF, including antibiotics. Transferred to ED on  and found to be in hypercapnic respiratory failure. Presented with pH 7.07, obtunded mental status, required HFNC and Levophed. Received vancomycin, Zosyn, Tamiflu, and Levophed. Influenza A positive. US chest showing L pleural effusion. Concern for aspiration pneumonia. Lab derangements also included DWAIN on CKD (initial Cr 1.62, baseline 1.0-1.2 per 2024) and transaminitis (alk phos 577, , ). Initially managed on MICU, downgraded to medicine floors (weaned to RA). Leukocytosis downtrending to 11.8. Transaminitis improving (alk phos 318, AST nl, ALT 57). CTH with no acute pathology. Hospital course complicated by afib with RVR, s/p amio, on metoprolol now. NGT placed after failing dysphagia screen and SLP evaluation. Home mirtazapine and Seroquel being held, awaiting NGT clearance to use.    Collateral obtained from patric Brar via phone call. He states that patient is wheelchair-bound and dependent on others for ADLs. She generally speaks and follows commands. Knows herself, family members, and locations. Forgetful about family members who are . No psychiatric history. No concern for SI, hallucinations, or self-harm. Can get verbally aggressive at times. Mood fluctuates, can be depressed or happy. She worked as a  until age 30 and became homemaker after getting . Never smoking, alcohol, or drug use. FHx positive for bipolar d/o in brother. Neurological care guided by providers at SNF. Son feels that she did well on home regimen of medications.    Per primary team, concern for agitation (no violence, however restless and pulling at lines). Using soft restraints. No PRNs yet given for agitation. Went to bedside to assess patient. Sleeping, arouses to noxious stimuli, non-verbal and not following commands. Per bedside RN, patient was speaking earlier in day, stating she felt cold.

## 2025-01-14 NOTE — PROGRESS NOTE ADULT - NSPROGADDITIONALINFOA_GEN_ALL_CORE
discussed with patient's son expresses understanding of treatment plans. discussed with patient's son expresses understanding of treatment plans.  discussed with covering ACP    encounter 50 min including PE, progress note, medication adjustment and d/w ACP and patient

## 2025-01-14 NOTE — BH CONSULTATION LIAISON ASSESSMENT NOTE - NSBHCONSULTMEDAGITATION_PSY_A_CORE FT
can give Seroquel 12.5mg q6h PRN via NGT  alternatively, can give haldol 0.25mg q6h PRN IV for severe agitation can give Seroquel 12.5mg q6h PRN via NGT  alternatively, can give haldol 0.25mg q6h PRN IV for severe agitation. Ok to repeat dose x 1 if first dose not effective. Would monitor EKG for QTc prolongation

## 2025-01-14 NOTE — BH CONSULTATION LIAISON ASSESSMENT NOTE - RISK ASSESSMENT
unable to question patient directly. However, low risk for SI per collateral from son (no psychiatric history, good response to Rx, no prior attempts/SI/hallucinations)

## 2025-01-14 NOTE — BH CONSULTATION LIAISON ASSESSMENT NOTE - SUMMARY
89y F with Hx remote brain tumor, dementia, colon cancer s/p hemicolectomy, HTN, hypothyroidism, who is admitted for respiratory failure due to influenza and possible aspiration PNA. Difficult to assess patient's mental status, due to sleeping and not cooperating or sleeping during encounter. Suspect intermittent agitation is due to delirium and underlying medical conditions (DWAIN, URI, etc), exacerbated by elderly age and underlying dementia. Would treat symptoms for now.

## 2025-01-14 NOTE — BH CONSULTATION LIAISON ASSESSMENT NOTE - NSBHCHARTREVIEWVS_PSY_A_CORE FT
Vital Signs Last 24 Hrs  T(C): 36.3 (14 Jan 2025 12:30), Max: 37.5 (13 Jan 2025 16:00)  T(F): 97.4 (14 Jan 2025 12:30), Max: 99.5 (13 Jan 2025 16:00)  HR: 92 (14 Jan 2025 12:30) (77 - 97)  BP: 163/91 (14 Jan 2025 12:30) (140/86 - 165/77)  BP(mean): 109 (13 Jan 2025 19:00) (109 - 116)  RR: 18 (14 Jan 2025 12:30) (18 - 27)  SpO2: 96% (14 Jan 2025 12:30) (93% - 96%)    Parameters below as of 14 Jan 2025 12:30  Patient On (Oxygen Delivery Method): nasal cannula  O2 Flow (L/min): 3

## 2025-01-14 NOTE — PROGRESS NOTE ADULT - CONVERSATION DETAILS
detailed discussed with above re all of above options  requesting that intubation and/or chest compression be withheld     does want other non-heroic measures including IVF, antibiotics, NGT    MOLST filled and placed in chart    DNR ordered in [facility]    30 minutes for advanced care planning discussion separate and in addition to the E&M service provided

## 2025-01-14 NOTE — BH CONSULTATION LIAISON ASSESSMENT NOTE - NSBHATTESTCOMMENTATTENDFT_PSY_A_CORE
Patient is a 89 year old , domiciled in [place], retired woman with remote history of anxiety and a medical history significant for dementia, HTN, hypothyroidism, brain tumor s/p resection in 1961 with residual left sided facial droop, and colon cancer s/p R hemicolectomy who is currently admitted for hypoxic respiratory failure and found to have Flu A pneumonia, DWAIN, and UA. Patient was initially admitted to the MICU but has stepped down to the floor as of last night. She is now off pressors and on room air, with IV abx and placement of NGtube. Psychiatry was consulted for assistance with agitation management. Per primary team patient has been pulling at lines and attempting to get out of bed. Thus far patient has not received any PRNs but team is using soft restraints. On exam, patient is somnolent and does not wake to verbal or light tactile stimulus. Collateral from her son indicates no chronic mental health issues and no history of substance abuse. Patient takes Remeron and Seroquel outpatient, which have thus far been held here - recommend they be resumed when able. Also recommend use of Seroquel 12.5mg Q6hrs PRN for agitation, and if unable to take oral medications, can use Haldol 0.25mg Q6hrs PRN.

## 2025-01-14 NOTE — PROGRESS NOTE ADULT - PROBLEM SELECTOR PLAN 3
new onset per son   has not heard of her having A fib in the past  detailed discussed with feli he does not feel full anticoagulation would be safe for his mother secondary to extremely high fall risk and agitation related to dementia  heart rate controlled with metoprolol

## 2025-01-15 LAB
ALBUMIN SERPL ELPH-MCNC: 2.3 G/DL — LOW (ref 3.3–5)
ALP SERPL-CCNC: 243 U/L — HIGH (ref 40–120)
ALT FLD-CCNC: 30 U/L — SIGNIFICANT CHANGE UP (ref 10–45)
ANION GAP SERPL CALC-SCNC: 11 MMOL/L — SIGNIFICANT CHANGE UP (ref 5–17)
AST SERPL-CCNC: 20 U/L — SIGNIFICANT CHANGE UP (ref 10–40)
BILIRUB SERPL-MCNC: 0.7 MG/DL — SIGNIFICANT CHANGE UP (ref 0.2–1.2)
BUN SERPL-MCNC: 30 MG/DL — HIGH (ref 7–23)
CALCIUM SERPL-MCNC: 9.2 MG/DL — SIGNIFICANT CHANGE UP (ref 8.4–10.5)
CHLORIDE SERPL-SCNC: 109 MMOL/L — HIGH (ref 96–108)
CO2 SERPL-SCNC: 21 MMOL/L — LOW (ref 22–31)
CREAT SERPL-MCNC: 1.84 MG/DL — HIGH (ref 0.5–1.3)
EGFR: 25 ML/MIN/1.73M2 — LOW
FOLATE SERPL-MCNC: 11 NG/ML — SIGNIFICANT CHANGE UP
GLUCOSE BLDC GLUCOMTR-MCNC: 136 MG/DL — HIGH (ref 70–99)
GLUCOSE BLDC GLUCOMTR-MCNC: 81 MG/DL — SIGNIFICANT CHANGE UP (ref 70–99)
GLUCOSE SERPL-MCNC: 80 MG/DL — SIGNIFICANT CHANGE UP (ref 70–99)
HCT VFR BLD CALC: 36 % — SIGNIFICANT CHANGE UP (ref 34.5–45)
HGB BLD-MCNC: 11.4 G/DL — LOW (ref 11.5–15.5)
MCHC RBC-ENTMCNC: 30.8 PG — SIGNIFICANT CHANGE UP (ref 27–34)
MCHC RBC-ENTMCNC: 31.7 G/DL — LOW (ref 32–36)
MCV RBC AUTO: 97.3 FL — SIGNIFICANT CHANGE UP (ref 80–100)
NRBC # BLD: 0 /100 WBCS — SIGNIFICANT CHANGE UP (ref 0–0)
NRBC BLD-RTO: 0 /100 WBCS — SIGNIFICANT CHANGE UP (ref 0–0)
PLATELET # BLD AUTO: 244 K/UL — SIGNIFICANT CHANGE UP (ref 150–400)
POTASSIUM SERPL-MCNC: 3.9 MMOL/L — SIGNIFICANT CHANGE UP (ref 3.5–5.3)
POTASSIUM SERPL-SCNC: 3.9 MMOL/L — SIGNIFICANT CHANGE UP (ref 3.5–5.3)
PROT SERPL-MCNC: 6.4 G/DL — SIGNIFICANT CHANGE UP (ref 6–8.3)
RBC # BLD: 3.7 M/UL — LOW (ref 3.8–5.2)
RBC # FLD: 14.5 % — SIGNIFICANT CHANGE UP (ref 10.3–14.5)
SODIUM SERPL-SCNC: 141 MMOL/L — SIGNIFICANT CHANGE UP (ref 135–145)
T4 FREE SERPL-MCNC: 1.2 NG/DL — SIGNIFICANT CHANGE UP (ref 0.9–1.7)
VIT B12 SERPL-MCNC: 832 PG/ML — SIGNIFICANT CHANGE UP (ref 232–1245)
WBC # BLD: 12.24 K/UL — HIGH (ref 3.8–10.5)
WBC # FLD AUTO: 12.24 K/UL — HIGH (ref 3.8–10.5)

## 2025-01-15 PROCEDURE — 99232 SBSQ HOSP IP/OBS MODERATE 35: CPT

## 2025-01-15 RX ORDER — HYDRALAZINE HCL 100 MG
50 TABLET ORAL
Refills: 0 | Status: DISCONTINUED | OUTPATIENT
Start: 2025-01-15 | End: 2025-01-24

## 2025-01-15 RX ADMIN — Medication 5000 UNIT(S): at 19:23

## 2025-01-15 RX ADMIN — OSELTAMIVIR PHOSPHATE 30 MILLIGRAM(S): 75 CAPSULE ORAL at 11:34

## 2025-01-15 RX ADMIN — Medication 500 MILLIGRAM(S): at 11:33

## 2025-01-15 RX ADMIN — IPRATROPIUM BROMIDE AND ALBUTEROL SULFATE 3 MILLILITER(S): .5; 2.5 SOLUTION RESPIRATORY (INHALATION) at 05:40

## 2025-01-15 RX ADMIN — Medication 50 MILLIGRAM(S): at 19:23

## 2025-01-15 RX ADMIN — QUETIAPINE FUMARATE 12.5 MILLIGRAM(S): 300 TABLET ORAL at 21:34

## 2025-01-15 RX ADMIN — Medication 10 MILLIGRAM(S): at 11:33

## 2025-01-15 RX ADMIN — Medication 50 MILLIGRAM(S): at 05:40

## 2025-01-15 RX ADMIN — IPRATROPIUM BROMIDE AND ALBUTEROL SULFATE 3 MILLILITER(S): .5; 2.5 SOLUTION RESPIRATORY (INHALATION) at 11:34

## 2025-01-15 RX ADMIN — PIPERACILLIN SODIUM AND TAZOBACTAM SODIUM 25 GRAM(S): 2; 250 INJECTION, POWDER, FOR SOLUTION INTRAVENOUS at 05:40

## 2025-01-15 RX ADMIN — Medication 5000 UNIT(S): at 05:40

## 2025-01-15 RX ADMIN — LEVOTHYROXINE SODIUM 60 MICROGRAM(S): 25 TABLET ORAL at 21:34

## 2025-01-15 RX ADMIN — MIRTAZAPINE 15 MILLIGRAM(S): 30 TABLET, FILM COATED ORAL at 21:34

## 2025-01-15 RX ADMIN — Medication 1 TABLET(S): at 11:33

## 2025-01-15 RX ADMIN — IPRATROPIUM BROMIDE AND ALBUTEROL SULFATE 3 MILLILITER(S): .5; 2.5 SOLUTION RESPIRATORY (INHALATION) at 19:23

## 2025-01-15 RX ADMIN — ANTISEPTIC SURGICAL SCRUB 1 APPLICATION(S): 0.04 SOLUTION TOPICAL at 05:40

## 2025-01-15 NOTE — CONSULT NOTE ADULT - CONVERSATION DETAILS
8:45 no family at bedside this am will return to bedside to meet with family    12:30 no family at bedside.  Will reach out via phone

## 2025-01-15 NOTE — CONSULT NOTE ADULT - PROBLEM SELECTOR RECOMMENDATION 2
Multifactorial including but not limited to infection, antibiotics, medication, dementia, hospitalization.

## 2025-01-15 NOTE — PROGRESS NOTE ADULT - PROBLEM SELECTOR PLAN 3
new onset per son   has not heard of her having A fib in the past  no anticoagulation per son   heart rate controlled with metoprolol

## 2025-01-15 NOTE — CONSULT NOTE ADULT - SUBJECTIVE AND OBJECTIVE BOX
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 from nursing home via EMS with hypoxic resp failure since 1/9/25 secondary to suspected pneum, treated with HFNC, son endorsed pt was ordered for antibx treatment at time as well. EMS endorsed nursing home facility O2 tank found off, and placed pt on NRB.        In E.D. pt found to be obtunded/unresponsive and to have hypoxic/hypercapnic resp failure with SPO2 of 86-88, Vph of 7.07, VPCO2 90, placed on HFNC with FIO2 of 100%, 50 liter flow with improvement to 92-94% . In addition found to have Influenza A positive, DWAIN with sCr of 1.62 on CKD2 (baseline 1.08-1.23 [8/2024]), transaminitis - alk-phos 577, , , +UA with large leuk-est, neg nitrite. SBP of 98/46 - 107/46. Pt received vanco/zosyn, 1 liter NS IVF bolus, pt placed on norepi gtt. Pt received narcan 0.2 mg IVP x1, albuterol nebs x3.        upon consult POCUS with A line predominant, few focal B lines mostly in Left lung field, small area of consolidation in RLL field, mod/large Lt pleural effusion with LLL field atelectasis. Pt with initial norepi at 0.13 mcg/min, titrated down to 0.05 mcg/min over 35 minutes (130/64 -> 112/64),       Consult called for and pt admitted to MICU for hypercapnic/hypoxic resp failure/septic shock in the setting of possible RLL pneum and UTI     (11 Jan 2025 12:40)    PERTINENT PM/SXH:   No pertinent past medical history    Hypertension    Hypothyroidism    HTN (hypertension)    Dementia    Other dementia      No significant past surgical history    History of colon cancer    H/O brain tumor      FAMILY HISTORY:    Family Hx substance abuse [ ]yes [ ]no    ITEMS NOT CHECKED ARE NOT PRESENT    SOCIAL HISTORY:   Significant other/partner[ ]  Children[ ]  Sikh/Spirituality:  Substance hx:  [ ]   Tobacco hx:  [ ]   Alcohol hx: [ ]   Home Opioid hx:  [ ] I-Stop Reference No:  Living Situation: [ ]Home  [ ]Long term care  [ ]Rehab [ ]Other    ADVANCE DIRECTIVES:    DNR/MOLST  [ ]  Living Will  [ ]   DECISION MAKER(s):  [ ] Health Care Proxy(s)  [ ] Surrogate(s)  [ ] Guardian           Name(s): Phone Number(s):    BASELINE (I)ADL(s) (prior to admission):  [place]: [ ]Total  [ ] Moderate [ ]Dependent    Allergies    clindamycin (Unknown)  shellfish (Unknown)  strawberry (Rash)  penicillin (Unknown)  clindamycin (Other)  strawberry (Unknown)  IV Contrast (Other)  IV Contrast (Unknown)    Intolerances    MEDICATIONS  (STANDING):  albuterol/ipratropium for Nebulization 3 milliLiter(s) Nebulizer every 6 hours  ascorbic acid 500 milliGRAM(s) Oral daily  bisacodyl Suppository 10 milliGRAM(s) Rectal daily  chlorhexidine 2% Cloths 1 Application(s) Topical <User Schedule>  dextrose 5% + lactated ringers. 1000 milliLiter(s) (50 mL/Hr) IV Continuous <Continuous>  dextrose 5% + lactated ringers. 1000 milliLiter(s) (50 mL/Hr) IV Continuous <Continuous>  dextrose 5% + lactated ringers. 1000 milliLiter(s) (50 mL/Hr) IV Continuous <Continuous>  heparin   Injectable 5000 Unit(s) SubCutaneous every 12 hours  levothyroxine Injectable 60 MICROGram(s) IV Push at bedtime  metoprolol tartrate 50 milliGRAM(s) Oral two times a day  mirtazapine 15 milliGRAM(s) Oral at bedtime  multivitamin 1 Tablet(s) Oral daily  oseltamivir 30 milliGRAM(s) Oral daily  QUEtiapine 12.5 milliGRAM(s) Oral at bedtime    MEDICATIONS  (PRN):    PRESENT SYMPTOMS: [ ]Unable to self-report  [ ] CPOT [ ] PAINADs [ ] RDOS  Source if other than patient:  [ ]Family   [ ]Team     Pain: [ ]yes [ ]no  QOL impact -   Location -                    Aggravating factors -  Quality -  Radiation -  Timing-  Severity (0-10 scale):  Minimal acceptable level (0-10 scale):     CPOT:    https://www.[facility].org/getattachment/icc52k52-5r3b-7r0n-5q7t-3114n5176u2k/Critical-Care-Pain-Observation-Tool-(CPOT)    PAIN AD Score:   http://geriatrictoolkit.[facility]/cog/painad.pdf (press ctrl +  left click to view)    Dyspnea:                           [ ]Mild [ ]Moderate [ ]Severe      RDOS:  0 to 2  minimal or no respiratory distress   3  mild distress  4 to 6 moderate distress  >7 severe distress  https://homecareinformation.net/handouts/hen/Respiratory_Distress_Observation_Scale.pdf (Ctrl +  left click to view)     Anxiety:                             [ ]Mild [ ]Moderate [ ]Severe  Fatigue:                             [ ]Mild [ ]Moderate [ ]Severe  Nausea:                             [ ]Mild [ ]Moderate [ ]Severe  Loss of appetite:              [ ]Mild [ ]Moderate [ ]Severe  Constipation:                    [ ]Mild [ ]Moderate [ ]Severe    PCSSQ[Palliative Care Spiritual Screening Question]   Severity (0-10):  Score of 4 or > indicate consideration of Chaplaincy referral.  Chaplaincy Referral: [ ] yes [ ] refused [ ] following [ ] Deferred     Caregiver Sandy? : [ ] yes [ ] no [ ] Deferred [ ] Declined             Social work referral [ ] Patient & Family Centered Care Referral [ ]     Anticipatory Grief present?:  [ ] yes [ ] no  [ ] Deferred                  Social work referral [ ] Chaplaincy Referral[ ]      Other Symptoms:  [ ]All other review of systems negative     Palliative Performance Status Version 2:         %    http://npcrc.org/files/news/palliative_performance_scale_ppsv2.pdf  PHYSICAL EXAM:  Vital Signs Last 24 Hrs  T(C): 36.3 (15 Alberto 2025 11:00), Max: 36.3 (14 Jan 2025 12:30)  T(F): 97.4 (15 Alberto 2025 11:00), Max: 97.4 (14 Jan 2025 12:30)  HR: 98 (15 Alberto 2025 11:00) (69 - 98)  BP: 158/87 (15 Alberto 2025 11:00) (145/74 - 179/100)  BP(mean): --  RR: 18 (15 Alberto 2025 11:00) (18 - 18)  SpO2: 98% (15 Alberto 2025 11:00) (96% - 98%)    Parameters below as of 15 Alberto 2025 11:00  Patient On (Oxygen Delivery Method): room air     I&O's Summary    GENERAL: [ ]Cachexia    [ ]Alert  [ ]Oriented x   [ ]Lethargic  [ ]Unarousable  [ ]Verbal  [ ]Non-Verbal  Behavioral:   [ ] Anxiety  [ ] Delirium [ ] Agitation [ ] Other  HEENT:  [ ]Normal   [ ]Dry mouth   [ ]ET Tube/Trach  [ ]Oral lesions  PULMONARY:   [ ]Clear [ ]Tachypnea  [ ]Audible excessive secretions   [ ]Rhonchi        [ ]Right [ ]Left [ ]Bilateral  [ ]Crackles        [ ]Right [ ]Left [ ]Bilateral  [ ]Wheezing     [ ]Right [ ]Left [ ]Bilateral  [ ]Diminished breath sounds [ ]right [ ]left [ ]bilateral  CARDIOVASCULAR:    [ ]Regular [ ]Irregular [ ]Tachy  [ ]Michael [ ]Murmur [ ]Other  GASTROINTESTINAL:  [ ]Soft  [ ]Distended   [ ]+BS  [ ]Non tender [ ]Tender  [ ]Other [ ]PEG [ ]OGT/ NGT  Last BM:  GENITOURINARY:  [ ]Normal [ ] Incontinent   [ ]Oliguria/Anuria   [ ]Crooks  MUSCULOSKELETAL:   [ ]Normal   [ ]Weakness  [ ]Bed/Wheelchair bound [ ]Edema  NEUROLOGIC:   [ ]No focal deficits  [ ]Cognitive impairment  [ ]Dysphagia [ ]Dysarthria [ ]Paresis [ ]Other   SKIN:   [ ]Normal  [ ]Rash  [ ]Other  [ ]Pressure ulcer(s)       Present on admission [ ]y [ ]n    CRITICAL CARE:  [ ] Shock Present  [ ]Septic [ ]Cardiogenic [ ]Neurologic [ ]Hypovolemic  [ ]  Vasopressors [ ]  Inotropes   [ ]Respiratory failure present [ ]Mechanical ventilation [ ]Non-invasive ventilatory support [ ]High flow    [ ]Acute  [ ]Chronic [ ]Hypoxic  [ ]Hypercarbic [ ]Other  [ ]Other organ failure     LABS:                        11.4   12.24 )-----------( 244      ( 15 Alberto 2025 07:15 )             36.0   01-15    141  |  109[H]  |  30[H]  ----------------------------<  80  3.9   |  21[L]  |  1.84[H]    Ca    9.2      15 Alberto 2025 07:15    TPro  6.4  /  Alb  2.3[L]  /  TBili  0.7  /  DBili  x   /  AST  20  /  ALT  30  /  AlkPhos  243[H]  01-15      Urinalysis Basic - ( 15 Alberto 2025 07:15 )    Color: x / Appearance: x / SG: x / pH: x  Gluc: 80 mg/dL / Ketone: x  / Bili: x / Urobili: x   Blood: x / Protein: x / Nitrite: x   Leuk Esterase: x / RBC: x / WBC x   Sq Epi: x / Non Sq Epi: x / Bacteria: x      RADIOLOGY & ADDITIONAL STUDIES:    PROTEIN CALORIE MALNUTRITION PRESENT: [ ]mild [ ]moderate [ ]severe [ ]underweight [ ]morbid obesity  https://www.andeal.org/vault/2440/web/files/ONC/Table_Clinical%20Characteristics%20to%20Document%20Malnutrition-White%20JV%20et%20al%202012.pdf    Height (cm): 160 (01-11-25 @ 13:51)  Weight (kg): 53 (01-11-25 @ 13:51)  BMI (kg/m2): 20.7 (01-11-25 @ 13:51)    [ ]PPSV2 < or = to 30% [ ]significant weight loss  [ ]poor nutritional intake  [ ]anasarca[ ]Artificial Nutrition      Other REFERRALS:  [ ]Hospice  [ ]Child Life  [ ]Social Work  [ ]Case management [ ]Holistic Therapy     Goals of Care Document:  Time and date of service: 01-15-25 @ 12:24    HPI:   89 yr old female with PMHx dementia, s/p brain tumor resection 1961 with residual Lt sided facial droop, HTN, HYPOthyroidism, colon Ca s/p Rt hemicolectomy ('09), 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 from nursing home via EMS with hypoxic resp failure since 1/9/25 secondary to suspected pneum, treated with HFNC, son endorsed pt was ordered for antibx treatment at time as well. EMS endorsed nursing home facility O2 tank found off, and placed pt on NRB.        In E.D. pt found to be obtunded/unresponsive and to have hypoxic/hypercapnic resp failure with SPO2 of 86-88, Vph of 7.07, VPCO2 90, placed on HFNC with FIO2 of 100%, 50 liter flow with improvement to 92-94% . In addition found to have Influenza A positive, DWAIN with sCr of 1.62 on CKD2 (baseline 1.08-1.23 [8/2024]), transaminitis - alk-phos 577, , , +UA with large leuk-est, neg nitrite. SBP of 98/46 - 107/46. Pt received vanco/zosyn, 1 liter NS IVF bolus, pt placed on norepi gtt. Pt received narcan 0.2 mg IVP x1, albuterol nebs x3.        upon consult POCUS with A line predominant, few focal B lines mostly in Left lung field, small area of consolidation in RLL field, mod/large Lt pleural effusion with LLL field atelectasis. Pt with initial norepi at 0.13 mcg/min, titrated down to 0.05 mcg/min over 35 minutes (130/64 -> 112/64),       Consult called for and pt admitted to MICU for hypercapnic/hypoxic resp failure/septic shock in the setting of possible RLL pneum and UTI     (11 Jan 2025 12:40)    PERTINENT PM/SXH:   No pertinent past medical history    Hypertension    Hypothyroidism    HTN (hypertension)    Dementia    Other dementia      No significant past surgical history    History of colon cancer    H/O brain tumor      FAMILY HISTORY:    Family Hx substance abuse [ ]yes [x ]no    ITEMS NOT CHECKED ARE NOT PRESENT    SOCIAL HISTORY:   Significant other/partner[ ]  Children[ x]  Adventism/Spirituality:  Substance hx:  [ ]   Tobacco hx:  [ ]   Alcohol hx: [ ]   Home Opioid hx:  [ ] I-Stop Reference No:  Living Situation: [ ]Home  [ ]Long term care  [ ]Rehab [ ]Other    ADVANCE DIRECTIVES:    DNR/MOLST  [ ]  Living Will  [ ]   DECISION MAKER(s):  [ ] Health Care Proxy(s)  [ ] Surrogate(s)  [ ] Guardian           Name(s): Phone Number(s): javi [phone]    BASELINE (I)ADL(s) (prior to admission):  [place]: [ ]Total  [ ] Moderate [x ]Dependent    Allergies    clindamycin (Unknown)  shellfish (Unknown)  strawberry (Rash)  penicillin (Unknown)  clindamycin (Other)  strawberry (Unknown)  IV Contrast (Other)  IV Contrast (Unknown)    Intolerances    MEDICATIONS  (STANDING):  albuterol/ipratropium for Nebulization 3 milliLiter(s) Nebulizer every 6 hours  ascorbic acid 500 milliGRAM(s) Oral daily  bisacodyl Suppository 10 milliGRAM(s) Rectal daily  chlorhexidine 2% Cloths 1 Application(s) Topical <User Schedule>  dextrose 5% + lactated ringers. 1000 milliLiter(s) (50 mL/Hr) IV Continuous <Continuous>  dextrose 5% + lactated ringers. 1000 milliLiter(s) (50 mL/Hr) IV Continuous <Continuous>  dextrose 5% + lactated ringers. 1000 milliLiter(s) (50 mL/Hr) IV Continuous <Continuous>  heparin   Injectable 5000 Unit(s) SubCutaneous every 12 hours  levothyroxine Injectable 60 MICROGram(s) IV Push at bedtime  metoprolol tartrate 50 milliGRAM(s) Oral two times a day  mirtazapine 15 milliGRAM(s) Oral at bedtime  multivitamin 1 Tablet(s) Oral daily  oseltamivir 30 milliGRAM(s) Oral daily  QUEtiapine 12.5 milliGRAM(s) Oral at bedtime    MEDICATIONS  (PRN):    PRESENT SYMPTOMS: [ ]Unable to self-report  [ ] CPOT [ ] PAINADs [ ] RDOS  Source if other than patient:  [ ]Family   [ ]Team     Pain: [ ]yes [ x]no  QOL impact -   Location -                    Aggravating factors -  Quality -  Radiation -  Timing-  Severity (0-10 scale):  Minimal acceptable level (0-10 scale):     CPOT:    https://www.[facility].org/getattachment/[email or web address]/Critical-Care-Pain-Observation-Tool-(CPOT)    PAIN AD Score:   http://geriatrictoolkit.[facility]/cog/painad.pdf (press ctrl +  left click to view)    Dyspnea:                           [ ]Mild [ ]Moderate [ ]Severe      RDOS:  0 to 2  minimal or no respiratory distress   3  mild distress  4 to 6 moderate distress  >7 severe distress  https://homecareinformation.net/handouts/hen/Respiratory_Distress_Observation_Scale.pdf (Ctrl +  left click to view)     Anxiety:                             [ ]Mild [ ]Moderate [ ]Severe  Fatigue:                             [ ]Mild [ ]Moderate [x ]Severe  Nausea:                             [ ]Mild [ ]Moderate [ ]Severe  Loss of appetite:              [ ]Mild [ ]Moderate [ x]Severe  Constipation:                    [ ]Mild [ ]Moderate [ ]Severe    PCSSQ[Palliative Care Spiritual Screening Question]   Severity (0-10):  Score of 4 or > indicate consideration of Chaplaincy referral.  Chaplaincy Referral: [ ] yes [ ] refused [ ] following [ x] Deferred     Caregiver Victoria? : [ ] yes [ ] no [ x] Deferred [ ] Declined             Social work referral [ ] Patient & Family Centered Care Referral [ ]     Anticipatory Grief present?:  [ ] yes [ ] no  [ x] Deferred                  Social work referral [ ] Chaplaincy Referral[ ]      Other Symptoms:  [ ]All other review of systems negative     Palliative Performance Status Version 2:  20       %    http://npcrc.org/files/news/palliative_performance_scale_ppsv2.pdf  PHYSICAL EXAM:  Vital Signs Last 24 Hrs  T(C): 36.3 (15 Alberto 2025 11:00), Max: 36.3 (14 Jan 2025 12:30)  T(F): 97.4 (15 Alberto 2025 11:00), Max: 97.4 (14 Jan 2025 12:30)  HR: 98 (15 Alberto 2025 11:00) (69 - 98)  BP: 158/87 (15 Alberto 2025 11:00) (145/74 - 179/100)  BP(mean): --  RR: 18 (15 Alberto 2025 11:00) (18 - 18)  SpO2: 98% (15 Alberto 2025 11:00) (96% - 98%)    Parameters below as of 15 Alberto 2025 11:00  Patient On (Oxygen Delivery Method): room air     I&O's Summary    GENERAL: [ x]Cachexia   contracted  [ ]Alert  [ ]Oriented x   [ ]Lethargic  [ ]Unarousable  [x ]Verbal  [ ]Non-Verbal  Behavioral:   [ ] Anxiety  [ ] Delirium [ ] Agitation [ ] Other  HEENT: oral secretions  [ ]Normal   [ ]Dry mouth   [ ]ET Tube/Trach  [ ]Oral lesions  PULMONARY:   [ ]Clear [ ]Tachypnea  [ ]Audible excessive secretions   [ ]Rhonchi        [ ]Right [ ]Left [ ]Bilateral  [ ]Crackles        [ ]Right [ ]Left [ ]Bilateral  [ ]Wheezing     [ ]Right [ ]Left [ ]Bilateral  [x ]Diminished breath sounds [ ]right [ ]left [x ]bilateral  CARDIOVASCULAR:    [ x]Regular [ ]Irregular [ ]Tachy  [ ]Michael [ ]Murmur [ ]Other  GASTROINTESTINAL:  [x ]Soft  [ ]Distended   [x ]+BS  [ ]Non tender [ ]Tender  [ ]Other [ ]PEG [x ]OGT/ NGT  Last BM:  GENITOURINARY:  [ ]Normal [ x] Incontinent   [ ]Oliguria/Anuria   [ ]Crooks  MUSCULOSKELETAL:   [ ]Normal   [x ]Weakness  [ x]Bed/Wheelchair bound [ ]Edema  NEUROLOGIC:   [ ]No focal deficits  [ x]Cognitive impairment  [ x]Dysphagia [ ]Dysarthria [ ]Paresis [ ]Other   SKIN: see rn flowsheet  [ ]Normal  [ ]Rash  [ ]Other  [ ]Pressure ulcer(s)       Present on admission [ ]y [ ]n    CRITICAL CARE:  [ ] Shock Present  [ ]Septic [ ]Cardiogenic [ ]Neurologic [ ]Hypovolemic  [ ]  Vasopressors [ ]  Inotropes   [ ]Respiratory failure present [ ]Mechanical ventilation [ ]Non-invasive ventilatory support [ ]High flow    [ ]Acute  [ ]Chronic [ ]Hypoxic  [ ]Hypercarbic [ ]Other  [ ]Other organ failure     LABS:                        11.4   12.24 )-----------( 244      ( 15 Alberto 2025 07:15 )             36.0   01-15    141  |  109[H]  |  30[H]  ----------------------------<  80  3.9   |  21[L]  |  1.84[H]    Ca    9.2      15 Alberto 2025 07:15    TPro  6.4  /  Alb  2.3[L]  /  TBili  0.7  /  DBili  x   /  AST  20  /  ALT  30  /  AlkPhos  243[H]  01-15      Urinalysis Basic - ( 15 Alberto 2025 07:15 )    Color: x / Appearance: x / SG: x / pH: x  Gluc: 80 mg/dL / Ketone: x  / Bili: x / Urobili: x   Blood: x / Protein: x / Nitrite: x   Leuk Esterase: x / RBC: x / WBC x   Sq Epi: x / Non Sq Epi: x / Bacteria: x      RADIOLOGY & ADDITIONAL STUDIES:    < from: CT Head No Cont (01.11.25 @ 13:53) >  ACC: [id number] EXAM:  CT BRAIN   ORDERED BY:  IVANA BERNSTEIN     PROCEDURE DATE:  01/11/2025          INTERPRETATION:  CT HEAD WITHOUT CONTRAST    TECHNIQUE: Multiple axial images of the head were obtained from the skull   base to the vertex without intravenous contrast.  Multiplanar reformats   were created according to the standard protocol.    INDICATION: Admitting Dxs: A41.9 SEPSIS ams  COMPARISON: Head CT without contrast 11/10/2021.  _____________________  FINDINGS:    Mildly degraded by motion artifact.    Status post right suboccipital craniectomy with postsurgical changes in   the skull/scalp as before. Similar appearance of the presumed right   cerebellar resection bed.    Status post right parieto-occipital cuca hole with a mild amount of   adjacent encephalomalacia and gliosis as before.    Mild diffuse cerebral volume loss. No significant midline shift.   Maintenance of gray-white differentiation in the large vascular   distributions. Mild hypoattenuation of the periventricular/deep white   matter most consistent with chronic microvascular ischemic disease.   Motion artifact precludes exclusion of an acute intracranial hemorrhage.   No findings suspicious for an acute intracranial hemorrhage within this   limitation. No hydrocephalus.    No findings suspicious for a depressed calvarial fracture.    Fatty atrophy of the visualized right aspect of the tongue.    Paranasal Sinuses, Mastoid Air Cells, and Orbits: Mucosal thickening left   sphenoid sinus. Visualized mastoidair cells clear. Status post left lens   replacement.  ______________________  IMPRESSION:  1.  Mildly degraded by motion artifact. A tiny acute intracranial   hemorrhage is not excluded. No findings suspicious for an acute   intracranial abnormalityotherwise. Chronic intracranial findings as   above.    --- End of Report ---            NASRIN LAGUERRE MD; Attending Radiologist    < end of copied text >      PROTEIN CALORIE MALNUTRITION PRESENT: [ ]mild [ ]moderate [ ]severe [ ]underweight [ ]morbid obesity  https://www.andeal.org/vault/2440/web/files/ONC/Table_Clinical%20Characteristics%20to%20Document%20Malnutrition-White%20JV%20et%20al%394429.pdf    Height (cm): 160 (01-11-25 @ 13:51)  Weight (kg): 53 (01-11-25 @ 13:51)  BMI (kg/m2): 20.7 (01-11-25 @ 13:51)    [ ]PPSV2 < or = to 30% [ ]significant weight loss  [ ]poor nutritional intake  [ ]anasarca[ ]Artificial Nutrition      Other REFERRALS:  [ ]Hospice  [ ]Child Life  [ ]Social Work  [ ]Case management [ ]Holistic Therapy     Goals of Care Document:

## 2025-01-15 NOTE — PROGRESS NOTE ADULT - SUBJECTIVE AND OBJECTIVE BOX
Patient is a 89y old  Female who presents with a chief complaint of Hypoxic/hypercapnic resp failure (15 Alberto 2025 12:23)      DATE OF SERVICE: 01-15-25 @ 15:07    SUBJECTIVE / OVERNIGHT EVENTS: overnight events noted    ROS:  not available         MEDICATIONS  (STANDING):  albuterol/ipratropium for Nebulization 3 milliLiter(s) Nebulizer every 6 hours  ascorbic acid 500 milliGRAM(s) Oral daily  bisacodyl Suppository 10 milliGRAM(s) Rectal daily  chlorhexidine 2% Cloths 1 Application(s) Topical <User Schedule>  dextrose 5% + lactated ringers. 1000 milliLiter(s) (50 mL/Hr) IV Continuous <Continuous>  dextrose 5% + lactated ringers. 1000 milliLiter(s) (50 mL/Hr) IV Continuous <Continuous>  dextrose 5% + lactated ringers. 1000 milliLiter(s) (50 mL/Hr) IV Continuous <Continuous>  heparin   Injectable 5000 Unit(s) SubCutaneous every 12 hours  levothyroxine Injectable 60 MICROGram(s) IV Push at bedtime  metoprolol tartrate 50 milliGRAM(s) Oral two times a day  mirtazapine 15 milliGRAM(s) Oral at bedtime  multivitamin 1 Tablet(s) Oral daily  oseltamivir 30 milliGRAM(s) Oral daily  QUEtiapine 12.5 milliGRAM(s) Oral at bedtime    MEDICATIONS  (PRN):        CAPILLARY BLOOD GLUCOSE      POCT Blood Glucose.: 81 mg/dL (15 Alberto 2025 11:48)  POCT Blood Glucose.: 108 mg/dL (14 Jan 2025 23:53)    I&O's Summary      Vital Signs Last 24 Hrs  T(C): 36.3 (15 Alberto 2025 11:00), Max: 36.3 (14 Jan 2025 20:48)  T(F): 97.4 (15 Alberto 2025 11:00), Max: 97.4 (15 Alberto 2025 11:00)  HR: 98 (15 Alberto 2025 11:00) (69 - 98)  BP: 158/87 (15 Alberto 2025 11:00) (145/74 - 179/100)  BP(mean): --  RR: 18 (15 Alberto 2025 11:00) (18 - 18)  SpO2: 98% (15 Alberto 2025 11:00) (97% - 98%)      PHYSICAL EXAM: confused  CHEST/LUNG: resolved wheeze   HEART: S1 S2; no murmurs   ABDOMEN: Soft, Nontender  EXTREMITIES: no edema  NEUROLOGY: agitated non-focal  SKIN: No rashes or lesions    LABS:                        11.4   12.24 )-----------( 244      ( 15 Alberto 2025 07:15 )             36.0     01-15    141  |  109[H]  |  30[H]  ----------------------------<  80  3.9   |  21[L]  |  1.84[H]    Ca    9.2      15 Alberto 2025 07:15    TPro  6.4  /  Alb  2.3[L]  /  TBili  0.7  /  DBili  x   /  AST  20  /  ALT  30  /  AlkPhos  243[H]  01-15          Urinalysis Basic - ( 15 Alberto 2025 07:15 )    Color: x / Appearance: x / SG: x / pH: x  Gluc: 80 mg/dL / Ketone: x  / Bili: x / Urobili: x   Blood: x / Protein: x / Nitrite: x   Leuk Esterase: x / RBC: x / WBC x   Sq Epi: x / Non Sq Epi: x / Bacteria: x          All consultant(s) notes reviewed and care discussed with other providers        Contact Number, Dr Link [phone]

## 2025-01-15 NOTE — PROGRESS NOTE ADULT - PROBLEM SELECTOR PLAN 1
likely secondary to pneumonia precipitated by influenza A on admission  continue piperacillin/tazobactam day 4/5 today  complete Tamiflu  aspiration pneumonia   continue NG Tube feedings  NGT is within the GOC per son

## 2025-01-15 NOTE — CONSULT NOTE ADULT - TIME BILLING
symptom assessment and management, supportive counseling, coordination of care, discussion and coordination with team.      Adriana Lau, ANP-BC  Please contact me via Teams  between 6am-2pm. If not answering, please call the palliative care pager [phone]    After 2pm and on weekends, please see the contact information below:    In the event of newly developing, evolving, or worsening symptoms between 2pm and 5pm please contact the Palliative Medicine via extension [phone] for assistance.  After 5pm please contact team via pager (if the patient is at [facility] #[phone] or if the patient is at [facility] #[phone]) The Geriatric and Palliative Medicine service has coverage 24 hours a day/ 7 days a week to provide medical recommendations regarding symptom management needs via telephone

## 2025-01-15 NOTE — CONSULT NOTE ADULT - ASSESSMENT
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 from nursing home via EMS with hypoxic resp failure since 1/9/25 secondary to suspected pneum, treated with HFNC, son endorsed pt was ordered for antibx treatment at time as well. EMS endorsed nursing home facility O2 tank found off, and placed pt on NRB.        In E.D. pt found to be obtunded/unresponsive and to have hypoxic/hypercapnic resp failure with SPO2 of 86-88, Vph of 7.07, VPCO2 90, placed on HFNC with FIO2 of 100%, 50 liter flow with improvement to 92-94% . In addition found to have Influenza A positive, DWAIN with sCr of 1.62 on CKD2 (baseline 1.08-1.23 [8/2024]), transaminitis - alk-phos 577, , , +UA with large leuk-est, neg nitrite. SBP of 98/46 - 107/46. Pt received vanco/zosyn, 1 liter NS IVF bolus, pt placed on norepi gtt. Pt received narcan 0.2 mg IVP x1, albuterol nebs x3.        upon consult POCUS with A line predominant, few focal B lines mostly in Left lung field, small area of consolidation in RLL field, mod/large Lt pleural effusion with LLL field atelectasis. Pt with initial norepi at 0.13 mcg/min, titrated down to 0.05 mcg/min over 35 minutes (130/64 -> 112/64),       Consult called for and pt admitted to MICU for hypercapnic/hypoxic resp failure/septic shock in the setting of possible RLL pneum and UTI  she was treated in MICU for uti , influenza and RLL pneumonia  currently pt is awqke but do not respond to questions:  she is not on bipap at [facility] stime:   currently on 2 L of oxygen            Septic shock.   -likely secondary to pneumonia precipitated by influenza A on admission  continue piperacillin/tazobactam day 4/5 today  complete Tamiflu  aspiration pneumonia   continue NG Tube feedings  NGT is within the GOC per son.    Acute hypoxic respiratory failure.   -resolving  pulmonary help requested  try to wean off O2.  -she had mild hypercapnic resp failure few days ago : currently off bipap:  if she gets worse restart bipap:   check abg IN AM      Atrial fibrillation and flutter.   -new onset per son   has not heard of her having A fib in the past  no anticoagulation per son   heart rate controlled with metoprolol.     Dementia.   ·  Plan: supportive care  fall precautions.    Hypothyroidism.   - synthroid 60 mcg qd IV.    Hypertension.   will continue metoprolol 50 mg BID via NGT  add hydralazine 50 BID.    DW ACP

## 2025-01-16 DIAGNOSIS — G93.41 METABOLIC ENCEPHALOPATHY: ICD-10-CM

## 2025-01-16 DIAGNOSIS — Z51.5 ENCOUNTER FOR PALLIATIVE CARE: ICD-10-CM

## 2025-01-16 DIAGNOSIS — R64 CACHEXIA: ICD-10-CM

## 2025-01-16 DIAGNOSIS — Z71.89 OTHER SPECIFIED COUNSELING: ICD-10-CM

## 2025-01-16 DIAGNOSIS — N17.9 ACUTE KIDNEY FAILURE, UNSPECIFIED: ICD-10-CM

## 2025-01-16 LAB
ANION GAP SERPL CALC-SCNC: 13 MMOL/L — SIGNIFICANT CHANGE UP (ref 5–17)
BASE EXCESS BLDA CALC-SCNC: 2.9 MMOL/L — SIGNIFICANT CHANGE UP (ref -2–3)
BUN SERPL-MCNC: 32 MG/DL — HIGH (ref 7–23)
CALCIUM SERPL-MCNC: 9.3 MG/DL — SIGNIFICANT CHANGE UP (ref 8.4–10.5)
CHLORIDE SERPL-SCNC: 108 MMOL/L — SIGNIFICANT CHANGE UP (ref 96–108)
CO2 BLDA-SCNC: 29 MMOL/L — HIGH (ref 19–24)
CO2 SERPL-SCNC: 23 MMOL/L — SIGNIFICANT CHANGE UP (ref 22–31)
CREAT SERPL-MCNC: 1.66 MG/DL — HIGH (ref 0.5–1.3)
CULTURE RESULTS: SIGNIFICANT CHANGE UP
CULTURE RESULTS: SIGNIFICANT CHANGE UP
EGFR: 28 ML/MIN/1.73M2 — LOW
GAS PNL BLDA: SIGNIFICANT CHANGE UP
GLUCOSE BLDC GLUCOMTR-MCNC: 111 MG/DL — HIGH (ref 70–99)
GLUCOSE BLDC GLUCOMTR-MCNC: 138 MG/DL — HIGH (ref 70–99)
GLUCOSE BLDC GLUCOMTR-MCNC: 139 MG/DL — HIGH (ref 70–99)
GLUCOSE BLDC GLUCOMTR-MCNC: 152 MG/DL — HIGH (ref 70–99)
GLUCOSE SERPL-MCNC: 147 MG/DL — HIGH (ref 70–99)
HCO3 BLDA-SCNC: 28 MMOL/L — SIGNIFICANT CHANGE UP (ref 21–28)
HCT VFR BLD CALC: 36.4 % — SIGNIFICANT CHANGE UP (ref 34.5–45)
HGB BLD-MCNC: 11.9 G/DL — SIGNIFICANT CHANGE UP (ref 11.5–15.5)
MCHC RBC-ENTMCNC: 30.8 PG — SIGNIFICANT CHANGE UP (ref 27–34)
MCHC RBC-ENTMCNC: 32.7 G/DL — SIGNIFICANT CHANGE UP (ref 32–36)
MCV RBC AUTO: 94.3 FL — SIGNIFICANT CHANGE UP (ref 80–100)
NRBC # BLD: 0 /100 WBCS — SIGNIFICANT CHANGE UP (ref 0–0)
NRBC BLD-RTO: 0 /100 WBCS — SIGNIFICANT CHANGE UP (ref 0–0)
PCO2 BLDA: 43 MMHG — SIGNIFICANT CHANGE UP (ref 32–45)
PH BLDA: 7.42 — SIGNIFICANT CHANGE UP (ref 7.35–7.45)
PLATELET # BLD AUTO: 290 K/UL — SIGNIFICANT CHANGE UP (ref 150–400)
PO2 BLDA: 93 MMHG — SIGNIFICANT CHANGE UP (ref 83–108)
POTASSIUM SERPL-MCNC: 3.6 MMOL/L — SIGNIFICANT CHANGE UP (ref 3.5–5.3)
POTASSIUM SERPL-SCNC: 3.6 MMOL/L — SIGNIFICANT CHANGE UP (ref 3.5–5.3)
RBC # BLD: 3.86 M/UL — SIGNIFICANT CHANGE UP (ref 3.8–5.2)
RBC # FLD: 14.3 % — SIGNIFICANT CHANGE UP (ref 10.3–14.5)
SAO2 % BLDA: 98.7 % — HIGH (ref 94–98)
SODIUM SERPL-SCNC: 144 MMOL/L — SIGNIFICANT CHANGE UP (ref 135–145)
SPECIMEN SOURCE: SIGNIFICANT CHANGE UP
SPECIMEN SOURCE: SIGNIFICANT CHANGE UP
WBC # BLD: 11.46 K/UL — HIGH (ref 3.8–10.5)
WBC # FLD AUTO: 11.46 K/UL — HIGH (ref 3.8–10.5)

## 2025-01-16 PROCEDURE — 99497 ADVNCD CARE PLAN 30 MIN: CPT

## 2025-01-16 PROCEDURE — 99233 SBSQ HOSP IP/OBS HIGH 50: CPT

## 2025-01-16 RX ADMIN — IPRATROPIUM BROMIDE AND ALBUTEROL SULFATE 3 MILLILITER(S): .5; 2.5 SOLUTION RESPIRATORY (INHALATION) at 11:49

## 2025-01-16 RX ADMIN — LEVOTHYROXINE SODIUM 60 MICROGRAM(S): 25 TABLET ORAL at 22:09

## 2025-01-16 RX ADMIN — Medication 50 MILLIGRAM(S): at 06:38

## 2025-01-16 RX ADMIN — IPRATROPIUM BROMIDE AND ALBUTEROL SULFATE 3 MILLILITER(S): .5; 2.5 SOLUTION RESPIRATORY (INHALATION) at 23:42

## 2025-01-16 RX ADMIN — Medication 10 MILLIGRAM(S): at 11:49

## 2025-01-16 RX ADMIN — Medication 5000 UNIT(S): at 06:38

## 2025-01-16 RX ADMIN — ANTISEPTIC SURGICAL SCRUB 1 APPLICATION(S): 0.04 SOLUTION TOPICAL at 06:40

## 2025-01-16 RX ADMIN — Medication 50 MILLIGRAM(S): at 18:09

## 2025-01-16 RX ADMIN — Medication 5000 UNIT(S): at 18:09

## 2025-01-16 RX ADMIN — Medication 1 TABLET(S): at 11:49

## 2025-01-16 RX ADMIN — QUETIAPINE FUMARATE 12.5 MILLIGRAM(S): 300 TABLET ORAL at 22:08

## 2025-01-16 RX ADMIN — MIRTAZAPINE 15 MILLIGRAM(S): 30 TABLET, FILM COATED ORAL at 22:08

## 2025-01-16 RX ADMIN — OSELTAMIVIR PHOSPHATE 30 MILLIGRAM(S): 75 CAPSULE ORAL at 11:48

## 2025-01-16 RX ADMIN — Medication 500 MILLIGRAM(S): at 11:49

## 2025-01-16 NOTE — PROGRESS NOTE ADULT - CONVERSATION DETAILS
Spoke with pts son for greater then 16 mins discussing ACP and GOC. Maykel has a good understanding of pt medical conditions and hospital course.  MOLST form reviewed in detail.  DIscussed ngt and the nature that is a temporary tube.  Maykel reports that pt would not want a PEG tube and that he prefer she eats.  Plan is to go back to LTC. Questions answered and emotional support provided. Palliative care will sign off. Spoke with pts son for greater then 16 mins discussing ACP and GOC. Maykel has a good understanding of pt medical conditions and hospital course.  MOLST form reviewed in detail confirmed that the pt is DNR/DNI, use abx, use IVF and NGT trial.  DIscussed NGT and the nature that it is a temporary time limited trial of NGT.  Maykel reports that pt would not want a PEG tube and that he prefer she eats. Maykel expalins that his Mom is usually very interactive and direct or straight forward when she wants something. Plan is to go back to LTC. Questions answered and emotional support provided. Palliative care will sign off.

## 2025-01-16 NOTE — PROGRESS NOTE ADULT - SUBJECTIVE AND OBJECTIVE BOX
Date of Service: 01-16-25 @ 16:01    Patient is a 89y old  Female who presents with a chief complaint of Hypoxic/hypercapnic resp failure (16 Jan 2025 14:19)      Any change in ROS: she looks more alert and awake and is doing  ok ; on room air today       MEDICATIONS  (STANDING):  albuterol/ipratropium for Nebulization 3 milliLiter(s) Nebulizer every 6 hours  ascorbic acid 500 milliGRAM(s) Oral daily  bisacodyl Suppository 10 milliGRAM(s) Rectal daily  chlorhexidine 2% Cloths 1 Application(s) Topical <User Schedule>  dextrose 5% + lactated ringers. 1000 milliLiter(s) (50 mL/Hr) IV Continuous <Continuous>  dextrose 5% + lactated ringers. 1000 milliLiter(s) (50 mL/Hr) IV Continuous <Continuous>  dextrose 5% + lactated ringers. 1000 milliLiter(s) (50 mL/Hr) IV Continuous <Continuous>  heparin   Injectable 5000 Unit(s) SubCutaneous every 12 hours  hydrALAZINE 50 milliGRAM(s) Oral two times a day  levothyroxine Injectable 60 MICROGram(s) IV Push at bedtime  metoprolol tartrate 50 milliGRAM(s) Oral two times a day  mirtazapine 15 milliGRAM(s) Oral at bedtime  multivitamin 1 Tablet(s) Oral daily  QUEtiapine 12.5 milliGRAM(s) Oral at bedtime    MEDICATIONS  (PRN):    Vital Signs Last 24 Hrs  T(C): 36.5 (16 Jan 2025 11:03), Max: 37.1 (16 Jan 2025 01:05)  T(F): 97.7 (16 Jan 2025 11:03), Max: 98.8 (16 Jan 2025 01:05)  HR: 89 (16 Jan 2025 11:03) (78 - 108)  BP: 119/67 (16 Jan 2025 11:03) (119/67 - 167/93)  BP(mean): --  RR: 18 (16 Jan 2025 11:03) (18 - 18)  SpO2: 95% (16 Jan 2025 11:03) (95% - 100%)    Parameters below as of 16 Jan 2025 11:03  Patient On (Oxygen Delivery Method): room air        I&O's Summary        Physical Exam:   GENERAL: NAD, well-groomed, well-developed  HEENT: LUANN/   Atraumatic, Normocephalic  ENMT: No tonsillar erythema, exudates, or enlargement; Moist mucous membranes, Good dentition, No lesions  NECK: Supple, No JVD, Normal thyroid  CHEST/LUNG: Clear to auscultaion, ; No rales, rhonchi, wheezing, or rubs  CVS: Regular rate and rhythm; No murmurs, rubs, or gallops  GI: : Soft, Nontender, Nondistended; Bowel sounds present  NERVOUS SYSTEM:  Alert & awake:  no very comprehensible   EXTREMITIES:-edema  LYMPH: No lymphadenopathy noted  SKIN: No rashes or lesions  ENDOCRINOLOGY: No Thyromegaly  PSYCH: calm     Labs:  ABG - ( 16 Jan 2025 07:13 )  pH, Arterial: 7.42  pH, Blood: x     /  pCO2: 43    /  pO2: 93    / HCO3: 28    / Base Excess: 2.9   /  SaO2: 98.7            21.0, 25, 26                            11.9   11.46 )-----------( 290      ( 16 Jan 2025 07:08 )             36.4                         11.4   12.24 )-----------( 244      ( 15 Alberto 2025 07:15 )             36.0                         11.0   11.83 )-----------( 242      ( 13 Jan 2025 00:36 )             33.8     01-16    144  |  108  |  32[H]  ----------------------------<  147[H]  3.6   |  23  |  1.66[H]  01-15    141  |  109[H]  |  30[H]  ----------------------------<  80  3.9   |  21[L]  |  1.84[H]  01-13    140  |  106  |  36[H]  ----------------------------<  125[H]  4.1   |  18[L]  |  2.14[H]    Ca    9.3      16 Jan 2025 07:08  Ca    9.2      15 Alberto 2025 07:15    TPro  6.4  /  Alb  2.3[L]  /  TBili  0.7  /  DBili  x   /  AST  20  /  ALT  30  /  AlkPhos  243[H]  01-15  TPro  6.7  /  Alb  2.8[L]  /  TBili  0.6  /  DBili  x   /  AST  35  /  ALT  57[H]  /  AlkPhos  318[H]  01-13    CAPILLARY BLOOD GLUCOSE      POCT Blood Glucose.: 139 mg/dL (16 Jan 2025 11:49)  POCT Blood Glucose.: 138 mg/dL (16 Jan 2025 06:30)  POCT Blood Glucose.: 111 mg/dL (16 Jan 2025 00:27)  POCT Blood Glucose.: 136 mg/dL (15 Alberto 2025 17:14)      LIVER FUNCTIONS - ( 15 Alberto 2025 07:15 )  Alb: 2.3 g/dL / Pro: 6.4 g/dL / ALK PHOS: 243 U/L / ALT: 30 U/L / AST: 20 U/L / GGT: x             Urinalysis Basic - ( 16 Jan 2025 07:08 )    Color: x / Appearance: x / SG: x / pH: x  Gluc: 147 mg/dL / Ketone: x  / Bili: x / Urobili: x   Blood: x / Protein: x / Nitrite: x   Leuk Esterase: x / RBC: x / WBC x   Sq Epi: x / Non Sq Epi: x / Bacteria: x            RECENT CULTURES:  01-11 @ 11:19 Catheterized Catheterized                <10,000 CFU/mL Normal Urogenital Zulma    01-11 @ 09:36 .Blood BLOOD         rad< from: Xray Chest 1 View- PORTABLE-Routine (Xray Chest 1 View- PORTABLE-Routine .) (01.14.25 @ 17:18) >  FINDINGS: Patient's chin obscures the left lung apex.    Enteric tube with tip in the stomach.  The heart is normal in size.  Limited evaluation of the left apex due to patient positioning.  Mildly decreased bibasilar hazy opacities.  Small left pleural effusion.  No pneumothorax.  No acute osseous abnormalities.    IMPRESSION:  Enteric tube with tip in the stomach.  Mildly decreased bibasilar hazy opacities.    --- End of Report ---          TEO ROE MD; Resident Radiologist  This document has been electronically signed.  MADELIN STARK MD; Attending Radiologist    < end of copied text >  < from: Xray Chest 1 View- PORTABLE-Urgent (Xray Chest 1 View- PORTABLE-Urgent .) (01.13.25 @ 15:21) >  ION:  EXAMINATION: XR CHEST URGENT    CLINICAL INDICATION: eval CHET feed tubue placement    TECHNIQUE: Single frontal,portable view of the chest was obtained.    COMPARISON: Chest x-ray 1/11/2025.    FINDINGS:  Enteric tube with tip overlying stomach.  The heart is not accurately assessed in this AP projection.  Improved hazy bibasilar opacities.  There is no pneumothorax.  No acute displaced bony abnormality.    IMPRESSION:  Improved hazy bibasilar opacities.  Enteric tube with tip overlying stomach.    --- End of Report ---           HIPOLITO SANTANA MD; Resident Radiologist  This document has been electronically signed.  CLAIRE DUMONT MD; Attending Radiologist  This document has been electronically signed. Jan 14 2025  1:26PM    < end of copied text >         No growth at 4 days    01-11 @ 09:30 .Blood BLOOD                No growth at 4 days          RESPIRATORY CULTURES:          Studies  Chest X-RAY  CT SCAN Chest   Venous Dopplers: LE:   CT Abdomen  Others

## 2025-01-16 NOTE — PROGRESS NOTE ADULT - TIME BILLING
symptom assessment and management, supportive counseling, coordination of care, discussion and coordination with team.    I personally spent 30 minutes on advance care planning services with the patient. This time is separate and distinct from any other care management services provided on this date.    Adriana Lau, ANGELITA-BC  Please contact me via Teams  between 6am-2pm. If not answering, please call the palliative care pager [phone]    After 2pm and on weekends, please see the contact information below:    In the event of newly developing, evolving, or worsening symptoms between 2pm and 5pm please contact the Palliative Medicine via extension [phone] for assistance.  After 5pm please contact team via pager (if the patient is at [facility] #[phone] or if the patient is at [facility] #[phone]) The Geriatric and Palliative Medicine service has coverage 24 hours a day/ 7 days a week to provide medical recommendations regarding symptom management needs via telephone

## 2025-01-16 NOTE — PROGRESS NOTE ADULT - SUBJECTIVE AND OBJECTIVE BOX
Patient is a 89y old  Female who presents with a chief complaint of Hypoxic/hypercapnic resp failure (16 Jan 2025 13:18)      DATE OF SERVICE: 01-16-25 @ 14:22    SUBJECTIVE / OVERNIGHT EVENTS: overnight events noted    ROS:  Resp: No cough no sputum production  CVS: No chest pain no palpitations no orthopnea  GI: no N/V/D        MEDICATIONS  (STANDING):  albuterol/ipratropium for Nebulization 3 milliLiter(s) Nebulizer every 6 hours  ascorbic acid 500 milliGRAM(s) Oral daily  bisacodyl Suppository 10 milliGRAM(s) Rectal daily  chlorhexidine 2% Cloths 1 Application(s) Topical <User Schedule>  dextrose 5% + lactated ringers. 1000 milliLiter(s) (50 mL/Hr) IV Continuous <Continuous>  dextrose 5% + lactated ringers. 1000 milliLiter(s) (50 mL/Hr) IV Continuous <Continuous>  dextrose 5% + lactated ringers. 1000 milliLiter(s) (50 mL/Hr) IV Continuous <Continuous>  heparin   Injectable 5000 Unit(s) SubCutaneous every 12 hours  hydrALAZINE 50 milliGRAM(s) Oral two times a day  levothyroxine Injectable 60 MICROGram(s) IV Push at bedtime  metoprolol tartrate 50 milliGRAM(s) Oral two times a day  mirtazapine 15 milliGRAM(s) Oral at bedtime  multivitamin 1 Tablet(s) Oral daily  QUEtiapine 12.5 milliGRAM(s) Oral at bedtime    MEDICATIONS  (PRN):        CAPILLARY BLOOD GLUCOSE      POCT Blood Glucose.: 139 mg/dL (16 Jan 2025 11:49)  POCT Blood Glucose.: 138 mg/dL (16 Jan 2025 06:30)  POCT Blood Glucose.: 111 mg/dL (16 Jan 2025 00:27)  POCT Blood Glucose.: 136 mg/dL (15 Alberto 2025 17:14)    I&O's Summary      Vital Signs Last 24 Hrs  T(C): 36.5 (16 Jan 2025 11:03), Max: 37.1 (16 Jan 2025 01:05)  T(F): 97.7 (16 Jan 2025 11:03), Max: 98.8 (16 Jan 2025 01:05)  HR: 89 (16 Jan 2025 11:03) (78 - 108)  BP: 119/67 (16 Jan 2025 11:03) (119/67 - 167/93)  BP(mean): --  RR: 18 (16 Jan 2025 11:03) (18 - 18)  SpO2: 95% (16 Jan 2025 11:03) (95% - 100%)    PHYSICAL EXAM: confused  CHEST/LUNG: resolved wheeze   HEART: S1 S2; no murmurs   ABDOMEN: Soft, Nontender  EXTREMITIES: no edema  NEUROLOGY: agitated non-focal  SKIN: No rashes or lesions    LABS:                        11.9   11.46 )-----------( 290      ( 16 Jan 2025 07:08 )             36.4     01-16    144  |  108  |  32[H]  ----------------------------<  147[H]  3.6   |  23  |  1.66[H]    Ca    9.3      16 Jan 2025 07:08    TPro  6.4  /  Alb  2.3[L]  /  TBili  0.7  /  DBili  x   /  AST  20  /  ALT  30  /  AlkPhos  243[H]  01-15          Urinalysis Basic - ( 16 Jan 2025 07:08 )    Color: x / Appearance: x / SG: x / pH: x  Gluc: 147 mg/dL / Ketone: x  / Bili: x / Urobili: x   Blood: x / Protein: x / Nitrite: x   Leuk Esterase: x / RBC: x / WBC x   Sq Epi: x / Non Sq Epi: x / Bacteria: x          All consultant(s) notes reviewed and care discussed with other providers        Contact Number, Dr Link [phone] Patient is a 89y old  Female who presents with a chief complaint of Hypoxic/hypercapnic resp failure (16 Jan 2025 13:18)      DATE OF SERVICE: 01-16-25 @ 14:22    SUBJECTIVE / OVERNIGHT EVENTS: overnight events noted    ROS:  not available         MEDICATIONS  (STANDING):  albuterol/ipratropium for Nebulization 3 milliLiter(s) Nebulizer every 6 hours  ascorbic acid 500 milliGRAM(s) Oral daily  bisacodyl Suppository 10 milliGRAM(s) Rectal daily  chlorhexidine 2% Cloths 1 Application(s) Topical <User Schedule>  dextrose 5% + lactated ringers. 1000 milliLiter(s) (50 mL/Hr) IV Continuous <Continuous>  dextrose 5% + lactated ringers. 1000 milliLiter(s) (50 mL/Hr) IV Continuous <Continuous>  dextrose 5% + lactated ringers. 1000 milliLiter(s) (50 mL/Hr) IV Continuous <Continuous>  heparin   Injectable 5000 Unit(s) SubCutaneous every 12 hours  hydrALAZINE 50 milliGRAM(s) Oral two times a day  levothyroxine Injectable 60 MICROGram(s) IV Push at bedtime  metoprolol tartrate 50 milliGRAM(s) Oral two times a day  mirtazapine 15 milliGRAM(s) Oral at bedtime  multivitamin 1 Tablet(s) Oral daily  QUEtiapine 12.5 milliGRAM(s) Oral at bedtime    MEDICATIONS  (PRN):        CAPILLARY BLOOD GLUCOSE      POCT Blood Glucose.: 139 mg/dL (16 Jan 2025 11:49)  POCT Blood Glucose.: 138 mg/dL (16 Jan 2025 06:30)  POCT Blood Glucose.: 111 mg/dL (16 Jan 2025 00:27)  POCT Blood Glucose.: 136 mg/dL (15 Alberto 2025 17:14)    I&O's Summary      Vital Signs Last 24 Hrs  T(C): 36.5 (16 Jan 2025 11:03), Max: 37.1 (16 Jan 2025 01:05)  T(F): 97.7 (16 Jan 2025 11:03), Max: 98.8 (16 Jan 2025 01:05)  HR: 89 (16 Jan 2025 11:03) (78 - 108)  BP: 119/67 (16 Jan 2025 11:03) (119/67 - 167/93)  BP(mean): --  RR: 18 (16 Jan 2025 11:03) (18 - 18)  SpO2: 95% (16 Jan 2025 11:03) (95% - 100%)    PHYSICAL EXAM: confused  CHEST/LUNG: resolved wheeze   HEART: S1 S2; no murmurs   ABDOMEN: Soft, Nontender  EXTREMITIES: no edema  NEUROLOGY: non-focal  SKIN: No rashes or lesions    LABS:                        11.9   11.46 )-----------( 290      ( 16 Jan 2025 07:08 )             36.4     01-16    144  |  108  |  32[H]  ----------------------------<  147[H]  3.6   |  23  |  1.66[H]    Ca    9.3      16 Jan 2025 07:08    TPro  6.4  /  Alb  2.3[L]  /  TBili  0.7  /  DBili  x   /  AST  20  /  ALT  30  /  AlkPhos  243[H]  01-15          Urinalysis Basic - ( 16 Jan 2025 07:08 )    Color: x / Appearance: x / SG: x / pH: x  Gluc: 147 mg/dL / Ketone: x  / Bili: x / Urobili: x   Blood: x / Protein: x / Nitrite: x   Leuk Esterase: x / RBC: x / WBC x   Sq Epi: x / Non Sq Epi: x / Bacteria: x          All consultant(s) notes reviewed and care discussed with other providers        Contact Number, Dr Link [phone]

## 2025-01-16 NOTE — PROGRESS NOTE ADULT - NSPROGADDITIONALINFOA_GEN_ALL_CORE
if goals are in line with comfort Recommend:  Dilaudid 0.5 mg IV q2h prn dyspnea or tachypnea  Dilaudid 0.5 mg IV q2h prn severe pain and Dilaudid 0.2 mg IV q2h prn moderate pain  Ativan 0.2 mg IV q2h prn agitation   Glycopyrrolate 0.4 mg IV q6h prn copious oral secretions   Dulcolax suppository daily prn constipation   Acetaminophen suppository 650 mg q6h prn fever  Zofran 4 mg IV q8h prn nausea or vomiting    Recommend discontinuation of all medications and interventions that do not serve goal of promoting patient's comfort and dignity at end-of-life.    Please page for any acute symptoms or further questions or concerns.

## 2025-01-16 NOTE — PROGRESS NOTE ADULT - PROBLEM SELECTOR PLAN 6
acceptable  will continue metoprolol 50 mg BID via NGT  add hydralazine 50 BID continue synthroid 60 mcg qd IV

## 2025-01-16 NOTE — PROGRESS NOTE ADULT - PROBLEM SELECTOR PLAN 1
likely secondary to pneumonia precipitated by influenza A on admission  continue piperacillin/tazobactam day 5/5 today  complete Tamiflu  aspiration pneumonia   continue NG Tube feedings  FEEST study tomorrow

## 2025-01-16 NOTE — PROGRESS NOTE ADULT - ASSESSMENT
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 Pt found to have DWAIN on CKD, Influenza A +, UTI +, possible RLL pneum with consolidation appreciated on POCUS. Pt admitted to MICU for hypercapnic/hypoxic resp failure/septic shock in the setting of possible RLL pneum and UTI now on medical floor.  Palliative care called for GOC.

## 2025-01-16 NOTE — PROGRESS NOTE ADULT - SUBJECTIVE AND OBJECTIVE BOX
Time and date of service: 01-16-25 @ 13:19      SUBJECTIVE AND OBJECTIVE: pt resting in bed, awake not following commands  Indication for Geriatrics and Palliative Care Services/INTERVAL HPI: Nutritional goc    OVERNIGHT EVENTS: no acute events overnight    DNR on chart:DNI: Trial NIV  DNI: Trial NIV      Allergies    clindamycin (Unknown)  shellfish (Unknown)  strawberry (Rash)  penicillin (Unknown)  clindamycin (Other)  strawberry (Unknown)  IV Contrast (Other)  IV Contrast (Unknown)    Intolerances    MEDICATIONS  (STANDING):  albuterol/ipratropium for Nebulization 3 milliLiter(s) Nebulizer every 6 hours  ascorbic acid 500 milliGRAM(s) Oral daily  bisacodyl Suppository 10 milliGRAM(s) Rectal daily  chlorhexidine 2% Cloths 1 Application(s) Topical <User Schedule>  dextrose 5% + lactated ringers. 1000 milliLiter(s) (50 mL/Hr) IV Continuous <Continuous>  dextrose 5% + lactated ringers. 1000 milliLiter(s) (50 mL/Hr) IV Continuous <Continuous>  dextrose 5% + lactated ringers. 1000 milliLiter(s) (50 mL/Hr) IV Continuous <Continuous>  heparin   Injectable 5000 Unit(s) SubCutaneous every 12 hours  hydrALAZINE 50 milliGRAM(s) Oral two times a day  levothyroxine Injectable 60 MICROGram(s) IV Push at bedtime  metoprolol tartrate 50 milliGRAM(s) Oral two times a day  mirtazapine 15 milliGRAM(s) Oral at bedtime  multivitamin 1 Tablet(s) Oral daily  QUEtiapine 12.5 milliGRAM(s) Oral at bedtime    MEDICATIONS  (PRN):        ITEMS UNCHECKED ARE NOT PRESENT    PRESENT SYMPTOMS: [ x]Unable to self-report - see [ ] CPOT [ ] PAINADS [ ] RDOS  Source if other than patient:  [ ]Family   [ ]Team     Pain:  [ ]yes [x ]no  QOL impact -   Location -                    Aggravating factors -  Quality -  Radiation -  Timing-  Severity (0-10 scale):  Minimal acceptable level (0-10 scale):     CPOT:    https://www.[facility].org/getattachment/[email or web address]/Critical-Care-Pain-Observation-Tool-(CPOT)    PAIN AD Score:	  http://geriatrictoolkit.[facility]/cog/painad.pdf (Ctrl + left click to view)    Dyspnea:                           [ ]Mild [ ]Moderate [ ]Severe    RDOS:  0 to 2  minimal or no respiratory distress   3  mild distress  4 to 6 moderate distress  >7 severe distress  https://homecareinformation.net/handouts/hen/Respiratory_Distress_Observation_Scale.pdf (Ctrl +  left click to view)     Anxiety:                             [ ]Mild [ ]Moderate [ ]Severe  Fatigue:                             [ ]Mild [x ]Moderate [ ]Severe  Nausea:                            [ ]Mild [ ]Moderate [ ]Severe  Loss of appetite:               [ ]Mild [x ]Moderate [ ]Severe  Constipation:                    [ ]Mild [ ]Moderate [ ]Severe    PCSSQ[Palliative Care Spiritual Screening Question]   Severity (0-10):  Score of 4 or > indicate consideration of Chaplaincy referral.  Chaplaincy Referral: [ ] yes [ ] refused [ ] following [x ] Deferred     Caregiver [place]? : [ ] yes [ ] no [x ] Deferred [ ] Declined             Social work referral [ ] Patient & Family Centered Care Referral [ ]     Anticipatory Grief present?:  [ ] yes [ ] no  [x ] Deferred                  Social work referral [ ] Chaplaincy Referral[ ]      Other Symptoms:  [ ]All other review of systems negative     Palliative Performance Status Version 2:     20-30    %      http://npcrc.org/files/news/palliative_performance_scale_ppsv2.pdf  PHYSICAL EXAM:  Vital Signs Last 24 Hrs  T(C): 36.5 (16 Jan 2025 11:03), Max: 37.1 (16 Jan 2025 01:05)  T(F): 97.7 (16 Jan 2025 11:03), Max: 98.8 (16 Jan 2025 01:05)  HR: 89 (16 Jan 2025 11:03) (78 - 108)  BP: 119/67 (16 Jan 2025 11:03) (119/67 - 167/93)  BP(mean): --  RR: 18 (16 Jan 2025 11:03) (18 - 18)  SpO2: 95% (16 Jan 2025 11:03) (95% - 100%)    Parameters below as of 16 Jan 2025 11:03  Patient On (Oxygen Delivery Method): room air     I&O's Summary     GENERAL: [x ]Cachexia    [x ]Alert  [ ]Oriented x   [ ]Lethargic  [ ]Unarousable  [x ]min Verbal  [ ]Non-Verbal  Behavioral:   [ ]Anxiety  [ ]Delirium [ ]Agitation [ ]Other  HEENT:  [x ]Normal   [ ]Dry mouth   [ ]ET Tube/Trach  [ ]Oral lesions  PULMONARY:   [ ]Clear [ ]Tachypnea  [ ]Audible excessive secretions   [ ]Rhonchi        [ ]Right [ ]Left [ ]Bilateral  [ ]Crackles        [ ]Right [ ]Left [ ]Bilateral  [ ]Wheezing     [ ]Right [ ]Left [ ]Bilateral  [xDiminished BS [ ] Right [ ]Left [ ]Bilateral  CARDIOVASCULAR:    [ x]Regular [ ]Irregular [ ]Tachy  [ ]Michael [ ]Murmur [ ]Other  GASTROINTESTINAL:  [x ]Soft  [ ]Distended   [x ]+BS  [ ]Non tender [ ]Tender  [ ]Other [ ]PEG [ ]OGT/ NGT   Last BM:   GENITOURINARY:  [ ]Normal [ ]Incontinent   [ ]Oliguria/Anuria   [x ]Crooks  MUSCULOSKELETAL:   [ ]Normal   [ x]Weakness  [x ]Bed/Wheelchair bound [ ]Edema  NEUROLOGIC:   [ ]No focal deficits  [x ] Cognitive impairment  [ ] Dysphagia [ ]Dysarthria [ ] Paresis [ ]Other   SKIN: see rn flowsheet  [ ]Normal  [ ]Rash  [ ]Other  [ ]Pressure ulcer(s) [ ]y [ ]n present on admission    CRITICAL CARE:  [ ]Shock Present  [ ]Septic [ ]Cardiogenic [ ]Neurologic [ ]Hypovolemic  [ ]Vasopressors [ ]Inotropes  [ ]Respiratory failure present [ ]Mechanical Ventilation [ ]Non-invasive ventilatory support [ ]High-Flow   [ ]Acute  [ ]Chronic [ ]Hypoxic  [ ]Hypercarbic [ ]Other  [ ]Other organ failure     LABS:                        11.9   11.46 )-----------( 290      ( 16 Jan 2025 07:08 )             36.4   01-16    144  |  108  |  32[H]  ----------------------------<  147[H]  3.6   |  23  |  1.66[H]    Ca    9.3      16 Jan 2025 07:08    TPro  6.4  /  Alb  2.3[L]  /  TBili  0.7  /  DBili  x   /  AST  20  /  ALT  30  /  AlkPhos  243[H]  01-15      Urinalysis Basic - ( 16 Jan 2025 07:08 )    Color: x / Appearance: x / SG: x / pH: x  Gluc: 147 mg/dL / Ketone: x  / Bili: x / Urobili: x   Blood: x / Protein: x / Nitrite: x   Leuk Esterase: x / RBC: x / WBC x   Sq Epi: x / Non Sq Epi: x / Bacteria: x      RADIOLOGY & ADDITIONAL STUDIES:  < from: CT Head No Cont (01.11.25 @ 13:53) >  ACC: [id number] EXAM:  CT BRAIN   ORDERED BY:  IVANA BERNSTEIN     PROCEDURE DATE:  01/11/2025          INTERPRETATION:  CT HEAD WITHOUT CONTRAST    TECHNIQUE: Multiple axial images of the head were obtained from the skull   base to the vertex without intravenous contrast.  Multiplanar reformats   were created according to the standard protocol.    INDICATION: Admitting Dxs: A41.9 SEPSIS ams  COMPARISON: Head CT without contrast 11/10/2021.  _____________________  FINDINGS:    Mildly degraded by motion artifact.    Status post right suboccipital craniectomy with postsurgical changes in   the skull/scalp as before. Similar appearance of the presumed right   cerebellar resection bed.    Status post right parieto-occipital cuca hole with a mild amount of   adjacent encephalomalacia and gliosis as before.    Mild diffuse cerebral volume loss. No significant midline shift.   Maintenance of gray-white differentiation in the large vascular   distributions. Mild hypoattenuation of the periventricular/deep white   matter most consistent with chronic microvascular ischemic disease.   Motion artifact precludes exclusion of an acute intracranial hemorrhage.   No findings suspicious for an acute intracranial hemorrhage within this   limitation. No hydrocephalus.    No findings suspicious for a depressed calvarial fracture.    Fatty atrophy of the visualized right aspect of the tongue.    Paranasal Sinuses, Mastoid Air Cells, and Orbits: Mucosal thickening left   sphenoid sinus. Visualized mastoidair cells clear. Status post left lens   replacement.  ______________________  IMPRESSION:  1.  Mildly degraded by motion artifact. A tiny acute intracranial   hemorrhage is not excluded. No findings suspicious for an acute   intracranial abnormalityotherwise. Chronic intracranial findings as   above.    --- End of Report ---            NASRIN LAGUERRE MD; Attending Radiologist  This document has been electronically signed. Jan 11 2025  2:12PM    < end of copied text >          Protein Calorie Malnutrition Present: [ ]mild [ ]moderate [ ]severe [ ]underweight [ ]morbid obesity  https://www.andeal.org/vault/2440/web/files/ONC/Table_Clinical%20Characteristics%20to%20Document%20Malnutrition-White%20JV%20et%20al%202012.pdf    Height (cm): 160 (01-11-25 @ 13:51)  Weight (kg): 53 (01-11-25 @ 13:51)  BMI (kg/m2): 20.7 (01-11-25 @ 13:51)    [ ]PPSV2 < or = 30%  [ ]significant weight loss [ ]poor nutritional intake [ ]anasarca[ ]Artificial Nutrition    Other REFERRALS:  [ ]Hospice  [ ]Child Life  [ ]Social Work  [ ]Case management [ ]Holistic Therapy      Time and date of service: 01-16-25 @ 13:19      SUBJECTIVE AND OBJECTIVE: pt resting in bed, awake not following commands  Indication for Geriatrics and Palliative Care Services/INTERVAL HPI: Nutritional goc    OVERNIGHT EVENTS: no acute events overnight    DNR on chart:DNI: Trial NIV  DNI: Trial NIV      Allergies    clindamycin (Unknown)  shellfish (Unknown)  strawberry (Rash)  penicillin (Unknown)  clindamycin (Other)  strawberry (Unknown)  IV Contrast (Other)  IV Contrast (Unknown)    Intolerances    MEDICATIONS  (STANDING):  albuterol/ipratropium for Nebulization 3 milliLiter(s) Nebulizer every 6 hours  ascorbic acid 500 milliGRAM(s) Oral daily  bisacodyl Suppository 10 milliGRAM(s) Rectal daily  chlorhexidine 2% Cloths 1 Application(s) Topical <User Schedule>  dextrose 5% + lactated ringers. 1000 milliLiter(s) (50 mL/Hr) IV Continuous <Continuous>  dextrose 5% + lactated ringers. 1000 milliLiter(s) (50 mL/Hr) IV Continuous <Continuous>  dextrose 5% + lactated ringers. 1000 milliLiter(s) (50 mL/Hr) IV Continuous <Continuous>  heparin   Injectable 5000 Unit(s) SubCutaneous every 12 hours  hydrALAZINE 50 milliGRAM(s) Oral two times a day  levothyroxine Injectable 60 MICROGram(s) IV Push at bedtime  metoprolol tartrate 50 milliGRAM(s) Oral two times a day  mirtazapine 15 milliGRAM(s) Oral at bedtime  multivitamin 1 Tablet(s) Oral daily  QUEtiapine 12.5 milliGRAM(s) Oral at bedtime    MEDICATIONS  (PRN):        ITEMS UNCHECKED ARE NOT PRESENT    PRESENT SYMPTOMS: [ x]Unable to self-report - see [ ] CPOT [ ] PAINADS [ ] RDOS  Source if other than patient:  [ ]Family   [ x]Team     Pain:  [ ]yes [x ]no  QOL impact -   Location -                    Aggravating factors -  Quality -  Radiation -  Timing-  Severity (0-10 scale):  Minimal acceptable level (0-10 scale):     CPOT:    https://www.[facility].org/getattachment/[email or web address]/Critical-Care-Pain-Observation-Tool-(CPOT)    PAIN AD Score:	  http://geriatrictoolkit.[facility]/cog/painad.pdf (Ctrl + left click to view)    Dyspnea:                           [ ]Mild [ ]Moderate [ ]Severe    RDOS:  0 to 2  minimal or no respiratory distress   3  mild distress  4 to 6 moderate distress  >7 severe distress  https://homecareinformation.net/handouts/hen/Respiratory_Distress_Observation_Scale.pdf (Ctrl +  left click to view)     Anxiety:                             [ ]Mild [ ]Moderate [ ]Severe  Fatigue:                             [ ]Mild [x ]Moderate [ ]Severe  Nausea:                            [ ]Mild [ ]Moderate [ ]Severe  Loss of appetite:               [ ]Mild [x ]Moderate [ ]Severe  Constipation:                    [ ]Mild [ ]Moderate [ ]Severe    PCSSQ[Palliative Care Spiritual Screening Question]   Severity (0-10):  Score of 4 or > indicate consideration of Chaplaincy referral.  Chaplaincy Referral: [ ] yes [ ] refused [ ] following [x ] Deferred     Caregiver [place]? : [ ] yes [ ] no [x ] Deferred [ ] Declined             Social work referral [ ] Patient & Family Centered Care Referral [ ]     Anticipatory Grief present?:  [ ] yes [ ] no  [x ] Deferred                  Social work referral [ ] Chaplaincy Referral[ ]      Other Symptoms:  [ ]All other review of systems negative     Palliative Performance Status Version 2:     20-30    %      http://npcrc.org/files/news/palliative_performance_scale_ppsv2.pdf  PHYSICAL EXAM:  Vital Signs Last 24 Hrs  T(C): 36.5 (16 Jan 2025 11:03), Max: 37.1 (16 Jan 2025 01:05)  T(F): 97.7 (16 Jan 2025 11:03), Max: 98.8 (16 Jan 2025 01:05)  HR: 89 (16 Jan 2025 11:03) (78 - 108)  BP: 119/67 (16 Jan 2025 11:03) (119/67 - 167/93)  BP(mean): --  RR: 18 (16 Jan 2025 11:03) (18 - 18)  SpO2: 95% (16 Jan 2025 11:03) (95% - 100%)    Parameters below as of 16 Jan 2025 11:03  Patient On (Oxygen Delivery Method): room air     I&O's Summary     GENERAL: [x ]Cachexia    [x ]Alert  [ ]Oriented x   [ ]Lethargic  [ ]Unarousable  [x ]min Verbal  [ ]Non-Verbal  Behavioral:   [ ]Anxiety  [ ]Delirium [ ]Agitation [ ]Other  HEENT:  [x ]Normal   [ ]Dry mouth   [ ]ET Tube/Trach  [ ]Oral lesions  PULMONARY:   [ ]Clear [ ]Tachypnea  [ ]Audible excessive secretions   [ ]Rhonchi        [ ]Right [ ]Left [ ]Bilateral  [ ]Crackles        [ ]Right [ ]Left [ ]Bilateral  [ ]Wheezing     [ ]Right [ ]Left [ ]Bilateral  [xDiminished BS [ ] Right [ ]Left [ ]Bilateral  CARDIOVASCULAR:    [ x]Regular [ ]Irregular [ ]Tachy  [ ]Michael [ ]Murmur [ ]Other  GASTROINTESTINAL:  [x ]Soft  [ ]Distended   [x ]+BS  [ ]Non tender [ ]Tender  [ ]Other [ ]PEG [ ]OGT/ NGT   Last BM:   GENITOURINARY:  [ ]Normal [ ]Incontinent   [ ]Oliguria/Anuria   [x ]Crooks  MUSCULOSKELETAL:   [ ]Normal   [ x]Weakness  [x ]Bed/Wheelchair bound [ ]Edema  NEUROLOGIC:   [ ]No focal deficits  [x ] Cognitive impairment  [ ] Dysphagia [ ]Dysarthria [ ] Paresis [ ]Other   SKIN: see rn flowsheet  [ ]Normal  [ ]Rash  [ ]Other  [ ]Pressure ulcer(s) [ ]y [ ]n present on admission    CRITICAL CARE:  [ ]Shock Present  [ ]Septic [ ]Cardiogenic [ ]Neurologic [ ]Hypovolemic  [ ]Vasopressors [ ]Inotropes  [ ]Respiratory failure present [ ]Mechanical Ventilation [ ]Non-invasive ventilatory support [ ]High-Flow   [ ]Acute  [ ]Chronic [ ]Hypoxic  [ ]Hypercarbic [ ]Other  [ ]Other organ failure     LABS:                        11.9   11.46 )-----------( 290      ( 16 Jan 2025 07:08 )             36.4   01-16    144  |  108  |  32[H]  ----------------------------<  147[H]  3.6   |  23  |  1.66[H]    Ca    9.3      16 Jan 2025 07:08    TPro  6.4  /  Alb  2.3[L]  /  TBili  0.7  /  DBili  x   /  AST  20  /  ALT  30  /  AlkPhos  243[H]  01-15      Urinalysis Basic - ( 16 Jan 2025 07:08 )    Color: x / Appearance: x / SG: x / pH: x  Gluc: 147 mg/dL / Ketone: x  / Bili: x / Urobili: x   Blood: x / Protein: x / Nitrite: x   Leuk Esterase: x / RBC: x / WBC x   Sq Epi: x / Non Sq Epi: x / Bacteria: x      RADIOLOGY & ADDITIONAL STUDIES:  < from: CT Head No Cont (01.11.25 @ 13:53) >  ACC: [id number] EXAM:  CT BRAIN   ORDERED BY:  IVANA BERNSTEIN     PROCEDURE DATE:  01/11/2025          INTERPRETATION:  CT HEAD WITHOUT CONTRAST    TECHNIQUE: Multiple axial images of the head were obtained from the skull   base to the vertex without intravenous contrast.  Multiplanar reformats   were created according to the standard protocol.    INDICATION: Admitting Dxs: A41.9 SEPSIS ams  COMPARISON: Head CT without contrast 11/10/2021.  _____________________  FINDINGS:    Mildly degraded by motion artifact.    Status post right suboccipital craniectomy with postsurgical changes in   the skull/scalp as before. Similar appearance of the presumed right   cerebellar resection bed.    Status post right parieto-occipital cuca hole with a mild amount of   adjacent encephalomalacia and gliosis as before.    Mild diffuse cerebral volume loss. No significant midline shift.   Maintenance of gray-white differentiation in the large vascular   distributions. Mild hypoattenuation of the periventricular/deep white   matter most consistent with chronic microvascular ischemic disease.   Motion artifact precludes exclusion of an acute intracranial hemorrhage.   No findings suspicious for an acute intracranial hemorrhage within this   limitation. No hydrocephalus.    No findings suspicious for a depressed calvarial fracture.    Fatty atrophy of the visualized right aspect of the tongue.    Paranasal Sinuses, Mastoid Air Cells, and Orbits: Mucosal thickening left   sphenoid sinus. Visualized mastoidair cells clear. Status post left lens   replacement.  ______________________  IMPRESSION:  1.  Mildly degraded by motion artifact. A tiny acute intracranial   hemorrhage is not excluded. No findings suspicious for an acute   intracranial abnormalityotherwise. Chronic intracranial findings as   above.    --- End of Report ---            NASRIN LAGUERRE MD; Attending Radiologist  This document has been electronically signed. Jan 11 2025  2:12PM    < end of copied text >          Protein Calorie Malnutrition Present: [ ]mild [ ]moderate [ ]severe [ ]underweight [ ]morbid obesity  https://www.andeal.org/vault/2440/web/files/ONC/Table_Clinical%20Characteristics%20to%20Document%20Malnutrition-White%20JV%20et%20al%202012.pdf    Height (cm): 160 (01-11-25 @ 13:51)  Weight (kg): 53 (01-11-25 @ 13:51)  BMI (kg/m2): 20.7 (01-11-25 @ 13:51)    [ ]PPSV2 < or = 30%  [ ]significant weight loss [ ]poor nutritional intake [ ]anasarca[ ]Artificial Nutrition    Other REFERRALS:  [ ]Hospice  [ ]Child Life  [ ]Social Work  [ ]Case management [ ]Holistic Therapy

## 2025-01-16 NOTE — PROGRESS NOTE ADULT - ASSESSMENT
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 from nursing home via EMS with hypoxic resp failure since 1/9/25 secondary to suspected pneum, treated with HFNC, son endorsed pt was ordered for antibx treatment at time as well. EMS endorsed nursing home facility O2 tank found off, and placed pt on NRB.        In E.D. pt found to be obtunded/unresponsive and to have hypoxic/hypercapnic resp failure with SPO2 of 86-88, Vph of 7.07, VPCO2 90, placed on HFNC with FIO2 of 100%, 50 liter flow with improvement to 92-94% . In addition found to have Influenza A positive, DWAIN with sCr of 1.62 on CKD2 (baseline 1.08-1.23 [8/2024]), transaminitis - alk-phos 577, , , +UA with large leuk-est, neg nitrite. SBP of 98/46 - 107/46. Pt received vanco/zosyn, 1 liter NS IVF bolus, pt placed on norepi gtt. Pt received narcan 0.2 mg IVP x1, albuterol nebs x3.        upon consult POCUS with A line predominant, few focal B lines mostly in Left lung field, small area of consolidation in RLL field, mod/large Lt pleural effusion with LLL field atelectasis. Pt with initial norepi at 0.13 mcg/min, titrated down to 0.05 mcg/min over 35 minutes (130/64 -> 112/64),       Consult called for and pt admitted to MICU for hypercapnic/hypoxic resp failure/septic shock in the setting of possible RLL pneum and UTI  she was treated in MICU for uti , influenza and RLL pneumonia  currently pt is awqke but do not respond to questions:  she is not on bipap at [facility] stime:   currently on 2 L of oxygen            Septic shock.   -likely secondary to pneumonia precipitated by influenza A on admission  continue piperacillin/tazobactam day 4/5 today  complete Tamiflu  aspiration pneumonia   continue NG Tube feedings  NGT is within the GOC per son.    /16: recovered:  doing  ok : on rooma ir:  hemodynamically stable:       Acute hypoxic respiratory failure.   -resolving  pulmonary help requested  try to wean off O2.  -she had mild hypercapnic resp failure few days ago : currently off bipap:  if she gets worse restart bipap:   check abg IN AM      1/16: ABG today is excellent     Atrial fibrillation and flutter.   -new onset per son   has not heard of her having A fib in the past  no anticoagulation per son   heart rate controlled with metoprolol.    1/16 hr seems t obe controlled      Dementia.   ·  Plan: supportive care  fall precautions.    Hypothyroidism.   - synthroid 60 mcg qd IV.    Hypertension.   will continue metoprolol 50 mg BID via NGT  add hydralazine 50 BID.  1/16: blood pressure seems stable:     CHARO ACP

## 2025-01-16 NOTE — CHART NOTE - NSCHARTNOTEFT_GEN_A_CORE
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 Pt found to have DWAIN on CKD, Influenza A +, UTI +, possible RLL pneum with consolidation appreciated on POCUS. Pt admitted to MICU for hypercapnic/hypoxic resp failure/septic shock in the setting of possible RLL pneum and UTI now on medical floor.    **Seen by this service, 1/13, for initial evaluation. Patient presented with mentation not supportive of oral trials or feeding; recommendations for NPO, with non-oral nutrition/hydration/medications. NGT placed by team 1/13.     TODAY, 1/16, chart reviewed. Per hard chart, patient previously on diet of chopped solids with thin liquids at [facility] [place]. Patient seen for swallow re-eval. Patient received alert, upright in bed; +NGT; +b/l mittens; 2L O2 via NC; AAOx1 (self). +Baseline wet, congested cough; Improved mentation compared to prior encounter, able to answer simple questions, engage in conversation, and follow 1-step commands. Oral care provided via toothette for moderate dried secretions upon palate and labial surface. Non-nutritive swallow palpable. Improved orientation to feeding tasks (i.e., appropriate aperture/stripping from tsp). Deemed appropriate for initiation of oral trials. Trialed with 2x crushed ice chips and 1x tsp of moderately thickened liquid. Swallow sequence is marked by prolonged manipulation of bolus, suspect premature spillage, suspect delay in pharyngeal swallow trigger, reduced hyolaryngeal excursion upon palpation. Coughing upon intake of crushed ice and moderately thick liquid before initiation of swallow trigger, which may be suggestive of laryngeal penetration/aspiration. However, baseline cough makes behavioral analysis of swallow function difficult to a degree. NPO, with non-oral nutrition/hydration/medications remains indicated. Recommend objective testing (FEES) to further assess swallow mechanism.     Impressions: Patient presents with improved mentation to support initiation of oral trials by SLP. On limited, conservative trials, patient presents with evidence of an oropharyngeal dysphagia.     Per discussion with patient's son (via phone call), long-term feeding tubes are not within GOC; agreeable to FEES to determine candidacy for an oral diet.     Recommendations:  1. NPO, with non-oral nutrition/hydration/medications.   2. maintain aspiration precautions for secretions and if enteral feeds are initiated.  3. good oral hygiene   4. plan for FEES      d/w patient; CARINA Jones; VEGA Floyd; son (via phone call)     Taylor Lott CCC-SLP (TEAMS)

## 2025-01-16 NOTE — CHART NOTE - NSCHARTNOTEFT_GEN_A_CORE
Interval Hx : Notified by RN that, RN found bulge on the right lower abdomen while pt cough.    >Seen and examined the pt at bed side.   >Palpate the lower abdomen, nothing noted while inspection. tiny bulge noted on the right lower abdomen while palpating.   > pt unable to cough at this time.   > Questionable inguinal hernia ?  > will notify the morning team to f/u    Aiden Henson NP  Medicine

## 2025-01-17 LAB
ANION GAP SERPL CALC-SCNC: 12 MMOL/L — SIGNIFICANT CHANGE UP (ref 5–17)
BUN SERPL-MCNC: 36 MG/DL — HIGH (ref 7–23)
CALCIUM SERPL-MCNC: 9.3 MG/DL — SIGNIFICANT CHANGE UP (ref 8.4–10.5)
CHLORIDE SERPL-SCNC: 110 MMOL/L — HIGH (ref 96–108)
CO2 SERPL-SCNC: 24 MMOL/L — SIGNIFICANT CHANGE UP (ref 22–31)
CREAT SERPL-MCNC: 1.59 MG/DL — HIGH (ref 0.5–1.3)
EGFR: 30 ML/MIN/1.73M2 — LOW
GLUCOSE BLDC GLUCOMTR-MCNC: 135 MG/DL — HIGH (ref 70–99)
GLUCOSE BLDC GLUCOMTR-MCNC: 140 MG/DL — HIGH (ref 70–99)
GLUCOSE BLDC GLUCOMTR-MCNC: 148 MG/DL — HIGH (ref 70–99)
GLUCOSE BLDC GLUCOMTR-MCNC: 166 MG/DL — HIGH (ref 70–99)
GLUCOSE SERPL-MCNC: 135 MG/DL — HIGH (ref 70–99)
HCT VFR BLD CALC: 33.3 % — LOW (ref 34.5–45)
HGB BLD-MCNC: 10.7 G/DL — LOW (ref 11.5–15.5)
MCHC RBC-ENTMCNC: 30.5 PG — SIGNIFICANT CHANGE UP (ref 27–34)
MCHC RBC-ENTMCNC: 32.1 G/DL — SIGNIFICANT CHANGE UP (ref 32–36)
MCV RBC AUTO: 94.9 FL — SIGNIFICANT CHANGE UP (ref 80–100)
NRBC # BLD: 0 /100 WBCS — SIGNIFICANT CHANGE UP (ref 0–0)
NRBC BLD-RTO: 0 /100 WBCS — SIGNIFICANT CHANGE UP (ref 0–0)
PLATELET # BLD AUTO: 250 K/UL — SIGNIFICANT CHANGE UP (ref 150–400)
POTASSIUM SERPL-MCNC: 4.1 MMOL/L — SIGNIFICANT CHANGE UP (ref 3.5–5.3)
POTASSIUM SERPL-SCNC: 4.1 MMOL/L — SIGNIFICANT CHANGE UP (ref 3.5–5.3)
RBC # BLD: 3.51 M/UL — LOW (ref 3.8–5.2)
RBC # FLD: 14.7 % — HIGH (ref 10.3–14.5)
SODIUM SERPL-SCNC: 146 MMOL/L — HIGH (ref 135–145)
T4 SERPL-MCNC: 6.3 UG/DL — SIGNIFICANT CHANGE UP
WBC # BLD: 9.62 K/UL — SIGNIFICANT CHANGE UP (ref 3.8–10.5)
WBC # FLD AUTO: 9.62 K/UL — SIGNIFICANT CHANGE UP (ref 3.8–10.5)

## 2025-01-17 RX ADMIN — Medication 5000 UNIT(S): at 06:16

## 2025-01-17 RX ADMIN — Medication 5000 UNIT(S): at 18:06

## 2025-01-17 RX ADMIN — Medication 50 MILLIGRAM(S): at 18:06

## 2025-01-17 RX ADMIN — Medication 50 MILLIGRAM(S): at 06:16

## 2025-01-17 RX ADMIN — MIRTAZAPINE 15 MILLIGRAM(S): 30 TABLET, FILM COATED ORAL at 22:59

## 2025-01-17 RX ADMIN — ANTISEPTIC SURGICAL SCRUB 1 APPLICATION(S): 0.04 SOLUTION TOPICAL at 06:16

## 2025-01-17 RX ADMIN — Medication 1 TABLET(S): at 13:00

## 2025-01-17 RX ADMIN — Medication 10 MILLIGRAM(S): at 13:00

## 2025-01-17 RX ADMIN — IPRATROPIUM BROMIDE AND ALBUTEROL SULFATE 3 MILLILITER(S): .5; 2.5 SOLUTION RESPIRATORY (INHALATION) at 06:16

## 2025-01-17 RX ADMIN — LEVOTHYROXINE SODIUM 60 MICROGRAM(S): 25 TABLET ORAL at 22:58

## 2025-01-17 RX ADMIN — QUETIAPINE FUMARATE 12.5 MILLIGRAM(S): 300 TABLET ORAL at 22:58

## 2025-01-17 RX ADMIN — Medication 500 MILLIGRAM(S): at 13:00

## 2025-01-17 RX ADMIN — IPRATROPIUM BROMIDE AND ALBUTEROL SULFATE 3 MILLILITER(S): .5; 2.5 SOLUTION RESPIRATORY (INHALATION) at 13:00

## 2025-01-17 NOTE — SWALLOW FEES ASSESSMENT ADULT - DIAGNOSTIC IMPRESSIONS
Pt is 89 y/o F admitted for hypoxic/hypercapnic resp failure. Now presenting with an oropharyngeal dysphagia superimposed upon extremely weak congested cough at baseline with poor head/neck/trunk control. Pt unable to independently maintain head neutral posture despite maximal external positioning. The swallow is marked by poor oral grading, uncontrolled loss, suspect poor bolus formation, delayed oral transit time, laryngeal penetration with purees, and aspiration of blue tinged secretions suggestive of aspiration over the course of exam.   Disorders: reduced lingual strength/ROM/Rate of motion, reduced BOT to posterior pharyngeal wall contact, delay in trigger of the swallow reflex, reduced hyo-laryngeal excursion, reduced laryngeal closure, reduced pharyngeal contractility, reduced supraglottic sensation, reduced subglottic sensation.

## 2025-01-17 NOTE — CONSULT NOTE ADULT - ASSESSMENT
resp failure  aspiration    plan  ngt for feedings and meds  ivf   iv abs  gerd and aspiration precautions  will need to have a family discussion about goc and long term feeding  will d/w dr escalante and the team resp failure  aspiration    plan  ngt for feedings and meds  ivf   iv absif oeg is desired prior  gerd and aspiration precautions  will need to have a family discussion about goc and long term feeding  will d/w dr escalante and the team

## 2025-01-17 NOTE — SWALLOW FEES ASSESSMENT ADULT - PHARYNGEAL PHASE COMMENTS
Laryngeal penetration after the swallow with incomplete retrieval, mixed with thick secretions. + Aspirated blue tinged secretions vs pudding noted deep in trachea.  Suspect aspiration over the course of the study given blue tinged secretions noted deep in trachea.

## 2025-01-17 NOTE — SWALLOW FEES ASSESSMENT ADULT - ROSENBEK'S PENETRATION ASPIRATION SCALE
(3) material remains above the vocal cords, visible residue remains (penetration) (7) material passes glottis, visible subglottic residue remains despite patient’s response (aspiration) Difficult to reliably assess laryngeal penetration/aspiration as pt with poor head and neck control limiting full view of larynx/(1) no aspiration, material does not enter airway

## 2025-01-17 NOTE — PROGRESS NOTE ADULT - ASSESSMENT
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 from nursing home via EMS with hypoxic resp failure since 1/9/25 secondary to suspected pneum, treated with HFNC, son endorsed pt was ordered for antibx treatment at time as well. EMS endorsed nursing home facility O2 tank found off, and placed pt on NRB.        In E.D. pt found to be obtunded/unresponsive and to have hypoxic/hypercapnic resp failure with SPO2 of 86-88, Vph of 7.07, VPCO2 90, placed on HFNC with FIO2 of 100%, 50 liter flow with improvement to 92-94% . In addition found to have Influenza A positive, DWAIN with sCr of 1.62 on CKD2 (baseline 1.08-1.23 [8/2024]), transaminitis - alk-phos 577, , , +UA with large leuk-est, neg nitrite. SBP of 98/46 - 107/46. Pt received vanco/zosyn, 1 liter NS IVF bolus, pt placed on norepi gtt. Pt received narcan 0.2 mg IVP x1, albuterol nebs x3.        upon consult POCUS with A line predominant, few focal B lines mostly in Left lung field, small area of consolidation in RLL field, mod/large Lt pleural effusion with LLL field atelectasis. Pt with initial norepi at 0.13 mcg/min, titrated down to 0.05 mcg/min over 35 minutes (130/64 -> 112/64),       Consult called for and pt admitted to MICU for hypercapnic/hypoxic resp failure/septic shock in the setting of possible RLL pneum and UTI  she was treated in MICU for uti , influenza and RLL pneumonia  currently pt is awqke but do not respond to questions:  she is not on bipap at [facility] stime:   currently on 2 L of oxygen            Septic shock.   -likely secondary to pneumonia precipitated by influenza A on admission  continue piperacillin/tazobactam day 4/5 today  complete Tamiflu  aspiration pneumonia   continue NG Tube feedings  NGT is within the GOC per son.    /16: recovered:  doing  ok : on rooma ir:  hemodynamically stable:     1/17: she seems comfortable:  failed feest:   has NGT :   pleasure feeds vs peg        Acute hypoxic respiratory failure.   -resolving  pulmonary help requested  try to wean off O2.  -she had mild hypercapnic resp failure few days ago : currently off bipap:  if she gets worse restart bipap:   check abg IN AM      1/16: ABG today is excellent   1/17: sao2 is good:  try to wean off oxygen      Atrial fibrillation and flutter.   -new onset per son   has not heard of her having A fib in the past  no anticoagulation per son   heart rate controlled with metoprolol.    1/16 hr seems t obe controlled   1/17: seems ata      Dementia.   -supportive care  fall precautions.    Hypothyroidism.   - synthroid 60 mcg qd IV.    Hypertension.   will continue metoprolol 50 mg BID via NGT  add hydralazine 50 BID.  1/16: blood pressure seems stable:   1/17: blood pressure is controlled     DW ACP

## 2025-01-17 NOTE — SWALLOW FEES ASSESSMENT ADULT - ORAL PHASE COMMENTS
delayed oral transit time, suspect poor bolus formation, reduced bolus stripping. Detail Level: Detailed reduced bolus formation, reduced bolus stripping, delayed oral transit time, suspect reduced bolus formation, suspect reduced oral action, reduced bolus stripping

## 2025-01-17 NOTE — PROGRESS NOTE ADULT - SUBJECTIVE AND OBJECTIVE BOX
Patient is a 89y old  Female who presents with a chief complaint of Hypoxic/hypercapnic resp failure (16 Jan 2025 16:01)      DATE OF SERVICE: 01-17-25 @ 14:40    SUBJECTIVE / OVERNIGHT EVENTS: overnight events noted    ROS:  not available         MEDICATIONS  (STANDING):  albuterol/ipratropium for Nebulization 3 milliLiter(s) Nebulizer every 6 hours  ascorbic acid 500 milliGRAM(s) Oral daily  bisacodyl Suppository 10 milliGRAM(s) Rectal daily  chlorhexidine 2% Cloths 1 Application(s) Topical <User Schedule>  dextrose 5% + lactated ringers. 1000 milliLiter(s) (50 mL/Hr) IV Continuous <Continuous>  dextrose 5% + lactated ringers. 1000 milliLiter(s) (50 mL/Hr) IV Continuous <Continuous>  dextrose 5% + lactated ringers. 1000 milliLiter(s) (50 mL/Hr) IV Continuous <Continuous>  heparin   Injectable 5000 Unit(s) SubCutaneous every 12 hours  hydrALAZINE 50 milliGRAM(s) Oral two times a day  levothyroxine Injectable 60 MICROGram(s) IV Push at bedtime  metoprolol tartrate 50 milliGRAM(s) Oral two times a day  mirtazapine 15 milliGRAM(s) Oral at bedtime  multivitamin 1 Tablet(s) Oral daily  QUEtiapine 12.5 milliGRAM(s) Oral at bedtime    MEDICATIONS  (PRN):        CAPILLARY BLOOD GLUCOSE      POCT Blood Glucose.: 135 mg/dL (17 Jan 2025 11:54)  POCT Blood Glucose.: 148 mg/dL (17 Jan 2025 06:32)  POCT Blood Glucose.: 166 mg/dL (17 Jan 2025 00:01)  POCT Blood Glucose.: 152 mg/dL (16 Jan 2025 18:18)    I&O's Summary    17 Jan 2025 07:01  -  17 Jan 2025 14:40  --------------------------------------------------------  IN: 0 mL / OUT: 250 mL / NET: -250 mL        Vital Signs Last 24 Hrs  T(C): 36.8 (17 Jan 2025 11:00), Max: 37.3 (17 Jan 2025 04:31)  T(F): 98.3 (17 Jan 2025 11:00), Max: 99.2 (17 Jan 2025 04:31)  HR: 99 (17 Jan 2025 11:00) (87 - 113)  BP: 139/76 (17 Jan 2025 11:00) (128/69 - 163/76)  BP(mean): --  RR: 18 (17 Jan 2025 11:00) (18 - 18)  SpO2: 94% (17 Jan 2025 11:00) (94% - 96%)    PHYSICAL EXAM:   CHEST/LUNG: resolved wheeze   HEART: S1 S2; no murmurs   ABDOMEN: Soft, Nontender  EXTREMITIES: no edema  NEUROLOGY: non-focal  SKIN: No rashes or lesions    LABS:                        10.7   9.62  )-----------( 250      ( 17 Jan 2025 08:55 )             33.3     01-17    146[H]  |  110[H]  |  36[H]  ----------------------------<  135[H]  4.1   |  24  |  1.59[H]    Ca    9.3      17 Jan 2025 07:10            Urinalysis Basic - ( 17 Jan 2025 07:10 )    Color: x / Appearance: x / SG: x / pH: x  Gluc: 135 mg/dL / Ketone: x  / Bili: x / Urobili: x   Blood: x / Protein: x / Nitrite: x   Leuk Esterase: x / RBC: x / WBC x   Sq Epi: x / Non Sq Epi: x / Bacteria: x          All consultant(s) notes reviewed and care discussed with other providers        Contact Number, Dr Link [phone]

## 2025-01-17 NOTE — PROGRESS NOTE ADULT - PROBLEM SELECTOR PLAN 1
completed piperacillin/tazobactam   completed Tamiflu  continue NG Tube feedings  FEEST study noted  discussed with son in detail over the phone   he requested me to speak to patient's PCP Dr Montalvo  I updated PCP over the phone in comprehensive detail and he is in agreement with plans for pleasure feeding and likely comfort care ? home hospice if son amenable

## 2025-01-17 NOTE — CONSULT NOTE ADULT - SUBJECTIVE AND OBJECTIVE BOX
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 from nursing home via EMS with hypoxic resp failure since 1/9/25 secondary to suspected pneum, treated with HFNC, son endorsed pt was ordered for antibx treatment at time as well. EMS endorsed nursing home facility O2 tank found off, and placed pt on NRB.        In E.D. pt found to be obtunded/unresponsive and to have hypoxic/hypercapnic resp failure with SPO2 of 86-88, Vph of 7.07, VPCO2 90, placed on HFNC with FIO2 of 100%, 50 liter flow with improvement to 92-94% . In addition found to have Influenza A positive, DWAIN with sCr of 1.62 on CKD2 (baseline 1.08-1.23 [8/2024]), transaminitis - alk-phos 577, , , +UA with large leuk-est, neg nitrite. SBP of 98/46 - 107/46. Pt received vanco/zosyn, 1 liter NS IVF bolus, pt placed on norepi gtt. Pt received narcan 0.2 mg IVP x1, albuterol nebs x3.        upon consult POCUS with A line predominant, few focal B lines mostly in Left lung field, small area of consolidation in RLL field, mod/large Lt pleural effusion with LLL field atelectasis. Pt with initial norepi at 0.13 mcg/min, titrated down to 0.05 mcg/min over 35 minutes (130/64 -> 112/64),       Consult called for and pt admitted to MICU for hypercapnic/hypoxic resp failure/septic shock in the setting of possible RLL pneum and UTI           Review of Systems:  · Additional ROS	unable due to obtundation/unresponsiveness      Allergies:  clindamycin (Unknown)  shellfish (Unknown)  strawberry (Rash)  penicillin (Unknown)  clindamycin (Other)  strawberry (Unknown)  IV Contrast (Other)  IV Contrast (Unknown)      Medications:  albuterol/ipratropium for Nebulization 3 milliLiter(s) Nebulizer every 6 hours  ascorbic acid 500 milliGRAM(s) Oral daily  bisacodyl Suppository 10 milliGRAM(s) Rectal daily  chlorhexidine 2% Cloths 1 Application(s) Topical <User Schedule>  dextrose 5% + lactated ringers. 1000 milliLiter(s) IV Continuous <Continuous>  dextrose 5% + lactated ringers. 1000 milliLiter(s) IV Continuous <Continuous>  dextrose 5% + lactated ringers. 1000 milliLiter(s) IV Continuous <Continuous>  heparin   Injectable 5000 Unit(s) SubCutaneous every 12 hours  hydrALAZINE 50 milliGRAM(s) Oral two times a day  levothyroxine Injectable 60 MICROGram(s) IV Push at bedtime  metoprolol tartrate 50 milliGRAM(s) Oral two times a day  mirtazapine 15 milliGRAM(s) Oral at bedtime  multivitamin 1 Tablet(s) Oral daily  QUEtiapine 12.5 milliGRAM(s) Oral at bedtime      PMHX/PSHX:  No pertinent past medical history    Hypertension    Hypothyroidism    HTN (hypertension)    Dementia    Other dementia    No significant past surgical history    History of colon cancer    H/O brain tumor        Family history:      Social History:     ROS:     General:  No wt loss, fevers, chills, night sweats, fatigue,   Eyes:  Good vision, no reported pain  ENT:  No sore throat, pain, runny nose, dysphagia  CV:  No pain, palpitations, hypo/hypertension  Resp:  No dyspnea, cough, tachypnea, wheezing  GI:  No pain, No nausea, No vomiting, No diarrhea, No constipation, No weight loss, No fever, No pruritis, No rectal bleeding, No tarry stools, No dysphagia,  :  No pain, bleeding, incontinence, nocturia  Muscle:  No pain, weakness  Neuro:  No weakness, tingling, memory problems  Psych:  No fatigue, insomnia, mood problems, depression  Endocrine:  No polyuria, polydipsia, cold/heat intolerance  Heme:  No petechiae, ecchymosis, easy bruisability  Skin:  No rash, tattoos, scars, edema      PHYSICAL EXAM:   Vital Signs:  Vital Signs Last 24 Hrs  T(C): 36.8 (17 Jan 2025 11:00), Max: 37.3 (17 Jan 2025 04:31)  T(F): 98.3 (17 Jan 2025 11:00), Max: 99.2 (17 Jan 2025 04:31)  HR: 120 (17 Jan 2025 18:00) (99 - 120)  BP: 165/75 (17 Jan 2025 18:00) (133/67 - 165/75)  BP(mean): --  RR: 18 (17 Jan 2025 11:00) (18 - 18)  SpO2: 94% (17 Jan 2025 11:00) (94% - 95%)    Parameters below as of 17 Jan 2025 11:00  Patient On (Oxygen Delivery Method): room air      Daily     Daily     GENERAL:  Appears stated age, well-groomed, well-nourished, no distress  HEENT:  NC/AT,  conjunctivae clear and pink, no thyromegaly, nodules, adenopathy, no JVD, sclera -anicteric  CHEST:  Full & symmetric excursion, no increased effort, breath sounds clear  HEART:  Regular rhythm, S1, S2, no murmur/rub/S3/S4, no abdominal bruit, no edema  ABDOMEN:  Soft, non-tender, non-distended, normoactive bowel sounds,  no masses ,no hepato-splenomegaly, no signs of chronic liver disease  EXTEREMITIES:  no cyanosis,clubbing or edema  SKIN:  No rash/erythema/ecchymoses/petechiae/wounds/abscess/warm/dry  NEURO:  Alert, oriented, no asterixis, no tremor, no encephalopathy    LABS:                        10.7   9.62  )-----------( 250      ( 17 Jan 2025 08:55 )             33.3     01-17    146[H]  |  110[H]  |  36[H]  ----------------------------<  135[H]  4.1   |  24  |  1.59[H]    Ca    9.3      17 Jan 2025 07:10          Urinalysis Basic - ( 17 Jan 2025 07:10 )    Color: x / Appearance: x / SG: x / pH: x  Gluc: 135 mg/dL / Ketone: x  / Bili: x / Urobili: x   Blood: x / Protein: x / Nitrite: x   Leuk Esterase: x / RBC: x / WBC x   Sq Epi: x / Non Sq Epi: x / Bacteria: x          Imaging:

## 2025-01-17 NOTE — PROGRESS NOTE ADULT - ASSESSMENT
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 Pt found to have DWAIN on CKD, Influenza A +, UTI +, possible RLL pneum with consolidation appreciated on POCUS. Pt admitted to MICU for hypercapnic/hypoxic respiratory failure failure/septic shock in the setting of possible RLL pneum and UTI now on medical floor

## 2025-01-17 NOTE — SWALLOW FEES ASSESSMENT ADULT - H & P REVIEW
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 from nursing home via EMS with hypoxic resp failure since 1/9/25 secondary to suspected pneum, treated with HFNC, son endorsed pt was ordered for antibx treatment at time as well. EMS endorsed nursing home facility O2 tank found off, and placed pt on NRB./yes

## 2025-01-17 NOTE — SWALLOW FEES ASSESSMENT ADULT - COMMENTS
Technically difficult study as pt unable to maintain head neutral posture and with extremely poor neck and trunk control.

## 2025-01-17 NOTE — SWALLOW FEES ASSESSMENT ADULT - RECOMMENDED CONSISTENCY
NPO, with non-oral nutrition/hydration/medications if in keeping with pt and family wishes. Pt with multiple risk factors for aspiration including poor head/neck/trunk control, extremely weak congested cough, overall debilitated state with baseline penetration of secretions and thick aspiration secretions intermittently seen in trachea.

## 2025-01-17 NOTE — PROGRESS NOTE ADULT - SUBJECTIVE AND OBJECTIVE BOX
Date of Service: 01-17-25 @ 17:12    Patient is a 89y old  Female who presents with a chief complaint of Hypoxic/hypercapnic respiratory failure (17 Jan 2025 14:40)      Any change in ROS: alert and awke:  non verbal to me:  on 2 L of oxygen      MEDICATIONS  (STANDING):  albuterol/ipratropium for Nebulization 3 milliLiter(s) Nebulizer every 6 hours  ascorbic acid 500 milliGRAM(s) Oral daily  bisacodyl Suppository 10 milliGRAM(s) Rectal daily  chlorhexidine 2% Cloths 1 Application(s) Topical <User Schedule>  dextrose 5% + lactated ringers. 1000 milliLiter(s) (50 mL/Hr) IV Continuous <Continuous>  dextrose 5% + lactated ringers. 1000 milliLiter(s) (50 mL/Hr) IV Continuous <Continuous>  dextrose 5% + lactated ringers. 1000 milliLiter(s) (50 mL/Hr) IV Continuous <Continuous>  heparin   Injectable 5000 Unit(s) SubCutaneous every 12 hours  hydrALAZINE 50 milliGRAM(s) Oral two times a day  levothyroxine Injectable 60 MICROGram(s) IV Push at bedtime  metoprolol tartrate 50 milliGRAM(s) Oral two times a day  mirtazapine 15 milliGRAM(s) Oral at bedtime  multivitamin 1 Tablet(s) Oral daily  QUEtiapine 12.5 milliGRAM(s) Oral at bedtime    MEDICATIONS  (PRN):    Vital Signs Last 24 Hrs  T(C): 36.8 (17 Jan 2025 11:00), Max: 37.3 (17 Jan 2025 04:31)  T(F): 98.3 (17 Jan 2025 11:00), Max: 99.2 (17 Jan 2025 04:31)  HR: 99 (17 Jan 2025 11:00) (87 - 113)  BP: 139/76 (17 Jan 2025 11:00) (128/69 - 163/76)  BP(mean): --  RR: 18 (17 Jan 2025 11:00) (18 - 18)  SpO2: 94% (17 Jan 2025 11:00) (94% - 96%)    Parameters below as of 17 Jan 2025 11:00  Patient On (Oxygen Delivery Method): room air        I&O's Summary    17 Jan 2025 07:01  -  17 Jan 2025 17:12  --------------------------------------------------------  IN: 0 mL / OUT: 250 mL / NET: -250 mL          Physical Exam:   GENERAL: NAD, well-groomed, well-developed  HEENT: LUANN/   Atraumatic, Normocephalic  ENMT: No tonsillar erythema, exudates, or enlargement; Moist mucous membranes, Good dentition, No lesions  NECK: Supple, No JVD, Normal thyroid  CHEST/LUNG: Clear to auscultaion  CVS: Regular rate and rhythm; No murmurs, rubs, or gallops  GI: : Soft, Nontender, Nondistended; Bowel sounds present  NERVOUS SYSTEM:  Alert & awake;  has NGT   EXTREMITIES: - edema  LYMPH: No lymphadenopathy noted  SKIN: No rashes or lesions  ENDOCRINOLOGY: No Thyromegaly  PSYCH:calm     Labs:  ABG - ( 16 Jan 2025 07:13 )  pH, Arterial: 7.42  pH, Blood: x     /  pCO2: 43    /  pO2: 93    / HCO3: 28    / Base Excess: 2.9   /  SaO2: 98.7            21.0, 25, 26                            10.7   9.62  )-----------( 250      ( 17 Jan 2025 08:55 )             33.3                         11.9   11.46 )-----------( 290      ( 16 Jan 2025 07:08 )             36.4                         11.4   12.24 )-----------( 244      ( 15 Alberto 2025 07:15 )             36.0     01-17    146[H]  |  110[H]  |  36[H]  ----------------------------<  135[H]  4.1   |  24  |  1.59[H]  01-16    144  |  108  |  32[H]  ----------------------------<  147[H]  3.6   |  23  |  1.66[H]  01-15    141  |  109[H]  |  30[H]  ----------------------------<  80  3.9   |  21[L]  |  1.84[H]    Ca    9.3      17 Jan 2025 07:10  Ca    9.3      16 Jan 2025 07:08    TPro  6.4  /  Alb  2.3[L]  /  TBili  0.7  /  DBili  x   /  AST  20  /  ALT  30  /  AlkPhos  243[H]  01-15    CAPILLARY BLOOD GLUCOSE      POCT Blood Glucose.: 135 mg/dL (17 Jan 2025 11:54)  POCT Blood Glucose.: 148 mg/dL (17 Jan 2025 06:32)  POCT Blood Glucose.: 166 mg/dL (17 Jan 2025 00:01)  POCT Blood Glucose.: 152 mg/dL (16 Jan 2025 18:18)          Urinalysis Basic - ( 17 Jan 2025 07:10 )    Color: x / Appearance: x / SG: x / pH: x  Gluc: 135 mg/dL / Ketone: x  / Bili: x / Urobili: x   Blood: x / Protein: x / Nitrite: x   Leuk Esterase: x / RBC: x / WBC x   Sq Epi: x / Non Sq Epi: x / Bacteria: x            RECENT CULTURES:  01-11 @ 11:19 Catheterized Catheterized     rad< from: Xray Chest 1 View- PORTABLE-Routine (Xray Chest 1 View- PORTABLE-Routine .) (01.14.25 @ 17:18) >   heart is normal in size.  Limited evaluation of the left apex due to patient positioning.  Mildly decreased bibasilar hazy opacities.  Small left pleural effusion.  No pneumothorax.  No acute osseous abnormalities.    IMPRESSION:  Enteric tube with tip in the stomach.  Mildly decreased bibasilar hazy opacities.    --- End of Report ---          TEO ROE MD; Resident Radiologist  This document has been electronically signed.  MADELIN STARK MD; Attending Radiologist    < end of copied text >             <10,000 CFU/mL Normal Urogenital Zulma    01-11 @ 09:36 .Blood BLOOD                No growth at 5 days    01-11 @ 09:30 .Blood BLOOD                No growth at 5 days          RESPIRATORY CULTURES:          Studies  Chest X-RAY  CT SCAN Chest   Venous Dopplers: LE:   CT Abdomen  Others

## 2025-01-17 NOTE — SWALLOW FEES ASSESSMENT ADULT - SLP GENERAL OBSERVATIONS
Pt awake in bed, unable to reliably communicate needs or follow directions for exam. + O2 via NC. Garbled sounding speech. Weak congested cough.

## 2025-01-18 LAB
ANION GAP SERPL CALC-SCNC: 9 MMOL/L — SIGNIFICANT CHANGE UP (ref 5–17)
BASE EXCESS BLDA CALC-SCNC: 5.5 MMOL/L — HIGH (ref -2–3)
BUN SERPL-MCNC: 41 MG/DL — HIGH (ref 7–23)
CALCIUM SERPL-MCNC: 9.1 MG/DL — SIGNIFICANT CHANGE UP (ref 8.4–10.5)
CHLORIDE SERPL-SCNC: 110 MMOL/L — HIGH (ref 96–108)
CO2 BLDA-SCNC: 33 MMOL/L — HIGH (ref 19–24)
CO2 SERPL-SCNC: 27 MMOL/L — SIGNIFICANT CHANGE UP (ref 22–31)
CREAT SERPL-MCNC: 1.72 MG/DL — HIGH (ref 0.5–1.3)
EGFR: 27 ML/MIN/1.73M2 — LOW
GAS PNL BLDA: SIGNIFICANT CHANGE UP
GAS PNL BLDV: SIGNIFICANT CHANGE UP
GLUCOSE BLDC GLUCOMTR-MCNC: 111 MG/DL — HIGH (ref 70–99)
GLUCOSE BLDC GLUCOMTR-MCNC: 120 MG/DL — HIGH (ref 70–99)
GLUCOSE BLDC GLUCOMTR-MCNC: 131 MG/DL — HIGH (ref 70–99)
GLUCOSE BLDC GLUCOMTR-MCNC: 148 MG/DL — HIGH (ref 70–99)
GLUCOSE BLDC GLUCOMTR-MCNC: 164 MG/DL — HIGH (ref 70–99)
GLUCOSE SERPL-MCNC: 118 MG/DL — HIGH (ref 70–99)
HCO3 BLDA-SCNC: 31 MMOL/L — HIGH (ref 21–28)
HCT VFR BLD CALC: 31.2 % — LOW (ref 34.5–45)
HGB BLD-MCNC: 9.8 G/DL — LOW (ref 11.5–15.5)
HOROWITZ INDEX BLDA+IHG-RTO: 40 — SIGNIFICANT CHANGE UP
MCHC RBC-ENTMCNC: 30.7 PG — SIGNIFICANT CHANGE UP (ref 27–34)
MCHC RBC-ENTMCNC: 31.4 G/DL — LOW (ref 32–36)
MCV RBC AUTO: 97.8 FL — SIGNIFICANT CHANGE UP (ref 80–100)
NRBC # BLD: 0 /100 WBCS — SIGNIFICANT CHANGE UP (ref 0–0)
NRBC BLD-RTO: 0 /100 WBCS — SIGNIFICANT CHANGE UP (ref 0–0)
PCO2 BLDA: 48 MMHG — HIGH (ref 32–45)
PH BLDA: 7.42 — SIGNIFICANT CHANGE UP (ref 7.35–7.45)
PLATELET # BLD AUTO: 204 K/UL — SIGNIFICANT CHANGE UP (ref 150–400)
PO2 BLDA: 124 MMHG — HIGH (ref 83–108)
POTASSIUM SERPL-MCNC: 4 MMOL/L — SIGNIFICANT CHANGE UP (ref 3.5–5.3)
POTASSIUM SERPL-SCNC: 4 MMOL/L — SIGNIFICANT CHANGE UP (ref 3.5–5.3)
RBC # BLD: 3.19 M/UL — LOW (ref 3.8–5.2)
RBC # FLD: 15.3 % — HIGH (ref 10.3–14.5)
SAO2 % BLDA: 99.4 % — HIGH (ref 94–98)
SODIUM SERPL-SCNC: 146 MMOL/L — HIGH (ref 135–145)
WBC # BLD: 9.85 K/UL — SIGNIFICANT CHANGE UP (ref 3.8–10.5)
WBC # FLD AUTO: 9.85 K/UL — SIGNIFICANT CHANGE UP (ref 3.8–10.5)

## 2025-01-18 PROCEDURE — 71045 X-RAY EXAM CHEST 1 VIEW: CPT | Mod: 26

## 2025-01-18 RX ORDER — CEFEPIME HCL 1 G
1000 IV SOLUTION, PIGGYBACK, BOTTLE (EA) INTRAVENOUS EVERY 12 HOURS
Refills: 0 | Status: COMPLETED | OUTPATIENT
Start: 2025-01-18 | End: 2025-01-25

## 2025-01-18 RX ORDER — ACETAMINOPHEN 160 MG/5ML
1000 SUSPENSION ORAL ONCE
Refills: 0 | Status: COMPLETED | OUTPATIENT
Start: 2025-01-18 | End: 2025-01-18

## 2025-01-18 RX ORDER — SODIUM CHLORIDE 9 G/ML
500 INJECTION, SOLUTION INTRAVENOUS ONCE
Refills: 0 | Status: COMPLETED | OUTPATIENT
Start: 2025-01-18 | End: 2025-01-18

## 2025-01-18 RX ADMIN — Medication 50 MILLIGRAM(S): at 05:46

## 2025-01-18 RX ADMIN — Medication 500 MILLIGRAM(S): at 12:46

## 2025-01-18 RX ADMIN — IPRATROPIUM BROMIDE AND ALBUTEROL SULFATE 3 MILLILITER(S): .5; 2.5 SOLUTION RESPIRATORY (INHALATION) at 05:47

## 2025-01-18 RX ADMIN — IPRATROPIUM BROMIDE AND ALBUTEROL SULFATE 3 MILLILITER(S): .5; 2.5 SOLUTION RESPIRATORY (INHALATION) at 12:46

## 2025-01-18 RX ADMIN — MIRTAZAPINE 15 MILLIGRAM(S): 30 TABLET, FILM COATED ORAL at 22:08

## 2025-01-18 RX ADMIN — ANTISEPTIC SURGICAL SCRUB 1 APPLICATION(S): 0.04 SOLUTION TOPICAL at 05:56

## 2025-01-18 RX ADMIN — SODIUM CHLORIDE 500 MILLILITER(S): 9 INJECTION, SOLUTION INTRAVENOUS at 21:21

## 2025-01-18 RX ADMIN — QUETIAPINE FUMARATE 12.5 MILLIGRAM(S): 300 TABLET ORAL at 22:08

## 2025-01-18 RX ADMIN — Medication 50 MILLIGRAM(S): at 17:37

## 2025-01-18 RX ADMIN — IPRATROPIUM BROMIDE AND ALBUTEROL SULFATE 3 MILLILITER(S): .5; 2.5 SOLUTION RESPIRATORY (INHALATION) at 17:36

## 2025-01-18 RX ADMIN — IPRATROPIUM BROMIDE AND ALBUTEROL SULFATE 3 MILLILITER(S): .5; 2.5 SOLUTION RESPIRATORY (INHALATION) at 01:01

## 2025-01-18 RX ADMIN — ACETAMINOPHEN 400 MILLIGRAM(S): 160 SUSPENSION ORAL at 01:40

## 2025-01-18 RX ADMIN — Medication 1 TABLET(S): at 12:46

## 2025-01-18 RX ADMIN — Medication 100 MILLIGRAM(S): at 17:37

## 2025-01-18 RX ADMIN — Medication 5000 UNIT(S): at 17:37

## 2025-01-18 RX ADMIN — Medication 5000 UNIT(S): at 05:46

## 2025-01-18 RX ADMIN — Medication 50 MILLIGRAM(S): at 17:36

## 2025-01-18 NOTE — PROGRESS NOTE ADULT - SUBJECTIVE AND OBJECTIVE BOX
INTERVAL HPI/OVERNIGHT EVENTS:  No N/V/D.  Tolerating diet via ngt  events noted last night    Allergies    clindamycin (Unknown)  shellfish (Unknown)  strawberry (Rash)  penicillin (Unknown)  clindamycin (Other)  strawberry (Unknown)  IV Contrast (Other)  IV Contrast (Unknown)    Intolerances          General:  No wt loss, fevers, chills, night sweats, fatigue,   Eyes:  Good vision, no reported pain  ENT:  No sore throat, pain, runny nose, dysphagia  CV:  No pain, palpitations, hypo/hypertension  Resp:  No dyspnea, cough, tachypnea, wheezing  GI:  No pain, No nausea, No vomiting, No diarrhea, No constipation, No weight loss, No fever, No pruritis, No rectal bleeding, No tarry stools, No dysphagia,  :  No pain, bleeding, incontinence, nocturia  Muscle:  No pain, weakness  Neuro:  No weakness, tingling, memory problems  Psych:  No fatigue, insomnia, mood problems, depression  Endocrine:  No polyuria, polydipsia, cold/heat intolerance  Heme:  No petechiae, ecchymosis, easy bruisability  Skin:  No rash, tattoos, scars, edema      PHYSICAL EXAM:   Vital Signs:  Vital Signs Last 24 Hrs  T(C): 37 (18 Jan 2025 16:51), Max: 38.2 (18 Jan 2025 00:38)  T(F): 98.6 (18 Jan 2025 16:51), Max: 100.8 (18 Jan 2025 00:38)  HR: 81 (18 Jan 2025 16:51) (78 - 100)  BP: 111/61 (18 Jan 2025 16:51) (99/57 - 137/68)  BP(mean): --  RR: 18 (18 Jan 2025 16:51) (18 - 18)  SpO2: 100% (18 Jan 2025 16:51) (94% - 100%)    Parameters below as of 18 Jan 2025 16:51  Patient On (Oxygen Delivery Method): nasal cannula  O2 Flow (L/min): 4    Daily     Daily I&O's Summary    17 Jan 2025 07:01  -  18 Jan 2025 07:00  --------------------------------------------------------  IN: 0 mL / OUT: 250 mL / NET: -250 mL        GENERAL:  Appears stated age, well-groomed, well-nourished, no distress  HEENT:  NC/AT,  conjunctivae clear and pink, no thyromegaly, nodules, adenopathy, no JVD, sclera -anicteric  CHEST:  Full & symmetric excursion, no increased effort, breath sounds clear  HEART:  Regular rhythm, S1, S2, no murmur/rub/S3/S4, no abdominal bruit, no edema  ABDOMEN:  Soft, non-tender, non-distended, normoactive bowel sounds,  no masses ,no hepato-splenomegaly, no signs of chronic liver disease  EXTEREMITIES:  no cyanosis,clubbing or edema  SKIN:  No rash/erythema/ecchymoses/petechiae/wounds/abscess/warm/dry  NEURO:  Alert, oriented, no asterixis, no tremor, no encephalopathy      LABS:                        9.8    9.85  )-----------( 204      ( 18 Jan 2025 08:21 )             31.2     01-18    146[H]  |  110[H]  |  41[H]  ----------------------------<  118[H]  4.0   |  27  |  1.72[H]    Ca    9.1      18 Jan 2025 07:11        Urinalysis Basic - ( 18 Jan 2025 07:11 )    Color: x / Appearance: x / SG: x / pH: x  Gluc: 118 mg/dL / Ketone: x  / Bili: x / Urobili: x   Blood: x / Protein: x / Nitrite: x   Leuk Esterase: x / RBC: x / WBC x   Sq Epi: x / Non Sq Epi: x / Bacteria: x      amylase   lipase  RADIOLOGY & ADDITIONAL TESTS:

## 2025-01-18 NOTE — PROGRESS NOTE ADULT - ASSESSMENT
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 Pt found to have DWAIN on CKD, Influenza A +, UTI +, possible RLL pneum with consolidation appreciated on POCUS. Pt admitted to MICU for hypercapnic/hypoxic respiratory failure failure/septic shock in the setting of possible RLL pneum and UTI

## 2025-01-18 NOTE — PROGRESS NOTE ADULT - SUBJECTIVE AND OBJECTIVE BOX
Date of Service: 01-18-25 @ 15:17    Patient is a 89y old  Female who presents with a chief complaint of Hypoxic/hypercapnic resp failure (17 Jan 2025 19:55)      Any change in ROS: doing same:  had fever:  she still has NGT     MEDICATIONS  (STANDING):  albuterol/ipratropium for Nebulization 3 milliLiter(s) Nebulizer every 6 hours  ascorbic acid 500 milliGRAM(s) Oral daily  bisacodyl Suppository 10 milliGRAM(s) Rectal daily  cefepime   IVPB 1000 milliGRAM(s) IV Intermittent every 12 hours  chlorhexidine 2% Cloths 1 Application(s) Topical <User Schedule>  dextrose 5% + lactated ringers. 1000 milliLiter(s) (50 mL/Hr) IV Continuous <Continuous>  dextrose 5% + lactated ringers. 1000 milliLiter(s) (50 mL/Hr) IV Continuous <Continuous>  dextrose 5% + lactated ringers. 1000 milliLiter(s) (50 mL/Hr) IV Continuous <Continuous>  heparin   Injectable 5000 Unit(s) SubCutaneous every 12 hours  hydrALAZINE 50 milliGRAM(s) Oral two times a day  levothyroxine Injectable 60 MICROGram(s) IV Push at bedtime  metoprolol tartrate 50 milliGRAM(s) Oral two times a day  mirtazapine 15 milliGRAM(s) Oral at bedtime  multivitamin 1 Tablet(s) Oral daily  QUEtiapine 12.5 milliGRAM(s) Oral at bedtime    MEDICATIONS  (PRN):    Vital Signs Last 24 Hrs  T(C): 36.8 (18 Jan 2025 12:21), Max: 38.2 (18 Jan 2025 00:38)  T(F): 98.2 (18 Jan 2025 12:21), Max: 100.8 (18 Jan 2025 00:38)  HR: 87 (18 Jan 2025 12:21) (78 - 120)  BP: 99/57 (18 Jan 2025 12:21) (99/57 - 165/75)  BP(mean): --  RR: 18 (18 Jan 2025 12:21) (18 - 18)  SpO2: 94% (18 Jan 2025 12:21) (94% - 98%)    Parameters below as of 18 Jan 2025 12:21  Patient On (Oxygen Delivery Method): nasal cannula  O2 Flow (L/min): 4      I&O's Summary    17 Jan 2025 07:01  -  18 Jan 2025 07:00  --------------------------------------------------------  IN: 0 mL / OUT: 250 mL / NET: -250 mL          Physical Exam:   GENERAL: NAD, well-groomed, well-developed  HEENT: LUANN/   Atraumatic, Normocephalic  ENMT: No tonsillar erythema, exudates, or enlargement; Moist mucous membranes, Good dentition, No lesions  NECK: Supple, No JVD, Normal thyroid  CHEST/LUNG: Clear to auscultaion-  CVS: Regular rate and rhythm; No murmurs, rubs, or gallops  GI: : Soft, Nontender, Nondistended; Bowel sounds present  NERVOUS SYSTEM:  Alert &awake';  has ngt   EXTREMITIES:  - edema  LYMPH: No lymphadenopathy noted  SKIN: No rashes or lesions  ENDOCRINOLOGY: No Thyromegaly  PSYCH: calm     Labs:  ABG - ( 18 Jan 2025 06:50 )  pH, Arterial: 7.42  pH, Blood: x     /  pCO2: 48    /  pO2: 124   / HCO3: 31    / Base Excess: 5.5   /  SaO2: 99.4            32, 21.0                            9.8    9.85  )-----------( 204      ( 18 Jan 2025 08:21 )             31.2                         10.7   9.62  )-----------( 250      ( 17 Jan 2025 08:55 )             33.3                         11.9   11.46 )-----------( 290      ( 16 Jan 2025 07:08 )             36.4                         11.4   12.24 )-----------( 244      ( 15 Alberto 2025 07:15 )             36.0     01-18    146[H]  |  110[H]  |  41[H]  ----------------------------<  118[H]  4.0   |  27  |  1.72[H]  01-17    146[H]  |  110[H]  |  36[H]  ----------------------------<  135[H]  4.1   |  24  |  1.59[H]  01-16    144  |  108  |  32[H]  ----------------------------<  147[H]  3.6   |  23  |  1.66[H]  01-15    141  |  109[H]  |  30[H]  ----------------------------<  80  3.9   |  21[L]  |  1.84[H]    Ca    9.1      18 Jan 2025 07:11  Ca    9.3      17 Jan 2025 07:10    TPro  6.4  /  Alb  2.3[L]  /  TBili  0.7  /  DBili  x   /  AST  20  /  ALT  30  /  AlkPhos  243[H]  01-15    CAPILLARY BLOOD GLUCOSE      POCT Blood Glucose.: 111 mg/dL (18 Jan 2025 12:21)  POCT Blood Glucose.: 131 mg/dL (18 Jan 2025 06:16)  POCT Blood Glucose.: 164 mg/dL (18 Jan 2025 01:21)  POCT Blood Glucose.: 140 mg/dL (17 Jan 2025 17:44)          Urinalysis Basic - ( 18 Jan 2025 07:11 )    Color: x / Appearance: x / SG: x / pH: x  Gluc: 118 mg/dL / Ketone: x  / Bili: x / Urobili: x   Blood: x / Protein: x / Nitrite: x   Leuk Esterase: x / RBC: x / WBC x   Sq Epi: x / Non Sq Epi: x / Bacteria: x        rad< from: Xray Chest 1 View- PORTABLE-Urgent (Xray Chest 1 View- PORTABLE-Urgent .) (01.18.25 @ 01:20) >  ACC: [id number] EXAM:  XR CHEST PORTABLE URGENT 1V   ORDERED BY: JOYCE LAZARO     PROCEDURE DATE:  01/18/2025          INTERPRETATION:  EXAMINATION: XR CHEST URGENT    CLINICAL INDICATION: Hypoxia, increased O2 requirement & WOB    TECHNIQUE: Single frontal, portable view of the chest was obtained.    COMPARISON: Chest x-ray 1/14/2025.    FINDINGS:  Enteric tube tip in the stomach.  The heart size is normal.  No focal consolidation. Similar bibasilar hazy opacities.  There is no pneumothorax  Small left pleural effusion, unchanged.  No acute osseous abnormalities.    IMPRESSION:  Similar bibasilar hazy opacities.  Unchanged small left pleural effusion.    --- End of Report ---          DANG ANAND MD; Resident Radiologist  This document has been electronically signed.  BRIAN HARDY MD; Attending Interventional Radiologist  This document has been electronically signed. Jan 18 2025 10:37AM    < end of copied text >      RECENT CULTURES:        RESPIRATORY CULTURES:          Studies  Chest X-RAY  CT SCAN Chest   Venous Dopplers: LE:   CT Abdomen  Others

## 2025-01-18 NOTE — CHART NOTE - NSCHARTNOTEFT_GEN_A_CORE
Medicine PA Note     PATRICK CARLIN  MRN-[id number]  Allergies    clindamycin (Unknown)  shellfish (Unknown)  strawberry (Rash)  penicillin (Unknown)  clindamycin (Other)  strawberry (Unknown)  IV Contrast (Other)  IV Contrast (Unknown)    Intolerances      Notified by RN pt hypoxic to 88% on 3L NC requiring more oxygen. Pt denies headache, chest pain, sob, n/v/d, weakness, dizziness.    Vital Signs Last 24 Hrs  T(C): 36.6 (01-17-25 @ 21:01), Max: 37.3 (01-17-25 @ 04:31)  T(F): 97.9 (01-17-25 @ 21:01), Max: 99.2 (01-17-25 @ 04:31)  HR: 78 (01-17-25 @ 21:01) (78 - 120)  BP: 137/68 (01-17-25 @ 21:01) (133/67 - 165/75)  BP(mean): --  RR: 18 (01-17-25 @ 21:01) (18 - 18)  SpO2: 98% (01-17-25 @ 21:01) (94% - 98%)                        10.7   9.62  )-----------( 250      ( 17 Jan 2025 08:55 )             33.3     01-17    146[H]  |  110[H]  |  36[H]  ----------------------------<  135[H]  4.1   |  24  |  1.59[H]    Ca    9.3      17 Jan 2025 07:10            PHYSICAL EXAM:  GENERAL: NAD, well-developed  CHEST/LUNG: Clear to auscultation bilaterally; No wheezes, rhonchi or rales appreciated  HEART: Regular rate and rhythm; No murmurs, rubs, or gallops  ABDOMEN: Soft, Nontender, Nondistended; Bowel sounds present. No rebound, or guarding noted.  EXTREMITIES:  2+ Peripheral Pulses, No clubbing, cyanosis, or edema  NEUROLOGY: CN 2-12 grossly intact, Muscle strength 5/5 in all extremities and sensation intact in all extremities.      Assessment/Plan: HPI:   89 yr old female with PMHx dementia, s/p brain tumor resection 1961 with residual Lt sided facial droop, HTN, HYPOthyroidism, colon Ca s/p Rt hemicolectomy ('09), 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 from nursing home via EMS with hypoxic resp failure since 1/9/25 secondary to suspected pneum, treated with HFNC, son endorsed pt was ordered for antibx treatment at time as well. EMS endorsed nursing home facility O2 tank found off, and placed pt on NRB.        In E.D. pt found to be obtunded/unresponsive and to have hypoxic/hypercapnic resp failure with SPO2 of 86-88, Vph of 7.07, VPCO2 90, placed on HFNC with FIO2 of 100%, 50 liter flow with improvement to 92-94% . In addition found to have Influenza A positive, DWAIN with sCr of 1.62 on CKD2 (baseline 1.08-1.23 [8/2024]), transaminitis - alk-phos 577, , , +UA with large leuk-est, neg nitrite. SBP of 98/46 - 107/46. Pt received vanco/zosyn, 1 liter NS IVF bolus, pt placed on norepi gtt. Pt received narcan 0.2 mg IVP x1, albuterol nebs x3.        upon consult POCUS with A line predominant, few focal B lines mostly in Left lung field, small area of consolidation in RLL field, mod/large Lt pleural effusion with LLL field atelectasis. Pt with initial norepi at 0.13 mcg/min, titrated down to 0.05 mcg/min over 35 minutes (130/64 -> 112/64),       Consult called for and pt admitted to MICU for hypercapnic/hypoxic resp failure/septic shock in the setting of possible RLL pneum and UTI     (11 Jan 2025 12:40)    Now w/ hypoxia & increased WOB.    #Hypoxia/increased WOB 2/2 aspiration vs worsening PNA   > VS hemodynamically stable aside from oxygen saturation of 88% -- increased to 91% once oxygen increased to 5.5L NC   > Rectal temperature done to r/o fever (was warm to touch)   > Duoneb x1 given   > Urgent CXR ordered to assess for an acute change   > VBG w/ lytes ordered to assess for acid-base disorder  > Will endorse to AM team, attending to follow    Rafat Wick PA-C,   Department of Medicine Medicine PA Note     PATRICK CARLIN  MRN-[id number]  Allergies    clindamycin (Unknown)  shellfish (Unknown)  strawberry (Rash)  penicillin (Unknown)  clindamycin (Other)  strawberry (Unknown)  IV Contrast (Other)  IV Contrast (Unknown)    Intolerances      Notified by RN pt hypoxic to 88% on 2L NC requiring more oxygen. Pt seen and examined at bedside, appears mildly tachypneic but otherwise in NAD. Unable to obtain ROS d/t pt's current mental status.       Vital Signs Last 24 Hrs  T(C): 36.6 (01-17-25 @ 21:01), Max: 37.3 (01-17-25 @ 04:31)  T(F): 97.9 (01-17-25 @ 21:01), Max: 99.2 (01-17-25 @ 04:31)  HR: 78 (01-17-25 @ 21:01) (78 - 120)  BP: 137/68 (01-17-25 @ 21:01) (133/67 - 165/75)  BP(mean): --  RR: 18 (01-17-25 @ 21:01) (18 - 18)  SpO2: 98% (01-17-25 @ 21:01) (94% - 98%)                        10.7   9.62  )-----------( 250      ( 17 Jan 2025 08:55 )             33.3     01-17    146[H]  |  110[H]  |  36[H]  ----------------------------<  135[H]  4.1   |  24  |  1.59[H]    Ca    9.3      17 Jan 2025 07:10            PHYSICAL EXAM:  GENERAL: NAD, well-developed  CHEST/LUNG: Clear to auscultation bilaterally; No wheezes, rhonchi or rales appreciated  HEART: Regular rate and rhythm; No murmurs, rubs, or gallops  ABDOMEN: Soft, Nontender, Nondistended; Bowel sounds present. No rebound, or guarding noted.  EXTREMITIES:  2+ Peripheral Pulses, No clubbing, cyanosis, or edema  NEUROLOGY: CN 2-12 grossly intact, Muscle strength 5/5 in all extremities and sensation intact in all extremities.      Assessment/Plan: HPI:   89 yr old female with PMHx dementia, s/p brain tumor resection 1961 with residual Lt sided facial droop, HTN, HYPOthyroidism, colon Ca s/p Rt hemicolectomy ('09), 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 from nursing home via EMS with hypoxic resp failure since 1/9/25 secondary to suspected pneum, treated with HFNC, son endorsed pt was ordered for antibx treatment at time as well. EMS endorsed nursing home facility O2 tank found off, and placed pt on NRB.        In E.D. pt found to be obtunded/unresponsive and to have hypoxic/hypercapnic resp failure with SPO2 of 86-88, Vph of 7.07, VPCO2 90, placed on HFNC with FIO2 of 100%, 50 liter flow with improvement to 92-94% . In addition found to have Influenza A positive, DWAIN with sCr of 1.62 on CKD2 (baseline 1.08-1.23 [8/2024]), transaminitis - alk-phos 577, , , +UA with large leuk-est, neg nitrite. SBP of 98/46 - 107/46. Pt received vanco/zosyn, 1 liter NS IVF bolus, pt placed on norepi gtt. Pt received narcan 0.2 mg IVP x1, albuterol nebs x3.        upon consult POCUS with A line predominant, few focal B lines mostly in Left lung field, small area of consolidation in RLL field, mod/large Lt pleural effusion with LLL field atelectasis. Pt with initial norepi at 0.13 mcg/min, titrated down to 0.05 mcg/min over 35 minutes (130/64 -> 112/64),       Consult called for and pt admitted to MICU for hypercapnic/hypoxic resp failure/septic shock in the setting of possible RLL pneum and UTI     (11 Jan 2025 12:40)    Now w/ hypoxia & increased WOB.    #Hypoxia/increased WOB 2/2 aspiration vs worsening PNA   > VS hemodynamically stable aside from oxygen saturation of 88% -- increased to 91% once oxygen increased to 5.5L NC   > Rectal temperature done to r/o fever (was warm to touch) -- temperature 100.9 F; infectious w/u ordered (BCX x2, UA, UCX) & IV tylenol given   > Duoneb x1 given   > Urgent CXR ordered to assess for an acute change -- verbal prelim from radiologist w/ small L pleural effusion and trace R pleural effusion   > VBG w/ lytes ordered to assess for acid-base disorder -- pH 7.40, pCO2 51, HCO3 32, pO2 142; c/w oxygen PRN & repeat gas in AM   > Tube feeds stopped d/t c/f possible aspiration   > Will endorse to AM team, attending to follow    Rafat Wick PA-C,   Department of Medicine      ADDENDUM: Pt re-examined at bedside appearing more comfortable. Oxygen has been weaned down to 3L NC w/ pt saturating at 98%. Will continue to monitor. Medicine PA Note     PATRICK CARLIN  MRN-[id number]  Allergies    clindamycin (Unknown)  shellfish (Unknown)  strawberry (Rash)  penicillin (Unknown)  clindamycin (Other)  strawberry (Unknown)  IV Contrast (Other)  IV Contrast (Unknown)    Intolerances      Notified by RN pt hypoxic to 88% on 2L NC requiring more oxygen. Pt seen and examined at bedside, appears mildly tachypneic but otherwise in NAD. Unable to obtain ROS d/t pt's current mental status.       Vital Signs Last 24 Hrs  T(C): 36.6 (01-17-25 @ 21:01), Max: 37.3 (01-17-25 @ 04:31)  T(F): 97.9 (01-17-25 @ 21:01), Max: 99.2 (01-17-25 @ 04:31)  HR: 78 (01-17-25 @ 21:01) (78 - 120)  BP: 137/68 (01-17-25 @ 21:01) (133/67 - 165/75)  BP(mean): --  RR: 18 (01-17-25 @ 21:01) (18 - 18)  SpO2: 98% (01-17-25 @ 21:01) (94% - 98%)                        10.7   9.62  )-----------( 250      ( 17 Jan 2025 08:55 )             33.3     01-17    146[H]  |  110[H]  |  36[H]  ----------------------------<  135[H]  4.1   |  24  |  1.59[H]    Ca    9.3      17 Jan 2025 07:10            PHYSICAL EXAM:  GENERAL: NAD, well-developed  CHEST/LUNG: Clear to auscultation bilaterally; No wheezes, rhonchi or rales appreciated  HEART: Regular rate and rhythm; No murmurs, rubs, or gallops  ABDOMEN: Soft, Nontender, Nondistended; Bowel sounds present. No rebound, or guarding noted.  EXTREMITIES:  2+ Peripheral Pulses, No clubbing, cyanosis, or edema  NEUROLOGY: CN 2-12 grossly intact, Muscle strength 5/5 in all extremities and sensation intact in all extremities.      Assessment/Plan: HPI:   89 yr old female with PMHx dementia, s/p brain tumor resection 1961 with residual Lt sided facial droop, HTN, HYPOthyroidism, colon Ca s/p Rt hemicolectomy ('09), 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 from nursing home via EMS with hypoxic resp failure since 1/9/25 secondary to suspected pneum, treated with HFNC, son endorsed pt was ordered for antibx treatment at time as well. EMS endorsed nursing home facility O2 tank found off, and placed pt on NRB.        In E.D. pt found to be obtunded/unresponsive and to have hypoxic/hypercapnic resp failure with SPO2 of 86-88, Vph of 7.07, VPCO2 90, placed on HFNC with FIO2 of 100%, 50 liter flow with improvement to 92-94% . In addition found to have Influenza A positive, DWAIN with sCr of 1.62 on CKD2 (baseline 1.08-1.23 [8/2024]), transaminitis - alk-phos 577, , , +UA with large leuk-est, neg nitrite. SBP of 98/46 - 107/46. Pt received vanco/zosyn, 1 liter NS IVF bolus, pt placed on norepi gtt. Pt received narcan 0.2 mg IVP x1, albuterol nebs x3.        upon consult POCUS with A line predominant, few focal B lines mostly in Left lung field, small area of consolidation in RLL field, mod/large Lt pleural effusion with LLL field atelectasis. Pt with initial norepi at 0.13 mcg/min, titrated down to 0.05 mcg/min over 35 minutes (130/64 -> 112/64),       Consult called for and pt admitted to MICU for hypercapnic/hypoxic resp failure/septic shock in the setting of possible RLL pneum and UTI     (11 Jan 2025 12:40)    Now w/ hypoxia & increased WOB.    #Hypoxia/increased WOB 2/2 aspiration vs worsening PNA   > VS hemodynamically stable aside from oxygen saturation of 88% -- increased to 91% once oxygen increased to 5.5L NC   > Rectal temperature done to r/o fever (was warm to touch) -- temperature 100.9 F; infectious w/u ordered (BCX x2, UA, UCX) & IV tylenol given   > Duoneb x1 given   > Urgent CXR ordered to assess for an acute change -- verbal prelim from radiologist w/ small L pleural effusion and trace R pleural effusion   > VBG w/ lytes ordered to assess for acid-base disorder -- pH 7.40, pCO2 51, HCO3 32, pO2 142; c/w oxygen PRN & repeat gas in AM   > Tube feeds stopped d/t c/f possible aspiration   > Will endorse to AM team, attending to follow    Rafat Wick PA-C,   Department of Medicine      ADDENDUM: Pt re-examined at bedside appearing more comfortable. Oxygen has been weaned down to 4L NC w/ pt saturating at 95%. Will continue to monitor.

## 2025-01-18 NOTE — PROGRESS NOTE ADULT - ASSESSMENT
Patient would like a call back and can be reached at [phone].   resp failure  aspiration    plan  ngt for feedings and meds  ivf   to discuss with family clinically improving  iv abs if oeg is desired prior  gerd and aspiration precautions  will need to have a family discussion about goc and long term feeding  will d/w dr escalante and the team    Advanced care planning was discussed with patient and family.  Advanced care planning forms were reviewed and discussed.  Risks, benefits and alternatives of gastroenterologic procedures were discussed in detail and all questions were answered.    30 minutes spent.

## 2025-01-18 NOTE — PROGRESS NOTE ADULT - ASSESSMENT
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 from nursing home via EMS with hypoxic resp failure since 1/9/25 secondary to suspected pneum, treated with HFNC, son endorsed pt was ordered for antibx treatment at time as well. EMS endorsed nursing home facility O2 tank found off, and placed pt on NRB.        In E.D. pt found to be obtunded/unresponsive and to have hypoxic/hypercapnic resp failure with SPO2 of 86-88, Vph of 7.07, VPCO2 90, placed on HFNC with FIO2 of 100%, 50 liter flow with improvement to 92-94% . In addition found to have Influenza A positive, DWAIN with sCr of 1.62 on CKD2 (baseline 1.08-1.23 [8/2024]), transaminitis - alk-phos 577, , , +UA with large leuk-est, neg nitrite. SBP of 98/46 - 107/46. Pt received vanco/zosyn, 1 liter NS IVF bolus, pt placed on norepi gtt. Pt received narcan 0.2 mg IVP x1, albuterol nebs x3.        upon consult POCUS with A line predominant, few focal B lines mostly in Left lung field, small area of consolidation in RLL field, mod/large Lt pleural effusion with LLL field atelectasis. Pt with initial norepi at 0.13 mcg/min, titrated down to 0.05 mcg/min over 35 minutes (130/64 -> 112/64),       Consult called for and pt admitted to MICU for hypercapnic/hypoxic resp failure/septic shock in the setting of possible RLL pneum and UTI  she was treated in MICU for uti , influenza and RLL pneumonia  currently pt is awqke but do not respond to questions:  she is not on bipap at [facility] stime:   currently on 2 L of oxygen            Septic shock.   -likely secondary to pneumonia precipitated by influenza A on admission  continue piperacillin/tazobactam day 4/5 today  complete Tamiflu  aspiration pneumonia   continue NG Tube feedings  NGT is within the GOC per son.    /16: recovered:  doing  ok : on rooma ir:  hemodynamically stable:     1/17: she seems comfortable:  failed feest:   has NGT :   pleasure feeds vs peg      1/18: febrile overnight;  started on cefepime  cxr last night with bibasilar opacities;   on 4 Lof oxygen :  ABG today is pretty good:   family deciding about peg?     Acute hypoxic respiratory failure.   -resolving  pulmonary help requested  try to wean off O2.  -she had mild hypercapnic resp failure few days ago : currently off bipap:  if she gets worse restart bipap:   check abg IN AM      1/16: ABG today is excellent   1/17: sao2 is good:  try to wean off oxygen    1/18: sheis still requiring 4 Lof oxygen : abg is pretty good:  can try to wean down the oxygen      Atrial fibrillation and flutter.   -new onset per son   has not heard of her having A fib in the past  no anticoagulation per son   heart rate controlled with metoprolol.    1/16 hr seems t obe controlled   1/17: seems ata   1/18: controlled hr      Dementia.   -supportive care  fall precautions.    Hypothyroidism.   - synthroid 60 mcg qd IV.    Hypertension.   will continue metoprolol 50 mg BID via NGT  add hydralazine 50 BID.  blood pressure is OK     DW ACP

## 2025-01-18 NOTE — CHART NOTE - NSCHARTNOTEFT_GEN_A_CORE
Notified by RN pt hypotensive w/ SBP high 80s, low 90s. Pt's BP typically runs from SBP 110s - 160s. Ordered 500 cc bolus LR over an hour. Notified by RN pt hypotensive w/ SBP high 80s, low 90s. Pt's BP typically runs from SBP 110s - 160s. All other VSS; pt remains on 4L NC. Blood work from today stable. Ordered 500 cc bolus of LR over an hour. Will recheck BP s/p bolus. C/w Cefepime for presumed infection. BCX pending from 1/18. Will continue to monitor. Will endorse to day team in AM, attending to follow.    Rafat Wick PA-C  Department of Medicine Notified by RN pt hypotensive w/ SBP high 80s, low 90s. Pt's BP typically runs from SBP 110s - 160s. All other VSS; pt remains on 4L NC. Blood work from today stable. Ordered 500 cc bolus of LR over an hour. Will recheck BP s/p bolus. C/w Cefepime for presumed infection. BCX pending from 1/18. Will continue to monitor. Will endorse to day team in AM, attending to follow.    Rafat Wick PA-C  Department of Medicine      ADDENDUM: Repeat BP s/p IVF bolus is 118/66 which around the pt's baseline. Will continue to monitor.

## 2025-01-18 NOTE — PROGRESS NOTE ADULT - SUBJECTIVE AND OBJECTIVE BOX
DATE OF SERVICE: 01-18-25 @ 16:24  CHIEF COMPLAINT:Patient is a 89y old  Female who presents with a chief complaint of Hypoxic/hypercapnic resp failure (18 Jan 2025 15:16)    	        PAST MEDICAL & SURGICAL HISTORY:  Hypertension      Hypothyroidism      HTN (hypertension)      Dementia      Other dementia      History of colon cancer  s/p Resection in 2009, Dr. David      H/O brain tumor  History of brain tumor resection in 1961.              REVIEW OF SYSTEMS:    RESPIRATORY: No cough, wheezing, chills or hemoptysis; No Shortness of Breath  CARDIOVASCULAR: No chest pain, palpitations,   GASTROINTESTINAL: No abdominal or epigastric pain.     NEUROLOGICAL: No headache  Medications:  MEDICATIONS  (STANDING):  albuterol/ipratropium for Nebulization 3 milliLiter(s) Nebulizer every 6 hours  ascorbic acid 500 milliGRAM(s) Oral daily  bisacodyl Suppository 10 milliGRAM(s) Rectal daily  cefepime   IVPB 1000 milliGRAM(s) IV Intermittent every 12 hours  chlorhexidine 2% Cloths 1 Application(s) Topical <User Schedule>  dextrose 5% + lactated ringers. 1000 milliLiter(s) (50 mL/Hr) IV Continuous <Continuous>  dextrose 5% + lactated ringers. 1000 milliLiter(s) (50 mL/Hr) IV Continuous <Continuous>  dextrose 5% + lactated ringers. 1000 milliLiter(s) (50 mL/Hr) IV Continuous <Continuous>  heparin   Injectable 5000 Unit(s) SubCutaneous every 12 hours  hydrALAZINE 50 milliGRAM(s) Oral two times a day  levothyroxine Injectable 60 MICROGram(s) IV Push at bedtime  metoprolol tartrate 50 milliGRAM(s) Oral two times a day  mirtazapine 15 milliGRAM(s) Oral at bedtime  multivitamin 1 Tablet(s) Oral daily  QUEtiapine 12.5 milliGRAM(s) Oral at bedtime    MEDICATIONS  (PRN):    	    PHYSICAL EXAM:  T(C): 36.8 (01-18-25 @ 12:21), Max: 38.2 (01-18-25 @ 00:38)  HR: 87 (01-18-25 @ 12:21) (78 - 120)  BP: 99/57 (01-18-25 @ 12:21) (99/57 - 165/75)  RR: 18 (01-18-25 @ 12:21) (18 - 18)  SpO2: 94% (01-18-25 @ 12:21) (94% - 98%)  Wt(kg): --  I&O's Summary    17 Jan 2025 07:01  -  18 Jan 2025 07:00  --------------------------------------------------------  IN: 0 mL / OUT: 250 mL / NET: -250 mL        Cardiovascular: Normal S1 S2, No JVD, No murmurs, No edema  Respiratory: dec bs    Gastrointestinal:  Soft, Non-tender, + BS	  	  Neurologic: Non-focal  Extremities: dec rom,    TELEMETRY: 	    ECG:  	  RADIOLOGY:  OTHER: 	  	  LABS:	 	    CARDIAC MARKERS:                                9.8    9.85  )-----------( 204      ( 18 Jan 2025 08:21 )             31.2     01-18    146[H]  |  110[H]  |  41[H]  ----------------------------<  118[H]  4.0   |  27  |  1.72[H]    Ca    9.1      18 Jan 2025 07:11      proBNP:   Lipid Profile:   HgA1c:   TSH:

## 2025-01-19 LAB
ANION GAP SERPL CALC-SCNC: 10 MMOL/L — SIGNIFICANT CHANGE UP (ref 5–17)
BUN SERPL-MCNC: 52 MG/DL — HIGH (ref 7–23)
CALCIUM SERPL-MCNC: 9.3 MG/DL — SIGNIFICANT CHANGE UP (ref 8.4–10.5)
CHLORIDE SERPL-SCNC: 107 MMOL/L — SIGNIFICANT CHANGE UP (ref 96–108)
CO2 SERPL-SCNC: 27 MMOL/L — SIGNIFICANT CHANGE UP (ref 22–31)
CREAT SERPL-MCNC: 1.7 MG/DL — HIGH (ref 0.5–1.3)
EGFR: 27 ML/MIN/1.73M2 — LOW
GLUCOSE BLDC GLUCOMTR-MCNC: 107 MG/DL — HIGH (ref 70–99)
GLUCOSE BLDC GLUCOMTR-MCNC: 125 MG/DL — HIGH (ref 70–99)
GLUCOSE BLDC GLUCOMTR-MCNC: 85 MG/DL — SIGNIFICANT CHANGE UP (ref 70–99)
GLUCOSE SERPL-MCNC: 114 MG/DL — HIGH (ref 70–99)
HCT VFR BLD CALC: 32 % — LOW (ref 34.5–45)
HGB BLD-MCNC: 9.8 G/DL — LOW (ref 11.5–15.5)
MCHC RBC-ENTMCNC: 30.6 G/DL — LOW (ref 32–36)
MCHC RBC-ENTMCNC: 30.6 PG — SIGNIFICANT CHANGE UP (ref 27–34)
MCV RBC AUTO: 100 FL — SIGNIFICANT CHANGE UP (ref 80–100)
NRBC # BLD: 0 /100 WBCS — SIGNIFICANT CHANGE UP (ref 0–0)
NRBC BLD-RTO: 0 /100 WBCS — SIGNIFICANT CHANGE UP (ref 0–0)
PLATELET # BLD AUTO: 193 K/UL — SIGNIFICANT CHANGE UP (ref 150–400)
POTASSIUM SERPL-MCNC: 4.3 MMOL/L — SIGNIFICANT CHANGE UP (ref 3.5–5.3)
POTASSIUM SERPL-SCNC: 4.3 MMOL/L — SIGNIFICANT CHANGE UP (ref 3.5–5.3)
RBC # BLD: 3.2 M/UL — LOW (ref 3.8–5.2)
RBC # FLD: 15.4 % — HIGH (ref 10.3–14.5)
SODIUM SERPL-SCNC: 144 MMOL/L — SIGNIFICANT CHANGE UP (ref 135–145)
WBC # BLD: 8.65 K/UL — SIGNIFICANT CHANGE UP (ref 3.8–10.5)
WBC # FLD AUTO: 8.65 K/UL — SIGNIFICANT CHANGE UP (ref 3.8–10.5)

## 2025-01-19 PROCEDURE — 71045 X-RAY EXAM CHEST 1 VIEW: CPT | Mod: 26,77

## 2025-01-19 PROCEDURE — 71045 X-RAY EXAM CHEST 1 VIEW: CPT | Mod: 26

## 2025-01-19 RX ORDER — SODIUM CHLORIDE 9 G/ML
1000 INJECTION, SOLUTION INTRAVENOUS
Refills: 0 | Status: DISCONTINUED | OUTPATIENT
Start: 2025-01-19 | End: 2025-01-27

## 2025-01-19 RX ADMIN — LEVOTHYROXINE SODIUM 60 MICROGRAM(S): 25 TABLET ORAL at 22:08

## 2025-01-19 RX ADMIN — IPRATROPIUM BROMIDE AND ALBUTEROL SULFATE 3 MILLILITER(S): .5; 2.5 SOLUTION RESPIRATORY (INHALATION) at 17:50

## 2025-01-19 RX ADMIN — IPRATROPIUM BROMIDE AND ALBUTEROL SULFATE 3 MILLILITER(S): .5; 2.5 SOLUTION RESPIRATORY (INHALATION) at 01:16

## 2025-01-19 RX ADMIN — SODIUM CHLORIDE 50 MILLILITER(S): 9 INJECTION, SOLUTION INTRAVENOUS at 19:50

## 2025-01-19 RX ADMIN — Medication 100 MILLIGRAM(S): at 17:50

## 2025-01-19 RX ADMIN — QUETIAPINE FUMARATE 12.5 MILLIGRAM(S): 300 TABLET ORAL at 22:37

## 2025-01-19 RX ADMIN — IPRATROPIUM BROMIDE AND ALBUTEROL SULFATE 3 MILLILITER(S): .5; 2.5 SOLUTION RESPIRATORY (INHALATION) at 06:06

## 2025-01-19 RX ADMIN — MIRTAZAPINE 15 MILLIGRAM(S): 30 TABLET, FILM COATED ORAL at 22:37

## 2025-01-19 RX ADMIN — ANTISEPTIC SURGICAL SCRUB 1 APPLICATION(S): 0.04 SOLUTION TOPICAL at 06:05

## 2025-01-19 RX ADMIN — Medication 100 MILLIGRAM(S): at 06:05

## 2025-01-19 RX ADMIN — IPRATROPIUM BROMIDE AND ALBUTEROL SULFATE 3 MILLILITER(S): .5; 2.5 SOLUTION RESPIRATORY (INHALATION) at 23:43

## 2025-01-19 RX ADMIN — Medication 5000 UNIT(S): at 18:00

## 2025-01-19 RX ADMIN — IPRATROPIUM BROMIDE AND ALBUTEROL SULFATE 3 MILLILITER(S): .5; 2.5 SOLUTION RESPIRATORY (INHALATION) at 13:07

## 2025-01-19 RX ADMIN — Medication 5000 UNIT(S): at 06:05

## 2025-01-19 RX ADMIN — LEVOTHYROXINE SODIUM 60 MICROGRAM(S): 25 TABLET ORAL at 00:16

## 2025-01-19 RX ADMIN — Medication 10 MILLIGRAM(S): at 13:08

## 2025-01-19 NOTE — PROGRESS NOTE ADULT - SUBJECTIVE AND OBJECTIVE BOX
Patient is a 89y old  Female who presents with a chief complaint of Hypoxic/hypercapnic resp failure (18 Jan 2025 20:09)      DATE OF SERVICE: 01-19-25 @ 12:50    SUBJECTIVE / OVERNIGHT EVENTS: overnight events noted    ROS:  not available   appears a little more alert      MEDICATIONS  (STANDING):  albuterol/ipratropium for Nebulization 3 milliLiter(s) Nebulizer every 6 hours  ascorbic acid 500 milliGRAM(s) Oral daily  bisacodyl Suppository 10 milliGRAM(s) Rectal daily  cefepime   IVPB 1000 milliGRAM(s) IV Intermittent every 12 hours  chlorhexidine 2% Cloths 1 Application(s) Topical <User Schedule>  heparin   Injectable 5000 Unit(s) SubCutaneous every 12 hours  hydrALAZINE 50 milliGRAM(s) Oral two times a day  levothyroxine Injectable 60 MICROGram(s) IV Push at bedtime  metoprolol tartrate 50 milliGRAM(s) Oral two times a day  mirtazapine 15 milliGRAM(s) Oral at bedtime  multivitamin 1 Tablet(s) Oral daily  QUEtiapine 12.5 milliGRAM(s) Oral at bedtime    MEDICATIONS  (PRN):        CAPILLARY BLOOD GLUCOSE      POCT Blood Glucose.: 107 mg/dL (19 Jan 2025 11:31)  POCT Blood Glucose.: 125 mg/dL (19 Jan 2025 05:49)  POCT Blood Glucose.: 148 mg/dL (18 Jan 2025 23:48)  POCT Blood Glucose.: 120 mg/dL (18 Jan 2025 18:34)    I&O's Summary      Vital Signs Last 24 Hrs  T(C): 36.9 (19 Jan 2025 11:38), Max: 37 (18 Jan 2025 16:51)  T(F): 98.4 (19 Jan 2025 11:38), Max: 98.6 (18 Jan 2025 16:51)  HR: 78 (19 Jan 2025 11:38) (70 - 81)  BP: 114/64 (19 Jan 2025 11:38) (88/62 - 118/66)  BP(mean): --  RR: 18 (19 Jan 2025 11:38) (18 - 18)  SpO2: 99% (19 Jan 2025 11:38) (99% - 100%)    PHYSICAL EXAM:   CHEST/LUNG: decreased breath sounds bases    HEART: S1 S2; no murmurs   ABDOMEN: Soft, Nontender  EXTREMITIES: no edema  NEUROLOGY: non-focal  SKIN: No rashes or lesions    LABS:                        9.8    8.65  )-----------( 193      ( 19 Jan 2025 07:30 )             32.0     01-19    144  |  107  |  52[H]  ----------------------------<  114[H]  4.3   |  27  |  1.70[H]    Ca    9.3      19 Jan 2025 07:30            Urinalysis Basic - ( 19 Jan 2025 07:30 )    Color: x / Appearance: x / SG: x / pH: x  Gluc: 114 mg/dL / Ketone: x  / Bili: x / Urobili: x   Blood: x / Protein: x / Nitrite: x   Leuk Esterase: x / RBC: x / WBC x   Sq Epi: x / Non Sq Epi: x / Bacteria: x          All consultant(s) notes reviewed and care discussed with other providers        Contact Number, Dr Link [phone]

## 2025-01-19 NOTE — PROGRESS NOTE ADULT - PROBLEM SELECTOR PLAN 1
on admission resolved  however recurred with fever yesterday  CXR unchanged  continue Cefepime  likely 5 days  continue NG Tube feedings  FEEST study noted  discussed with son he now requests a PEG tube  GI follow up appreciated

## 2025-01-19 NOTE — PROGRESS NOTE ADULT - SUBJECTIVE AND OBJECTIVE BOX
Date of Service: 01-19-25 @ 15:33    Patient is a 89y old  Female who presents with a chief complaint of Hypoxic/hypercapnic resp failure (19 Jan 2025 15:01)      Any change in ROS: lethargic  no sob:  no cough ;  no phlegm      MEDICATIONS  (STANDING):  albuterol/ipratropium for Nebulization 3 milliLiter(s) Nebulizer every 6 hours  ascorbic acid 500 milliGRAM(s) Oral daily  bisacodyl Suppository 10 milliGRAM(s) Rectal daily  cefepime   IVPB 1000 milliGRAM(s) IV Intermittent every 12 hours  chlorhexidine 2% Cloths 1 Application(s) Topical <User Schedule>  heparin   Injectable 5000 Unit(s) SubCutaneous every 12 hours  hydrALAZINE 50 milliGRAM(s) Oral two times a day  levothyroxine Injectable 60 MICROGram(s) IV Push at bedtime  metoprolol tartrate 50 milliGRAM(s) Oral two times a day  mirtazapine 15 milliGRAM(s) Oral at bedtime  multivitamin 1 Tablet(s) Oral daily  QUEtiapine 12.5 milliGRAM(s) Oral at bedtime    MEDICATIONS  (PRN):    Vital Signs Last 24 Hrs  T(C): 36.9 (19 Jan 2025 11:38), Max: 37 (18 Jan 2025 16:51)  T(F): 98.4 (19 Jan 2025 11:38), Max: 98.6 (18 Jan 2025 16:51)  HR: 78 (19 Jan 2025 11:38) (70 - 81)  BP: 114/64 (19 Jan 2025 11:38) (88/62 - 118/66)  BP(mean): --  RR: 18 (19 Jan 2025 11:38) (18 - 18)  SpO2: 99% (19 Jan 2025 11:38) (99% - 100%)    Parameters below as of 19 Jan 2025 11:38  Patient On (Oxygen Delivery Method): nasal cannula        I&O's Summary    19 Jan 2025 07:01  -  19 Jan 2025 15:33  --------------------------------------------------------  IN: 0 mL / OUT: 150 mL / NET: -150 mL          Physical Exam:   GENERAL: NAD, well-groomed, well-developed  HEENT: LUANN/   Atraumatic, Normocephalic  ENMT: No tonsillar erythema, exudates, or enlargement; Moist mucous membranes, Good dentition, No lesions  NECK: Supple, No JVD, Normal thyroid  CHEST/LUNG: Clear to auscultaion  CVS: Regular rate and rhythm; No murmurs, rubs, or gallops  GI: : Soft, Nontender, Nondistended; Bowel sounds present  NERVOUS SYSTEM: Lethargic  EXTREMITIES:  -edema  LYMPH: No lymphadenopathy noted  SKIN: No rashes or lesions  ENDOCRINOLOGY: No Thyromegaly  PSYCH: letahrgic    Labs:  ABG - ( 18 Jan 2025 06:50 )  pH, Arterial: 7.42  pH, Blood: x     /  pCO2: 48    /  pO2: 124   / HCO3: 31    / Base Excess: 5.5   /  SaO2: 99.4            32, 21.0                            9.8    8.65  )-----------( 193      ( 19 Jan 2025 07:30 )             32.0                         9.8    9.85  )-----------( 204      ( 18 Jan 2025 08:21 )             31.2                         10.7   9.62  )-----------( 250      ( 17 Jan 2025 08:55 )             33.3                         11.9   11.46 )-----------( 290      ( 16 Jan 2025 07:08 )             36.4     01-19    144  |  107  |  52[H]  ----------------------------<  114[H]  4.3   |  27  |  1.70[H]  01-18    146[H]  |  110[H]  |  41[H]  ----------------------------<  118[H]  4.0   |  27  |  1.72[H]  01-17    146[H]  |  110[H]  |  36[H]  ----------------------------<  135[H]  4.1   |  24  |  1.59[H]  01-16    144  |  108  |  32[H]  ----------------------------<  147[H]  3.6   |  23  |  1.66[H]    Ca    9.3      19 Jan 2025 07:30  Ca    9.1      18 Jan 2025 07:11      CAPILLARY BLOOD GLUCOSE      POCT Blood Glucose.: 107 mg/dL (19 Jan 2025 11:31)  POCT Blood Glucose.: 125 mg/dL (19 Jan 2025 05:49)  POCT Blood Glucose.: 148 mg/dL (18 Jan 2025 23:48)  POCT Blood Glucose.: 120 mg/dL (18 Jan 2025 18:34)          Urinalysis Basic - ( 19 Jan 2025 07:30 )    Color: x / Appearance: x / SG: x / pH: x  Gluc: 114 mg/dL / Ketone: x  / Bili: x / Urobili: x   Blood: x / Protein: x / Nitrite: x   Leuk Esterase: x / RBC: x / WBC x   Sq Epi: x / Non Sq Epi: x / Bacteria: x            RECENT CULTURES:  01-18 @ 01:49 .Blood BLOOD         < from: Xray Chest 1 View- PORTABLE-Urgent (Xray Chest 1 View- PORTABLE-Urgent .) (01.19.25 @ 09:04) >  ACC: [id number] EXAM:  XR CHEST PORTABLE URGENT 1V   ORDERED BY: SHANELLE BRENNAN     PROCEDURE DATE:  01/19/2025          INTERPRETATION:  EXAMINATION: XR CHEST URGENT    CLINICAL INDICATION: NG tube placement    TECHNIQUE: Single frontal, portable view of the chest was obtained.    COMPARISON: Chest x-ray earlier the same day.    FINDINGS:    Enteric tube has been repositioned with tip now in the stomach.    The heart size is similar.  Unchanged bilateral pleural effusions and hazy lower lunglung opacities.  There is no pneumothorax.    IMPRESSION:  Enteric tube tip in the stomach.    --- End of Report ---          SANDY WAITE MD; Resident Radiologist  This document has been electronically signed.  BRIAN HARDY MD; Attending Interventional Radiologist  This document has been electronically signed. Jan 19 2025 11:54AM    < end of copied text >         No growth at 24 hours          RESPIRATORY CULTURES:          Studies  Chest X-RAY  CT SCAN Chest   Venous Dopplers: LE:   CT Abdomen  Others

## 2025-01-19 NOTE — PROGRESS NOTE ADULT - ASSESSMENT
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 from nursing home via EMS with hypoxic resp failure since 1/9/25 secondary to suspected pneum, treated with HFNC, son endorsed pt was ordered for antibx treatment at time as well. EMS endorsed nursing home facility O2 tank found off, and placed pt on NRB.        In E.D. pt found to be obtunded/unresponsive and to have hypoxic/hypercapnic resp failure with SPO2 of 86-88, Vph of 7.07, VPCO2 90, placed on HFNC with FIO2 of 100%, 50 liter flow with improvement to 92-94% . In addition found to have Influenza A positive, DWAIN with sCr of 1.62 on CKD2 (baseline 1.08-1.23 [8/2024]), transaminitis - alk-phos 577, , , +UA with large leuk-est, neg nitrite. SBP of 98/46 - 107/46. Pt received vanco/zosyn, 1 liter NS IVF bolus, pt placed on norepi gtt. Pt received narcan 0.2 mg IVP x1, albuterol nebs x3.        upon consult POCUS with A line predominant, few focal B lines mostly in Left lung field, small area of consolidation in RLL field, mod/large Lt pleural effusion with LLL field atelectasis. Pt with initial norepi at 0.13 mcg/min, titrated down to 0.05 mcg/min over 35 minutes (130/64 -> 112/64),       Consult called for and pt admitted to MICU for hypercapnic/hypoxic resp failure/septic shock in the setting of possible RLL pneum and UTI  she was treated in MICU for uti , influenza and RLL pneumonia  currently pt is awqke but do not respond to questions:  she is not on bipap at [facility] stime:   currently on 2 L of oxygen            Septic shock.   -likely secondary to pneumonia precipitated by influenza A on admission  continue piperacillin/tazobactam day 4/5 today  complete Tamiflu  aspiration pneumonia   continue NG Tube feedings  NGT is within the GOC per son.    /16: recovered:  doing  ok : on rooma ir:  hemodynamically stable:     1/17: she seems comfortable:  failed feest:   has NGT :   pleasure feeds vs peg      1/18: febrile overnight;  started on cefepime  cxr last night with bibasilar opacities;   on 4 Lof oxygen :  ABG today is pretty good:   family deciding about peg?     1/19:  low grade fever:  cefepime cont:   cxr with small effusion :no change:  for eventual PEG:   abg S GOOD     Acute hypoxic respiratory failure.   -resolving  pulmonary help requested  try to wean off O2.  -she had mild hypercapnic resp failure few days ago : currently off bipap:  if she gets worse restart bipap:   check abg IN AM      1/16: ABG today is excellent   1/17: sao2 is good:  try to wean off oxygen    1/18: sheis still requiring 4 L of oxygen : abg is pretty good:  can try to wean down the oxygen    1/19; SEEMS ok : no sob:  no cough : no phlegm      Atrial fibrillation and flutter.   -new onset per son   has not heard of her having A fib in the past  no anticoagulation per son   heart rate controlled with metoprolol.    1/16 hr seems t obe controlled   1/17: seems ata   1/18: controlled hr   1/19: seems stable     Dementia.   -supportive care  fall precautions.    Hypothyroidism.   - synthroid 60 mcg qd IV.    Hypertension.   will continue metoprolol 50 mg BID via NGT  add hydralazine 50 BID.  blood pressure is OK     CHARO HAHN

## 2025-01-19 NOTE — PROGRESS NOTE ADULT - ASSESSMENT
resp failure  aspiration    plan  ngt for feedings and meds  ivf   to discuss with family clinically improving  iv abs if oeg is desired prior  gerd and aspiration precautions  will need to have a family discussion about goc and long term feeding  will d/w dr escalante and the team    Advanced care planning was discussed with patient and family.  Advanced care planning forms were reviewed and discussed.  Risks, benefits and alternatives of gastroenterologic procedures were discussed in detail and all questions were answered.    30 minutes spent.

## 2025-01-19 NOTE — PROGRESS NOTE ADULT - SUBJECTIVE AND OBJECTIVE BOX
INTERVAL HPI/OVERNIGHT EVENTS:  events noted no n/v/d/  ngt in place   ? responds to verbal   Allergies    clindamycin (Unknown)  shellfish (Unknown)  strawberry (Rash)  penicillin (Unknown)  clindamycin (Other)  strawberry (Unknown)  IV Contrast (Other)  IV Contrast (Unknown)    Intolerances      General:  No wt loss, fevers, chills, night sweats, fatigue,   Eyes:  Good vision, no reported pain  ENT:  No sore throat, pain, runny nose, dysphagia  CV:  No pain, palpitations, hypo/hypertension  Resp:  No dyspnea, cough, tachypnea, wheezing  GI:  No pain, No nausea, No vomiting, No diarrhea, No constipation, No weight loss, No fever, No pruritis, No rectal bleeding, No tarry stools, No dysphagia,  :  No pain, bleeding, incontinence, nocturia  Muscle:  No pain, weakness  Neuro:  No weakness, tingling, memory problems  Psych:  No fatigue, insomnia, mood problems, depression  Endocrine:  No polyuria, polydipsia, cold/heat intolerance  Heme:  No petechiae, ecchymosis, easy bruisability  Skin:  No rash, tattoos, scars, edema      PHYSICAL EXAM:   Vital Signs:  Vital Signs Last 24 Hrs  T(C): 36.9 (19 Jan 2025 11:38), Max: 37 (18 Jan 2025 16:51)  T(F): 98.4 (19 Jan 2025 11:38), Max: 98.6 (18 Jan 2025 16:51)  HR: 78 (19 Jan 2025 11:38) (70 - 81)  BP: 114/64 (19 Jan 2025 11:38) (88/62 - 118/66)  BP(mean): --  RR: 18 (19 Jan 2025 11:38) (18 - 18)  SpO2: 99% (19 Jan 2025 11:38) (99% - 100%)    Parameters below as of 19 Jan 2025 11:38  Patient On (Oxygen Delivery Method): nasal cannula      Daily     Daily I&O's Summary    19 Jan 2025 07:01  -  19 Jan 2025 15:01  --------------------------------------------------------  IN: 0 mL / OUT: 150 mL / NET: -150 mL        GENERAL:  Appears stated age, well-groomed, well-nourished, no distress  HEENT:  NC/AT,  conjunctivae clear and pink, no thyromegaly, nodules, adenopathy, no JVD, sclera -anicteric  CHEST:  Full & symmetric excursion, no increased effort, breath sounds clear  HEART:  Regular rhythm, S1, S2, no murmur/rub/S3/S4, no abdominal bruit, no edema  ABDOMEN:  Soft, non-tender, non-distended, normoactive bowel sounds,  no masses ,no hepato-splenomegaly, no signs of chronic liver disease  EXTEREMITIES:  no cyanosis,clubbing or edema  SKIN:  No rash/erythema/ecchymoses/petechiae/wounds/abscess/warm/dry  NEURO:  Alert, oriented, no asterixis, no tremor, no encephalopathy      LABS:                        9.8    8.65  )-----------( 193      ( 19 Jan 2025 07:30 )             32.0     01-19    144  |  107  |  52[H]  ----------------------------<  114[H]  4.3   |  27  |  1.70[H]    Ca    9.3      19 Jan 2025 07:30        Urinalysis Basic - ( 19 Jan 2025 07:30 )    Color: x / Appearance: x / SG: x / pH: x  Gluc: 114 mg/dL / Ketone: x  / Bili: x / Urobili: x   Blood: x / Protein: x / Nitrite: x   Leuk Esterase: x / RBC: x / WBC x   Sq Epi: x / Non Sq Epi: x / Bacteria: x      amylase   lipase  RADIOLOGY & ADDITIONAL TESTS:

## 2025-01-20 LAB
ANION GAP SERPL CALC-SCNC: 8 MMOL/L — SIGNIFICANT CHANGE UP (ref 5–17)
BUN SERPL-MCNC: 47 MG/DL — HIGH (ref 7–23)
CALCIUM SERPL-MCNC: 9.5 MG/DL — SIGNIFICANT CHANGE UP (ref 8.4–10.5)
CHLORIDE SERPL-SCNC: 107 MMOL/L — SIGNIFICANT CHANGE UP (ref 96–108)
CO2 SERPL-SCNC: 28 MMOL/L — SIGNIFICANT CHANGE UP (ref 22–31)
CREAT SERPL-MCNC: 1.51 MG/DL — HIGH (ref 0.5–1.3)
EGFR: 31 ML/MIN/1.73M2 — LOW
GLUCOSE BLDC GLUCOMTR-MCNC: 105 MG/DL — HIGH (ref 70–99)
GLUCOSE BLDC GLUCOMTR-MCNC: 113 MG/DL — HIGH (ref 70–99)
GLUCOSE BLDC GLUCOMTR-MCNC: 122 MG/DL — HIGH (ref 70–99)
GLUCOSE SERPL-MCNC: 106 MG/DL — HIGH (ref 70–99)
HCT VFR BLD CALC: 29.9 % — LOW (ref 34.5–45)
HGB BLD-MCNC: 9.2 G/DL — LOW (ref 11.5–15.5)
MCHC RBC-ENTMCNC: 30.4 PG — SIGNIFICANT CHANGE UP (ref 27–34)
MCHC RBC-ENTMCNC: 30.8 G/DL — LOW (ref 32–36)
MCV RBC AUTO: 98.7 FL — SIGNIFICANT CHANGE UP (ref 80–100)
NRBC # BLD: 0 /100 WBCS — SIGNIFICANT CHANGE UP (ref 0–0)
NRBC BLD-RTO: 0 /100 WBCS — SIGNIFICANT CHANGE UP (ref 0–0)
PLATELET # BLD AUTO: 167 K/UL — SIGNIFICANT CHANGE UP (ref 150–400)
POTASSIUM SERPL-MCNC: 4.4 MMOL/L — SIGNIFICANT CHANGE UP (ref 3.5–5.3)
POTASSIUM SERPL-SCNC: 4.4 MMOL/L — SIGNIFICANT CHANGE UP (ref 3.5–5.3)
RBC # BLD: 3.03 M/UL — LOW (ref 3.8–5.2)
RBC # FLD: 15.4 % — HIGH (ref 10.3–14.5)
SODIUM SERPL-SCNC: 143 MMOL/L — SIGNIFICANT CHANGE UP (ref 135–145)
WBC # BLD: 7.5 K/UL — SIGNIFICANT CHANGE UP (ref 3.8–10.5)
WBC # FLD AUTO: 7.5 K/UL — SIGNIFICANT CHANGE UP (ref 3.8–10.5)

## 2025-01-20 RX ORDER — ACETYLCYSTEINE 200 MG/ML
3 VIAL (ML) MISCELLANEOUS EVERY 6 HOURS
Refills: 0 | Status: COMPLETED | OUTPATIENT
Start: 2025-01-20 | End: 2025-01-23

## 2025-01-20 RX ADMIN — Medication 5000 UNIT(S): at 06:05

## 2025-01-20 RX ADMIN — Medication 50 MILLIGRAM(S): at 17:05

## 2025-01-20 RX ADMIN — QUETIAPINE FUMARATE 12.5 MILLIGRAM(S): 300 TABLET ORAL at 22:13

## 2025-01-20 RX ADMIN — LEVOTHYROXINE SODIUM 60 MICROGRAM(S): 25 TABLET ORAL at 22:13

## 2025-01-20 RX ADMIN — Medication 5000 UNIT(S): at 17:06

## 2025-01-20 RX ADMIN — Medication 1 TABLET(S): at 12:07

## 2025-01-20 RX ADMIN — ANTISEPTIC SURGICAL SCRUB 1 APPLICATION(S): 0.04 SOLUTION TOPICAL at 06:05

## 2025-01-20 RX ADMIN — Medication 50 MILLIGRAM(S): at 06:05

## 2025-01-20 RX ADMIN — Medication 50 MILLIGRAM(S): at 17:06

## 2025-01-20 RX ADMIN — IPRATROPIUM BROMIDE AND ALBUTEROL SULFATE 3 MILLILITER(S): .5; 2.5 SOLUTION RESPIRATORY (INHALATION) at 12:07

## 2025-01-20 RX ADMIN — IPRATROPIUM BROMIDE AND ALBUTEROL SULFATE 3 MILLILITER(S): .5; 2.5 SOLUTION RESPIRATORY (INHALATION) at 06:05

## 2025-01-20 RX ADMIN — Medication 100 MILLIGRAM(S): at 17:06

## 2025-01-20 RX ADMIN — IPRATROPIUM BROMIDE AND ALBUTEROL SULFATE 3 MILLILITER(S): .5; 2.5 SOLUTION RESPIRATORY (INHALATION) at 17:05

## 2025-01-20 RX ADMIN — Medication 10 MILLIGRAM(S): at 12:08

## 2025-01-20 RX ADMIN — Medication 3 MILLILITER(S): at 17:05

## 2025-01-20 RX ADMIN — MIRTAZAPINE 15 MILLIGRAM(S): 30 TABLET, FILM COATED ORAL at 22:13

## 2025-01-20 RX ADMIN — Medication 500 MILLIGRAM(S): at 12:08

## 2025-01-20 RX ADMIN — Medication 100 MILLIGRAM(S): at 06:04

## 2025-01-20 NOTE — PROGRESS NOTE ADULT - SUBJECTIVE AND OBJECTIVE BOX
Date of Service: 01-20-25 @ 13:56    Patient is a 89y old  Female who presents with a chief complaint of Hypoxic/hypercapnic resp failure (19 Jan 2025 15:33)      Any change in ROS:   ROS limited  Breathing nonlabored  O2 sats 98% on 5LNC    MEDICATIONS  (STANDING):  albuterol/ipratropium for Nebulization 3 milliLiter(s) Nebulizer every 6 hours  ascorbic acid 500 milliGRAM(s) Oral daily  bisacodyl Suppository 10 milliGRAM(s) Rectal daily  cefepime   IVPB 1000 milliGRAM(s) IV Intermittent every 12 hours  chlorhexidine 2% Cloths 1 Application(s) Topical <User Schedule>  dextrose 5%. 1000 milliLiter(s) (50 mL/Hr) IV Continuous <Continuous>  heparin   Injectable 5000 Unit(s) SubCutaneous every 12 hours  hydrALAZINE 50 milliGRAM(s) Oral two times a day  levothyroxine Injectable 60 MICROGram(s) IV Push at bedtime  metoprolol tartrate 50 milliGRAM(s) Oral two times a day  mirtazapine 15 milliGRAM(s) Oral at bedtime  multivitamin 1 Tablet(s) Oral daily  QUEtiapine 12.5 milliGRAM(s) Oral at bedtime    MEDICATIONS  (PRN):    Vital Signs Last 24 Hrs  T(C): 36.3 (20 Jan 2025 12:41), Max: 36.9 (19 Jan 2025 20:50)  T(F): 97.3 (20 Jan 2025 12:41), Max: 98.4 (19 Jan 2025 20:50)  HR: 67 (20 Jan 2025 12:41) (67 - 90)  BP: 128/76 (20 Jan 2025 12:41) (104/67 - 145/71)  BP(mean): --  RR: 18 (20 Jan 2025 12:41) (18 - 18)  SpO2: 100% (20 Jan 2025 12:41) (96% - 100%)    Parameters below as of 20 Jan 2025 12:41  Patient On (Oxygen Delivery Method): nasal cannula  O2 Flow (L/min): 5      I&O's Summary    19 Jan 2025 07:01  -  20 Jan 2025 07:00  --------------------------------------------------------  IN: 0 mL / OUT: 400 mL / NET: -400 mL          Physical Exam:   GENERAL: NAD  HEENT: LUANN  ENMT: No tonsillar erythema, exudates, or enlargement  NECK: Supple, No JVD, Normal thyroid  CHEST/LUNG: b/l rhonchi   CVS: Regular rate and rhythm; No murmurs, rubs, or gallops  GI: : Soft, Nontender, Nondistended; Bowel sounds present  NERVOUS SYSTEM:  lethargic   EXTREMITIES:  2+ Peripheral Pulses, No clubbing, cyanosis, or edema  LYMPH: No lymphadenopathy noted  SKIN: No rashes or lesions  ENDOCRINOLOGY: No Thyromegaly    Labs:  32                            9.2    7.50  )-----------( 167      ( 20 Jan 2025 12:06 )             29.9                         9.8    8.65  )-----------( 193      ( 19 Jan 2025 07:30 )             32.0                         9.8    9.85  )-----------( 204      ( 18 Jan 2025 08:21 )             31.2                         10.7   9.62  )-----------( 250      ( 17 Jan 2025 08:55 )             33.3     01-20    143  |  107  |  47[H]  ----------------------------<  106[H]  4.4   |  28  |  1.51[H]  01-19    144  |  107  |  52[H]  ----------------------------<  114[H]  4.3   |  27  |  1.70[H]  01-18    146[H]  |  110[H]  |  41[H]  ----------------------------<  118[H]  4.0   |  27  |  1.72[H]  01-17    146[H]  |  110[H]  |  36[H]  ----------------------------<  135[H]  4.1   |  24  |  1.59[H]    Ca    9.5      20 Jan 2025 12:06  Ca    9.3      19 Jan 2025 07:30      CAPILLARY BLOOD GLUCOSE      POCT Blood Glucose.: 122 mg/dL (20 Jan 2025 06:08)  POCT Blood Glucose.: 105 mg/dL (20 Jan 2025 00:15)  POCT Blood Glucose.: 85 mg/dL (19 Jan 2025 17:56)          Urinalysis Basic - ( 20 Jan 2025 12:06 )    Color: x / Appearance: x / SG: x / pH: x  Gluc: 106 mg/dL / Ketone: x  / Bili: x / Urobili: x   Blood: x / Protein: x / Nitrite: x   Leuk Esterase: x / RBC: x / WBC x   Sq Epi: x / Non Sq Epi: x / Bacteria: x            RECENT CULTURES:  01-18 @ 01:49 .Blood BLOOD                No growth at 48 Hours      Studies  < from: Xray Chest 1 View- PORTABLE-Urgent (Xray Chest 1 View- PORTABLE-Urgent .) (01.19.25 @ 09:04) >    INTERPRETATION:  EXAMINATION: XR CHEST URGENT    CLINICAL INDICATION: NG tube placement    TECHNIQUE: Single frontal, portable view of the chest was obtained.    COMPARISON: Chest x-ray earlier the same day.    FINDINGS:    Enteric tube has been repositioned with tip now in the stomach.    The heart size is similar.  Unchanged bilateral pleural effusions and hazy lower lunglung opacities.  There is no pneumothorax.    IMPRESSION:  Enteric tube tip in the stomach.    --- End of Report ---      < end of copied text >

## 2025-01-20 NOTE — PROGRESS NOTE ADULT - SUBJECTIVE AND OBJECTIVE BOX
INTERVAL HPI/OVERNIGHT EVENTS:  events noted  ngt in place     Allergies    clindamycin (Unknown)  shellfish (Unknown)  strawberry (Rash)  penicillin (Unknown)  clindamycin (Other)  strawberry (Unknown)  IV Contrast (Other)  IV Contrast (Unknown)    Intolerances      unable to obtain      PHYSICAL EXAM:   Vital Signs:  Vital Signs Last 24 Hrs  T(C): 36.9 (19 Jan 2025 11:38), Max: 37 (18 Jan 2025 16:51)  T(F): 98.4 (19 Jan 2025 11:38), Max: 98.6 (18 Jan 2025 16:51)  HR: 78 (19 Jan 2025 11:38) (70 - 81)  BP: 114/64 (19 Jan 2025 11:38) (88/62 - 118/66)  BP(mean): --  RR: 18 (19 Jan 2025 11:38) (18 - 18)  SpO2: 99% (19 Jan 2025 11:38) (99% - 100%)    Parameters below as of 19 Jan 2025 11:38  Patient On (Oxygen Delivery Method): nasal cannula      Daily     Daily I&O's Summary    19 Jan 2025 07:01  -  19 Jan 2025 15:01  --------------------------------------------------------  IN: 0 mL / OUT: 150 mL / NET: -150 mL      nad  confused  frail  non toxic  soft, nt  no edema        LABS:                        9.8    8.65  )-----------( 193      ( 19 Jan 2025 07:30 )             32.0     01-19    144  |  107  |  52[H]  ----------------------------<  114[H]  4.3   |  27  |  1.70[H]    Ca    9.3      19 Jan 2025 07:30        Urinalysis Basic - ( 19 Jan 2025 07:30 )    Color: x / Appearance: x / SG: x / pH: x  Gluc: 114 mg/dL / Ketone: x  / Bili: x / Urobili: x   Blood: x / Protein: x / Nitrite: x   Leuk Esterase: x / RBC: x / WBC x   Sq Epi: x / Non Sq Epi: x / Bacteria: x      amylase   lipase  RADIOLOGY & ADDITIONAL TESTS:

## 2025-01-20 NOTE — PROGRESS NOTE ADULT - PROBLEM SELECTOR PLAN 1
resolved  continue cefepime day 3/5 today  appears more alert and interactive  will request repeat speech and swallow eval   GI note appreciated

## 2025-01-20 NOTE — PROGRESS NOTE ADULT - ASSESSMENT
resp failure  aspiration    plan  ngt for feedings and meds  ivf   to discuss with family clinically improving  gerd and aspiration precautions  case d/w son  will plan for peg later this week pending endoscopy availability       Advanced care planning was discussed with patient and family.  Advanced care planning forms were reviewed and discussed.  Risks, benefits and alternatives of gastroenterologic procedures were discussed in detail and all questions were answered.    30 minutes spent.

## 2025-01-20 NOTE — PROGRESS NOTE ADULT - ASSESSMENT
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 Admitted to MICU with hypercapnic/hypoxic resp failure/septic shock in the setting of RLL PNA and UTI. Also found to have Influenza.       Acute hypoxic respiratory failure  -2nd to PNA  -Continue bronchodilators  -Keep sats >90% with O2. Wean o2 as tolerated.  -Start Mucomyst 20% 3ml q6h x3 days to assist with expectoration of secretions   -ABG acceptable pt off bipap     PNA  -? aspiration related, Pt also s/p flu   -Completed ABX, now resumed as pt spiked temp. CXR with bibasilar opacities   -Continue bronchodilators  -Keep sats >90% with O2. Wean o2 as tolerated.  -Start Mucomyst 20% 3ml q6h x3 days to assist with expectoration of secretions     Septic shock  -In the setting of PNA, UTI, Flu  -Now resolved    Dysphagia  -Plans for PEG if aligns with GOC  -Aspiration precautions  -Oral care     Atrial fibrillation and flutter.   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 Admitted to MICU with hypercapnic/hypoxic resp failure/septic shock in the setting of RLL PNA and UTI. Also found to have Influenza.       Acute hypoxic respiratory failure  -2nd to PNA  -Continue bronchodilators  -Keep sats >90% with O2. Wean o2 as tolerated.  -Start Mucomyst 20% 3ml q6h x3 days to assist with expectoration of secretions   -ABG acceptable pt off bipap     PNA  -? aspiration related, Pt also s/p flu   -Completed ABX, now resumed as pt spiked temp. CXR with bibasilar opacities   -Continue bronchodilators  -Keep sats >90% with O2. Wean o2 as tolerated.  -Start Mucomyst 20% 3ml q6h x3 days to assist with expectoration of secretions     Septic shock  -In the setting of PNA, UTI, Flu  -Now resolved    Dysphagia  -TF via NGT, ? Plans for PEG if aligns with GOC. GI f/u  -Aspiration precautions  -Oral care     Atrial fibrillation and flutter.   -Controlled    Dementia  -supportive care

## 2025-01-21 LAB
ALBUMIN SERPL ELPH-MCNC: 2.6 G/DL — LOW (ref 3.3–5)
ALP SERPL-CCNC: 250 U/L — HIGH (ref 40–120)
ALT FLD-CCNC: 27 U/L — SIGNIFICANT CHANGE UP (ref 10–45)
ANION GAP SERPL CALC-SCNC: 8 MMOL/L — SIGNIFICANT CHANGE UP (ref 5–17)
AST SERPL-CCNC: 25 U/L — SIGNIFICANT CHANGE UP (ref 10–40)
BASOPHILS # BLD AUTO: 0.05 K/UL — SIGNIFICANT CHANGE UP (ref 0–0.2)
BASOPHILS NFR BLD AUTO: 0.7 % — SIGNIFICANT CHANGE UP (ref 0–2)
BILIRUB SERPL-MCNC: 0.3 MG/DL — SIGNIFICANT CHANGE UP (ref 0.2–1.2)
BUN SERPL-MCNC: 44 MG/DL — HIGH (ref 7–23)
CALCIUM SERPL-MCNC: 9.4 MG/DL — SIGNIFICANT CHANGE UP (ref 8.4–10.5)
CHLORIDE SERPL-SCNC: 101 MMOL/L — SIGNIFICANT CHANGE UP (ref 96–108)
CO2 SERPL-SCNC: 28 MMOL/L — SIGNIFICANT CHANGE UP (ref 22–31)
CREAT SERPL-MCNC: 1.29 MG/DL — SIGNIFICANT CHANGE UP (ref 0.5–1.3)
EGFR: 38 ML/MIN/1.73M2 — LOW
EOSINOPHIL # BLD AUTO: 0.15 K/UL — SIGNIFICANT CHANGE UP (ref 0–0.5)
EOSINOPHIL NFR BLD AUTO: 2 % — SIGNIFICANT CHANGE UP (ref 0–6)
GLUCOSE BLDC GLUCOMTR-MCNC: 139 MG/DL — HIGH (ref 70–99)
GLUCOSE BLDC GLUCOMTR-MCNC: 150 MG/DL — HIGH (ref 70–99)
GLUCOSE BLDC GLUCOMTR-MCNC: 153 MG/DL — HIGH (ref 70–99)
GLUCOSE BLDC GLUCOMTR-MCNC: 173 MG/DL — HIGH (ref 70–99)
GLUCOSE SERPL-MCNC: 142 MG/DL — HIGH (ref 70–99)
HCT VFR BLD CALC: 31.2 % — LOW (ref 34.5–45)
HGB BLD-MCNC: 9.8 G/DL — LOW (ref 11.5–15.5)
IMM GRANULOCYTES NFR BLD AUTO: 0.5 % — SIGNIFICANT CHANGE UP (ref 0–0.9)
LYMPHOCYTES # BLD AUTO: 0.93 K/UL — LOW (ref 1–3.3)
LYMPHOCYTES # BLD AUTO: 12.4 % — LOW (ref 13–44)
MCHC RBC-ENTMCNC: 30.9 PG — SIGNIFICANT CHANGE UP (ref 27–34)
MCHC RBC-ENTMCNC: 31.4 G/DL — LOW (ref 32–36)
MCV RBC AUTO: 98.4 FL — SIGNIFICANT CHANGE UP (ref 80–100)
MONOCYTES # BLD AUTO: 0.83 K/UL — SIGNIFICANT CHANGE UP (ref 0–0.9)
MONOCYTES NFR BLD AUTO: 11.1 % — SIGNIFICANT CHANGE UP (ref 2–14)
NEUTROPHILS # BLD AUTO: 5.5 K/UL — SIGNIFICANT CHANGE UP (ref 1.8–7.4)
NEUTROPHILS NFR BLD AUTO: 73.3 % — SIGNIFICANT CHANGE UP (ref 43–77)
NRBC # BLD: 0 /100 WBCS — SIGNIFICANT CHANGE UP (ref 0–0)
NRBC BLD-RTO: 0 /100 WBCS — SIGNIFICANT CHANGE UP (ref 0–0)
PLATELET # BLD AUTO: 185 K/UL — SIGNIFICANT CHANGE UP (ref 150–400)
POTASSIUM SERPL-MCNC: 4.3 MMOL/L — SIGNIFICANT CHANGE UP (ref 3.5–5.3)
POTASSIUM SERPL-SCNC: 4.3 MMOL/L — SIGNIFICANT CHANGE UP (ref 3.5–5.3)
PROT SERPL-MCNC: 6.6 G/DL — SIGNIFICANT CHANGE UP (ref 6–8.3)
RBC # BLD: 3.17 M/UL — LOW (ref 3.8–5.2)
RBC # FLD: 15.3 % — HIGH (ref 10.3–14.5)
SODIUM SERPL-SCNC: 137 MMOL/L — SIGNIFICANT CHANGE UP (ref 135–145)
WBC # BLD: 7.5 K/UL — SIGNIFICANT CHANGE UP (ref 3.8–10.5)
WBC # FLD AUTO: 7.5 K/UL — SIGNIFICANT CHANGE UP (ref 3.8–10.5)

## 2025-01-21 RX ORDER — BACTERIOSTATIC SODIUM CHLORIDE 0.9 %
4 VIAL (ML) INJECTION EVERY 12 HOURS
Refills: 0 | Status: DISCONTINUED | OUTPATIENT
Start: 2025-01-21 | End: 2025-01-24

## 2025-01-21 RX ORDER — IPRATROPIUM BROMIDE AND ALBUTEROL SULFATE .5; 2.5 MG/3ML; MG/3ML
3 SOLUTION RESPIRATORY (INHALATION) ONCE
Refills: 0 | Status: DISCONTINUED | OUTPATIENT
Start: 2025-01-21 | End: 2025-02-12

## 2025-01-21 RX ORDER — ACETAMINOPHEN 160 MG/5ML
1000 SUSPENSION ORAL ONCE
Refills: 0 | Status: COMPLETED | OUTPATIENT
Start: 2025-01-21 | End: 2025-01-21

## 2025-01-21 RX ADMIN — Medication 100 MILLIGRAM(S): at 05:37

## 2025-01-21 RX ADMIN — IPRATROPIUM BROMIDE AND ALBUTEROL SULFATE 3 MILLILITER(S): .5; 2.5 SOLUTION RESPIRATORY (INHALATION) at 05:53

## 2025-01-21 RX ADMIN — MIRTAZAPINE 15 MILLIGRAM(S): 30 TABLET, FILM COATED ORAL at 21:29

## 2025-01-21 RX ADMIN — Medication 5000 UNIT(S): at 05:56

## 2025-01-21 RX ADMIN — Medication 50 MILLIGRAM(S): at 18:40

## 2025-01-21 RX ADMIN — Medication 4 MILLILITER(S): at 23:38

## 2025-01-21 RX ADMIN — Medication 3 MILLILITER(S): at 23:58

## 2025-01-21 RX ADMIN — Medication 10 MILLIGRAM(S): at 12:53

## 2025-01-21 RX ADMIN — Medication 100 MILLIGRAM(S): at 18:40

## 2025-01-21 RX ADMIN — IPRATROPIUM BROMIDE AND ALBUTEROL SULFATE 3 MILLILITER(S): .5; 2.5 SOLUTION RESPIRATORY (INHALATION) at 23:59

## 2025-01-21 RX ADMIN — Medication 50 MILLIGRAM(S): at 05:56

## 2025-01-21 RX ADMIN — ACETAMINOPHEN 1000 MILLIGRAM(S): 160 SUSPENSION ORAL at 05:30

## 2025-01-21 RX ADMIN — ACETAMINOPHEN 400 MILLIGRAM(S): 160 SUSPENSION ORAL at 04:57

## 2025-01-21 RX ADMIN — Medication 500 MILLIGRAM(S): at 12:53

## 2025-01-21 RX ADMIN — QUETIAPINE FUMARATE 12.5 MILLIGRAM(S): 300 TABLET ORAL at 21:29

## 2025-01-21 RX ADMIN — IPRATROPIUM BROMIDE AND ALBUTEROL SULFATE 3 MILLILITER(S): .5; 2.5 SOLUTION RESPIRATORY (INHALATION) at 13:18

## 2025-01-21 RX ADMIN — IPRATROPIUM BROMIDE AND ALBUTEROL SULFATE 3 MILLILITER(S): .5; 2.5 SOLUTION RESPIRATORY (INHALATION) at 18:40

## 2025-01-21 RX ADMIN — LEVOTHYROXINE SODIUM 60 MICROGRAM(S): 25 TABLET ORAL at 21:29

## 2025-01-21 RX ADMIN — Medication 3 MILLILITER(S): at 12:52

## 2025-01-21 RX ADMIN — Medication 3 MILLILITER(S): at 05:53

## 2025-01-21 RX ADMIN — Medication 3 MILLILITER(S): at 18:41

## 2025-01-21 RX ADMIN — Medication 1 TABLET(S): at 12:52

## 2025-01-21 RX ADMIN — SODIUM CHLORIDE 50 MILLILITER(S): 9 INJECTION, SOLUTION INTRAVENOUS at 12:42

## 2025-01-21 RX ADMIN — Medication 5000 UNIT(S): at 18:40

## 2025-01-21 RX ADMIN — ANTISEPTIC SURGICAL SCRUB 1 APPLICATION(S): 0.04 SOLUTION TOPICAL at 06:45

## 2025-01-21 NOTE — CHART NOTE - NSCHARTNOTEFT_GEN_A_CORE
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 Pt found to have DWAIN on CKD, Influenza A +, UTI +, possible RLL pneum with consolidation appreciated on POCUS. Pt admitted to MICU for hypercapnic/hypoxic resp failure/septic shock in the setting of possible RLL pneum and UTI now on medical floor.    **Seen by this service initially, 1/13, with recommendations NPO, as mentation did not support oral trials or PO feeding. NGT placed by team 1/13.     1/16 - Seen for swallow re-eval. Demonstrated improved mentation and orientation to feeding tasks, Coughing post-intake of crushed ice and moderately thick liquid before initiation of swallow trigger. Recommendations for NPO and plan for FEES.     1/17 - SLP completed FEES - > recommended NPO, with non-oral nutrition/hydration/medications if in keeping with pt and family wishes. Pt with multiple risk factors for aspiration including poor head/neck/trunk control, extremely weak congested cough, overall debilitated state with baseline penetration of secretions and thick aspiration secretions intermittently seen in trachea.    1/20 - Per EMR, patient's son now agreeable to PEG. GI -> "will plan for PEG later this week."     TODAY, 1/21, order received and chart reviewed. Per EMR, increasing O2 requirements due to 1/18 - hypoxic to 84% SPO2 on 2L O2. Patient received awake in bed; +NGT; 4.5L O2 via NC; +b/l mittens in place; +Weak, congested cough; +Garbled speech; able to follow simple commands. Mentation stable, AAOx1 (self). Oral care provided via toothette. Trialed with 1x crushed ice chip. Demonstrating reduced oral grading, uncontrolled anterior loss, suspect delayed oral transit time, immediate wet cough noted post-intake which may be suggestive of laryngeal penetration/aspiration. Baseline cough makes behavioral analysis of swallow function difficult to a degree. No further PO trials administered. Purposeful proactive rounding reinforced and 5 Ps addressed. Pt left in no distress.     Impressions: Patient presents with an oropharyngeal dysphagia likely superimposed upon by baseline weak, congested cough and immediate, wet cough following limited trials. Patient is at high risk for aspiration and should remain NPO, with non-oral nutrition/hydration/medications at this time if in keeping with patient and family wishes. Strongly suggest palliative consult for GOC re: long term nutrition/hydration/medications.     Recommendations:  1. Continue NPO, with non-oral nutrition/hydration/medications if in keeping with patient and family wishes; strongly consider palliative consult for GOC re: long term nutrition/hydration/meds  2. maintain aspiration precautions for secretions and if enteral feeds are initiated.   3. this service will continue to follow pending patient/family decision re: GOC.     d/w NP Sharri Lott, CCC-SLP ([employer])

## 2025-01-21 NOTE — PROGRESS NOTE ADULT - PROBLEM SELECTOR PLAN 1
resolved  continue cefepime day 4/5 today  appears more alert   repeat speech and swallow noted  not cleared for po   GI note appreciated  awaiting PEG

## 2025-01-21 NOTE — PROGRESS NOTE ADULT - ASSESSMENT
resp failure  aspiration    plan  ngt for feedings and meds  ivf   to discuss with family clinically improving  gerd and aspiration precautions  case d/w son  will plan for peg later this week when oxygen requirement improves      Advanced care planning was discussed with patient and family.  Advanced care planning forms were reviewed and discussed.  Risks, benefits and alternatives of gastroenterologic procedures were discussed in detail and all questions were answered.    30 minutes spent.

## 2025-01-21 NOTE — PROGRESS NOTE ADULT - SUBJECTIVE AND OBJECTIVE BOX
Date of Service: 01-21-25 @ 14:34    Patient is a 89y old  Female who presents with a chief complaint of Hypoxic/hypercapnic resp failure (20 Jan 2025 16:51)      Any change in ROS:   More alert today but confused  O2 sats 96% on 5LNC    MEDICATIONS  (STANDING):  acetylcysteine 20%  Inhalation 3 milliLiter(s) Inhalation every 6 hours  albuterol/ipratropium for Nebulization 3 milliLiter(s) Nebulizer every 6 hours  ascorbic acid 500 milliGRAM(s) Oral daily  bisacodyl Suppository 10 milliGRAM(s) Rectal daily  cefepime   IVPB 1000 milliGRAM(s) IV Intermittent every 12 hours  chlorhexidine 2% Cloths 1 Application(s) Topical <User Schedule>  dextrose 5%. 1000 milliLiter(s) (50 mL/Hr) IV Continuous <Continuous>  heparin   Injectable 5000 Unit(s) SubCutaneous every 12 hours  hydrALAZINE 50 milliGRAM(s) Oral two times a day  levothyroxine Injectable 60 MICROGram(s) IV Push at bedtime  metoprolol tartrate 50 milliGRAM(s) Oral two times a day  mirtazapine 15 milliGRAM(s) Oral at bedtime  multivitamin 1 Tablet(s) Oral daily  QUEtiapine 12.5 milliGRAM(s) Oral at bedtime    MEDICATIONS  (PRN):    Vital Signs Last 24 Hrs  T(C): 36.2 (21 Jan 2025 12:23), Max: 36.5 (20 Jan 2025 19:50)  T(F): 97.2 (21 Jan 2025 12:23), Max: 97.7 (20 Jan 2025 19:50)  HR: 87 (21 Jan 2025 12:23) (80 - 95)  BP: 143/69 (21 Jan 2025 12:23) (100/60 - 143/69)  BP(mean): --  RR: 18 (21 Jan 2025 12:23) (18 - 18)  SpO2: 96% (21 Jan 2025 12:23) (96% - 100%)    Parameters below as of 21 Jan 2025 12:23  Patient On (Oxygen Delivery Method): room air        I&O's Summary    20 Jan 2025 07:01  -  21 Jan 2025 07:00  --------------------------------------------------------  IN: 0 mL / OUT: 400 mL / NET: -400 mL          Physical Exam:   GENERAL: NAD  PAULA: LUANN  ENMT: No tonsillar erythema, exudates, or enlargement  NECK: Supple, No JVD  CHEST/LUNG: Coarse bs b/l   CVS: Regular rate and rhythm  GI: : Soft, Nontender, Nondistended  NERVOUS SYSTEM:  Alert; confused   EXTREMITIES:  2+ Peripheral Pulses, No clubbing, cyanosis, or edema  SKIN: No rashes or lesions  PSYCH: Appropriate    Labs:  32                            9.8    7.50  )-----------( 185      ( 21 Jan 2025 13:15 )             31.2                         9.2    7.50  )-----------( 167      ( 20 Jan 2025 12:06 )             29.9                         9.8    8.65  )-----------( 193      ( 19 Jan 2025 07:30 )             32.0                         9.8    9.85  )-----------( 204      ( 18 Jan 2025 08:21 )             31.2     01-21    137  |  101  |  44[H]  ----------------------------<  142[H]  4.3   |  28  |  1.29  01-20    143  |  107  |  47[H]  ----------------------------<  106[H]  4.4   |  28  |  1.51[H]  01-19    144  |  107  |  52[H]  ----------------------------<  114[H]  4.3   |  27  |  1.70[H]  01-18    146[H]  |  110[H]  |  41[H]  ----------------------------<  118[H]  4.0   |  27  |  1.72[H]    Ca    9.4      21 Jan 2025 13:15  Ca    9.5      20 Jan 2025 12:06    TPro  6.6  /  Alb  2.6[L]  /  TBili  0.3  /  DBili  x   /  AST  25  /  ALT  27  /  AlkPhos  250[H]  01-21    CAPILLARY BLOOD GLUCOSE      POCT Blood Glucose.: 153 mg/dL (21 Jan 2025 12:24)  POCT Blood Glucose.: 173 mg/dL (21 Jan 2025 05:35)  POCT Blood Glucose.: 150 mg/dL (21 Jan 2025 00:55)  POCT Blood Glucose.: 113 mg/dL (20 Jan 2025 17:55)      LIVER FUNCTIONS - ( 21 Jan 2025 13:15 )  Alb: 2.6 g/dL / Pro: 6.6 g/dL / ALK PHOS: 250 U/L / ALT: 27 U/L / AST: 25 U/L / GGT: x             Urinalysis Basic - ( 21 Jan 2025 13:15 )    Color: x / Appearance: x / SG: x / pH: x  Gluc: 142 mg/dL / Ketone: x  / Bili: x / Urobili: x   Blood: x / Protein: x / Nitrite: x   Leuk Esterase: x / RBC: x / WBC x   Sq Epi: x / Non Sq Epi: x / Bacteria: x            RECENT CULTURES:  01-18 @ 01:49 .Blood BLOOD                No growth at 72 Hours      Studies  ct< from: Xray Chest 1 View- PORTABLE-Urgent (Xray Chest 1 View- PORTABLE-Urgent .) (01.19.25 @ 22:00) >  PROCEDURE DATE:  01/19/2025          INTERPRETATION:  Prelim  impression:  Enteric tube with tip in the stomach.    DATE OF STUDY: 1/19/25 at 9:29PM    PRIOR: Earlier 1/19/25 exam    CLINICAL INDICATION: Assess NG tube    TECHNIQUE: AP radiograph of the chest.    FINDINGS:  Enteric tube with tip in the stomach.  Heart size is similar.  Unchanged bilateral pleural effusions and hazy lower lung opacities.  Thereis no pneumothorax.    IMPRESSION:    Enteric tube with tip in the stomach.    --- End of Report ---        < end of copied text >

## 2025-01-21 NOTE — PROGRESS NOTE ADULT - SUBJECTIVE AND OBJECTIVE BOX
Patient is a 89y old  Female who presents with a chief complaint of Hypoxic/hypercapnic resp failure (21 Jan 2025 15:51)      DATE OF SERVICE: 01-21-25 @ 16:11    SUBJECTIVE / OVERNIGHT EVENTS: overnight events noted    ROS:  alert said Hi this AM      MEDICATIONS  (STANDING):  acetylcysteine 20%  Inhalation 3 milliLiter(s) Inhalation every 6 hours  albuterol/ipratropium for Nebulization 3 milliLiter(s) Nebulizer every 6 hours  ascorbic acid 500 milliGRAM(s) Oral daily  bisacodyl Suppository 10 milliGRAM(s) Rectal daily  cefepime   IVPB 1000 milliGRAM(s) IV Intermittent every 12 hours  chlorhexidine 2% Cloths 1 Application(s) Topical <User Schedule>  dextrose 5%. 1000 milliLiter(s) (50 mL/Hr) IV Continuous <Continuous>  heparin   Injectable 5000 Unit(s) SubCutaneous every 12 hours  hydrALAZINE 50 milliGRAM(s) Oral two times a day  levothyroxine Injectable 60 MICROGram(s) IV Push at bedtime  metoprolol tartrate 50 milliGRAM(s) Oral two times a day  mirtazapine 15 milliGRAM(s) Oral at bedtime  multivitamin 1 Tablet(s) Oral daily  QUEtiapine 12.5 milliGRAM(s) Oral at bedtime    MEDICATIONS  (PRN):        CAPILLARY BLOOD GLUCOSE      POCT Blood Glucose.: 153 mg/dL (21 Jan 2025 12:24)  POCT Blood Glucose.: 173 mg/dL (21 Jan 2025 05:35)  POCT Blood Glucose.: 150 mg/dL (21 Jan 2025 00:55)  POCT Blood Glucose.: 113 mg/dL (20 Jan 2025 17:55)    I&O's Summary    20 Jan 2025 07:01  -  21 Jan 2025 07:00  --------------------------------------------------------  IN: 0 mL / OUT: 400 mL / NET: -400 mL        Vital Signs Last 24 Hrs  T(C): 36.2 (21 Jan 2025 12:23), Max: 36.5 (20 Jan 2025 19:50)  T(F): 97.2 (21 Jan 2025 12:23), Max: 97.7 (20 Jan 2025 19:50)  HR: 87 (21 Jan 2025 12:23) (80 - 95)  BP: 143/69 (21 Jan 2025 12:23) (100/60 - 143/69)  BP(mean): --  RR: 18 (21 Jan 2025 12:23) (18 - 18)  SpO2: 96% (21 Jan 2025 12:23) (96% - 100%)    PHYSICAL EXAM:   CHEST/LUNG: clear  HEART: S1 S2; no murmurs   ABDOMEN: Soft, Nontender  EXTREMITIES: no edema  NEUROLOGY: non-focal    LABS:                        9.8    7.50  )-----------( 185      ( 21 Jan 2025 13:15 )             31.2     01-21    137  |  101  |  44[H]  ----------------------------<  142[H]  4.3   |  28  |  1.29    Ca    9.4      21 Jan 2025 13:15    TPro  6.6  /  Alb  2.6[L]  /  TBili  0.3  /  DBili  x   /  AST  25  /  ALT  27  /  AlkPhos  250[H]  01-21          Urinalysis Basic - ( 21 Jan 2025 13:15 )    Color: x / Appearance: x / SG: x / pH: x  Gluc: 142 mg/dL / Ketone: x  / Bili: x / Urobili: x   Blood: x / Protein: x / Nitrite: x   Leuk Esterase: x / RBC: x / WBC x   Sq Epi: x / Non Sq Epi: x / Bacteria: x          All consultant(s) notes reviewed and care discussed with other providers        Contact Number, Dr Link [phone]

## 2025-01-21 NOTE — PROGRESS NOTE ADULT - ASSESSMENT
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 Admitted to MICU with hypercapnic/hypoxic resp failure/septic shock in the setting of RLL PNA and UTI. Also found to have Influenza.       Acute hypoxic respiratory failure  -2nd to PNA  -Continue bronchodilators/mucolytics  -Keep sats >90% with O2. Wean o2 as tolerated (currently 4-5LNC)  -ABG acceptable pt off bipap     PNA  -? aspiration related, Pt also s/p flu   -Completed ABX, now resumed as pt spiked temp. CXR with bibasilar opacities   -Continue bronchodilators/mucolytics   -Trend leukocytosis/fever curve     Septic shock  -In the setting of PNA, UTI, Flu  -Now resolved    Dysphagia  -TF via NGT, ? Plans for PEG if aligns with GOC. GI f/u  -Speech and swallow f/u noted, recommending NPO  -Aspiration precautions  -Oral care     Atrial fibrillation and flutter.   -Controlled    Dementia  -supportive care

## 2025-01-22 LAB
ANION GAP SERPL CALC-SCNC: 8 MMOL/L — SIGNIFICANT CHANGE UP (ref 5–17)
BUN SERPL-MCNC: 42 MG/DL — HIGH (ref 7–23)
CALCIUM SERPL-MCNC: 9.4 MG/DL — SIGNIFICANT CHANGE UP (ref 8.4–10.5)
CHLORIDE SERPL-SCNC: 102 MMOL/L — SIGNIFICANT CHANGE UP (ref 96–108)
CO2 SERPL-SCNC: 27 MMOL/L — SIGNIFICANT CHANGE UP (ref 22–31)
CREAT SERPL-MCNC: 1.21 MG/DL — SIGNIFICANT CHANGE UP (ref 0.5–1.3)
EGFR: 41 ML/MIN/1.73M2 — LOW
GLUCOSE BLDC GLUCOMTR-MCNC: 103 MG/DL — HIGH (ref 70–99)
GLUCOSE BLDC GLUCOMTR-MCNC: 129 MG/DL — HIGH (ref 70–99)
GLUCOSE BLDC GLUCOMTR-MCNC: 146 MG/DL — HIGH (ref 70–99)
GLUCOSE BLDC GLUCOMTR-MCNC: 156 MG/DL — HIGH (ref 70–99)
GLUCOSE BLDC GLUCOMTR-MCNC: 187 MG/DL — HIGH (ref 70–99)
GLUCOSE SERPL-MCNC: 142 MG/DL — HIGH (ref 70–99)
HCT VFR BLD CALC: 29.5 % — LOW (ref 34.5–45)
HGB BLD-MCNC: 9.2 G/DL — LOW (ref 11.5–15.5)
MAGNESIUM SERPL-MCNC: 2.2 MG/DL — SIGNIFICANT CHANGE UP (ref 1.6–2.6)
MCHC RBC-ENTMCNC: 30.7 PG — SIGNIFICANT CHANGE UP (ref 27–34)
MCHC RBC-ENTMCNC: 31.2 G/DL — LOW (ref 32–36)
MCV RBC AUTO: 98.3 FL — SIGNIFICANT CHANGE UP (ref 80–100)
NRBC # BLD: 0 /100 WBCS — SIGNIFICANT CHANGE UP (ref 0–0)
NRBC BLD-RTO: 0 /100 WBCS — SIGNIFICANT CHANGE UP (ref 0–0)
PHOSPHATE SERPL-MCNC: 2.4 MG/DL — LOW (ref 2.5–4.5)
PLATELET # BLD AUTO: 160 K/UL — SIGNIFICANT CHANGE UP (ref 150–400)
POTASSIUM SERPL-MCNC: 4.8 MMOL/L — SIGNIFICANT CHANGE UP (ref 3.5–5.3)
POTASSIUM SERPL-SCNC: 4.8 MMOL/L — SIGNIFICANT CHANGE UP (ref 3.5–5.3)
RBC # BLD: 3 M/UL — LOW (ref 3.8–5.2)
RBC # FLD: 15.3 % — HIGH (ref 10.3–14.5)
SODIUM SERPL-SCNC: 137 MMOL/L — SIGNIFICANT CHANGE UP (ref 135–145)
WBC # BLD: 7.93 K/UL — SIGNIFICANT CHANGE UP (ref 3.8–10.5)
WBC # FLD AUTO: 7.93 K/UL — SIGNIFICANT CHANGE UP (ref 3.8–10.5)

## 2025-01-22 RX ORDER — ACETAMINOPHEN 160 MG/5ML
1000 SUSPENSION ORAL ONCE
Refills: 0 | Status: COMPLETED | OUTPATIENT
Start: 2025-01-22 | End: 2025-01-22

## 2025-01-22 RX ORDER — POTASSIUM PHOSPHATE, MONOBASIC POTASSIUM PHOSPHATE, DIBASIC 236; 224 MG/ML; MG/ML
15 INJECTION, SOLUTION INTRAVENOUS ONCE
Refills: 0 | Status: COMPLETED | OUTPATIENT
Start: 2025-01-22 | End: 2025-01-22

## 2025-01-22 RX ADMIN — Medication 10 MILLIGRAM(S): at 12:48

## 2025-01-22 RX ADMIN — IPRATROPIUM BROMIDE AND ALBUTEROL SULFATE 3 MILLILITER(S): .5; 2.5 SOLUTION RESPIRATORY (INHALATION) at 17:53

## 2025-01-22 RX ADMIN — Medication 4 MILLILITER(S): at 17:54

## 2025-01-22 RX ADMIN — Medication 50 MILLIGRAM(S): at 17:54

## 2025-01-22 RX ADMIN — Medication 1 TABLET(S): at 12:47

## 2025-01-22 RX ADMIN — Medication 3 MILLILITER(S): at 06:30

## 2025-01-22 RX ADMIN — MIRTAZAPINE 15 MILLIGRAM(S): 30 TABLET, FILM COATED ORAL at 21:54

## 2025-01-22 RX ADMIN — LEVOTHYROXINE SODIUM 60 MICROGRAM(S): 25 TABLET ORAL at 23:26

## 2025-01-22 RX ADMIN — Medication 3 MILLILITER(S): at 13:02

## 2025-01-22 RX ADMIN — Medication 500 MILLIGRAM(S): at 12:47

## 2025-01-22 RX ADMIN — POTASSIUM PHOSPHATE, MONOBASIC POTASSIUM PHOSPHATE, DIBASIC 62.5 MILLIMOLE(S): 236; 224 INJECTION, SOLUTION INTRAVENOUS at 23:25

## 2025-01-22 RX ADMIN — Medication 5000 UNIT(S): at 06:31

## 2025-01-22 RX ADMIN — Medication 100 MILLIGRAM(S): at 17:50

## 2025-01-22 RX ADMIN — Medication 100 MILLIGRAM(S): at 06:32

## 2025-01-22 RX ADMIN — SODIUM CHLORIDE 50 MILLILITER(S): 9 INJECTION, SOLUTION INTRAVENOUS at 11:20

## 2025-01-22 RX ADMIN — Medication 50 MILLIGRAM(S): at 06:31

## 2025-01-22 RX ADMIN — QUETIAPINE FUMARATE 12.5 MILLIGRAM(S): 300 TABLET ORAL at 21:53

## 2025-01-22 RX ADMIN — IPRATROPIUM BROMIDE AND ALBUTEROL SULFATE 3 MILLILITER(S): .5; 2.5 SOLUTION RESPIRATORY (INHALATION) at 12:48

## 2025-01-22 RX ADMIN — Medication 5000 UNIT(S): at 17:54

## 2025-01-22 RX ADMIN — ACETAMINOPHEN 400 MILLIGRAM(S): 160 SUSPENSION ORAL at 06:18

## 2025-01-22 RX ADMIN — Medication 4 MILLILITER(S): at 06:30

## 2025-01-22 RX ADMIN — Medication 50 MILLIGRAM(S): at 17:53

## 2025-01-22 RX ADMIN — Medication 3 MILLILITER(S): at 17:56

## 2025-01-22 RX ADMIN — ANTISEPTIC SURGICAL SCRUB 1 APPLICATION(S): 0.04 SOLUTION TOPICAL at 06:32

## 2025-01-22 RX ADMIN — IPRATROPIUM BROMIDE AND ALBUTEROL SULFATE 3 MILLILITER(S): .5; 2.5 SOLUTION RESPIRATORY (INHALATION) at 06:30

## 2025-01-22 NOTE — PROGRESS NOTE ADULT - SUBJECTIVE AND OBJECTIVE BOX
INTERVAL HPI/OVERNIGHT EVENTS:  events noted  ngt in place   remains on 5L     Allergies    clindamycin (Unknown)  shellfish (Unknown)  strawberry (Rash)  penicillin (Unknown)  clindamycin (Other)  strawberry (Unknown)  IV Contrast (Other)  IV Contrast (Unknown)    Intolerances      unable to obtain      PHYSICAL EXAM:   Vital Signs:  Vital Signs Last 24 Hrs  T(C): 36.9 (19 Jan 2025 11:38), Max: 37 (18 Jan 2025 16:51)  T(F): 98.4 (19 Jan 2025 11:38), Max: 98.6 (18 Jan 2025 16:51)  HR: 78 (19 Jan 2025 11:38) (70 - 81)  BP: 114/64 (19 Jan 2025 11:38) (88/62 - 118/66)  BP(mean): --  RR: 18 (19 Jan 2025 11:38) (18 - 18)  SpO2: 99% (19 Jan 2025 11:38) (99% - 100%)    Parameters below as of 19 Jan 2025 11:38  Patient On (Oxygen Delivery Method): nasal cannula      Daily     Daily I&O's Summary    19 Jan 2025 07:01  -  19 Jan 2025 15:01  --------------------------------------------------------  IN: 0 mL / OUT: 150 mL / NET: -150 mL      nad  confused  frail  non toxic  soft, nt  no edema        LABS:                        9.8    8.65  )-----------( 193      ( 19 Jan 2025 07:30 )             32.0     01-19    144  |  107  |  52[H]  ----------------------------<  114[H]  4.3   |  27  |  1.70[H]    Ca    9.3      19 Jan 2025 07:30        Urinalysis Basic - ( 19 Jan 2025 07:30 )    Color: x / Appearance: x / SG: x / pH: x  Gluc: 114 mg/dL / Ketone: x  / Bili: x / Urobili: x   Blood: x / Protein: x / Nitrite: x   Leuk Esterase: x / RBC: x / WBC x   Sq Epi: x / Non Sq Epi: x / Bacteria: x      amylase   lipase  RADIOLOGY & ADDITIONAL TESTS:

## 2025-01-22 NOTE — PROGRESS NOTE ADULT - SUBJECTIVE AND OBJECTIVE BOX
Date of Service: 01-22-25 @ 16:44    Patient is a 89y old  Female who presents with a chief complaint of Hypoxic/hypercapnic resp failure (22 Jan 2025 15:30)      Any change in ROS: seems OK:  confused:  has NGT :  for peg on friday      MEDICATIONS  (STANDING):  acetylcysteine 20%  Inhalation 3 milliLiter(s) Inhalation every 6 hours  albuterol/ipratropium for Nebulization 3 milliLiter(s) Nebulizer every 6 hours  albuterol/ipratropium for Nebulization. 3 milliLiter(s) Nebulizer once  ascorbic acid 500 milliGRAM(s) Oral daily  bisacodyl Suppository 10 milliGRAM(s) Rectal daily  cefepime   IVPB 1000 milliGRAM(s) IV Intermittent every 12 hours  chlorhexidine 2% Cloths 1 Application(s) Topical <User Schedule>  dextrose 5%. 1000 milliLiter(s) (50 mL/Hr) IV Continuous <Continuous>  heparin   Injectable 5000 Unit(s) SubCutaneous every 12 hours  hydrALAZINE 50 milliGRAM(s) Oral two times a day  levothyroxine Injectable 60 MICROGram(s) IV Push at bedtime  metoprolol tartrate 50 milliGRAM(s) Oral two times a day  mirtazapine 15 milliGRAM(s) Oral at bedtime  multivitamin 1 Tablet(s) Oral daily  QUEtiapine 12.5 milliGRAM(s) Oral at bedtime  sodium chloride 7% Inhalation 4 milliLiter(s) Inhalation every 12 hours    MEDICATIONS  (PRN):    Vital Signs Last 24 Hrs  T(C): 36.5 (22 Jan 2025 16:18), Max: 36.9 (21 Jan 2025 20:08)  T(F): 97.7 (22 Jan 2025 16:18), Max: 98.5 (21 Jan 2025 20:08)  HR: 98 (22 Jan 2025 16:18) (79 - 103)  BP: 163/84 (22 Jan 2025 16:18) (104/61 - 167/73)  BP(mean): --  RR: 18 (22 Jan 2025 16:18) (18 - 18)  SpO2: 94% (22 Jan 2025 16:18) (94% - 99%)    Parameters below as of 22 Jan 2025 16:18  Patient On (Oxygen Delivery Method): nasal cannula  O2 Flow (L/min): 4      I&O's Summary    21 Jan 2025 07:01  -  22 Jan 2025 07:00  --------------------------------------------------------  IN: 750 mL / OUT: 0 mL / NET: 750 mL    22 Jan 2025 07:01  -  22 Jan 2025 16:44  --------------------------------------------------------  IN: 0 mL / OUT: 300 mL / NET: -300 mL          Physical Exam:   GENERAL: NAD, well-groomed, well-developed  HEENT: LUANN/   Atraumatic, Normocephalic  ENMT: No tonsillar erythema, exudates, or enlargement; Moist mucous membranes, Good dentition, No lesions  NECK: Supple, No JVD, Normal thyroid  CHEST/LUNG: Clear to auscultaion, ; No rales, rhonchi, wheezing, or rubs  CVS: Regular rate and rhythm; No murmurs, rubs, or gallops  GI: : Soft, Nontender, Nondistended; Bowel sounds present  NERVOUS SYSTEM:  awake  EXTREMITIES:  -edema  LYMPH: No lymphadenopathy noted  SKIN: No rashes or lesions  ENDOCRINOLOGY: No Thyromegaly  PSYCH: dementia    Labs:  32                            9.2    7.93  )-----------( 160      ( 22 Jan 2025 07:13 )             29.5                         9.8    7.50  )-----------( 185      ( 21 Jan 2025 13:15 )             31.2                         9.2    7.50  )-----------( 167      ( 20 Jan 2025 12:06 )             29.9                         9.8    8.65  )-----------( 193      ( 19 Jan 2025 07:30 )             32.0     01-22    137  |  102  |  42[H]  ----------------------------<  142[H]  4.8   |  27  |  1.21  01-21    137  |  101  |  44[H]  ----------------------------<  142[H]  4.3   |  28  |  1.29  01-20    143  |  107  |  47[H]  ----------------------------<  106[H]  4.4   |  28  |  1.51[H]  01-19    144  |  107  |  52[H]  ----------------------------<  114[H]  4.3   |  27  |  1.70[H]    Ca    9.4      22 Jan 2025 07:13  Ca    9.4      21 Jan 2025 13:15  Phos  2.4     01-22  Mg     2.2     01-22    TPro  6.6  /  Alb  2.6[L]  /  TBili  0.3  /  DBili  x   /  AST  25  /  ALT  27  /  AlkPhos  250[H]  01-21    CAPILLARY BLOOD GLUCOSE      POCT Blood Glucose.: 103 mg/dL (22 Jan 2025 11:36)  POCT Blood Glucose.: 146 mg/dL (22 Jan 2025 06:31)  POCT Blood Glucose.: 156 mg/dL (22 Jan 2025 00:19)  POCT Blood Glucose.: 139 mg/dL (21 Jan 2025 17:40)      LIVER FUNCTIONS - ( 21 Jan 2025 13:15 )  Alb: 2.6 g/dL / Pro: 6.6 g/dL / ALK PHOS: 250 U/L / ALT: 27 U/L / AST: 25 U/L / GGT: x             Urinalysis Basic - ( 22 Jan 2025 07:13 )    Color: x / Appearance: x / SG: x / pH: x  Gluc: 142 mg/dL / Ketone: x  / Bili: x / Urobili: x   Blood: x / Protein: x / Nitrite: x   Leuk Esterase: x / RBC: x / WBC x   Sq Epi: x / Non Sq Epi: x / Bacteria: x            RECENT CULTURES:  01-18 @ 01:49 .Blood BLOOD         rad< from: Xray Chest 1 View- PORTABLE-Urgent (Xray Chest 1 View- PORTABLE-Urgent .) (01.19.25 @ 22:00) >    FINDINGS:  Enteric tube with tip in the stomach.  Heart size is similar.  Unchanged bilateral pleural effusions and hazy lower lung opacities.  Thereis no pneumothorax.    IMPRESSION:    Enteric tube with tip in the stomach.    --- End of Report ---           DURGA ROSAS MD; Resident Radiologist  This document has been electronically signed.  ANTONIA RUVALCABA MD; Attending Radiologist  This document has been electronically signed. Jan 21 2025 10:15AM    < end of copied text >         No growth at 4 days          RESPIRATORY CULTURES:          Studies  Chest X-RAY  CT SCAN Chest   Venous Dopplers: LE:   CT Abdomen  Others

## 2025-01-22 NOTE — PROGRESS NOTE ADULT - PROBLEM SELECTOR PLAN 1
resolved  continue cefepime day 5/7 today  will extend to 7 days   GI note appreciated  awaiting PEG likely Fri

## 2025-01-22 NOTE — PROGRESS NOTE ADULT - ASSESSMENT
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 Admitted to MICU with hypercapnic/hypoxic resp failure/septic shock in the setting of RLL PNA and UTI. Also found to have Influenza.       Acute hypoxic respiratory failure  -2nd to PNA  -Continue bronchodilators/mucolytics  -Keep sats >90% with O2. Wean o2 as tolerated (currently 4-5LNC)  -ABG acceptable pt off bipap :  sheis still on 4 L  of oxygen :  since she is hypoxic relatively high risk for resp failure during peg placement:    -cxr reviewed:  do ct chest  : as xray is abnormal       PNA  -? aspiration related, Pt also s/p flu   -Completed ABX, now resumed as pt spiked temp. CXR with bibasilar opacities   -Continue bronchodilators/mucolytics   -Trend leukocytosis/fever curve     Septic shock  -In the setting of PNA, UTI, Flu  -Now resolved    Dysphagia  -TF via NGT, ? Plans for PEG if aligns with GOC. GI f/u  -Speech and swallow f/u noted, recommending NPO  -Aspiration precautions  -Oral care     Atrial fibrillation and flutter.   -Controlled    Dementia  -supportive care      dw acp

## 2025-01-22 NOTE — PROGRESS NOTE ADULT - ASSESSMENT
resp failure  aspiration    plan  ngt for feedings and meds  ivf   to discuss with family clinically improving  gerd and aspiration precautions  will plan for peg later this week ,  tentatively friday  d/w son      Advanced care planning was discussed with patient and family.  Advanced care planning forms were reviewed and discussed.  Risks, benefits and alternatives of gastroenterologic procedures were discussed in detail and all questions were answered.    30 minutes spent.

## 2025-01-22 NOTE — PROGRESS NOTE ADULT - SUBJECTIVE AND OBJECTIVE BOX
Patient is a 89y old  Female who presents with a chief complaint of Hypoxic/hypercapnic resp failure (22 Jan 2025 16:44)      DATE OF SERVICE: 01-22-25 @ 17:07    SUBJECTIVE / OVERNIGHT EVENTS: overnight events noted    ROS:  Resp: No cough no sputum production  CVS: No chest pain no palpitations no orthopnea  GI: no N/V/D      MEDICATIONS  (STANDING):  acetylcysteine 20%  Inhalation 3 milliLiter(s) Inhalation every 6 hours  albuterol/ipratropium for Nebulization 3 milliLiter(s) Nebulizer every 6 hours  albuterol/ipratropium for Nebulization. 3 milliLiter(s) Nebulizer once  ascorbic acid 500 milliGRAM(s) Oral daily  bisacodyl Suppository 10 milliGRAM(s) Rectal daily  cefepime   IVPB 1000 milliGRAM(s) IV Intermittent every 12 hours  chlorhexidine 2% Cloths 1 Application(s) Topical <User Schedule>  dextrose 5%. 1000 milliLiter(s) (50 mL/Hr) IV Continuous <Continuous>  heparin   Injectable 5000 Unit(s) SubCutaneous every 12 hours  hydrALAZINE 50 milliGRAM(s) Oral two times a day  levothyroxine Injectable 60 MICROGram(s) IV Push at bedtime  metoprolol tartrate 50 milliGRAM(s) Oral two times a day  mirtazapine 15 milliGRAM(s) Oral at bedtime  multivitamin 1 Tablet(s) Oral daily  QUEtiapine 12.5 milliGRAM(s) Oral at bedtime  sodium chloride 7% Inhalation 4 milliLiter(s) Inhalation every 12 hours    MEDICATIONS  (PRN):        CAPILLARY BLOOD GLUCOSE      POCT Blood Glucose.: 103 mg/dL (22 Jan 2025 11:36)  POCT Blood Glucose.: 146 mg/dL (22 Jan 2025 06:31)  POCT Blood Glucose.: 156 mg/dL (22 Jan 2025 00:19)  POCT Blood Glucose.: 139 mg/dL (21 Jan 2025 17:40)    I&O's Summary    21 Jan 2025 07:01  -  22 Jan 2025 07:00  --------------------------------------------------------  IN: 750 mL / OUT: 0 mL / NET: 750 mL    22 Jan 2025 07:01  -  22 Jan 2025 17:07  --------------------------------------------------------  IN: 0 mL / OUT: 300 mL / NET: -300 mL        Vital Signs Last 24 Hrs  T(C): 36.5 (22 Jan 2025 16:18), Max: 36.9 (21 Jan 2025 20:08)  T(F): 97.7 (22 Jan 2025 16:18), Max: 98.5 (21 Jan 2025 20:08)  HR: 98 (22 Jan 2025 16:18) (79 - 103)  BP: 163/84 (22 Jan 2025 16:18) (104/61 - 167/73)  BP(mean): --  RR: 18 (22 Jan 2025 16:18) (18 - 18)  SpO2: 94% (22 Jan 2025 16:18) (94% - 99%)    PHYSICAL EXAM:   CHEST/LUNG: clear  HEART: S1 S2; no murmurs   ABDOMEN: Soft, Nontender  EXTREMITIES: no edema  NEUROLOGY: non-focal    LABS:                        9.2    7.93  )-----------( 160      ( 22 Jan 2025 07:13 )             29.5     01-22    137  |  102  |  42[H]  ----------------------------<  142[H]  4.8   |  27  |  1.21    Ca    9.4      22 Jan 2025 07:13  Phos  2.4     01-22  Mg     2.2     01-22    TPro  6.6  /  Alb  2.6[L]  /  TBili  0.3  /  DBili  x   /  AST  25  /  ALT  27  /  AlkPhos  250[H]  01-21          Urinalysis Basic - ( 22 Jan 2025 07:13 )    Color: x / Appearance: x / SG: x / pH: x  Gluc: 142 mg/dL / Ketone: x  / Bili: x / Urobili: x   Blood: x / Protein: x / Nitrite: x   Leuk Esterase: x / RBC: x / WBC x   Sq Epi: x / Non Sq Epi: x / Bacteria: x          All consultant(s) notes reviewed and care discussed with other providers        Contact Number, Dr Link [phone]

## 2025-01-23 LAB
CULTURE RESULTS: SIGNIFICANT CHANGE UP
CULTURE RESULTS: SIGNIFICANT CHANGE UP
GLUCOSE BLDC GLUCOMTR-MCNC: 136 MG/DL — HIGH (ref 70–99)
GLUCOSE BLDC GLUCOMTR-MCNC: 158 MG/DL — HIGH (ref 70–99)
GLUCOSE BLDC GLUCOMTR-MCNC: 187 MG/DL — HIGH (ref 70–99)
SPECIMEN SOURCE: SIGNIFICANT CHANGE UP
SPECIMEN SOURCE: SIGNIFICANT CHANGE UP

## 2025-01-23 PROCEDURE — 71250 CT THORAX DX C-: CPT | Mod: 26

## 2025-01-23 RX ADMIN — MIRTAZAPINE 15 MILLIGRAM(S): 30 TABLET, FILM COATED ORAL at 21:37

## 2025-01-23 RX ADMIN — Medication 500 MILLIGRAM(S): at 11:02

## 2025-01-23 RX ADMIN — Medication 3 MILLILITER(S): at 00:01

## 2025-01-23 RX ADMIN — Medication 10 MILLIGRAM(S): at 11:02

## 2025-01-23 RX ADMIN — Medication 100 MILLIGRAM(S): at 17:22

## 2025-01-23 RX ADMIN — IPRATROPIUM BROMIDE AND ALBUTEROL SULFATE 3 MILLILITER(S): .5; 2.5 SOLUTION RESPIRATORY (INHALATION) at 00:01

## 2025-01-23 RX ADMIN — Medication 1 TABLET(S): at 11:02

## 2025-01-23 RX ADMIN — IPRATROPIUM BROMIDE AND ALBUTEROL SULFATE 3 MILLILITER(S): .5; 2.5 SOLUTION RESPIRATORY (INHALATION) at 11:02

## 2025-01-23 RX ADMIN — Medication 100 MILLIGRAM(S): at 05:46

## 2025-01-23 RX ADMIN — ANTISEPTIC SURGICAL SCRUB 1 APPLICATION(S): 0.04 SOLUTION TOPICAL at 05:44

## 2025-01-23 RX ADMIN — Medication 50 MILLIGRAM(S): at 05:35

## 2025-01-23 RX ADMIN — Medication 5000 UNIT(S): at 05:35

## 2025-01-23 RX ADMIN — IPRATROPIUM BROMIDE AND ALBUTEROL SULFATE 3 MILLILITER(S): .5; 2.5 SOLUTION RESPIRATORY (INHALATION) at 05:34

## 2025-01-23 RX ADMIN — IPRATROPIUM BROMIDE AND ALBUTEROL SULFATE 3 MILLILITER(S): .5; 2.5 SOLUTION RESPIRATORY (INHALATION) at 17:21

## 2025-01-23 RX ADMIN — QUETIAPINE FUMARATE 12.5 MILLIGRAM(S): 300 TABLET ORAL at 21:37

## 2025-01-23 RX ADMIN — Medication 3 MILLILITER(S): at 05:34

## 2025-01-23 RX ADMIN — Medication 4 MILLILITER(S): at 05:34

## 2025-01-23 RX ADMIN — Medication 3 MILLILITER(S): at 11:02

## 2025-01-23 RX ADMIN — LEVOTHYROXINE SODIUM 60 MICROGRAM(S): 25 TABLET ORAL at 21:37

## 2025-01-23 RX ADMIN — Medication 50 MILLIGRAM(S): at 17:21

## 2025-01-23 RX ADMIN — IPRATROPIUM BROMIDE AND ALBUTEROL SULFATE 3 MILLILITER(S): .5; 2.5 SOLUTION RESPIRATORY (INHALATION) at 23:06

## 2025-01-23 RX ADMIN — Medication 4 MILLILITER(S): at 17:21

## 2025-01-23 RX ADMIN — Medication 5000 UNIT(S): at 17:21

## 2025-01-23 NOTE — PROGRESS NOTE ADULT - PROBLEM SELECTOR PLAN 1
resolved  continue cefepime day 6/7 today  GI note appreciated  awaiting PEG likely Fri  repeat CT chest ordered

## 2025-01-23 NOTE — PROGRESS NOTE ADULT - SUBJECTIVE AND OBJECTIVE BOX
Patient is a 89y old  Female who presents with a chief complaint of Hypoxic/hypercapnic resp failure (23 Jan 2025 13:44)      DATE OF SERVICE: 01-23-25 @ 15:45    SUBJECTIVE / OVERNIGHT EVENTS: overnight events noted    ROS:  not available   alert       MEDICATIONS  (STANDING):  albuterol/ipratropium for Nebulization 3 milliLiter(s) Nebulizer every 6 hours  albuterol/ipratropium for Nebulization. 3 milliLiter(s) Nebulizer once  ascorbic acid 500 milliGRAM(s) Oral daily  bisacodyl Suppository 10 milliGRAM(s) Rectal daily  cefepime   IVPB 1000 milliGRAM(s) IV Intermittent every 12 hours  chlorhexidine 2% Cloths 1 Application(s) Topical <User Schedule>  dextrose 5%. 1000 milliLiter(s) (50 mL/Hr) IV Continuous <Continuous>  heparin   Injectable 5000 Unit(s) SubCutaneous every 12 hours  hydrALAZINE 50 milliGRAM(s) Oral two times a day  levothyroxine Injectable 60 MICROGram(s) IV Push at bedtime  metoprolol tartrate 50 milliGRAM(s) Oral two times a day  mirtazapine 15 milliGRAM(s) Oral at bedtime  multivitamin 1 Tablet(s) Oral daily  QUEtiapine 12.5 milliGRAM(s) Oral at bedtime  sodium chloride 7% Inhalation 4 milliLiter(s) Inhalation every 12 hours    MEDICATIONS  (PRN):        CAPILLARY BLOOD GLUCOSE      POCT Blood Glucose.: 187 mg/dL (23 Jan 2025 11:40)  POCT Blood Glucose.: 158 mg/dL (23 Jan 2025 06:35)  POCT Blood Glucose.: 129 mg/dL (22 Jan 2025 23:50)  POCT Blood Glucose.: 187 mg/dL (22 Jan 2025 17:37)    I&O's Summary    22 Jan 2025 07:01  -  23 Jan 2025 07:00  --------------------------------------------------------  IN: 0 mL / OUT: 700 mL / NET: -700 mL    23 Jan 2025 07:01  -  23 Jan 2025 15:45  --------------------------------------------------------  IN: 0 mL / OUT: 300 mL / NET: -300 mL        Vital Signs Last 24 Hrs  T(C): 36.5 (23 Jan 2025 12:26), Max: 36.8 (23 Jan 2025 04:49)  T(F): 97.7 (23 Jan 2025 12:26), Max: 98.3 (23 Jan 2025 04:49)  HR: 96 (23 Jan 2025 12:26) (78 - 98)  BP: 161/90 (23 Jan 2025 12:26) (106/60 - 163/84)  BP(mean): --  RR: 18 (23 Jan 2025 12:26) (18 - 18)  SpO2: 97% (23 Jan 2025 12:26) (94% - 99%)    PHYSICAL EXAM:   CHEST/LUNG: decreased breath sounds bases   HEART: S1 S2; no murmurs   ABDOMEN: Soft, Nontender  EXTREMITIES: no edema  NEUROLOGY: non-focal    LABS:                        9.2    7.93  )-----------( 160      ( 22 Jan 2025 07:13 )             29.5     01-22    137  |  102  |  42[H]  ----------------------------<  142[H]  4.8   |  27  |  1.21    Ca    9.4      22 Jan 2025 07:13  Phos  2.4     01-22  Mg     2.2     01-22            Urinalysis Basic - ( 22 Jan 2025 07:13 )    Color: x / Appearance: x / SG: x / pH: x  Gluc: 142 mg/dL / Ketone: x  / Bili: x / Urobili: x   Blood: x / Protein: x / Nitrite: x   Leuk Esterase: x / RBC: x / WBC x   Sq Epi: x / Non Sq Epi: x / Bacteria: x          All consultant(s) notes reviewed and care discussed with other providers        Contact Number, Dr Link [phone]

## 2025-01-23 NOTE — PROGRESS NOTE ADULT - ASSESSMENT
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 Admitted to MICU with hypercapnic/hypoxic resp failure/septic shock in the setting of RLL PNA and UTI. Also found to have Influenza.       Acute hypoxic respiratory failure  -2nd to PNA  -Continue bronchodilators/mucolytics  -ABG acceptable off Bipap  -O2 requirements improving, now 2LNC. Keep sats >90% with O2 PRN  -CXR reviewed. F/u CT chest    PNA  -? aspiration related, Pt also s/p flu   -Completed ABX, now resumed as pt spiked temp. CXR with bibasilar opacities   -Continue bronchodilators/mucolytics   -Trend leukocytosis/fever curve   -F/u CT chest  -Suction PRN, chest PT     Septic shock  -In the setting of PNA, UTI, Flu  -Now resolved    Dysphagia  -TF via NGT, ? Plans for PEG if aligns with GOC. GI f/u  -Speech and swallow f/u noted, recommending NPO  -Aspiration precautions  -Oral care     Atrial fibrillation and flutter.   -Controlled    Dementia  -supportive care

## 2025-01-23 NOTE — PROGRESS NOTE ADULT - ASSESSMENT
resp failure  aspiration    plan  ngt for feedings and meds  ivf   to discuss with family clinically improving  gerd and aspiration precautions  peg in am  npo p mn  hold tube feeds at mn  will need pulm clearance         Advanced care planning was discussed with patient and family.  Advanced care planning forms were reviewed and discussed.  Risks, benefits and alternatives of gastroenterologic procedures were discussed in detail and all questions were answered.    30 minutes spent.

## 2025-01-23 NOTE — PROGRESS NOTE ADULT - SUBJECTIVE AND OBJECTIVE BOX
INTERVAL HPI/OVERNIGHT EVENTS:  events noted  ngt in place    on 2L     Allergies    clindamycin (Unknown)  shellfish (Unknown)  strawberry (Rash)  penicillin (Unknown)  clindamycin (Other)  strawberry (Unknown)  IV Contrast (Other)  IV Contrast (Unknown)    Intolerances      unable to obtain      PHYSICAL EXAM:   Vital Signs:  Vital Signs Last 24 Hrs  T(C): 36.9 (19 Jan 2025 11:38), Max: 37 (18 Jan 2025 16:51)  T(F): 98.4 (19 Jan 2025 11:38), Max: 98.6 (18 Jan 2025 16:51)  HR: 78 (19 Jan 2025 11:38) (70 - 81)  BP: 114/64 (19 Jan 2025 11:38) (88/62 - 118/66)  BP(mean): --  RR: 18 (19 Jan 2025 11:38) (18 - 18)  SpO2: 99% (19 Jan 2025 11:38) (99% - 100%)    Parameters below as of 19 Jan 2025 11:38  Patient On (Oxygen Delivery Method): nasal cannula      Daily     Daily I&O's Summary    19 Jan 2025 07:01  -  19 Jan 2025 15:01  --------------------------------------------------------  IN: 0 mL / OUT: 150 mL / NET: -150 mL      nad  confused  frail  non toxic  soft, nt  no edema        LABS:                        9.8    8.65  )-----------( 193      ( 19 Jan 2025 07:30 )             32.0     01-19    144  |  107  |  52[H]  ----------------------------<  114[H]  4.3   |  27  |  1.70[H]    Ca    9.3      19 Jan 2025 07:30        Urinalysis Basic - ( 19 Jan 2025 07:30 )    Color: x / Appearance: x / SG: x / pH: x  Gluc: 114 mg/dL / Ketone: x  / Bili: x / Urobili: x   Blood: x / Protein: x / Nitrite: x   Leuk Esterase: x / RBC: x / WBC x   Sq Epi: x / Non Sq Epi: x / Bacteria: x      amylase   lipase  RADIOLOGY & ADDITIONAL TESTS:

## 2025-01-23 NOTE — PROGRESS NOTE ADULT - SUBJECTIVE AND OBJECTIVE BOX
Date of Service: 01-23-25 @ 11:29    Patient is a 89y old  Female who presents with a chief complaint of Hypoxic/hypercapnic resp failure (22 Jan 2025 17:06)      Any change in ROS:   Awake & alert  ROS limited, breathing nonlabored on 2LNC     MEDICATIONS  (STANDING):  albuterol/ipratropium for Nebulization 3 milliLiter(s) Nebulizer every 6 hours  albuterol/ipratropium for Nebulization. 3 milliLiter(s) Nebulizer once  ascorbic acid 500 milliGRAM(s) Oral daily  bisacodyl Suppository 10 milliGRAM(s) Rectal daily  cefepime   IVPB 1000 milliGRAM(s) IV Intermittent every 12 hours  chlorhexidine 2% Cloths 1 Application(s) Topical <User Schedule>  dextrose 5%. 1000 milliLiter(s) (50 mL/Hr) IV Continuous <Continuous>  heparin   Injectable 5000 Unit(s) SubCutaneous every 12 hours  hydrALAZINE 50 milliGRAM(s) Oral two times a day  levothyroxine Injectable 60 MICROGram(s) IV Push at bedtime  metoprolol tartrate 50 milliGRAM(s) Oral two times a day  mirtazapine 15 milliGRAM(s) Oral at bedtime  multivitamin 1 Tablet(s) Oral daily  QUEtiapine 12.5 milliGRAM(s) Oral at bedtime  sodium chloride 7% Inhalation 4 milliLiter(s) Inhalation every 12 hours    MEDICATIONS  (PRN):    Vital Signs Last 24 Hrs  T(C): 36.8 (23 Jan 2025 04:49), Max: 36.8 (23 Jan 2025 04:49)  T(F): 98.3 (23 Jan 2025 04:49), Max: 98.3 (23 Jan 2025 04:49)  HR: 96 (23 Jan 2025 04:49) (78 - 103)  BP: 129/72 (23 Jan 2025 04:49) (106/60 - 163/84)  BP(mean): --  RR: 18 (23 Jan 2025 08:00) (18 - 18)  SpO2: 97% (23 Jan 2025 08:00) (94% - 99%)    Parameters below as of 23 Jan 2025 08:00  Patient On (Oxygen Delivery Method): nasal cannula  O2 Flow (L/min): 2      I&O's Summary    22 Jan 2025 07:01  -  23 Jan 2025 07:00  --------------------------------------------------------  IN: 0 mL / OUT: 700 mL / NET: -700 mL          Physical Exam:   GENERAL: NAD, well-groomed, well-developed  HEENT: LUANN/   Atraumatic, Normocephalic  ENMT: No tonsillar erythema, exudates, or enlargement; Moist mucous membranes, Good dentition, No lesions  NECK: Supple, No JVD, Normal thyroid  CHEST/LUNG: b/l rhonchi   CVS: Regular rate and rhythm; No murmurs, rubs, or gallops  GI: : Soft, Nontender, Nondistended; Bowel sounds present  NERVOUS SYSTEM:  Alert & Oriented X2  EXTREMITIES:  2+ Peripheral Pulses, No clubbing, cyanosis, or edema  LYMPH: No lymphadenopathy noted  SKIN: No rashes or lesions  ENDOCRINOLOGY: No Thyromegaly  PSYCH: Appropriate    Labs:  32                            9.2    7.93  )-----------( 160      ( 22 Jan 2025 07:13 )             29.5                         9.8    7.50  )-----------( 185      ( 21 Jan 2025 13:15 )             31.2                         9.2    7.50  )-----------( 167      ( 20 Jan 2025 12:06 )             29.9     01-22    137  |  102  |  42[H]  ----------------------------<  142[H]  4.8   |  27  |  1.21  01-21    137  |  101  |  44[H]  ----------------------------<  142[H]  4.3   |  28  |  1.29  01-20    143  |  107  |  47[H]  ----------------------------<  106[H]  4.4   |  28  |  1.51[H]    Ca    9.4      22 Jan 2025 07:13  Ca    9.4      21 Jan 2025 13:15  Phos  2.4     01-22  Mg     2.2     01-22    TPro  6.6  /  Alb  2.6[L]  /  TBili  0.3  /  DBili  x   /  AST  25  /  ALT  27  /  AlkPhos  250[H]  01-21    CAPILLARY BLOOD GLUCOSE      POCT Blood Glucose.: 158 mg/dL (23 Jan 2025 06:35)  POCT Blood Glucose.: 129 mg/dL (22 Jan 2025 23:50)  POCT Blood Glucose.: 187 mg/dL (22 Jan 2025 17:37)  POCT Blood Glucose.: 103 mg/dL (22 Jan 2025 11:36)      LIVER FUNCTIONS - ( 21 Jan 2025 13:15 )  Alb: 2.6 g/dL / Pro: 6.6 g/dL / ALK PHOS: 250 U/L / ALT: 27 U/L / AST: 25 U/L / GGT: x             Urinalysis Basic - ( 22 Jan 2025 07:13 )    Color: x / Appearance: x / SG: x / pH: x  Gluc: 142 mg/dL / Ketone: x  / Bili: x / Urobili: x   Blood: x / Protein: x / Nitrite: x   Leuk Esterase: x / RBC: x / WBC x   Sq Epi: x / Non Sq Epi: x / Bacteria: x            RECENT CULTURES:  01-18 @ 01:49 .Blood BLOOD                No growth at 5 days          Studies  Chest X-RAY  < from: Xray Chest 1 View- PORTABLE-Urgent (Xray Chest 1 View- PORTABLE-Urgent .) (01.19.25 @ 22:00) >      INTERPRETATION:  Prelim  impression:  Enteric tube with tip in the stomach.    DATE OF STUDY: 1/19/25 at 9:29PM    PRIOR: Earlier 1/19/25 exam    CLINICAL INDICATION: Assess NG tube    TECHNIQUE: AP radiograph of the chest.    FINDINGS:  Enteric tube with tip in the stomach.  Heart size is similar.  Unchanged bilateral pleural effusions and hazy lower lung opacities.  Thereis no pneumothorax.    IMPRESSION:    Enteric tube with tip in the stomach.    --- End of Report ---          < end of copied text >

## 2025-01-24 LAB
ANION GAP SERPL CALC-SCNC: 7 MMOL/L — SIGNIFICANT CHANGE UP (ref 5–17)
ANION GAP SERPL CALC-SCNC: 8 MMOL/L — SIGNIFICANT CHANGE UP (ref 5–17)
ANION GAP SERPL CALC-SCNC: 9 MMOL/L — SIGNIFICANT CHANGE UP (ref 5–17)
BUN SERPL-MCNC: 42 MG/DL — HIGH (ref 7–23)
BUN SERPL-MCNC: 44 MG/DL — HIGH (ref 7–23)
BUN SERPL-MCNC: 44 MG/DL — HIGH (ref 7–23)
CALCIUM SERPL-MCNC: 8.7 MG/DL — SIGNIFICANT CHANGE UP (ref 8.4–10.5)
CALCIUM SERPL-MCNC: 9.3 MG/DL — SIGNIFICANT CHANGE UP (ref 8.4–10.5)
CALCIUM SERPL-MCNC: 9.7 MG/DL — SIGNIFICANT CHANGE UP (ref 8.4–10.5)
CHLORIDE SERPL-SCNC: 91 MMOL/L — LOW (ref 96–108)
CHLORIDE SERPL-SCNC: 97 MMOL/L — SIGNIFICANT CHANGE UP (ref 96–108)
CHLORIDE SERPL-SCNC: 97 MMOL/L — SIGNIFICANT CHANGE UP (ref 96–108)
CO2 SERPL-SCNC: 26 MMOL/L — SIGNIFICANT CHANGE UP (ref 22–31)
CO2 SERPL-SCNC: 26 MMOL/L — SIGNIFICANT CHANGE UP (ref 22–31)
CO2 SERPL-SCNC: 28 MMOL/L — SIGNIFICANT CHANGE UP (ref 22–31)
CREAT SERPL-MCNC: 1.16 MG/DL — SIGNIFICANT CHANGE UP (ref 0.5–1.3)
CREAT SERPL-MCNC: 1.17 MG/DL — SIGNIFICANT CHANGE UP (ref 0.5–1.3)
CREAT SERPL-MCNC: 1.25 MG/DL — SIGNIFICANT CHANGE UP (ref 0.5–1.3)
EGFR: 39 ML/MIN/1.73M2 — LOW
EGFR: 43 ML/MIN/1.73M2 — LOW
EGFR: 43 ML/MIN/1.73M2 — LOW
GLUCOSE BLDC GLUCOMTR-MCNC: 114 MG/DL — HIGH (ref 70–99)
GLUCOSE BLDC GLUCOMTR-MCNC: 143 MG/DL — HIGH (ref 70–99)
GLUCOSE BLDC GLUCOMTR-MCNC: 158 MG/DL — HIGH (ref 70–99)
GLUCOSE BLDC GLUCOMTR-MCNC: 77 MG/DL — SIGNIFICANT CHANGE UP (ref 70–99)
GLUCOSE BLDC GLUCOMTR-MCNC: 85 MG/DL — SIGNIFICANT CHANGE UP (ref 70–99)
GLUCOSE SERPL-MCNC: 141 MG/DL — HIGH (ref 70–99)
GLUCOSE SERPL-MCNC: 191 MG/DL — HIGH (ref 70–99)
GLUCOSE SERPL-MCNC: 82 MG/DL — SIGNIFICANT CHANGE UP (ref 70–99)
HCT VFR BLD CALC: 31.2 % — LOW (ref 34.5–45)
HGB BLD-MCNC: 9.7 G/DL — LOW (ref 11.5–15.5)
MCHC RBC-ENTMCNC: 31.1 G/DL — LOW (ref 32–36)
MCHC RBC-ENTMCNC: 31.4 PG — SIGNIFICANT CHANGE UP (ref 27–34)
MCV RBC AUTO: 101 FL — HIGH (ref 80–100)
NRBC # BLD: 0 /100 WBCS — SIGNIFICANT CHANGE UP (ref 0–0)
NRBC BLD-RTO: 0 /100 WBCS — SIGNIFICANT CHANGE UP (ref 0–0)
PLATELET # BLD AUTO: 199 K/UL — SIGNIFICANT CHANGE UP (ref 150–400)
POTASSIUM SERPL-MCNC: 5.3 MMOL/L — SIGNIFICANT CHANGE UP (ref 3.5–5.3)
POTASSIUM SERPL-MCNC: 6 MMOL/L — HIGH (ref 3.5–5.3)
POTASSIUM SERPL-MCNC: 6.1 MMOL/L — HIGH (ref 3.5–5.3)
POTASSIUM SERPL-SCNC: 5.3 MMOL/L — SIGNIFICANT CHANGE UP (ref 3.5–5.3)
POTASSIUM SERPL-SCNC: 6 MMOL/L — HIGH (ref 3.5–5.3)
POTASSIUM SERPL-SCNC: 6.1 MMOL/L — HIGH (ref 3.5–5.3)
RBC # BLD: 3.09 M/UL — LOW (ref 3.8–5.2)
RBC # FLD: 15.9 % — HIGH (ref 10.3–14.5)
SODIUM SERPL-SCNC: 125 MMOL/L — LOW (ref 135–145)
SODIUM SERPL-SCNC: 132 MMOL/L — LOW (ref 135–145)
SODIUM SERPL-SCNC: 132 MMOL/L — LOW (ref 135–145)
WBC # BLD: 9.69 K/UL — SIGNIFICANT CHANGE UP (ref 3.8–10.5)
WBC # FLD AUTO: 9.69 K/UL — SIGNIFICANT CHANGE UP (ref 3.8–10.5)

## 2025-01-24 PROCEDURE — 99233 SBSQ HOSP IP/OBS HIGH 50: CPT | Mod: GC

## 2025-01-24 RX ORDER — IPRATROPIUM BROMIDE AND ALBUTEROL SULFATE .5; 2.5 MG/3ML; MG/3ML
3 SOLUTION RESPIRATORY (INHALATION) ONCE
Refills: 0 | Status: COMPLETED | OUTPATIENT
Start: 2025-01-24 | End: 2025-01-24

## 2025-01-24 RX ORDER — SODIUM ZIRCONIUM CYCLOSILICATE 5 G/5G
10 POWDER, FOR SUSPENSION ORAL ONCE
Refills: 0 | Status: COMPLETED | OUTPATIENT
Start: 2025-01-24 | End: 2025-01-24

## 2025-01-24 RX ORDER — BACTERIOSTATIC SODIUM CHLORIDE 0.9 %
4 VIAL (ML) INJECTION EVERY 12 HOURS
Refills: 0 | Status: DISCONTINUED | OUTPATIENT
Start: 2025-01-24 | End: 2025-02-12

## 2025-01-24 RX ORDER — ACETYLCYSTEINE 200 MG/ML
3 VIAL (ML) MISCELLANEOUS EVERY 6 HOURS
Refills: 0 | Status: COMPLETED | OUTPATIENT
Start: 2025-01-24 | End: 2025-01-27

## 2025-01-24 RX ORDER — METOPROLOL SUCCINATE 25 MG
5 TABLET, EXTENDED RELEASE 24 HR ORAL EVERY 6 HOURS
Refills: 0 | Status: DISCONTINUED | OUTPATIENT
Start: 2025-01-24 | End: 2025-02-09

## 2025-01-24 RX ADMIN — ANTISEPTIC SURGICAL SCRUB 1 APPLICATION(S): 0.04 SOLUTION TOPICAL at 05:02

## 2025-01-24 RX ADMIN — Medication 40 MILLIGRAM(S): at 11:28

## 2025-01-24 RX ADMIN — Medication 3 MILLILITER(S): at 17:09

## 2025-01-24 RX ADMIN — IPRATROPIUM BROMIDE AND ALBUTEROL SULFATE 3 MILLILITER(S): .5; 2.5 SOLUTION RESPIRATORY (INHALATION) at 07:02

## 2025-01-24 RX ADMIN — Medication 50 MILLIGRAM(S): at 05:02

## 2025-01-24 RX ADMIN — Medication 5000 UNIT(S): at 05:02

## 2025-01-24 RX ADMIN — IPRATROPIUM BROMIDE AND ALBUTEROL SULFATE 3 MILLILITER(S): .5; 2.5 SOLUTION RESPIRATORY (INHALATION) at 17:08

## 2025-01-24 RX ADMIN — Medication 3 MILLILITER(S): at 11:08

## 2025-01-24 RX ADMIN — Medication 100 MILLIGRAM(S): at 17:09

## 2025-01-24 RX ADMIN — Medication 4 MILLILITER(S): at 05:03

## 2025-01-24 RX ADMIN — SODIUM ZIRCONIUM CYCLOSILICATE 10 GRAM(S): 5 POWDER, FOR SUSPENSION ORAL at 08:22

## 2025-01-24 RX ADMIN — LEVOTHYROXINE SODIUM 60 MICROGRAM(S): 25 TABLET ORAL at 22:15

## 2025-01-24 RX ADMIN — IPRATROPIUM BROMIDE AND ALBUTEROL SULFATE 3 MILLILITER(S): .5; 2.5 SOLUTION RESPIRATORY (INHALATION) at 11:07

## 2025-01-24 RX ADMIN — Medication 1 TABLET(S): at 11:07

## 2025-01-24 RX ADMIN — IPRATROPIUM BROMIDE AND ALBUTEROL SULFATE 3 MILLILITER(S): .5; 2.5 SOLUTION RESPIRATORY (INHALATION) at 05:03

## 2025-01-24 RX ADMIN — Medication 4 MILLILITER(S): at 17:11

## 2025-01-24 RX ADMIN — Medication 10 MILLIGRAM(S): at 11:07

## 2025-01-24 RX ADMIN — Medication 100 MILLIGRAM(S): at 05:02

## 2025-01-24 RX ADMIN — Medication 5000 UNIT(S): at 17:09

## 2025-01-24 RX ADMIN — Medication 500 MILLIGRAM(S): at 11:08

## 2025-01-24 NOTE — CONSULT NOTE ADULT - ASSESSMENT
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) initially presented with Hypoxemic respiratory failure, UTI, Aspiration PNA on Abx with MICU consulted for optimization of Hypoxemic respiratory failure    RRT called for hypoxemic respiratory failure  Started on HFNC with O2 saturation of 100%    Recommendations  - CT Scan reviewed; showing bilateral moderate pleural effusions; layering debris in main stem bronchi  - Agree with removal of NG Tube; patient is significant aspiration risk  - Agree with Crooks placement; monitor strict I/O  - Uptitrate Lasix as needed; goal net negative 1L  - Continue with Duonebs; Hypersal/chest PT  - Monitor Gas to avoid hyperoxemia  - Would discuss with Pulm/family if Thoracentesis is within GOC given significant pleural effusion    Patient is not a MICU candidate at this time  Please re-consult as needed    Justin Zamarripa PGY3  Internal Medicine 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) initially presented with Hypoxemic respiratory failure, UTI, Aspiration PNA on Abx with MICU consulted for optimization of Hypoxemic respiratory failure    RRT called for hypoxemic respiratory failure  Started on HFNC with O2 saturation of 100%    Recommendations  - Please obtain repeat BMP to assess for Potassium levels  - If still elevated please use hyperkalemic cocktail with insulin 10/D50, would also give calcium gluconate  - CT Scan reviewed; showing bilateral moderate pleural effusions; layering debris in main stem bronchi  - Agree with removal of NG Tube; patient is significant aspiration risk  - Agree with Crooks placement; monitor strict I/O  - Uptitrate Lasix as needed; goal net negative 1L  - Continue with Duonebs; Hypersal/chest PT  - Monitor Gas to avoid hyperoxemia  - Would discuss with Pulm/family if Thoracentesis is within GOC given significant pleural effusion    Patient is not a MICU candidate at this time  Please re-consult as needed    Justin Zamarripa PGY3  Internal Medicine 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) initially presented with Hypoxemic respiratory failure, UTI, Aspiration PNA on Abx with MICU consulted for optimization of Hypoxemic respiratory failure    RRT called for hypoxemic respiratory failure      Recommendations  -Titrate HFNC with goal SPO2~92%; increase flow from 10L and decrease FIO2 if able to reduce hyperoxia risk  - Please obtain repeat BMP to assess for Potassium levels  - If still elevated please use hyperkalemic cocktail with insulin 10/D50, would also give calcium gluconate  - CT Scan reviewed; showing bilateral moderate pleural effusions; layering debris in main stem bronchi  - Agree with removal of NG Tube; patient is significant aspiration risk  - Agree with Crooks placement; monitor strict I/O  - Lasix as needed; goal net negative 1L  - Continue with Duonebs; Hypersal/chest PT  - Obtain CXR and ABG  - Would discuss with Pulm/family if Thoracentesis is within GOC given significant pleural effusion    Patient is not a MICU candidate at this time  Please re-consult as needed    Justin Zamarripa PGY3  Internal Medicine

## 2025-01-24 NOTE — CONSULT NOTE ADULT - SUBJECTIVE AND OBJECTIVE BOX
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 Pt found to have DWAIN on CKD, Influenza A +, UTI +, possible RLL pneum with consolidation appreciated on POCUS. Pt admitted to MICU for hypercapnic/hypoxic respiratory failure failure/septic shock in the setting of possible RLL pneum and UTI     PAST MEDICAL & SURGICAL HISTORY:  Hypertension      Hypothyroidism      HTN (hypertension)      Dementia      Other dementia      History of colon cancer  s/p Resection in 2009, Dr. David      H/O brain tumor  History of brain tumor resection in 1961.          FAMILY HISTORY:      SOCIAL HISTORY:  Smoking: __ packs x ___ years  EtOH Use:  Marital Status:  Occupation:  Recent Travel:  Country of Birth:  Advance Directives:    Allergies    clindamycin (Unknown)  shellfish (Unknown)  strawberry (Rash)  penicillin (Unknown)  clindamycin (Other)  strawberry (Unknown)  IV Contrast (Other)  IV Contrast (Unknown)    Intolerances        HOME MEDICATIONS:    REVIEW OF SYSTEMS:    [ ] Unable to assess ROS because AMS    OBJECTIVE:  ICU Vital Signs Last 24 Hrs  T(C): 36.3 (24 Jan 2025 14:32), Max: 36.6 (23 Jan 2025 20:32)  T(F): 97.3 (24 Jan 2025 14:32), Max: 97.9 (23 Jan 2025 20:32)  HR: 78 (24 Jan 2025 16:53) (74 - 93)  BP: 163/63 (24 Jan 2025 14:32) (115/64 - 163/63)  BP(mean): --  ABP: --  ABP(mean): --  RR: 19 (24 Jan 2025 16:53) (18 - 19)  SpO2: 95% (24 Jan 2025 16:53) (94% - 99%)    O2 Parameters below as of 24 Jan 2025 16:53  Patient On (Oxygen Delivery Method): nasal cannula, high flow  O2 Flow (L/min): 50  O2 Concentration (%): 100          01-23 @ 07:01  -  01-24 @ 07:00  --------------------------------------------------------  IN: 0 mL / OUT: 300 mL / NET: -300 mL    01-24 @ 07:01  -  01-24 @ 20:03  --------------------------------------------------------  IN: 0 mL / OUT: 600 mL / NET: -600 mL      CAPILLARY BLOOD GLUCOSE      POCT Blood Glucose.: 85 mg/dL (24 Jan 2025 16:14)      T(C): 36.3 (01-24-25 @ 14:32), Max: 36.6 (01-23-25 @ 20:32)  HR: 78 (01-24-25 @ 16:53) (74 - 93)  BP: 163/63 (01-24-25 @ 14:32) (115/64 - 163/63)  RR: 19 (01-24-25 @ 16:53) (18 - 19)  SpO2: 95% (01-24-25 @ 16:53) (94% - 99%)    CONSTITUTIONAL: Patient with increased WOB  EYES: PERRLA and symmetric  ENMT: Oral mucosa with moist membranes.  RESP: Increased WOB, Bilateral Rales  CV: RRR, +S1S2, no MRG; no JVD; no peripheral edema  MSK: Normal gait; No digital clubbing or cyanosis; examination of the (head/neck/spine/ribs/pelvis, RUE, LUE, RLE, LLE) without misalignment, Normal ROM without pain, no spinal tenderness, normal muscle strength/tone  PSYCH: AOx0    HOSPITAL MEDICATIONS:  MEDICATIONS  (STANDING):  acetylcysteine 20%  Inhalation 3 milliLiter(s) Inhalation every 6 hours  albuterol/ipratropium for Nebulization 3 milliLiter(s) Nebulizer every 6 hours  albuterol/ipratropium for Nebulization. 3 milliLiter(s) Nebulizer once  ascorbic acid 500 milliGRAM(s) Oral daily  bisacodyl Suppository 10 milliGRAM(s) Rectal daily  cefepime   IVPB 1000 milliGRAM(s) IV Intermittent every 12 hours  chlorhexidine 2% Cloths 1 Application(s) Topical <User Schedule>  dextrose 5%. 1000 milliLiter(s) (50 mL/Hr) IV Continuous <Continuous>  heparin   Injectable 5000 Unit(s) SubCutaneous every 12 hours  levothyroxine Injectable 60 MICROGram(s) IV Push at bedtime  metoprolol tartrate Injectable 5 milliGRAM(s) IV Push every 6 hours  mirtazapine 15 milliGRAM(s) Oral at bedtime  multivitamin 1 Tablet(s) Oral daily  QUEtiapine 12.5 milliGRAM(s) Oral at bedtime  sodium chloride 3%  Inhalation 4 milliLiter(s) Inhalation every 12 hours    MEDICATIONS  (PRN):      LABS:                        9.7    9.69  )-----------( 199      ( 24 Jan 2025 07:08 )             31.2     01-24    132[L]  |  97  |  42[H]  ----------------------------<  141[H]  6.1[H]   |  26  |  1.16    Ca    9.7      24 Jan 2025 07:11        Urinalysis Basic - ( 24 Jan 2025 07:11 )    Color: x / Appearance: x / SG: x / pH: x  Gluc: 141 mg/dL / Ketone: x  / Bili: x / Urobili: x   Blood: x / Protein: x / Nitrite: x   Leuk Esterase: x / RBC: x / WBC x   Sq Epi: x / Non Sq Epi: x / Bacteria: x            MICROBIOLOGY:     RADIOLOGY:  [ ] Reviewed and interpreted by me    EKG:      Justin Zamarripa PGY3  Internal Medicine CHIEF COMPLAINT:    HPI:    89y old Female with stated hx significant for dementia, s/p brain tumor resection 1961, HTN, hypothyroidism colon Ca s/p Rt hemicolectomy ('09), 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 Pt found to have DWAIN on CKD, Influenza A +, UTI +, possible RLL pneum with consolidation appreciated on POCUS. Pt admitted for hypercapnic/hypoxic respiratory failure failure/septic shock in the setting of possible RLL pneum and UTI     PAST MEDICAL & SURGICAL HISTORY:  Hypertension      Hypothyroidism      HTN (hypertension)      Dementia      Other dementia      History of colon cancer  s/p Resection in 2009, Dr. David      H/O brain tumor  History of brain tumor resection in 1961.          FAMILY HISTORY:      SOCIAL HISTORY:  Smoking: __ packs x ___ years  EtOH Use:  Marital Status:  Occupation:  Recent Travel:  Country of Birth:  Advance Directives:    Allergies    clindamycin (Unknown)  shellfish (Unknown)  strawberry (Rash)  penicillin (Unknown)  clindamycin (Other)  strawberry (Unknown)  IV Contrast (Other)  IV Contrast (Unknown)    Intolerances        HOME MEDICATIONS:    REVIEW OF SYSTEMS:    [ ] Unable to assess ROS because AMS    OBJECTIVE:  ICU Vital Signs Last 24 Hrs  T(C): 36.3 (24 Jan 2025 14:32), Max: 36.6 (23 Jan 2025 20:32)  T(F): 97.3 (24 Jan 2025 14:32), Max: 97.9 (23 Jan 2025 20:32)  HR: 78 (24 Jan 2025 16:53) (74 - 93)  BP: 163/63 (24 Jan 2025 14:32) (115/64 - 163/63)  BP(mean): --  ABP: --  ABP(mean): --  RR: 19 (24 Jan 2025 16:53) (18 - 19)  SpO2: 95% (24 Jan 2025 16:53) (94% - 99%)    O2 Parameters below as of 24 Jan 2025 16:53  Patient On (Oxygen Delivery Method): nasal cannula, high flow  O2 Flow (L/min): 50  O2 Concentration (%): 100          01-23 @ 07:01  -  01-24 @ 07:00  --------------------------------------------------------  IN: 0 mL / OUT: 300 mL / NET: -300 mL    01-24 @ 07:01  -  01-24 @ 20:03  --------------------------------------------------------  IN: 0 mL / OUT: 600 mL / NET: -600 mL      CAPILLARY BLOOD GLUCOSE      POCT Blood Glucose.: 85 mg/dL (24 Jan 2025 16:14)      T(C): 36.3 (01-24-25 @ 14:32), Max: 36.6 (01-23-25 @ 20:32)  HR: 78 (01-24-25 @ 16:53) (74 - 93)  BP: 163/63 (01-24-25 @ 14:32) (115/64 - 163/63)  RR: 19 (01-24-25 @ 16:53) (18 - 19)  SpO2: 95% (01-24-25 @ 16:53) (94% - 99%)    CONSTITUTIONAL: Patient with increased WOB  EYES: PERRLA and symmetric  ENMT: Oral mucosa with moist membranes.  RESP: Increased WOB, Bilateral Rales  CV: RRR, +S1S2, no MRG; no JVD; no peripheral edema  MSK: Normal gait; No digital clubbing or cyanosis; examination of the (head/neck/spine/ribs/pelvis, RUE, LUE, RLE, LLE) without misalignment, Normal ROM without pain, no spinal tenderness, normal muscle strength/tone  PSYCH: AOx0    HOSPITAL MEDICATIONS:  MEDICATIONS  (STANDING):  acetylcysteine 20%  Inhalation 3 milliLiter(s) Inhalation every 6 hours  albuterol/ipratropium for Nebulization 3 milliLiter(s) Nebulizer every 6 hours  albuterol/ipratropium for Nebulization. 3 milliLiter(s) Nebulizer once  ascorbic acid 500 milliGRAM(s) Oral daily  bisacodyl Suppository 10 milliGRAM(s) Rectal daily  cefepime   IVPB 1000 milliGRAM(s) IV Intermittent every 12 hours  chlorhexidine 2% Cloths 1 Application(s) Topical <User Schedule>  dextrose 5%. 1000 milliLiter(s) (50 mL/Hr) IV Continuous <Continuous>  heparin   Injectable 5000 Unit(s) SubCutaneous every 12 hours  levothyroxine Injectable 60 MICROGram(s) IV Push at bedtime  metoprolol tartrate Injectable 5 milliGRAM(s) IV Push every 6 hours  mirtazapine 15 milliGRAM(s) Oral at bedtime  multivitamin 1 Tablet(s) Oral daily  QUEtiapine 12.5 milliGRAM(s) Oral at bedtime  sodium chloride 3%  Inhalation 4 milliLiter(s) Inhalation every 12 hours    MEDICATIONS  (PRN):      LABS:                        9.7    9.69  )-----------( 199      ( 24 Jan 2025 07:08 )             31.2     01-24    132[L]  |  97  |  42[H]  ----------------------------<  141[H]  6.1[H]   |  26  |  1.16    Ca    9.7      24 Jan 2025 07:11        Urinalysis Basic - ( 24 Jan 2025 07:11 )    Color: x / Appearance: x / SG: x / pH: x  Gluc: 141 mg/dL / Ketone: x  / Bili: x / Urobili: x   Blood: x / Protein: x / Nitrite: x   Leuk Esterase: x / RBC: x / WBC x   Sq Epi: x / Non Sq Epi: x / Bacteria: x            MICROBIOLOGY:     RADIOLOGY:  [ ] Reviewed and interpreted by me    EKG:      Justin Zamarripa PGY3  Internal Medicine

## 2025-01-24 NOTE — PROGRESS NOTE ADULT - ASSESSMENT
resp failure  aspiration    peg cancelled  now on hiflow  will need to revisit GOC as currently she is not a candidate for PEG  dnr/dni  prognosis guarded       Advanced care planning was discussed with patient and family.  Advanced care planning forms were reviewed and discussed.  Risks, benefits and alternatives of gastroenterologic procedures were discussed in detail and all questions were answered.    30 minutes spent.

## 2025-01-24 NOTE — CHART NOTE - NSCHARTNOTEFT_GEN_A_CORE
RRT and MICU called for hypoxia, desatting to 60s-70s on tele monitor, improved with deep suctioning by RRT team, IV diuresis added per Pulm, and adding HFNC @50LPM 100% FiO2. Patient noted to have desat event overnight and increase in O2 requirement from 2L via nasal cannula to 4L then 6LPM this morning via nasal cannula. Rest of vitals stable. C/f aspiration on tube feeds, tube feeds held, now discontinued. Attending discussed GoC with patric Brar, confirmed patient still DNR/DNI trial NIIV. C/f aspiration on tube feeds and now with HFNC in place. Indwelling urinary catheter recommended by Pulmonology for output monitoring. PEG postponed pending improvement in respiratory status.      Vital Signs Last 24 Hrs  T(C): 36.3 (24 Jan 2025 14:32), Max: 36.6 (23 Jan 2025 20:32)  T(F): 97.3 (24 Jan 2025 14:32), Max: 97.9 (23 Jan 2025 20:32)  HR: 78 (24 Jan 2025 16:53) (74 - 93)  BP: 163/63 (24 Jan 2025 14:32) (115/64 - 163/63)  BP(mean): --  RR: 19 (24 Jan 2025 16:53) (18 - 19)  SpO2: 95% (24 Jan 2025 16:53) (94% - 99%)    Parameters below as of 24 Jan 2025 16:53  Patient On (Oxygen Delivery Method): nasal cannula, high flow  O2 Flow (L/min): 50  O2 Concentration (%): 100                Assessment: 89F likely chronically aspirating, with acute desaturation event overnight on tube feeds via NGT, with increased O2 need      #Hypoxia, c/f tube feed aspiration  -c/w HFNC, current setting 50LPM 100% FiO2  -wean HFNC as tolerated  - IV diuresis per pulm  - GoC - will need to readdress nutritional GoC as patient was unable to go for PEG today and no longer has NGT in place for feeding and meds.  -Metoprolol converted to IV with hold parameters  -Hydralazine discontinued per attending  -C/w tele and continuous pulse ox monitoring  -C/w intake and output monitoring, cruz placement        Discussed with attending Dr Link, pulmonology, patric Brar at bedside, RN    Adarsh Mead PA-C  [facility] Division of Medicine  Reachable on MS Teams

## 2025-01-24 NOTE — CONSULT NOTE ADULT - ATTENDING COMMENTS
Ms. Carvalho is a 89 year old with dementia, HTN, hypothyroidism, stercoral colitis, aspiration syndrome, heart failure, bilateral pleural effusions and a remote history of brain malignancy and colon cancer admitted with hypercapnic/hypoxemic respiratory failure in the in the setting of influenza A, UTI and RLL PNA. She was noted to have worsening hypoxemia and MICU was consulted fo evaluation. On arrival patient's oxygen support was increased from nasal cannula to HFNC 10L 96% and she was satting 100%. Tube feeds were held given history of aspiration syndrome and concern for aspiration as the etiology of her worsening respiratory failure. Please titrate the FIO2 as able an increase rate flow if able to maintain SPO2>92% to reduce risk of hyperoxia. Continue antibiotic coverage for aspiration PNA and also airway clearance protocol. Patient not in respiratory distress on HFNC. Obtain ABG and CXR, recheck BMP and treat electrolyte abnormalities and call the MICU if she clinically worsens. Thank you for this consult. Ms. Carvalho is a 89 year old with dementia, hypothyroidism, stercoral colitis, aspiration syndrome, bilateral pleural effusions and a remote history of brain malignancy and colon cancer admitted initially to the MICU with hypercapnic/hypoxemic respiratory failure and septic shock in the in the setting of influenza A, UTI and RLL PNA. She was treated with antimicrobial therapy and AVAPs with clinical improvement prior to transfer to the floor for further management. Her MICU course was complicated by AFib with RVR requiring amiodarone. On the floor on 1/24 she was noted to have worsening hypoxemia and the MICU was reconsulted for further evaluation. On arrival patient's oxygen support was noted to be increased from nasal cannula to HFNC 10L 96% and she was satting 100%. Tube feeds were held given history of aspiration syndrome and concern for aspiration as the etiology of her worsening respiratory failure. Patient not in any respiratory distress on exam while on HFNC. Would recommend titrating the HFNC FIO2 down if able an increasing rate flow to maintain SPO2>92% to reduce risk of hyperoxia. Continue airway clearance to improve VQ matching from mucous plugging and also continue antibiotic coverage for aspiration PNA. Please obtain ABG and CXR, recheck BMP and treat electrolyte abnormalities. Thank you for this consult please call with questions.

## 2025-01-24 NOTE — PROGRESS NOTE ADULT - SUBJECTIVE AND OBJECTIVE BOX
Patient is a 89y old  Female who presents with a chief complaint of Hypoxic/hypercapnic resp failure (24 Jan 2025 13:29)      DATE OF SERVICE: 01-24-25 @ 14:57    SUBJECTIVE / OVERNIGHT EVENTS: overnight events noted    ROS:  not available  overnight desaturation noted now on 6L        MEDICATIONS  (STANDING):  acetylcysteine 20%  Inhalation 3 milliLiter(s) Inhalation every 6 hours  albuterol/ipratropium for Nebulization 3 milliLiter(s) Nebulizer every 6 hours  albuterol/ipratropium for Nebulization. 3 milliLiter(s) Nebulizer once  ascorbic acid 500 milliGRAM(s) Oral daily  bisacodyl Suppository 10 milliGRAM(s) Rectal daily  cefepime   IVPB 1000 milliGRAM(s) IV Intermittent every 12 hours  chlorhexidine 2% Cloths 1 Application(s) Topical <User Schedule>  dextrose 5%. 1000 milliLiter(s) (50 mL/Hr) IV Continuous <Continuous>  heparin   Injectable 5000 Unit(s) SubCutaneous every 12 hours  hydrALAZINE 50 milliGRAM(s) Oral two times a day  levothyroxine Injectable 60 MICROGram(s) IV Push at bedtime  metoprolol tartrate 50 milliGRAM(s) Oral two times a day  mirtazapine 15 milliGRAM(s) Oral at bedtime  multivitamin 1 Tablet(s) Oral daily  QUEtiapine 12.5 milliGRAM(s) Oral at bedtime  sodium chloride 3%  Inhalation 4 milliLiter(s) Inhalation every 12 hours    MEDICATIONS  (PRN):        CAPILLARY BLOOD GLUCOSE      POCT Blood Glucose.: 114 mg/dL (24 Jan 2025 12:10)  POCT Blood Glucose.: 158 mg/dL (24 Jan 2025 06:15)  POCT Blood Glucose.: 143 mg/dL (24 Jan 2025 00:11)  POCT Blood Glucose.: 136 mg/dL (23 Jan 2025 18:34)    I&O's Summary    23 Jan 2025 07:01  -  24 Jan 2025 07:00  --------------------------------------------------------  IN: 0 mL / OUT: 300 mL / NET: -300 mL    24 Jan 2025 07:01  -  24 Jan 2025 14:57  --------------------------------------------------------  IN: 0 mL / OUT: 600 mL / NET: -600 mL        Vital Signs Last 24 Hrs  T(C): 36.3 (24 Jan 2025 14:32), Max: 36.6 (23 Jan 2025 20:32)  T(F): 97.3 (24 Jan 2025 14:32), Max: 97.9 (23 Jan 2025 20:32)  HR: 75 (24 Jan 2025 14:32) (74 - 105)  BP: 163/63 (24 Jan 2025 14:32) (115/64 - 172/79)  BP(mean): --  RR: 18 (24 Jan 2025 14:32) (18 - 18)  SpO2: 94% (24 Jan 2025 14:32) (94% - 99%)    PHYSICAL EXAM:   CHEST/LUNG: decreased breath sounds bases   HEART: S1 S2; no murmurs   ABDOMEN: Soft, Nontender  EXTREMITIES: no edema  NEUROLOGY: non-focal    LABS:                        9.7    9.69  )-----------( 199      ( 24 Jan 2025 07:08 )             31.2     01-24    132[L]  |  97  |  42[H]  ----------------------------<  141[H]  6.1[H]   |  26  |  1.16    Ca    9.7      24 Jan 2025 07:11            Urinalysis Basic - ( 24 Jan 2025 07:11 )    Color: x / Appearance: x / SG: x / pH: x  Gluc: 141 mg/dL / Ketone: x  / Bili: x / Urobili: x   Blood: x / Protein: x / Nitrite: x   Leuk Esterase: x / RBC: x / WBC x   Sq Epi: x / Non Sq Epi: x / Bacteria: x          All consultant(s) notes reviewed and care discussed with other providers        Contact Number, Dr Link [phone]

## 2025-01-24 NOTE — PROGRESS NOTE ADULT - SUBJECTIVE AND OBJECTIVE BOX
INTERVAL HPI/OVERNIGHT EVENTS:  events noted  peg cancelled 2/2 pulmonary issues    Allergies    clindamycin (Unknown)  shellfish (Unknown)  strawberry (Rash)  penicillin (Unknown)  clindamycin (Other)  strawberry (Unknown)  IV Contrast (Other)  IV Contrast (Unknown)    Intolerances      unable to obtain      PHYSICAL EXAM:   Vital Signs:  Vital Signs Last 24 Hrs  T(C): 36.9 (19 Jan 2025 11:38), Max: 37 (18 Jan 2025 16:51)  T(F): 98.4 (19 Jan 2025 11:38), Max: 98.6 (18 Jan 2025 16:51)  HR: 78 (19 Jan 2025 11:38) (70 - 81)  BP: 114/64 (19 Jan 2025 11:38) (88/62 - 118/66)  BP(mean): --  RR: 18 (19 Jan 2025 11:38) (18 - 18)  SpO2: 99% (19 Jan 2025 11:38) (99% - 100%)    Parameters below as of 19 Jan 2025 11:38  Patient On (Oxygen Delivery Method): nasal cannula      Daily     Daily I&O's Summary    19 Jan 2025 07:01  -  19 Jan 2025 15:01  --------------------------------------------------------  IN: 0 mL / OUT: 150 mL / NET: -150 mL      nad  confused  frail  non toxic  soft, nt  no edema        LABS:                        9.8    8.65  )-----------( 193      ( 19 Jan 2025 07:30 )             32.0     01-19    144  |  107  |  52[H]  ----------------------------<  114[H]  4.3   |  27  |  1.70[H]    Ca    9.3      19 Jan 2025 07:30        Urinalysis Basic - ( 19 Jan 2025 07:30 )    Color: x / Appearance: x / SG: x / pH: x  Gluc: 114 mg/dL / Ketone: x  / Bili: x / Urobili: x   Blood: x / Protein: x / Nitrite: x   Leuk Esterase: x / RBC: x / WBC x   Sq Epi: x / Non Sq Epi: x / Bacteria: x      amylase   lipase  RADIOLOGY & ADDITIONAL TESTS:

## 2025-01-24 NOTE — PROGRESS NOTE ADULT - PROBLEM SELECTOR PLAN 1
overnight desaturation noted  now needing 6L to maintain O2 sat > 90%  K > 6  PEG canceled as patient will not tolerate sedation and anesthesia  palliative care evaluation requested again for GOC  repeat CT chest noted  furosemide IV

## 2025-01-24 NOTE — PROGRESS NOTE ADULT - SUBJECTIVE AND OBJECTIVE BOX
Date of Service: 01-24-25 @ 13:29    Patient is a 89y old  Female who presents with a chief complaint of Hypoxic/hypercapnic resp failure (23 Jan 2025 15:45)      Any change in ROS:   Appears more confused this AM, agitated  Increase in O2 requirements overnight, now 5-6LNC  Breathing nonlabored  +Secretions    MEDICATIONS  (STANDING):  acetylcysteine 20%  Inhalation 3 milliLiter(s) Inhalation every 6 hours  albuterol/ipratropium for Nebulization 3 milliLiter(s) Nebulizer every 6 hours  albuterol/ipratropium for Nebulization. 3 milliLiter(s) Nebulizer once  ascorbic acid 500 milliGRAM(s) Oral daily  bisacodyl Suppository 10 milliGRAM(s) Rectal daily  cefepime   IVPB 1000 milliGRAM(s) IV Intermittent every 12 hours  chlorhexidine 2% Cloths 1 Application(s) Topical <User Schedule>  dextrose 5%. 1000 milliLiter(s) (50 mL/Hr) IV Continuous <Continuous>  heparin   Injectable 5000 Unit(s) SubCutaneous every 12 hours  hydrALAZINE 50 milliGRAM(s) Oral two times a day  levothyroxine Injectable 60 MICROGram(s) IV Push at bedtime  metoprolol tartrate 50 milliGRAM(s) Oral two times a day  mirtazapine 15 milliGRAM(s) Oral at bedtime  multivitamin 1 Tablet(s) Oral daily  QUEtiapine 12.5 milliGRAM(s) Oral at bedtime  sodium chloride 3%  Inhalation 4 milliLiter(s) Inhalation every 12 hours    MEDICATIONS  (PRN):    Vital Signs Last 24 Hrs  T(C): 36.3 (24 Jan 2025 10:43), Max: 36.6 (23 Jan 2025 20:32)  T(F): 97.3 (24 Jan 2025 10:43), Max: 97.9 (23 Jan 2025 20:32)  HR: 93 (24 Jan 2025 10:43) (74 - 105)  BP: 117/82 (24 Jan 2025 10:43) (115/64 - 172/79)  BP(mean): --  RR: 18 (24 Jan 2025 10:43) (18 - 18)  SpO2: 99% (24 Jan 2025 10:43) (94% - 99%)    Parameters below as of 24 Jan 2025 10:43  Patient On (Oxygen Delivery Method): nasal cannula  O2 Flow (L/min): 6      I&O's Summary    23 Jan 2025 07:01  -  24 Jan 2025 07:00  --------------------------------------------------------  IN: 0 mL / OUT: 300 mL / NET: -300 mL          Physical Exam:   GENERAL: NAD  HEENT: LUANN  ENMT: No tonsillar erythema, exudates, or enlargement  NECK: Supple, No JVd  CHEST/LUNG: Coarse bs b/l   CVS: Regular rate and rhythm  GI: : Soft, Nontender, Nondistended  NERVOUS SYSTEM:  Confused   EXTREMITIES:  2+ Peripheral Pulses, No clubbing, cyanosis, or edema  SKIN: No rashes or lesions  PSYCH: Appropriate    Labs:  32                            9.7    9.69  )-----------( 199      ( 24 Jan 2025 07:08 )             31.2                         9.2    7.93  )-----------( 160      ( 22 Jan 2025 07:13 )             29.5                         9.8    7.50  )-----------( 185      ( 21 Jan 2025 13:15 )             31.2     01-24    132[L]  |  97  |  42[H]  ----------------------------<  141[H]  6.1[H]   |  26  |  1.16  01-22    137  |  102  |  42[H]  ----------------------------<  142[H]  4.8   |  27  |  1.21  01-21    137  |  101  |  44[H]  ----------------------------<  142[H]  4.3   |  28  |  1.29    Ca    9.7      24 Jan 2025 07:11    TPro  6.6  /  Alb  2.6[L]  /  TBili  0.3  /  DBili  x   /  AST  25  /  ALT  27  /  AlkPhos  250[H]  01-21    CAPILLARY BLOOD GLUCOSE      POCT Blood Glucose.: 114 mg/dL (24 Jan 2025 12:10)  POCT Blood Glucose.: 158 mg/dL (24 Jan 2025 06:15)  POCT Blood Glucose.: 143 mg/dL (24 Jan 2025 00:11)  POCT Blood Glucose.: 136 mg/dL (23 Jan 2025 18:34)          Urinalysis Basic - ( 24 Jan 2025 07:11 )    Color: x / Appearance: x / SG: x / pH: x  Gluc: 141 mg/dL / Ketone: x  / Bili: x / Urobili: x   Blood: x / Protein: x / Nitrite: x   Leuk Esterase: x / RBC: x / WBC x   Sq Epi: x / Non Sq Epi: x / Bacteria: x            RECENT CULTURES:  01-18 @ 01:49 .Blood BLOOD                No growth at 5 days    < from: CT Chest No Cont (01.23.25 @ 17:52) >    ACC: [id number] EXAM:  CT CHEST   ORDERED BY: DEON NAPOLES     PROCEDURE DATE:  01/23/2025          INTERPRETATION:  CLINICAL INFORMATION: Pneumonia. Evaluate for pleural   effusion.    COMPARISON: CT chest abdomen and pelvis 11/11/2021.    CONTRAST/COMPLICATIONS:  IV Contrast: NONE  Oral Contrast: NONE  .    PROCEDURE:  CT of the Chest was performed.  Sagittal and coronal reformats were performed.    FINDINGS:  Nasogastric tube tip in the stomach.  LUNGS : Large bilateral pleural effusions with compressive atelectasis of   both lower lobes, and atelectasis in the right middle lobe and lingula.   Groundglass opacity in the right upper lobe.  VESSELS: Aortic calcifications. Coronary artery calcifications.  HEART: Heart size is normal. No pericardial effusion.  MEDIASTINUM AND SWETHA: No lymphadenopathy.  CHEST WALL AND LOWER NECK: Within normal limits.  VISUALIZED UPPER ABDOMEN: Within normal limits.  BONES: Scoliosis and degenerative changes.    IMPRESSION:  Large bilateral pleural effusions with atelectasis of both lower lobes,   and linear atelectasis in the right middle lobe and left upper lobe.   Nonspecific groundglass opacity is seen in the aerated portions of both   lungs.    < end of copied text >

## 2025-01-24 NOTE — PROGRESS NOTE ADULT - ASSESSMENT
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 Admitted to MICU with hypercapnic/hypoxic resp failure/septic shock in the setting of RLL PNA and UTI. Also found to have Influenza.       Acute hypoxic respiratory failure  -Initially 2nd to PNA   -Hypoxia had been improving, now with increase in O2 requirements  -Repeat CT chest 1/23 with b/l pleural effusions/atelectasis, b/l GGO. Favor combination of fluid overload + PNA  -Continue ABX  -Continue bronchodilators/mucolytics  -Lasix 40 mg IVP x1 ordered. Keep O>I as tolerated   -Prognosis guarded. Pending palliative care f/u.     PNA  -? aspiration related, Pt also s/p flu   -Completed ABX, then resumed as pt spiked temp. CXR with bibasilar opacities   -Repeat CT chest 1/23 with b/l pleural effusions/atelectasis, b/l GGO. Favor combination of fluid overload + PNA  -Continue bronchodilators/mucolytics   -Trend leukocytosis/fever curve   -Suction PRN, chest PT     Septic shock  -In the setting of PNA, UTI, Flu  -Now resolved    Dysphagia  -TF via NGT  -Speech and swallow f/u noted, recommending NPO  -Aspiration precautions  -Oral care  -Palliative care f/u.     Atrial fibrillation  -By hx.     Dementia  -Supportive care per primary team.      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 Admitted to MICU with hypercapnic/hypoxic resp failure/septic shock in the setting of RLL PNA and UTI. Also found to have Influenza.       Acute hypoxic respiratory failure  -Initially 2nd to PNA   -Hypoxia had been improving, now with increase in O2 requirements  -Repeat CT chest 1/23 with b/l pleural effusions/atelectasis, b/l GGO. Favor combination of fluid overload + PNA  -Continue ABX  -Continue bronchodilators/mucolytics  -Keep O>I as tolerated  -Prognosis guarded. Pending palliative care f/u. GOC DNR/DNI     PNA  -? aspiration related, Pt also s/p flu   -Completed ABX, then resumed as pt spiked temp. CXR with bibasilar opacities   -Repeat CT chest 1/23 with b/l pleural effusions/atelectasis, b/l GGO. Favor combination of fluid overload + PNA  -Continue bronchodilators/mucolytics   -Trend leukocytosis/fever curve   -Suction PRN, chest PT     Septic shock  -In the setting of PNA, UTI, Flu  -Now resolved    Dysphagia  -TF via NGT  -Speech and swallow f/u noted, recommending NPO  -Aspiration precautions  -Oral care  -Palliative care f/u.     Atrial fibrillation  -By hx.     Dementia  -Supportive care per primary team.

## 2025-01-25 LAB
ADD ON TEST-SPECIMEN IN LAB: SIGNIFICANT CHANGE UP
ALBUMIN SERPL ELPH-MCNC: 2.5 G/DL — LOW (ref 3.3–5)
ANION GAP SERPL CALC-SCNC: 7 MMOL/L — SIGNIFICANT CHANGE UP (ref 5–17)
ANION GAP SERPL CALC-SCNC: 7 MMOL/L — SIGNIFICANT CHANGE UP (ref 5–17)
BLD GP AB SCN SERPL QL: NEGATIVE — SIGNIFICANT CHANGE UP
BUN SERPL-MCNC: 40 MG/DL — HIGH (ref 7–23)
BUN SERPL-MCNC: 43 MG/DL — HIGH (ref 7–23)
CALCIUM SERPL-MCNC: 8.3 MG/DL — LOW (ref 8.4–10.5)
CALCIUM SERPL-MCNC: 9.1 MG/DL — SIGNIFICANT CHANGE UP (ref 8.4–10.5)
CHLORIDE SERPL-SCNC: 87 MMOL/L — LOW (ref 96–108)
CHLORIDE SERPL-SCNC: 95 MMOL/L — LOW (ref 96–108)
CO2 SERPL-SCNC: 28 MMOL/L — SIGNIFICANT CHANGE UP (ref 22–31)
CO2 SERPL-SCNC: 29 MMOL/L — SIGNIFICANT CHANGE UP (ref 22–31)
CREAT SERPL-MCNC: 1.21 MG/DL — SIGNIFICANT CHANGE UP (ref 0.5–1.3)
CREAT SERPL-MCNC: 1.36 MG/DL — HIGH (ref 0.5–1.3)
EGFR: 36 ML/MIN/1.73M2 — LOW
EGFR: 41 ML/MIN/1.73M2 — LOW
GAS PNL BLDA: SIGNIFICANT CHANGE UP
GLUCOSE BLDC GLUCOMTR-MCNC: 102 MG/DL — HIGH (ref 70–99)
GLUCOSE BLDC GLUCOMTR-MCNC: 95 MG/DL — SIGNIFICANT CHANGE UP (ref 70–99)
GLUCOSE BLDC GLUCOMTR-MCNC: 99 MG/DL — SIGNIFICANT CHANGE UP (ref 70–99)
GLUCOSE SERPL-MCNC: 421 MG/DL — HIGH (ref 70–99)
GLUCOSE SERPL-MCNC: 86 MG/DL — SIGNIFICANT CHANGE UP (ref 70–99)
HCT VFR BLD CALC: 21.5 % — LOW (ref 34.5–45)
HCT VFR BLD CALC: 25.3 % — LOW (ref 34.5–45)
HGB BLD-MCNC: 6.8 G/DL — CRITICAL LOW (ref 11.5–15.5)
HGB BLD-MCNC: 7.9 G/DL — LOW (ref 11.5–15.5)
MAGNESIUM SERPL-MCNC: 2 MG/DL — SIGNIFICANT CHANGE UP (ref 1.6–2.6)
MCHC RBC-ENTMCNC: 30.6 PG — SIGNIFICANT CHANGE UP (ref 27–34)
MCHC RBC-ENTMCNC: 31.1 PG — SIGNIFICANT CHANGE UP (ref 27–34)
MCHC RBC-ENTMCNC: 31.2 G/DL — LOW (ref 32–36)
MCHC RBC-ENTMCNC: 31.6 G/DL — LOW (ref 32–36)
MCV RBC AUTO: 98.1 FL — SIGNIFICANT CHANGE UP (ref 80–100)
MCV RBC AUTO: 98.2 FL — SIGNIFICANT CHANGE UP (ref 80–100)
NRBC # BLD: 0 /100 WBCS — SIGNIFICANT CHANGE UP (ref 0–0)
NRBC # BLD: 0 /100 WBCS — SIGNIFICANT CHANGE UP (ref 0–0)
NRBC BLD-RTO: 0 /100 WBCS — SIGNIFICANT CHANGE UP (ref 0–0)
NRBC BLD-RTO: 0 /100 WBCS — SIGNIFICANT CHANGE UP (ref 0–0)
PHOSPHATE SERPL-MCNC: 3.1 MG/DL — SIGNIFICANT CHANGE UP (ref 2.5–4.5)
PLATELET # BLD AUTO: 138 K/UL — LOW (ref 150–400)
PLATELET # BLD AUTO: 184 K/UL — SIGNIFICANT CHANGE UP (ref 150–400)
POTASSIUM SERPL-MCNC: 4.6 MMOL/L — SIGNIFICANT CHANGE UP (ref 3.5–5.3)
POTASSIUM SERPL-MCNC: 4.7 MMOL/L — SIGNIFICANT CHANGE UP (ref 3.5–5.3)
POTASSIUM SERPL-SCNC: 4.6 MMOL/L — SIGNIFICANT CHANGE UP (ref 3.5–5.3)
POTASSIUM SERPL-SCNC: 4.7 MMOL/L — SIGNIFICANT CHANGE UP (ref 3.5–5.3)
RBC # BLD: 2.19 M/UL — LOW (ref 3.8–5.2)
RBC # BLD: 2.58 M/UL — LOW (ref 3.8–5.2)
RBC # FLD: 15.6 % — HIGH (ref 10.3–14.5)
RBC # FLD: 15.7 % — HIGH (ref 10.3–14.5)
RH IG SCN BLD-IMP: POSITIVE — SIGNIFICANT CHANGE UP
SODIUM SERPL-SCNC: 122 MMOL/L — LOW (ref 135–145)
SODIUM SERPL-SCNC: 131 MMOL/L — LOW (ref 135–145)
WBC # BLD: 6.32 K/UL — SIGNIFICANT CHANGE UP (ref 3.8–10.5)
WBC # BLD: 6.88 K/UL — SIGNIFICANT CHANGE UP (ref 3.8–10.5)
WBC # FLD AUTO: 6.32 K/UL — SIGNIFICANT CHANGE UP (ref 3.8–10.5)
WBC # FLD AUTO: 6.88 K/UL — SIGNIFICANT CHANGE UP (ref 3.8–10.5)

## 2025-01-25 PROCEDURE — 71045 X-RAY EXAM CHEST 1 VIEW: CPT | Mod: 26

## 2025-01-25 PROCEDURE — 93010 ELECTROCARDIOGRAM REPORT: CPT

## 2025-01-25 RX ORDER — AMIODARONE HYDROCHLORIDE 50 MG/ML
0.5 INJECTION, SOLUTION INTRAVENOUS
Qty: 450 | Refills: 0 | Status: COMPLETED | OUTPATIENT
Start: 2025-01-25 | End: 2025-12-24

## 2025-01-25 RX ORDER — ACETAMINOPHEN 160 MG/5ML
650 SUSPENSION ORAL EVERY 6 HOURS
Refills: 0 | Status: DISCONTINUED | OUTPATIENT
Start: 2025-01-25 | End: 2025-02-12

## 2025-01-25 RX ORDER — FENTANYL CITRATE 50 UG/ML
50 INJECTION INTRAMUSCULAR; INTRAVENOUS ONCE
Refills: 0 | Status: COMPLETED | OUTPATIENT
Start: 2025-01-25 | End: 2025-01-25

## 2025-01-25 RX ORDER — SODIUM CHLORIDE 9 G/ML
1000 INJECTION, SOLUTION INTRAVENOUS
Refills: 0 | Status: DISCONTINUED | OUTPATIENT
Start: 2025-01-25 | End: 2025-01-26

## 2025-01-25 RX ORDER — AMIODARONE HYDROCHLORIDE 50 MG/ML
200 INJECTION, SOLUTION INTRAVENOUS
Refills: 0 | Status: DISCONTINUED | OUTPATIENT
Start: 2025-01-25 | End: 2025-01-25

## 2025-01-25 RX ORDER — AMIODARONE HYDROCHLORIDE 50 MG/ML
1 INJECTION, SOLUTION INTRAVENOUS
Qty: 450 | Refills: 0 | Status: DISCONTINUED | OUTPATIENT
Start: 2025-01-25 | End: 2025-01-26

## 2025-01-25 RX ORDER — AMIODARONE HYDROCHLORIDE 50 MG/ML
150 INJECTION, SOLUTION INTRAVENOUS ONCE
Refills: 0 | Status: COMPLETED | OUTPATIENT
Start: 2025-01-25 | End: 2025-01-25

## 2025-01-25 RX ORDER — AMIODARONE HYDROCHLORIDE 50 MG/ML
0.5 INJECTION, SOLUTION INTRAVENOUS
Qty: 450 | Refills: 0 | Status: DISCONTINUED | OUTPATIENT
Start: 2025-01-25 | End: 2025-01-26

## 2025-01-25 RX ORDER — CEFEPIME HCL 1 G
1000 IV SOLUTION, PIGGYBACK, BOTTLE (EA) INTRAVENOUS EVERY 12 HOURS
Refills: 0 | Status: COMPLETED | OUTPATIENT
Start: 2025-01-25 | End: 2025-01-28

## 2025-01-25 RX ADMIN — Medication 3 MILLILITER(S): at 01:11

## 2025-01-25 RX ADMIN — Medication 40 MILLIGRAM(S): at 13:08

## 2025-01-25 RX ADMIN — Medication 4 MILLILITER(S): at 06:13

## 2025-01-25 RX ADMIN — IPRATROPIUM BROMIDE AND ALBUTEROL SULFATE 3 MILLILITER(S): .5; 2.5 SOLUTION RESPIRATORY (INHALATION) at 11:18

## 2025-01-25 RX ADMIN — AMIODARONE HYDROCHLORIDE 16.7 MG/MIN: 50 INJECTION, SOLUTION INTRAVENOUS at 17:20

## 2025-01-25 RX ADMIN — AMIODARONE HYDROCHLORIDE 33.3 MG/MIN: 50 INJECTION, SOLUTION INTRAVENOUS at 11:44

## 2025-01-25 RX ADMIN — Medication 3 MILLILITER(S): at 17:08

## 2025-01-25 RX ADMIN — IPRATROPIUM BROMIDE AND ALBUTEROL SULFATE 3 MILLILITER(S): .5; 2.5 SOLUTION RESPIRATORY (INHALATION) at 01:11

## 2025-01-25 RX ADMIN — Medication 10 MILLIGRAM(S): at 13:15

## 2025-01-25 RX ADMIN — LEVOTHYROXINE SODIUM 60 MICROGRAM(S): 25 TABLET ORAL at 21:10

## 2025-01-25 RX ADMIN — Medication 5000 UNIT(S): at 06:14

## 2025-01-25 RX ADMIN — Medication 5 MILLIGRAM(S): at 23:43

## 2025-01-25 RX ADMIN — IPRATROPIUM BROMIDE AND ALBUTEROL SULFATE 3 MILLILITER(S): .5; 2.5 SOLUTION RESPIRATORY (INHALATION) at 23:44

## 2025-01-25 RX ADMIN — IPRATROPIUM BROMIDE AND ALBUTEROL SULFATE 3 MILLILITER(S): .5; 2.5 SOLUTION RESPIRATORY (INHALATION) at 06:14

## 2025-01-25 RX ADMIN — Medication 3 MILLILITER(S): at 06:13

## 2025-01-25 RX ADMIN — Medication 3 MILLILITER(S): at 11:18

## 2025-01-25 RX ADMIN — Medication 100 MILLIGRAM(S): at 17:07

## 2025-01-25 RX ADMIN — Medication 5000 UNIT(S): at 17:07

## 2025-01-25 RX ADMIN — Medication 4 MILLILITER(S): at 17:07

## 2025-01-25 RX ADMIN — IPRATROPIUM BROMIDE AND ALBUTEROL SULFATE 3 MILLILITER(S): .5; 2.5 SOLUTION RESPIRATORY (INHALATION) at 17:07

## 2025-01-25 RX ADMIN — SODIUM CHLORIDE 50 MILLILITER(S): 9 INJECTION, SOLUTION INTRAVENOUS at 10:06

## 2025-01-25 RX ADMIN — ANTISEPTIC SURGICAL SCRUB 1 APPLICATION(S): 0.04 SOLUTION TOPICAL at 08:18

## 2025-01-25 RX ADMIN — AMIODARONE HYDROCHLORIDE 600 MILLIGRAM(S): 50 INJECTION, SOLUTION INTRAVENOUS at 11:34

## 2025-01-25 RX ADMIN — Medication 3 MILLILITER(S): at 23:44

## 2025-01-25 RX ADMIN — Medication 100 MILLIGRAM(S): at 06:13

## 2025-01-25 NOTE — PROGRESS NOTE ADULT - SUBJECTIVE AND OBJECTIVE BOX
INTERVAL HPI/OVERNIGHT EVENTS:  events noted  RRT x 2 for hypoxia past 24 hours    Allergies    clindamycin (Unknown)  shellfish (Unknown)  strawberry (Rash)  penicillin (Unknown)  clindamycin (Other)  strawberry (Unknown)  IV Contrast (Other)  IV Contrast (Unknown)    Intolerances      unable to obtain      PHYSICAL EXAM:   Vital Signs:  Vital Signs Last 24 Hrs  T(C): 36.9 (19 Jan 2025 11:38), Max: 37 (18 Jan 2025 16:51)  T(F): 98.4 (19 Jan 2025 11:38), Max: 98.6 (18 Jan 2025 16:51)  HR: 78 (19 Jan 2025 11:38) (70 - 81)  BP: 114/64 (19 Jan 2025 11:38) (88/62 - 118/66)  BP(mean): --  RR: 18 (19 Jan 2025 11:38) (18 - 18)  SpO2: 99% (19 Jan 2025 11:38) (99% - 100%)    Parameters below as of 19 Jan 2025 11:38  Patient On (Oxygen Delivery Method): nasal cannula      Daily     Daily I&O's Summary    19 Jan 2025 07:01  -  19 Jan 2025 15:01  --------------------------------------------------------  IN: 0 mL / OUT: 150 mL / NET: -150 mL      nad  confused  frail  non toxic  soft, nt  no edema        LABS:                        9.8    8.65  )-----------( 193      ( 19 Jan 2025 07:30 )             32.0     01-19    144  |  107  |  52[H]  ----------------------------<  114[H]  4.3   |  27  |  1.70[H]    Ca    9.3      19 Jan 2025 07:30        Urinalysis Basic - ( 19 Jan 2025 07:30 )    Color: x / Appearance: x / SG: x / pH: x  Gluc: 114 mg/dL / Ketone: x  / Bili: x / Urobili: x   Blood: x / Protein: x / Nitrite: x   Leuk Esterase: x / RBC: x / WBC x   Sq Epi: x / Non Sq Epi: x / Bacteria: x      amylase   lipase  RADIOLOGY & ADDITIONAL TESTS:

## 2025-01-25 NOTE — PROGRESS NOTE ADULT - SUBJECTIVE AND OBJECTIVE BOX
DATE OF SERVICE: 01-25-25 @ 14:11  CHIEF COMPLAINT:Patient is a 89y old  Female who presents with a chief complaint of Hypoxic/hypercapnic resp failure (25 Jan 2025 10:46)    	    seen early this am pre rrt    PAST MEDICAL & SURGICAL HISTORY:  Hypertension      Hypothyroidism      HTN (hypertension)      Dementia      Other dementia      History of colon cancer  s/p Resection in 2009, Dr. David      H/O brain tumor  History of brain tumor resection in 1961.              sleeping  arousable  no apparent cp or sob    Medications:  MEDICATIONS  (STANDING):  acetylcysteine 20%  Inhalation 3 milliLiter(s) Inhalation every 6 hours  albuterol/ipratropium for Nebulization 3 milliLiter(s) Nebulizer every 6 hours  albuterol/ipratropium for Nebulization. 3 milliLiter(s) Nebulizer once  aMIOdarone Infusion 1 mG/Min (33.3 mL/Hr) IV Continuous <Continuous>  aMIOdarone Infusion 0.5 mG/Min (16.7 mL/Hr) IV Continuous <Continuous>  ascorbic acid 500 milliGRAM(s) Oral daily  bisacodyl Suppository 10 milliGRAM(s) Rectal daily  cefepime   IVPB 1000 milliGRAM(s) IV Intermittent every 12 hours  chlorhexidine 2% Cloths 1 Application(s) Topical <User Schedule>  dextrose 5% + sodium chloride 0.9%. 1000 milliLiter(s) (50 mL/Hr) IV Continuous <Continuous>  dextrose 5%. 1000 milliLiter(s) (50 mL/Hr) IV Continuous <Continuous>  furosemide   Injectable 40 milliGRAM(s) IV Push daily  heparin   Injectable 5000 Unit(s) SubCutaneous every 12 hours  levothyroxine Injectable 60 MICROGram(s) IV Push at bedtime  metoprolol tartrate Injectable 5 milliGRAM(s) IV Push every 6 hours  mirtazapine 15 milliGRAM(s) Oral at bedtime  multivitamin 1 Tablet(s) Oral daily  QUEtiapine 12.5 milliGRAM(s) Oral at bedtime  sodium chloride 3%  Inhalation 4 milliLiter(s) Inhalation every 12 hours    MEDICATIONS  (PRN):    	    PHYSICAL EXAM:  T(C): 37.6 (01-25-25 @ 09:26), Max: 37.6 (01-25-25 @ 09:26)  HR: 97 (01-25-25 @ 13:23) (75 - 105)  BP: 96/56 (01-25-25 @ 13:23) (94/57 - 163/63)  RR: 18 (01-25-25 @ 12:37) (18 - 20)  SpO2: 94% (01-25-25 @ 12:37) (92% - 100%)  Wt(kg): --  I&O's Summary    24 Jan 2025 07:01  -  25 Jan 2025 07:00  --------------------------------------------------------  IN: 0 mL / OUT: 600 mL / NET: -600 mL        frail  Cardiovascular: rr  Respiratory: dec bs  dec rom  neuro unable to fully assess      TELEMETRY: 	    ECG:  	  RADIOLOGY:  OTHER: 	  	  LABS:	 	    CARDIAC MARKERS:                                7.9    6.88  )-----------( 184      ( 25 Jan 2025 08:59 )             25.3     01-25    122[L]  |  87[L]  |  40[H]  ----------------------------<  421[H]  4.6   |  28  |  1.21    Ca    8.3[L]      25 Jan 2025 07:05  Phos  3.1     01-25  Mg     2.0     01-25    TPro  x   /  Alb  2.5[L]  /  TBili  x   /  DBili  x   /  AST  x   /  ALT  x   /  AlkPhos  x   01-24    proBNP:   Lipid Profile:   HgA1c:   TSH:

## 2025-01-25 NOTE — CONSULT NOTE ADULT - SUBJECTIVE AND OBJECTIVE BOX
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 Pt found to Influenza A +, UTI +, possible RLL pneum with consolidation appreciated on POCUS. She is admitted with septic shock in the setting of possible RLL pneum and UTI. Patient had RRT today for Afib RVR, s/p DCCV, now back in RVR. Nephrology consulted for hyponatremia management          PAST MEDICAL & SURGICAL HISTORY:  Hypertension      Hypothyroidism      HTN (hypertension)      Dementia      Other dementia      History of colon cancer  s/p Resection in 2009, Dr. David      H/O brain tumor  History of brain tumor resection in 1961.          MEDICATIONS  (STANDING):  acetylcysteine 20%  Inhalation 3 milliLiter(s) Inhalation every 6 hours  albuterol/ipratropium for Nebulization 3 milliLiter(s) Nebulizer every 6 hours  albuterol/ipratropium for Nebulization. 3 milliLiter(s) Nebulizer once  aMIOdarone Infusion 1 mG/Min (33.3 mL/Hr) IV Continuous <Continuous>  aMIOdarone Infusion 0.5 mG/Min (16.7 mL/Hr) IV Continuous <Continuous>  ascorbic acid 500 milliGRAM(s) Oral daily  bisacodyl Suppository 10 milliGRAM(s) Rectal daily  cefepime   IVPB 1000 milliGRAM(s) IV Intermittent every 12 hours  chlorhexidine 2% Cloths 1 Application(s) Topical <User Schedule>  dextrose 5% + sodium chloride 0.9%. 1000 milliLiter(s) (50 mL/Hr) IV Continuous <Continuous>  dextrose 5%. 1000 milliLiter(s) (50 mL/Hr) IV Continuous <Continuous>  furosemide   Injectable 40 milliGRAM(s) IV Push daily  heparin   Injectable 5000 Unit(s) SubCutaneous every 12 hours  levothyroxine Injectable 60 MICROGram(s) IV Push at bedtime  metoprolol tartrate Injectable 5 milliGRAM(s) IV Push every 6 hours  mirtazapine 15 milliGRAM(s) Oral at bedtime  multivitamin 1 Tablet(s) Oral daily  QUEtiapine 12.5 milliGRAM(s) Oral at bedtime  sodium chloride 3%  Inhalation 4 milliLiter(s) Inhalation every 12 hours      Allergies    clindamycin (Unknown)  shellfish (Unknown)  strawberry (Rash)  penicillin (Unknown)  clindamycin (Other)  strawberry (Unknown)  IV Contrast (Other)  IV Contrast (Unknown)    Intolerances        SOCIAL HISTORY:  Denies ETOh,Smoking,     FAMILY HISTORY:      REVIEW OF SYSTEMS:  CONSTITUTIONAL: No weakness, fevers or chills  EYES/ENT: No visual changes;  No vertigo or throat pain   NECK: No pain or stiffness  RESPIRATORY: No cough, wheezing, hemoptysis; No shortness of breath  CARDIOVASCULAR: No chest pain or palpitations  GASTROINTESTINAL: No abdominal or epigastric pain. No nausea, vomiting, or hematemesis; No diarrhea or constipation. No melena or hematochezia.  GENITOURINARY: No dysuria, frequency or hematuria  NEUROLOGICAL: No numbness or weakness  SKIN: No itching, burning, rashes, or lesions   All other review of systems is negative unless indicated above.    VITAL:  T(C): , Max: 37.6 (01-25-25 @ 09:26)  T(F): , Max: 99.7 (01-25-25 @ 09:26)  HR: 97 (01-25-25 @ 13:23)  BP: 96/56 (01-25-25 @ 13:23)  BP(mean): --  RR: 18 (01-25-25 @ 12:37)  SpO2: 94% (01-25-25 @ 12:37)  Wt(kg): --    PHYSICAL EXAM:  Constitutional: NAD, Alert  HEENT: NCAT, MMM  Neck: Supple, No JVD  Respiratory: CTA-b/l  Cardiovascular: RRR s1s2, no m/r/g  Gastrointestinal: BS+, soft, NT/ND  Extremities: No peripheral edema b/l  Neurological: no focal deficits; strength grossly intact  Back: no CVAT b/l  Skin: No rashes, no nevi    LABS:                        7.9    6.88  )-----------( 184      ( 25 Jan 2025 08:59 )             25.3     Na(131)/K(4.7)/Cl(95)/HCO3(29)/BUN(43)/Cr(1.36)Glu(86)/Ca(9.1)/Mg(--)/PO4(--)    01-25 @ 15:08  Na(122)/K(4.6)/Cl(87)/HCO3(28)/BUN(40)/Cr(1.21)Glu(421)/Ca(8.3)/Mg(2.0)/PO4(3.1)    01-25 @ 07:05  Na(132)/K(5.3)/Cl(97)/HCO3(28)/BUN(44)/Cr(1.25)Glu(82)/Ca(9.3)/Mg(--)/PO4(--)    01-24 @ 23:09  Na(125)/K(6.0)/Cl(91)/HCO3(26)/BUN(44)/Cr(1.17)Glu(191)/Ca(8.7)/Mg(--)/PO4(--)    01-24 @ 21:49  Na(132)/K(6.1)/Cl(97)/HCO3(26)/BUN(42)/Cr(1.16)Glu(141)/Ca(9.7)/Mg(--)/PO4(--)    01-24 @ 07:11    Urinalysis Basic - ( 25 Jan 2025 15:08 )    Color: x / Appearance: x / SG: x / pH: x  Gluc: 86 mg/dL / Ketone: x  / Bili: x / Urobili: x   Blood: x / Protein: x / Nitrite: x   Leuk Esterase: x / RBC: x / WBC x   Sq Epi: x / Non Sq Epi: x / Bacteria: x            IMAGING:    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 Pt found to Influenza A +, UTI +, possible RLL pneum with consolidation appreciated on POCUS. She is admitted with septic shock in the setting of possible RLL pneum and UTI. Patient had RRT today for Afib RVR, s/p DCCV, now back in RVR. Nephrology consulted for hyponatremia management. Patient is currently obtunded on high flow           PAST MEDICAL & SURGICAL HISTORY:  Hypertension      Hypothyroidism      HTN (hypertension)      Dementia      Other dementia      History of colon cancer  s/p Resection in 2009, Dr. David      H/O brain tumor  History of brain tumor resection in 1961.          MEDICATIONS  (STANDING):  acetylcysteine 20%  Inhalation 3 milliLiter(s) Inhalation every 6 hours  albuterol/ipratropium for Nebulization 3 milliLiter(s) Nebulizer every 6 hours  albuterol/ipratropium for Nebulization. 3 milliLiter(s) Nebulizer once  aMIOdarone Infusion 1 mG/Min (33.3 mL/Hr) IV Continuous <Continuous>  aMIOdarone Infusion 0.5 mG/Min (16.7 mL/Hr) IV Continuous <Continuous>  ascorbic acid 500 milliGRAM(s) Oral daily  bisacodyl Suppository 10 milliGRAM(s) Rectal daily  cefepime   IVPB 1000 milliGRAM(s) IV Intermittent every 12 hours  chlorhexidine 2% Cloths 1 Application(s) Topical <User Schedule>  dextrose 5% + sodium chloride 0.9%. 1000 milliLiter(s) (50 mL/Hr) IV Continuous <Continuous>  dextrose 5%. 1000 milliLiter(s) (50 mL/Hr) IV Continuous <Continuous>  furosemide   Injectable 40 milliGRAM(s) IV Push daily  heparin   Injectable 5000 Unit(s) SubCutaneous every 12 hours  levothyroxine Injectable 60 MICROGram(s) IV Push at bedtime  metoprolol tartrate Injectable 5 milliGRAM(s) IV Push every 6 hours  mirtazapine 15 milliGRAM(s) Oral at bedtime  multivitamin 1 Tablet(s) Oral daily  QUEtiapine 12.5 milliGRAM(s) Oral at bedtime  sodium chloride 3%  Inhalation 4 milliLiter(s) Inhalation every 12 hours      Allergies    clindamycin (Unknown)  shellfish (Unknown)  strawberry (Rash)  penicillin (Unknown)  clindamycin (Other)  strawberry (Unknown)  IV Contrast (Other)  IV Contrast (Unknown)    Intolerances        SOCIAL HISTORY:  Denies ETOh,Smoking,     FAMILY HISTORY:      REVIEW OF SYSTEMS:  CONSTITUTIONAL: No weakness, fevers or chills  EYES/ENT: No visual changes;  No vertigo or throat pain   NECK: No pain or stiffness  RESPIRATORY: No cough, wheezing, hemoptysis; No shortness of breath  CARDIOVASCULAR: No chest pain or palpitations  GASTROINTESTINAL: No abdominal or epigastric pain. No nausea, vomiting, or hematemesis; No diarrhea or constipation. No melena or hematochezia.  GENITOURINARY: No dysuria, frequency or hematuria  NEUROLOGICAL: No numbness or weakness  SKIN: No itching, burning, rashes, or lesions   All other review of systems is negative unless indicated above.    VITAL:  T(C): , Max: 37.6 (01-25-25 @ 09:26)  T(F): , Max: 99.7 (01-25-25 @ 09:26)  HR: 97 (01-25-25 @ 13:23)  BP: 96/56 (01-25-25 @ 13:23)  BP(mean): --  RR: 18 (01-25-25 @ 12:37)  SpO2: 94% (01-25-25 @ 12:37)  Wt(kg): --    PHYSICAL EXAM:  Constitutional: somnolent   HEENT: NCAT, MMM  Neck: Supple, No JVD  Respiratory: CTA-b/l  Cardiovascular: RRR s1s2, no m/r/g  Gastrointestinal: BS+, soft, NT/ND  Extremities: No peripheral edema b/l  Neurological: no focal deficits; strength grossly intact  Back: no CVAT b/l  Skin: No rashes, no nevi    LABS:                        7.9    6.88  )-----------( 184      ( 25 Jan 2025 08:59 )             25.3     Na(131)/K(4.7)/Cl(95)/HCO3(29)/BUN(43)/Cr(1.36)Glu(86)/Ca(9.1)/Mg(--)/PO4(--)    01-25 @ 15:08  Na(122)/K(4.6)/Cl(87)/HCO3(28)/BUN(40)/Cr(1.21)Glu(421)/Ca(8.3)/Mg(2.0)/PO4(3.1)    01-25 @ 07:05  Na(132)/K(5.3)/Cl(97)/HCO3(28)/BUN(44)/Cr(1.25)Glu(82)/Ca(9.3)/Mg(--)/PO4(--)    01-24 @ 23:09  Na(125)/K(6.0)/Cl(91)/HCO3(26)/BUN(44)/Cr(1.17)Glu(191)/Ca(8.7)/Mg(--)/PO4(--)    01-24 @ 21:49  Na(132)/K(6.1)/Cl(97)/HCO3(26)/BUN(42)/Cr(1.16)Glu(141)/Ca(9.7)/Mg(--)/PO4(--)    01-24 @ 07:11    Urinalysis Basic - ( 25 Jan 2025 15:08 )    Color: x / Appearance: x / SG: x / pH: x  Gluc: 86 mg/dL / Ketone: x  / Bili: x / Urobili: x   Blood: x / Protein: x / Nitrite: x   Leuk Esterase: x / RBC: x / WBC x   Sq Epi: x / Non Sq Epi: x / Bacteria: x            IMAGING:    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    With warm regards,    Gena Bhatia MD   [facility]  Office: [phone]

## 2025-01-25 NOTE — CHART NOTE - NSCHARTNOTEFT_GEN_A_CORE
RRT called for tachycardia 145 and hypotension. Patient seen as RRT in progress. Cardiac monitor c/f afib with RVR, concern for hemodynamic instability. Attempted to call patric Brar to discuss GoC for cardioversion but went to voicemail x 2. Patient given 50 mcg fentanyl and emergently cardioverted with subsequent conversion to NSR, HR 70s-80s, and improvement in BP to 100s-120s/80s. Provider was able to update patric Brar via telephone call after RRT. Pending cruz placement for output monitoring per pulmonology while on IV diuresis      Vital Signs Last 24 Hrs  T(C): 36.9 (25 Jan 2025 05:28), Max: 36.9 (25 Jan 2025 05:28)  T(F): 98.5 (25 Jan 2025 05:28), Max: 98.5 (25 Jan 2025 05:28)  HR: 80 (25 Jan 2025 05:28) (75 - 98)  BP: 103/51 (25 Jan 2025 05:28) (102/60 - 163/63)  BP(mean): --  RR: 18 (25 Jan 2025 05:28) (18 - 20)  SpO2: 100% (25 Jan 2025 05:28) (94% - 100%)    Parameters below as of 25 Jan 2025 05:28  Patient On (Oxygen Delivery Method): nasal cannula, high flow  O2 Flow (L/min): 50  O2 Concentration (%): 100    #s/p RRT for Afib w/ RVR:  -continue IV lopressor 5mg Q6 hrs  -Consider cardiology eval   f/u pulmonology  -f/u RRT recs  -GoC ongoing      Discussed with Dr. Link, RN, patric Maykel      Adarsh Mead PA-C  [facility] Division of Medicine  Reachable on MS Teams

## 2025-01-25 NOTE — RAPID RESPONSE TEAM SUMMARY - NSADDTLFINDINGSRRT_GEN_ALL_CORE
Hypoxic to 70s with poor pulse ox waveform. Pt ill appearing. Vital signs otherwise nonactionable. Deep nasal suctioning productive of small amount of mucus. HFNC and NRB placed, good waveform achieved with SaO2 100%. NRB removed and pulse ox monitor affixed to finger for consistent waveform. Tube feeds held. Encouraged continued GOC conversations with family given recurrent aspiration with tube feeds. Primary team at bedside in agreement. 
On arrival, /60s,  (Afib), afebrile rectally, SpO2 100% on HFNC 100/60. On exam, patient AOx0 at baseline, drooling, contracted in bed, minimally interactive, +anasarca. Patient given 2.5 mg IV lopressor with subsequent drop in BP to 70s-80s systolic. Spoke with family to see if cardioversion was within goals of care, however unable to reach despite several phone calls by primary team.    Patient given 50 mcg fentanyl and emergently cardioverted with subsequent conversion to NSR, HR 70s-80s, and improvement in BP to 100s-120s/80s. Should continue with standing lopressor as ordered. Prognosis remains extremely poor. Would encourage further GOC with family. Plan of care discussed with primary team at bedside.

## 2025-01-25 NOTE — CONSULT NOTE ADULT - SUBJECTIVE AND OBJECTIVE BOX
Cardiovascular Disease Initial Evaluation  Date of service: 01-25-25 @ 10:47    CHIEF COMPLAINT: Afib RVR     HISTORY OF PRESENT ILLNESS:  Ms. 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 Pt found to have DWAIN on CKD, Influenza A +, UTI +, possible RLL pneum with consolidation appreciated on POCUS. Pt admitted for hypercapnic/hypoxic respiratory failure failure/septic shock in the setting of possible RLL pneum and UTI. Patient had RRT today for Afib RVR, s/p DCCV, now back in RVR. Cardiology consulted for further assessment. History obtained from chart review as patient has severe dementia.       Allergies    clindamycin (Unknown)  shellfish (Unknown)  strawberry (Rash)  penicillin (Unknown)  clindamycin (Other)  strawberry (Unknown)  IV Contrast (Other)  IV Contrast (Unknown)    Intolerances    	    MEDICATIONS:  furosemide   Injectable 40 milliGRAM(s) IV Push daily  heparin   Injectable 5000 Unit(s) SubCutaneous every 12 hours  metoprolol tartrate Injectable 5 milliGRAM(s) IV Push every 6 hours      acetylcysteine 20%  Inhalation 3 milliLiter(s) Inhalation every 6 hours  albuterol/ipratropium for Nebulization 3 milliLiter(s) Nebulizer every 6 hours  albuterol/ipratropium for Nebulization. 3 milliLiter(s) Nebulizer once  sodium chloride 3%  Inhalation 4 milliLiter(s) Inhalation every 12 hours    mirtazapine 15 milliGRAM(s) Oral at bedtime  QUEtiapine 12.5 milliGRAM(s) Oral at bedtime    bisacodyl Suppository 10 milliGRAM(s) Rectal daily    levothyroxine Injectable 60 MICROGram(s) IV Push at bedtime    ascorbic acid 500 milliGRAM(s) Oral daily  chlorhexidine 2% Cloths 1 Application(s) Topical <User Schedule>  dextrose 5% + sodium chloride 0.9%. 1000 milliLiter(s) IV Continuous <Continuous>  dextrose 5%. 1000 milliLiter(s) IV Continuous <Continuous>  multivitamin 1 Tablet(s) Oral daily      PAST MEDICAL & SURGICAL HISTORY:  Hypertension      Hypothyroidism      HTN (hypertension)      Dementia      Other dementia      History of colon cancer  s/p Resection in 2009, Dr. David      H/O brain tumor  History of brain tumor resection in 1961.          FAMILY HISTORY:      SOCIAL HISTORY:    The patient is a nonsmoker       REVIEW OF SYSTEMS:  See HPI, otherwise complete 14 point review of systems negative    [ ] All others negative	  [ x] Unable to obtain    PHYSICAL EXAM:  T(C): 37.6 (01-25-25 @ 09:26), Max: 37.6 (01-25-25 @ 09:26)  HR: 100 (01-25-25 @ 09:26) (75 - 100)  BP: 94/57 (01-25-25 @ 09:26) (94/57 - 163/63)  RR: 18 (01-25-25 @ 05:28) (18 - 20)  SpO2: 92% (01-25-25 @ 09:26) (92% - 100%)  Wt(kg): --  I&O's Summary    24 Jan 2025 07:01  -  25 Jan 2025 07:00  --------------------------------------------------------  IN: 0 mL / OUT: 600 mL / NET: -600 mL        Appearance: No Acute Distress; resting comfortably  HEENT:  Normal oral mucosa, PERRL, EOMI	  Cardiovascular: Irregular and tachycardic  Respiratory: HFNC  Gastrointestinal:  Soft, Non-tender, + BS	  Skin: No rashes, No ecchymoses, No cyanosis	  Neurologic: Non-focal; no weakness  Extremities: No clubbing, cyanosis or edema  Vascular: Peripheral pulses palpable 2+ bilaterally  Psychiatry: A & O x 0    Laboratory Data:	 	    CBC Full  -  ( 25 Jan 2025 08:59 )  WBC Count : 6.88 K/uL  Hemoglobin : 7.9 g/dL  Hematocrit : 25.3 %  Platelet Count - Automated : 184 K/uL  Mean Cell Volume : 98.1 fl  Mean Cell Hemoglobin : 30.6 pg  Mean Cell Hemoglobin Concentration : 31.2 g/dL  Auto Neutrophil # : x  Auto Lymphocyte # : x  Auto Monocyte # : x  Auto Eosinophil # : x  Auto Basophil # : x  Auto Neutrophil % : x  Auto Lymphocyte % : x  Auto Monocyte % : x  Auto Eosinophil % : x  Auto Basophil % : x    01-25    122[L]  |  87[L]  |  40[H]  ----------------------------<  421[H]  4.6   |  28  |  1.21  01-24    132[L]  |  97  |  44[H]  ----------------------------<  82  5.3   |  28  |  1.25    Ca    8.3[L]      25 Jan 2025 07:05  Ca    9.3      24 Jan 2025 23:09  Phos  3.1     01-25  Mg     2.0     01-25    TPro  x   /  Alb  2.5[L]  /  TBili  x   /  DBili  x   /  AST  x   /  ALT  x   /  AlkPhos  x   01-24      proBNP:   Lipid Profile:   HgA1c:   TSH:       CARDIAC MARKERS:            Interpretation of Telemetry: N/a	    ECG: afib RVR  RADIOLOGY:  OTHER: 	    PREVIOUS DIAGNOSTIC TESTING:    [ ] Echocardiogram:  [ ] Catheterization:  [ ] Stress Test:  	    Assessment:  89y old Female with stated hx significant for dementia, s/p brain tumor resection 1961, HTN, hypothyroidism colon Ca s/p Rt hemicolectomy ('09), 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    Plan of Care:    #Afib RVR  - Likely hypoxia driven  - S/p DCCV which was unsuccessful  - Patient BP soft  - Recommend Amiodarone loading for rate control  - Given recent DCCV, there is myocardial stunning and higher risk of clot formation  - Patient is anemic  - No signs of overt bleeding, will obtain occult blood and if negative recommend weight based Lovenox  - May also continue BB with holding parameters    #Hypoxix respiratory failure  - On HFNC  - Wean as tolerated    #Dementia  - Patient bedbound  - AAOx0  - Guarded prognosis        Complex medical decision making in a patient with multiple co-morbidities.   77 minutes spent on total encounter; more than 50% of the visit was spent counseling and/or coordinating care by the attending physician.   	  Bobby Montilla DO [facility]  Cardiovascular Diseases  [phone]     Cardiovascular Disease Initial Evaluation  Date of service: 01-25-25 @ 10:47    CHIEF COMPLAINT: Afib RVR     HISTORY OF PRESENT ILLNESS:  Ms. 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 Pt found to have DWAIN on CKD, Influenza A +, UTI +, possible RLL pneum with consolidation appreciated on POCUS. Pt admitted for hypercapnic/hypoxic respiratory failure failure/septic shock in the setting of possible RLL pneum and UTI. Patient had RRT today for Afib RVR, s/p DCCV, now back in RVR. Cardiology consulted for further assessment. History obtained from chart review as patient has severe dementia.       Allergies    clindamycin (Unknown)  shellfish (Unknown)  strawberry (Rash)  penicillin (Unknown)  clindamycin (Other)  strawberry (Unknown)  IV Contrast (Other)  IV Contrast (Unknown)    Intolerances    	    MEDICATIONS:  furosemide   Injectable 40 milliGRAM(s) IV Push daily  heparin   Injectable 5000 Unit(s) SubCutaneous every 12 hours  metoprolol tartrate Injectable 5 milliGRAM(s) IV Push every 6 hours      acetylcysteine 20%  Inhalation 3 milliLiter(s) Inhalation every 6 hours  albuterol/ipratropium for Nebulization 3 milliLiter(s) Nebulizer every 6 hours  albuterol/ipratropium for Nebulization. 3 milliLiter(s) Nebulizer once  sodium chloride 3%  Inhalation 4 milliLiter(s) Inhalation every 12 hours    mirtazapine 15 milliGRAM(s) Oral at bedtime  QUEtiapine 12.5 milliGRAM(s) Oral at bedtime    bisacodyl Suppository 10 milliGRAM(s) Rectal daily    levothyroxine Injectable 60 MICROGram(s) IV Push at bedtime    ascorbic acid 500 milliGRAM(s) Oral daily  chlorhexidine 2% Cloths 1 Application(s) Topical <User Schedule>  dextrose 5% + sodium chloride 0.9%. 1000 milliLiter(s) IV Continuous <Continuous>  dextrose 5%. 1000 milliLiter(s) IV Continuous <Continuous>  multivitamin 1 Tablet(s) Oral daily      PAST MEDICAL & SURGICAL HISTORY:  Hypertension      Hypothyroidism      HTN (hypertension)      Dementia      Other dementia      History of colon cancer  s/p Resection in 2009, Dr. David      H/O brain tumor  History of brain tumor resection in 1961.          FAMILY HISTORY:      SOCIAL HISTORY:    The patient is a nonsmoker       REVIEW OF SYSTEMS:  See HPI, otherwise complete 14 point review of systems negative    [ ] All others negative	  [ x] Unable to obtain    PHYSICAL EXAM:  T(C): 37.6 (01-25-25 @ 09:26), Max: 37.6 (01-25-25 @ 09:26)  HR: 100 (01-25-25 @ 09:26) (75 - 100)  BP: 94/57 (01-25-25 @ 09:26) (94/57 - 163/63)  RR: 18 (01-25-25 @ 05:28) (18 - 20)  SpO2: 92% (01-25-25 @ 09:26) (92% - 100%)  Wt(kg): --  I&O's Summary    24 Jan 2025 07:01  -  25 Jan 2025 07:00  --------------------------------------------------------  IN: 0 mL / OUT: 600 mL / NET: -600 mL        Appearance: No Acute Distress; resting comfortably  HEENT:  Normal oral mucosa, PERRL, EOMI	  Cardiovascular: Irregular and tachycardic  Respiratory: HFNC  Gastrointestinal:  Soft, Non-tender, + BS	  Skin: No rashes, No ecchymoses, No cyanosis	  Neurologic: Non-focal; no weakness  Extremities: No clubbing, cyanosis or edema  Vascular: Peripheral pulses palpable 2+ bilaterally  Psychiatry: A & O x 0    Laboratory Data:	 	    CBC Full  -  ( 25 Jan 2025 08:59 )  WBC Count : 6.88 K/uL  Hemoglobin : 7.9 g/dL  Hematocrit : 25.3 %  Platelet Count - Automated : 184 K/uL  Mean Cell Volume : 98.1 fl  Mean Cell Hemoglobin : 30.6 pg  Mean Cell Hemoglobin Concentration : 31.2 g/dL  Auto Neutrophil # : x  Auto Lymphocyte # : x  Auto Monocyte # : x  Auto Eosinophil # : x  Auto Basophil # : x  Auto Neutrophil % : x  Auto Lymphocyte % : x  Auto Monocyte % : x  Auto Eosinophil % : x  Auto Basophil % : x    01-25    122[L]  |  87[L]  |  40[H]  ----------------------------<  421[H]  4.6   |  28  |  1.21  01-24    132[L]  |  97  |  44[H]  ----------------------------<  82  5.3   |  28  |  1.25    Ca    8.3[L]      25 Jan 2025 07:05  Ca    9.3      24 Jan 2025 23:09  Phos  3.1     01-25  Mg     2.0     01-25    TPro  x   /  Alb  2.5[L]  /  TBili  x   /  DBili  x   /  AST  x   /  ALT  x   /  AlkPhos  x   01-24      proBNP:   Lipid Profile:   HgA1c:   TSH:       CARDIAC MARKERS:            Interpretation of Telemetry: N/a	    ECG: afib RVR  RADIOLOGY:  OTHER: 	    PREVIOUS DIAGNOSTIC TESTING:    [ ] Echocardiogram:  [ ] Catheterization:  [ ] Stress Test:  	    Assessment:  89y old Female with stated hx significant for dementia, s/p brain tumor resection 1961, HTN, hypothyroidism colon Ca s/p Rt hemicolectomy ('09), 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    Plan of Care:    #Afib RVR  - Likely hypoxia driven  - S/p DCCV which was unsuccessful  - Patient BP soft  - Recommend Amiodarone loading for rate control  - Patient is anemic and high bleeding risk, would refrain from AC at this time.   - May also continue BB with holding parameters    #Hypoxix respiratory failure  - On HFNC  - Wean as tolerated    #Dementia  - Patient bedbound  - AAOx0  - Guarded prognosis        Complex medical decision making in a patient with multiple co-morbidities.   77 minutes spent on total encounter; more than 50% of the visit was spent counseling and/or coordinating care by the attending physician.   	  Bobby Montilla DO [facility]  Cardiovascular Diseases  [phone]

## 2025-01-25 NOTE — PROGRESS NOTE ADULT - ASSESSMENT
resp failure  aspiration    peg cancelled  now on hiflow  will need to revisit GOC as currently she is not a candidate for PEG/sedation  dnr/dni  prognosis guarded       Advanced care planning was discussed with patient and family.  Advanced care planning forms were reviewed and discussed.  Risks, benefits and alternatives of gastroenterologic procedures were discussed in detail and all questions were answered.    30 minutes spent.

## 2025-01-25 NOTE — PROGRESS NOTE ADULT - SUBJECTIVE AND OBJECTIVE BOX
Follow-up Pulm Progress Note    Covering Dr. Torres    Events noted - s/p afib with RVR, s/p cardioversion   ROS unable to be obtained   pt mildly labored  sats currently mid/high 90s on HFNC 50L/90% - o2 dropped to 88% on 80% FiO2 - increased back to 90%     Medications:  MEDICATIONS  (STANDING):  acetylcysteine 20%  Inhalation 3 milliLiter(s) Inhalation every 6 hours  albuterol/ipratropium for Nebulization 3 milliLiter(s) Nebulizer every 6 hours  albuterol/ipratropium for Nebulization. 3 milliLiter(s) Nebulizer once  ascorbic acid 500 milliGRAM(s) Oral daily  bisacodyl Suppository 10 milliGRAM(s) Rectal daily  chlorhexidine 2% Cloths 1 Application(s) Topical <User Schedule>  dextrose 5% + sodium chloride 0.9%. 1000 milliLiter(s) (50 mL/Hr) IV Continuous <Continuous>  dextrose 5%. 1000 milliLiter(s) (50 mL/Hr) IV Continuous <Continuous>  fentaNYL    Injectable 50 MICROGram(s) IV Push once  furosemide   Injectable 40 milliGRAM(s) IV Push daily  heparin   Injectable 5000 Unit(s) SubCutaneous every 12 hours  levothyroxine Injectable 60 MICROGram(s) IV Push at bedtime  metoprolol tartrate Injectable 5 milliGRAM(s) IV Push every 6 hours  mirtazapine 15 milliGRAM(s) Oral at bedtime  multivitamin 1 Tablet(s) Oral daily  QUEtiapine 12.5 milliGRAM(s) Oral at bedtime  sodium chloride 3%  Inhalation 4 milliLiter(s) Inhalation every 12 hours          Vital Signs Last 24 Hrs  T(C): 37.6 (25 Jan 2025 09:26), Max: 37.6 (25 Jan 2025 09:26)  T(F): 99.7 (25 Jan 2025 09:26), Max: 99.7 (25 Jan 2025 09:26)  HR: 100 (25 Jan 2025 09:26) (75 - 100)  BP: 94/57 (25 Jan 2025 09:26) (94/57 - 163/63)  BP(mean): --  RR: 18 (25 Jan 2025 05:28) (18 - 20)  SpO2: 92% (25 Jan 2025 09:26) (92% - 100%)    Parameters below as of 25 Jan 2025 09:26  Patient On (Oxygen Delivery Method): nasal cannula, high flow        ABG - ( 25 Jan 2025 05:35 )  pH, Arterial: 7.47  pH, Blood: x     /  pCO2: 46    /  pO2: 190   / HCO3: 34    / Base Excess: 8.9   /  SaO2: 98.9                  01-24 @ 07:01  -  01-25 @ 07:00  --------------------------------------------------------  IN: 0 mL / OUT: 600 mL / NET: -600 mL          LABS:                        7.9    6.88  )-----------( 184      ( 25 Jan 2025 08:59 )             25.3     01-25    122[L]  |  87[L]  |  40[H]  ----------------------------<  421[H]  4.6   |  28  |  1.21    Ca    8.3[L]      25 Jan 2025 07:05  Phos  3.1     01-25  Mg     2.0     01-25    TPro  x   /  Alb  2.5[L]  /  TBili  x   /  DBili  x   /  AST  x   /  ALT  x   /  AlkPhos  x   01-24          CAPILLARY BLOOD GLUCOSE      POCT Blood Glucose.: 99 mg/dL (25 Jan 2025 07:43)      Urinalysis Basic - ( 25 Jan 2025 07:05 )    Color: x / Appearance: x / SG: x / pH: x  Gluc: 421 mg/dL / Ketone: x  / Bili: x / Urobili: x   Blood: x / Protein: x / Nitrite: x   Leuk Esterase: x / RBC: x / WBC x   Sq Epi: x / Non Sq Epi: x / Bacteria: x                      CULTURES:     Culture - Blood (collected 01-18-25 @ 01:49)  Source: .Blood BLOOD  Final Report (01-23-25 @ 09:01):    No growth at 5 days    Culture - Blood (collected 01-18-25 @ 01:49)  Source: .Blood BLOOD  Final Report (01-23-25 @ 09:01):    No growth at 5 days                Physical Examination:  PULM: decreased BS  CVS: S1, S2 heard    RADIOLOGY REVIEWED    CT chest:    < from: CT Chest No Cont (01.23.25 @ 17:52) >    FINDINGS:  Nasogastric tube tip in the stomach.  LUNGS : Large bilateral pleural effusions with compressive atelectasis of   both lower lobes, and atelectasis in the right middle lobe and lingula.   Groundglass opacity in the right upper lobe.  VESSELS: Aortic calcifications. Coronary artery calcifications.  HEART: Heart size is normal. No pericardial effusion.  MEDIASTINUM AND SWETHA: No lymphadenopathy.  CHEST WALL AND LOWER NECK: Within normal limits.  VISUALIZED UPPER ABDOMEN: Within normal limits.  BONES: Scoliosis and degenerative changes.    IMPRESSION:  Large bilateral pleural effusions with atelectasis of both lower lobes,   and linear atelectasis in the right middle lobe and left upper lobe.   Nonspecific groundglass opacity is seen in the aerated portions of both   lungs.        PRELIMINARY IMPRESSION:    Layering debris in the bilateral mainstem bronchi. Moderate bilateral   pleural effusions with near complete atelectasis of the right middle   lobe, and left lower lobe, and complete atelectasis of the right lower   lobe. Linear opacity involving the left upper lobe and lingula likely   represents atelectasis.    Follow-up official report in the a.m.    --- End of Report ---      < end of copied text >

## 2025-01-25 NOTE — PROGRESS NOTE ADULT - ASSESSMENT
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 Admitted to MICU with hypercapnic/hypoxic resp failure/septic shock in the setting of RLL PNA and UTI. Also found to have Influenza.       Acute hypoxic respiratory failure  -Initially 2nd to PNA   -Hypoxia had been improving, now with increase in O2 requirements  -Repeat CT chest 1/23 with b/l pleural effusions/atelectasis, b/l GGO. Favor combination of fluid overload + PNA  -Continue ABX  -Continue bronchodilators/mucolytics  -Keep O>I as tolerated  -Prognosis guarded. Pending palliative care f/u. C DNR/DNI/trial NIV   -Continue HFNC, wean as tolerated, keep sats >90%   -Bipap 10/5 PRN if worsening hypoxia or increased WOB    PNA  -? aspiration related, Pt also s/p flu   -Completed ABX, then resumed as pt spiked temp. CXR with bibasilar opacities   -Repeat CT chest 1/23 with b/l pleural effusions/atelectasis, b/l GGO. Favor combination of fluid overload + PNA  -Continue bronchodilators/mucolytics   -Trend leukocytosis/fever curve   -Suction PRN, chest PT     Septic shock  -In the setting of PNA, UTI, Flu  -Now resolved    Dysphagia  -TF via NGT  -Speech and swallow f/u noted, recommending NPO  -Aspiration precautions  -Oral care  -Palliative care f/u.     Atrial fibrillation  -By hx.   -S/p cardioversion 1/25, now tachycardic again     Dementia  -Supportive care per primary team.

## 2025-01-25 NOTE — RAPID RESPONSE TEAM SUMMARY - NSSITUATIONBACKGROUNDRRT_GEN_ALL_CORE
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 Pt found to have DWAIN on CKD, Influenza A +, UTI +, possible RLL pneum with consolidation appreciated on POCUS. Pt admitted to MICU for hypercapnic/hypoxic respiratory failure failure/septic shock in the setting of possible RLL pneum and UTI.  RRT called for hypoxia. 
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 Pt found to have DWAIN on CKD, Influenza A +, UTI +, possible RLL pneum with consolidation appreciated on POCUS. Pt admitted to MICU for hypercapnic/hypoxic respiratory failure failure/septic shock in the setting of possible RLL pneum and UTI. Recent RRT yesterday for hypoxemia requiring maxiumum HFNC and deep nasal suctioning. RRT now called for unstable AF with RVR.

## 2025-01-26 DIAGNOSIS — J90 PLEURAL EFFUSION, NOT ELSEWHERE CLASSIFIED: ICD-10-CM

## 2025-01-26 DIAGNOSIS — I48.91 UNSPECIFIED ATRIAL FIBRILLATION: ICD-10-CM

## 2025-01-26 DIAGNOSIS — R13.10 DYSPHAGIA, UNSPECIFIED: ICD-10-CM

## 2025-01-26 DIAGNOSIS — J18.9 PNEUMONIA, UNSPECIFIED ORGANISM: ICD-10-CM

## 2025-01-26 LAB
ANION GAP SERPL CALC-SCNC: 10 MMOL/L — SIGNIFICANT CHANGE UP (ref 5–17)
BASE EXCESS BLDV CALC-SCNC: 7.2 MMOL/L — HIGH (ref -2–3)
BUN SERPL-MCNC: 42 MG/DL — HIGH (ref 7–23)
CALCIUM SERPL-MCNC: 9.1 MG/DL — SIGNIFICANT CHANGE UP (ref 8.4–10.5)
CHLORIDE SERPL-SCNC: 94 MMOL/L — LOW (ref 96–108)
CO2 BLDV-SCNC: 34 MMOL/L — HIGH (ref 22–26)
CO2 SERPL-SCNC: 29 MMOL/L — SIGNIFICANT CHANGE UP (ref 22–31)
CREAT SERPL-MCNC: 1.45 MG/DL — HIGH (ref 0.5–1.3)
EGFR: 33 ML/MIN/1.73M2 — LOW
GAS PNL BLDV: SIGNIFICANT CHANGE UP
GLUCOSE BLDC GLUCOMTR-MCNC: 100 MG/DL — HIGH (ref 70–99)
GLUCOSE BLDC GLUCOMTR-MCNC: 122 MG/DL — HIGH (ref 70–99)
GLUCOSE BLDC GLUCOMTR-MCNC: 99 MG/DL — SIGNIFICANT CHANGE UP (ref 70–99)
GLUCOSE SERPL-MCNC: 97 MG/DL — SIGNIFICANT CHANGE UP (ref 70–99)
HCO3 BLDV-SCNC: 33 MMOL/L — HIGH (ref 22–29)
HCT VFR BLD CALC: 25.4 % — LOW (ref 34.5–45)
HGB BLD-MCNC: 7.9 G/DL — LOW (ref 11.5–15.5)
MAGNESIUM SERPL-MCNC: 2.1 MG/DL — SIGNIFICANT CHANGE UP (ref 1.6–2.6)
MCHC RBC-ENTMCNC: 30.5 PG — SIGNIFICANT CHANGE UP (ref 27–34)
MCHC RBC-ENTMCNC: 31.1 G/DL — LOW (ref 32–36)
MCV RBC AUTO: 98.1 FL — SIGNIFICANT CHANGE UP (ref 80–100)
NRBC # BLD: 0 /100 WBCS — SIGNIFICANT CHANGE UP (ref 0–0)
NRBC BLD-RTO: 0 /100 WBCS — SIGNIFICANT CHANGE UP (ref 0–0)
PCO2 BLDV: 48 MMHG — HIGH (ref 39–42)
PH BLDV: 7.44 — HIGH (ref 7.32–7.43)
PHOSPHATE SERPL-MCNC: 3.2 MG/DL — SIGNIFICANT CHANGE UP (ref 2.5–4.5)
PLATELET # BLD AUTO: 179 K/UL — SIGNIFICANT CHANGE UP (ref 150–400)
PO2 BLDV: 53 MMHG — HIGH (ref 25–45)
POTASSIUM SERPL-MCNC: 4.2 MMOL/L — SIGNIFICANT CHANGE UP (ref 3.5–5.3)
POTASSIUM SERPL-SCNC: 4.2 MMOL/L — SIGNIFICANT CHANGE UP (ref 3.5–5.3)
RBC # BLD: 2.59 M/UL — LOW (ref 3.8–5.2)
RBC # FLD: 15.5 % — HIGH (ref 10.3–14.5)
SAO2 % BLDV: 90.4 % — HIGH (ref 67–88)
SODIUM SERPL-SCNC: 133 MMOL/L — LOW (ref 135–145)
WBC # BLD: 6.97 K/UL — SIGNIFICANT CHANGE UP (ref 3.8–10.5)
WBC # FLD AUTO: 6.97 K/UL — SIGNIFICANT CHANGE UP (ref 3.8–10.5)

## 2025-01-26 RX ORDER — SODIUM CHLORIDE 9 G/ML
1000 INJECTION, SOLUTION INTRAVENOUS
Refills: 0 | Status: DISCONTINUED | OUTPATIENT
Start: 2025-01-26 | End: 2025-01-26

## 2025-01-26 RX ORDER — AMIODARONE HYDROCHLORIDE 50 MG/ML
0.5 INJECTION, SOLUTION INTRAVENOUS
Qty: 450 | Refills: 0 | Status: DISCONTINUED | OUTPATIENT
Start: 2025-01-26 | End: 2025-02-01

## 2025-01-26 RX ORDER — HALOPERIDOL 10 MG/1
1 TABLET ORAL EVERY 6 HOURS
Refills: 0 | Status: DISCONTINUED | OUTPATIENT
Start: 2025-01-26 | End: 2025-02-12

## 2025-01-26 RX ORDER — AMIODARONE HYDROCHLORIDE 50 MG/ML
200 INJECTION, SOLUTION INTRAVENOUS
Refills: 0 | Status: DISCONTINUED | OUTPATIENT
Start: 2025-01-26 | End: 2025-01-26

## 2025-01-26 RX ADMIN — Medication 10 MILLIGRAM(S): at 21:26

## 2025-01-26 RX ADMIN — Medication 5000 UNIT(S): at 17:11

## 2025-01-26 RX ADMIN — Medication 5 MILLIGRAM(S): at 06:07

## 2025-01-26 RX ADMIN — Medication 100 MILLIGRAM(S): at 17:11

## 2025-01-26 RX ADMIN — Medication 100 MILLIGRAM(S): at 06:02

## 2025-01-26 RX ADMIN — Medication 3 MILLILITER(S): at 17:12

## 2025-01-26 RX ADMIN — ANTISEPTIC SURGICAL SCRUB 1 APPLICATION(S): 0.04 SOLUTION TOPICAL at 06:07

## 2025-01-26 RX ADMIN — AMIODARONE HYDROCHLORIDE 16.7 MG/MIN: 50 INJECTION, SOLUTION INTRAVENOUS at 14:35

## 2025-01-26 RX ADMIN — HALOPERIDOL 1 MILLIGRAM(S): 10 TABLET ORAL at 01:59

## 2025-01-26 RX ADMIN — Medication 5 MILLIGRAM(S): at 17:12

## 2025-01-26 RX ADMIN — Medication 4 MILLILITER(S): at 06:04

## 2025-01-26 RX ADMIN — IPRATROPIUM BROMIDE AND ALBUTEROL SULFATE 3 MILLILITER(S): .5; 2.5 SOLUTION RESPIRATORY (INHALATION) at 17:12

## 2025-01-26 RX ADMIN — IPRATROPIUM BROMIDE AND ALBUTEROL SULFATE 3 MILLILITER(S): .5; 2.5 SOLUTION RESPIRATORY (INHALATION) at 06:04

## 2025-01-26 RX ADMIN — Medication 5 MILLIGRAM(S): at 13:35

## 2025-01-26 RX ADMIN — Medication 5000 UNIT(S): at 06:07

## 2025-01-26 RX ADMIN — LEVOTHYROXINE SODIUM 60 MICROGRAM(S): 25 TABLET ORAL at 21:26

## 2025-01-26 RX ADMIN — Medication 3 MILLILITER(S): at 13:35

## 2025-01-26 RX ADMIN — Medication 40 MILLIGRAM(S): at 06:07

## 2025-01-26 RX ADMIN — IPRATROPIUM BROMIDE AND ALBUTEROL SULFATE 3 MILLILITER(S): .5; 2.5 SOLUTION RESPIRATORY (INHALATION) at 13:35

## 2025-01-26 RX ADMIN — Medication 4 MILLILITER(S): at 17:14

## 2025-01-26 RX ADMIN — Medication 3 MILLILITER(S): at 06:04

## 2025-01-26 NOTE — PROGRESS NOTE ADULT - PROBLEM SELECTOR PLAN 1
events noted yesterday  progressive deterioration with RRT   now stable at Hi[facility]lo nasal cannula   CT positive for bilateral effusions  intervention on bilateral pleural effusions likely not in keeping with GOC  will attempt to wean off Hi [place]

## 2025-01-26 NOTE — PROGRESS NOTE ADULT - PROBLEM SELECTOR PLAN 5
-TF via NGT  -Speech and swallow f/u noted, recommending NPO  -Aspiration precautions  -Oral care  -Palliative care f/u.

## 2025-01-26 NOTE — PROGRESS NOTE ADULT - ASSESSMENT
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 from nursing home via EMS with hypoxic resp failure. Admitted to MICU with hypercapnic/hypoxic resp failure/septic shock in the setting of RLL PNA and UTI. Also found to have Influenza.

## 2025-01-26 NOTE — PROGRESS NOTE ADULT - SUBJECTIVE AND OBJECTIVE BOX
NEPHROLOGY     Patient seen and examined resting on HFNC, on amio gtt, in no acute distress.     MEDICATIONS  (STANDING):  acetylcysteine 20%  Inhalation 3 milliLiter(s) Inhalation every 6 hours  albuterol/ipratropium for Nebulization 3 milliLiter(s) Nebulizer every 6 hours  albuterol/ipratropium for Nebulization. 3 milliLiter(s) Nebulizer once  aMIOdarone Infusion 0.501 mG/Min (16.7 mL/Hr) IV Continuous <Continuous>  ascorbic acid 500 milliGRAM(s) Oral daily  bisacodyl Suppository 10 milliGRAM(s) Rectal daily  cefepime   IVPB 1000 milliGRAM(s) IV Intermittent every 12 hours  chlorhexidine 2% Cloths 1 Application(s) Topical <User Schedule>  dextrose 5% + sodium chloride 0.9%. 1000 milliLiter(s) (50 mL/Hr) IV Continuous <Continuous>  dextrose 5%. 1000 milliLiter(s) (50 mL/Hr) IV Continuous <Continuous>  furosemide   Injectable 40 milliGRAM(s) IV Push daily  heparin   Injectable 5000 Unit(s) SubCutaneous every 12 hours  levothyroxine Injectable 60 MICROGram(s) IV Push at bedtime  metoprolol tartrate Injectable 5 milliGRAM(s) IV Push every 6 hours  mirtazapine 15 milliGRAM(s) Oral at bedtime  multivitamin 1 Tablet(s) Oral daily  QUEtiapine 12.5 milliGRAM(s) Oral at bedtime  sodium chloride 3%  Inhalation 4 milliLiter(s) Inhalation every 12 hours    VITALS:  T(C): , Max: 36.7 (01-26-25 @ 07:00)  T(F): , Max: 98.1 (01-26-25 @ 07:00)  HR: 73 (01-26-25 @ 09:03)  BP: 133/75 (01-26-25 @ 07:00)  RR: 18 (01-26-25 @ 07:00)  SpO2: 100% (01-26-25 @ 09:03)    I and O's:    01-25 @ 07:01  -  01-26 @ 07:00  --------------------------------------------------------  IN: 0 mL / OUT: 175 mL / NET: -175 mL    PHYSICAL EXAM:  Constitutional: awake  HEENT: NCAT, MMM  Neck: Supple, No JVD  Respiratory: diminished   Cardiovascular: RRR s1s2, no m/r/g  Gastrointestinal: BS+, soft, NT/ND  Extremities: No peripheral edema b/l  Neurological: uto  Back: no CVAT b/l  Skin: No rashes, no nevi    LABS:                        7.9    6.97  )-----------( 179      ( 26 Jan 2025 08:33 )             25.4     01-26    133[L]  |  94[L]  |  42[H]  ----------------------------<  97  4.2   |  29  |  1.45[H]    Ca    9.1      26 Jan 2025 08:33  Phos  3.2     01-26  Mg     2.1     01-26    TPro  x   /  Alb  2.5[L]  /  TBili  x   /  DBili  x   /  AST  x   /  ALT  x   /  AlkPhos  x   01-24      RADIOLOGY & ADDITIONAL STUDIES:  < from: Xray Chest 1 View- PORTABLE-Urgent (Xray Chest 1 View- PORTABLE-Urgent .) (01.25.25 @ 14:39) >    IMPRESSION:  Moderate left and small right pleural effusions.    --- End of Report ---      LILIAM KING MD; Resident Radiologist  This document has been electronically signed.  GENI BORJA MD; Attending Radiologist  This document has been electronically signed. Jan 26 2025 10:38AM    < end of copied text >

## 2025-01-26 NOTE — PROGRESS NOTE ADULT - SUBJECTIVE AND OBJECTIVE BOX
Date of Service: 01-26-25 @ 08:40    Patient is a 89y old  Female who presents with a chief complaint of Hypoxic/hypercapnic resp failure (25 Jan 2025 16:58)      Any change in ROS:             MEDICATIONS  (STANDING):  acetylcysteine 20%  Inhalation 3 milliLiter(s) Inhalation every 6 hours  albuterol/ipratropium for Nebulization 3 milliLiter(s) Nebulizer every 6 hours  albuterol/ipratropium for Nebulization. 3 milliLiter(s) Nebulizer once  aMIOdarone Infusion 1 mG/Min (33.3 mL/Hr) IV Continuous <Continuous>  aMIOdarone Infusion 0.501 mG/Min (16.7 mL/Hr) IV Continuous <Continuous>  ascorbic acid 500 milliGRAM(s) Oral daily  bisacodyl Suppository 10 milliGRAM(s) Rectal daily  cefepime   IVPB 1000 milliGRAM(s) IV Intermittent every 12 hours  chlorhexidine 2% Cloths 1 Application(s) Topical <User Schedule>  dextrose 5% + sodium chloride 0.9%. 1000 milliLiter(s) (50 mL/Hr) IV Continuous <Continuous>  dextrose 5%. 1000 milliLiter(s) (50 mL/Hr) IV Continuous <Continuous>  furosemide   Injectable 40 milliGRAM(s) IV Push daily  heparin   Injectable 5000 Unit(s) SubCutaneous every 12 hours  levothyroxine Injectable 60 MICROGram(s) IV Push at bedtime  metoprolol tartrate Injectable 5 milliGRAM(s) IV Push every 6 hours  mirtazapine 15 milliGRAM(s) Oral at bedtime  multivitamin 1 Tablet(s) Oral daily  QUEtiapine 12.5 milliGRAM(s) Oral at bedtime  sodium chloride 3%  Inhalation 4 milliLiter(s) Inhalation every 12 hours    MEDICATIONS  (PRN):  acetaminophen   Oral Liquid .. 650 milliGRAM(s) Oral every 6 hours PRN Temp greater or equal to 38C (100.4F), Moderate Pain (4 - 6)  haloperidol    Injectable 1 milliGRAM(s) IntraMuscular every 6 hours PRN Agitation    Vital Signs Last 24 Hrs  T(C): 36.7 (26 Jan 2025 07:00), Max: 37.6 (25 Jan 2025 09:26)  T(F): 98.1 (26 Jan 2025 07:00), Max: 99.7 (25 Jan 2025 09:26)  HR: 73 (26 Jan 2025 07:00) (73 - 105)  BP: 133/75 (26 Jan 2025 07:00) (94/57 - 142/85)  BP(mean): --  RR: 18 (26 Jan 2025 07:00) (18 - 19)  SpO2: 100% (26 Jan 2025 07:00) (92% - 100%)    Parameters below as of 26 Jan 2025 07:00  Patient On (Oxygen Delivery Method): nasal cannula, high flow        I&O's Summary    25 Jan 2025 07:01  -  26 Jan 2025 07:00  --------------------------------------------------------  IN: 0 mL / OUT: 175 mL / NET: -175 mL          Physical Exam:   GENERAL: NAD, well-groomed, well-developed  HEENT: LUANN/   Atraumatic, Normocephalic  ENMT: No tonsillar erythema, exudates, or enlargement; Moist mucous membranes, Good dentition, No lesions  NECK: Supple, No JVD, Normal thyroid  CHEST/LUNG: Clear to auscultaion, ; No rales, rhonchi, wheezing, or rubs  CVS: Regular rate and rhythm; No murmurs, rubs, or gallops  GI: : Soft, Nontender, Nondistended; Bowel sounds present  NERVOUS SYSTEM:  Alert & Oriented X3, Good concentration; Motor Strength 5/5 B/L upper and lower extremities; DTRs 2+ intact and symmetric  EXTREMITIES:  2+ Peripheral Pulses, No clubbing, cyanosis, or edema  LYMPH: No lymphadenopathy noted  SKIN: No rashes or lesions  ENDOCRINOLOGY: No Thyromegaly  PSYCH: Appropriate    Labs:  ABG - ( 25 Jan 2025 05:35 )  pH, Arterial: 7.47  pH, Blood: x     /  pCO2: 46    /  pO2: 190   / HCO3: 34    / Base Excess: 8.9   /  SaO2: 98.9                                        7.9    6.88  )-----------( 184      ( 25 Jan 2025 08:59 )             25.3                         6.8    6.32  )-----------( 138      ( 25 Jan 2025 07:05 )             21.5                         9.7    9.69  )-----------( 199      ( 24 Jan 2025 07:08 )             31.2     01-25    131[L]  |  95[L]  |  43[H]  ----------------------------<  86  4.7   |  29  |  1.36[H]  01-25    122[L]  |  87[L]  |  40[H]  ----------------------------<  421[H]  4.6   |  28  |  1.21  01-24    132[L]  |  97  |  44[H]  ----------------------------<  82  5.3   |  28  |  1.25  01-24    125[L]  |  91[L]  |  44[H]  ----------------------------<  191[H]  6.0[H]   |  26  |  1.17  01-24    132[L]  |  97  |  42[H]  ----------------------------<  141[H]  6.1[H]   |  26  |  1.16    Ca    9.1      25 Jan 2025 15:08  Ca    8.3[L]      25 Jan 2025 07:05  Ca    9.3      24 Jan 2025 23:09  Ca    8.7      24 Jan 2025 21:49  Phos  3.1     01-25  Mg     2.0     01-25    TPro  x   /  Alb  2.5[L]  /  TBili  x   /  DBili  x   /  AST  x   /  ALT  x   /  AlkPhos  x   01-24    CAPILLARY BLOOD GLUCOSE      POCT Blood Glucose.: 99 mg/dL (26 Jan 2025 08:03)  POCT Blood Glucose.: 95 mg/dL (25 Jan 2025 18:35)  POCT Blood Glucose.: 102 mg/dL (25 Jan 2025 12:05)      LIVER FUNCTIONS - ( 24 Jan 2025 23:09 )  Alb: 2.5 g/dL / Pro: x     / ALK PHOS: x     / ALT: x     / AST: x     / GGT: x             Urinalysis Basic - ( 25 Jan 2025 15:08 )    Color: x / Appearance: x / SG: x / pH: x  Gluc: 86 mg/dL / Ketone: x  / Bili: x / Urobili: x   Blood: x / Protein: x / Nitrite: x   Leuk Esterase: x / RBC: x / WBC x   Sq Epi: x / Non Sq Epi: x / Bacteria: x            RECENT CULTURES:        RESPIRATORY CULTURES:          Studies  Chest X-RAY  large L effusion/atelectasis    CT SCAN Chest '  < from: CT Chest No Cont (01.23.25 @ 17:52) >  ACC: [id number] EXAM:  CT CHEST   ORDERED BY: DEON NAPOLES     PROCEDURE DATE:  01/23/2025          INTERPRETATION:  CLINICAL INFORMATION: Pneumonia. Evaluate for pleural   effusion.    COMPARISON: CT chest abdomen and pelvis 11/11/2021.    CONTRAST/COMPLICATIONS:  IV Contrast: NONE  Oral Contrast: NONE  .    PROCEDURE:  CT of the Chest was performed.  Sagittal and coronal reformats were performed.    FINDINGS:  Nasogastric tube tip in the stomach.  LUNGS : Large bilateral pleural effusions with compressive atelectasis of   both lower lobes, and atelectasis in the right middle lobe and lingula.   Groundglass opacity in the right upper lobe.  VESSELS: Aortic calcifications. Coronary artery calcifications.  HEART: Heart size is normal. No pericardial effusion.  MEDIASTINUM AND SWETHA: No lymphadenopathy.  CHEST WALL AND LOWER NECK: Within normal limits.  VISUALIZED UPPER ABDOMEN: Within normal limits.  BONES: Scoliosis and degenerative changes.    IMPRESSION:  Large bilateral pleural effusions with atelectasis of both lower lobes,   and linear atelectasis in the right middle lobe and left upper lobe.   Nonspecific groundglass opacity is seen in the aerated portions of both   lungs.        PRELIMINARY IMPRESSION:    Layering debris in the bilateral mainstem bronchi. Moderate bilateral   pleural effusions with near complete atelectasis of the right middle   lobe, and left lower lobe, and complete atelectasis of the right lower   lobe. Linear opacity involving the left upper lobe and lingula likely   represents atelectasis.    Follow-up official report in the a.m.    --- End of Report ---    < end of copied text >                     Date of Service: 01-26-25 @ 08:40    Patient is a 89y old  Female who presents with a chief complaint of Hypoxic/hypercapnic resp failure (25 Jan 2025 16:58)      Any change in ROS:   More alert today but confused  O2 sats 98% on HFNC 50L/90%, lowered to 50L/80%now  Spoke with patric Brar via telephone regarding plan of care           MEDICATIONS  (STANDING):  acetylcysteine 20%  Inhalation 3 milliLiter(s) Inhalation every 6 hours  albuterol/ipratropium for Nebulization 3 milliLiter(s) Nebulizer every 6 hours  albuterol/ipratropium for Nebulization. 3 milliLiter(s) Nebulizer once  aMIOdarone Infusion 1 mG/Min (33.3 mL/Hr) IV Continuous <Continuous>  aMIOdarone Infusion 0.501 mG/Min (16.7 mL/Hr) IV Continuous <Continuous>  ascorbic acid 500 milliGRAM(s) Oral daily  bisacodyl Suppository 10 milliGRAM(s) Rectal daily  cefepime   IVPB 1000 milliGRAM(s) IV Intermittent every 12 hours  chlorhexidine 2% Cloths 1 Application(s) Topical <User Schedule>  dextrose 5% + sodium chloride 0.9%. 1000 milliLiter(s) (50 mL/Hr) IV Continuous <Continuous>  dextrose 5%. 1000 milliLiter(s) (50 mL/Hr) IV Continuous <Continuous>  furosemide   Injectable 40 milliGRAM(s) IV Push daily  heparin   Injectable 5000 Unit(s) SubCutaneous every 12 hours  levothyroxine Injectable 60 MICROGram(s) IV Push at bedtime  metoprolol tartrate Injectable 5 milliGRAM(s) IV Push every 6 hours  mirtazapine 15 milliGRAM(s) Oral at bedtime  multivitamin 1 Tablet(s) Oral daily  QUEtiapine 12.5 milliGRAM(s) Oral at bedtime  sodium chloride 3%  Inhalation 4 milliLiter(s) Inhalation every 12 hours    MEDICATIONS  (PRN):  acetaminophen   Oral Liquid .. 650 milliGRAM(s) Oral every 6 hours PRN Temp greater or equal to 38C (100.4F), Moderate Pain (4 - 6)  haloperidol    Injectable 1 milliGRAM(s) IntraMuscular every 6 hours PRN Agitation    Vital Signs Last 24 Hrs  T(C): 36.7 (26 Jan 2025 07:00), Max: 37.6 (25 Jan 2025 09:26)  T(F): 98.1 (26 Jan 2025 07:00), Max: 99.7 (25 Jan 2025 09:26)  HR: 73 (26 Jan 2025 07:00) (73 - 105)  BP: 133/75 (26 Jan 2025 07:00) (94/57 - 142/85)  BP(mean): --  RR: 18 (26 Jan 2025 07:00) (18 - 19)  SpO2: 100% (26 Jan 2025 07:00) (92% - 100%)    Parameters below as of 26 Jan 2025 07:00  Patient On (Oxygen Delivery Method): nasal cannula, high flow        I&O's Summary    25 Jan 2025 07:01  -  26 Jan 2025 07:00  --------------------------------------------------------  IN: 0 mL / OUT: 175 mL / NET: -175 mL          Physical Exam:   GENERAL: NAD  HEENT: LUANN/   Atraumatic, Normocephalic  ENMT: No tonsillar erythema, exudates, or enlargement  NECK: Supple, No JVD, Normal thyroid  CHEST/LUNG: Decreased BS at bases; b/l rhonchi   CVS: Regular rate and rhythm; No murmurs, rubs, or gallops  GI: : Soft, Nontender, Nondistended; Bowel sounds present  NERVOUS SYSTEM:  Alert   EXTREMITIES:  2+ Peripheral Pulses, No clubbing, cyanosis, or edema  LYMPH: No lymphadenopathy noted  SKIN: No rashes or lesions    Labs:  ABG - ( 25 Jan 2025 05:35 )  pH, Arterial: 7.47  pH, Blood: x     /  pCO2: 46    /  pO2: 190   / HCO3: 34    / Base Excess: 8.9   /  SaO2: 98.9                                        7.9    6.88  )-----------( 184      ( 25 Jan 2025 08:59 )             25.3                         6.8    6.32  )-----------( 138      ( 25 Jan 2025 07:05 )             21.5                         9.7    9.69  )-----------( 199      ( 24 Jan 2025 07:08 )             31.2     01-25    131[L]  |  95[L]  |  43[H]  ----------------------------<  86  4.7   |  29  |  1.36[H]  01-25    122[L]  |  87[L]  |  40[H]  ----------------------------<  421[H]  4.6   |  28  |  1.21  01-24    132[L]  |  97  |  44[H]  ----------------------------<  82  5.3   |  28  |  1.25  01-24    125[L]  |  91[L]  |  44[H]  ----------------------------<  191[H]  6.0[H]   |  26  |  1.17  01-24    132[L]  |  97  |  42[H]  ----------------------------<  141[H]  6.1[H]   |  26  |  1.16    Ca    9.1      25 Jan 2025 15:08  Ca    8.3[L]      25 Jan 2025 07:05  Ca    9.3      24 Jan 2025 23:09  Ca    8.7      24 Jan 2025 21:49  Phos  3.1     01-25  Mg     2.0     01-25    TPro  x   /  Alb  2.5[L]  /  TBili  x   /  DBili  x   /  AST  x   /  ALT  x   /  AlkPhos  x   01-24    CAPILLARY BLOOD GLUCOSE      POCT Blood Glucose.: 99 mg/dL (26 Jan 2025 08:03)  POCT Blood Glucose.: 95 mg/dL (25 Jan 2025 18:35)  POCT Blood Glucose.: 102 mg/dL (25 Jan 2025 12:05)      LIVER FUNCTIONS - ( 24 Jan 2025 23:09 )  Alb: 2.5 g/dL / Pro: x     / ALK PHOS: x     / ALT: x     / AST: x     / GGT: x             Urinalysis Basic - ( 25 Jan 2025 15:08 )    Color: x / Appearance: x / SG: x / pH: x  Gluc: 86 mg/dL / Ketone: x  / Bili: x / Urobili: x   Blood: x / Protein: x / Nitrite: x   Leuk Esterase: x / RBC: x / WBC x   Sq Epi: x / Non Sq Epi: x / Bacteria: x      Studies  Chest X-RAY  large L effusion/atelectasis    CT SCAN Chest '  < from: CT Chest No Cont (01.23.25 @ 17:52) >  ACC: [id number] EXAM:  CT CHEST   ORDERED BY: DEON NAPOLES     PROCEDURE DATE:  01/23/2025          INTERPRETATION:  CLINICAL INFORMATION: Pneumonia. Evaluate for pleural   effusion.    COMPARISON: CT chest abdomen and pelvis 11/11/2021.    CONTRAST/COMPLICATIONS:  IV Contrast: NONE  Oral Contrast: NONE  .    PROCEDURE:  CT of the Chest was performed.  Sagittal and coronal reformats were performed.    FINDINGS:  Nasogastric tube tip in the stomach.  LUNGS : Large bilateral pleural effusions with compressive atelectasis of   both lower lobes, and atelectasis in the right middle lobe and lingula.   Groundglass opacity in the right upper lobe.  VESSELS: Aortic calcifications. Coronary artery calcifications.  HEART: Heart size is normal. No pericardial effusion.  MEDIASTINUM AND SWETHA: No lymphadenopathy.  CHEST WALL AND LOWER NECK: Within normal limits.  VISUALIZED UPPER ABDOMEN: Within normal limits.  BONES: Scoliosis and degenerative changes.    IMPRESSION:  Large bilateral pleural effusions with atelectasis of both lower lobes,   and linear atelectasis in the right middle lobe and left upper lobe.   Nonspecific groundglass opacity is seen in the aerated portions of both   lungs.        PRELIMINARY IMPRESSION:    Layering debris in the bilateral mainstem bronchi. Moderate bilateral   pleural effusions with near complete atelectasis of the right middle   lobe, and left lower lobe, and complete atelectasis of the right lower   lobe. Linear opacity involving the left upper lobe and lingula likely   represents atelectasis.    Follow-up official report in the a.m.    --- End of Report ---    < end of copied text >

## 2025-01-26 NOTE — PROGRESS NOTE ADULT - PROBLEM SELECTOR PLAN 1
-Initially 2nd to PNA   -Hypoxia had been improving, now with increase in O2 requirements requiring HFNC 1/24  -Repeat CT chest 1/23 with moderate b/l pleural effusions/atelectasis, b/l GGO. Likely combination of fluid overload + PNA  -Continue ABX, suggest 10 day course   -Continue bronchodilators/mucolytics  -Keep O>I with diuresis as tolerated  -Continue HFNC, wean as tolerated, keep sats >90%   -Bipap 10/5 PRN if worsening hypoxia or increased WOB  -Prognosis guarded. C DNR/DNI/trial NIV -Initially 2nd to PNA   -Hypoxia had been improving, then with increase in O2 requirements requiring HFNC 1/24  -Repeat CT chest 1/23 with moderate b/l pleural effusions/atelectasis, b/l GGO. Likely combination of fluid overload + PNA  -Continue ABX, suggest 10 day course   -Continue bronchodilators/mucolytics  -Keep O>I with diuresis as tolerated  -Continue HFNC, wean as tolerated, keep sats >90%. Lowered to 50L/80% this AM   -Bipap 10/5 PRN if worsening hypoxia or increased WOB  -Prognosis guarded. [facility] DNR/DNI/trial NIV

## 2025-01-26 NOTE — PROGRESS NOTE ADULT - ASSESSMENT
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

## 2025-01-26 NOTE — PROGRESS NOTE ADULT - PROBLEM SELECTOR PLAN 3
CT chest 1/23 with moderate b/l pl effusions  -CXR 1/25 with b/l pleural effusions/atelectasis L>R, L pleural effusion now appears large  -Keep O>I as tolerated CT chest 1/23 with moderate b/l pl effusions  -CXR 1/25 with b/l pleural effusions/atelectasis L>R, L pleural effusion now appears large  -Keep O>I as tolerated  -Suggest thoracic consult for L pigtail placement, d/w patric Brar who is agreeable

## 2025-01-26 NOTE — PROGRESS NOTE ADULT - PROBLEM SELECTOR PLAN 2
-Likely aspiration related. Pt also s/p flu   -Completed ABX, then resumed as pt spiked temp. CXR with bibasilar opacities   -Repeat CT chest 1/23 with b/l pleural effusions/atelectasis, b/l GGO. Likely combination of fluid overload + PNA  -Continue ABX, suggest 10 day course   -Continue bronchodilators/mucolytics   -Trend leukocytosis/fever curve   -Suction PRN, chest PT

## 2025-01-26 NOTE — PROGRESS NOTE ADULT - SUBJECTIVE AND OBJECTIVE BOX
Cardiovascular Disease Progress Note  Date of service: 01-26-25 @ 11:02    Overnight events: No acute events overnight.  Patient is in no distress. On HFNC.   Otherwise review of systems negative    Objective Findings:  T(C): 36.7 (01-26-25 @ 07:00), Max: 36.7 (01-26-25 @ 07:00)  HR: 73 (01-26-25 @ 09:03) (73 - 105)  BP: 133/75 (01-26-25 @ 07:00) (96/56 - 142/85)  RR: 18 (01-26-25 @ 07:00) (18 - 19)  SpO2: 100% (01-26-25 @ 09:03) (94% - 100%)  Wt(kg): --  Daily     Daily       Physical Exam:  Gen: NAD; Patient resting comfortably  HEENT: EOMI, Normocephalic/ atraumatic  CV: RRR, normal S1 + S2, no m/r/g  Lungs:  Normal respiratory effort; clear to auscultation bilaterally  Abd: soft, non-tender; bowel sounds present  Ext: No edema; warm and well perfused    Telemetry: n/a    Laboratory Data:                        7.9    6.97  )-----------( 179      ( 26 Jan 2025 08:33 )             25.4     01-26    133[L]  |  94[L]  |  42[H]  ----------------------------<  97  4.2   |  29  |  1.45[H]    Ca    9.1      26 Jan 2025 08:33  Phos  3.2     01-26  Mg     2.1     01-26    TPro  x   /  Alb  2.5[L]  /  TBili  x   /  DBili  x   /  AST  x   /  ALT  x   /  AlkPhos  x   01-24              Inpatient Medications:  MEDICATIONS  (STANDING):  acetylcysteine 20%  Inhalation 3 milliLiter(s) Inhalation every 6 hours  albuterol/ipratropium for Nebulization 3 milliLiter(s) Nebulizer every 6 hours  albuterol/ipratropium for Nebulization. 3 milliLiter(s) Nebulizer once  aMIOdarone Infusion 0.501 mG/Min (16.7 mL/Hr) IV Continuous <Continuous>  ascorbic acid 500 milliGRAM(s) Oral daily  bisacodyl Suppository 10 milliGRAM(s) Rectal daily  cefepime   IVPB 1000 milliGRAM(s) IV Intermittent every 12 hours  chlorhexidine 2% Cloths 1 Application(s) Topical <User Schedule>  dextrose 5% + sodium chloride 0.9%. 1000 milliLiter(s) (50 mL/Hr) IV Continuous <Continuous>  dextrose 5%. 1000 milliLiter(s) (50 mL/Hr) IV Continuous <Continuous>  furosemide   Injectable 40 milliGRAM(s) IV Push daily  heparin   Injectable 5000 Unit(s) SubCutaneous every 12 hours  levothyroxine Injectable 60 MICROGram(s) IV Push at bedtime  metoprolol tartrate Injectable 5 milliGRAM(s) IV Push every 6 hours  mirtazapine 15 milliGRAM(s) Oral at bedtime  multivitamin 1 Tablet(s) Oral daily  QUEtiapine 12.5 milliGRAM(s) Oral at bedtime  sodium chloride 3%  Inhalation 4 milliLiter(s) Inhalation every 12 hours      Assessment:   89y old Female with stated hx significant for dementia, s/p brain tumor resection 1961, HTN, hypothyroidism colon Ca s/p Rt hemicolectomy ('09), 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    Plan of Care:    #Afib RVR  - HR now improved on Amidarone GTT  - Likely hypoxia driven  - S/p DCCV which was unsuccessful  - Patient is anemic and high bleeding risk, would refrain from AC at this time.   - May also continue BB with holding parameters  - Plan to transition to PO Amio 200mg BID once NGT is placed.     #Hypoxic respiratory failure  - On HFNC  - Wean as tolerated  - IV diuresis     #Dementia  - Patient bedbound  - AAOx0  - Guarded prognosis      #ACP (advance care planning)-  Advanced care planning was discussed. Patient is DNR/DNI.  30 additional minutes spent addressing advance care plans.          Over 55 minutes spent on total encounter; more than 50% of the visit was spent counseling and/or coordinating care by the attending physician.      Bobby Montilla DO [facility]  Cardiovascular Disease  [phone]

## 2025-01-26 NOTE — PROGRESS NOTE ADULT - SUBJECTIVE AND OBJECTIVE BOX
INTERVAL HPI/OVERNIGHT EVENTS:  events noted      Allergies    clindamycin (Unknown)  shellfish (Unknown)  strawberry (Rash)  penicillin (Unknown)  clindamycin (Other)  strawberry (Unknown)  IV Contrast (Other)  IV Contrast (Unknown)    Intolerances      unable to obtain      PHYSICAL EXAM:   Vital Signs:  Vital Signs Last 24 Hrs  T(C): 36.9 (19 Jan 2025 11:38), Max: 37 (18 Jan 2025 16:51)  T(F): 98.4 (19 Jan 2025 11:38), Max: 98.6 (18 Jan 2025 16:51)  HR: 78 (19 Jan 2025 11:38) (70 - 81)  BP: 114/64 (19 Jan 2025 11:38) (88/62 - 118/66)  BP(mean): --  RR: 18 (19 Jan 2025 11:38) (18 - 18)  SpO2: 99% (19 Jan 2025 11:38) (99% - 100%)    Parameters below as of 19 Jan 2025 11:38  Patient On (Oxygen Delivery Method): nasal cannula      Daily     Daily I&O's Summary    19 Jan 2025 07:01  -  19 Jan 2025 15:01  --------------------------------------------------------  IN: 0 mL / OUT: 150 mL / NET: -150 mL      nad  confused  frail  non toxic  soft, nt  no edema        LABS:                        9.8    8.65  )-----------( 193      ( 19 Jan 2025 07:30 )             32.0     01-19    144  |  107  |  52[H]  ----------------------------<  114[H]  4.3   |  27  |  1.70[H]    Ca    9.3      19 Jan 2025 07:30        Urinalysis Basic - ( 19 Jan 2025 07:30 )    Color: x / Appearance: x / SG: x / pH: x  Gluc: 114 mg/dL / Ketone: x  / Bili: x / Urobili: x   Blood: x / Protein: x / Nitrite: x   Leuk Esterase: x / RBC: x / WBC x   Sq Epi: x / Non Sq Epi: x / Bacteria: x      amylase   lipase  RADIOLOGY & ADDITIONAL TESTS:

## 2025-01-26 NOTE — PROGRESS NOTE ADULT - PROBLEM SELECTOR PLAN 2
RVR yesterday now improved on amiodarone  continue to follow cardiology recommendations  once NGT re-inserted will continue amiodarone by NGT

## 2025-01-27 LAB
GLUCOSE BLDC GLUCOMTR-MCNC: 100 MG/DL — HIGH (ref 70–99)
GLUCOSE BLDC GLUCOMTR-MCNC: 101 MG/DL — HIGH (ref 70–99)
GLUCOSE BLDC GLUCOMTR-MCNC: 103 MG/DL — HIGH (ref 70–99)
GLUCOSE BLDC GLUCOMTR-MCNC: 105 MG/DL — HIGH (ref 70–99)
GLUCOSE BLDC GLUCOMTR-MCNC: 94 MG/DL — SIGNIFICANT CHANGE UP (ref 70–99)

## 2025-01-27 RX ORDER — SODIUM CHLORIDE 9 G/ML
1000 INJECTION, SOLUTION INTRAVENOUS
Refills: 0 | Status: DISCONTINUED | OUTPATIENT
Start: 2025-01-27 | End: 2025-01-29

## 2025-01-27 RX ADMIN — Medication 4 MILLILITER(S): at 06:10

## 2025-01-27 RX ADMIN — IPRATROPIUM BROMIDE AND ALBUTEROL SULFATE 3 MILLILITER(S): .5; 2.5 SOLUTION RESPIRATORY (INHALATION) at 13:35

## 2025-01-27 RX ADMIN — Medication 100 MILLIGRAM(S): at 18:47

## 2025-01-27 RX ADMIN — SODIUM CHLORIDE 50 MILLILITER(S): 9 INJECTION, SOLUTION INTRAVENOUS at 02:10

## 2025-01-27 RX ADMIN — Medication 5 MILLIGRAM(S): at 19:26

## 2025-01-27 RX ADMIN — AMIODARONE HYDROCHLORIDE 16.7 MG/MIN: 50 INJECTION, SOLUTION INTRAVENOUS at 20:52

## 2025-01-27 RX ADMIN — Medication 3 MILLILITER(S): at 06:11

## 2025-01-27 RX ADMIN — Medication 4 MILLILITER(S): at 18:47

## 2025-01-27 RX ADMIN — IPRATROPIUM BROMIDE AND ALBUTEROL SULFATE 3 MILLILITER(S): .5; 2.5 SOLUTION RESPIRATORY (INHALATION) at 18:47

## 2025-01-27 RX ADMIN — Medication 40 MILLIGRAM(S): at 06:11

## 2025-01-27 RX ADMIN — ANTISEPTIC SURGICAL SCRUB 1 APPLICATION(S): 0.04 SOLUTION TOPICAL at 06:12

## 2025-01-27 RX ADMIN — AMIODARONE HYDROCHLORIDE 16.7 MG/MIN: 50 INJECTION, SOLUTION INTRAVENOUS at 03:07

## 2025-01-27 RX ADMIN — Medication 5000 UNIT(S): at 06:10

## 2025-01-27 RX ADMIN — Medication 3 MILLILITER(S): at 00:39

## 2025-01-27 RX ADMIN — Medication 5000 UNIT(S): at 18:48

## 2025-01-27 RX ADMIN — IPRATROPIUM BROMIDE AND ALBUTEROL SULFATE 3 MILLILITER(S): .5; 2.5 SOLUTION RESPIRATORY (INHALATION) at 06:11

## 2025-01-27 RX ADMIN — LEVOTHYROXINE SODIUM 60 MICROGRAM(S): 25 TABLET ORAL at 21:04

## 2025-01-27 RX ADMIN — IPRATROPIUM BROMIDE AND ALBUTEROL SULFATE 3 MILLILITER(S): .5; 2.5 SOLUTION RESPIRATORY (INHALATION) at 00:39

## 2025-01-27 RX ADMIN — Medication 100 MILLIGRAM(S): at 06:10

## 2025-01-27 RX ADMIN — Medication 5 MILLIGRAM(S): at 06:11

## 2025-01-27 RX ADMIN — SODIUM CHLORIDE 50 MILLILITER(S): 9 INJECTION, SOLUTION INTRAVENOUS at 14:38

## 2025-01-27 RX ADMIN — Medication 5 MILLIGRAM(S): at 13:34

## 2025-01-27 RX ADMIN — Medication 5 MILLIGRAM(S): at 00:39

## 2025-01-27 NOTE — PROGRESS NOTE ADULT - PROBLEM SELECTOR PLAN 1
-Initially 2nd to PNA   -Hypoxia had been improving, then with increase in O2 requirements requiring HFNC 1/24  -Repeat CT chest 1/23 with moderate b/l pleural effusions/atelectasis, b/l GGO. Likely combination of fluid overload + PNA  -Continue ABX, suggest 10 day course   -Continue bronchodilators/mucolytics  -Keep O>I with diuresis as tolerated  -O2 requirements improving. Continue HFNC, wean as tolerated, keep sats >90% (currently 50L/50%)  -Bipap 10/5 PRN if worsening hypoxia or increased WOB  -Prognosis guarded. [facility] DNR/DNI/trial NIV

## 2025-01-27 NOTE — PROGRESS NOTE ADULT - PROBLEM SELECTOR PLAN 1
improved   now stable at HiFlo nasal cannula   intervention on bilateral pleural effusions likely not in keeping with GOC  will attempt to wean off Hi [place]

## 2025-01-27 NOTE — PROGRESS NOTE ADULT - PROBLEM SELECTOR PLAN 3
CT chest 1/23 with moderate b/l pl effusions  -CXR 1/25 with b/l pleural effusions/atelectasis L>R, L pleural effusion now appears mod-large  -Keep O>I as tolerated  -O2 requirements improving with diuresis   -If increase worsening hypoxia may need to consider pigtail drainage of L effusion if aligns with GOC.

## 2025-01-27 NOTE — PROGRESS NOTE ADULT - SUBJECTIVE AND OBJECTIVE BOX
NEPHROLOGY-[facility] [phone]        Patient seen and examined in bed.  SHe was the same         MEDICATIONS  (STANDING):  albuterol/ipratropium for Nebulization 3 milliLiter(s) Nebulizer every 6 hours  albuterol/ipratropium for Nebulization. 3 milliLiter(s) Nebulizer once  aMIOdarone Infusion 0.501 mG/Min (16.7 mL/Hr) IV Continuous <Continuous>  ascorbic acid 500 milliGRAM(s) Oral daily  bisacodyl Suppository 10 milliGRAM(s) Rectal daily  cefepime   IVPB 1000 milliGRAM(s) IV Intermittent every 12 hours  chlorhexidine 2% Cloths 1 Application(s) Topical <User Schedule>  dextrose 5%. 1000 milliLiter(s) (50 mL/Hr) IV Continuous <Continuous>  furosemide   Injectable 40 milliGRAM(s) IV Push daily  heparin   Injectable 5000 Unit(s) SubCutaneous every 12 hours  levothyroxine Injectable 60 MICROGram(s) IV Push at bedtime  metoprolol tartrate Injectable 5 milliGRAM(s) IV Push every 6 hours  mirtazapine 15 milliGRAM(s) Oral at bedtime  multivitamin 1 Tablet(s) Oral daily  QUEtiapine 12.5 milliGRAM(s) Oral at bedtime  sodium chloride 3%  Inhalation 4 milliLiter(s) Inhalation every 12 hours      VITAL:  T(C): , Max: 36.6 (01-26-25 @ 21:47)  T(F): , Max: 97.8 (01-26-25 @ 21:47)  HR: 80 (01-27-25 @ 13:30)  BP: 133/73 (01-27-25 @ 13:30)  BP(mean): --  RR: 18 (01-27-25 @ 13:30)  SpO2: 99% (01-27-25 @ 13:30)  Wt(kg): --    I and O's:    01-26 @ 07:01  -  01-27 @ 07:00  --------------------------------------------------------  IN: 0 mL / OUT: 300 mL / NET: -300 mL          PHYSICAL EXAM:    Constitutional: looks stated age   Neck:  ? JVD  Respiratory: poor effort   Cardiovascular: S1 and S2  Gastrointestinal: BS+, soft, NT/ND  Extremities: No peripheral edema  Neurological:    : No Crooks  Skin: No rashes  Access: Not applicable    LABS:                        7.9    6.97  )-----------( 179      ( 26 Jan 2025 08:33 )             25.4     01-26    133[L]  |  94[L]  |  42[H]  ----------------------------<  97  4.2   |  29  |  1.45[H]    Ca    9.1      26 Jan 2025 08:33  Phos  3.2     01-26  Mg     2.1     01-26            Urine Studies:  Urinalysis Basic - ( 26 Jan 2025 08:33 )    Color: x / Appearance: x / SG: x / pH: x  Gluc: 97 mg/dL / Ketone: x  / Bili: x / Urobili: x   Blood: x / Protein: x / Nitrite: x   Leuk Esterase: x / RBC: x / WBC x   Sq Epi: x / Non Sq Epi: x / Bacteria: x            RADIOLOGY & ADDITIONAL STUDIES:      < from: Xray Chest 1 View- PORTABLE-Urgent (Xray Chest 1 View- PORTABLE-Urgent .) (01.25.25 @ 14:39) >    ACC: [id number] EXAM:  XR CHEST PORTABLE URGENT 1V   ORDERED BY: JESUS ALBERTO VARNER     PROCEDURE DATE:  01/25/2025          INTERPRETATION:  CLINICAL INDICATION: Hypoxia    TECHNIQUE: Frontal view of the chest was obtained.    COMPARISON: Chest x-ray1/19/2025.    FINDINGS:  The heart size is normal.  Moderate left and small right pleural effusions.  No pneumothorax.  No acute osseous abnormalities.    IMPRESSION:  Moderate left and small right pleural effusions.    --- End of Report ---          LILIAM KING MD; Resident Radiologist  This document has been electronically signed.  GENI BORJA MD; Attending Radiologist  This document has been electronically signed. Jan 26 2025 10:38AM    < end of copied text >

## 2025-01-27 NOTE — PROGRESS NOTE ADULT - ASSESSMENT
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

## 2025-01-27 NOTE — PROGRESS NOTE ADULT - SUBJECTIVE AND OBJECTIVE BOX
Patient is a 89y old  Female who presents with a chief complaint of Hypoxic/hypercapnic resp failure (27 Jan 2025 13:58)      DATE OF SERVICE: 01-27-25 @ 15:17    SUBJECTIVE / OVERNIGHT EVENTS: overnight events noted    ROS:  not available  alert       MEDICATIONS  (STANDING):  albuterol/ipratropium for Nebulization 3 milliLiter(s) Nebulizer every 6 hours  albuterol/ipratropium for Nebulization. 3 milliLiter(s) Nebulizer once  aMIOdarone Infusion 0.501 mG/Min (16.7 mL/Hr) IV Continuous <Continuous>  ascorbic acid 500 milliGRAM(s) Oral daily  bisacodyl Suppository 10 milliGRAM(s) Rectal daily  cefepime   IVPB 1000 milliGRAM(s) IV Intermittent every 12 hours  chlorhexidine 2% Cloths 1 Application(s) Topical <User Schedule>  dextrose 5%. 1000 milliLiter(s) (50 mL/Hr) IV Continuous <Continuous>  furosemide   Injectable 40 milliGRAM(s) IV Push daily  heparin   Injectable 5000 Unit(s) SubCutaneous every 12 hours  levothyroxine Injectable 60 MICROGram(s) IV Push at bedtime  metoprolol tartrate Injectable 5 milliGRAM(s) IV Push every 6 hours  mirtazapine 15 milliGRAM(s) Oral at bedtime  multivitamin 1 Tablet(s) Oral daily  QUEtiapine 12.5 milliGRAM(s) Oral at bedtime  sodium chloride 3%  Inhalation 4 milliLiter(s) Inhalation every 12 hours    MEDICATIONS  (PRN):  acetaminophen   Oral Liquid .. 650 milliGRAM(s) Oral every 6 hours PRN Temp greater or equal to 38C (100.4F), Moderate Pain (4 - 6)  haloperidol    Injectable 1 milliGRAM(s) IntraMuscular every 6 hours PRN Agitation        CAPILLARY BLOOD GLUCOSE      POCT Blood Glucose.: 100 mg/dL (27 Jan 2025 11:57)  POCT Blood Glucose.: 103 mg/dL (27 Jan 2025 06:05)  POCT Blood Glucose.: 105 mg/dL (27 Jan 2025 00:54)  POCT Blood Glucose.: 122 mg/dL (26 Jan 2025 18:17)    I&O's Summary    26 Jan 2025 07:01  -  27 Jan 2025 07:00  --------------------------------------------------------  IN: 0 mL / OUT: 300 mL / NET: -300 mL    27 Jan 2025 07:01  -  27 Jan 2025 15:17  --------------------------------------------------------  IN: 0 mL / OUT: 300 mL / NET: -300 mL        Vital Signs Last 24 Hrs  T(C): 36.5 (27 Jan 2025 09:05), Max: 36.6 (26 Jan 2025 21:47)  T(F): 97.7 (27 Jan 2025 09:05), Max: 97.8 (26 Jan 2025 21:47)  HR: 64 (27 Jan 2025 15:03) (61 - 80)  BP: 133/73 (27 Jan 2025 13:30) (115/54 - 141/69)  BP(mean): --  RR: 20 (27 Jan 2025 15:03) (16 - 20)  SpO2: 97% (27 Jan 2025 15:03) (94% - 100%)    PHYSICAL EXAM:   CHEST/LUNG: coarse breath sounds   HEART: S1 S2; no murmurs   ABDOMEN: Soft, Nontender  EXTREMITIES: no edema  NEUROLOGY: non-focal    LABS:                        7.9    6.97  )-----------( 179      ( 26 Jan 2025 08:33 )             25.4     01-26    133[L]  |  94[L]  |  42[H]  ----------------------------<  97  4.2   |  29  |  1.45[H]    Ca    9.1      26 Jan 2025 08:33  Phos  3.2     01-26  Mg     2.1     01-26            Urinalysis Basic - ( 26 Jan 2025 08:33 )    Color: x / Appearance: x / SG: x / pH: x  Gluc: 97 mg/dL / Ketone: x  / Bili: x / Urobili: x   Blood: x / Protein: x / Nitrite: x   Leuk Esterase: x / RBC: x / WBC x   Sq Epi: x / Non Sq Epi: x / Bacteria: x          All consultant(s) notes reviewed and care discussed with other providers        Contact Number, Dr Link [phone]

## 2025-01-27 NOTE — PROGRESS NOTE ADULT - PROBLEM SELECTOR PROBLEM 6
Pt presented for Pre-procedure drive thru testing. Patient identified using two patient identifiers prior to specimen collection. Questions answered prior to departure and education given.      Dementia

## 2025-01-27 NOTE — PROGRESS NOTE ADULT - SUBJECTIVE AND OBJECTIVE BOX
Date of Service: 01-27-25 @ 13:56    Patient is a 89y old  Female who presents with a chief complaint of Hypoxic/hypercapnic resp failure (27 Jan 2025 12:56)      Any change in ROS:   Awake & alert but confused, ROS unable to be obtained  Tolerating HFNC 50L/50%     MEDICATIONS  (STANDING):  albuterol/ipratropium for Nebulization 3 milliLiter(s) Nebulizer every 6 hours  albuterol/ipratropium for Nebulization. 3 milliLiter(s) Nebulizer once  aMIOdarone Infusion 0.501 mG/Min (16.7 mL/Hr) IV Continuous <Continuous>  ascorbic acid 500 milliGRAM(s) Oral daily  bisacodyl Suppository 10 milliGRAM(s) Rectal daily  cefepime   IVPB 1000 milliGRAM(s) IV Intermittent every 12 hours  chlorhexidine 2% Cloths 1 Application(s) Topical <User Schedule>  dextrose 5%. 1000 milliLiter(s) (50 mL/Hr) IV Continuous <Continuous>  furosemide   Injectable 40 milliGRAM(s) IV Push daily  heparin   Injectable 5000 Unit(s) SubCutaneous every 12 hours  levothyroxine Injectable 60 MICROGram(s) IV Push at bedtime  metoprolol tartrate Injectable 5 milliGRAM(s) IV Push every 6 hours  mirtazapine 15 milliGRAM(s) Oral at bedtime  multivitamin 1 Tablet(s) Oral daily  QUEtiapine 12.5 milliGRAM(s) Oral at bedtime  sodium chloride 3%  Inhalation 4 milliLiter(s) Inhalation every 12 hours    MEDICATIONS  (PRN):  acetaminophen   Oral Liquid .. 650 milliGRAM(s) Oral every 6 hours PRN Temp greater or equal to 38C (100.4F), Moderate Pain (4 - 6)  haloperidol    Injectable 1 milliGRAM(s) IntraMuscular every 6 hours PRN Agitation    Vital Signs Last 24 Hrs  T(C): 36.5 (27 Jan 2025 09:05), Max: 36.6 (26 Jan 2025 21:47)  T(F): 97.7 (27 Jan 2025 09:05), Max: 97.8 (26 Jan 2025 21:47)  HR: 80 (27 Jan 2025 13:30) (61 - 80)  BP: 133/73 (27 Jan 2025 13:30) (115/54 - 141/69)  BP(mean): --  RR: 18 (27 Jan 2025 13:30) (16 - 20)  SpO2: 99% (27 Jan 2025 13:30) (94% - 100%)    Parameters below as of 27 Jan 2025 13:30  Patient On (Oxygen Delivery Method): nasal cannula, high flow  O2 Flow (L/min): 50  O2 Concentration (%): 50    I&O's Summary    26 Jan 2025 07:01  -  27 Jan 2025 07:00  --------------------------------------------------------  IN: 0 mL / OUT: 300 mL / NET: -300 mL          Physical Exam:   GENERAL: NAD  HEENT: LUANN/   Atraumatic, Normocephalic  ENMT: No tonsillar erythema, exudates, or enlargement; Moist mucous membranes, Good dentition, No lesions  NECK: Supple, No JVD, Normal thyroid  CHEST/LUNG: Decreased BS L>R  CVS: Regular rate and rhythm; No murmurs, rubs, or gallops  GI: : Soft, Nontender, Nondistended; Bowel sounds present  NERVOUS SYSTEM:  Alert, confused   EXTREMITIES:  2+ Peripheral Pulses, No clubbing, cyanosis, or edema  LYMPH: No lymphadenopathy noted  SKIN: No rashes or lesions  ENDOCRINOLOGY: No Thyromegaly  PSYCH: Appropriate    Labs:  33                            7.9    6.97  )-----------( 179      ( 26 Jan 2025 08:33 )             25.4                         7.9    6.88  )-----------( 184      ( 25 Jan 2025 08:59 )             25.3                         6.8    6.32  )-----------( 138      ( 25 Jan 2025 07:05 )             21.5                         9.7    9.69  )-----------( 199      ( 24 Jan 2025 07:08 )             31.2     01-26    133[L]  |  94[L]  |  42[H]  ----------------------------<  97  4.2   |  29  |  1.45[H]  01-25    131[L]  |  95[L]  |  43[H]  ----------------------------<  86  4.7   |  29  |  1.36[H]  01-25    122[L]  |  87[L]  |  40[H]  ----------------------------<  421[H]  4.6   |  28  |  1.21  01-24    132[L]  |  97  |  44[H]  ----------------------------<  82  5.3   |  28  |  1.25  01-24    125[L]  |  91[L]  |  44[H]  ----------------------------<  191[H]  6.0[H]   |  26  |  1.17  01-24    132[L]  |  97  |  42[H]  ----------------------------<  141[H]  6.1[H]   |  26  |  1.16    Ca    9.1      26 Jan 2025 08:33  Ca    9.1      25 Jan 2025 15:08  Phos  3.2     01-26  Mg     2.1     01-26    TPro  x   /  Alb  2.5[L]  /  TBili  x   /  DBili  x   /  AST  x   /  ALT  x   /  AlkPhos  x   01-24    CAPILLARY BLOOD GLUCOSE      POCT Blood Glucose.: 100 mg/dL (27 Jan 2025 11:57)  POCT Blood Glucose.: 103 mg/dL (27 Jan 2025 06:05)  POCT Blood Glucose.: 105 mg/dL (27 Jan 2025 00:54)  POCT Blood Glucose.: 122 mg/dL (26 Jan 2025 18:17)          Urinalysis Basic - ( 26 Jan 2025 08:33 )    Color: x / Appearance: x / SG: x / pH: x  Gluc: 97 mg/dL / Ketone: x  / Bili: x / Urobili: x   Blood: x / Protein: x / Nitrite: x   Leuk Esterase: x / RBC: x / WBC x   Sq Epi: x / Non Sq Epi: x / Bacteria: x          Studies  Chest X-RAY  < from: Xray Chest 1 View- PORTABLE-Urgent (Xray Chest 1 View- PORTABLE-Urgent .) (01.25.25 @ 14:39) >  INTERPRETATION:  CLINICAL INDICATION: Hypoxia    TECHNIQUE: Frontal view of the chest was obtained.    COMPARISON: Chest x-ray1/19/2025.    FINDINGS:  The heart size is normal.  Moderate left and small right pleural effusions.  No pneumothorax.  No acute osseous abnormalities.    IMPRESSION:  Moderate left and small right pleural effusions.    --- End of Report ---      < end of copied text >    < from: CT Chest No Cont (01.23.25 @ 17:52) >      INTERPRETATION:  CLINICAL INFORMATION: Pneumonia. Evaluate for pleural   effusion.    COMPARISON: CT chest abdomen and pelvis 11/11/2021.    CONTRAST/COMPLICATIONS:  IV Contrast: NONE  Oral Contrast: NONE  .    PROCEDURE:  CT of the Chest was performed.  Sagittal and coronal reformats were performed.    FINDINGS:  Nasogastric tube tip in the stomach.  LUNGS : Large bilateral pleural effusions with compressive atelectasis of   both lower lobes, and atelectasis in the right middle lobe and lingula.   Groundglass opacity in the right upper lobe.  VESSELS: Aortic calcifications. Coronary artery calcifications.  HEART: Heart size is normal. No pericardial effusion.  MEDIASTINUM AND SWETHA: No lymphadenopathy.  CHEST WALL AND LOWER NECK: Within normal limits.  VISUALIZED UPPER ABDOMEN: Within normal limits.  BONES: Scoliosis and degenerative changes.    IMPRESSION:  Large bilateral pleural effusions with atelectasis of both lower lobes,   and linear atelectasis in the right middle lobe and left upper lobe.   Nonspecific groundglass opacity is seen in the aerated portions of both   lungs.        PRELIMINARY IMPRESSION:    Layering debris in the bilateral mainstem bronchi. Moderate bilateral   pleural effusions with near complete atelectasis of the right middle   lobe, and left lower lobe, and complete atelectasis of the right lower   lobe. Linear opacity involving the left upper lobe and lingula likely   represents atelectasis.    Follow-up official report in the a.m.    --- End of Report ---      < end of copied text >

## 2025-01-27 NOTE — PROGRESS NOTE ADULT - SUBJECTIVE AND OBJECTIVE BOX
Cardiovascular Disease Progress Note  Date of service: 01-27-25 @ 12:56    Overnight events: No acute events overnight.    Otherwise review of systems negative    Objective Findings:  T(C): 36.5 (01-27-25 @ 09:05), Max: 36.6 (01-26-25 @ 13:20)  HR: 75 (01-27-25 @ 09:28) (61 - 80)  BP: 130/60 (01-27-25 @ 09:05) (115/54 - 141/69)  RR: 18 (01-27-25 @ 09:28) (16 - 20)  SpO2: 95% (01-27-25 @ 09:28) (94% - 100%)  Wt(kg): --  Daily     Daily       Physical Exam:  Gen: NAD; Patient resting comfortably  HEENT: EOMI, Normocephalic/ atraumatic  CV: RRR, normal S1 + S2, no m/r/g  Lungs:  Normal respiratory effort; clear to auscultation bilaterally  Abd: soft, non-tender; bowel sounds present  Ext: No edema; warm and well perfused    Telemetry:    Laboratory Data:                        7.9    6.97  )-----------( 179      ( 26 Jan 2025 08:33 )             25.4     01-26    133[L]  |  94[L]  |  42[H]  ----------------------------<  97  4.2   |  29  |  1.45[H]    Ca    9.1      26 Jan 2025 08:33  Phos  3.2     01-26  Mg     2.1     01-26                Inpatient Medications:  MEDICATIONS  (STANDING):  albuterol/ipratropium for Nebulization 3 milliLiter(s) Nebulizer every 6 hours  albuterol/ipratropium for Nebulization. 3 milliLiter(s) Nebulizer once  aMIOdarone Infusion 0.501 mG/Min (16.7 mL/Hr) IV Continuous <Continuous>  ascorbic acid 500 milliGRAM(s) Oral daily  bisacodyl Suppository 10 milliGRAM(s) Rectal daily  cefepime   IVPB 1000 milliGRAM(s) IV Intermittent every 12 hours  chlorhexidine 2% Cloths 1 Application(s) Topical <User Schedule>  dextrose 5%. 1000 milliLiter(s) (50 mL/Hr) IV Continuous <Continuous>  furosemide   Injectable 40 milliGRAM(s) IV Push daily  heparin   Injectable 5000 Unit(s) SubCutaneous every 12 hours  levothyroxine Injectable 60 MICROGram(s) IV Push at bedtime  metoprolol tartrate Injectable 5 milliGRAM(s) IV Push every 6 hours  mirtazapine 15 milliGRAM(s) Oral at bedtime  multivitamin 1 Tablet(s) Oral daily  QUEtiapine 12.5 milliGRAM(s) Oral at bedtime  sodium chloride 3%  Inhalation 4 milliLiter(s) Inhalation every 12 hours      Assessment: 89y old Female with stated hx significant for dementia, s/p brain tumor resection 1961, HTN, hypothyroidism colon Ca s/p Rt hemicolectomy ('09), 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    Plan of Care:    #Afib RVR  - HR now improved on Amidarone GTT  - Likely hypoxia driven  - S/p DCCV which was unsuccessful  - Patient is anemic and high bleeding risk, would refrain from AC at this time.   - May also continue BB with holding parameters  - Plan to transition to PO Amio 200mg BID once NGT is placed.     #Hypoxic respiratory failure  - On HFNC  - Wean as tolerated  - IV diuresis  as needed.     #Dementia  - Patient bedbound  - AAOx0  - Guarded prognosis          Over 55 minutes spent on total encounter; more than 50% of the visit was spent counseling and/or coordinating care by the attending physician.      Bobby Montilla DO [facility]  Cardiovascular Disease  [phone]

## 2025-01-28 DIAGNOSIS — J96.01 ACUTE RESPIRATORY FAILURE WITH HYPOXIA: ICD-10-CM

## 2025-01-28 LAB
ANION GAP SERPL CALC-SCNC: 11 MMOL/L — SIGNIFICANT CHANGE UP (ref 5–17)
BUN SERPL-MCNC: 29 MG/DL — HIGH (ref 7–23)
CALCIUM SERPL-MCNC: 8.9 MG/DL — SIGNIFICANT CHANGE UP (ref 8.4–10.5)
CHLORIDE SERPL-SCNC: 93 MMOL/L — LOW (ref 96–108)
CO2 SERPL-SCNC: 28 MMOL/L — SIGNIFICANT CHANGE UP (ref 22–31)
CREAT SERPL-MCNC: 1.48 MG/DL — HIGH (ref 0.5–1.3)
EGFR: 32 ML/MIN/1.73M2 — LOW
GLUCOSE BLDC GLUCOMTR-MCNC: 101 MG/DL — HIGH (ref 70–99)
GLUCOSE BLDC GLUCOMTR-MCNC: 109 MG/DL — HIGH (ref 70–99)
GLUCOSE BLDC GLUCOMTR-MCNC: 91 MG/DL — SIGNIFICANT CHANGE UP (ref 70–99)
GLUCOSE BLDC GLUCOMTR-MCNC: 94 MG/DL — SIGNIFICANT CHANGE UP (ref 70–99)
GLUCOSE SERPL-MCNC: 91 MG/DL — SIGNIFICANT CHANGE UP (ref 70–99)
HCT VFR BLD CALC: 25.3 % — LOW (ref 34.5–45)
HGB BLD-MCNC: 8.3 G/DL — LOW (ref 11.5–15.5)
MCHC RBC-ENTMCNC: 31.4 PG — SIGNIFICANT CHANGE UP (ref 27–34)
MCHC RBC-ENTMCNC: 32.8 G/DL — SIGNIFICANT CHANGE UP (ref 32–36)
MCV RBC AUTO: 95.8 FL — SIGNIFICANT CHANGE UP (ref 80–100)
NRBC # BLD: 0 /100 WBCS — SIGNIFICANT CHANGE UP (ref 0–0)
NRBC BLD-RTO: 0 /100 WBCS — SIGNIFICANT CHANGE UP (ref 0–0)
PLATELET # BLD AUTO: 153 K/UL — SIGNIFICANT CHANGE UP (ref 150–400)
POTASSIUM SERPL-MCNC: 3.7 MMOL/L — SIGNIFICANT CHANGE UP (ref 3.5–5.3)
POTASSIUM SERPL-SCNC: 3.7 MMOL/L — SIGNIFICANT CHANGE UP (ref 3.5–5.3)
RBC # BLD: 2.64 M/UL — LOW (ref 3.8–5.2)
RBC # FLD: 15.5 % — HIGH (ref 10.3–14.5)
SODIUM SERPL-SCNC: 132 MMOL/L — LOW (ref 135–145)
WBC # BLD: 6.26 K/UL — SIGNIFICANT CHANGE UP (ref 3.8–10.5)
WBC # FLD AUTO: 6.26 K/UL — SIGNIFICANT CHANGE UP (ref 3.8–10.5)

## 2025-01-28 PROCEDURE — 99497 ADVNCD CARE PLAN 30 MIN: CPT

## 2025-01-28 PROCEDURE — 99233 SBSQ HOSP IP/OBS HIGH 50: CPT

## 2025-01-28 RX ORDER — ACETYLCYSTEINE 200 MG/ML
3 VIAL (ML) MISCELLANEOUS EVERY 6 HOURS
Refills: 0 | Status: COMPLETED | OUTPATIENT
Start: 2025-01-28 | End: 2025-01-31

## 2025-01-28 RX ADMIN — IPRATROPIUM BROMIDE AND ALBUTEROL SULFATE 3 MILLILITER(S): .5; 2.5 SOLUTION RESPIRATORY (INHALATION) at 00:03

## 2025-01-28 RX ADMIN — Medication 40 MILLIGRAM(S): at 05:44

## 2025-01-28 RX ADMIN — Medication 5 MILLIGRAM(S): at 05:44

## 2025-01-28 RX ADMIN — SODIUM CHLORIDE 50 MILLILITER(S): 9 INJECTION, SOLUTION INTRAVENOUS at 20:38

## 2025-01-28 RX ADMIN — Medication 5000 UNIT(S): at 17:20

## 2025-01-28 RX ADMIN — LEVOTHYROXINE SODIUM 60 MICROGRAM(S): 25 TABLET ORAL at 21:07

## 2025-01-28 RX ADMIN — Medication 4 MILLILITER(S): at 17:19

## 2025-01-28 RX ADMIN — IPRATROPIUM BROMIDE AND ALBUTEROL SULFATE 3 MILLILITER(S): .5; 2.5 SOLUTION RESPIRATORY (INHALATION) at 17:19

## 2025-01-28 RX ADMIN — IPRATROPIUM BROMIDE AND ALBUTEROL SULFATE 3 MILLILITER(S): .5; 2.5 SOLUTION RESPIRATORY (INHALATION) at 05:43

## 2025-01-28 RX ADMIN — Medication 5000 UNIT(S): at 05:44

## 2025-01-28 RX ADMIN — Medication 100 MILLIGRAM(S): at 05:44

## 2025-01-28 RX ADMIN — ANTISEPTIC SURGICAL SCRUB 1 APPLICATION(S): 0.04 SOLUTION TOPICAL at 05:43

## 2025-01-28 RX ADMIN — Medication 3 MILLILITER(S): at 17:18

## 2025-01-28 RX ADMIN — Medication 5 MILLIGRAM(S): at 23:51

## 2025-01-28 RX ADMIN — Medication 5 MILLIGRAM(S): at 00:03

## 2025-01-28 RX ADMIN — Medication 3 MILLILITER(S): at 23:51

## 2025-01-28 RX ADMIN — Medication 4 MILLILITER(S): at 05:44

## 2025-01-28 RX ADMIN — IPRATROPIUM BROMIDE AND ALBUTEROL SULFATE 3 MILLILITER(S): .5; 2.5 SOLUTION RESPIRATORY (INHALATION) at 23:51

## 2025-01-28 RX ADMIN — IPRATROPIUM BROMIDE AND ALBUTEROL SULFATE 3 MILLILITER(S): .5; 2.5 SOLUTION RESPIRATORY (INHALATION) at 11:11

## 2025-01-28 NOTE — PROGRESS NOTE ADULT - SUBJECTIVE AND OBJECTIVE BOX
NEPHROLOGY-[facility] [phone]        Patient seen and examined in bed.  She was on high flow         MEDICATIONS  (STANDING):  albuterol/ipratropium for Nebulization 3 milliLiter(s) Nebulizer every 6 hours  albuterol/ipratropium for Nebulization. 3 milliLiter(s) Nebulizer once  aMIOdarone Infusion 0.501 mG/Min (16.7 mL/Hr) IV Continuous <Continuous>  ascorbic acid 500 milliGRAM(s) Oral daily  bisacodyl Suppository 10 milliGRAM(s) Rectal daily  chlorhexidine 2% Cloths 1 Application(s) Topical <User Schedule>  dextrose 5%. 1000 milliLiter(s) (50 mL/Hr) IV Continuous <Continuous>  furosemide   Injectable 40 milliGRAM(s) IV Push daily  heparin   Injectable 5000 Unit(s) SubCutaneous every 12 hours  levothyroxine Injectable 60 MICROGram(s) IV Push at bedtime  metoprolol tartrate Injectable 5 milliGRAM(s) IV Push every 6 hours  mirtazapine 15 milliGRAM(s) Oral at bedtime  multivitamin 1 Tablet(s) Oral daily  QUEtiapine 12.5 milliGRAM(s) Oral at bedtime  sodium chloride 3%  Inhalation 4 milliLiter(s) Inhalation every 12 hours      VITAL:  T(C): , Max: 36.5 (01-28-25 @ 00:01)  T(F): , Max: 97.7 (01-28-25 @ 00:01)  HR: 65 (01-28-25 @ 08:00)  BP: 143/76 (01-28-25 @ 08:00)  BP(mean): --  RR: 18 (01-28-25 @ 08:00)  SpO2: 100% (01-28-25 @ 08:00)  Wt(kg): --    I and O's:    01-27 @ 07:01  -  01-28 @ 07:00  --------------------------------------------------------  IN: 0 mL / OUT: 1000 mL / NET: -1000 mL          PHYSICAL EXAM:    Constitutional: NAD  Neck:  No JVD  Respiratory: Course   Cardiovascular: S1 and S2  Gastrointestinal: BS+, soft, NT/ND  Extremities: No peripheral edema  Neurological:   no focal deficits  Psychiatric   : No Crooks  Skin: No rashes  Access: Not applicable    LABS:                        8.3    6.26  )-----------( 153      ( 28 Jan 2025 07:21 )             25.3     01-28    132[L]  |  93[L]  |  29[H]  ----------------------------<  91  3.7   |  28  |  1.48[H]    Ca    8.9      28 Jan 2025 07:13            Urine Studies:  Urinalysis Basic - ( 28 Jan 2025 07:13 )    Color: x / Appearance: x / SG: x / pH: x  Gluc: 91 mg/dL / Ketone: x  / Bili: x / Urobili: x   Blood: x / Protein: x / Nitrite: x   Leuk Esterase: x / RBC: x / WBC x   Sq Epi: x / Non Sq Epi: x / Bacteria: x            RADIOLOGY & ADDITIONAL STUDIES:      < from: Xray Chest 1 View- PORTABLE-Urgent (Xray Chest 1 View- PORTABLE-Urgent .) (01.25.25 @ 14:39) >    ACC: [id number] EXAM:  XR CHEST PORTABLE URGENT 1V   ORDERED BY: JESUS ALBERTO VARNER     PROCEDURE DATE:  01/25/2025          INTERPRETATION:  CLINICAL INDICATION: Hypoxia    TECHNIQUE: Frontal view of the chest was obtained.    COMPARISON: Chest x-ray1/19/2025.    FINDINGS:  The heart size is normal.  Moderate left and small right pleural effusions.  No pneumothorax.  No acute osseous abnormalities.    IMPRESSION:  Moderate left and small right pleural effusions.    --- End of Report ---            < end of copied text >

## 2025-01-28 NOTE — PROGRESS NOTE ADULT - SUBJECTIVE AND OBJECTIVE BOX
Date of Service: 01-28-25 @ 13:50    Patient is a 89y old  Female who presents with a chief complaint of Hypoxic/hypercapnic resp failure (28 Jan 2025 11:44)      Any change in ROS:   Lethargic, ROS unable to be obtained  Breathing currently nonlabored     MEDICATIONS  (STANDING):  albuterol/ipratropium for Nebulization 3 milliLiter(s) Nebulizer every 6 hours  albuterol/ipratropium for Nebulization. 3 milliLiter(s) Nebulizer once  aMIOdarone Infusion 0.501 mG/Min (16.7 mL/Hr) IV Continuous <Continuous>  ascorbic acid 500 milliGRAM(s) Oral daily  bisacodyl Suppository 10 milliGRAM(s) Rectal daily  chlorhexidine 2% Cloths 1 Application(s) Topical <User Schedule>  dextrose 5%. 1000 milliLiter(s) (50 mL/Hr) IV Continuous <Continuous>  furosemide   Injectable 40 milliGRAM(s) IV Push daily  heparin   Injectable 5000 Unit(s) SubCutaneous every 12 hours  levothyroxine Injectable 60 MICROGram(s) IV Push at bedtime  metoprolol tartrate Injectable 5 milliGRAM(s) IV Push every 6 hours  mirtazapine 15 milliGRAM(s) Oral at bedtime  multivitamin 1 Tablet(s) Oral daily  QUEtiapine 12.5 milliGRAM(s) Oral at bedtime  sodium chloride 3%  Inhalation 4 milliLiter(s) Inhalation every 12 hours    MEDICATIONS  (PRN):  acetaminophen   Oral Liquid .. 650 milliGRAM(s) Oral every 6 hours PRN Temp greater or equal to 38C (100.4F), Moderate Pain (4 - 6)  haloperidol    Injectable 1 milliGRAM(s) IntraMuscular every 6 hours PRN Agitation    Vital Signs Last 24 Hrs  T(C): 36.6 (28 Jan 2025 13:00), Max: 36.6 (28 Jan 2025 12:42)  T(F): 97.9 (28 Jan 2025 13:00), Max: 97.9 (28 Jan 2025 12:42)  HR: 66 (28 Jan 2025 13:00) (56 - 73)  BP: 135/72 (28 Jan 2025 13:00) (120/74 - 145/78)  BP(mean): --  RR: 18 (28 Jan 2025 13:00) (18 - 20)  SpO2: 100% (28 Jan 2025 13:00) (96% - 100%)    Parameters below as of 28 Jan 2025 13:00  Patient On (Oxygen Delivery Method): nasal cannula, high flow        I&O's Summary    27 Jan 2025 07:01  -  28 Jan 2025 07:00  --------------------------------------------------------  IN: 0 mL / OUT: 1000 mL / NET: -1000 mL          Physical Exam:   GENERAL: NAD  HEENT: LUANN  ENMT: No tonsillar erythema, exudates, or enlargement; Moist mucous membranes, Good dentition, No lesions  NECK: Supple, No JVD, Normal thyroid  CHEST/LUNG: Decreased BS   CVS: S1, S2  GI: : Soft, Nontender, Nondistended; Bowel sounds present  NERVOUS SYSTEM: Lethargic   EXTREMITIES:  2+ Peripheral Pulses, No clubbing, cyanosis, or edema  LYMPH: No lymphadenopathy noted  SKIN: No rashes or lesions      Labs:  33                            8.3    6.26  )-----------( 153      ( 28 Jan 2025 07:21 )             25.3                         7.9    6.97  )-----------( 179      ( 26 Jan 2025 08:33 )             25.4                         7.9    6.88  )-----------( 184      ( 25 Jan 2025 08:59 )             25.3                         6.8    6.32  )-----------( 138      ( 25 Jan 2025 07:05 )             21.5     01-28    132[L]  |  93[L]  |  29[H]  ----------------------------<  91  3.7   |  28  |  1.48[H]  01-26    133[L]  |  94[L]  |  42[H]  ----------------------------<  97  4.2   |  29  |  1.45[H]  01-25    131[L]  |  95[L]  |  43[H]  ----------------------------<  86  4.7   |  29  |  1.36[H]  01-25    122[L]  |  87[L]  |  40[H]  ----------------------------<  421[H]  4.6   |  28  |  1.21  01-24    132[L]  |  97  |  44[H]  ----------------------------<  82  5.3   |  28  |  1.25  01-24    125[L]  |  91[L]  |  44[H]  ----------------------------<  191[H]  6.0[H]   |  26  |  1.17    Ca    8.9      28 Jan 2025 07:13    TPro  x   /  Alb  2.5[L]  /  TBili  x   /  DBili  x   /  AST  x   /  ALT  x   /  AlkPhos  x   01-24    CAPILLARY BLOOD GLUCOSE      POCT Blood Glucose.: 101 mg/dL (28 Jan 2025 12:11)  POCT Blood Glucose.: 109 mg/dL (28 Jan 2025 05:29)  POCT Blood Glucose.: 101 mg/dL (27 Jan 2025 23:50)  POCT Blood Glucose.: 94 mg/dL (27 Jan 2025 17:32)          Urinalysis Basic - ( 28 Jan 2025 07:13 )    Color: x / Appearance: x / SG: x / pH: x  Gluc: 91 mg/dL / Ketone: x  / Bili: x / Urobili: x   Blood: x / Protein: x / Nitrite: x   Leuk Esterase: x / RBC: x / WBC x   Sq Epi: x / Non Sq Epi: x / Bacteria: x      Studies  < from: Xray Chest 1 View- PORTABLE-Urgent (Xray Chest 1 View- PORTABLE-Urgent .) (01.25.25 @ 14:39) >    ACC: [id number] EXAM:  XR CHEST PORTABLE URGENT 1V   ORDERED BY: JESUS ALBERTO VARNER     PROCEDURE DATE:  01/25/2025          INTERPRETATION:  CLINICAL INDICATION: Hypoxia    TECHNIQUE: Frontal view of the chest was obtained.    COMPARISON: Chest x-ray1/19/2025.    FINDINGS:  The heart size is normal.  Moderate left and small right pleural effusions.  No pneumothorax.  No acute osseous abnormalities.    IMPRESSION:  Moderate left and small right pleural effusions.    --- End of Report ---          < end of copied text >

## 2025-01-28 NOTE — PROGRESS NOTE ADULT - CONVERSATION DETAILS
Spoke to Maykel for greater then 16 mins discussing ACP and goc.  Maykel has a failr understanding of pts medical conditions and hospital course.  Discussed hiflow and use with the inability to wean off and what that means for his Mom.  Maykel able to report that he understands information provided but remains consistent reporting that the team and the respiratory therapist told me that she is getting better.  Explained to Maykel that although coming down on her oxygen is an improvement (from 60 to 50) she is still requiring NIV of oxygen supplementation that is very high.  She is unable to come off this at present.  Discussed that she is unable to get the PEG placement 2/2 to respiratory status.  Maykel verbalized understanding but is hopeful that an "NGT will be able to be put in since she is getting better".  We discussed that if that was able to be done it is temporary time limited trial.   Maykel reports that he will be at bedside tomorrow and we can further discuss.

## 2025-01-28 NOTE — PROGRESS NOTE ADULT - PROBLEM SELECTOR PLAN 3
CT chest 1/23 with moderate b/l pl effusions  -CXR 1/25 with b/l pleural effusions/atelectasis L>R, L pleural effusion now appears mod-large  -Keep O>I with diuresis as tolerated.

## 2025-01-28 NOTE — PROGRESS NOTE ADULT - PROBLEM SELECTOR PLAN 4
2/2 chronic disease  c/w ngt as tolerated and indicated pt requiring full supportive care with all ADL's.

## 2025-01-28 NOTE — PROGRESS NOTE ADULT - SUBJECTIVE AND OBJECTIVE BOX
Cardiovascular Disease Progress Note  Date of service: 01-28-25 @ 11:34    Overnight events: No acute events overnight.  Patient remains on HFNC  Otherwise review of systems negative    Objective Findings:  T(C): 36.3 (01-28-25 @ 08:00), Max: 36.5 (01-28-25 @ 00:01)  HR: 65 (01-28-25 @ 08:00) (56 - 80)  BP: 143/76 (01-28-25 @ 08:00) (120/74 - 145/78)  RR: 18 (01-28-25 @ 08:00) (18 - 20)  SpO2: 100% (01-28-25 @ 08:00) (96% - 100%)  Wt(kg): --  Daily     Daily       Physical Exam:  Gen: NAD; Patient resting comfortably  HEENT: EOMI, Normocephalic/ atraumatic  CV: RRR, normal S1 + S2, no m/r/g  Lungs:  Normal respiratory effort; clear to auscultation bilaterally  Abd: soft, non-tender; bowel sounds present  Ext: No edema; warm and well perfused    Telemetry: Sinus    Laboratory Data:                        8.3    6.26  )-----------( 153      ( 28 Jan 2025 07:21 )             25.3     01-28    132[L]  |  93[L]  |  29[H]  ----------------------------<  91  3.7   |  28  |  1.48[H]    Ca    8.9      28 Jan 2025 07:13                Inpatient Medications:  MEDICATIONS  (STANDING):  albuterol/ipratropium for Nebulization 3 milliLiter(s) Nebulizer every 6 hours  albuterol/ipratropium for Nebulization. 3 milliLiter(s) Nebulizer once  aMIOdarone Infusion 0.501 mG/Min (16.7 mL/Hr) IV Continuous <Continuous>  ascorbic acid 500 milliGRAM(s) Oral daily  bisacodyl Suppository 10 milliGRAM(s) Rectal daily  chlorhexidine 2% Cloths 1 Application(s) Topical <User Schedule>  dextrose 5%. 1000 milliLiter(s) (50 mL/Hr) IV Continuous <Continuous>  furosemide   Injectable 40 milliGRAM(s) IV Push daily  heparin   Injectable 5000 Unit(s) SubCutaneous every 12 hours  levothyroxine Injectable 60 MICROGram(s) IV Push at bedtime  metoprolol tartrate Injectable 5 milliGRAM(s) IV Push every 6 hours  mirtazapine 15 milliGRAM(s) Oral at bedtime  multivitamin 1 Tablet(s) Oral daily  QUEtiapine 12.5 milliGRAM(s) Oral at bedtime  sodium chloride 3%  Inhalation 4 milliLiter(s) Inhalation every 12 hours      Assessment:  89y old Female with stated hx significant for dementia, s/p brain tumor resection 1961, HTN, hypothyroidism colon Ca s/p Rt hemicolectomy ('09), 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    Plan of Care:    #Afib RVR  - HR now improved on Amidarone GTT  - Likely hypoxia driven  - S/p DCCV which was unsuccessful  - Patient is anemic and high bleeding risk, would refrain from AC at this time.   - May also continue BB with holding parameters  - Plan to transition to PO Amio 200mg BID once NGT is placed.     #Hypoxic respiratory failure  - On HFNC  - Wean as tolerated  - IV diuresis  as needed.     #Dementia  - Patient bedbound  - AAOx0              Over 55 minutes spent on total encounter; more than 50% of the visit was spent counseling and/or coordinating care by the attending physician.      Bobby Montilla DO [facility]  Cardiovascular Disease  [phone]

## 2025-01-28 NOTE — PROGRESS NOTE ADULT - PROBLEM SELECTOR PLAN 1
-Initially 2nd to PNA   -Hypoxia had been improving, then with increase in O2 requirements requiring HFNC 1/24  -Repeat CT chest 1/23 with moderate b/l pleural effusions/atelectasis, b/l GGO. Likely combination of fluid overload + PNA  -Continue ABX, suggest 10 day course   -Continue bronchodilators/mucolytics  -Keep O>I with diuresis as tolerated  -Continue HFNC, wean as tolerated, keep sats >90%   -Bipap 10/5 PRN if worsening hypoxia or increased WOB  -Prognosis guarded. [facility] DNR/DNI/trial NIV  -Palliative care f/u.

## 2025-01-28 NOTE — PROGRESS NOTE ADULT - TIME BILLING
symptom assessment and management, supportive counseling, coordination of care, discussion and coordination with team.    I personally spent 30 minutes on advance care planning services with the patient. This time is separate and distinct from any other care management services provided on this date.    Adriana Lau, ANGELITA-BC  Please contact me via Teams  between 6am-2pm. If not answering, please call the palliative care pager [phone]    After 2pm and on weekends, please see the contact information below:    In the event of newly developing, evolving, or worsening symptoms between 2pm and 5pm please contact the Palliative Medicine via extension [phone] for assistance.  After 5pm please contact team via pager (if the patient is at [facility] #[phone] or if the patient is at [facility] #[phone]) The Geriatric and Palliative Medicine service has coverage 24 hours a day/ 7 days a week to provide medical recommendations regarding symptom management needs via telephone symptom assessment and management, supportive counseling, coordination of care, discussion and coordination with team.    I personally spent 10 minutes on advance care planning services with the patient. This time is separate and distinct from any other care management services provided on this date.      I personally spent 30 minutes on advance care planning services with the patient. This time is separate and distinct from any other care management services provided on this date.    Adriana Lau, ANP-BC  Please contact me via Teams  between 6am-2pm. If not answering, please call the palliative care pager [phone]    After 2pm and on weekends, please see the contact information below:    In the event of newly developing, evolving, or worsening symptoms between 2pm and 5pm please contact the Palliative Medicine via extension [phone] for assistance.  After 5pm please contact team via pager (if the patient is at [facility] #[phone] or if the patient is at [facility] #[phone]) The Geriatric and Palliative Medicine service has coverage 24 hours a day/ 7 days a week to provide medical recommendations regarding symptom management needs via telephone

## 2025-01-28 NOTE — PROGRESS NOTE ADULT - PROBLEM SELECTOR PLAN 1
improved   now HiFlo nasal cannula   will attempt to wean off Hi [place] per pulmonary recommendations  continue to follow recommendations

## 2025-01-28 NOTE — CHART NOTE - NSCHARTNOTEFT_GEN_A_CORE
NUTRITION FOLLOW UP NOTE    PATIENT SEEN FOR: nutrition follow-up, inadequate diet order     SOURCE: [] Patient  [x] Current Medical Record  [] RN  [] Family/support person at bedside  [x] Patient unavailable/inappropriate  [x] Other: chart reviewed     CHART REVIEWED/EVENTS NOTED.  [] No changes to nutrition care plan to note  [x] Nutrition Status:  -pt previously on NGT of Jevity 1.5 @ 60ml/hr x 24hrs from 1/13-1/26,   -pt NPO in setting of worsening respiratory status, pt requiring high flow NC.   -Palliative care following for GOC, pt not a candidate for PEG placement due to respiratory status, son is hopeful she can get a temporary NGT placement if her respiratory status improve.     DIET ORDER:   Diet, NPO (01-26-25)  Diet, NPO after Midnight:      NPO Start Date: 23-Jan-2025,   NPO Start Time: 23:59 (01-23-25)    CURRENT DIET ORDER IS:  [x] Appropriate:  [] Inadequate:  [] Other:    NUTRITION INTAKE/PROVISION:  [] PO:   [x] Enteral Nutrition: tube feeds on hold since 1/26  [] Parenteral Nutrition:    ANTHROPOMETRICS:  Drug Dosing Weight  Height (cm): 160 (11 Jan 2025 13:51)  Weight (kg): 53 (11 Jan 2025 13:51)  BMI (kg/m2): 20.7 (11 Jan 2025 13:51)  BSA (m2): 1.54 (11 Jan 2025 13:51)  Weights: 53.1Kg (1/13)      MEDICATIONS:  MEDICATIONS  (STANDING):  acetylcysteine 20%  Inhalation 3 milliLiter(s) Inhalation every 6 hours  albuterol/ipratropium for Nebulization 3 milliLiter(s) Nebulizer every 6 hours  albuterol/ipratropium for Nebulization. 3 milliLiter(s) Nebulizer once  aMIOdarone Infusion 0.501 mG/Min (16.7 mL/Hr) IV Continuous <Continuous>  ascorbic acid 500 milliGRAM(s) Oral daily  bisacodyl Suppository 10 milliGRAM(s) Rectal daily  chlorhexidine 2% Cloths 1 Application(s) Topical <User Schedule>  dextrose 5%. 1000 milliLiter(s) (50 mL/Hr) IV Continuous <Continuous>  furosemide   Injectable 40 milliGRAM(s) IV Push daily  heparin   Injectable 5000 Unit(s) SubCutaneous every 12 hours  levothyroxine Injectable 60 MICROGram(s) IV Push at bedtime  metoprolol tartrate Injectable 5 milliGRAM(s) IV Push every 6 hours  mirtazapine 15 milliGRAM(s) Oral at bedtime  multivitamin 1 Tablet(s) Oral daily  QUEtiapine 12.5 milliGRAM(s) Oral at bedtime  sodium chloride 3%  Inhalation 4 milliLiter(s) Inhalation every 12 hours    MEDICATIONS  (PRN):  acetaminophen   Oral Liquid .. 650 milliGRAM(s) Oral every 6 hours PRN Temp greater or equal to 38C (100.4F), Moderate Pain (4 - 6)  haloperidol    Injectable 1 milliGRAM(s) IntraMuscular every 6 hours PRN Agitation      NUTRITIONALLY PERTINENT LABS:  01-28 Na132 mmol/L[L] Glu 91 mg/dL K+ 3.7 mmol/L Cr  1.48 mg/dL[H] BUN 29 mg/dL[H] 01-26 Phos 3.2 mg/dL 01-24 Alb 2.5 g/dL[L]        Finger Sticks:  POCT Blood Glucose.: 101 mg/dL (01-28 @ 12:11)  POCT Blood Glucose.: 109 mg/dL (01-28 @ 05:29)  POCT Blood Glucose.: 101 mg/dL (01-27 @ 23:50)  POCT Blood Glucose.: 94 mg/dL (01-27 @ 17:32)      NUTRITIONALLY PERTINENT MEDICATIONS/LABS:  [x] Reviewed  [] Relevant notes on medications/labs:    EDEMA:  [x] Reviewed  [] Relevant notes:    GI/ I&O:  [x] Reviewed  [] Relevant notes:  [] Other: last bowel movement 1/18    SKIN:   [] No pressure injuries documented, per nursing flowsheet  [x] Pressure injury previously noted: bilateral buttocks, sacrum, left heel suspected deep tissue injury, right heel suspected deep tissue injury   [] Change in pressure injury documentation:  [] Other:    ESTIMATED NEEDS:  [] No change:  [x] Updated:  Energy:  9253-7566 kcal/day (30-35cal/kg)  Protein:  63.6 79 g/day (1.2-1.5g/kg)  Fluid:   ml/day or [] defer to team  Based on: dosing weight of 53Kg     NUTRITION DIAGNOSIS:  [x] Prior Dx: Increased Nutrient Needs  [] New Dx:    EDUCATION:  [] Yes:  [x] Not appropriate/warranted    NUTRITION CARE PLAN:  1. Monitor nutritional GOC, If enteral nutrition support is within GOC and medically feasible recommend resume Jevity 1.5 @ 60mL x 18hrs providing 1080mL of formula, 1620kcal/day (31kcal/kg), 69g protein/day (1.3g/kg), 821mL free water - based on 53kg. defer additional free-water to team  2. Multivitamin/mineral supplementation: continue multivitamin, ascorbic acid daily if no contraindications   3. RD to remain available and follow-up as medically appropriate.     MONITORING AND EVALUATION:   RD remains available upon request and will follow up per protocol.    Rachele Mar RD, CDN, [facility], Available on Teams

## 2025-01-28 NOTE — PROGRESS NOTE ADULT - PROBLEM SELECTOR PLAN 3
pt requiring full supportive care with all ADL's. on hiflow  unable to wean  management as per resp therapy

## 2025-01-28 NOTE — PROGRESS NOTE ADULT - SUBJECTIVE AND OBJECTIVE BOX
Time and date of service: 01-28-25 @ 10:19      SUBJECTIVE AND OBJECTIVE: pt resting in bed,  following commands, mitts in place  Indication for Geriatrics and Palliative Care Services/INTERVAL HPI: Nutritional goc    OVERNIGHT EVENTS: unable to wean off hi flow    DNR on chart: DNI: Trial NIV  DNI: Trial NIV      Allergies    clindamycin (Unknown)  shellfish (Unknown)  strawberry (Rash)  penicillin (Unknown)  clindamycin (Other)  strawberry (Unknown)  IV Contrast (Other)  IV Contrast (Unknown)    Intolerances    MEDICATIONS  (STANDING):  albuterol/ipratropium for Nebulization 3 milliLiter(s) Nebulizer every 6 hours  albuterol/ipratropium for Nebulization. 3 milliLiter(s) Nebulizer once  aMIOdarone Infusion 0.501 mG/Min (16.7 mL/Hr) IV Continuous <Continuous>  ascorbic acid 500 milliGRAM(s) Oral daily  bisacodyl Suppository 10 milliGRAM(s) Rectal daily  chlorhexidine 2% Cloths 1 Application(s) Topical <User Schedule>  dextrose 5%. 1000 milliLiter(s) (50 mL/Hr) IV Continuous <Continuous>  furosemide   Injectable 40 milliGRAM(s) IV Push daily  heparin   Injectable 5000 Unit(s) SubCutaneous every 12 hours  levothyroxine Injectable 60 MICROGram(s) IV Push at bedtime  metoprolol tartrate Injectable 5 milliGRAM(s) IV Push every 6 hours  mirtazapine 15 milliGRAM(s) Oral at bedtime  multivitamin 1 Tablet(s) Oral daily  QUEtiapine 12.5 milliGRAM(s) Oral at bedtime  sodium chloride 3%  Inhalation 4 milliLiter(s) Inhalation every 12 hours    MEDICATIONS  (PRN):  acetaminophen   Oral Liquid .. 650 milliGRAM(s) Oral every 6 hours PRN Temp greater or equal to 38C (100.4F), Moderate Pain (4 - 6)  haloperidol    Injectable 1 milliGRAM(s) IntraMuscular every 6 hours PRN Agitation        ITEMS UNCHECKED ARE NOT PRESENT    PRESENT SYMPTOMS: [ x]Unable to self-report - see [ ] CPOT [ ] PAINADS [ ] RDOS  Source if other than patient:  [ ]Family   [ x]Team     Pain:  [ ]yes [x ]no  QOL impact -   Location -                    Aggravating factors -  Quality -  Radiation -  Timing-  Severity (0-10 scale):  Minimal acceptable level (0-10 scale):     CPOT:    https://www.[facility].org/getattachment/[email or web address]/Critical-Care-Pain-Observation-Tool-(CPOT)    PAIN AD Score:	  http://geriatrictoolkit.[facility]/cog/painad.pdf (Ctrl + left click to view)    Dyspnea:                           [ ]Mild [ ]Moderate [ ]Severe    RDOS:  0 to 2  minimal or no respiratory distress   3  mild distress  4 to 6 moderate distress  >7 severe distress  https://[email or web address].net/handouts/hen/Respiratory_Distress_Observation_Scale.pdf (Ctrl +  left click to view)     Anxiety:                             [ ]Mild [ ]Moderate [ ]Severe  Fatigue:                             [ ]Mild [x ]Moderate [ ]Severe  Nausea:                            [ ]Mild [ ]Moderate [ ]Severe  Loss of appetite:               [ ]Mild [x ]Moderate [ ]Severe  Constipation:                    [ ]Mild [ ]Moderate [ ]Severe    PCSSQ[Palliative Care Spiritual Screening Question]   Severity (0-10):  Score of 4 or > indicate consideration of Chaplaincy referral.  Chaplaincy Referral: [ ] yes [ ] refused [ ] following [x ] Deferred     Caregiver [place]? : [ ] yes [ ] no [x ] Deferred [ ] Declined             Social work referral [ ] Patient & Family Centered Care Referral [ ]     Anticipatory Grief present?:  [ ] yes [ ] no  [x ] Deferred                  Social work referral [ ] Chaplaincy Referral[ ]      Other Symptoms:  [ ]All other review of systems negative     Palliative Performance Status Version 2:     20-30    %      http://npcrc.org/files/news/palliative_performance_scale_ppsv2.pdf  PHYSICAL EXAM:  Vital Signs Last 24 Hrs  T(C): 36.3 (28 Jan 2025 08:00), Max: 36.5 (28 Jan 2025 00:01)  T(F): 97.3 (28 Jan 2025 08:00), Max: 97.7 (28 Jan 2025 00:01)  HR: 65 (28 Jan 2025 08:00) (56 - 80)  BP: 143/76 (28 Jan 2025 08:00) (120/74 - 145/78)  BP(mean): --  RR: 18 (28 Jan 2025 08:00) (18 - 20)  SpO2: 100% (28 Jan 2025 08:00) (96% - 100%)    Parameters below as of 28 Jan 2025 08:00  Patient On (Oxygen Delivery Method): nasal cannula, high flow         I&O's Summary     GENERAL: [x ]Cachexia    [ ]Alert  [ ]Oriented x   [x ]Lethargic  [ ]Unarousable  [x ]min Verbal  [ ]Non-Verbal  Behavioral:   [ ]Anxiety  [ x]Delirium [x ]Agitation [ ]Other  HEENT:  [x ]Normal   [ ]Dry mouth   [ ]ET Tube/Trach  [ ]Oral lesions  PULMONARY:   [ ]Clear [ ]Tachypnea  [ ]Audible excessive secretions   [ ]Rhonchi        [ ]Right [ ]Left [ ]Bilateral  [ ]Crackles        [ ]Right [ ]Left [ ]Bilateral  [ ]Wheezing     [ ]Right [ ]Left [ ]Bilateral  [xDiminished BS [ ] Right [ ]Left [ ]Bilateral  CARDIOVASCULAR:    [ x]Regular [ ]Irregular [ ]Tachy  [ ]Michael [ ]Murmur [ ]Other  GASTROINTESTINAL:  [x ]Soft  [ ]Distended   [x ]+BS  [ ]Non tender [ ]Tender  [ ]Other [ ]PEG [ ]OGT/ NGT   Last BM:   GENITOURINARY:  [ ]Normal [ ]Incontinent   [ ]Oliguria/Anuria   [x ]Crooks  MUSCULOSKELETAL:   [ ]Normal   [ x]Weakness  [x ]Bed/Wheelchair bound [ ]Edema  NEUROLOGIC:   [ ]No focal deficits  [x ] Cognitive impairment  [ ] Dysphagia [ ]Dysarthria [ ] Paresis [ ]Other   SKIN: see rn flowsheet  [ ]Normal  [ ]Rash  [ ]Other  [ ]Pressure ulcer(s) [ ]y [ ]n present on admission    CRITICAL CARE:  [ ]Shock Present  [ ]Septic [ ]Cardiogenic [ ]Neurologic [ ]Hypovolemic  [ ]Vasopressors [ ]Inotropes  [ ]Respiratory failure present [ ]Mechanical Ventilation [ ]Non-invasive ventilatory support [ ]High-Flow   [ ]Acute  [ ]Chronic [ ]Hypoxic  [ ]Hypercarbic [ ]Other  [ ]Other organ failure     LABS:                                   8.3    6.26  )-----------( 153      ( 28 Jan 2025 07:21 )             25.3   01-28    132[L]  |  93[L]  |  29[H]  ----------------------------<  91  3.7   |  28  |  1.48[H]    Ca    8.9      28 Jan 2025 07:13            RADIOLOGY & ADDITIONAL STUDIES:  < from: CT Head No Cont (01.11.25 @ 13:53) >  ACC: [id number] EXAM:  CT BRAIN   ORDERED BY:  IVANA BERNSTEIN     PROCEDURE DATE:  01/11/2025          INTERPRETATION:  CT HEAD WITHOUT CONTRAST    TECHNIQUE: Multiple axial images of the head were obtained from the skull   base to the vertex without intravenous contrast.  Multiplanar reformats   were created according to the standard protocol.    INDICATION: Admitting Dxs: A41.9 SEPSIS ams  COMPARISON: Head CT without contrast 11/10/2021.  _____________________  FINDINGS:    Mildly degraded by motion artifact.    Status post right suboccipital craniectomy with postsurgical changes in   the skull/scalp as before. Similar appearance of the presumed right   cerebellar resection bed.    Status post right parieto-occipital cuca hole with a mild amount of   adjacent encephalomalacia and gliosis as before.    Mild diffuse cerebral volume loss. No significant midline shift.   Maintenance of gray-white differentiation in the large vascular   distributions. Mild hypoattenuation of the periventricular/deep white   matter most consistent with chronic microvascular ischemic disease.   Motion artifact precludes exclusion of an acute intracranial hemorrhage.   No findings suspicious for an acute intracranial hemorrhage within this   limitation. No hydrocephalus.    No findings suspicious for a depressed calvarial fracture.    Fatty atrophy of the visualized right aspect of the tongue.    Paranasal Sinuses, Mastoid Air Cells, and Orbits: Mucosal thickening left   sphenoid sinus. Visualized mastoidair cells clear. Status post left lens   replacement.  ______________________  IMPRESSION:  1.  Mildly degraded by motion artifact. A tiny acute intracranial   hemorrhage is not excluded. No findings suspicious for an acute   intracranial abnormalityotherwise. Chronic intracranial findings as   above.    --- End of Report ---            NASRIN LAGUERRE MD; Attending Radiologist  This document has been electronically signed. Jan 11 2025  2:12PM    < end of copied text >          Protein Calorie Malnutrition Present: [ ]mild [ ]moderate [ ]severe [ ]underweight [ ]morbid obesity  https://www.andeal.org/vault/2440/web/files/ONC/Table_Clinical%20Characteristics%20to%20Document%20Malnutrition-White%20JV%20et%20al%202012.pdf    Height (cm): 160 (01-11-25 @ 13:51)  Weight (kg): 53 (01-11-25 @ 13:51)  BMI (kg/m2): 20.7 (01-11-25 @ 13:51)    [ ]PPSV2 < or = 30%  [ ]significant weight loss [ ]poor nutritional intake [ ]anasarca[ ]Artificial Nutrition    Other REFERRALS:  [ ]Hospice  [ ]Child Life  [ ]Social Work  [ ]Case management [ ]Holistic Therapy      Time and date of service: 01-28-25 @ 10:19      SUBJECTIVE AND OBJECTIVE: pt resting in bed on hiflow, mitts in place  Indication for Geriatrics and Palliative Care Services/INTERVAL HPI: Nutritional goc    OVERNIGHT EVENTS: unable to wean off hi flow    DNR on chart: DNI: Trial NIV  DNI: Trial NIV      Allergies    clindamycin (Unknown)  shellfish (Unknown)  strawberry (Rash)  penicillin (Unknown)  clindamycin (Other)  strawberry (Unknown)  IV Contrast (Other)  IV Contrast (Unknown)    Intolerances    MEDICATIONS  (STANDING):  albuterol/ipratropium for Nebulization 3 milliLiter(s) Nebulizer every 6 hours  albuterol/ipratropium for Nebulization. 3 milliLiter(s) Nebulizer once  aMIOdarone Infusion 0.501 mG/Min (16.7 mL/Hr) IV Continuous <Continuous>  ascorbic acid 500 milliGRAM(s) Oral daily  bisacodyl Suppository 10 milliGRAM(s) Rectal daily  chlorhexidine 2% Cloths 1 Application(s) Topical <User Schedule>  dextrose 5%. 1000 milliLiter(s) (50 mL/Hr) IV Continuous <Continuous>  furosemide   Injectable 40 milliGRAM(s) IV Push daily  heparin   Injectable 5000 Unit(s) SubCutaneous every 12 hours  levothyroxine Injectable 60 MICROGram(s) IV Push at bedtime  metoprolol tartrate Injectable 5 milliGRAM(s) IV Push every 6 hours  mirtazapine 15 milliGRAM(s) Oral at bedtime  multivitamin 1 Tablet(s) Oral daily  QUEtiapine 12.5 milliGRAM(s) Oral at bedtime  sodium chloride 3%  Inhalation 4 milliLiter(s) Inhalation every 12 hours    MEDICATIONS  (PRN):  acetaminophen   Oral Liquid .. 650 milliGRAM(s) Oral every 6 hours PRN Temp greater or equal to 38C (100.4F), Moderate Pain (4 - 6)  haloperidol    Injectable 1 milliGRAM(s) IntraMuscular every 6 hours PRN Agitation        ITEMS UNCHECKED ARE NOT PRESENT    PRESENT SYMPTOMS: [ x]Unable to self-report - see [ ] CPOT [ ] PAINADS [ ] RDOS  Source if other than patient:  [ ]Family   [ x]Team     Pain:  [ ]yes [x ]no  QOL impact -   Location -                    Aggravating factors -  Quality -  Radiation -  Timing-  Severity (0-10 scale):  Minimal acceptable level (0-10 scale):     CPOT:    https://www.[facility].org/getattachment/[email or web address]/Critical-Care-Pain-Observation-Tool-(CPOT)    PAIN AD Score:	  http://geriatrictoolkit.[facility]/cog/painad.pdf (Ctrl + left click to view)    Dyspnea:                           [ ]Mild [ ]Moderate [ ]Severe    RDOS:  0 to 2  minimal or no respiratory distress   3  mild distress  4 to 6 moderate distress  >7 severe distress  https://[email or web address]/handouts/hen/Respiratory_Distress_Observation_Scale.pdf (Ctrl +  left click to view)     Anxiety:                             [ ]Mild [ ]Moderate [ ]Severe  Fatigue:                             [ ]Mild [x ]Moderate [x ]Severe  Nausea:                            [ ]Mild [ ]Moderate [ ]Severe  Loss of appetite:               [ ]Mild [x ]Moderate [ ]Severe  Constipation:                    [ ]Mild [ ]Moderate [ ]Severe    PCSSQ[Palliative Care Spiritual Screening Question]   Severity (0-10):  Score of 4 or > indicate consideration of Chaplaincy referral.  Chaplaincy Referral: [ ] yes [ ] refused [ ] following [x ] Deferred     Caregiver [place]? : [ ] yes [ ] no [x ] Deferred [ ] Declined             Social work referral [ ] Patient & Family Centered Care Referral [ ]     Anticipatory Grief present?:  [ ] yes [ ] no  [x ] Deferred                  Social work referral [ ] Chaplaincy Referral[ ]      Other Symptoms:  [ ]All other review of systems negative     Palliative Performance Status Version 2:     20-30    %      http://npcrc.org/files/news/palliative_performance_scale_ppsv2.pdf  PHYSICAL EXAM:  Vital Signs Last 24 Hrs  T(C): 36.3 (28 Jan 2025 08:00), Max: 36.5 (28 Jan 2025 00:01)  T(F): 97.3 (28 Jan 2025 08:00), Max: 97.7 (28 Jan 2025 00:01)  HR: 65 (28 Jan 2025 08:00) (56 - 80)  BP: 143/76 (28 Jan 2025 08:00) (120/74 - 145/78)  BP(mean): --  RR: 18 (28 Jan 2025 08:00) (18 - 20)  SpO2: 100% (28 Jan 2025 08:00) (96% - 100%)    Parameters below as of 28 Jan 2025 08:00  Patient On (Oxygen Delivery Method): nasal cannula, high flow         I&O's Summary     GENERAL: [x ]Cachexia    [ ]Alert  [ ]Oriented x   [x ]Lethargic  [ ]Unarousable  [x ]min Verbal  [ ]Non-Verbal  Behavioral:   [ ]Anxiety  [ x]Delirium [x ]Agitation [ ]Other  HEENT:  [x ]Normal   [ ]Dry mouth   [ ]ET Tube/Trach  [ ]Oral lesions  PULMONARY:   [ ]Clear [ ]Tachypnea  [ ]Audible excessive secretions   [ ]Rhonchi        [ ]Right [ ]Left [ ]Bilateral  [ ]Crackles        [ ]Right [ ]Left [ ]Bilateral  [ ]Wheezing     [ ]Right [ ]Left [ ]Bilateral  [xDiminished BS [ ] Right [ ]Left [ ]Bilateral  CARDIOVASCULAR:    [ x]Regular [ ]Irregular [ ]Tachy  [ ]Michael [ ]Murmur [ ]Other  GASTROINTESTINAL:  [x ]Soft  [ ]Distended   [x ]+BS  [ ]Non tender [ ]Tender  [ ]Other [ ]PEG [ ]OGT/ NGT   Last BM:   GENITOURINARY:  [ ]Normal [ ]Incontinent   [ ]Oliguria/Anuria   [x ]Crooks  MUSCULOSKELETAL:   [ ]Normal   [ x]Weakness  [x ]Bed/Wheelchair bound [ ]Edema  NEUROLOGIC:   [ ]No focal deficits  [x ] Cognitive impairment  [ ] Dysphagia [ ]Dysarthria [ ] Paresis [ ]Other   SKIN: see rn flowsheet  [ ]Normal  [ ]Rash  [ ]Other  [ ]Pressure ulcer(s) [ ]y [ ]n present on admission    CRITICAL CARE:  [ ]Shock Present  [ ]Septic [ ]Cardiogenic [ ]Neurologic [ ]Hypovolemic  [ ]Vasopressors [ ]Inotropes  [ ]Respiratory failure present [ ]Mechanical Ventilation [ ]Non-invasive ventilatory support [ ]High-Flow   [ ]Acute  [ ]Chronic [ ]Hypoxic  [ ]Hypercarbic [ ]Other  [ ]Other organ failure     LABS:                                   8.3    6.26  )-----------( 153      ( 28 Jan 2025 07:21 )             25.3   01-28    132[L]  |  93[L]  |  29[H]  ----------------------------<  91  3.7   |  28  |  1.48[H]    Ca    8.9      28 Jan 2025 07:13            RADIOLOGY & ADDITIONAL STUDIES:  < from: CT Head No Cont (01.11.25 @ 13:53) >  ACC: [id number] EXAM:  CT BRAIN   ORDERED BY:  IVANA BERNSTEIN     PROCEDURE DATE:  01/11/2025          INTERPRETATION:  CT HEAD WITHOUT CONTRAST    TECHNIQUE: Multiple axial images of the head were obtained from the skull   base to the vertex without intravenous contrast.  Multiplanar reformats   were created according to the standard protocol.    INDICATION: Admitting Dxs: A41.9 SEPSIS ams  COMPARISON: Head CT without contrast 11/10/2021.  _____________________  FINDINGS:    Mildly degraded by motion artifact.    Status post right suboccipital craniectomy with postsurgical changes in   the skull/scalp as before. Similar appearance of the presumed right   cerebellar resection bed.    Status post right parieto-occipital cuca hole with a mild amount of   adjacent encephalomalacia and gliosis as before.    Mild diffuse cerebral volume loss. No significant midline shift.   Maintenance of gray-white differentiation in the large vascular   distributions. Mild hypoattenuation of the periventricular/deep white   matter most consistent with chronic microvascular ischemic disease.   Motion artifact precludes exclusion of an acute intracranial hemorrhage.   No findings suspicious for an acute intracranial hemorrhage within this   limitation. No hydrocephalus.    No findings suspicious for a depressed calvarial fracture.    Fatty atrophy of the visualized right aspect of the tongue.    Paranasal Sinuses, Mastoid Air Cells, and Orbits: Mucosal thickening left   sphenoid sinus. Visualized mastoidair cells clear. Status post left lens   replacement.  ______________________  IMPRESSION:  1.  Mildly degraded by motion artifact. A tiny acute intracranial   hemorrhage is not excluded. No findings suspicious for an acute   intracranial abnormalityotherwise. Chronic intracranial findings as   above.    --- End of Report ---            NASRIN LAGUERRE MD; Attending Radiologist  This document has been electronically signed. Jan 11 2025  2:12PM    < end of copied text >          Protein Calorie Malnutrition Present: [ ]mild [ ]moderate [ ]severe [ ]underweight [ ]morbid obesity  https://www.andeal.org/vault/2440/web/files/ONC/Table_Clinical%20Characteristics%20to%20Document%20Malnutrition-White%20JV%20et%20al%202012.pdf    Height (cm): 160 (01-11-25 @ 13:51)  Weight (kg): 53 (01-11-25 @ 13:51)  BMI (kg/m2): 20.7 (01-11-25 @ 13:51)    [ ]PPSV2 < or = 30%  [ ]significant weight loss [ ]poor nutritional intake [ ]anasarca[ ]Artificial Nutrition    Other REFERRALS:  [ ]Hospice  [ ]Child Life  [ ]Social Work  [ ]Case management [ ]Holistic Therapy

## 2025-01-28 NOTE — PROGRESS NOTE ADULT - ASSESSMENT
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

## 2025-01-28 NOTE — PROGRESS NOTE ADULT - SUBJECTIVE AND OBJECTIVE BOX
Patient is a 89y old  Female who presents with a chief complaint of Hypoxic/hypercapnic resp failure (28 Jan 2025 15:10)      DATE OF SERVICE: 01-28-25 @ 16:42    SUBJECTIVE / OVERNIGHT EVENTS: overnight events noted    ROS:  not available     MEDICATIONS  (STANDING):  acetylcysteine 20%  Inhalation 3 milliLiter(s) Inhalation every 6 hours  albuterol/ipratropium for Nebulization 3 milliLiter(s) Nebulizer every 6 hours  albuterol/ipratropium for Nebulization. 3 milliLiter(s) Nebulizer once  aMIOdarone Infusion 0.501 mG/Min (16.7 mL/Hr) IV Continuous <Continuous>  ascorbic acid 500 milliGRAM(s) Oral daily  bisacodyl Suppository 10 milliGRAM(s) Rectal daily  chlorhexidine 2% Cloths 1 Application(s) Topical <User Schedule>  dextrose 5%. 1000 milliLiter(s) (50 mL/Hr) IV Continuous <Continuous>  furosemide   Injectable 40 milliGRAM(s) IV Push daily  heparin   Injectable 5000 Unit(s) SubCutaneous every 12 hours  levothyroxine Injectable 60 MICROGram(s) IV Push at bedtime  metoprolol tartrate Injectable 5 milliGRAM(s) IV Push every 6 hours  mirtazapine 15 milliGRAM(s) Oral at bedtime  multivitamin 1 Tablet(s) Oral daily  QUEtiapine 12.5 milliGRAM(s) Oral at bedtime  sodium chloride 3%  Inhalation 4 milliLiter(s) Inhalation every 12 hours    MEDICATIONS  (PRN):  acetaminophen   Oral Liquid .. 650 milliGRAM(s) Oral every 6 hours PRN Temp greater or equal to 38C (100.4F), Moderate Pain (4 - 6)  haloperidol    Injectable 1 milliGRAM(s) IntraMuscular every 6 hours PRN Agitation        CAPILLARY BLOOD GLUCOSE      POCT Blood Glucose.: 101 mg/dL (28 Jan 2025 12:11)  POCT Blood Glucose.: 109 mg/dL (28 Jan 2025 05:29)  POCT Blood Glucose.: 101 mg/dL (27 Jan 2025 23:50)  POCT Blood Glucose.: 94 mg/dL (27 Jan 2025 17:32)    I&O's Summary    27 Jan 2025 07:01  -  28 Jan 2025 07:00  --------------------------------------------------------  IN: 0 mL / OUT: 1000 mL / NET: -1000 mL    28 Jan 2025 07:01  -  28 Jan 2025 16:42  --------------------------------------------------------  IN: 0 mL / OUT: 1000 mL / NET: -1000 mL        Vital Signs Last 24 Hrs  T(C): 36.2 (28 Jan 2025 16:01), Max: 36.6 (28 Jan 2025 12:42)  T(F): 97.2 (28 Jan 2025 16:01), Max: 97.9 (28 Jan 2025 12:42)  HR: 74 (28 Jan 2025 16:01) (56 - 74)  BP: 134/74 (28 Jan 2025 16:01) (120/74 - 145/78)  BP(mean): --  RR: 18 (28 Jan 2025 16:01) (18 - 20)  SpO2: 99% (28 Jan 2025 16:01) (96% - 100%)    PHYSICAL EXAM:   CHEST/LUNG: coarse breath sounds   HEART: S1 S2; no murmurs   ABDOMEN: Soft, Nontender  EXTREMITIES: no edema  NEUROLOGY: non-focal    LABS:                        8.3    6.26  )-----------( 153      ( 28 Jan 2025 07:21 )             25.3     01-28    132[L]  |  93[L]  |  29[H]  ----------------------------<  91  3.7   |  28  |  1.48[H]    Ca    8.9      28 Jan 2025 07:13            Urinalysis Basic - ( 28 Jan 2025 07:13 )    Color: x / Appearance: x / SG: x / pH: x  Gluc: 91 mg/dL / Ketone: x  / Bili: x / Urobili: x   Blood: x / Protein: x / Nitrite: x   Leuk Esterase: x / RBC: x / WBC x   Sq Epi: x / Non Sq Epi: x / Bacteria: x          All consultant(s) notes reviewed and care discussed with other providers        Contact Number, Dr Link [phone]

## 2025-01-28 NOTE — PROGRESS NOTE ADULT - NSPROGADDITIONALINFOA_GEN_ALL_CORE
if goals are in line with comfort Recommend:  Dilaudid 0.5 mg IV q2h prn dyspnea or tachypnea  Dilaudid 0.5 mg IV q2h prn severe pain and Dilaudid 0.2 mg IV q2h prn moderate pain  Ativan 0.2 mg IV q2h prn agitation   Glycopyrrolate 0.4 mg IV q6h prn copious oral secretions   Dulcolax suppository daily prn constipation   Acetaminophen suppository 650 mg q6h prn fever  Zofran 4 mg IV q8h prn nausea or vomiting    Recommend discontinuation of all medications and interventions that do not serve goal of promoting patient's comfort and dignity at end-of-life.    Please page for any acute symptoms or further questions or concerns. if acute decompensation and goals are in line with comfort Recommend:  Dilaudid 0.5 mg IV q2h prn dyspnea or tachypnea  Dilaudid 0.5 mg IV q2h prn severe pain and Dilaudid 0.2 mg IV q2h prn moderate pain  Ativan 0.2 mg IV q2h prn agitation   Glycopyrrolate 0.4 mg IV q6h prn copious oral secretions   Dulcolax suppository daily prn constipation   Acetaminophen suppository 650 mg q6h prn fever  Zofran 4 mg IV q8h prn nausea or vomiting    Recommend discontinuation of all medications and interventions that do not serve goal of promoting patient's comfort and dignity at end-of-life.    Please page for any acute symptoms or further questions or concerns.

## 2025-01-29 LAB
ANION GAP SERPL CALC-SCNC: 9 MMOL/L — SIGNIFICANT CHANGE UP (ref 5–17)
BUN SERPL-MCNC: 24 MG/DL — HIGH (ref 7–23)
CALCIUM SERPL-MCNC: 9.2 MG/DL — SIGNIFICANT CHANGE UP (ref 8.4–10.5)
CHLORIDE SERPL-SCNC: 94 MMOL/L — LOW (ref 96–108)
CO2 SERPL-SCNC: 29 MMOL/L — SIGNIFICANT CHANGE UP (ref 22–31)
CREAT SERPL-MCNC: 1.47 MG/DL — HIGH (ref 0.5–1.3)
EGFR: 32 ML/MIN/1.73M2 — LOW
GLUCOSE BLDC GLUCOMTR-MCNC: 90 MG/DL — SIGNIFICANT CHANGE UP (ref 70–99)
GLUCOSE BLDC GLUCOMTR-MCNC: 92 MG/DL — SIGNIFICANT CHANGE UP (ref 70–99)
GLUCOSE BLDC GLUCOMTR-MCNC: 92 MG/DL — SIGNIFICANT CHANGE UP (ref 70–99)
GLUCOSE SERPL-MCNC: 82 MG/DL — SIGNIFICANT CHANGE UP (ref 70–99)
HCT VFR BLD CALC: 26.8 % — LOW (ref 34.5–45)
HGB BLD-MCNC: 8.5 G/DL — LOW (ref 11.5–15.5)
MCHC RBC-ENTMCNC: 30.8 PG — SIGNIFICANT CHANGE UP (ref 27–34)
MCHC RBC-ENTMCNC: 31.7 G/DL — LOW (ref 32–36)
MCV RBC AUTO: 97.1 FL — SIGNIFICANT CHANGE UP (ref 80–100)
NRBC # BLD: 0 /100 WBCS — SIGNIFICANT CHANGE UP (ref 0–0)
NRBC BLD-RTO: 0 /100 WBCS — SIGNIFICANT CHANGE UP (ref 0–0)
PLATELET # BLD AUTO: 181 K/UL — SIGNIFICANT CHANGE UP (ref 150–400)
POTASSIUM SERPL-MCNC: 3.4 MMOL/L — LOW (ref 3.5–5.3)
POTASSIUM SERPL-SCNC: 3.4 MMOL/L — LOW (ref 3.5–5.3)
RBC # BLD: 2.76 M/UL — LOW (ref 3.8–5.2)
RBC # FLD: 15.6 % — HIGH (ref 10.3–14.5)
SODIUM SERPL-SCNC: 132 MMOL/L — LOW (ref 135–145)
WBC # BLD: 6.47 K/UL — SIGNIFICANT CHANGE UP (ref 3.8–10.5)
WBC # FLD AUTO: 6.47 K/UL — SIGNIFICANT CHANGE UP (ref 3.8–10.5)

## 2025-01-29 PROCEDURE — 99232 SBSQ HOSP IP/OBS MODERATE 35: CPT

## 2025-01-29 RX ORDER — POTASSIUM CHLORIDE 750 MG/1
10 TABLET, EXTENDED RELEASE ORAL
Refills: 0 | Status: COMPLETED | OUTPATIENT
Start: 2025-01-29 | End: 2025-01-29

## 2025-01-29 RX ADMIN — LEVOTHYROXINE SODIUM 60 MICROGRAM(S): 25 TABLET ORAL at 21:37

## 2025-01-29 RX ADMIN — POTASSIUM CHLORIDE 100 MILLIEQUIVALENT(S): 750 TABLET, EXTENDED RELEASE ORAL at 08:22

## 2025-01-29 RX ADMIN — Medication 5000 UNIT(S): at 05:05

## 2025-01-29 RX ADMIN — Medication 3 MILLILITER(S): at 19:05

## 2025-01-29 RX ADMIN — Medication 40 MILLIGRAM(S): at 05:05

## 2025-01-29 RX ADMIN — AMIODARONE HYDROCHLORIDE 16.7 MG/MIN: 50 INJECTION, SOLUTION INTRAVENOUS at 08:47

## 2025-01-29 RX ADMIN — ANTISEPTIC SURGICAL SCRUB 1 APPLICATION(S): 0.04 SOLUTION TOPICAL at 05:06

## 2025-01-29 RX ADMIN — IPRATROPIUM BROMIDE AND ALBUTEROL SULFATE 3 MILLILITER(S): .5; 2.5 SOLUTION RESPIRATORY (INHALATION) at 14:30

## 2025-01-29 RX ADMIN — Medication 4 MILLILITER(S): at 19:06

## 2025-01-29 RX ADMIN — Medication 10 MILLIGRAM(S): at 21:37

## 2025-01-29 RX ADMIN — Medication 5000 UNIT(S): at 19:05

## 2025-01-29 RX ADMIN — Medication 4 MILLILITER(S): at 05:06

## 2025-01-29 RX ADMIN — Medication 5 MILLIGRAM(S): at 19:05

## 2025-01-29 RX ADMIN — POTASSIUM CHLORIDE 100 MILLIEQUIVALENT(S): 750 TABLET, EXTENDED RELEASE ORAL at 09:38

## 2025-01-29 RX ADMIN — IPRATROPIUM BROMIDE AND ALBUTEROL SULFATE 3 MILLILITER(S): .5; 2.5 SOLUTION RESPIRATORY (INHALATION) at 05:06

## 2025-01-29 RX ADMIN — Medication 3 MILLILITER(S): at 14:31

## 2025-01-29 RX ADMIN — Medication 3 MILLILITER(S): at 05:05

## 2025-01-29 RX ADMIN — Medication 5 MILLIGRAM(S): at 14:30

## 2025-01-29 RX ADMIN — IPRATROPIUM BROMIDE AND ALBUTEROL SULFATE 3 MILLILITER(S): .5; 2.5 SOLUTION RESPIRATORY (INHALATION) at 19:04

## 2025-01-29 NOTE — PROGRESS NOTE ADULT - SUBJECTIVE AND OBJECTIVE BOX
NEPHROLOGY-[facility] [phone]        Patient seen and examined in bed.   SHe was the same   Seems more alert   On Amio and Dextrose     ros-unable         MEDICATIONS  (STANDING):  acetylcysteine 20%  Inhalation 3 milliLiter(s) Inhalation every 6 hours  albuterol/ipratropium for Nebulization 3 milliLiter(s) Nebulizer every 6 hours  albuterol/ipratropium for Nebulization. 3 milliLiter(s) Nebulizer once  aMIOdarone Infusion 0.501 mG/Min (16.7 mL/Hr) IV Continuous <Continuous>  ascorbic acid 500 milliGRAM(s) Oral daily  bisacodyl Suppository 10 milliGRAM(s) Rectal daily  chlorhexidine 2% Cloths 1 Application(s) Topical <User Schedule>  dextrose 5%. 1000 milliLiter(s) (50 mL/Hr) IV Continuous <Continuous>  furosemide   Injectable 40 milliGRAM(s) IV Push daily  heparin   Injectable 5000 Unit(s) SubCutaneous every 12 hours  levothyroxine Injectable 60 MICROGram(s) IV Push at bedtime  metoprolol tartrate Injectable 5 milliGRAM(s) IV Push every 6 hours  mirtazapine 15 milliGRAM(s) Oral at bedtime  multivitamin 1 Tablet(s) Oral daily  QUEtiapine 12.5 milliGRAM(s) Oral at bedtime  sodium chloride 3%  Inhalation 4 milliLiter(s) Inhalation every 12 hours      VITAL:  T(C): , Max: 36.6 (01-28-25 @ 12:42)  T(F): , Max: 97.9 (01-28-25 @ 12:42)  HR: 60 (01-29-25 @ 10:00)  BP: 129/73 (01-29-25 @ 10:00)  BP(mean): --  RR: 18 (01-29-25 @ 10:00)  SpO2: 99% (01-29-25 @ 10:00)  Wt(kg): --    I and O's:    01-28 @ 07:01  -  01-29 @ 07:00  --------------------------------------------------------  IN: 0 mL / OUT: 1725 mL / NET: -1725 mL          PHYSICAL EXAM:    Constitutional: looks stated age   Neck:  No JVD  Respiratory: Course   Cardiovascular: S1 and S2  Gastrointestinal: BS+, soft, NT/ND  Extremities: No peripheral edema  Neurological:   : No Crooks  Skin: No rashes  Access: Not applicable    LABS:                        8.5    6.47  )-----------( 181      ( 29 Jan 2025 07:07 )             26.8     01-29    132[L]  |  94[L]  |  24[H]  ----------------------------<  82  3.4[L]   |  29  |  1.47[H]    Ca    9.2      29 Jan 2025 07:06            Urine Studies:  Urinalysis Basic - ( 29 Jan 2025 07:06 )    Color: x / Appearance: x / SG: x / pH: x  Gluc: 82 mg/dL / Ketone: x  / Bili: x / Urobili: x   Blood: x / Protein: x / Nitrite: x   Leuk Esterase: x / RBC: x / WBC x   Sq Epi: x / Non Sq Epi: x / Bacteria: x            RADIOLOGY & ADDITIONAL STUDIES:          < from: Xray Chest 1 View- PORTABLE-Urgent (Xray Chest 1 View- PORTABLE-Urgent .) (01.25.25 @ 14:39) >    ACC: [id number] EXAM:  XR CHEST PORTABLE URGENT 1V   ORDERED BY: JESUS ALBERTO VARNER     PROCEDURE DATE:  01/25/2025          INTERPRETATION:  CLINICAL INDICATION: Hypoxia    TECHNIQUE: Frontal view of the chest was obtained.    COMPARISON: Chest x-ray1/19/2025.    FINDINGS:  The heart size is normal.  Moderate left and small right pleural effusions.  No pneumothorax.  No acute osseous abnormalities.    IMPRESSION:  Moderate left and small right pleural effusions.    --- End of Report ---          LILIAM KING MD; Resident Radiologist  This document has been electronically signed.  GENI BORJA MD; Attending Radiologist  This document has bee    < end of copied text >

## 2025-01-29 NOTE — PROGRESS NOTE ADULT - ASSESSMENT
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

## 2025-01-29 NOTE — PROGRESS NOTE ADULT - NSPROGADDITIONALINFOA_GEN_ALL_CORE
if acute decompensation and goals are in line with comfort Recommend:  Dilaudid 0.5 mg IV q2h prn dyspnea or tachypnea  Dilaudid 0.5 mg IV q2h prn severe pain and Dilaudid 0.2 mg IV q2h prn moderate pain  Ativan 0.2 mg IV q2h prn agitation   Glycopyrrolate 0.4 mg IV q6h prn copious oral secretions   Dulcolax suppository daily prn constipation   Acetaminophen suppository 650 mg q6h prn fever  Zofran 4 mg IV q8h prn nausea or vomiting    Recommend discontinuation of all medications and interventions that do not serve goal of promoting patient's comfort and dignity at end-of-life.    Please page for any acute symptoms or further questions or concerns.

## 2025-01-29 NOTE — PROGRESS NOTE ADULT - PROBLEM SELECTOR PLAN 7
continue home meds with hold parameters
c.w meds
continue home meds with hold parameters
acceptable  will continue metoprolol 50 mg BID via NGT  add hydralazine 50 BID
see GOC note
continue home meds with hold parameters
acceptable  will continue metoprolol 50 mg BID via NGT  add hydralazine 50 BID
continue home meds with hold parameters
see GOC note

## 2025-01-29 NOTE — PROGRESS NOTE ADULT - PROBLEM SELECTOR PLAN 1
unable to expeditiously wean off  palliative care follow up appreciated  detailed d/w son Maykel today as well  my recommendations are for comfort care and pleasure feeds  continue to follow pulmonary recommendations  patient is clearly not a candidate for PEG at this time   she will NOT tolerate the procedure

## 2025-01-29 NOTE — PROGRESS NOTE ADULT - PROBLEM SELECTOR PLAN 1
-Initially 2nd to PNA   -Hypoxia had been improving, then with increase in O2 requirements requiring HFNC 1/24  -Repeat CT chest 1/23 with moderate b/l pleural effusions/atelectasis, b/l GGO. Likely combination of fluid overload + PNA  -Completed 10 day course of ABX   -Continue bronchodilators/mucolytics  -Keep O>I with diuresis as tolerated  -Continue HFNC, wean as tolerated, keep sats >90%. O2 requirements improving, on 40L/50% this AM   -Suggest Bipap 10/5 PRN if worsening hypoxia or increased WOB  -Prognosis guarded. [facility] DNR/DNI/trial NIV  -Palliative care f/u.

## 2025-01-29 NOTE — PROGRESS NOTE ADULT - SUBJECTIVE AND OBJECTIVE BOX
Patient is a 89y old  Female who presents with a chief complaint of Hypoxic/hypercapnic resp failure (29 Jan 2025 14:20)      DATE OF SERVICE: 01-29-25 @ 15:18    SUBJECTIVE / OVERNIGHT EVENTS: overnight events noted    ROS:  not available         MEDICATIONS  (STANDING):  acetylcysteine 20%  Inhalation 3 milliLiter(s) Inhalation every 6 hours  albuterol/ipratropium for Nebulization 3 milliLiter(s) Nebulizer every 6 hours  albuterol/ipratropium for Nebulization. 3 milliLiter(s) Nebulizer once  aMIOdarone Infusion 0.501 mG/Min (16.7 mL/Hr) IV Continuous <Continuous>  ascorbic acid 500 milliGRAM(s) Oral daily  bisacodyl Suppository 10 milliGRAM(s) Rectal daily  chlorhexidine 2% Cloths 1 Application(s) Topical <User Schedule>  furosemide   Injectable 40 milliGRAM(s) IV Push daily  heparin   Injectable 5000 Unit(s) SubCutaneous every 12 hours  levothyroxine Injectable 60 MICROGram(s) IV Push at bedtime  metoprolol tartrate Injectable 5 milliGRAM(s) IV Push every 6 hours  mirtazapine 15 milliGRAM(s) Oral at bedtime  multivitamin 1 Tablet(s) Oral daily  QUEtiapine 12.5 milliGRAM(s) Oral at bedtime  sodium chloride 3%  Inhalation 4 milliLiter(s) Inhalation every 12 hours    MEDICATIONS  (PRN):  acetaminophen   Oral Liquid .. 650 milliGRAM(s) Oral every 6 hours PRN Temp greater or equal to 38C (100.4F), Moderate Pain (4 - 6)  haloperidol    Injectable 1 milliGRAM(s) IntraMuscular every 6 hours PRN Agitation        CAPILLARY BLOOD GLUCOSE      POCT Blood Glucose.: 92 mg/dL (29 Jan 2025 12:17)  POCT Blood Glucose.: 92 mg/dL (29 Jan 2025 06:16)  POCT Blood Glucose.: 94 mg/dL (28 Jan 2025 23:39)  POCT Blood Glucose.: 91 mg/dL (28 Jan 2025 18:04)    I&O's Summary    28 Jan 2025 07:01  -  29 Jan 2025 07:00  --------------------------------------------------------  IN: 0 mL / OUT: 1725 mL / NET: -1725 mL        Vital Signs Last 24 Hrs  T(C): 36.2 (29 Jan 2025 10:00), Max: 36.3 (28 Jan 2025 21:15)  T(F): 97.1 (29 Jan 2025 10:00), Max: 97.3 (28 Jan 2025 21:15)  HR: 67 (29 Jan 2025 14:32) (55 - 84)  BP: 144/69 (29 Jan 2025 14:32) (129/73 - 154/69)  BP(mean): --  RR: 18 (29 Jan 2025 10:00) (18 - 20)  SpO2: 99% (29 Jan 2025 10:00) (96% - 99%)      PHYSICAL EXAM:   CHEST/LUNG: coarse breath sounds   HEART: S1 S2; no murmurs   ABDOMEN: Soft, Nontender  EXTREMITIES: no edema  NEUROLOGY: non-focal    LABS:                        8.5    6.47  )-----------( 181      ( 29 Jan 2025 07:07 )             26.8     01-29    132[L]  |  94[L]  |  24[H]  ----------------------------<  82  3.4[L]   |  29  |  1.47[H]    Ca    9.2      29 Jan 2025 07:06            Urinalysis Basic - ( 29 Jan 2025 07:06 )    Color: x / Appearance: x / SG: x / pH: x  Gluc: 82 mg/dL / Ketone: x  / Bili: x / Urobili: x   Blood: x / Protein: x / Nitrite: x   Leuk Esterase: x / RBC: x / WBC x   Sq Epi: x / Non Sq Epi: x / Bacteria: x          All consultant(s) notes reviewed and care discussed with other providers        Contact Number, Dr Link [phone]

## 2025-01-29 NOTE — PROGRESS NOTE ADULT - SUBJECTIVE AND OBJECTIVE BOX
Cardiovascular Disease Progress Note  Date of service: 01-29-25 @ 14:21    Overnight events: No acute events overnight.  Patient is in no distress.   Otherwise review of systems negative    Objective Findings:  T(C): 36.2 (01-29-25 @ 10:00), Max: 36.3 (01-28-25 @ 21:15)  HR: 60 (01-29-25 @ 10:00) (55 - 84)  BP: 129/73 (01-29-25 @ 10:00) (129/73 - 154/69)  RR: 18 (01-29-25 @ 10:00) (18 - 20)  SpO2: 99% (01-29-25 @ 10:00) (96% - 99%)  Wt(kg): --  Daily     Daily       Physical Exam:  Gen: NAD; Patient resting comfortably  HEENT: EOMI, Normocephalic/ atraumatic  CV: RRR, normal S1 + S2, no m/r/g  Lungs:  Normal respiratory effort; clear to auscultation bilaterally  Abd: soft, non-tender; bowel sounds present  Ext: No edema; warm and well perfused    Telemetry: SInus     Laboratory Data:                        8.5    6.47  )-----------( 181      ( 29 Jan 2025 07:07 )             26.8     01-29    132[L]  |  94[L]  |  24[H]  ----------------------------<  82  3.4[L]   |  29  |  1.47[H]    Ca    9.2      29 Jan 2025 07:06                Inpatient Medications:  MEDICATIONS  (STANDING):  acetylcysteine 20%  Inhalation 3 milliLiter(s) Inhalation every 6 hours  albuterol/ipratropium for Nebulization 3 milliLiter(s) Nebulizer every 6 hours  albuterol/ipratropium for Nebulization. 3 milliLiter(s) Nebulizer once  aMIOdarone Infusion 0.501 mG/Min (16.7 mL/Hr) IV Continuous <Continuous>  ascorbic acid 500 milliGRAM(s) Oral daily  bisacodyl Suppository 10 milliGRAM(s) Rectal daily  chlorhexidine 2% Cloths 1 Application(s) Topical <User Schedule>  furosemide   Injectable 40 milliGRAM(s) IV Push daily  heparin   Injectable 5000 Unit(s) SubCutaneous every 12 hours  levothyroxine Injectable 60 MICROGram(s) IV Push at bedtime  metoprolol tartrate Injectable 5 milliGRAM(s) IV Push every 6 hours  mirtazapine 15 milliGRAM(s) Oral at bedtime  multivitamin 1 Tablet(s) Oral daily  QUEtiapine 12.5 milliGRAM(s) Oral at bedtime  sodium chloride 3%  Inhalation 4 milliLiter(s) Inhalation every 12 hours      Assessment:   89y old Female with stated hx significant for dementia, s/p brain tumor resection 1961, HTN, hypothyroidism colon Ca s/p Rt hemicolectomy ('09), 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    Plan of Care:    #Afib RVR  - HR now improved on Amidarone GTT  - Currently maintaining sinus.   - BP now stable. Would continue with IV lopressor standing dose. Wean off Amio GTT.   - Patient is anemic and high bleeding risk, would refrain from AC at this time.   - Plan to transition to PO Amio 200mg BID once NGT is placed.     #Hypoxic respiratory failure  - On HFNC  - Wean as tolerated  - IV diuresis  as needed.       Over 55 minutes spent on total encounter; more than 50% of the visit was spent counseling and/or coordinating care by the attending physician.      Bobby Montilla DO [facility]  Cardiovascular Disease  [phone] Cardiovascular Disease Progress Note  Date of service: 01-29-25 @ 14:21    Overnight events: No acute events overnight.  Patient is in no distress.   Otherwise review of systems negative    Objective Findings:  T(C): 36.2 (01-29-25 @ 10:00), Max: 36.3 (01-28-25 @ 21:15)  HR: 60 (01-29-25 @ 10:00) (55 - 84)  BP: 129/73 (01-29-25 @ 10:00) (129/73 - 154/69)  RR: 18 (01-29-25 @ 10:00) (18 - 20)  SpO2: 99% (01-29-25 @ 10:00) (96% - 99%)  Wt(kg): --  Daily     Daily       Physical Exam:  Gen: NAD; Patient resting comfortably  HEENT: EOMI, Normocephalic/ atraumatic  CV: RRR, normal S1 + S2, no m/r/g  Lungs:  Normal respiratory effort; clear to auscultation bilaterally  Abd: soft, non-tender; bowel sounds present  Ext: No edema; warm and well perfused    Telemetry: SInus     Laboratory Data:                        8.5    6.47  )-----------( 181      ( 29 Jan 2025 07:07 )             26.8     01-29    132[L]  |  94[L]  |  24[H]  ----------------------------<  82  3.4[L]   |  29  |  1.47[H]    Ca    9.2      29 Jan 2025 07:06                Inpatient Medications:  MEDICATIONS  (STANDING):  acetylcysteine 20%  Inhalation 3 milliLiter(s) Inhalation every 6 hours  albuterol/ipratropium for Nebulization 3 milliLiter(s) Nebulizer every 6 hours  albuterol/ipratropium for Nebulization. 3 milliLiter(s) Nebulizer once  aMIOdarone Infusion 0.501 mG/Min (16.7 mL/Hr) IV Continuous <Continuous>  ascorbic acid 500 milliGRAM(s) Oral daily  bisacodyl Suppository 10 milliGRAM(s) Rectal daily  chlorhexidine 2% Cloths 1 Application(s) Topical <User Schedule>  furosemide   Injectable 40 milliGRAM(s) IV Push daily  heparin   Injectable 5000 Unit(s) SubCutaneous every 12 hours  levothyroxine Injectable 60 MICROGram(s) IV Push at bedtime  metoprolol tartrate Injectable 5 milliGRAM(s) IV Push every 6 hours  mirtazapine 15 milliGRAM(s) Oral at bedtime  multivitamin 1 Tablet(s) Oral daily  QUEtiapine 12.5 milliGRAM(s) Oral at bedtime  sodium chloride 3%  Inhalation 4 milliLiter(s) Inhalation every 12 hours      Assessment:   89y old Female with stated hx significant for dementia, s/p brain tumor resection 1961, HTN, hypothyroidism colon Ca s/p Rt hemicolectomy ('09), 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    Plan of Care:    #Afib RVR  - HR now improved on Amidarone GTT  - Currently maintaining sinus.   - BP now stable. Would continue with IV lopressor standing dose. Wean off Amio GTT.   - Patient is anemic and high bleeding risk, would refrain from AC at this time.     #Hypoxic respiratory failure  - On HFNC  - Wean as tolerated  - IV diuresis  as needed.       Over 55 minutes spent on total encounter; more than 50% of the visit was spent counseling and/or coordinating care by the attending physician.      Bobby Montilla DO [facility]  Cardiovascular Disease  [phone]

## 2025-01-29 NOTE — PROGRESS NOTE ADULT - PROBLEM SELECTOR PLAN 2
Multifactorial including but not limited to infection, antibiotics, medication, dementia, hospitalization. Multifactorial including but not limited to infection, antibiotics, medication, dementia, hospitalization, respiratory failure.

## 2025-01-29 NOTE — PROGRESS NOTE ADULT - SUBJECTIVE AND OBJECTIVE BOX
Date of Service: 01-29-25 @ 10:36    Patient is a 89y old  Female who presents with a chief complaint of Hypoxic/hypercapnic resp failure (29 Jan 2025 09:46)      Any change in ROS:   More alert today but confused, ROS unable to be obtained  Breathing nonlabored  O2 sats mid 90s on HFNC 40L/50%     MEDICATIONS  (STANDING):  acetylcysteine 20%  Inhalation 3 milliLiter(s) Inhalation every 6 hours  albuterol/ipratropium for Nebulization 3 milliLiter(s) Nebulizer every 6 hours  albuterol/ipratropium for Nebulization. 3 milliLiter(s) Nebulizer once  aMIOdarone Infusion 0.501 mG/Min (16.7 mL/Hr) IV Continuous <Continuous>  ascorbic acid 500 milliGRAM(s) Oral daily  bisacodyl Suppository 10 milliGRAM(s) Rectal daily  chlorhexidine 2% Cloths 1 Application(s) Topical <User Schedule>  dextrose 5%. 1000 milliLiter(s) (50 mL/Hr) IV Continuous <Continuous>  furosemide   Injectable 40 milliGRAM(s) IV Push daily  heparin   Injectable 5000 Unit(s) SubCutaneous every 12 hours  levothyroxine Injectable 60 MICROGram(s) IV Push at bedtime  metoprolol tartrate Injectable 5 milliGRAM(s) IV Push every 6 hours  mirtazapine 15 milliGRAM(s) Oral at bedtime  multivitamin 1 Tablet(s) Oral daily  QUEtiapine 12.5 milliGRAM(s) Oral at bedtime  sodium chloride 3%  Inhalation 4 milliLiter(s) Inhalation every 12 hours    MEDICATIONS  (PRN):  acetaminophen   Oral Liquid .. 650 milliGRAM(s) Oral every 6 hours PRN Temp greater or equal to 38C (100.4F), Moderate Pain (4 - 6)  haloperidol    Injectable 1 milliGRAM(s) IntraMuscular every 6 hours PRN Agitation    Vital Signs Last 24 Hrs  T(C): 36.2 (29 Jan 2025 10:00), Max: 36.6 (28 Jan 2025 12:42)  T(F): 97.1 (29 Jan 2025 10:00), Max: 97.9 (28 Jan 2025 12:42)  HR: 60 (29 Jan 2025 10:00) (55 - 84)  BP: 129/73 (29 Jan 2025 10:00) (129/73 - 154/69)  BP(mean): --  RR: 18 (29 Jan 2025 10:00) (18 - 20)  SpO2: 99% (29 Jan 2025 10:00) (96% - 100%)    Parameters below as of 29 Jan 2025 10:00  Patient On (Oxygen Delivery Method): nasal cannula        I&O's Summary    28 Jan 2025 07:01  -  29 Jan 2025 07:00  --------------------------------------------------------  IN: 0 mL / OUT: 1725 mL / NET: -1725 mL          Physical Exam:   GENERAL: NAD  HEENT: LUANN  ENMT: No tonsillar erythema, exudates, or enlargement  NECK: Supple, No JVD  CHEST/LUNG: Decreased BS at bases   CVS: Regular rate and rhythm; No murmurs, rubs, or gallops  GI: Soft, Nontender, Nondistended; Bowel sounds present  NERVOUS SYSTEM: Alert, confused   EXTREMITIES:  2+ Peripheral Pulses, No clubbing, cyanosis, or edema  LYMPH: No lymphadenopathy noted  SKIN: No rashes or lesions  ENDOCRINOLOGY: No Thyromegaly      Labs:  33                            8.5    6.47  )-----------( 181      ( 29 Jan 2025 07:07 )             26.8                         8.3    6.26  )-----------( 153      ( 28 Jan 2025 07:21 )             25.3                         7.9    6.97  )-----------( 179      ( 26 Jan 2025 08:33 )             25.4     01-29    132[L]  |  94[L]  |  24[H]  ----------------------------<  82  3.4[L]   |  29  |  1.47[H]  01-28    132[L]  |  93[L]  |  29[H]  ----------------------------<  91  3.7   |  28  |  1.48[H]  01-26    133[L]  |  94[L]  |  42[H]  ----------------------------<  97  4.2   |  29  |  1.45[H]  01-25    131[L]  |  95[L]  |  43[H]  ----------------------------<  86  4.7   |  29  |  1.36[H]    Ca    9.2      29 Jan 2025 07:06  Ca    8.9      28 Jan 2025 07:13      CAPILLARY BLOOD GLUCOSE      POCT Blood Glucose.: 92 mg/dL (29 Jan 2025 06:16)  POCT Blood Glucose.: 94 mg/dL (28 Jan 2025 23:39)  POCT Blood Glucose.: 91 mg/dL (28 Jan 2025 18:04)  POCT Blood Glucose.: 101 mg/dL (28 Jan 2025 12:11)          Urinalysis Basic - ( 29 Jan 2025 07:06 )    Color: x / Appearance: x / SG: x / pH: x  Gluc: 82 mg/dL / Ketone: x  / Bili: x / Urobili: x   Blood: x / Protein: x / Nitrite: x   Leuk Esterase: x / RBC: x / WBC x   Sq Epi: x / Non Sq Epi: x / Bacteria: x      Studies  < from: Xray Chest 1 View- PORTABLE-Urgent (Xray Chest 1 View- PORTABLE-Urgent .) (01.25.25 @ 14:39) >    ACC: [id number] EXAM:  XR CHEST PORTABLE URGENT 1V   ORDERED BY: JESUS ALBERTO VARNER     PROCEDURE DATE:  01/25/2025          INTERPRETATION:  CLINICAL INDICATION: Hypoxia    TECHNIQUE: Frontal view of the chest was obtained.    COMPARISON: Chest x-ray1/19/2025.    FINDINGS:  The heart size is normal.  Moderate left and small right pleural effusions.  No pneumothorax.  No acute osseous abnormalities.    IMPRESSION:  Moderate left and small right pleural effusions.    --- End of Report ---      < end of copied text >

## 2025-01-29 NOTE — PROGRESS NOTE ADULT - SUBJECTIVE AND OBJECTIVE BOX
Time and date of service: 01-29-25 @ 09:20      SUBJECTIVE AND OBJECTIVE: pt resting in bed on hiflow, mitts in place  Indication for Geriatrics and Palliative Care Services/INTERVAL HPI: Nutritional goc    OVERNIGHT EVENTS: unable to wean off hi flow    DNR on chart: DNI: Trial NIV  DNI: Trial NIV      Allergies    clindamycin (Unknown)  shellfish (Unknown)  strawberry (Rash)  penicillin (Unknown)  clindamycin (Other)  strawberry (Unknown)  IV Contrast (Other)  IV Contrast (Unknown)    Intolerances    MEDICATIONS  (STANDING):  acetylcysteine 20%  Inhalation 3 milliLiter(s) Inhalation every 6 hours  albuterol/ipratropium for Nebulization 3 milliLiter(s) Nebulizer every 6 hours  albuterol/ipratropium for Nebulization. 3 milliLiter(s) Nebulizer once  aMIOdarone Infusion 0.501 mG/Min (16.7 mL/Hr) IV Continuous <Continuous>  ascorbic acid 500 milliGRAM(s) Oral daily  bisacodyl Suppository 10 milliGRAM(s) Rectal daily  chlorhexidine 2% Cloths 1 Application(s) Topical <User Schedule>  dextrose 5%. 1000 milliLiter(s) (50 mL/Hr) IV Continuous <Continuous>  furosemide   Injectable 40 milliGRAM(s) IV Push daily  heparin   Injectable 5000 Unit(s) SubCutaneous every 12 hours  levothyroxine Injectable 60 MICROGram(s) IV Push at bedtime  metoprolol tartrate Injectable 5 milliGRAM(s) IV Push every 6 hours  mirtazapine 15 milliGRAM(s) Oral at bedtime  multivitamin 1 Tablet(s) Oral daily  QUEtiapine 12.5 milliGRAM(s) Oral at bedtime  sodium chloride 3%  Inhalation 4 milliLiter(s) Inhalation every 12 hours    MEDICATIONS  (PRN):  acetaminophen   Oral Liquid .. 650 milliGRAM(s) Oral every 6 hours PRN Temp greater or equal to 38C (100.4F), Moderate Pain (4 - 6)  haloperidol    Injectable 1 milliGRAM(s) IntraMuscular every 6 hours PRN Agitation          ITEMS UNCHECKED ARE NOT PRESENT    PRESENT SYMPTOMS: [ x]Unable to self-report - see [ ] CPOT [ ] PAINADS [ ] RDOS  Source if other than patient:  [ ]Family   [ x]Team     Pain:  [ ]yes [x ]no  QOL impact -   Location -                    Aggravating factors -  Quality -  Radiation -  Timing-  Severity (0-10 scale):  Minimal acceptable level (0-10 scale):     CPOT:    https://www.[facility].org/getattachment/[email or web address]/Critical-Care-Pain-Observation-Tool-(CPOT)    PAIN AD Score:	  http://geriatrictoolkit.[email or web address]/cog/painad.pdf (Ctrl + left click to view)    Dyspnea:                           [ ]Mild [ ]Moderate [ ]Severe    RDOS:  0 to 2  minimal or no respiratory distress   3  mild distress  4 to 6 moderate distress  >7 severe distress  https://homecareinformation.net/handouts/hen/Respiratory_Distress_Observation_Scale.pdf (Ctrl +  left click to view)     Anxiety:                             [ ]Mild [ ]Moderate [ ]Severe  Fatigue:                             [ ]Mild [x ]Moderate [x ]Severe  Nausea:                            [ ]Mild [ ]Moderate [ ]Severe  Loss of appetite:               [ ]Mild [x ]Moderate [ ]Severe  Constipation:                    [ ]Mild [ ]Moderate [ ]Severe    PCSSQ[Palliative Care Spiritual Screening Question]   Severity (0-10):  Score of 4 or > indicate consideration of Chaplaincy referral.  Chaplaincy Referral: [ ] yes [ ] refused [ ] following [x ] Deferred     Caregiver [place]? : [ ] yes [ ] no [x ] Deferred [ ] Declined             Social work referral [ ] Patient & Family Centered Care Referral [ ]     Anticipatory Grief present?:  [ ] yes [ ] no  [x ] Deferred                  Social work referral [ ] Chaplaincy Referral[ ]      Other Symptoms:  [ ]All other review of systems negative     Palliative Performance Status Version 2:     20-30    %      http://npcrc.org/files/news/palliative_performance_scale_ppsv2.pdf    PHYSICAL EXAM:  Vital Signs Last 24 Hrs  T(C): 36.3 (29 Jan 2025 04:40), Max: 36.6 (28 Jan 2025 12:42)  T(F): 97.3 (29 Jan 2025 04:40), Max: 97.9 (28 Jan 2025 12:42)  HR: 63 (29 Jan 2025 04:40) (55 - 84)  BP: 135/80 (29 Jan 2025 04:40) (133/76 - 154/69)  BP(mean): --  RR: 20 (29 Jan 2025 04:40) (18 - 20)  SpO2: 99% (29 Jan 2025 04:40) (96% - 100%)    Parameters below as of 29 Jan 2025 04:40  Patient On (Oxygen Delivery Method): nasal cannula, high flow      Patient On (Oxygen Delivery Method): nasal cannula, high flow         I&O's Summary     GENERAL: [x ]Cachexia    [ ]Alert  [ ]Oriented x   [x ]Lethargic  [ ]Unarousable  [x ]min Verbal  [ ]Non-Verbal  Behavioral:   [ ]Anxiety  [ x]Delirium [x ]Agitation [ ]Other  HEENT:  [x ]Normal   [ ]Dry mouth   [ ]ET Tube/Trach  [ ]Oral lesions  PULMONARY:   [ ]Clear [ ]Tachypnea  [ ]Audible excessive secretions   [ ]Rhonchi        [ ]Right [ ]Left [ ]Bilateral  [ ]Crackles        [ ]Right [ ]Left [ ]Bilateral  [ ]Wheezing     [ ]Right [ ]Left [ ]Bilateral  [xDiminished BS [ ] Right [ ]Left [ ]Bilateral  CARDIOVASCULAR:    [ x]Regular [ ]Irregular [ ]Tachy  [ ]Michael [ ]Murmur [ ]Other  GASTROINTESTINAL:  [x ]Soft  [ ]Distended   [x ]+BS  [ ]Non tender [ ]Tender  [ ]Other [ ]PEG [ ]OGT/ NGT   Last BM:   GENITOURINARY:  [ ]Normal [ ]Incontinent   [ ]Oliguria/Anuria   [x ]Crooks  MUSCULOSKELETAL:   [ ]Normal   [ x]Weakness  [x ]Bed/Wheelchair bound [ ]Edema  NEUROLOGIC:   [ ]No focal deficits  [x ] Cognitive impairment  [ ] Dysphagia [ ]Dysarthria [ ] Paresis [ ]Other   SKIN: see rn flowsheet  [ ]Normal  [ ]Rash  [ ]Other  [ ]Pressure ulcer(s) [ ]y [ ]n present on admission    CRITICAL CARE:  [ ]Shock Present  [ ]Septic [ ]Cardiogenic [ ]Neurologic [ ]Hypovolemic  [ ]Vasopressors [ ]Inotropes  [ ]Respiratory failure present [ ]Mechanical Ventilation [ ]Non-invasive ventilatory support [ ]High-Flow   [ ]Acute  [ ]Chronic [ ]Hypoxic  [ ]Hypercarbic [ ]Other  [ ]Other organ failure     LABS:                                   8.5    6.47  )-----------( 181      ( 29 Jan 2025 07:07 )             26.8   01-29    132[L]  |  94[L]  |  24[H]  ----------------------------<  82  3.4[L]   |  29  |  1.47[H]    Ca    9.2      29 Jan 2025 07:06          RADIOLOGY & ADDITIONAL STUDIES:  < from: CT Head No Cont (01.11.25 @ 13:53) >  ACC: [id number] EXAM:  CT BRAIN   ORDERED BY:  IVANA BERNSTEIN     PROCEDURE DATE:  01/11/2025          INTERPRETATION:  CT HEAD WITHOUT CONTRAST    TECHNIQUE: Multiple axial images of the head were obtained from the skull   base to the vertex without intravenous contrast.  Multiplanar reformats   were created according to the standard protocol.    INDICATION: Admitting Dxs: A41.9 SEPSIS ams  COMPARISON: Head CT without contrast 11/10/2021.  _____________________  FINDINGS:    Mildly degraded by motion artifact.    Status post right suboccipital craniectomy with postsurgical changes in   the skull/scalp as before. Similar appearance of the presumed right   cerebellar resection bed.    Status post right parieto-occipital cuca hole with a mild amount of   adjacent encephalomalacia and gliosis as before.    Mild diffuse cerebral volume loss. No significant midline shift.   Maintenance of gray-white differentiation in the large vascular   distributions. Mild hypoattenuation of the periventricular/deep white   matter most consistent with chronic microvascular ischemic disease.   Motion artifact precludes exclusion of an acute intracranial hemorrhage.   No findings suspicious for an acute intracranial hemorrhage within this   limitation. No hydrocephalus.    No findings suspicious for a depressed calvarial fracture.    Fatty atrophy of the visualized right aspect of the tongue.    Paranasal Sinuses, Mastoid Air Cells, and Orbits: Mucosal thickening left   sphenoid sinus. Visualized mastoidair cells clear. Status post left lens   replacement.  ______________________  IMPRESSION:  1.  Mildly degraded by motion artifact. A tiny acute intracranial   hemorrhage is not excluded. No findings suspicious for an acute   intracranial abnormalityotherwise. Chronic intracranial findings as   above.    --- End of Report ---            NASRIN LAGUERRE MD; Attending Radiologist  This document has been electronically signed. Jan 11 2025  2:12PM    < end of copied text >          Protein Calorie Malnutrition Present: [ ]mild [ ]moderate [ ]severe [ ]underweight [ ]morbid obesity  https://www.andeal.org/vault/9030/web/files/ONC/Table_Clinical%20Characteristics%20to%20Document%20Malnutrition-White%20JV%20et%20al%202012.pdf    Height (cm): 160 (01-11-25 @ 13:51)  Weight (kg): 53 (01-11-25 @ 13:51)  BMI (kg/m2): 20.7 (01-11-25 @ 13:51)    [ ]PPSV2 < or = 30%  [ ]significant weight loss [ ]poor nutritional intake [ ]anasarca[ ]Artificial Nutrition    Other REFERRALS:  [ ]Hospice  [ ]Child Life  [ ]Social Work  [ ]Case management [ ]Holistic Therapy      Time and date of service: 01-29-25 @ 09:20      SUBJECTIVE AND OBJECTIVE: pt resting in bed on hiflow, mitts in place  Indication for Geriatrics and Palliative Care Services/INTERVAL HPI: Nutritional goc    OVERNIGHT EVENTS: unable to wean off hi flow    DNR on chart: DNI: Trial NIV  DNI: Trial NIV      Allergies    clindamycin (Unknown)  shellfish (Unknown)  strawberry (Rash)  penicillin (Unknown)  clindamycin (Other)  strawberry (Unknown)  IV Contrast (Other)  IV Contrast (Unknown)    Intolerances    MEDICATIONS  (STANDING):  acetylcysteine 20%  Inhalation 3 milliLiter(s) Inhalation every 6 hours  albuterol/ipratropium for Nebulization 3 milliLiter(s) Nebulizer every 6 hours  albuterol/ipratropium for Nebulization. 3 milliLiter(s) Nebulizer once  aMIOdarone Infusion 0.501 mG/Min (16.7 mL/Hr) IV Continuous <Continuous>  ascorbic acid 500 milliGRAM(s) Oral daily  bisacodyl Suppository 10 milliGRAM(s) Rectal daily  chlorhexidine 2% Cloths 1 Application(s) Topical <User Schedule>  dextrose 5%. 1000 milliLiter(s) (50 mL/Hr) IV Continuous <Continuous>  furosemide   Injectable 40 milliGRAM(s) IV Push daily  heparin   Injectable 5000 Unit(s) SubCutaneous every 12 hours  levothyroxine Injectable 60 MICROGram(s) IV Push at bedtime  metoprolol tartrate Injectable 5 milliGRAM(s) IV Push every 6 hours  mirtazapine 15 milliGRAM(s) Oral at bedtime  multivitamin 1 Tablet(s) Oral daily  QUEtiapine 12.5 milliGRAM(s) Oral at bedtime  sodium chloride 3%  Inhalation 4 milliLiter(s) Inhalation every 12 hours    MEDICATIONS  (PRN):  acetaminophen   Oral Liquid .. 650 milliGRAM(s) Oral every 6 hours PRN Temp greater or equal to 38C (100.4F), Moderate Pain (4 - 6)  haloperidol    Injectable 1 milliGRAM(s) IntraMuscular every 6 hours PRN Agitation          ITEMS UNCHECKED ARE NOT PRESENT    PRESENT SYMPTOMS: [ x]Unable to self-report - see [ ] CPOT [ ] PAINADS [ ] RDOS  Source if other than patient:  [ ]Family   [ x]Team     Pain:  [ ]yes [x ]no  QOL impact -   Location -                    Aggravating factors -  Quality -  Radiation -  Timing-  Severity (0-10 scale):  Minimal acceptable level (0-10 scale):     CPOT:    https://www.[facility].org/getattachment/[email or web address]/Critical-Care-Pain-Observation-Tool-(CPOT)    PAIN AD Score:	  http://geriatrictoolkit.[facility]/cog/painad.pdf (Ctrl + left click to view)    Dyspnea:                           [ ]Mild [ ]Moderate [ ]Severe    RDOS:  0 to 2  minimal or no respiratory distress   3  mild distress  4 to 6 moderate distress  >7 severe distress  https://homecareinformation.net/handouts/hen/Respiratory_Distress_Observation_Scale.pdf (Ctrl +  left click to view)     Anxiety:                             [ ]Mild [ ]Moderate [ ]Severe  Fatigue:                             [ ]Mild [x ]Moderate [x ]Severe  Nausea:                            [ ]Mild [ ]Moderate [ ]Severe  Loss of appetite:               [ ]Mild [x ]Moderate [ ]Severe  Constipation:                    [ ]Mild [ ]Moderate [ ]Severe    PCSSQ[Palliative Care Spiritual Screening Question]   Severity (0-10):  Score of 4 or > indicate consideration of Chaplaincy referral.  Chaplaincy Referral: [ ] yes [ ] refused [ ] following [x ] Deferred     Caregiver [place]? : [ ] yes [ ] no [x ] Deferred [ ] Declined             Social work referral [ ] Patient & Family Centered Care Referral [ ]     Anticipatory Grief present?:  [ ] yes [ ] no  [x ] Deferred                  Social work referral [ ] Chaplaincy Referral[ ]      Other Symptoms:  [ ]All other review of systems negative     Palliative Performance Status Version 2:     20-30    %      http://npcrc.org/files/news/palliative_performance_scale_ppsv2.pdf    PHYSICAL EXAM:  Vital Signs Last 24 Hrs  T(C): 36.3 (29 Jan 2025 04:40), Max: 36.6 (28 Jan 2025 12:42)  T(F): 97.3 (29 Jan 2025 04:40), Max: 97.9 (28 Jan 2025 12:42)  HR: 63 (29 Jan 2025 04:40) (55 - 84)  BP: 135/80 (29 Jan 2025 04:40) (133/76 - 154/69)  BP(mean): --  RR: 20 (29 Jan 2025 04:40) (18 - 20)  SpO2: 99% (29 Jan 2025 04:40) (96% - 100%)    Parameters below as of 29 Jan 2025 04:40  Patient On (Oxygen Delivery Method): nasal cannula, high flow      Patient On (Oxygen Delivery Method): nasal cannula, high flow         I&O's Summary     GENERAL: [x ]Cachexia    [ ]Alert  [ ]Oriented x   [x ]Lethargic  [ ]Unarousable  [ ]min Verbal  x[ ]Non-Verbal  Behavioral:   [ ]Anxiety  [ x]Delirium [x ]Agitation [ ]Other  HEENT:  [x ]Normal   [ ]Dry mouth   [ ]ET Tube/Trach  [ ]Oral lesions  PULMONARY:   [ ]Clear [ ]Tachypnea  [ ]Audible excessive secretions   [ ]Rhonchi        [ ]Right [ ]Left [ ]Bilateral  [ ]Crackles        [ ]Right [ ]Left [ ]Bilateral  [ ]Wheezing     [ ]Right [ ]Left [ ]Bilateral  [xDiminished BS [ ] Right [ ]Left [ ]Bilateral  CARDIOVASCULAR:    [ x]Regular [ ]Irregular [ ]Tachy  [ ]Michael [ ]Murmur [ ]Other  GASTROINTESTINAL:  [x ]Soft  [ ]Distended   [x ]+BS  [ ]Non tender [ ]Tender  [ ]Other [ ]PEG [ ]OGT/ NGT   Last BM:   GENITOURINARY:  [ ]Normal [ ]Incontinent   [ ]Oliguria/Anuria   [x ]Crooks  MUSCULOSKELETAL:   [ ]Normal   [ x]Weakness  [x ]Bed/Wheelchair bound [ ]Edema  NEUROLOGIC:   [ ]No focal deficits  [x ] Cognitive impairment  [ ] Dysphagia [ ]Dysarthria [ ] Paresis [ ]Other   SKIN: see rn flowsheet  [ ]Normal  [ ]Rash  [ ]Other  [ ]Pressure ulcer(s) [ ]y [ ]n present on admission    CRITICAL CARE:  [ ]Shock Present  [ ]Septic [ ]Cardiogenic [ ]Neurologic [ ]Hypovolemic  [ ]Vasopressors [ ]Inotropes  [ ]Respiratory failure present [ ]Mechanical Ventilation [ ]Non-invasive ventilatory support [ ]High-Flow   [ ]Acute  [ ]Chronic [ ]Hypoxic  [ ]Hypercarbic [ ]Other  [ ]Other organ failure     LABS:                                   8.5    6.47  )-----------( 181      ( 29 Jan 2025 07:07 )             26.8   01-29    132[L]  |  94[L]  |  24[H]  ----------------------------<  82  3.4[L]   |  29  |  1.47[H]    Ca    9.2      29 Jan 2025 07:06          RADIOLOGY & ADDITIONAL STUDIES:  < from: CT Head No Cont (01.11.25 @ 13:53) >  ACC: [id number] EXAM:  CT BRAIN   ORDERED BY:  IVANA BERNSTEIN     PROCEDURE DATE:  01/11/2025          INTERPRETATION:  CT HEAD WITHOUT CONTRAST    TECHNIQUE: Multiple axial images of the head were obtained from the skull   base to the vertex without intravenous contrast.  Multiplanar reformats   were created according to the standard protocol.    INDICATION: Admitting Dxs: A41.9 SEPSIS ams  COMPARISON: Head CT without contrast 11/10/2021.  _____________________  FINDINGS:    Mildly degraded by motion artifact.    Status post right suboccipital craniectomy with postsurgical changes in   the skull/scalp as before. Similar appearance of the presumed right   cerebellar resection bed.    Status post right parieto-occipital cuca hole with a mild amount of   adjacent encephalomalacia and gliosis as before.    Mild diffuse cerebral volume loss. No significant midline shift.   Maintenance of gray-white differentiation in the large vascular   distributions. Mild hypoattenuation of the periventricular/deep white   matter most consistent with chronic microvascular ischemic disease.   Motion artifact precludes exclusion of an acute intracranial hemorrhage.   No findings suspicious for an acute intracranial hemorrhage within this   limitation. No hydrocephalus.    No findings suspicious for a depressed calvarial fracture.    Fatty atrophy of the visualized right aspect of the tongue.    Paranasal Sinuses, Mastoid Air Cells, and Orbits: Mucosal thickening left   sphenoid sinus. Visualized mastoidair cells clear. Status post left lens   replacement.  ______________________  IMPRESSION:  1.  Mildly degraded by motion artifact. A tiny acute intracranial   hemorrhage is not excluded. No findings suspicious for an acute   intracranial abnormalityotherwise. Chronic intracranial findings as   above.    --- End of Report ---            NASRIN LAGUERRE MD; Attending Radiologist  This document has been electronically signed. Jan 11 2025  2:12PM    < end of copied text >          Protein Calorie Malnutrition Present: [ ]mild [ ]moderate [ ]severe [ ]underweight [ ]morbid obesity  https://www.andeal.org/vault/1390/web/files/ONC/Table_Clinical%20Characteristics%20to%20Document%20Malnutrition-White%20JV%20et%20al%202012.pdf    Height (cm): 160 (01-11-25 @ 13:51)  Weight (kg): 53 (01-11-25 @ 13:51)  BMI (kg/m2): 20.7 (01-11-25 @ 13:51)    [ ]PPSV2 < or = 30%  [ ]significant weight loss [ ]poor nutritional intake [ ]anasarca[ ]Artificial Nutrition    Other REFERRALS:  [ ]Hospice  [ ]Child Life  [ ]Social Work  [ ]Case management [ ]Holistic Therapy

## 2025-01-29 NOTE — PROGRESS NOTE ADULT - PROBLEM SELECTOR PLAN 6
kps 20% [facility] 20%    spoke with Dr. Link.  He will reach out to the pts son Maykel to review pts current medical conditions.  Palliative care will f/u in am.

## 2025-01-29 NOTE — PROGRESS NOTE ADULT - PROBLEM SELECTOR PLAN 2
-Likely aspiration related. Pt also s/p flu   -Completed ABX, then resumed as pt spiked temp. CXR with bibasilar opacities   -Repeat CT chest 1/23 with b/l pleural effusions/atelectasis, b/l GGO. Likely combination of fluid overload + PNA  -Completed additional 10 day course of ABX   -Continue bronchodilators/mucolytics   -Trend leukocytosis/fever curve   -Suction PRN, chest PT

## 2025-01-29 NOTE — PROGRESS NOTE ADULT - PROBLEM SELECTOR PLAN 3
CT chest 1/23 with moderate b/l pl effusions  -CXR 1/25 with b/l pleural effusions/atelectasis L>R, L pleural effusion now appears mod-large  -Holding off on thoracentesis for now as O2 requirements improving with diuresis  -Keep O>I as tolerated.

## 2025-01-29 NOTE — PROGRESS NOTE ADULT - PROBLEM SELECTOR PROBLEM 7
Hypertension
Advanced care planning/counseling discussion
Advanced care planning/counseling discussion

## 2025-01-29 NOTE — PROGRESS NOTE ADULT - TIME BILLING
symptom assessment and management, supportive counseling, coordination of care, discussion and coordination with team.    I personally spent 10 minutes on advance care planning services with the patient. This time is separate and distinct from any other care management services provided on this date.      I personally spent 30 minutes on advance care planning services with the patient. This time is separate and distinct from any other care management services provided on this date.    Adriana Lau, ANP-BC  Please contact me via Teams  between 6am-2pm. If not answering, please call the palliative care pager [phone]    After 2pm and on weekends, please see the contact information below:    In the event of newly developing, evolving, or worsening symptoms between 2pm and 5pm please contact the Palliative Medicine via extension [phone] for assistance.  After 5pm please contact team via pager (if the patient is at [facility] #[phone] or if the patient is at [facility] #[phone]) The Geriatric and Palliative Medicine service has coverage 24 hours a day/ 7 days a week to provide medical recommendations regarding symptom management needs via telephone symptom assessment and management, supportive counseling, coordination of care, discussion and coordination with team.    Adriana Lau, ANP-BC  Please contact me via Teams  between 6am-2pm. If not answering, please call the palliative care pager [phone]    After 2pm and on weekends, please see the contact information below:    In the event of newly developing, evolving, or worsening symptoms between 2pm and 5pm please contact the Palliative Medicine via extension [phone] for assistance.  After 5pm please contact team via pager (if the patient is at [facility] #[phone] or if the patient is at [facility] #[phone]) The Geriatric and Palliative Medicine service has coverage 24 hours a day/ 7 days a week to provide medical recommendations regarding symptom management needs via telephone

## 2025-01-30 LAB
GLUCOSE BLDC GLUCOMTR-MCNC: 104 MG/DL — HIGH (ref 70–99)
GLUCOSE BLDC GLUCOMTR-MCNC: 81 MG/DL — SIGNIFICANT CHANGE UP (ref 70–99)
GLUCOSE BLDC GLUCOMTR-MCNC: 84 MG/DL — SIGNIFICANT CHANGE UP (ref 70–99)
GLUCOSE BLDC GLUCOMTR-MCNC: 93 MG/DL — SIGNIFICANT CHANGE UP (ref 70–99)

## 2025-01-30 PROCEDURE — 99232 SBSQ HOSP IP/OBS MODERATE 35: CPT

## 2025-01-30 RX ORDER — SODIUM CHLORIDE 9 G/ML
1000 INJECTION, SOLUTION INTRAVENOUS
Refills: 0 | Status: DISCONTINUED | OUTPATIENT
Start: 2025-01-30 | End: 2025-02-02

## 2025-01-30 RX ORDER — SODIUM CHLORIDE 9 G/ML
1000 INJECTION, SOLUTION INTRAVENOUS
Refills: 0 | Status: DISCONTINUED | OUTPATIENT
Start: 2025-01-30 | End: 2025-01-30

## 2025-01-30 RX ADMIN — IPRATROPIUM BROMIDE AND ALBUTEROL SULFATE 3 MILLILITER(S): .5; 2.5 SOLUTION RESPIRATORY (INHALATION) at 23:53

## 2025-01-30 RX ADMIN — Medication 4 MILLILITER(S): at 17:13

## 2025-01-30 RX ADMIN — Medication 3 MILLILITER(S): at 17:12

## 2025-01-30 RX ADMIN — Medication 5 MILLIGRAM(S): at 17:12

## 2025-01-30 RX ADMIN — Medication 3 MILLILITER(S): at 23:53

## 2025-01-30 RX ADMIN — Medication 5000 UNIT(S): at 05:23

## 2025-01-30 RX ADMIN — Medication 4 MILLILITER(S): at 05:22

## 2025-01-30 RX ADMIN — Medication 5 MILLIGRAM(S): at 23:53

## 2025-01-30 RX ADMIN — Medication 40 MILLIGRAM(S): at 05:23

## 2025-01-30 RX ADMIN — IPRATROPIUM BROMIDE AND ALBUTEROL SULFATE 3 MILLILITER(S): .5; 2.5 SOLUTION RESPIRATORY (INHALATION) at 11:12

## 2025-01-30 RX ADMIN — Medication 3 MILLILITER(S): at 00:02

## 2025-01-30 RX ADMIN — IPRATROPIUM BROMIDE AND ALBUTEROL SULFATE 3 MILLILITER(S): .5; 2.5 SOLUTION RESPIRATORY (INHALATION) at 00:02

## 2025-01-30 RX ADMIN — Medication 3 MILLILITER(S): at 11:12

## 2025-01-30 RX ADMIN — Medication 5 MILLIGRAM(S): at 11:12

## 2025-01-30 RX ADMIN — SODIUM CHLORIDE 50 MILLILITER(S): 9 INJECTION, SOLUTION INTRAVENOUS at 11:11

## 2025-01-30 RX ADMIN — Medication 5 MILLIGRAM(S): at 05:23

## 2025-01-30 RX ADMIN — Medication 3 MILLILITER(S): at 05:22

## 2025-01-30 RX ADMIN — ANTISEPTIC SURGICAL SCRUB 1 APPLICATION(S): 0.04 SOLUTION TOPICAL at 05:24

## 2025-01-30 RX ADMIN — LEVOTHYROXINE SODIUM 60 MICROGRAM(S): 25 TABLET ORAL at 23:53

## 2025-01-30 RX ADMIN — SODIUM CHLORIDE 50 MILLILITER(S): 9 INJECTION, SOLUTION INTRAVENOUS at 10:26

## 2025-01-30 RX ADMIN — IPRATROPIUM BROMIDE AND ALBUTEROL SULFATE 3 MILLILITER(S): .5; 2.5 SOLUTION RESPIRATORY (INHALATION) at 05:21

## 2025-01-30 RX ADMIN — AMIODARONE HYDROCHLORIDE 16.7 MG/MIN: 50 INJECTION, SOLUTION INTRAVENOUS at 00:07

## 2025-01-30 RX ADMIN — IPRATROPIUM BROMIDE AND ALBUTEROL SULFATE 3 MILLILITER(S): .5; 2.5 SOLUTION RESPIRATORY (INHALATION) at 17:11

## 2025-01-30 RX ADMIN — Medication 5 MILLIGRAM(S): at 00:02

## 2025-01-30 RX ADMIN — Medication 5000 UNIT(S): at 17:13

## 2025-01-30 NOTE — PROGRESS NOTE ADULT - PROBLEM SELECTOR PLAN 6
kps 20%    Dr. Link spoke with son yesterday.  Teams Dr. Link to clarify message that pts son was "amenable and he would think about it."  awaiting return message then will reach out to pts son. kps 20%    Dr. Link spoke with son yesterday.  Teams Dr. Link to clarify message that pts son was "amenable and he would think about it."  awaiting return message then will reach out to pts son.    [phone]- spoke with Dr. Link. He spoke with pts primary at pts sons request to get his opinion.  Dr Link reports that he will speak with son to give recommendations of pleasure feeds and comfort as per primary doctor.  Palliative care will reach out to pts son after Dr. Link speaks with him.

## 2025-01-30 NOTE — PROGRESS NOTE ADULT - ASSESSMENT
resp failure  aspiration    peg cancelled  now on hiflow  she is not a candidate for PEG/sedation  dnr/dni  prognosis guarded       Advanced care planning was discussed with patient and family.  Advanced care planning forms were reviewed and discussed.  Risks, benefits and alternatives of gastroenterologic procedures were discussed in detail and all questions were answered.    30 minutes spent.

## 2025-01-30 NOTE — PROGRESS NOTE ADULT - SUBJECTIVE AND OBJECTIVE BOX
NEPHROLOGY-[facility] [phone]        Patient seen and examined in bed.  She was still on high flow         MEDICATIONS  (STANDING):  acetylcysteine 20%  Inhalation 3 milliLiter(s) Inhalation every 6 hours  albuterol/ipratropium for Nebulization 3 milliLiter(s) Nebulizer every 6 hours  albuterol/ipratropium for Nebulization. 3 milliLiter(s) Nebulizer once  aMIOdarone Infusion 0.501 mG/Min (16.7 mL/Hr) IV Continuous <Continuous>  ascorbic acid 500 milliGRAM(s) Oral daily  bisacodyl Suppository 10 milliGRAM(s) Rectal daily  chlorhexidine 2% Cloths 1 Application(s) Topical <User Schedule>  dextrose 5% + lactated ringers. 1000 milliLiter(s) (50 mL/Hr) IV Continuous <Continuous>  furosemide   Injectable 40 milliGRAM(s) IV Push daily  heparin   Injectable 5000 Unit(s) SubCutaneous every 12 hours  levothyroxine Injectable 60 MICROGram(s) IV Push at bedtime  metoprolol tartrate Injectable 5 milliGRAM(s) IV Push every 6 hours  mirtazapine 15 milliGRAM(s) Oral at bedtime  multivitamin 1 Tablet(s) Oral daily  QUEtiapine 12.5 milliGRAM(s) Oral at bedtime  sodium chloride 3%  Inhalation 4 milliLiter(s) Inhalation every 12 hours      VITAL:  T(C): , Max: 36.6 (01-30-25 @ 00:11)  T(F): , Max: 97.8 (01-30-25 @ 00:11)  HR: 68 (01-30-25 @ 08:11)  BP: 160/81 (01-30-25 @ 05:08)  BP(mean): --  RR: 20 (01-30-25 @ 08:11)  SpO2: 95% (01-30-25 @ 08:11)  Wt(kg): --    I and O's:    01-29 @ 07:01  -  01-30 @ 07:00  --------------------------------------------------------  IN: 0 mL / OUT: 400 mL / NET: -400 mL          PHYSICAL EXAM:    Constitutional: cachetic   Neck:  No JVD  Respiratory: poor effort  Cardiovascular: S1 and S2  Gastrointestinal: BS+, soft, NT/ND  Extremities: No peripheral edema  Neurological:    :+  Crooks  Skin: No rashes  Access: Not applicable    LABS:                        8.5    6.47  )-----------( 181      ( 29 Jan 2025 07:07 )             26.8     01-29    132[L]  |  94[L]  |  24[H]  ----------------------------<  82  3.4[L]   |  29  |  1.47[H]    Ca    9.2      29 Jan 2025 07:06            Urine Studies:  Urinalysis Basic - ( 29 Jan 2025 07:06 )    Color: x / Appearance: x / SG: x / pH: x  Gluc: 82 mg/dL / Ketone: x  / Bili: x / Urobili: x   Blood: x / Protein: x / Nitrite: x   Leuk Esterase: x / RBC: x / WBC x   Sq Epi: x / Non Sq Epi: x / Bacteria: x            RADIOLOGY & ADDITIONAL STUDIES:      < from: Xray Chest 1 View- PORTABLE-Urgent (Xray Chest 1 View- PORTABLE-Urgent .) (01.25.25 @ 14:39) >    ACC: [id number] EXAM:  XR CHEST PORTABLE URGENT 1V   ORDERED BY: JESUS ALBERTO VARNER     PROCEDURE DATE:  01/25/2025          INTERPRETATION:  CLINICAL INDICATION: Hypoxia    TECHNIQUE: Frontal view of the chest was obtained.    COMPARISON: Chest x-ray1/19/2025.    FINDINGS:  The heart size is normal.  Moderate left and small right pleural effusions.  No pneumothorax.  No acute osseous abnormalities.    IMPRESSION:  Moderate left and small right pleural effusions.    < end of copied text >

## 2025-01-30 NOTE — PROGRESS NOTE ADULT - PROBLEM SELECTOR PLAN 2
Multifactorial including but not limited to infection, antibiotics, medication, dementia, hospitalization, respiratory failure.

## 2025-01-30 NOTE — PROGRESS NOTE ADULT - SUBJECTIVE AND OBJECTIVE BOX
Patient is a 89y old  Female who presents with a chief complaint of Hypoxic/hypercapnic resp failure (30 Jan 2025 12:22)      DATE OF SERVICE: 01-30-25 @ 15:11    SUBJECTIVE / OVERNIGHT EVENTS: overnight events noted    ROS:  not available     MEDICATIONS  (STANDING):  acetylcysteine 20%  Inhalation 3 milliLiter(s) Inhalation every 6 hours  albuterol/ipratropium for Nebulization 3 milliLiter(s) Nebulizer every 6 hours  albuterol/ipratropium for Nebulization. 3 milliLiter(s) Nebulizer once  aMIOdarone Infusion 0.501 mG/Min (16.7 mL/Hr) IV Continuous <Continuous>  ascorbic acid 500 milliGRAM(s) Oral daily  bisacodyl Suppository 10 milliGRAM(s) Rectal daily  chlorhexidine 2% Cloths 1 Application(s) Topical <User Schedule>  dextrose 5%. 1000 milliLiter(s) (50 mL/Hr) IV Continuous <Continuous>  furosemide   Injectable 40 milliGRAM(s) IV Push daily  heparin   Injectable 5000 Unit(s) SubCutaneous every 12 hours  levothyroxine Injectable 60 MICROGram(s) IV Push at bedtime  metoprolol tartrate Injectable 5 milliGRAM(s) IV Push every 6 hours  mirtazapine 15 milliGRAM(s) Oral at bedtime  multivitamin 1 Tablet(s) Oral daily  QUEtiapine 12.5 milliGRAM(s) Oral at bedtime  sodium chloride 3%  Inhalation 4 milliLiter(s) Inhalation every 12 hours    MEDICATIONS  (PRN):  acetaminophen   Oral Liquid .. 650 milliGRAM(s) Oral every 6 hours PRN Temp greater or equal to 38C (100.4F), Moderate Pain (4 - 6)  haloperidol    Injectable 1 milliGRAM(s) IntraMuscular every 6 hours PRN Agitation        CAPILLARY BLOOD GLUCOSE      POCT Blood Glucose.: 93 mg/dL (30 Jan 2025 13:11)  POCT Blood Glucose.: 81 mg/dL (30 Jan 2025 07:23)  POCT Blood Glucose.: 84 mg/dL (30 Jan 2025 00:36)  POCT Blood Glucose.: 90 mg/dL (29 Jan 2025 18:08)    I&O's Summary    29 Jan 2025 07:01  -  30 Jan 2025 07:00  --------------------------------------------------------  IN: 0 mL / OUT: 400 mL / NET: -400 mL    30 Jan 2025 07:01  -  30 Jan 2025 15:11  --------------------------------------------------------  IN: 0 mL / OUT: 800 mL / NET: -800 mL        Vital Signs Last 24 Hrs  T(C): 36.6 (30 Jan 2025 13:20), Max: 36.6 (30 Jan 2025 00:11)  T(F): 97.8 (30 Jan 2025 13:20), Max: 97.8 (30 Jan 2025 00:11)  HR: 67 (30 Jan 2025 13:20) (54 - 73)  BP: 145/78 (30 Jan 2025 13:20) (137/70 - 175/77)  BP(mean): --  RR: 20 (30 Jan 2025 13:20) (18 - 20)  SpO2: 93% (30 Jan 2025 11:17) (93% - 100%)      PHYSICAL EXAM:   CHEST/LUNG: coarse breath sounds   HEART: S1 S2; no murmurs   ABDOMEN: Soft, Nontender  EXTREMITIES: no edema  NEUROLOGY: non-focal    LABS:                        8.5    6.47  )-----------( 181      ( 29 Jan 2025 07:07 )             26.8     01-29    132[L]  |  94[L]  |  24[H]  ----------------------------<  82  3.4[L]   |  29  |  1.47[H]    Ca    9.2      29 Jan 2025 07:06            Urinalysis Basic - ( 29 Jan 2025 07:06 )    Color: x / Appearance: x / SG: x / pH: x  Gluc: 82 mg/dL / Ketone: x  / Bili: x / Urobili: x   Blood: x / Protein: x / Nitrite: x   Leuk Esterase: x / RBC: x / WBC x   Sq Epi: x / Non Sq Epi: x / Bacteria: x          All consultant(s) notes reviewed and care discussed with other providers        Contact Number, Dr Link [phone]

## 2025-01-30 NOTE — PROGRESS NOTE ADULT - PROBLEM SELECTOR PLAN 1
unable to expeditiously wean off  palliative care follow up appreciated  discussed with PCP  patient is clearly not a candidate for PEG at this time   she will NOT tolerate the procedure

## 2025-01-30 NOTE — PROGRESS NOTE ADULT - ASSESSMENT
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), Presented from nursing home with hypoxic/hypercapnic resp failure, was found to Influenza A +, UTI + DWAIN and hyponatremia     Hyponatremia- likely secondary to hypotonic fluids    DWAIN - pre renal - creatinine slightly higher   Blood pressure - stable   UTI/PNA on ceftriaxone   Functional quadriplegia  Hypokalemia     RECOMMEND:   1 Renal- IV lasix ;  DC LR Dextrose and change to dextrose for nutritional aspect(not eating)  2 CVS- IV Amio at present and when able transition to PO   3 Pulm- High flow         Full DNR   Palliative care consult noted and trying to get to hospice    DW Dr Link     [facility]   [phone]

## 2025-01-30 NOTE — PROGRESS NOTE ADULT - SUBJECTIVE AND OBJECTIVE BOX
Date of Service: 01-30-25 @ 12:22    Patient is a 89y old  Female who presents with a chief complaint of Hypoxic/hypercapnic resp failure (30 Jan 2025 10:55)      Any change in ROS:   More alert today  O2 sats 100% on HFNC 40L/75% - lowered to 40L/60%     MEDICATIONS  (STANDING):  acetylcysteine 20%  Inhalation 3 milliLiter(s) Inhalation every 6 hours  albuterol/ipratropium for Nebulization 3 milliLiter(s) Nebulizer every 6 hours  albuterol/ipratropium for Nebulization. 3 milliLiter(s) Nebulizer once  aMIOdarone Infusion 0.501 mG/Min (16.7 mL/Hr) IV Continuous <Continuous>  ascorbic acid 500 milliGRAM(s) Oral daily  bisacodyl Suppository 10 milliGRAM(s) Rectal daily  chlorhexidine 2% Cloths 1 Application(s) Topical <User Schedule>  dextrose 5%. 1000 milliLiter(s) (50 mL/Hr) IV Continuous <Continuous>  furosemide   Injectable 40 milliGRAM(s) IV Push daily  heparin   Injectable 5000 Unit(s) SubCutaneous every 12 hours  levothyroxine Injectable 60 MICROGram(s) IV Push at bedtime  metoprolol tartrate Injectable 5 milliGRAM(s) IV Push every 6 hours  mirtazapine 15 milliGRAM(s) Oral at bedtime  multivitamin 1 Tablet(s) Oral daily  QUEtiapine 12.5 milliGRAM(s) Oral at bedtime  sodium chloride 3%  Inhalation 4 milliLiter(s) Inhalation every 12 hours    MEDICATIONS  (PRN):  acetaminophen   Oral Liquid .. 650 milliGRAM(s) Oral every 6 hours PRN Temp greater or equal to 38C (100.4F), Moderate Pain (4 - 6)  haloperidol    Injectable 1 milliGRAM(s) IntraMuscular every 6 hours PRN Agitation    Vital Signs Last 24 Hrs  T(C): 36.6 (30 Jan 2025 05:08), Max: 36.6 (30 Jan 2025 00:11)  T(F): 97.8 (30 Jan 2025 05:08), Max: 97.8 (30 Jan 2025 00:11)  HR: 58 (30 Jan 2025 11:35) (54 - 73)  BP: 158/80 (30 Jan 2025 11:35) (137/70 - 175/77)  BP(mean): --  RR: 20 (30 Jan 2025 08:11) (18 - 20)  SpO2: 93% (30 Jan 2025 11:17) (93% - 100%)    Parameters below as of 30 Jan 2025 11:17  Patient On (Oxygen Delivery Method): nasal cannula, high flow, HFNC        I&O's Summary    29 Jan 2025 07:01  -  30 Jan 2025 07:00  --------------------------------------------------------  IN: 0 mL / OUT: 400 mL / NET: -400 mL          Physical Exam:   GENERAL: NAD, well-groomed, well-developed  HEENT: LUANN/   Atraumatic, Normocephalic  ENMT: No tonsillar erythema, exudates, or enlargement; Moist mucous membranes, Good dentition, No lesions  NECK: Supple, No JVD, Normal thyroid  CHEST/LUNG: Decreased BS at bases L>R, few scattered rhonchi   CVS: Regular rate and rhythm; No murmurs, rubs, or gallops  GI: : Soft, Nontender, Nondistended; Bowel sounds present  NERVOUS SYSTEM:  Alert & Oriented X2  EXTREMITIES:  2+ Peripheral Pulses  LYMPH: No lymphadenopathy noted  SKIN: No rashes or lesions  ENDOCRINOLOGY: No Thyromegaly  PSYCH: Appropriate    Labs:  33                            8.5    6.47  )-----------( 181      ( 29 Jan 2025 07:07 )             26.8                         8.3    6.26  )-----------( 153      ( 28 Jan 2025 07:21 )             25.3     01-29    132[L]  |  94[L]  |  24[H]  ----------------------------<  82  3.4[L]   |  29  |  1.47[H]  01-28    132[L]  |  93[L]  |  29[H]  ----------------------------<  91  3.7   |  28  |  1.48[H]    Ca    9.2      29 Jan 2025 07:06      CAPILLARY BLOOD GLUCOSE      POCT Blood Glucose.: 81 mg/dL (30 Jan 2025 07:23)  POCT Blood Glucose.: 84 mg/dL (30 Jan 2025 00:36)  POCT Blood Glucose.: 90 mg/dL (29 Jan 2025 18:08)          Urinalysis Basic - ( 29 Jan 2025 07:06 )    Color: x / Appearance: x / SG: x / pH: x  Gluc: 82 mg/dL / Ketone: x  / Bili: x / Urobili: x   Blood: x / Protein: x / Nitrite: x   Leuk Esterase: x / RBC: x / WBC x   Sq Epi: x / Non Sq Epi: x / Bacteria: x            Studies  Chest X-RAY  < from: Xray Chest 1 View- PORTABLE-Urgent (Xray Chest 1 View- PORTABLE-Urgent .) (01.25.25 @ 14:39) >    ACC: [id number] EXAM:  XR CHEST PORTABLE URGENT 1V   ORDERED BY: JESUS ALBERTO VARNER     PROCEDURE DATE:  01/25/2025          INTERPRETATION:  CLINICAL INDICATION: Hypoxia    TECHNIQUE: Frontal view of the chest was obtained.    COMPARISON: Chest x-ray1/19/2025.    FINDINGS:  The heart size is normal.  Moderate left and small right pleural effusions.  No pneumothorax.  No acute osseous abnormalities.    IMPRESSION:  Moderate left and small right pleural effusions.    --- End of Report ---    < end of copied text >

## 2025-01-30 NOTE — PROGRESS NOTE ADULT - SUBJECTIVE AND OBJECTIVE BOX
Time and date of service: 01-30-25 @ 09:20      SUBJECTIVE AND OBJECTIVE: pt resting in bed on hiflow, mitts in place  Indication for Geriatrics and Palliative Care Services/INTERVAL HPI: Nutritional goc    OVERNIGHT EVENTS: unable to wean off hi flow    DNR on chart: DNI: Trial NIV  DNI: Trial NIV      Allergies    clindamycin (Unknown)  shellfish (Unknown)  strawberry (Rash)  penicillin (Unknown)  clindamycin (Other)  strawberry (Unknown)  IV Contrast (Other)  IV Contrast (Unknown)    Intolerances    MEDICATIONS  (STANDING):  acetylcysteine 20%  Inhalation 3 milliLiter(s) Inhalation every 6 hours  albuterol/ipratropium for Nebulization 3 milliLiter(s) Nebulizer every 6 hours  albuterol/ipratropium for Nebulization. 3 milliLiter(s) Nebulizer once  aMIOdarone Infusion 0.501 mG/Min (16.7 mL/Hr) IV Continuous <Continuous>  ascorbic acid 500 milliGRAM(s) Oral daily  bisacodyl Suppository 10 milliGRAM(s) Rectal daily  chlorhexidine 2% Cloths 1 Application(s) Topical <User Schedule>  dextrose 5% + lactated ringers. 1000 milliLiter(s) (50 mL/Hr) IV Continuous <Continuous>  furosemide   Injectable 40 milliGRAM(s) IV Push daily  heparin   Injectable 5000 Unit(s) SubCutaneous every 12 hours  levothyroxine Injectable 60 MICROGram(s) IV Push at bedtime  metoprolol tartrate Injectable 5 milliGRAM(s) IV Push every 6 hours  mirtazapine 15 milliGRAM(s) Oral at bedtime  multivitamin 1 Tablet(s) Oral daily  QUEtiapine 12.5 milliGRAM(s) Oral at bedtime  sodium chloride 3%  Inhalation 4 milliLiter(s) Inhalation every 12 hours    MEDICATIONS  (PRN):  acetaminophen   Oral Liquid .. 650 milliGRAM(s) Oral every 6 hours PRN Temp greater or equal to 38C (100.4F), Moderate Pain (4 - 6)  haloperidol    Injectable 1 milliGRAM(s) IntraMuscular every 6 hours PRN Agitation            ITEMS UNCHECKED ARE NOT PRESENT    PRESENT SYMPTOMS: [ x]Unable to self-report - see [ ] CPOT [ ] PAINADS [ ] RDOS  Source if other than patient:  [ ]Family   [ x]Team     Pain:  [ ]yes [x ]no  QOL impact -   Location -                    Aggravating factors -  Quality -  Radiation -  Timing-  Severity (0-10 scale):  Minimal acceptable level (0-10 scale):     CPOT:    https://www.[facility].org/getattachment/[email or web address]-0h1r-3k9r-3111t2706f9u/Critical-Care-Pain-Observation-Tool-(CPOT)    PAIN AD Score:	  http://geriatrictoolkit.[facility]/cog/painad.pdf (Ctrl + left click to view)    Dyspnea:                           [ ]Mild [ ]Moderate [ ]Severe    RDOS:  0 to 2  minimal or no respiratory distress   3  mild distress  4 to 6 moderate distress  >7 severe distress  https://homecareinformation.net/handouts/hen/Respiratory_Distress_Observation_Scale.pdf (Ctrl +  left click to view)     Anxiety:                             [ ]Mild [ ]Moderate [ ]Severe  Fatigue:                             [ ]Mild [x ]Moderate [x ]Severe  Nausea:                            [ ]Mild [ ]Moderate [ ]Severe  Loss of appetite:               [ ]Mild [x ]Moderate [ ]Severe  Constipation:                    [ ]Mild [ ]Moderate [ ]Severe    PCSSQ[Palliative Care Spiritual Screening Question]   Severity (0-10):  Score of 4 or > indicate consideration of Chaplaincy referral.  Chaplaincy Referral: [ ] yes [ ] refused [ ] following [x ] Deferred     Caregiver [place]? : [ ] yes [ ] no [x ] Deferred [ ] Declined             Social work referral [ ] Patient & Family Centered Care Referral [ ]     Anticipatory Grief present?:  [ ] yes [ ] no  [x ] Deferred                  Social work referral [ ] Chaplaincy Referral[ ]      Other Symptoms:  [ ]All other review of systems negative     Palliative Performance Status Version 2:     20-30    %      http://npcrc.org/files/news/palliative_performance_scale_ppsv2.pdf    PHYSICAL EXAM:  Vital Signs Last 24 Hrs  T(C): 36.6 (30 Jan 2025 05:08), Max: 36.6 (30 Jan 2025 00:11)  T(F): 97.8 (30 Jan 2025 05:08), Max: 97.8 (30 Jan 2025 00:11)  HR: 68 (30 Jan 2025 08:11) (54 - 71)  BP: 160/81 (30 Jan 2025 05:08) (137/70 - 160/81)  BP(mean): --  RR: 20 (30 Jan 2025 08:11) (18 - 20)  SpO2: 95% (30 Jan 2025 08:11) (95% - 100%)    Parameters below as of 30 Jan 2025 08:11  Patient On (Oxygen Delivery Method): HFNC  O2 Flow (L/min): 40  O2 Concentration (%): 60         I&O's Summary     GENERAL: [x ]Cachexia    [ ]Alert  [ ]Oriented x   [x ]Lethargic  [ ]Unarousable  [ ]min Verbal  x[ ]Non-Verbal  Behavioral:   [ ]Anxiety  [ x]Delirium [x ]Agitation [ ]Other  HEENT:  [x ]Normal   [ ]Dry mouth   [ ]ET Tube/Trach  [ ]Oral lesions  PULMONARY:   [ ]Clear [ ]Tachypnea  [ ]Audible excessive secretions   [ ]Rhonchi        [ ]Right [ ]Left [ ]Bilateral  [ ]Crackles        [ ]Right [ ]Left [ ]Bilateral  [ ]Wheezing     [ ]Right [ ]Left [ ]Bilateral  [xDiminished BS [ ] Right [ ]Left [ ]Bilateral  CARDIOVASCULAR:    [ x]Regular [ ]Irregular [ ]Tachy  [ ]Michael [ ]Murmur [ ]Other  GASTROINTESTINAL:  [x ]Soft  [ ]Distended   [x ]+BS  [ ]Non tender [ ]Tender  [ ]Other [ ]PEG [ ]OGT/ NGT   Last BM:   GENITOURINARY:  [ ]Normal [ ]Incontinent   [ ]Oliguria/Anuria   [x ]Crooks  MUSCULOSKELETAL:   [ ]Normal   [ x]Weakness  [x ]Bed/Wheelchair bound [ ]Edema  NEUROLOGIC:   [ ]No focal deficits  [x ] Cognitive impairment  [ ] Dysphagia [ ]Dysarthria [ ] Paresis [ ]Other   SKIN: see rn flowsheet  [ ]Normal  [ ]Rash  [ ]Other  [ ]Pressure ulcer(s) [ ]y [ ]n present on admission    CRITICAL CARE:  [ ]Shock Present  [ ]Septic [ ]Cardiogenic [ ]Neurologic [ ]Hypovolemic  [ ]Vasopressors [ ]Inotropes  [ ]Respiratory failure present [ ]Mechanical Ventilation [ ]Non-invasive ventilatory support [ ]High-Flow   [ ]Acute  [ ]Chronic [ ]Hypoxic  [ ]Hypercarbic [ ]Other  [ ]Other organ failure     LABS:                                              8.5    6.47  )-----------( 181      ( 29 Jan 2025 07:07 )             26.8   01-29    132[L]  |  94[L]  |  24[H]  ----------------------------<  82  3.4[L]   |  29  |  1.47[H]    Ca    9.2      29 Jan 2025 07:06              RADIOLOGY & ADDITIONAL STUDIES:  < from: CT Head No Cont (01.11.25 @ 13:53) >  ACC: [id number] EXAM:  CT BRAIN   ORDERED BY:  IVANA BERNSTEIN     PROCEDURE DATE:  01/11/2025          INTERPRETATION:  CT HEAD WITHOUT CONTRAST    TECHNIQUE: Multiple axial images of the head were obtained from the skull   base to the vertex without intravenous contrast.  Multiplanar reformats   were created according to the standard protocol.    INDICATION: Admitting Dxs: A41.9 SEPSIS ams  COMPARISON: Head CT without contrast 11/10/2021.  _____________________  FINDINGS:    Mildly degraded by motion artifact.    Status post right suboccipital craniectomy with postsurgical changes in   the skull/scalp as before. Similar appearance of the presumed right   cerebellar resection bed.    Status post right parieto-occipital cuca hole with a mild amount of   adjacent encephalomalacia and gliosis as before.    Mild diffuse cerebral volume loss. No significant midline shift.   Maintenance of gray-white differentiation in the large vascular   distributions. Mild hypoattenuation of the periventricular/deep white   matter most consistent with chronic microvascular ischemic disease.   Motion artifact precludes exclusion of an acute intracranial hemorrhage.   No findings suspicious for an acute intracranial hemorrhage within this   limitation. No hydrocephalus.    No findings suspicious for a depressed calvarial fracture.    Fatty atrophy of the visualized right aspect of the tongue.    Paranasal Sinuses, Mastoid Air Cells, and Orbits: Mucosal thickening left   sphenoid sinus. Visualized mastoidair cells clear. Status post left lens   replacement.  ______________________  IMPRESSION:  1.  Mildly degraded by motion artifact. A tiny acute intracranial   hemorrhage is not excluded. No findings suspicious for an acute   intracranial abnormalityotherwise. Chronic intracranial findings as   above.    --- End of Report ---            NASRIN LAGUERRE MD; Attending Radiologist  This document has been electronically signed. Jan 11 2025  2:12PM    < end of copied text >          Protein Calorie Malnutrition Present: [ ]mild [ ]moderate [ ]severe [ ]underweight [ ]morbid obesity  https://www.andeal.org/vault/2440/web/files/ONC/Table_Clinical%20Characteristics%20to%20Document%20Malnutrition-White%20JV%20et%20al%202012.pdf    Height (cm): 160 (01-11-25 @ 13:51)  Weight (kg): 53 (01-11-25 @ 13:51)  BMI (kg/m2): 20.7 (01-11-25 @ 13:51)    [ ]PPSV2 < or = 30%  [ ]significant weight loss [ ]poor nutritional intake [ ]anasarca[ ]Artificial Nutrition    Other REFERRALS:  [ ]Hospice  [ ]Child Life  [ ]Social Work  [ ]Case management [ ]Holistic Therapy

## 2025-01-30 NOTE — PROGRESS NOTE ADULT - NSPROGADDITIONALINFOA_GEN_ALL_CORE
discussed with palliative care attending discussed with palliative care attending  discussed with son Maykel he had requested me to speak to patient's PCP Dr Montalvo.  I did d/w Dr Montalvo who agrees with the inpatient providers who ALL feel that patient is not a candidate for any more invasive interventions   comfort care and pleasure feeds would be appropriate and likely even transfer to PCU if son amenable

## 2025-01-30 NOTE — PROGRESS NOTE ADULT - PROBLEM SELECTOR PLAN 1
-Initially 2nd to PNA   -Hypoxia had been improving, then with increase in O2 requirements requiring HFNC 1/24  -Repeat CT chest 1/23 with moderate b/l pleural effusions/atelectasis, b/l GGO. Likely combination of fluid overload + PNA  -Completed 10 day course of ABX   -Continue bronchodilators/mucolytics  -Keep O>I with diuresis as tolerated  -Continue HFNC, wean as tolerated, keep sats >90%. O2 requirements improving, on 40L/60% this AM   -Suggest Bipap 10/5 PRN if worsening hypoxia or increased WOB  -Prognosis guarded. [facility] DNR/DNI/trial NIV  -Palliative care f/u.

## 2025-01-30 NOTE — PROGRESS NOTE ADULT - SUBJECTIVE AND OBJECTIVE BOX
Cardiovascular Disease Progress Note  Date of service: 01-30-25 @ 08:31    Overnight events: No acute events overnight.  Patient is in no distress.   Otherwise review of systems negative    Objective Findings:  T(C): 36.6 (01-30-25 @ 05:08), Max: 36.6 (01-30-25 @ 00:11)  HR: 68 (01-30-25 @ 05:08) (54 - 71)  BP: 160/81 (01-30-25 @ 05:08) (129/73 - 160/81)  RR: 18 (01-30-25 @ 05:08) (18 - 20)  SpO2: 100% (01-30-25 @ 05:08) (97% - 100%)  Wt(kg): --  Daily     Daily       Physical Exam:  Gen: NAD; Patient resting comfortably  HEENT: EOMI, Normocephalic/ atraumatic  CV: RRR, normal S1 + S2, no m/r/g  Lungs:  Normal respiratory effort; clear to auscultation bilaterally  Abd: soft, non-tender; bowel sounds present  Ext: No edema; warm and well perfused    Telemetry: Sinus     Laboratory Data:                        8.5    6.47  )-----------( 181      ( 29 Jan 2025 07:07 )             26.8     01-29    132[L]  |  94[L]  |  24[H]  ----------------------------<  82  3.4[L]   |  29  |  1.47[H]    Ca    9.2      29 Jan 2025 07:06                Inpatient Medications:  MEDICATIONS  (STANDING):  acetylcysteine 20%  Inhalation 3 milliLiter(s) Inhalation every 6 hours  albuterol/ipratropium for Nebulization 3 milliLiter(s) Nebulizer every 6 hours  albuterol/ipratropium for Nebulization. 3 milliLiter(s) Nebulizer once  aMIOdarone Infusion 0.501 mG/Min (16.7 mL/Hr) IV Continuous <Continuous>  ascorbic acid 500 milliGRAM(s) Oral daily  bisacodyl Suppository 10 milliGRAM(s) Rectal daily  chlorhexidine 2% Cloths 1 Application(s) Topical <User Schedule>  furosemide   Injectable 40 milliGRAM(s) IV Push daily  heparin   Injectable 5000 Unit(s) SubCutaneous every 12 hours  levothyroxine Injectable 60 MICROGram(s) IV Push at bedtime  metoprolol tartrate Injectable 5 milliGRAM(s) IV Push every 6 hours  mirtazapine 15 milliGRAM(s) Oral at bedtime  multivitamin 1 Tablet(s) Oral daily  QUEtiapine 12.5 milliGRAM(s) Oral at bedtime  sodium chloride 3%  Inhalation 4 milliLiter(s) Inhalation every 12 hours      Assessment:  89y old Female with stated hx significant for dementia, s/p brain tumor resection 1961, HTN, hypothyroidism colon Ca s/p Rt hemicolectomy ('09), 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    Plan of Care:    #Afib RVR  - HR now improved on Amidarone GTT  - Currently maintaining sinus.   - BP now stable. Would continue with IV lopressor standing dose. Wean off Amio GTT.   - Patient is anemic and high bleeding risk, would refrain from AC at this time.     #Hypoxic respiratory failure  - On HFNC  - Wean as tolerated  - IV diuresis  as needed.         Over 55 minutes spent on total encounter; more than 50% of the visit was spent counseling and/or coordinating care by the attending physician.      Bobby Montilla DO [facility]  Cardiovascular Disease  [phone]

## 2025-01-30 NOTE — PROGRESS NOTE ADULT - PROBLEM SELECTOR PLAN 1
pt requiring full supportive care with all ADL's.  pt mumbling and awake this morning remains on hiflow  not following commands

## 2025-01-30 NOTE — PROGRESS NOTE ADULT - TIME BILLING
symptom assessment and management, supportive counseling, coordination of care, discussion and coordination with team.    Adriana Lau, ANP-BC  Please contact me via Teams  between 6am-2pm. If not answering, please call the palliative care pager [phone]    After 2pm and on weekends, please see the contact information below:    In the event of newly developing, evolving, or worsening symptoms between 2pm and 5pm please contact the Palliative Medicine via extension [phone] for assistance.  After 5pm please contact team via pager (if the patient is at [facility] #[phone] or if the patient is at [facility] #[phone]) The Geriatric and Palliative Medicine service has coverage 24 hours a day/ 7 days a week to provide medical recommendations regarding symptom management needs via telephone

## 2025-01-31 LAB
GLUCOSE BLDC GLUCOMTR-MCNC: 119 MG/DL — HIGH (ref 70–99)
GLUCOSE BLDC GLUCOMTR-MCNC: 205 MG/DL — HIGH (ref 70–99)
GLUCOSE BLDC GLUCOMTR-MCNC: 89 MG/DL — SIGNIFICANT CHANGE UP (ref 70–99)

## 2025-01-31 RX ADMIN — Medication 5 MILLIGRAM(S): at 23:04

## 2025-01-31 RX ADMIN — IPRATROPIUM BROMIDE AND ALBUTEROL SULFATE 3 MILLILITER(S): .5; 2.5 SOLUTION RESPIRATORY (INHALATION) at 17:06

## 2025-01-31 RX ADMIN — LEVOTHYROXINE SODIUM 60 MICROGRAM(S): 25 TABLET ORAL at 23:04

## 2025-01-31 RX ADMIN — Medication 4 MILLILITER(S): at 05:12

## 2025-01-31 RX ADMIN — Medication 5 MILLIGRAM(S): at 11:51

## 2025-01-31 RX ADMIN — ANTISEPTIC SURGICAL SCRUB 1 APPLICATION(S): 0.04 SOLUTION TOPICAL at 05:13

## 2025-01-31 RX ADMIN — Medication 5 MILLIGRAM(S): at 17:07

## 2025-01-31 RX ADMIN — IPRATROPIUM BROMIDE AND ALBUTEROL SULFATE 3 MILLILITER(S): .5; 2.5 SOLUTION RESPIRATORY (INHALATION) at 05:11

## 2025-01-31 RX ADMIN — Medication 5000 UNIT(S): at 17:07

## 2025-01-31 RX ADMIN — IPRATROPIUM BROMIDE AND ALBUTEROL SULFATE 3 MILLILITER(S): .5; 2.5 SOLUTION RESPIRATORY (INHALATION) at 11:51

## 2025-01-31 RX ADMIN — Medication 5 MILLIGRAM(S): at 05:11

## 2025-01-31 RX ADMIN — Medication 40 MILLIGRAM(S): at 05:12

## 2025-01-31 RX ADMIN — Medication 3 MILLILITER(S): at 05:11

## 2025-01-31 RX ADMIN — Medication 5000 UNIT(S): at 05:13

## 2025-01-31 RX ADMIN — IPRATROPIUM BROMIDE AND ALBUTEROL SULFATE 3 MILLILITER(S): .5; 2.5 SOLUTION RESPIRATORY (INHALATION) at 23:04

## 2025-01-31 RX ADMIN — Medication 4 MILLILITER(S): at 17:07

## 2025-01-31 RX ADMIN — Medication 3 MILLILITER(S): at 11:51

## 2025-01-31 NOTE — PROGRESS NOTE ADULT - PROBLEM SELECTOR PLAN 1
-Initially 2nd to PNA   -Hypoxia had been improving, then with increase in O2 requirements requiring HFNC 1/24  -Repeat CT chest 1/23 with moderate b/l pleural effusions/atelectasis, b/l GGO. Likely combination of fluid overload + PNA  -Completed 10 day course of ABX   -Continue bronchodilators. S/p Mucomyst   -Keep O>I with diuresis as tolerated  -Continue HFNC, wean as tolerated, keep sats >90%. O2 requirements improving, on 40L/40% this AM   -Suggest Bipap 10/5 PRN if worsening hypoxia or increased WOB  -Prognosis guarded. [facility] DNR/DNI/trial NIV  -Palliative care f/u.

## 2025-01-31 NOTE — PROGRESS NOTE ADULT - NSPROGADDITIONALINFOA_GEN_ALL_CORE
discussed with palliative care service  son appears to favor status quo despite clear communication from ALL provider team members including RN, ACPs and medical consultants  will continue to reinforce patient's likely end stage prognosis and need to be made comfortable and pain free at likely the end of her natural life

## 2025-01-31 NOTE — PROGRESS NOTE ADULT - PROBLEM SELECTOR PLAN 1
unable to expeditiously wean off  palliative care follow up appreciated  discussed with PCP  tried to d/w son however he stated he was in a meeting and would call me back  son was provided my call back number  discussed with pulmonary   will attempt to remove Hi Brandon nasal cannula

## 2025-01-31 NOTE — PROGRESS NOTE ADULT - ASSESSMENT
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

## 2025-01-31 NOTE — PROGRESS NOTE ADULT - SUBJECTIVE AND OBJECTIVE BOX
Patient is a 89y old  Female who presents with a chief complaint of Hypoxic/hypercapnic resp failure (31 Jan 2025 14:06)      DATE OF SERVICE: 01-31-25 @ 14:15    SUBJECTIVE / OVERNIGHT EVENTS: overnight events noted    ROS:  not available        MEDICATIONS  (STANDING):  albuterol/ipratropium for Nebulization 3 milliLiter(s) Nebulizer every 6 hours  albuterol/ipratropium for Nebulization. 3 milliLiter(s) Nebulizer once  aMIOdarone Infusion 0.501 mG/Min (16.7 mL/Hr) IV Continuous <Continuous>  ascorbic acid 500 milliGRAM(s) Oral daily  bisacodyl Suppository 10 milliGRAM(s) Rectal daily  chlorhexidine 2% Cloths 1 Application(s) Topical <User Schedule>  dextrose 5%. 1000 milliLiter(s) (50 mL/Hr) IV Continuous <Continuous>  furosemide   Injectable 40 milliGRAM(s) IV Push daily  heparin   Injectable 5000 Unit(s) SubCutaneous every 12 hours  levothyroxine Injectable 60 MICROGram(s) IV Push at bedtime  metoprolol tartrate Injectable 5 milliGRAM(s) IV Push every 6 hours  mirtazapine 15 milliGRAM(s) Oral at bedtime  multivitamin 1 Tablet(s) Oral daily  QUEtiapine 12.5 milliGRAM(s) Oral at bedtime  sodium chloride 3%  Inhalation 4 milliLiter(s) Inhalation every 12 hours    MEDICATIONS  (PRN):  acetaminophen   Oral Liquid .. 650 milliGRAM(s) Oral every 6 hours PRN Temp greater or equal to 38C (100.4F), Moderate Pain (4 - 6)  haloperidol    Injectable 1 milliGRAM(s) IntraMuscular every 6 hours PRN Agitation        CAPILLARY BLOOD GLUCOSE      POCT Blood Glucose.: 205 mg/dL (31 Jan 2025 13:34)  POCT Blood Glucose.: 119 mg/dL (31 Jan 2025 07:41)  POCT Blood Glucose.: 104 mg/dL (30 Jan 2025 17:48)    I&O's Summary    30 Jan 2025 07:01  -  31 Jan 2025 07:00  --------------------------------------------------------  IN: 0 mL / OUT: 1300 mL / NET: -1300 mL    31 Jan 2025 07:01  -  31 Jan 2025 14:15  --------------------------------------------------------  IN: 0 mL / OUT: 500 mL / NET: -500 mL        Vital Signs Last 24 Hrs  T(C): 36.3 (31 Jan 2025 11:33), Max: 36.3 (31 Jan 2025 05:00)  T(F): 97.3 (31 Jan 2025 11:33), Max: 97.3 (31 Jan 2025 05:00)  HR: 98 (31 Jan 2025 11:33) (57 - 98)  BP: 134/72 (31 Jan 2025 11:33) (134/72 - 162/87)  BP(mean): --  RR: 18 (31 Jan 2025 12:00) (18 - 20)  SpO2: 99% (31 Jan 2025 12:00) (92% - 100%)    PHYSICAL EXAM:   CHEST/LUNG: coarse breath sounds   HEART: S1 S2; no murmurs   ABDOMEN: Soft, Nontender  EXTREMITIES: no edema  NEUROLOGY: non-focal    LABS:                      All consultant(s) notes reviewed and care discussed with other providers        Contact Number, Dr Link [phone]

## 2025-01-31 NOTE — PROGRESS NOTE ADULT - NS ATTEND OPT1 GEN_ALL_CORE
I independently performed the documented:
I independently performed the documented:
I attest my time as attending is greater than 50% of the total combined time spent on qualifying patient care activities by the PA/NP and attending.
I independently performed the documented:

## 2025-01-31 NOTE — PROGRESS NOTE ADULT - ASSESSMENT
resp failure  aspiration    peg cancelled previously due to worsening respiratory status  she is not a candidate for PEG/sedation at present  dnr/dni  prognosis guarded   goc noted      Advanced care planning was discussed with patient and family.  Advanced care planning forms were reviewed and discussed.  Risks, benefits and alternatives of gastroenterologic procedures were discussed in detail and all questions were answered.    30 minutes spent.

## 2025-01-31 NOTE — PROGRESS NOTE ADULT - SUBJECTIVE AND OBJECTIVE BOX
Cardiovascular Disease Progress Note  Date of service: 01-31-25 @ 08:39    Overnight events: No acute events overnight.  Patient is in no distress.   Otherwise review of systems negative    Objective Findings:  T(C): 36.3 (01-31-25 @ 05:00), Max: 36.6 (01-30-25 @ 13:20)  HR: 69 (01-31-25 @ 05:00) (58 - 96)  BP: 150/79 (01-31-25 @ 05:00) (145/78 - 175/77)  RR: 20 (01-31-25 @ 05:00) (20 - 20)  SpO2: 92% (01-31-25 @ 05:00) (92% - 100%)  Wt(kg): --  Daily     Daily       Physical Exam:  Gen: NAD; Patient resting comfortably  HEENT: EOMI, Normocephalic/ atraumatic  CV: RRR, normal S1 + S2, no m/r/g  Lungs:  Normal respiratory effort; clear to auscultation bilaterally  Abd: soft, non-tender; bowel sounds present  Ext: No edema; warm and well perfused    Telemetry: Sinus     Laboratory Data:                    Inpatient Medications:  MEDICATIONS  (STANDING):  acetylcysteine 20%  Inhalation 3 milliLiter(s) Inhalation every 6 hours  albuterol/ipratropium for Nebulization 3 milliLiter(s) Nebulizer every 6 hours  albuterol/ipratropium for Nebulization. 3 milliLiter(s) Nebulizer once  aMIOdarone Infusion 0.501 mG/Min (16.7 mL/Hr) IV Continuous <Continuous>  ascorbic acid 500 milliGRAM(s) Oral daily  bisacodyl Suppository 10 milliGRAM(s) Rectal daily  chlorhexidine 2% Cloths 1 Application(s) Topical <User Schedule>  dextrose 5%. 1000 milliLiter(s) (50 mL/Hr) IV Continuous <Continuous>  furosemide   Injectable 40 milliGRAM(s) IV Push daily  heparin   Injectable 5000 Unit(s) SubCutaneous every 12 hours  levothyroxine Injectable 60 MICROGram(s) IV Push at bedtime  metoprolol tartrate Injectable 5 milliGRAM(s) IV Push every 6 hours  mirtazapine 15 milliGRAM(s) Oral at bedtime  multivitamin 1 Tablet(s) Oral daily  QUEtiapine 12.5 milliGRAM(s) Oral at bedtime  sodium chloride 3%  Inhalation 4 milliLiter(s) Inhalation every 12 hours      Assessment:  89y old Female with stated hx significant for dementia, s/p brain tumor resection 1961, HTN, hypothyroidism colon Ca s/p Rt hemicolectomy ('09), 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    Plan of Care:    #Afib RVR  - HR now improved on Amidarone GTT  - Currently maintaining sinus.   - BP now stable. Would continue with IV lopressor standing dose. Wean off Amio GTT.   - Patient is anemic and high bleeding risk, would refrain from AC at this time.     #Hypoxic respiratory failure  - On HFNC  - Wean as tolerated  - IV diuresis  as needed.   #ACP (advance care planning)-  Advanced care planning was discussed. Patient is  DNR        Over 55 minutes spent on total encounter; more than 50% of the visit was spent counseling and/or coordinating care by the attending physician.      Bobby Montilla DO [facility]  Cardiovascular Disease  [phone]

## 2025-01-31 NOTE — PROGRESS NOTE ADULT - NS ATTEND AMEND GEN_ALL_CORE FT
events noted  continue current rx  prognosis poor  palliative fu
seems to be doing  ok : no sob:   oxygen decreased to 4 L :   seems to be getting towards getting the peg placements:
she seems OK:  no sob   agree with above:  breathing wise looks comfortable
when I saw the pt in afternoon : she looks much worse:  gasping:  on 100% NRB:  being started on bipap:  she has moderately large effusion on both sides; seems to be in fluid overlaod:  put cruz:  start aggressive diuresis and not urine output : sheis DNR and DNI
the cxr is worse:  can we expedite:  it?  can keep npo ; will clear only if ct scan is reasonable;   dw acp to get ct scan chest urgent
pt seems same to me:  still on high flow:  on going discussions with family for pleasure feeds vs peg placement:  seen by palliative care;  await final decision
she seems much m ore comfortable: : on 50% high flow:  in last few days she has improved significantly: cxr reviewed:  likely a combination of atelectasis as welals effusion:  clinically she has imporved:  can attempt to do peg now but remains at high risk  :  tenzin acp
floyd odonnell alert and awake:  demented:  currently on 6 L of oxygen  satting at 100%: dw acp:  ro decrease it further to 4 L today   and if remains OK:  to decrease to 2 L  in am:  she looks more alert and awake to me: no resp distress:
pt is doing poorly:  on high   flow:  not very responsive wither:  peg cancelled:  on 505 high flow:  she is not doing very well : GOC being addressed: :  tenzin rod
seems better today :  she has significants roberto effusions:  more on left side:  at this time will try conservative rx: ;  cont diuresis:  cont high flow   VBG with mild met alkalosis    tenzin leyva
she is basically doing same:  no resp distress:  still on high flow:  now for possible palliative care:  she is not in any resp distress

## 2025-01-31 NOTE — PROGRESS NOTE ADULT - SUBJECTIVE AND OBJECTIVE BOX
Date of Service: 01-31-25 @ 14:06    Patient is a 89y old  Female who presents with a chief complaint of Hypoxic/hypercapnic resp failure (31 Jan 2025 10:44)      Any change in ROS:   ROS unable to be obtained  O2 sats mid 90s on HFNC 40L/40%    MEDICATIONS  (STANDING):  albuterol/ipratropium for Nebulization 3 milliLiter(s) Nebulizer every 6 hours  albuterol/ipratropium for Nebulization. 3 milliLiter(s) Nebulizer once  aMIOdarone Infusion 0.501 mG/Min (16.7 mL/Hr) IV Continuous <Continuous>  ascorbic acid 500 milliGRAM(s) Oral daily  bisacodyl Suppository 10 milliGRAM(s) Rectal daily  chlorhexidine 2% Cloths 1 Application(s) Topical <User Schedule>  dextrose 5%. 1000 milliLiter(s) (50 mL/Hr) IV Continuous <Continuous>  furosemide   Injectable 40 milliGRAM(s) IV Push daily  heparin   Injectable 5000 Unit(s) SubCutaneous every 12 hours  levothyroxine Injectable 60 MICROGram(s) IV Push at bedtime  metoprolol tartrate Injectable 5 milliGRAM(s) IV Push every 6 hours  mirtazapine 15 milliGRAM(s) Oral at bedtime  multivitamin 1 Tablet(s) Oral daily  QUEtiapine 12.5 milliGRAM(s) Oral at bedtime  sodium chloride 3%  Inhalation 4 milliLiter(s) Inhalation every 12 hours    MEDICATIONS  (PRN):  acetaminophen   Oral Liquid .. 650 milliGRAM(s) Oral every 6 hours PRN Temp greater or equal to 38C (100.4F), Moderate Pain (4 - 6)  haloperidol    Injectable 1 milliGRAM(s) IntraMuscular every 6 hours PRN Agitation    Vital Signs Last 24 Hrs  T(C): 36.3 (31 Jan 2025 11:33), Max: 36.3 (31 Jan 2025 05:00)  T(F): 97.3 (31 Jan 2025 11:33), Max: 97.3 (31 Jan 2025 05:00)  HR: 98 (31 Jan 2025 11:33) (57 - 98)  BP: 134/72 (31 Jan 2025 11:33) (134/72 - 162/87)  BP(mean): --  RR: 18 (31 Jan 2025 12:00) (18 - 20)  SpO2: 99% (31 Jan 2025 12:00) (92% - 100%)    Parameters below as of 31 Jan 2025 12:00  Patient On (Oxygen Delivery Method): nasal cannula  O2 Flow (L/min): 6      I&O's Summary    30 Jan 2025 07:01  -  31 Jan 2025 07:00  --------------------------------------------------------  IN: 0 mL / OUT: 1300 mL / NET: -1300 mL    31 Jan 2025 07:01  -  31 Jan 2025 14:06  --------------------------------------------------------  IN: 0 mL / OUT: 500 mL / NET: -500 mL          Physical Exam:   GENERAL: NAD  HEENT: LUANN/   Atraumatic, Normocephalic  ENMT: No tonsillar erythema, exudates, or enlargement  NECK: Supple, No JVD, Normal thyroid  CHEST/LUNG: few scattered rhonchi, decreased BS L>R   CVS: Regular rate and rhythm; No murmurs, rubs, or gallops  GI: : Soft, Nontender, Nondistended; Bowel sounds present  NERVOUS SYSTEM:  Lethargic   EXTREMITIES:  2+ Peripheral Pulses, No clubbing, cyanosis, or edema  LYMPH: No lymphadenopathy noted  SKIN: No rashes or lesions  ENDOCRINOLOGY: No Thyromegaly  PSYCH: Appropriate    Labs:  33                            8.5    6.47  )-----------( 181      ( 29 Jan 2025 07:07 )             26.8                         8.3    6.26  )-----------( 153      ( 28 Jan 2025 07:21 )             25.3     01-29    132[L]  |  94[L]  |  24[H]  ----------------------------<  82  3.4[L]   |  29  |  1.47[H]  01-28    132[L]  |  93[L]  |  29[H]  ----------------------------<  91  3.7   |  28  |  1.48[H]        CAPILLARY BLOOD GLUCOSE      POCT Blood Glucose.: 205 mg/dL (31 Jan 2025 13:34)  POCT Blood Glucose.: 119 mg/dL (31 Jan 2025 07:41)  POCT Blood Glucose.: 104 mg/dL (30 Jan 2025 17:48)    Studies  < from: Xray Chest 1 View- PORTABLE-Urgent (Xray Chest 1 View- PORTABLE-Urgent .) (01.25.25 @ 14:39) >  ACC: [id number] EXAM:  XR CHEST PORTABLE URGENT 1V   ORDERED BY: JESUS ALBERTO VARNER     PROCEDURE DATE:  01/25/2025          INTERPRETATION:  CLINICAL INDICATION: Hypoxia    TECHNIQUE: Frontal view of the chest was obtained.    COMPARISON: Chest x-ray1/19/2025.    FINDINGS:  The heart size is normal.  Moderate left and small right pleural effusions.  No pneumothorax.  No acute osseous abnormalities.    IMPRESSION:  Moderate left and small right pleural effusions.    --- End of Report ---          < end of copied text >

## 2025-01-31 NOTE — PROGRESS NOTE ADULT - SUBJECTIVE AND OBJECTIVE BOX
NEPHROLOGY-[facility] [phone]        Patient seen and examined in bed.  She was the same   High flow   Amio gtt         MEDICATIONS  (STANDING):  acetylcysteine 20%  Inhalation 3 milliLiter(s) Inhalation every 6 hours  albuterol/ipratropium for Nebulization 3 milliLiter(s) Nebulizer every 6 hours  albuterol/ipratropium for Nebulization. 3 milliLiter(s) Nebulizer once  aMIOdarone Infusion 0.501 mG/Min (16.7 mL/Hr) IV Continuous <Continuous>  ascorbic acid 500 milliGRAM(s) Oral daily  bisacodyl Suppository 10 milliGRAM(s) Rectal daily  chlorhexidine 2% Cloths 1 Application(s) Topical <User Schedule>  dextrose 5%. 1000 milliLiter(s) (50 mL/Hr) IV Continuous <Continuous>  furosemide   Injectable 40 milliGRAM(s) IV Push daily  heparin   Injectable 5000 Unit(s) SubCutaneous every 12 hours  levothyroxine Injectable 60 MICROGram(s) IV Push at bedtime  metoprolol tartrate Injectable 5 milliGRAM(s) IV Push every 6 hours  mirtazapine 15 milliGRAM(s) Oral at bedtime  multivitamin 1 Tablet(s) Oral daily  QUEtiapine 12.5 milliGRAM(s) Oral at bedtime  sodium chloride 3%  Inhalation 4 milliLiter(s) Inhalation every 12 hours      VITAL:  T(C): , Max: 36.6 (01-30-25 @ 13:20)  T(F): , Max: 97.8 (01-30-25 @ 13:20)  HR: 57 (01-31-25 @ 09:15)  BP: 150/79 (01-31-25 @ 05:00)  BP(mean): --  RR: 20 (01-31-25 @ 05:00)  SpO2: 100% (01-31-25 @ 09:16)  Wt(kg): --    I and O's:    01-30 @ 07:01  -  01-31 @ 07:00  --------------------------------------------------------  IN: 0 mL / OUT: 1300 mL / NET: -1300 mL          PHYSICAL EXAM:    Constitutional: NAD  Neck:  No JVD  Respiratory: Course   Cardiovascular: S1 and S2 tachy   Gastrointestinal: BS+, soft, NT/ND  Extremities: No peripheral edema  Neurological:  , no focal deficits  Psychiatric:    : No Crooks  Skin: No rashes  Access: Not applicable    LABS:                Urine Studies:          RADIOLOGY & ADDITIONAL STUDIES:

## 2025-02-01 LAB
ANION GAP SERPL CALC-SCNC: 11 MMOL/L — SIGNIFICANT CHANGE UP (ref 5–17)
BUN SERPL-MCNC: 12 MG/DL — SIGNIFICANT CHANGE UP (ref 7–23)
CALCIUM SERPL-MCNC: 9.3 MG/DL — SIGNIFICANT CHANGE UP (ref 8.4–10.5)
CHLORIDE SERPL-SCNC: 91 MMOL/L — LOW (ref 96–108)
CO2 SERPL-SCNC: 29 MMOL/L — SIGNIFICANT CHANGE UP (ref 22–31)
CREAT SERPL-MCNC: 1.25 MG/DL — SIGNIFICANT CHANGE UP (ref 0.5–1.3)
EGFR: 39 ML/MIN/1.73M2 — LOW
GLUCOSE BLDC GLUCOMTR-MCNC: 112 MG/DL — HIGH (ref 70–99)
GLUCOSE BLDC GLUCOMTR-MCNC: 140 MG/DL — HIGH (ref 70–99)
GLUCOSE BLDC GLUCOMTR-MCNC: 77 MG/DL — SIGNIFICANT CHANGE UP (ref 70–99)
GLUCOSE BLDC GLUCOMTR-MCNC: 85 MG/DL — SIGNIFICANT CHANGE UP (ref 70–99)
GLUCOSE SERPL-MCNC: 79 MG/DL — SIGNIFICANT CHANGE UP (ref 70–99)
HCT VFR BLD CALC: 27.2 % — LOW (ref 34.5–45)
HGB BLD-MCNC: 8.7 G/DL — LOW (ref 11.5–15.5)
MCHC RBC-ENTMCNC: 30.5 PG — SIGNIFICANT CHANGE UP (ref 27–34)
MCHC RBC-ENTMCNC: 32 G/DL — SIGNIFICANT CHANGE UP (ref 32–36)
MCV RBC AUTO: 95.4 FL — SIGNIFICANT CHANGE UP (ref 80–100)
NRBC # BLD: 0 /100 WBCS — SIGNIFICANT CHANGE UP (ref 0–0)
NRBC BLD-RTO: 0 /100 WBCS — SIGNIFICANT CHANGE UP (ref 0–0)
PLATELET # BLD AUTO: 209 K/UL — SIGNIFICANT CHANGE UP (ref 150–400)
POTASSIUM SERPL-MCNC: 2.8 MMOL/L — CRITICAL LOW (ref 3.5–5.3)
POTASSIUM SERPL-SCNC: 2.8 MMOL/L — CRITICAL LOW (ref 3.5–5.3)
RBC # BLD: 2.85 M/UL — LOW (ref 3.8–5.2)
RBC # FLD: 15.5 % — HIGH (ref 10.3–14.5)
SODIUM SERPL-SCNC: 131 MMOL/L — LOW (ref 135–145)
WBC # BLD: 5.6 K/UL — SIGNIFICANT CHANGE UP (ref 3.8–10.5)
WBC # FLD AUTO: 5.6 K/UL — SIGNIFICANT CHANGE UP (ref 3.8–10.5)

## 2025-02-01 RX ORDER — POTASSIUM CHLORIDE 750 MG/1
10 TABLET, EXTENDED RELEASE ORAL
Refills: 0 | Status: COMPLETED | OUTPATIENT
Start: 2025-02-01 | End: 2025-02-01

## 2025-02-01 RX ADMIN — POTASSIUM CHLORIDE 100 MILLIEQUIVALENT(S): 750 TABLET, EXTENDED RELEASE ORAL at 11:38

## 2025-02-01 RX ADMIN — IPRATROPIUM BROMIDE AND ALBUTEROL SULFATE 3 MILLILITER(S): .5; 2.5 SOLUTION RESPIRATORY (INHALATION) at 11:38

## 2025-02-01 RX ADMIN — Medication 4 MILLILITER(S): at 18:28

## 2025-02-01 RX ADMIN — Medication 5 MILLIGRAM(S): at 11:45

## 2025-02-01 RX ADMIN — Medication 5000 UNIT(S): at 18:28

## 2025-02-01 RX ADMIN — ANTISEPTIC SURGICAL SCRUB 1 APPLICATION(S): 0.04 SOLUTION TOPICAL at 06:21

## 2025-02-01 RX ADMIN — IPRATROPIUM BROMIDE AND ALBUTEROL SULFATE 3 MILLILITER(S): .5; 2.5 SOLUTION RESPIRATORY (INHALATION) at 23:07

## 2025-02-01 RX ADMIN — LEVOTHYROXINE SODIUM 60 MICROGRAM(S): 25 TABLET ORAL at 23:18

## 2025-02-01 RX ADMIN — IPRATROPIUM BROMIDE AND ALBUTEROL SULFATE 3 MILLILITER(S): .5; 2.5 SOLUTION RESPIRATORY (INHALATION) at 06:20

## 2025-02-01 RX ADMIN — Medication 5000 UNIT(S): at 06:19

## 2025-02-01 RX ADMIN — Medication 5 MILLIGRAM(S): at 06:20

## 2025-02-01 RX ADMIN — Medication 5 MILLIGRAM(S): at 23:16

## 2025-02-01 RX ADMIN — POTASSIUM CHLORIDE 100 MILLIEQUIVALENT(S): 750 TABLET, EXTENDED RELEASE ORAL at 09:10

## 2025-02-01 RX ADMIN — POTASSIUM CHLORIDE 100 MILLIEQUIVALENT(S): 750 TABLET, EXTENDED RELEASE ORAL at 10:21

## 2025-02-01 RX ADMIN — Medication 4 MILLILITER(S): at 06:20

## 2025-02-01 RX ADMIN — Medication 40 MILLIGRAM(S): at 06:20

## 2025-02-01 RX ADMIN — AMIODARONE HYDROCHLORIDE 16.7 MG/MIN: 50 INJECTION, SOLUTION INTRAVENOUS at 11:39

## 2025-02-01 RX ADMIN — Medication 5 MILLIGRAM(S): at 18:27

## 2025-02-01 RX ADMIN — IPRATROPIUM BROMIDE AND ALBUTEROL SULFATE 3 MILLILITER(S): .5; 2.5 SOLUTION RESPIRATORY (INHALATION) at 18:27

## 2025-02-01 NOTE — PROGRESS NOTE ADULT - PROBLEM SELECTOR PLAN 3
CT chest 1/23 with moderate b/l pl effusions  -CXR 1/25 with b/l pleural effusions/atelectasis L>R, L pleural effusion now appears mod-large  -Holding off on thoracentesis for now as O2 requirements improving with diuresis  -Keep O>I as tolerated.    2/1: repeat cxr in AM

## 2025-02-01 NOTE — PROGRESS NOTE ADULT - SUBJECTIVE AND OBJECTIVE BOX
DATE OF SERVICE: 02-01-25 @ 14:11  CHIEF COMPLAINT:Patient is a 89y old  Female who presents with a chief complaint of Hypoxic/hypercapnic resp failure (01 Feb 2025 13:19)    	        PAST MEDICAL & SURGICAL HISTORY:  Hypertension      Hypothyroidism      HTN (hypertension)      Dementia      Other dementia      History of colon cancer  s/p Resection in 2009, Dr. David      H/O brain tumor  History of brain tumor resection in 1961.              REVIEW OF SYSTEMS:    RESPIRATORY: No cough, wheezing, chills or hemoptysis; No Shortness of Breath  CARDIOVASCULAR: No chest pain, palpitations, passing out  GASTROINTESTINAL: No abdominal or epigastric pain. No nausea, vomiting, or hematemesis;  GENITOURINARY: No dysuria, frequency, hematuria,   NEUROLOGICAL: No headaches,  Medications:  MEDICATIONS  (STANDING):  albuterol/ipratropium for Nebulization 3 milliLiter(s) Nebulizer every 6 hours  albuterol/ipratropium for Nebulization. 3 milliLiter(s) Nebulizer once  aMIOdarone Infusion 0.501 mG/Min (16.7 mL/Hr) IV Continuous <Continuous>  ascorbic acid 500 milliGRAM(s) Oral daily  bisacodyl Suppository 10 milliGRAM(s) Rectal daily  chlorhexidine 2% Cloths 1 Application(s) Topical <User Schedule>  dextrose 5%. 1000 milliLiter(s) (50 mL/Hr) IV Continuous <Continuous>  furosemide   Injectable 40 milliGRAM(s) IV Push daily  heparin   Injectable 5000 Unit(s) SubCutaneous every 12 hours  levothyroxine Injectable 60 MICROGram(s) IV Push at bedtime  metoprolol tartrate Injectable 5 milliGRAM(s) IV Push every 6 hours  mirtazapine 15 milliGRAM(s) Oral at bedtime  multivitamin 1 Tablet(s) Oral daily  QUEtiapine 12.5 milliGRAM(s) Oral at bedtime  sodium chloride 3%  Inhalation 4 milliLiter(s) Inhalation every 12 hours    MEDICATIONS  (PRN):  acetaminophen   Oral Liquid .. 650 milliGRAM(s) Oral every 6 hours PRN Temp greater or equal to 38C (100.4F), Moderate Pain (4 - 6)  haloperidol    Injectable 1 milliGRAM(s) IntraMuscular every 6 hours PRN Agitation    	    PHYSICAL EXAM:  T(C): 36.2 (02-01-25 @ 09:30), Max: 36.8 (01-31-25 @ 14:45)  HR: 79 (02-01-25 @ 09:30) (64 - 79)  BP: 154/77 (02-01-25 @ 09:30) (135/67 - 163/58)  RR: 18 (02-01-25 @ 09:30) (18 - 18)  SpO2: 94% (02-01-25 @ 09:30) (94% - 100%)  Wt(kg): --  I&O's Summary    31 Jan 2025 07:01  -  01 Feb 2025 07:00  --------------------------------------------------------  IN: 0 mL / OUT: 900 mL / NET: -900 mL        frail  HEENT:   Normal oral mucosa  Cardiovascular:  irreg  Respiratory:dec bs   Psychiatry: A & O  Gastrointestinal:  Soft, Non-tender, + BS	  dec rom    TELEMETRY: 	    ECG:  	  RADIOLOGY:  OTHER: 	  	  LABS:	 	    CARDIAC MARKERS:                                8.7    5.60  )-----------( 209      ( 01 Feb 2025 06:56 )             27.2     02-01    131[L]  |  91[L]  |  12  ----------------------------<  79  2.8[LL]   |  29  |  1.25    Ca    9.3      01 Feb 2025 06:54      proBNP:   Lipid Profile:   HgA1c:   TSH:

## 2025-02-01 NOTE — PROGRESS NOTE ADULT - SUBJECTIVE AND OBJECTIVE BOX
Cardiovascular Disease Progress Note  Date of service: 02-01-25 @ 13:19    Overnight events: No acute events overnight.  Patient is in no distress. Currently on room air.   Otherwise review of systems negative    Objective Findings:  T(C): 36.2 (02-01-25 @ 09:30), Max: 36.8 (01-31-25 @ 14:45)  HR: 79 (02-01-25 @ 09:30) (64 - 79)  BP: 154/77 (02-01-25 @ 09:30) (135/67 - 163/58)  RR: 18 (02-01-25 @ 09:30) (18 - 18)  SpO2: 94% (02-01-25 @ 09:30) (94% - 100%)  Wt(kg): --  Daily     Daily       Physical Exam:  Gen: NAD; Patient resting comfortably  HEENT: EOMI, Normocephalic/ atraumatic  CV: RRR, normal S1 + S2, no m/r/g  Lungs:  Normal respiratory effort; clear to auscultation bilaterally  Abd: soft, non-tender; bowel sounds present  Ext: No edema; warm and well perfused     Telemetry: Sinus     Laboratory Data:                        8.7    5.60  )-----------( 209      ( 01 Feb 2025 06:56 )             27.2     02-01    131[L]  |  91[L]  |  12  ----------------------------<  79  2.8[LL]   |  29  |  1.25    Ca    9.3      01 Feb 2025 06:54                Inpatient Medications:  MEDICATIONS  (STANDING):  albuterol/ipratropium for Nebulization 3 milliLiter(s) Nebulizer every 6 hours  albuterol/ipratropium for Nebulization. 3 milliLiter(s) Nebulizer once  aMIOdarone Infusion 0.501 mG/Min (16.7 mL/Hr) IV Continuous <Continuous>  ascorbic acid 500 milliGRAM(s) Oral daily  bisacodyl Suppository 10 milliGRAM(s) Rectal daily  chlorhexidine 2% Cloths 1 Application(s) Topical <User Schedule>  dextrose 5%. 1000 milliLiter(s) (50 mL/Hr) IV Continuous <Continuous>  furosemide   Injectable 40 milliGRAM(s) IV Push daily  heparin   Injectable 5000 Unit(s) SubCutaneous every 12 hours  levothyroxine Injectable 60 MICROGram(s) IV Push at bedtime  metoprolol tartrate Injectable 5 milliGRAM(s) IV Push every 6 hours  mirtazapine 15 milliGRAM(s) Oral at bedtime  multivitamin 1 Tablet(s) Oral daily  QUEtiapine 12.5 milliGRAM(s) Oral at bedtime  sodium chloride 3%  Inhalation 4 milliLiter(s) Inhalation every 12 hours      Assessment: 89y old Female with stated hx significant for dementia, s/p brain tumor resection 1961, HTN, hypothyroidism colon Ca s/p Rt hemicolectomy ('09), 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    Plan of Care:    #Afib RVR  - HR now improved on Amidarone GTT  - Currently maintaining sinus.   - BP now stable. Would continue with IV lopressor standing dose. Would recommend stopping Amio gtt at this time  - PO metoprolol tartrate 50mg BID if able to tolerate PO medication for rate control  - Patient is anemic and high bleeding risk, would refrain from AC at this time.     #Hypoxic respiratory failure  - On HFNC  - Wean as tolerated  - IV diuresis  as needed.           Over 55 minutes spent on total encounter; more than 50% of the visit was spent counseling and/or coordinating care by the attending physician.      Bobby Montilla DO [facility]  Cardiovascular Disease  [phone]

## 2025-02-01 NOTE — PROGRESS NOTE ADULT - PROBLEM SELECTOR PLAN 1
-Initially 2nd to PNA   -Hypoxia had been improving, then with increase in O2 requirements requiring HFNC 1/24  -Repeat CT chest 1/23 with moderate b/l pleural effusions/atelectasis, b/l GGO. Likely combination of fluid overload + PNA  -Completed 10 day course of ABX   -Continue bronchodilators. S/p Mucomyst   -Keep O>I with diuresis as tolerated  -Continue HFNC, wean as tolerated, keep sats >90%. O2 requirements improving, on 40L/40% this AM   -Suggest Bipap 10/5 PRN if worsening hypoxia or increased WOB  -Prognosis guarded. [facility] DNR/DNI/trial NIV  -Palliative care f/u.    2/1: seems better; on room air:  repeat cxr in AM to evaluate for pleural effusion : she is not in any resp distress

## 2025-02-01 NOTE — PROGRESS NOTE ADULT - SUBJECTIVE AND OBJECTIVE BOX
This office note has been dictated.     Date of Service: 02-01-25 @ 15:17    Patient is a 89y old  Female who presents with a chief complaint of Hypoxic/hypercapnic resp failure (01 Feb 2025 14:10)      Any change in ROS: sheis awake and is on room air today  : not in a ny resp distress      MEDICATIONS  (STANDING):  albuterol/ipratropium for Nebulization 3 milliLiter(s) Nebulizer every 6 hours  albuterol/ipratropium for Nebulization. 3 milliLiter(s) Nebulizer once  ascorbic acid 500 milliGRAM(s) Oral daily  bisacodyl Suppository 10 milliGRAM(s) Rectal daily  chlorhexidine 2% Cloths 1 Application(s) Topical <User Schedule>  dextrose 5%. 1000 milliLiter(s) (50 mL/Hr) IV Continuous <Continuous>  furosemide   Injectable 40 milliGRAM(s) IV Push daily  heparin   Injectable 5000 Unit(s) SubCutaneous every 12 hours  levothyroxine Injectable 60 MICROGram(s) IV Push at bedtime  metoprolol tartrate Injectable 5 milliGRAM(s) IV Push every 6 hours  mirtazapine 15 milliGRAM(s) Oral at bedtime  multivitamin 1 Tablet(s) Oral daily  QUEtiapine 12.5 milliGRAM(s) Oral at bedtime  sodium chloride 3%  Inhalation 4 milliLiter(s) Inhalation every 12 hours    MEDICATIONS  (PRN):  acetaminophen   Oral Liquid .. 650 milliGRAM(s) Oral every 6 hours PRN Temp greater or equal to 38C (100.4F), Moderate Pain (4 - 6)  haloperidol    Injectable 1 milliGRAM(s) IntraMuscular every 6 hours PRN Agitation    Vital Signs Last 24 Hrs  T(C): 36.2 (01 Feb 2025 09:30), Max: 36.3 (31 Jan 2025 20:26)  T(F): 97.1 (01 Feb 2025 09:30), Max: 97.4 (31 Jan 2025 20:26)  HR: 79 (01 Feb 2025 09:30) (69 - 79)  BP: 154/77 (01 Feb 2025 09:30) (154/77 - 163/58)  BP(mean): --  RR: 18 (01 Feb 2025 09:30) (18 - 18)  SpO2: 94% (01 Feb 2025 09:30) (94% - 97%)    Parameters below as of 01 Feb 2025 09:30  Patient On (Oxygen Delivery Method): room air        I&O's Summary    31 Jan 2025 07:01  -  01 Feb 2025 07:00  --------------------------------------------------------  IN: 0 mL / OUT: 900 mL / NET: -900 mL          Physical Exam:   GENERAL: NAD, well-groomed, well-developed  HEENT: LUANN/   Atraumatic, Normocephalic  ENMT: No tonsillar erythema, exudates, or enlargement; Moist mucous membranes, Good dentition, No lesions  NECK: Supple, No JVD, Normal thyroid  CHEST/LUNG: decreased  breath sounds left side   CVS: Regular rate and rhythm; No murmurs, rubs, or gallops  GI: : Soft, Nontender, Nondistended; Bowel sounds present  NERVOUS SYSTEM:  awake:  demtned  EXTREMITIES:  trace edema  LYMPH: No lymphadenopathy noted  SKIN: No rashes or lesions  ENDOCRINOLOGY: No Thyromegaly  PSYCH: Appropriate    Labs:  33                            8.7    5.60  )-----------( 209      ( 01 Feb 2025 06:56 )             27.2                         8.5    6.47  )-----------( 181      ( 29 Jan 2025 07:07 )             26.8     02-01    131[L]  |  91[L]  |  12  ----------------------------<  79  2.8[LL]   |  29  |  1.25  01-29    132[L]  |  94[L]  |  24[H]  ----------------------------<  82  3.4[L]   |  29  |  1.47[H]    Ca    9.3      01 Feb 2025 06:54      CAPILLARY BLOOD GLUCOSE      POCT Blood Glucose.: 140 mg/dL (01 Feb 2025 12:25)  POCT Blood Glucose.: 112 mg/dL (01 Feb 2025 08:13)  POCT Blood Glucose.: 89 mg/dL (31 Jan 2025 17:46)          Urinalysis Basic - ( 01 Feb 2025 06:54 )    Color: x / Appearance: x / SG: x / pH: x  Gluc: 79 mg/dL / Ketone: x  / Bili: x / Urobili: x   Blood: x / Protein: x / Nitrite: x   Leuk Esterase: x / RBC: x / WBC x   Sq Epi: x / Non Sq Epi: x / Bacteria: x        rad< from: Xray Chest 1 View- PORTABLE-Urgent (Xray Chest 1 View- PORTABLE-Urgent .) (01.25.25 @ 14:39) >    ACC: [id number] EXAM:  XR CHEST PORTABLE URGENT 1V   ORDERED BY: JESUS ALBERTO VARNER     PROCEDURE DATE:  01/25/2025          INTERPRETATION:  CLINICAL INDICATION: Hypoxia    TECHNIQUE: Frontal view of the chest was obtained.    COMPARISON: Chest x-ray1/19/2025.    FINDINGS:  The heart size is normal.  Moderate left and small right pleural effusions.  No pneumothorax.  No acute osseous abnormalities.    IMPRESSION:  Moderate left and small right pleural effusions.    --- End of Report ---          LILIAM KING MD; Resident Radiologist  This document has been electronically signed.  GENI BORJA MD; Attending Radiologist  This document has been electronically signed. Jan 26 2025 10:38AM    < end of copied text >      RECENT CULTURES:        RESPIRATORY CULTURES:          Studies  Chest X-RAY  CT SCAN Chest   Venous Dopplers: LE:   CT Abdomen  Others

## 2025-02-01 NOTE — PROGRESS NOTE ADULT - SUBJECTIVE AND OBJECTIVE BOX
[facility]      Zaid Garcia NP    [address] [place] [address] [phone]    INTERVAL HPI/OVERNIGHT EVENTS:  pt s/e RN at bedside, not on HFNC  more awake    Allergies    clindamycin (Unknown)  shellfish (Unknown)  strawberry (Rash)  penicillin (Unknown)  clindamycin (Other)  strawberry (Unknown)  IV Contrast (Other)  IV Contrast (Unknown)    Intolerances      unable to obtain      PHYSICAL EXAM:   Vital Signs Last 24 Hrs  T(C): 36.2 (01 Feb 2025 09:30), Max: 36.8 (31 Jan 2025 14:45)  T(F): 97.1 (01 Feb 2025 09:30), Max: 98.2 (31 Jan 2025 14:45)  HR: 79 (01 Feb 2025 09:30) (64 - 79)  BP: 154/77 (01 Feb 2025 09:30) (135/67 - 163/58)  BP(mean): --  RR: 18 (01 Feb 2025 09:30) (18 - 18)  SpO2: 94% (01 Feb 2025 09:30) (94% - 100%)    Parameters below as of 01 Feb 2025 09:30  Patient On (Oxygen Delivery Method): room air    Daily     Daily I&O's Summary    19 Jan 2025 07:01  -  19 Jan 2025 15:01  --------------------------------------------------------  IN: 0 mL / OUT: 150 mL / NET: -150 mL      nad  confused  frail  non toxic  soft, nt  no edema        LABS:                        8.7    5.60  )-----------( 209      ( 01 Feb 2025 06:56 )             27.2                02-01    131[L]  |  91[L]  |  12  ----------------------------<  79  2.8[LL]   |  29  |  1.25    Ca    9.3      01 Feb 2025 06:54          Urinalysis Basic - ( 19 Jan 2025 07:30 )    Color: x / Appearance: x / SG: x / pH: x  Gluc: 114 mg/dL / Ketone: x  / Bili: x / Urobili: x   Blood: x / Protein: x / Nitrite: x   Leuk Esterase: x / RBC: x / WBC x   Sq Epi: x / Non Sq Epi: x / Bacteria: x      amylase   lipase  RADIOLOGY & ADDITIONAL TESTS:

## 2025-02-02 LAB
ANION GAP SERPL CALC-SCNC: 13 MMOL/L — SIGNIFICANT CHANGE UP (ref 5–17)
BUN SERPL-MCNC: 11 MG/DL — SIGNIFICANT CHANGE UP (ref 7–23)
CALCIUM SERPL-MCNC: 9.4 MG/DL — SIGNIFICANT CHANGE UP (ref 8.4–10.5)
CHLORIDE SERPL-SCNC: 91 MMOL/L — LOW (ref 96–108)
CO2 SERPL-SCNC: 28 MMOL/L — SIGNIFICANT CHANGE UP (ref 22–31)
CREAT SERPL-MCNC: 1.2 MG/DL — SIGNIFICANT CHANGE UP (ref 0.5–1.3)
EGFR: 41 ML/MIN/1.73M2 — LOW
GLUCOSE BLDC GLUCOMTR-MCNC: 107 MG/DL — HIGH (ref 70–99)
GLUCOSE BLDC GLUCOMTR-MCNC: 138 MG/DL — HIGH (ref 70–99)
GLUCOSE BLDC GLUCOMTR-MCNC: 93 MG/DL — SIGNIFICANT CHANGE UP (ref 70–99)
GLUCOSE BLDC GLUCOMTR-MCNC: 94 MG/DL — SIGNIFICANT CHANGE UP (ref 70–99)
GLUCOSE BLDC GLUCOMTR-MCNC: 96 MG/DL — SIGNIFICANT CHANGE UP (ref 70–99)
GLUCOSE SERPL-MCNC: 75 MG/DL — SIGNIFICANT CHANGE UP (ref 70–99)
HCT VFR BLD CALC: 29.4 % — LOW (ref 34.5–45)
HGB BLD-MCNC: 9.7 G/DL — LOW (ref 11.5–15.5)
MCHC RBC-ENTMCNC: 31.3 PG — SIGNIFICANT CHANGE UP (ref 27–34)
MCHC RBC-ENTMCNC: 33 G/DL — SIGNIFICANT CHANGE UP (ref 32–36)
MCV RBC AUTO: 94.8 FL — SIGNIFICANT CHANGE UP (ref 80–100)
NRBC # BLD: 0 /100 WBCS — SIGNIFICANT CHANGE UP (ref 0–0)
NRBC BLD-RTO: 0 /100 WBCS — SIGNIFICANT CHANGE UP (ref 0–0)
PLATELET # BLD AUTO: 237 K/UL — SIGNIFICANT CHANGE UP (ref 150–400)
POTASSIUM SERPL-MCNC: 3.1 MMOL/L — LOW (ref 3.5–5.3)
POTASSIUM SERPL-SCNC: 3.1 MMOL/L — LOW (ref 3.5–5.3)
RBC # BLD: 3.1 M/UL — LOW (ref 3.8–5.2)
RBC # FLD: 15.9 % — HIGH (ref 10.3–14.5)
SODIUM SERPL-SCNC: 132 MMOL/L — LOW (ref 135–145)
WBC # BLD: 5.63 K/UL — SIGNIFICANT CHANGE UP (ref 3.8–10.5)
WBC # FLD AUTO: 5.63 K/UL — SIGNIFICANT CHANGE UP (ref 3.8–10.5)

## 2025-02-02 PROCEDURE — 71045 X-RAY EXAM CHEST 1 VIEW: CPT | Mod: 26

## 2025-02-02 RX ORDER — POTASSIUM CHLORIDE 750 MG/1
10 TABLET, EXTENDED RELEASE ORAL
Refills: 0 | Status: COMPLETED | OUTPATIENT
Start: 2025-02-02 | End: 2025-02-02

## 2025-02-02 RX ORDER — DEXTROSE MONOHYDRATE, SODIUM CHLORIDE, AND POTASSIUM CHLORIDE 50; 2.25; 2.24 G/1000ML; G/1000ML; G/1000ML
1000 INJECTION, SOLUTION INTRAVENOUS
Refills: 0 | Status: DISCONTINUED | OUTPATIENT
Start: 2025-02-02 | End: 2025-02-05

## 2025-02-02 RX ADMIN — POTASSIUM CHLORIDE 100 MILLIEQUIVALENT(S): 750 TABLET, EXTENDED RELEASE ORAL at 10:32

## 2025-02-02 RX ADMIN — POTASSIUM CHLORIDE 100 MILLIEQUIVALENT(S): 750 TABLET, EXTENDED RELEASE ORAL at 09:16

## 2025-02-02 RX ADMIN — DEXTROSE MONOHYDRATE, SODIUM CHLORIDE, AND POTASSIUM CHLORIDE 50 MILLILITER(S): 50; 2.25; 2.24 INJECTION, SOLUTION INTRAVENOUS at 17:00

## 2025-02-02 RX ADMIN — Medication 5000 UNIT(S): at 17:41

## 2025-02-02 RX ADMIN — Medication 5 MILLIGRAM(S): at 11:57

## 2025-02-02 RX ADMIN — Medication 40 MILLIGRAM(S): at 05:18

## 2025-02-02 RX ADMIN — HALOPERIDOL 1 MILLIGRAM(S): 10 TABLET ORAL at 18:39

## 2025-02-02 RX ADMIN — POTASSIUM CHLORIDE 100 MILLIEQUIVALENT(S): 750 TABLET, EXTENDED RELEASE ORAL at 11:56

## 2025-02-02 RX ADMIN — Medication 5 MILLIGRAM(S): at 05:18

## 2025-02-02 RX ADMIN — SODIUM CHLORIDE 50 MILLILITER(S): 9 INJECTION, SOLUTION INTRAVENOUS at 10:32

## 2025-02-02 RX ADMIN — IPRATROPIUM BROMIDE AND ALBUTEROL SULFATE 3 MILLILITER(S): .5; 2.5 SOLUTION RESPIRATORY (INHALATION) at 11:57

## 2025-02-02 RX ADMIN — Medication 5000 UNIT(S): at 05:18

## 2025-02-02 RX ADMIN — Medication 4 MILLILITER(S): at 07:22

## 2025-02-02 RX ADMIN — ANTISEPTIC SURGICAL SCRUB 1 APPLICATION(S): 0.04 SOLUTION TOPICAL at 07:22

## 2025-02-02 RX ADMIN — SODIUM CHLORIDE 50 MILLILITER(S): 9 INJECTION, SOLUTION INTRAVENOUS at 09:29

## 2025-02-02 RX ADMIN — IPRATROPIUM BROMIDE AND ALBUTEROL SULFATE 3 MILLILITER(S): .5; 2.5 SOLUTION RESPIRATORY (INHALATION) at 17:41

## 2025-02-02 RX ADMIN — IPRATROPIUM BROMIDE AND ALBUTEROL SULFATE 3 MILLILITER(S): .5; 2.5 SOLUTION RESPIRATORY (INHALATION) at 07:22

## 2025-02-02 RX ADMIN — Medication 4 MILLILITER(S): at 17:41

## 2025-02-02 RX ADMIN — Medication 5 MILLIGRAM(S): at 17:41

## 2025-02-02 NOTE — PROGRESS NOTE ADULT - ASSESSMENT
resp failure  aspiration    peg cancelled previously due to worsening respiratory status  she is not a candidate for PEG/sedation at present  dnr/dni  prognosis guarded   goc noted  Palliative care on going discussion     Advanced care planning was discussed with patient and family.  Advanced care planning forms were reviewed and discussed.  Risks, benefits and alternatives of gastroenterologic procedures were discussed in detail and all questions were answered.    30 minutes spent.

## 2025-02-02 NOTE — PROGRESS NOTE ADULT - NSPROGADDITIONALINFOA_GEN_ALL_CORE
Contacted by RN via teams, pt's son seeking to speak with GI. I spoke with pt's son at 1606 and he is requesting to repeat speech and swallow eval and  PEG tube placement since pt is not on HFNC. I advised him that we will evaluate patient tomorrow and follow up on s/s recommendations.

## 2025-02-02 NOTE — PROGRESS NOTE ADULT - PROBLEM SELECTOR PLAN 3
CT chest 1/23 with moderate b/l pl effusions  -CXR 1/25 with b/l pleural effusions/atelectasis L>R, L pleural effusion now appears mod-large  -Holding off on thoracentesis for now as O2 requirements improving with diuresis  -Keep O>I as tolerated.    2/1: repeat cxr in AM  2/2: cxr ordered:  pt has been on lasix

## 2025-02-02 NOTE — PROGRESS NOTE ADULT - PROBLEM SELECTOR PLAN 1
resolving   off HiFLo  continue to follow pulmonary recommendations  discussed with pulmonary she is cleared for PEG

## 2025-02-02 NOTE — PROGRESS NOTE ADULT - SUBJECTIVE AND OBJECTIVE BOX
Cardiovascular Disease Progress Note  Date of service: 02-02-25 @ 13:05    Overnight events: No acute events overnight.  Patient is in no distress.   Otherwise review of systems negative    Objective Findings:  T(C): 36.4 (02-02-25 @ 12:00), Max: 36.6 (02-02-25 @ 05:07)  HR: 69 (02-02-25 @ 12:00) (64 - 73)  BP: 151/71 (02-02-25 @ 12:00) (112/72 - 157/79)  RR: 18 (02-02-25 @ 12:00) (18 - 18)  SpO2: 95% (02-02-25 @ 12:00) (93% - 100%)  Wt(kg): --  Daily     Daily       Physical Exam:  Gen: NAD; Patient resting comfortably  HEENT: EOMI, Normocephalic/ atraumatic  CV: RRR, normal S1 + S2, no m/r/g  Lungs:  Normal respiratory effort; clear to auscultation bilaterally  Abd: soft, non-tender; bowel sounds present  Ext: No edema; warm and well perfused    Telemetry: Sinus     Laboratory Data:                        9.7    5.63  )-----------( 237      ( 02 Feb 2025 07:22 )             29.4     02-02    132[L]  |  91[L]  |  11  ----------------------------<  75  3.1[L]   |  28  |  1.20    Ca    9.4      02 Feb 2025 07:25                Inpatient Medications:  MEDICATIONS  (STANDING):  albuterol/ipratropium for Nebulization 3 milliLiter(s) Nebulizer every 6 hours  albuterol/ipratropium for Nebulization. 3 milliLiter(s) Nebulizer once  ascorbic acid 500 milliGRAM(s) Oral daily  bisacodyl Suppository 10 milliGRAM(s) Rectal daily  chlorhexidine 2% Cloths 1 Application(s) Topical <User Schedule>  dextrose 5%. 1000 milliLiter(s) (50 mL/Hr) IV Continuous <Continuous>  furosemide   Injectable 40 milliGRAM(s) IV Push daily  heparin   Injectable 5000 Unit(s) SubCutaneous every 12 hours  levothyroxine Injectable 60 MICROGram(s) IV Push at bedtime  metoprolol tartrate Injectable 5 milliGRAM(s) IV Push every 6 hours  mirtazapine 15 milliGRAM(s) Oral at bedtime  multivitamin 1 Tablet(s) Oral daily  QUEtiapine 12.5 milliGRAM(s) Oral at bedtime  sodium chloride 3%  Inhalation 4 milliLiter(s) Inhalation every 12 hours      Assessment: 89y old Female with stated hx significant for dementia, s/p brain tumor resection 1961, HTN, hypothyroidism colon Ca s/p Rt hemicolectomy ('09), 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    Plan of Care:    #Afib RVR  - HR now improved  - Currently maintaining sinus.   - BP now stable. Would continue with IV lopressor standing dose.   - PO metoprolol tartrate 50mg BID if able to tolerate PO medication for rate control  - Patient is anemic and high bleeding risk, would refrain from AC at this time.     #Hypoxic respiratory failure  - On HFNC  - Wean as tolerated  - IV diuresis  as needed.   - Pulm input appreciated.   - Volume status improving  - Replenish lytes as needed.       Over 55 minutes spent on total encounter; more than 50% of the visit was spent counseling and/or coordinating care by the attending physician.      Bobby Montilla DO [facility]  Cardiovascular Disease  [phone]

## 2025-02-02 NOTE — PROGRESS NOTE ADULT - SUBJECTIVE AND OBJECTIVE BOX
[facility]      Zaid Garcia NP    [address] [place] [address] [phone]    INTERVAL HPI/ OVERNIGHT EVENTS:  pt s/e RN at bedside, not on HFNC      Allergies    clindamycin (Unknown)  shellfish (Unknown)  strawberry (Rash)  penicillin (Unknown)  clindamycin (Other)  strawberry (Unknown)  IV Contrast (Other)  IV Contrast (Unknown)    Intolerances      unable to obtain      PHYSICAL EXAM:   Vital Signs Last 24 Hrs  T(C): 36.4 (02 Feb 2025 12:00), Max: 36.6 (02 Feb 2025 05:07)  T(F): 97.5 (02 Feb 2025 12:00), Max: 97.8 (02 Feb 2025 05:07)  HR: 69 (02 Feb 2025 12:00) (64 - 73)  BP: 151/71 (02 Feb 2025 12:00) (112/72 - 157/79)  BP(mean): 97 (02 Feb 2025 12:00) (97 - 97)  RR: 18 (02 Feb 2025 12:00) (18 - 18)  SpO2: 95% (02 Feb 2025 12:00) (93% - 100%)    Parameters below as of 02 Feb 2025 12:00  Patient On (Oxygen Delivery Method): room air    Daily     Daily I&O's Summary    19 Jan 2025 07:01  -  19 Jan 2025 15:01  --------------------------------------------------------  IN: 0 mL / OUT: 150 mL / NET: -150 mL      nad  confused  frail  non toxic  soft, nt  no edema        LABS:                        9.7    5.63  )-----------( 237      ( 02 Feb 2025 07:22 )             29.4      02-02    132[L]  |  91[L]  |  11  ----------------------------<  75  3.1[L]   |  28  |  1.20    Ca    9.4      02 Feb 2025 07:25                      Urinalysis Basic - ( 19 Jan 2025 07:30 )    Color: x / Appearance: x / SG: x / pH: x  Gluc: 114 mg/dL / Ketone: x  / Bili: x / Urobili: x   Blood: x / Protein: x / Nitrite: x   Leuk Esterase: x / RBC: x / WBC x   Sq Epi: x / Non Sq Epi: x / Bacteria: x      amylase   lipase  RADIOLOGY & ADDITIONAL TESTS:

## 2025-02-02 NOTE — PROGRESS NOTE ADULT - SUBJECTIVE AND OBJECTIVE BOX
Patient is a 89y old  Female who presents with a chief complaint of Hypoxic/hypercapnic resp failure (02 Feb 2025 13:05)      DATE OF SERVICE: 02-02-25 @ 14:28    SUBJECTIVE / OVERNIGHT EVENTS: overnight events noted    ROS:  not available       MEDICATIONS  (STANDING):  albuterol/ipratropium for Nebulization 3 milliLiter(s) Nebulizer every 6 hours  albuterol/ipratropium for Nebulization. 3 milliLiter(s) Nebulizer once  ascorbic acid 500 milliGRAM(s) Oral daily  bisacodyl Suppository 10 milliGRAM(s) Rectal daily  chlorhexidine 2% Cloths 1 Application(s) Topical <User Schedule>  dextrose 5%. 1000 milliLiter(s) (50 mL/Hr) IV Continuous <Continuous>  furosemide   Injectable 40 milliGRAM(s) IV Push daily  heparin   Injectable 5000 Unit(s) SubCutaneous every 12 hours  levothyroxine Injectable 60 MICROGram(s) IV Push at bedtime  metoprolol tartrate Injectable 5 milliGRAM(s) IV Push every 6 hours  mirtazapine 15 milliGRAM(s) Oral at bedtime  multivitamin 1 Tablet(s) Oral daily  QUEtiapine 12.5 milliGRAM(s) Oral at bedtime  sodium chloride 3%  Inhalation 4 milliLiter(s) Inhalation every 12 hours    MEDICATIONS  (PRN):  acetaminophen   Oral Liquid .. 650 milliGRAM(s) Oral every 6 hours PRN Temp greater or equal to 38C (100.4F), Moderate Pain (4 - 6)  haloperidol    Injectable 1 milliGRAM(s) IntraMuscular every 6 hours PRN Agitation        CAPILLARY BLOOD GLUCOSE      POCT Blood Glucose.: 96 mg/dL (02 Feb 2025 12:27)  POCT Blood Glucose.: 107 mg/dL (02 Feb 2025 08:11)  POCT Blood Glucose.: 138 mg/dL (02 Feb 2025 00:42)  POCT Blood Glucose.: 77 mg/dL (01 Feb 2025 21:35)  POCT Blood Glucose.: 85 mg/dL (01 Feb 2025 17:16)    I&O's Summary      Vital Signs Last 24 Hrs  T(C): 36.4 (02 Feb 2025 12:00), Max: 36.6 (02 Feb 2025 05:07)  T(F): 97.5 (02 Feb 2025 12:00), Max: 97.8 (02 Feb 2025 05:07)  HR: 69 (02 Feb 2025 12:00) (64 - 73)  BP: 151/71 (02 Feb 2025 12:00) (112/72 - 157/79)  BP(mean): 97 (02 Feb 2025 12:00) (97 - 97)  RR: 18 (02 Feb 2025 12:00) (18 - 18)  SpO2: 95% (02 Feb 2025 12:00) (93% - 100%)    PHYSICAL EXAM:   CHEST/LUNG: coarse breath sounds   HEART: S1 S2; no murmurs   ABDOMEN: Soft, Nontender  EXTREMITIES: no edema  NEUROLOGY: non-focal    LABS:                        9.7    5.63  )-----------( 237      ( 02 Feb 2025 07:22 )             29.4     02-02    132[L]  |  91[L]  |  11  ----------------------------<  75  3.1[L]   |  28  |  1.20    Ca    9.4      02 Feb 2025 07:25            Urinalysis Basic - ( 02 Feb 2025 07:25 )    Color: x / Appearance: x / SG: x / pH: x  Gluc: 75 mg/dL / Ketone: x  / Bili: x / Urobili: x   Blood: x / Protein: x / Nitrite: x   Leuk Esterase: x / RBC: x / WBC x   Sq Epi: x / Non Sq Epi: x / Bacteria: x          All consultant(s) notes reviewed and care discussed with other providers        Contact Number, Dr Link [phone]

## 2025-02-02 NOTE — PROGRESS NOTE ADULT - PROBLEM SELECTOR PLAN 2
-Likely aspiration related. Pt also s/p flu   -Completed ABX, then resumed as pt spiked temp. CXR with bibasilar opacities   -Repeat CT chest 1/23 with b/l pleural effusions/atelectasis, b/l GGO. Likely combination of fluid overload + PNA  -Completed additional 10 day course of ABX   -Continue bronchodilators/mucolytics   -Trend leukocytosis/fever curve   -Suction PRN, chest PT  2/2: resolved

## 2025-02-02 NOTE — PROGRESS NOTE ADULT - SUBJECTIVE AND OBJECTIVE BOX
Date of Service: 02-02-25 @ 16:14    Patient is a 89y old  Female who presents with a chief complaint of Hypoxic/hypercapnic resp failure (02 Feb 2025 14:28)      Any change in ROS: she is alert and awake and is responding to simple questions:  sheis on room air      MEDICATIONS  (STANDING):  albuterol/ipratropium for Nebulization 3 milliLiter(s) Nebulizer every 6 hours  albuterol/ipratropium for Nebulization. 3 milliLiter(s) Nebulizer once  ascorbic acid 500 milliGRAM(s) Oral daily  bisacodyl Suppository 10 milliGRAM(s) Rectal daily  chlorhexidine 2% Cloths 1 Application(s) Topical <User Schedule>  dextrose 5% with potassium chloride 20 mEq/L 1000 milliLiter(s) (50 mL/Hr) IV Continuous <Continuous>  furosemide   Injectable 40 milliGRAM(s) IV Push daily  heparin   Injectable 5000 Unit(s) SubCutaneous every 12 hours  levothyroxine Injectable 60 MICROGram(s) IV Push at bedtime  metoprolol tartrate Injectable 5 milliGRAM(s) IV Push every 6 hours  mirtazapine 15 milliGRAM(s) Oral at bedtime  multivitamin 1 Tablet(s) Oral daily  QUEtiapine 12.5 milliGRAM(s) Oral at bedtime  sodium chloride 3%  Inhalation 4 milliLiter(s) Inhalation every 12 hours    MEDICATIONS  (PRN):  acetaminophen   Oral Liquid .. 650 milliGRAM(s) Oral every 6 hours PRN Temp greater or equal to 38C (100.4F), Moderate Pain (4 - 6)  haloperidol    Injectable 1 milliGRAM(s) IntraMuscular every 6 hours PRN Agitation    Vital Signs Last 24 Hrs  T(C): 36.4 (02 Feb 2025 12:00), Max: 36.6 (02 Feb 2025 05:07)  T(F): 97.5 (02 Feb 2025 12:00), Max: 97.8 (02 Feb 2025 05:07)  HR: 69 (02 Feb 2025 12:00) (64 - 73)  BP: 151/71 (02 Feb 2025 12:00) (112/72 - 157/79)  BP(mean): 97 (02 Feb 2025 12:00) (97 - 97)  RR: 18 (02 Feb 2025 12:00) (18 - 18)  SpO2: 95% (02 Feb 2025 12:00) (93% - 100%)    Parameters below as of 02 Feb 2025 12:00  Patient On (Oxygen Delivery Method): room air        I&O's Summary        Physical Exam:   GENERAL: NAD, well-groomed, well-developed  HEENT: LUANN/   Atraumatic, Normocephalic  ENMT: No tonsillar erythema, exudates, or enlargement; Moist mucous membranes, Good dentition, No lesions  NECK: Supple, No JVD, Normal thyroid  CHEST/LUNG: decreased air entry left side  : lkeft base  CVS: Regular rate and rhythm; No murmurs, rubs, or gallops  GI: : Soft, Nontender, Nondistended; Bowel sounds present  NERVOUS SYSTEM:  Alert & awake x 0-1  EXTREMITIES:  - edema  LYMPH: No lymphadenopathy noted  SKIN: No rashes or lesions  ENDOCRINOLOGY: No Thyromegaly  PSYCH: calm     Labs:                              9.7    5.63  )-----------( 237      ( 02 Feb 2025 07:22 )             29.4                         8.7    5.60  )-----------( 209      ( 01 Feb 2025 06:56 )             27.2     02-02    132[L]  |  91[L]  |  11  ----------------------------<  75  3.1[L]   |  28  |  1.20  02-01    131[L]  |  91[L]  |  12  ----------------------------<  79  2.8[LL]   |  29  |  1.25    Ca    9.4      02 Feb 2025 07:25  Ca    9.3      01 Feb 2025 06:54      CAPILLARY BLOOD GLUCOSE      POCT Blood Glucose.: 96 mg/dL (02 Feb 2025 12:27)  POCT Blood Glucose.: 107 mg/dL (02 Feb 2025 08:11)  POCT Blood Glucose.: 138 mg/dL (02 Feb 2025 00:42)  POCT Blood Glucose.: 77 mg/dL (01 Feb 2025 21:35)  POCT Blood Glucose.: 85 mg/dL (01 Feb 2025 17:16)          Urinalysis Basic - ( 02 Feb 2025 07:25 )    Color: x / Appearance: x / SG: x / pH: x  Gluc: 75 mg/dL / Ketone: x  / Bili: x / Urobili: x   Blood: x / Protein: x / Nitrite: x   Leuk Esterase: x / RBC: x / WBC x   Sq Epi: x / Non Sq Epi: x / Bacteria: x    rad< from: Xray Chest 1 View- PORTABLE-Urgent (Xray Chest 1 View- PORTABLE-Urgent .) (01.25.25 @ 14:39) >      INTERPRETATION:  CLINICAL INDICATION: Hypoxia    TECHNIQUE: Frontal view of the chest was obtained.    COMPARISON: Chest x-ray1/19/2025.    FINDINGS:  The heart size is normal.  Moderate left and small right pleural effusions.  No pneumothorax.  No acute osseous abnormalities.    IMPRESSION:  Moderate left and small right pleural effusions.    --- End of Report ---          LILIAM KING MD; Resident Radiologist  This document has been electronically signed.  GENI BORJA MD; Attending Radiologist  This document has been electronically signed. Jan 26 2025 10:38AM    < end of copied text >          RECENT CULTURES:        RESPIRATORY CULTURES:          Studies  Chest X-RAY  CT SCAN Chest   Venous Dopplers: LE:   CT Abdomen  Others

## 2025-02-02 NOTE — PROGRESS NOTE ADULT - PROBLEM SELECTOR PLAN 1
-Initially 2nd to PNA   -Hypoxia had been improving, then with increase in O2 requirements requiring HFNC 1/24  -Repeat CT chest 1/23 with moderate b/l pleural effusions/atelectasis, b/l GGO. Likely combination of fluid overload + PNA  -Completed 10 day course of ABX   -Continue bronchodilators. S/p Mucomyst   -Keep O>I with diuresis as tolerated  -Continue HFNC, wean as tolerated, keep sats >90%. O2 requirements improving, on 40L/40% this AM   -Suggest Bipap 10/5 PRN if worsening hypoxia or increased WOB  -Prognosis guarded. [facility] DNR/DNI/trial NIV  -Palliative care f/u.    2/1: seems better; on room air:  repeat cxr in AM to evaluate for pleural effusion : she is not in any resp distress  2/2: clinically she looks the best today  : off oxygen : no resp distress:  alert and awake and is responsive

## 2025-02-02 NOTE — PROGRESS NOTE ADULT - NSPROGADDITIONALINFOA_GEN_ALL_CORE
prognosis end stage however son continues to request invasive interventions like PEG  left him a message he has not returned my call

## 2025-02-02 NOTE — PROGRESS NOTE ADULT - PROBLEM SELECTOR PLAN 3
likely multifactorial  now resolved   replete K IV  change IV D5 to D5 with 20 K /liter  creatinine baseline

## 2025-02-02 NOTE — PROGRESS NOTE ADULT - ASSESSMENT
on room air  nad.     acetaminophen   Oral Liquid .. 650 milliGRAM(s) Oral every 6 hours PRN  albuterol/ipratropium for Nebulization 3 milliLiter(s) Nebulizer every 6 hours  albuterol/ipratropium for Nebulization. 3 milliLiter(s) Nebulizer once  ascorbic acid 500 milliGRAM(s) Oral daily  bisacodyl Suppository 10 milliGRAM(s) Rectal daily  chlorhexidine 2% Cloths 1 Application(s) Topical <User Schedule>  dextrose 5%. 1000 milliLiter(s) IV Continuous <Continuous>  furosemide   Injectable 40 milliGRAM(s) IV Push daily  haloperidol    Injectable 1 milliGRAM(s) IntraMuscular every 6 hours PRN  heparin   Injectable 5000 Unit(s) SubCutaneous every 12 hours  levothyroxine Injectable 60 MICROGram(s) IV Push at bedtime  metoprolol tartrate Injectable 5 milliGRAM(s) IV Push every 6 hours  mirtazapine 15 milliGRAM(s) Oral at bedtime  multivitamin 1 Tablet(s) Oral daily  QUEtiapine 12.5 milliGRAM(s) Oral at bedtime  sodium chloride 3%  Inhalation 4 milliLiter(s) Inhalation every 12 hours      VITAL:  T(C): , Max: 36.6 (02-02-25 @ 05:07)  T(F): , Max: 97.8 (02-02-25 @ 05:07)  HR: 69 (02-02-25 @ 12:00)  BP: 151/71 (02-02-25 @ 12:00)  BP(mean): 97 (02-02-25 @ 12:00)  RR: 18 (02-02-25 @ 12:00)  SpO2: 95% (02-02-25 @ 12:00)  Wt(kg): --      PHYSICAL EXAM:  Constitutional: NAD  Neck:  No JVD  Respiratory: Course   Cardiovascular: S1 and S2 tachy   Gastrointestinal: BS+, soft, NT/ND  Extremities: No peripheral edema  Neurological:  , no focal deficits  Psychiatric:    : No Crooks  Skin: No rashes  Access: Not applicable    LABS:                          9.7    5.63  )-----------( 237      ( 02 Feb 2025 07:22 )             29.4     Na(132)/K(3.1)/Cl(91)/HCO3(28)/BUN(11)/Cr(1.20)Glu(75)/Ca(9.4)/Mg(--)/PO4(--)    02-02 @ 07:25  Na(131)/K(2.8)/Cl(91)/HCO3(29)/BUN(12)/Cr(1.25)Glu(79)/Ca(9.3)/Mg(--)/PO4(--)    02-01 @ 06:54    Urinalysis Basic - ( 02 Feb 2025 07:25 )    Color: x / Appearance: x / SG: x / pH: x  Gluc: 75 mg/dL / Ketone: x  / Bili: x / Urobili: x   Blood: x / Protein: x / Nitrite: x   Leuk Esterase: x / RBC: x / WBC x   Sq Epi: x / Non Sq Epi: x / Bacteria: x              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), Presented from nursing home with hypoxic/hypercapnic resp failure, was found to Influenza A +, UTI + DWAIN and hyponatremia     Hyponatremia- likely secondary to hypotonic fluids  , improving   Hypokalemia- supplementing with potassium chloride 10 MEq IV x 3 doses  DWAIN - pre renal -  renal fxn intact  Anemia- hgb improving  Blood pressure - stable   Functional quadriplegia      RECOMMEND:   1 Renal- IV lasix ;   IV  dextrose for some nutrition   2 CVS- s/p  IV Amio , now on lopressor   3 Pulm- High flow         Full DNR       Ammon Nathan NP-BC  [employer]  [phone]

## 2025-02-03 LAB
ANION GAP SERPL CALC-SCNC: 12 MMOL/L — SIGNIFICANT CHANGE UP (ref 5–17)
BUN SERPL-MCNC: 10 MG/DL — SIGNIFICANT CHANGE UP (ref 7–23)
CALCIUM SERPL-MCNC: 9.7 MG/DL — SIGNIFICANT CHANGE UP (ref 8.4–10.5)
CHLORIDE SERPL-SCNC: 91 MMOL/L — LOW (ref 96–108)
CO2 SERPL-SCNC: 27 MMOL/L — SIGNIFICANT CHANGE UP (ref 22–31)
CREAT SERPL-MCNC: 1.13 MG/DL — SIGNIFICANT CHANGE UP (ref 0.5–1.3)
EGFR: 45 ML/MIN/1.73M2 — LOW
GLUCOSE BLDC GLUCOMTR-MCNC: 109 MG/DL — HIGH (ref 70–99)
GLUCOSE BLDC GLUCOMTR-MCNC: 88 MG/DL — SIGNIFICANT CHANGE UP (ref 70–99)
GLUCOSE BLDC GLUCOMTR-MCNC: 97 MG/DL — SIGNIFICANT CHANGE UP (ref 70–99)
GLUCOSE BLDC GLUCOMTR-MCNC: 98 MG/DL — SIGNIFICANT CHANGE UP (ref 70–99)
GLUCOSE BLDC GLUCOMTR-MCNC: 98 MG/DL — SIGNIFICANT CHANGE UP (ref 70–99)
GLUCOSE SERPL-MCNC: 90 MG/DL — SIGNIFICANT CHANGE UP (ref 70–99)
HCT VFR BLD CALC: 32.7 % — LOW (ref 34.5–45)
HGB BLD-MCNC: 10.8 G/DL — LOW (ref 11.5–15.5)
MCHC RBC-ENTMCNC: 31.4 PG — SIGNIFICANT CHANGE UP (ref 27–34)
MCHC RBC-ENTMCNC: 33 G/DL — SIGNIFICANT CHANGE UP (ref 32–36)
MCV RBC AUTO: 95.1 FL — SIGNIFICANT CHANGE UP (ref 80–100)
NRBC # BLD: 0 /100 WBCS — SIGNIFICANT CHANGE UP (ref 0–0)
NRBC BLD-RTO: 0 /100 WBCS — SIGNIFICANT CHANGE UP (ref 0–0)
PLATELET # BLD AUTO: 243 K/UL — SIGNIFICANT CHANGE UP (ref 150–400)
POTASSIUM SERPL-MCNC: 3.8 MMOL/L — SIGNIFICANT CHANGE UP (ref 3.5–5.3)
POTASSIUM SERPL-SCNC: 3.8 MMOL/L — SIGNIFICANT CHANGE UP (ref 3.5–5.3)
RBC # BLD: 3.44 M/UL — LOW (ref 3.8–5.2)
RBC # FLD: 15.9 % — HIGH (ref 10.3–14.5)
SODIUM SERPL-SCNC: 130 MMOL/L — LOW (ref 135–145)
WBC # BLD: 5.53 K/UL — SIGNIFICANT CHANGE UP (ref 3.8–10.5)
WBC # FLD AUTO: 5.53 K/UL — SIGNIFICANT CHANGE UP (ref 3.8–10.5)

## 2025-02-03 PROCEDURE — 99232 SBSQ HOSP IP/OBS MODERATE 35: CPT

## 2025-02-03 RX ADMIN — HALOPERIDOL 1 MILLIGRAM(S): 10 TABLET ORAL at 08:58

## 2025-02-03 RX ADMIN — Medication 5000 UNIT(S): at 17:21

## 2025-02-03 RX ADMIN — Medication 5 MILLIGRAM(S): at 17:21

## 2025-02-03 RX ADMIN — Medication 5 MILLIGRAM(S): at 11:11

## 2025-02-03 RX ADMIN — ANTISEPTIC SURGICAL SCRUB 1 APPLICATION(S): 0.04 SOLUTION TOPICAL at 05:46

## 2025-02-03 RX ADMIN — IPRATROPIUM BROMIDE AND ALBUTEROL SULFATE 3 MILLILITER(S): .5; 2.5 SOLUTION RESPIRATORY (INHALATION) at 05:44

## 2025-02-03 RX ADMIN — Medication 4 MILLILITER(S): at 17:21

## 2025-02-03 RX ADMIN — IPRATROPIUM BROMIDE AND ALBUTEROL SULFATE 3 MILLILITER(S): .5; 2.5 SOLUTION RESPIRATORY (INHALATION) at 23:35

## 2025-02-03 RX ADMIN — IPRATROPIUM BROMIDE AND ALBUTEROL SULFATE 3 MILLILITER(S): .5; 2.5 SOLUTION RESPIRATORY (INHALATION) at 11:11

## 2025-02-03 RX ADMIN — IPRATROPIUM BROMIDE AND ALBUTEROL SULFATE 3 MILLILITER(S): .5; 2.5 SOLUTION RESPIRATORY (INHALATION) at 17:21

## 2025-02-03 RX ADMIN — HALOPERIDOL 1 MILLIGRAM(S): 10 TABLET ORAL at 02:32

## 2025-02-03 RX ADMIN — Medication 5 MILLIGRAM(S): at 05:45

## 2025-02-03 RX ADMIN — Medication 5 MILLIGRAM(S): at 00:00

## 2025-02-03 RX ADMIN — IPRATROPIUM BROMIDE AND ALBUTEROL SULFATE 3 MILLILITER(S): .5; 2.5 SOLUTION RESPIRATORY (INHALATION) at 00:00

## 2025-02-03 RX ADMIN — LEVOTHYROXINE SODIUM 60 MICROGRAM(S): 25 TABLET ORAL at 00:00

## 2025-02-03 RX ADMIN — Medication 5 MILLIGRAM(S): at 23:36

## 2025-02-03 RX ADMIN — Medication 5000 UNIT(S): at 05:44

## 2025-02-03 RX ADMIN — Medication 40 MILLIGRAM(S): at 05:45

## 2025-02-03 RX ADMIN — LEVOTHYROXINE SODIUM 60 MICROGRAM(S): 25 TABLET ORAL at 23:35

## 2025-02-03 RX ADMIN — Medication 4 MILLILITER(S): at 05:45

## 2025-02-03 NOTE — PROGRESS NOTE ADULT - SUBJECTIVE AND OBJECTIVE BOX
Cardiovascular Disease Progress Note  Date of service: 02-03-25 @ 07:48    Overnight events: No acute events overnight.    Otherwise review of systems negative    Objective Findings:  T(C): 36.2 (02-03-25 @ 05:10), Max: 36.6 (02-03-25 @ 00:35)  HR: 75 (02-03-25 @ 05:10) (67 - 75)  BP: 184/93 (02-03-25 @ 05:10) (142/77 - 184/93)  RR: 18 (02-03-25 @ 05:10) (18 - 18)  SpO2: 98% (02-03-25 @ 05:10) (93% - 98%)  Wt(kg): --  Daily     Daily       Physical Exam:  Gen: NAD; Patient resting comfortably  HEENT: EOMI, Normocephalic/ atraumatic  CV: RRR, normal S1 + S2, no m/r/g  Lungs:  Normal respiratory effort; clear to auscultation bilaterally  Abd: soft, non-tender; bowel sounds present  Ext: No edema; warm and well perfused    Telemetry: Sinus     Laboratory Data:                        9.7    5.63  )-----------( 237      ( 02 Feb 2025 07:22 )             29.4     02-02    132[L]  |  91[L]  |  11  ----------------------------<  75  3.1[L]   |  28  |  1.20    Ca    9.4      02 Feb 2025 07:25                Inpatient Medications:  MEDICATIONS  (STANDING):  albuterol/ipratropium for Nebulization 3 milliLiter(s) Nebulizer every 6 hours  albuterol/ipratropium for Nebulization. 3 milliLiter(s) Nebulizer once  ascorbic acid 500 milliGRAM(s) Oral daily  bisacodyl Suppository 10 milliGRAM(s) Rectal daily  chlorhexidine 2% Cloths 1 Application(s) Topical <User Schedule>  dextrose 5% with potassium chloride 20 mEq/L 1000 milliLiter(s) (50 mL/Hr) IV Continuous <Continuous>  furosemide   Injectable 40 milliGRAM(s) IV Push daily  heparin   Injectable 5000 Unit(s) SubCutaneous every 12 hours  levothyroxine Injectable 60 MICROGram(s) IV Push at bedtime  metoprolol tartrate Injectable 5 milliGRAM(s) IV Push every 6 hours  mirtazapine 15 milliGRAM(s) Oral at bedtime  multivitamin 1 Tablet(s) Oral daily  QUEtiapine 12.5 milliGRAM(s) Oral at bedtime  sodium chloride 3%  Inhalation 4 milliLiter(s) Inhalation every 12 hours      Assessment:  89y old Female with stated hx significant for dementia, s/p brain tumor resection 1961, HTN, hypothyroidism colon Ca s/p Rt hemicolectomy ('09), 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    Plan of Care:    #Afib RVR  - HR now improved  - Currently maintaining sinus.   - BP now stable. Would continue with IV lopressor standing dose.   - PO metoprolol tartrate 50mg BID if able to tolerate PO medication for rate control  - Patient is anemic and high bleeding risk, would refrain from AC at this time.     #Hypoxic respiratory failure  - IV diuresis  as needed. Consider transitioning to PO diuretics for maintenance.   - Pulm input appreciated.   - Volume status improving  - Replenish lytes as needed.     #HTN  - Bp elevated this morning    - Likely agitation  - Continue to monitor.         Over 55 minutes spent on total encounter; more than 50% of the visit was spent counseling and/or coordinating care by the attending physician.      Bobby Montilla DO [facility]  Cardiovascular Disease  [phone]

## 2025-02-03 NOTE — PROGRESS NOTE ADULT - PROBLEM SELECTOR PLAN 5
-TF via NGT  -Speech and swallow f/u noted, recommending NPO  -Aspiration precautions  -Oral care  -Palliative care f/u.    2/3 seems to be doing  ok :  no sob:  on room air:  cxr is much better: : keeps NPO: per speech and swallow:  the gi does not want to put the peg:  option remains for pleasure feeds:

## 2025-02-03 NOTE — SWALLOW BEDSIDE ASSESSMENT ADULT - SPECIFY REASON(S)
to subjectively assess swallowing skills and r/o dysphagia.
to assess the swallow mechanism; r/o dysphagia.

## 2025-02-03 NOTE — PROGRESS NOTE ADULT - PAIN ASSESSMENT ADVANCED DEMENTIA: NEGATIVE VOCALIZATION
Occasional moan or groan. Low-level speech with a negative or disapproving quality

## 2025-02-03 NOTE — PROGRESS NOTE ADULT - SUBJECTIVE AND OBJECTIVE BOX
Time and date of service: 02-03-25 @ 09:47      SUBJECTIVE AND OBJECTIVE: pt resting in bed on NC agitated, mitts in place  Indication for Geriatrics and Palliative Care Services/INTERVAL HPI: Nutritional goc    OVERNIGHT EVENTS: no acute events    DNR on chart: DNI: Trial NIV  DNI: Trial NIV      Allergies    clindamycin (Unknown)  shellfish (Unknown)  strawberry (Rash)  penicillin (Unknown)  clindamycin (Other)  strawberry (Unknown)  IV Contrast (Other)  IV Contrast (Unknown)    Intolerances    MEDICATIONS  (STANDING):  albuterol/ipratropium for Nebulization 3 milliLiter(s) Nebulizer every 6 hours  albuterol/ipratropium for Nebulization. 3 milliLiter(s) Nebulizer once  ascorbic acid 500 milliGRAM(s) Oral daily  bisacodyl Suppository 10 milliGRAM(s) Rectal daily  chlorhexidine 2% Cloths 1 Application(s) Topical <User Schedule>  dextrose 5% with potassium chloride 20 mEq/L 1000 milliLiter(s) (50 mL/Hr) IV Continuous <Continuous>  furosemide   Injectable 40 milliGRAM(s) IV Push daily  heparin   Injectable 5000 Unit(s) SubCutaneous every 12 hours  levothyroxine Injectable 60 MICROGram(s) IV Push at bedtime  metoprolol tartrate Injectable 5 milliGRAM(s) IV Push every 6 hours  mirtazapine 15 milliGRAM(s) Oral at bedtime  multivitamin 1 Tablet(s) Oral daily  QUEtiapine 12.5 milliGRAM(s) Oral at bedtime  sodium chloride 3%  Inhalation 4 milliLiter(s) Inhalation every 12 hours    MEDICATIONS  (PRN):  acetaminophen   Oral Liquid .. 650 milliGRAM(s) Oral every 6 hours PRN Temp greater or equal to 38C (100.4F), Moderate Pain (4 - 6)  haloperidol    Injectable 1 milliGRAM(s) IntraMuscular every 6 hours PRN Agitation              ITEMS UNCHECKED ARE NOT PRESENT    PRESENT SYMPTOMS: [ x]Unable to self-report - see [ ] CPOT [ ] PAINADS [ ] RDOS  Source if other than patient:  [ ]Family   [ x]Team     Pain:  [ ]yes [x ]no  QOL impact -   Location -                    Aggravating factors -  Quality -  Radiation -  Timing-  Severity (0-10 scale):  Minimal acceptable level (0-10 scale):     CPOT:    https://www.sccm.org/getattachment/kkp72j74-1u1l-2f0w-9o7g-5687r9986f2g/Critical-Care-Pain-Observation-Tool-(CPOT)    PAIN AD Score:	  http://geriatrictoolkit.[facility]/cog/painad.pdf (Ctrl + left click to view)    Dyspnea:                           [ ]Mild [ ]Moderate [ ]Severe    RDOS:  0 to 2  minimal or no respiratory distress   3  mild distress  4 to 6 moderate distress  >7 severe distress  https://[email or web address].[email or web address]/handouts/hen/Respiratory_Distress_Observation_Scale.pdf (Ctrl +  left click to view)     Anxiety:                             [ ]Mild [ ]Moderate [ ]Severe  Fatigue:                             [ ]Mild [x ]Moderate [x ]Severe  Nausea:                            [ ]Mild [ ]Moderate [ ]Severe  Loss of appetite:               [ ]Mild [x ]Moderate [ ]Severe  Constipation:                    [ ]Mild [ ]Moderate [ ]Severe    PCSSQ[Palliative Care Spiritual Screening Question]   Severity (0-10):  Score of 4 or > indicate consideration of Chaplaincy referral.  Chaplaincy Referral: [ ] yes [ ] refused [ ] following [x ] Deferred     Caregiver [place]? : [ ] yes [ ] no [x ] Deferred [ ] Declined             Social work referral [ ] Patient & Family Centered Care Referral [ ]     Anticipatory Grief present?:  [ ] yes [ ] no  [x ] Deferred                  Social work referral [ ] Chaplaincy Referral[ ]      Other Symptoms:  [ ]All other review of systems negative     Palliative Performance Status Version 2:     20-30    %      http://npcrc.org/files/news/palliative_performance_scale_ppsv2.pdf    PHYSICAL EXAM:  Vital Signs Last 24 Hrs  T(C): 36.2 (03 Feb 2025 09:00), Max: 36.6 (03 Feb 2025 00:35)  T(F): 97.2 (03 Feb 2025 09:00), Max: 97.8 (03 Feb 2025 00:35)  HR: 77 (03 Feb 2025 09:00) (67 - 77)  BP: 165/71 (03 Feb 2025 09:00) (142/77 - 184/93)  BP(mean): 97 (02 Feb 2025 12:00) (97 - 97)  RR: 18 (03 Feb 2025 09:00) (18 - 18)  SpO2: 98% (03 Feb 2025 09:00) (95% - 98%)    Parameters below as of 03 Feb 2025 09:00  Patient On (Oxygen Delivery Method): room air             I&O's Summary     GENERAL: [x ]Cachexia    [x ]Alert  [ ]Oriented x   [ ]Lethargic  [ ]Unarousable  [ x]min Verbal  [ ]Non-Verbal  Behavioral:   [ ]Anxiety  [ x]Delirium [x ]Agitation [ ]Other  HEENT:  [x ]Normal   [ ]Dry mouth   [ ]ET Tube/Trach  [ ]Oral lesions  PULMONARY:   [ ]Clear [ ]Tachypnea  [ ]Audible excessive secretions   [ ]Rhonchi        [ ]Right [ ]Left [ ]Bilateral  [ ]Crackles        [ ]Right [ ]Left [ ]Bilateral  [ ]Wheezing     [ ]Right [ ]Left [ ]Bilateral  [xDiminished BS [ ] Right [ ]Left [ ]Bilateral  CARDIOVASCULAR:    [ x]Regular [ ]Irregular [ ]Tachy  [ ]Michael [ ]Murmur [ ]Other  GASTROINTESTINAL:  [x ]Soft  [ ]Distended   [x ]+BS  [ ]Non tender [ ]Tender  [ ]Other [ ]PEG [ ]OGT/ NGT   Last BM:   GENITOURINARY:  [ ]Normal [ ]Incontinent   [ ]Oliguria/Anuria   [x ]Crooks  MUSCULOSKELETAL:   [ ]Normal   [ x]Weakness  [x ]Bed/Wheelchair bound [ ]Edema  NEUROLOGIC:   [ ]No focal deficits  [x ] Cognitive impairment  [ ] Dysphagia [ ]Dysarthria [ ] Paresis [ ]Other   SKIN: see rn flowsheet  [ ]Normal  [ ]Rash  [ ]Other  [ ]Pressure ulcer(s) [ ]y [ ]n present on admission    CRITICAL CARE:  [ ]Shock Present  [ ]Septic [ ]Cardiogenic [ ]Neurologic [ ]Hypovolemic  [ ]Vasopressors [ ]Inotropes  [ ]Respiratory failure present [ ]Mechanical Ventilation [ ]Non-invasive ventilatory support [ ]High-Flow   [ ]Acute  [ ]Chronic [ ]Hypoxic  [ ]Hypercarbic [ ]Other  [ ]Other organ failure     LABS:                                              10.8   5.53  )-----------( 243      ( 03 Feb 2025 07:14 )             32.7   02-03    130[L]  |  91[L]  |  10  ----------------------------<  90  3.8   |  27  |  1.13    Ca    9.7      03 Feb 2025 07:14                  RADIOLOGY & ADDITIONAL STUDIES:  < from: CT Head No Cont (01.11.25 @ 13:53) >  ACC: [id number] EXAM:  CT BRAIN   ORDERED BY:  IVANA AMANDEEP     PROCEDURE DATE:  01/11/2025          INTERPRETATION:  CT HEAD WITHOUT CONTRAST    TECHNIQUE: Multiple axial images of the head were obtained from the skull   base to the vertex without intravenous contrast.  Multiplanar reformats   were created according to the standard protocol.    INDICATION: Admitting Dxs: A41.9 SEPSIS ams  COMPARISON: Head CT without contrast 11/10/2021.  _____________________  FINDINGS:    Mildly degraded by motion artifact.    Status post right suboccipital craniectomy with postsurgical changes in   the skull/scalp as before. Similar appearance of the presumed right   cerebellar resection bed.    Status post right parieto-occipital cuca hole with a mild amount of   adjacent encephalomalacia and gliosis as before.    Mild diffuse cerebral volume loss. No significant midline shift.   Maintenance of gray-white differentiation in the large vascular   distributions. Mild hypoattenuation of the periventricular/deep white   matter most consistent with chronic microvascular ischemic disease.   Motion artifact precludes exclusion of an acute intracranial hemorrhage.   No findings suspicious for an acute intracranial hemorrhage within this   limitation. No hydrocephalus.    No findings suspicious for a depressed calvarial fracture.    Fatty atrophy of the visualized right aspect of the tongue.    Paranasal Sinuses, Mastoid Air Cells, and Orbits: Mucosal thickening left   sphenoid sinus. Visualized mastoidair cells clear. Status post left lens   replacement.  ______________________  IMPRESSION:  1.  Mildly degraded by motion artifact. A tiny acute intracranial   hemorrhage is not excluded. No findings suspicious for an acute   intracranial abnormalityotherwise. Chronic intracranial findings as   above.    --- End of Report ---            NASRIN LAGUERRE MD; Attending Radiologist  This document has been electronically signed. Jan 11 2025  2:12PM    < end of copied text >          Protein Calorie Malnutrition Present: [ ]mild [ ]moderate [ ]severe [ ]underweight [ ]morbid obesity  https://www.andeal.org/vault/2440/web/files/ONC/Table_Clinical%20Characteristics%20to%20Document%20Malnutrition-White%20JV%20et%20al%202012.pdf    Height (cm): 160 (01-11-25 @ 13:51)  Weight (kg): 53 (01-11-25 @ 13:51)  BMI (kg/m2): 20.7 (01-11-25 @ 13:51)    [ ]PPSV2 < or = 30%  [ ]significant weight loss [ ]poor nutritional intake [ ]anasarca[ ]Artificial Nutrition    Other REFERRALS:  [ ]Hospice  [ ]Child Life  [ ]Social Work  [ ]Case management [ ]Holistic Therapy      Time and date of service: 02-03-25 @ 09:47      SUBJECTIVE AND OBJECTIVE: pt resting in bed on NC agitated, mitts in place  Indication for Geriatrics and Palliative Care Services/INTERVAL HPI: Nutritional goc    OVERNIGHT EVENTS: no acute events    DNR on chart: DNI: Trial NIV  DNI: Trial NIV      Allergies    clindamycin (Unknown)  shellfish (Unknown)  strawberry (Rash)  penicillin (Unknown)  clindamycin (Other)  strawberry (Unknown)  IV Contrast (Other)  IV Contrast (Unknown)    Intolerances  MEDICATIONS  (STANDING):  albuterol/ipratropium for Nebulization 3 milliLiter(s) Nebulizer every 6 hours  albuterol/ipratropium for Nebulization. 3 milliLiter(s) Nebulizer once  ascorbic acid 500 milliGRAM(s) Oral daily  bisacodyl Suppository 10 milliGRAM(s) Rectal daily  chlorhexidine 2% Cloths 1 Application(s) Topical <User Schedule>  dextrose 5% with potassium chloride 20 mEq/L 1000 milliLiter(s) (50 mL/Hr) IV Continuous <Continuous>  furosemide   Injectable 40 milliGRAM(s) IV Push daily  heparin   Injectable 5000 Unit(s) SubCutaneous every 12 hours  levothyroxine Injectable 60 MICROGram(s) IV Push at bedtime  metoprolol tartrate Injectable 5 milliGRAM(s) IV Push every 6 hours  mirtazapine 15 milliGRAM(s) Oral at bedtime  multivitamin 1 Tablet(s) Oral daily  QUEtiapine 12.5 milliGRAM(s) Oral at bedtime  sodium chloride 3%  Inhalation 4 milliLiter(s) Inhalation every 12 hours    MEDICATIONS  (PRN):  acetaminophen   Oral Liquid .. 650 milliGRAM(s) Oral every 6 hours PRN Temp greater or equal to 38C (100.4F), Moderate Pain (4 - 6)  haloperidol    Injectable 1 milliGRAM(s) IntraMuscular every 6 hours PRN Agitation          ITEMS UNCHECKED ARE NOT PRESENT    PRESENT SYMPTOMS: [ x]Unable to self-report - see [ ] CPOT [ ] PAINADS [ ] RDOS  Source if other than patient:  [ ]Family   [ x]Team     Pain:  [ ]yes [x ]no  QOL impact -   Location -                    Aggravating factors -  Quality -  Radiation -  Timing-  Severity (0-10 scale):  Minimal acceptable level (0-10 scale):     CPOT:    https://www.sccm.org/getattachment/euq39e44-1l2g-1v8h-8o0l-3539w0940m8t/Critical-Care-Pain-Observation-Tool-(CPOT)    PAIN AD Score:	  http://geriatrictoolkit.[facility]/cog/painad.pdf (Ctrl + left click to view)    Dyspnea:                           [ ]Mild [ ]Moderate [ ]Severe    RDOS:  0 to 2  minimal or no respiratory distress   3  mild distress  4 to 6 moderate distress  >7 severe distress  https://homecareinformation.net/handouts/hen/Respiratory_Distress_Observation_Scale.pdf (Ctrl +  left click to view)     Anxiety:                             [ ]Mild [ ]Moderate [ ]Severe  Fatigue:                             [ ]Mild [x ]Moderate [x ]Severe  Nausea:                            [ ]Mild [ ]Moderate [ ]Severe  Loss of appetite:               [ ]Mild [x ]Moderate [ ]Severe  Constipation:                    [ ]Mild [ ]Moderate [ ]Severe    PCSSQ[Palliative Care Spiritual Screening Question]   Severity (0-10):  Score of 4 or > indicate consideration of Chaplaincy referral.  Chaplaincy Referral: [ ] yes [ ] refused [ ] following [x ] Deferred     Caregiver [place]? : [ ] yes [ ] no [x ] Deferred [ ] Declined             Social work referral [ ] Patient & Family Centered Care Referral [ ]     Anticipatory Grief present?:  [ ] yes [ ] no  [x ] Deferred                  Social work referral [ ] Chaplaincy Referral[ ]      Other Symptoms:  [ ]All other review of systems negative     Palliative Performance Status Version 2:     20-30    %      http://npcrc.org/files/news/palliative_performance_scale_ppsv2.pdf    PHYSICAL EXAM:  Vital Signs Last 24 Hrs  T(C): 36.2 (03 Feb 2025 09:00), Max: 36.6 (03 Feb 2025 00:35)  T(F): 97.2 (03 Feb 2025 09:00), Max: 97.8 (03 Feb 2025 00:35)  HR: 77 (03 Feb 2025 09:00) (67 - 77)  BP: 165/71 (03 Feb 2025 09:00) (142/77 - 184/93)  BP(mean): 97 (02 Feb 2025 12:00) (97 - 97)  RR: 18 (03 Feb 2025 09:00) (18 - 18)  SpO2: 98% (03 Feb 2025 09:00) (95% - 98%)    Parameters below as of 03 Feb 2025 09:00  Patient On (Oxygen Delivery Method): room air             I&O's Summary     GENERAL: [x ]Cachexia    [x ]Alert  [ ]Oriented x   [ ]Lethargic  [ ]Unarousable  [ x]min Verbal  [ ]Non-Verbal  Behavioral:   [ ]Anxiety  [ x]Delirium [x ]Agitation [ ]Other  HEENT:  [x ]Normal   [ ]Dry mouth   [ ]ET Tube/Trach  [ ]Oral lesions  PULMONARY:   [ ]Clear [ ]Tachypnea  [ ]Audible excessive secretions   [ ]Rhonchi        [ ]Right [ ]Left [ ]Bilateral  [ ]Crackles        [ ]Right [ ]Left [ ]Bilateral  [ ]Wheezing     [ ]Right [ ]Left [ ]Bilateral  [xDiminished BS [ ] Right [ ]Left [ ]Bilateral  CARDIOVASCULAR:    [ x]Regular [ ]Irregular [ ]Tachy  [ ]Michael [ ]Murmur [ ]Other  GASTROINTESTINAL:  [x ]Soft  [ ]Distended   [x ]+BS  [ ]Non tender [ ]Tender  [ ]Other [ ]PEG [ ]OGT/ NGT   Last BM:   GENITOURINARY:  [ ]Normal [ ]Incontinent   [ ]Oliguria/Anuria   [x ]Crooks  MUSCULOSKELETAL:   [ ]Normal   [ x]Weakness  [x ]Bed/Wheelchair bound [ ]Edema  NEUROLOGIC:   [ ]No focal deficits  [x ] Cognitive impairment  [ ] Dysphagia [ ]Dysarthria [ ] Paresis [ ]Other   SKIN: see rn flowsheet  [ ]Normal  [ ]Rash  [ ]Other  [ ]Pressure ulcer(s) [ ]y [ ]n present on admission    CRITICAL CARE:  [ ]Shock Present  [ ]Septic [ ]Cardiogenic [ ]Neurologic [ ]Hypovolemic  [ ]Vasopressors [ ]Inotropes  [ ]Respiratory failure present [ ]Mechanical Ventilation [ ]Non-invasive ventilatory support [ ]High-Flow   [ ]Acute  [ ]Chronic [ ]Hypoxic  [ ]Hypercarbic [ ]Other  [ ]Other organ failure     LABS:                                              10.8   5.53  )-----------( 243      ( 03 Feb 2025 07:14 )             32.7   02-03    130[L]  |  91[L]  |  10  ----------------------------<  90  3.8   |  27  |  1.13    Ca    9.7      03 Feb 2025 07:14                  RADIOLOGY & ADDITIONAL STUDIES:  < from: CT Head No Cont (01.11.25 @ 13:53) >  ACC: [id number] EXAM:  CT BRAIN   ORDERED BY:  IVANA BERNSTEIN     PROCEDURE DATE:  01/11/2025          INTERPRETATION:  CT HEAD WITHOUT CONTRAST    TECHNIQUE: Multiple axial images of the head were obtained from the skull   base to the vertex without intravenous contrast.  Multiplanar reformats   were created according to the standard protocol.    INDICATION: Admitting Dxs: A41.9 SEPSIS ams  COMPARISON: Head CT without contrast 11/10/2021.  _____________________  FINDINGS:    Mildly degraded by motion artifact.    Status post right suboccipital craniectomy with postsurgical changes in   the skull/scalp as before. Similar appearance of the presumed right   cerebellar resection bed.    Status post right parieto-occipital cuca hole with a mild amount of   adjacent encephalomalacia and gliosis as before.    Mild diffuse cerebral volume loss. No significant midline shift.   Maintenance of gray-white differentiation in the large vascular   distributions. Mild hypoattenuation of the periventricular/deep white   matter most consistent with chronic microvascular ischemic disease.   Motion artifact precludes exclusion of an acute intracranial hemorrhage.   No findings suspicious for an acute intracranial hemorrhage within this   limitation. No hydrocephalus.    No findings suspicious for a depressed calvarial fracture.    Fatty atrophy of the visualized right aspect of the tongue.    Paranasal Sinuses, Mastoid Air Cells, and Orbits: Mucosal thickening left   sphenoid sinus. Visualized mastoidair cells clear. Status post left lens   replacement.  ______________________  IMPRESSION:  1.  Mildly degraded by motion artifact. A tiny acute intracranial   hemorrhage is not excluded. No findings suspicious for an acute   intracranial abnormalityotherwise. Chronic intracranial findings as   above.    --- End of Report ---            NASRIN LAGUERRE MD; Attending Radiologist  This document has been electronically signed. Jan 11 2025  2:12PM    < end of copied text >      < from: CT Chest No Cont (01.23.25 @ 17:52) >  ACC: [id number] EXAM:  CT CHEST   ORDERED BY: DEON NAPOLES     PROCEDURE DATE:  01/23/2025          INTERPRETATION:  CLINICAL INFORMATION: Pneumonia. Evaluate for pleural   effusion.    COMPARISON: CT chest abdomen and pelvis 11/11/2021.    CONTRAST/COMPLICATIONS:  IV Contrast: NONE  Oral Contrast: NONE  .    PROCEDURE:  CT of the Chest was performed.  Sagittal and coronal reformats were performed.    FINDINGS:  Nasogastric tube tip in the stomach.  LUNGS : Large bilateral pleural effusions with compressive atelectasis of   both lower lobes, and atelectasis in the right middle lobe and lingula.   Groundglass opacity in the right upper lobe.  VESSELS: Aortic calcifications. Coronary artery calcifications.  HEART: Heart size is normal. No pericardial effusion.  MEDIASTINUM AND SWETHA: No lymphadenopathy.  CHEST WALL AND LOWER NECK: Within normal limits.  VISUALIZED UPPER ABDOMEN: Within normal limits.  BONES: Scoliosis and degenerative changes.    IMPRESSION:  Large bilateral pleural effusions with atelectasis of both lower lobes,   and linear atelectasis in the right middle lobe and left upper lobe.   Nonspecific groundglass opacity is seen in the aerated portions of both   lungs.        PRELIMINARY IMPRESSION:    Layering debris in the bilateral mainstem bronchi. Moderate bilateral   pleural effusions with near complete atelectasis of the right middle   lobe, and left lower lobe, and complete atelectasis of the right lower   lobe. Linear opacity involving the left upper lobe and lingula likely   represents atelectasis.    Follow-up official report in the a.m.    --- End of Report ---           SAMMIE HAYNES MD; Resident Radiologist  This document has been electronically signed.  HARMONY PEREZ MD; Attending Interventional Radiologist  This document has been electronically signed. Jan 23 2025  7:46PM    < end of copied text >        Protein Calorie Malnutrition Present: [ ]mild [ ]moderate [ ]severe [ ]underweight [ ]morbid obesity  https://www.andeal.org/vault/2440/web/files/ONC/Table_Clinical%20Characteristics%20to%20Document%20Malnutrition-White%20JV%20et%20al%202012.pdf    Height (cm): 160 (01-11-25 @ 13:51)  Weight (kg): 53 (01-11-25 @ 13:51)  BMI (kg/m2): 20.7 (01-11-25 @ 13:51)    [ ]PPSV2 < or = 30%  [ ]significant weight loss [ ]poor nutritional intake [ ]anasarca[ ]Artificial Nutrition    Other REFERRALS:  [ ]Hospice  [ ]Child Life  [ ]Social Work  [ ]Case management [ ]Holistic Therapy

## 2025-02-03 NOTE — PROGRESS NOTE ADULT - PROBLEM SELECTOR PLAN 3
CT chest 1/23 with moderate b/l pl effusions  -CXR 1/25 with b/l pleural effusions/atelectasis L>R, L pleural effusion now appears mod-large  -Holding off on thoracentesis for now as O2 requirements improving with diuresis  -Keep O>I as tolerated.    2/1: repeat cxr in AM  2/2: cxr ordered:  pt has been on lasix  2/3; the cxr looks better to me:  the left sided pleural effusion is less:  still congested but better then before:

## 2025-02-03 NOTE — PROGRESS NOTE ADULT - PROBLEM SELECTOR PLAN 3
likely multifactorial  now resolved   replete K IV  continue IV D5 D5 with 20 K /liter  creatinine baseline

## 2025-02-03 NOTE — SWALLOW BEDSIDE ASSESSMENT ADULT - COMMENTS
Pt s/p swallowing assessments on 1/13/25: recommendations NPO, as mentation did not support oral trials or PO feeding. NGT placed by team 1/13. 1/16/25 Coughing post-intake of crushed ice and moderately thick liquid before initiation of swallow trigger. Recommendations for NPO and plan for FEES. 1/17/25  FEES - > recommended NPO, with non-oral nutrition/hydration/medications if in keeping with pt and family wishes. Pt with multiple risk factors for aspiration including poor head/neck/trunk control, extremely weak congested cough, overall debilitated state with baseline penetration of secretions and thick aspiration secretions intermittently seen in trachea. 1/21/25 Pt seen for b/s swallow tx f/u with recd of NPO, with non-oral nutrition/hydration/medications, GOC and Palliative care consult.

## 2025-02-03 NOTE — PROGRESS NOTE ADULT - PROBLEM SELECTOR PLAN 2
-Likely aspiration related. Pt also s/p flu   -Completed ABX, then resumed as pt spiked temp. CXR with bibasilar opacities   -Repeat CT chest 1/23 with b/l pleural effusions/atelectasis, b/l GGO. Likely combination of fluid overload + PNA  -Completed additional 10 day course of ABX   -Continue bronchodilators/mucolytics   -Trend leukocytosis/fever curve   -Suction PRN, chest PT  2/2: resolved  2/3: resolved

## 2025-02-03 NOTE — CHART NOTE - NSCHARTNOTEFT_GEN_A_CORE
NUTRITION FOLLOW UP NOTE    PATIENT SEEN FOR: nutrition follow-up    SOURCE: [] Patient  [x] Current Medical Record  [] RN  [] Family/support person at bedside  [x] Patient unavailable/inappropriate  [] Other:    CHART REVIEWED/EVENTS NOTED.  [] No changes to nutrition care plan to note  [x] Nutrition Status:  -pt previously on NGT of Jevity 1.5 @ 60ml/hr x 24hrs from 1/13-1/26,   -pt NPO in setting of worsening respiratory status, now improved   -Palliative care following for GOC, son interested in PEG    DIET ORDER:   Diet, NPO (01-26-25)      CURRENT DIET ORDER IS:  [] Appropriate:  [x] Inadequate:  [] Other:    NUTRITION INTAKE/PROVISION:  [x] PO: pt has been NPO since 1/26 ( x 7 days)  [] Enteral Nutrition:  [] Parenteral Nutrition:    ANTHROPOMETRICS:  Drug Dosing Weight  Height (cm): 160 (11 Jan 2025 13:51)  Weight (kg): 53 (11 Jan 2025 13:51)  BMI (kg/m2): 20.7 (11 Jan 2025 13:51)  BSA (m2): 1.54 (11 Jan 2025 13:51)  Weights:  bed weight obtained by RD noted as 51.6Kg (2/3)--3% weight loss x < 1 month       MEDICATIONS:  MEDICATIONS  (STANDING):  albuterol/ipratropium for Nebulization 3 milliLiter(s) Nebulizer every 6 hours  albuterol/ipratropium for Nebulization. 3 milliLiter(s) Nebulizer once  ascorbic acid 500 milliGRAM(s) Oral daily  bisacodyl Suppository 10 milliGRAM(s) Rectal daily  chlorhexidine 2% Cloths 1 Application(s) Topical <User Schedule>  dextrose 5% with potassium chloride 20 mEq/L 1000 milliLiter(s) (50 mL/Hr) IV Continuous <Continuous>  furosemide   Injectable 40 milliGRAM(s) IV Push daily  heparin   Injectable 5000 Unit(s) SubCutaneous every 12 hours  levothyroxine Injectable 60 MICROGram(s) IV Push at bedtime  metoprolol tartrate Injectable 5 milliGRAM(s) IV Push every 6 hours  mirtazapine 15 milliGRAM(s) Oral at bedtime  multivitamin 1 Tablet(s) Oral daily  QUEtiapine 12.5 milliGRAM(s) Oral at bedtime  sodium chloride 3%  Inhalation 4 milliLiter(s) Inhalation every 12 hours    MEDICATIONS  (PRN):  acetaminophen   Oral Liquid .. 650 milliGRAM(s) Oral every 6 hours PRN Temp greater or equal to 38C (100.4F), Moderate Pain (4 - 6)  haloperidol    Injectable 1 milliGRAM(s) IntraMuscular every 6 hours PRN Agitation      NUTRITIONALLY PERTINENT LABS:  02-03 Na130 mmol/L[L] Glu 90 mg/dL K+ 3.8 mmol/L Cr  1.13 mg/dL BUN 10 mg/dL            Finger Sticks:  POCT Blood Glucose.: 98 mg/dL (02-03 @ 05:55)  POCT Blood Glucose.: 88 mg/dL (02-03 @ 00:08)  POCT Blood Glucose.: 93 mg/dL (02-02 @ 21:47)  POCT Blood Glucose.: 94 mg/dL (02-02 @ 16:49)  POCT Blood Glucose.: 96 mg/dL (02-02 @ 12:27)      NUTRITIONALLY PERTINENT MEDICATIONS/LABS:  [x] Reviewed  [] Relevant notes on medications/labs:    EDEMA:  [x] Reviewed  [x] Relevant notes: +1 left/right ankle     GI/ I&O:  [x] Reviewed  [] Relevant notes:  [] Other:    SKIN:   [] No pressure injuries documented, per nursing flowsheet  [x] Pressure injury previously noted  [] Change in pressure injury documentation:  [] Other:    ESTIMATED NEEDS:  [] No change:  [] Updated:  Energy:   kcal/day (xx-xx kcal/kg)  Protein:   g/day (xx-xx g/kg)  Fluid:   ml/day or [] defer to team  Based on:    NUTRITION DIAGNOSIS:  [] Prior Dx:  [] New Dx:    EDUCATION:  [] Yes:  [] Not appropriate/warranted    NUTRITION CARE PLAN:  1. Diet:  2. Supplements:  3. Multivitamin/mineral supplementation:  4:     [] Achieved - Continue current nutrition intervention(s)  [] Current medical condition precludes nutrition intervention at this time.    MONITORING AND EVALUATION:   RD remains available upon request and will follow up per protocol.    Name  Available on [facility] TEAMS NUTRITION FOLLOW UP NOTE    PATIENT SEEN FOR: nutrition follow-up    SOURCE: [] Patient  [x] Current Medical Record  [] RN  [] Family/support person at bedside  [x] Patient unavailable/inappropriate  [] Other:    CHART REVIEWED/EVENTS NOTED.  [] No changes to nutrition care plan to note  [x] Nutrition Status:  -pt previously on NGT of Jevity 1.5 @ 60ml/hr x 24hrs from 1/13-1/26,   -pt NPO in setting of worsening respiratory status, now improved   -Palliative care following for GOC, son interested in PEG    DIET ORDER:   Diet, NPO (01-26-25)      CURRENT DIET ORDER IS:  [] Appropriate:  [x] Inadequate:  [] Other:    NUTRITION INTAKE/PROVISION:  [x] PO: pt has been NPO since 1/26 ( x 7 days)  [] Enteral Nutrition:  [] Parenteral Nutrition:    ANTHROPOMETRICS:  Drug Dosing Weight  Height (cm): 160 (11 Jan 2025 13:51)  Weight (kg): 53 (11 Jan 2025 13:51)  BMI (kg/m2): 20.7 (11 Jan 2025 13:51)  BSA (m2): 1.54 (11 Jan 2025 13:51)  Weights:  bed weight obtained by RD noted as 51.6Kg (2/3)--3% weight loss x < 1 month       MEDICATIONS:  MEDICATIONS  (STANDING):  albuterol/ipratropium for Nebulization 3 milliLiter(s) Nebulizer every 6 hours  albuterol/ipratropium for Nebulization. 3 milliLiter(s) Nebulizer once  ascorbic acid 500 milliGRAM(s) Oral daily  bisacodyl Suppository 10 milliGRAM(s) Rectal daily  chlorhexidine 2% Cloths 1 Application(s) Topical <User Schedule>  dextrose 5% with potassium chloride 20 mEq/L 1000 milliLiter(s) (50 mL/Hr) IV Continuous <Continuous>  furosemide   Injectable 40 milliGRAM(s) IV Push daily  heparin   Injectable 5000 Unit(s) SubCutaneous every 12 hours  levothyroxine Injectable 60 MICROGram(s) IV Push at bedtime  metoprolol tartrate Injectable 5 milliGRAM(s) IV Push every 6 hours  mirtazapine 15 milliGRAM(s) Oral at bedtime  multivitamin 1 Tablet(s) Oral daily  QUEtiapine 12.5 milliGRAM(s) Oral at bedtime  sodium chloride 3%  Inhalation 4 milliLiter(s) Inhalation every 12 hours    MEDICATIONS  (PRN):  acetaminophen   Oral Liquid .. 650 milliGRAM(s) Oral every 6 hours PRN Temp greater or equal to 38C (100.4F), Moderate Pain (4 - 6)  haloperidol    Injectable 1 milliGRAM(s) IntraMuscular every 6 hours PRN Agitation      NUTRITIONALLY PERTINENT LABS:  02-03 Na130 mmol/L[L] Glu 90 mg/dL K+ 3.8 mmol/L Cr  1.13 mg/dL BUN 10 mg/dL      Finger Sticks:  POCT Blood Glucose.: 98 mg/dL (02-03 @ 05:55)  POCT Blood Glucose.: 88 mg/dL (02-03 @ 00:08)  POCT Blood Glucose.: 93 mg/dL (02-02 @ 21:47)  POCT Blood Glucose.: 94 mg/dL (02-02 @ 16:49)  POCT Blood Glucose.: 96 mg/dL (02-02 @ 12:27)      NUTRITIONALLY PERTINENT MEDICATIONS/LABS:  [x] Reviewed  [] Relevant notes on medications/labs:    EDEMA:  [x] Reviewed  [x] Relevant notes: +1 left/right ankle     GI/ I&O:  [x] Reviewed  [] Relevant notes:  [x] Other: last bowel movement 1/31    SKIN:   [] No pressure injuries documented, per nursing flowsheet  [x] Pressure injury previously noted: bilateral buttocks/sacrum suspected SDTI, Left heel suspected deep tissue injury, right heel suspected deep tissue injury   [] Change in pressure injury documentation:  [] Other:    ESTIMATED NEEDS:  [x] No change:  [] Updated:  Energy:  2334-8631 kcal/day (30-35cal/kg)  Protein:  61.9- 82g/day (1.2-1.5g/kg)  Fluid:   ml/day or [] defer to team  Based on: dosing weight of 51.6Kg     NUTRITION DIAGNOSIS:  [x] Prior Dx: Increased Nutrient Needs  [x] New Dx: Malnutrition, acute, moderate related to inadequate protein-energy intake as evidenced by patient NPO x 7 days, meeting < 50% of estimated needs > 5 days, mild edema     EDUCATION:  [] Yes:  [x] Not appropriate/warranted    NUTRITION CARE PLAN:  1. Monitor GOC, defer diet advancement to team (tube feeds vs pleasure feeds)  --if enteral nutrition support is medically feasible and within GOC, recommend starting Jevity 1.5 @ 10mL/hr and advancing by 10ml/hrs q8hr or as tolerated to goal rate of 45ml/hr x24hrs providing 1080mL of formula, 1620kcal/day (31kcal/kg), 69g protein/day (1.3g/kg), 821mL free water - based on 51.6kg. defer additional free-water to team.   2. Continue multivitamin, ascorbic acid daily if no contraindications. Recommend addition of thiamine supplementation in setting of refeeding risk.     MONITORING AND EVALUATION:   RD remains available upon request and will follow up per protocol.    Rachele Mar RD, CDN, CDCES, Available on Teams

## 2025-02-03 NOTE — PROGRESS NOTE ADULT - ASSESSMENT
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

## 2025-02-03 NOTE — PROGRESS NOTE ADULT - PROBLEM SELECTOR PLAN 3
on hiflow  unable to wean  management as per resp therapy and primary team on NC  management as per resp therapy and primary team

## 2025-02-03 NOTE — PROGRESS NOTE ADULT - SUBJECTIVE AND OBJECTIVE BOX
[facility]      Zaid Garcia NP    [address] [place] [address]  [phone]    INTERVAL HPI/ OVERNIGHT EVENTS:  pt s/e RN at bedside,   on RA       Allergies    clindamycin (Unknown)  shellfish (Unknown)  strawberry (Rash)  penicillin (Unknown)  clindamycin (Other)  strawberry (Unknown)  IV Contrast (Other)  IV Contrast (Unknown)    Intolerances      unable to obtain      PHYSICAL EXAM:   Vital Signs Last 24 Hrs  T(C): 36.4 (02 Feb 2025 12:00), Max: 36.6 (02 Feb 2025 05:07)  T(F): 97.5 (02 Feb 2025 12:00), Max: 97.8 (02 Feb 2025 05:07)  HR: 69 (02 Feb 2025 12:00) (64 - 73)  BP: 151/71 (02 Feb 2025 12:00) (112/72 - 157/79)  BP(mean): 97 (02 Feb 2025 12:00) (97 - 97)  RR: 18 (02 Feb 2025 12:00) (18 - 18)  SpO2: 95% (02 Feb 2025 12:00) (93% - 100%)    Parameters below as of 02 Feb 2025 12:00  Patient On (Oxygen Delivery Method): room air    Daily     Daily I&O's Summary    19 Jan 2025 07:01  -  19 Jan 2025 15:01  --------------------------------------------------------  IN: 0 mL / OUT: 150 mL / NET: -150 mL      nad  confused  frail  non toxic  soft, nt  no edema        LABS:                        9.7    5.63  )-----------( 237      ( 02 Feb 2025 07:22 )             29.4      02-02    132[L]  |  91[L]  |  11  ----------------------------<  75  3.1[L]   |  28  |  1.20    Ca    9.4      02 Feb 2025 07:25                      Urinalysis Basic - ( 19 Jan 2025 07:30 )    Color: x / Appearance: x / SG: x / pH: x  Gluc: 114 mg/dL / Ketone: x  / Bili: x / Urobili: x   Blood: x / Protein: x / Nitrite: x   Leuk Esterase: x / RBC: x / WBC x   Sq Epi: x / Non Sq Epi: x / Bacteria: x      amylase   lipase  RADIOLOGY & ADDITIONAL TESTS:

## 2025-02-03 NOTE — SWALLOW BEDSIDE ASSESSMENT ADULT - ASPIRATION PRECAUTIONS
Maintain aspiration precaution for Pt' own secretions and during enteral feeds, if initiated./yes
for secretions and enteral feeds/yes

## 2025-02-03 NOTE — SWALLOW BEDSIDE ASSESSMENT ADULT - SLP PERTINENT HISTORY OF CURRENT PROBLEM
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 from nursing home via EMS with hypoxic resp failure since 1/9/25 secondary to suspected pneum, treated with HFNC, son endorsed pt was ordered for antibx treatment at time as well. EMS endorsed nursing home facility O2 tank found off, and placed pt on NRB.
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 Pt found to have DWAIN on CKD, Influenza A +, UTI +, possible RLL pneum with consolidation appreciated on POCUS. Pt admitted to MICU for hypercapnic/hypoxic resp failure/septic shock in the setting of possible RLL pneum and UTI now on medical floor.

## 2025-02-03 NOTE — SWALLOW BEDSIDE ASSESSMENT ADULT - ADDITIONAL RECOMMENDATIONS
GOAL: Patient will obtain safe and adequate nutrition/hydration/medications via least restrictive means.
Should Pt's mentation significantly improve, FEES would be indicated to r/o aspiration and assess pharyngeal stage of swallow/management of secretions to determine candidacy for PO intake vs GOC to determine Pt/family/HCP wishes re: provision of nutrition, hydration and meds for pleasure feeds.

## 2025-02-03 NOTE — PROGRESS NOTE ADULT - SUBJECTIVE AND OBJECTIVE BOX
NEPHROLOGY-[facility] [phone]        Patient seen and examined in bed.  SHe was the same   Remains on high flow           MEDICATIONS  (STANDING):  albuterol/ipratropium for Nebulization 3 milliLiter(s) Nebulizer every 6 hours  albuterol/ipratropium for Nebulization. 3 milliLiter(s) Nebulizer once  ascorbic acid 500 milliGRAM(s) Oral daily  bisacodyl Suppository 10 milliGRAM(s) Rectal daily  chlorhexidine 2% Cloths 1 Application(s) Topical <User Schedule>  dextrose 5% with potassium chloride 20 mEq/L 1000 milliLiter(s) (50 mL/Hr) IV Continuous <Continuous>  furosemide   Injectable 40 milliGRAM(s) IV Push daily  heparin   Injectable 5000 Unit(s) SubCutaneous every 12 hours  levothyroxine Injectable 60 MICROGram(s) IV Push at bedtime  metoprolol tartrate Injectable 5 milliGRAM(s) IV Push every 6 hours  mirtazapine 15 milliGRAM(s) Oral at bedtime  multivitamin 1 Tablet(s) Oral daily  QUEtiapine 12.5 milliGRAM(s) Oral at bedtime  sodium chloride 3%  Inhalation 4 milliLiter(s) Inhalation every 12 hours      VITAL:  T(C): , Max: 36.6 (02-03-25 @ 00:35)  T(F): , Max: 97.8 (02-03-25 @ 00:35)  HR: 77 (02-03-25 @ 09:00)  BP: 165/71 (02-03-25 @ 09:00)  BP(mean): 97 (02-02-25 @ 12:00)  RR: 18 (02-03-25 @ 09:00)  SpO2: 98% (02-03-25 @ 09:00)  Wt(kg): --    I and O's:        PHYSICAL EXAM:    Constitutional: cachetic and frail   Neck:  No JVD  Respiratory: Course   Cardiovascular: S1 and S2  Gastrointestinal: BS+, soft, NT/ND  Extremities: No peripheral edema  Neurological:   : No Crooks  Skin: No rashes  Access: Not applicable    LABS:                        10.8   5.53  )-----------( 243      ( 03 Feb 2025 07:14 )             32.7     02-03    130[L]  |  91[L]  |  10  ----------------------------<  90  3.8   |  27  |  1.13    Ca    9.7      03 Feb 2025 07:14            Urine Studies:  Urinalysis Basic - ( 03 Feb 2025 07:14 )    Color: x / Appearance: x / SG: x / pH: x  Gluc: 90 mg/dL / Ketone: x  / Bili: x / Urobili: x   Blood: x / Protein: x / Nitrite: x   Leuk Esterase: x / RBC: x / WBC x   Sq Epi: x / Non Sq Epi: x / Bacteria: x            RADIOLOGY & ADDITIONAL STUDIES:

## 2025-02-03 NOTE — PROGRESS NOTE ADULT - PROBLEM SELECTOR PLAN 2
Multifactorial including but not limited to infection, antibiotics, medication, dementia, hospitalization, respiratory failure. Pt awake and alert not following commands  Multifactorial including but not limited to infection, antibiotics, medication, dementia, hospitalization, respiratory failure.

## 2025-02-03 NOTE — PROGRESS NOTE ADULT - PROBLEM SELECTOR PLAN 1
continue to follow pulmonary recommendations  discussed with pulmonary   she is cleared for PEG  patient's son is consistently requesting PEG  she failed speech and swallow eval today as well

## 2025-02-03 NOTE — PROGRESS NOTE ADULT - TIME BILLING
symptom assessment and management, supportive counseling, coordination of care, discussion and coordination with team.    Adriana Lau, ANP-BC  Please contact me via Teams  between 6am-2pm. If not answering, please call the palliative care pager [phone]    After 2pm and on weekends, please see the contact information below:    In the event of newly developing, evolving, or worsening symptoms between 2pm and 5pm please contact the Palliative Medicine via extension [phone] for assistance.  After 5pm please contact team via pager (if the patient is at [facility] #[phone] or if the patient is at [facility] #[phone]) The Geriatric and Palliative Medicine service has coverage 24 hours a day/ 7 days a week to provide medical recommendations regarding symptom management needs via telephone

## 2025-02-03 NOTE — PROGRESS NOTE ADULT - SUBJECTIVE AND OBJECTIVE BOX
Patient is a 89y old  Female who presents with a chief complaint of Hypoxic/hypercapnic resp failure (03 Feb 2025 12:17)      DATE OF SERVICE: 02-03-25 @ 13:47    SUBJECTIVE / OVERNIGHT EVENTS: overnight events noted    ROS:  not available       MEDICATIONS  (STANDING):  albuterol/ipratropium for Nebulization 3 milliLiter(s) Nebulizer every 6 hours  albuterol/ipratropium for Nebulization. 3 milliLiter(s) Nebulizer once  ascorbic acid 500 milliGRAM(s) Oral daily  bisacodyl Suppository 10 milliGRAM(s) Rectal daily  chlorhexidine 2% Cloths 1 Application(s) Topical <User Schedule>  dextrose 5% with potassium chloride 20 mEq/L 1000 milliLiter(s) (50 mL/Hr) IV Continuous <Continuous>  furosemide   Injectable 40 milliGRAM(s) IV Push daily  heparin   Injectable 5000 Unit(s) SubCutaneous every 12 hours  levothyroxine Injectable 60 MICROGram(s) IV Push at bedtime  metoprolol tartrate Injectable 5 milliGRAM(s) IV Push every 6 hours  mirtazapine 15 milliGRAM(s) Oral at bedtime  multivitamin 1 Tablet(s) Oral daily  QUEtiapine 12.5 milliGRAM(s) Oral at bedtime  sodium chloride 3%  Inhalation 4 milliLiter(s) Inhalation every 12 hours    MEDICATIONS  (PRN):  acetaminophen   Oral Liquid .. 650 milliGRAM(s) Oral every 6 hours PRN Temp greater or equal to 38C (100.4F), Moderate Pain (4 - 6)  haloperidol    Injectable 1 milliGRAM(s) IntraMuscular every 6 hours PRN Agitation        CAPILLARY BLOOD GLUCOSE      POCT Blood Glucose.: 97 mg/dL (03 Feb 2025 12:21)  POCT Blood Glucose.: 98 mg/dL (03 Feb 2025 05:55)  POCT Blood Glucose.: 88 mg/dL (03 Feb 2025 00:08)  POCT Blood Glucose.: 93 mg/dL (02 Feb 2025 21:47)  POCT Blood Glucose.: 94 mg/dL (02 Feb 2025 16:49)    I&O's Summary      Vital Signs Last 24 Hrs  T(C): 36.2 (03 Feb 2025 09:00), Max: 36.6 (03 Feb 2025 00:35)  T(F): 97.2 (03 Feb 2025 09:00), Max: 97.8 (03 Feb 2025 00:35)  HR: 77 (03 Feb 2025 09:00) (67 - 77)  BP: 165/71 (03 Feb 2025 09:00) (142/77 - 184/93)  BP(mean): --  RR: 18 (03 Feb 2025 09:00) (18 - 18)  SpO2: 98% (03 Feb 2025 09:00) (95% - 98%)    PHYSICAL EXAM:   CHEST/LUNG: coarse breath sounds   HEART: S1 S2; no murmurs   ABDOMEN: Soft, Nontender  EXTREMITIES: no edema  NEUROLOGY: non-focal    LABS:                        10.8   5.53  )-----------( 243      ( 03 Feb 2025 07:14 )             32.7     02-03    130[L]  |  91[L]  |  10  ----------------------------<  90  3.8   |  27  |  1.13    Ca    9.7      03 Feb 2025 07:14            Urinalysis Basic - ( 03 Feb 2025 07:14 )    Color: x / Appearance: x / SG: x / pH: x  Gluc: 90 mg/dL / Ketone: x  / Bili: x / Urobili: x   Blood: x / Protein: x / Nitrite: x   Leuk Esterase: x / RBC: x / WBC x   Sq Epi: x / Non Sq Epi: x / Bacteria: x          All consultant(s) notes reviewed and care discussed with other providers        Contact Number, Dr Link [phone]

## 2025-02-03 NOTE — PROGRESS NOTE ADULT - PROBLEM SELECTOR PLAN 1
-Initially 2nd to PNA   -Hypoxia had been improving, then with increase in O2 requirements requiring HFNC 1/24  -Repeat CT chest 1/23 with moderate b/l pleural effusions/atelectasis, b/l GGO. Likely combination of fluid overload + PNA  -Completed 10 day course of ABX   -Continue bronchodilators. S/p Mucomyst   -Keep O>I with diuresis as tolerated  -Continue HFNC, wean as tolerated, keep sats >90%. O2 requirements improving, on 40L/40% this AM   -Suggest Bipap 10/5 PRN if worsening hypoxia or increased WOB  -Prognosis guarded. [facility] DNR/DNI/trial NIV  -Palliative care f/u.    2/1: seems better; on room air:  repeat cxr in AM to evaluate for pleural effusion : she is not in any resp distress  2/2: clinically she looks the best today  : off oxygen : no resp distress:  alert and awake and is responsive  2/3: son at bedside:  pt is on room air:  doing pretty well : no sob:

## 2025-02-03 NOTE — SWALLOW BEDSIDE ASSESSMENT ADULT - SWALLOW EVAL: RECOMMENDED DIET
NPO, with non-oral nutrition/hydration/medications.
Suggest MD makes dietary decision in accordance with Pt/family/HCP wishes as NPO is recommended based upon the results of this examination however Palliative Care consult in progress and may guide decision re; provision of nutrition and hydration in this patient with dementia.

## 2025-02-03 NOTE — SWALLOW BEDSIDE ASSESSMENT ADULT - SWALLOW EVAL: DIAGNOSIS
Pt is a 89 y/o female who presents with oropharyngeal dysphagia which appears to be superimposed upon by fluctuating mentation. In addition, Pt with h/o reduced management of secretions and aspiration on previous FEES (1/17/25). Pt with suspected severely delayed pharyngeal swallow reflex on ice chip trials. Formation, control and transfer of the bolus were impaired. Clear vocal quality post swallow. Pt is a 89 y/o female with dementia (hx as above) who presents with oropharyngeal dysphagia which appears to be superimposed upon by fluctuating mentation. In addition, Pt with h/o reduced management of secretions and aspiration on previous FEES (1/17/25). Pt with suspected severely delayed pharyngeal swallow reflex on ice chip trials. Formation, control and transfer of the bolus were impaired. Clear vocal quality post swallow.

## 2025-02-03 NOTE — SWALLOW BEDSIDE ASSESSMENT ADULT - SLP GENERAL OBSERVATIONS
Pt asleep in bed; aroused to verbal and tactile cues initially; Pt did not sustain awake/alert state

## 2025-02-03 NOTE — PROGRESS NOTE ADULT - ASSESSMENT
resp failure  aspiration    peg cancelled previously due to worsening respiratory status  she is not a candidate for PEG/sedation at present, i do not think she will tolerate sedation  dnr/dni  prognosis guarded   goc noted  Palliative care on going discussion   left a msg with son to discuss further  suggest pleasure feeds     Advanced care planning was discussed with patient and family.  Advanced care planning forms were reviewed and discussed.  Risks, benefits and alternatives of gastroenterologic procedures were discussed in detail and all questions were answered.    30 minutes spent.

## 2025-02-03 NOTE — PROGRESS NOTE ADULT - SUBJECTIVE AND OBJECTIVE BOX
Date of Service: 02-03-25 @ 16:28    Patient is a 89y old  Female who presents with a chief complaint of Hypoxic/hypercapnic resp failure (03 Feb 2025 13:46)      Any change in ROS:     MEDICATIONS  (STANDING):  albuterol/ipratropium for Nebulization 3 milliLiter(s) Nebulizer every 6 hours  albuterol/ipratropium for Nebulization. 3 milliLiter(s) Nebulizer once  ascorbic acid 500 milliGRAM(s) Oral daily  bisacodyl Suppository 10 milliGRAM(s) Rectal daily  chlorhexidine 2% Cloths 1 Application(s) Topical <User Schedule>  dextrose 5% with potassium chloride 20 mEq/L 1000 milliLiter(s) (50 mL/Hr) IV Continuous <Continuous>  furosemide   Injectable 40 milliGRAM(s) IV Push daily  heparin   Injectable 5000 Unit(s) SubCutaneous every 12 hours  levothyroxine Injectable 60 MICROGram(s) IV Push at bedtime  metoprolol tartrate Injectable 5 milliGRAM(s) IV Push every 6 hours  mirtazapine 15 milliGRAM(s) Oral at bedtime  multivitamin 1 Tablet(s) Oral daily  QUEtiapine 12.5 milliGRAM(s) Oral at bedtime  sodium chloride 3%  Inhalation 4 milliLiter(s) Inhalation every 12 hours    MEDICATIONS  (PRN):  acetaminophen   Oral Liquid .. 650 milliGRAM(s) Oral every 6 hours PRN Temp greater or equal to 38C (100.4F), Moderate Pain (4 - 6)  haloperidol    Injectable 1 milliGRAM(s) IntraMuscular every 6 hours PRN Agitation    Vital Signs Last 24 Hrs  T(C): 36.6 (03 Feb 2025 13:00), Max: 36.6 (03 Feb 2025 00:35)  T(F): 97.9 (03 Feb 2025 13:00), Max: 97.9 (03 Feb 2025 13:00)  HR: 79 (03 Feb 2025 13:00) (67 - 79)  BP: 169/76 (03 Feb 2025 13:00) (142/77 - 184/93)  BP(mean): --  RR: 18 (03 Feb 2025 13:00) (18 - 18)  SpO2: 98% (03 Feb 2025 13:00) (95% - 98%)    Parameters below as of 03 Feb 2025 13:00  Patient On (Oxygen Delivery Method): room air        I&O's Summary        Physical Exam:   GENERAL: NAD, well-groomed, well-developed  HEENT: LUANN/   Atraumatic, Normocephalic  ENMT: No tonsillar erythema, exudates, or enlargement; Moist mucous membranes, Good dentition, No lesions  NECK: Supple, No JVD, Normal thyroid  CHEST/LUNG: Clear to auscultaion, ; No rales, rhonchi, wheezing, or rubs  CVS: Regular rate and rhythm; No murmurs, rubs, or gallops  GI: : Soft, Nontender, Nondistended; Bowel sounds present  NERVOUS SYSTEM:  Alert & Oriented X3, Good concentration; Motor Strength 5/5 B/L upper and lower extremities; DTRs 2+ intact and symmetric  EXTREMITIES:  2+ Peripheral Pulses, No clubbing, cyanosis, or edema  LYMPH: No lymphadenopathy noted  SKIN: No rashes or lesions  ENDOCRINOLOGY: No Thyromegaly  PSYCH: Appropriate    Labs:                              10.8   5.53  )-----------( 243      ( 03 Feb 2025 07:14 )             32.7                         9.7    5.63  )-----------( 237      ( 02 Feb 2025 07:22 )             29.4                         8.7    5.60  )-----------( 209      ( 01 Feb 2025 06:56 )             27.2     02-03    130[L]  |  91[L]  |  10  ----------------------------<  90  3.8   |  27  |  1.13  02-02    132[L]  |  91[L]  |  11  ----------------------------<  75  3.1[L]   |  28  |  1.20  02-01    131[L]  |  91[L]  |  12  ----------------------------<  79  2.8[LL]   |  29  |  1.25    Ca    9.7      03 Feb 2025 07:14  Ca    9.4      02 Feb 2025 07:25      CAPILLARY BLOOD GLUCOSE      POCT Blood Glucose.: 97 mg/dL (03 Feb 2025 12:21)  POCT Blood Glucose.: 98 mg/dL (03 Feb 2025 05:55)  POCT Blood Glucose.: 88 mg/dL (03 Feb 2025 00:08)  POCT Blood Glucose.: 93 mg/dL (02 Feb 2025 21:47)  POCT Blood Glucose.: 94 mg/dL (02 Feb 2025 16:49)          Urinalysis Basic - ( 03 Feb 2025 07:14 )    Color: x / Appearance: x / SG: x / pH: x  Gluc: 90 mg/dL / Ketone: x  / Bili: x / Urobili: x   Blood: x / Protein: x / Nitrite: x   Leuk Esterase: x / RBC: x / WBC x   Sq Epi: x / Non Sq Epi: x / Bacteria: x            RECENT CULTURES:        RESPIRATORY CULTURES:          Studies  Chest X-RAY  CT SCAN Chest   Venous Dopplers: LE:   CT Abdomen  Others

## 2025-02-03 NOTE — PROGRESS NOTE ADULT - PROBLEM SELECTOR PLAN 1
pt requiring full supportive care with all ADL's.  pt mumbling and awake this morning remains on hiflow  not following commands pt requiring full supportive care with all ADL's.  pt mumbling and awake this morning   not following commands

## 2025-02-03 NOTE — PROGRESS NOTE ADULT - PROBLEM SELECTOR PLAN 6
kps 20%    Dr. Link spoke with son yesterday.  Teams Dr. Link to clarify message that pts son was "amenable and he would think about it."  awaiting return message then will reach out to pts son.    2256- spoke with Dr. Link. He spoke with pts primary at pts sons request to get his opinion.  Dr Link reports that he will speak with son to give recommendations of pleasure feeds and comfort as per primary doctor.  Palliative care will reach out to pts son after Dr. Link speaks with him. [facility] 20%    Spoke with Dr. Link will see if GI will place PEG since pt is off hiflow.    pts son not at bedside and unable to be reached by phone

## 2025-02-03 NOTE — SWALLOW BEDSIDE ASSESSMENT ADULT - SWALLOW EVAL: ORAL MUSCULATURE
Health Maintenance Due   Topic Date Due     Pneumococcal Vaccine: Pediatrics (0 to 5 Years) and At-Risk Patients (6 to 64 Years) (1 of 2 - PPSV23) Never done     HIV SCREENING  Never done     HEPATITIS C SCREENING  Never done     PREVENTIVE CARE VISIT  01/16/2021     MICROALBUMIN  01/16/2021     A1C  03/08/2021     Amina DURON LPN    
unable to assess due to poor participation/comprehension
unable to assess due to poor participation/comprehension

## 2025-02-03 NOTE — PROGRESS NOTE ADULT - CONVERSATION DETAILS
No family at bedside will attempt to call son No family at bedside will attempt to call son    attempted to contact pt son without successs

## 2025-02-04 LAB
ANION GAP SERPL CALC-SCNC: 14 MMOL/L — SIGNIFICANT CHANGE UP (ref 5–17)
BUN SERPL-MCNC: 10 MG/DL — SIGNIFICANT CHANGE UP (ref 7–23)
CALCIUM SERPL-MCNC: 9.7 MG/DL — SIGNIFICANT CHANGE UP (ref 8.4–10.5)
CHLORIDE SERPL-SCNC: 89 MMOL/L — LOW (ref 96–108)
CO2 SERPL-SCNC: 29 MMOL/L — SIGNIFICANT CHANGE UP (ref 22–31)
CREAT SERPL-MCNC: 1.17 MG/DL — SIGNIFICANT CHANGE UP (ref 0.5–1.3)
EGFR: 43 ML/MIN/1.73M2 — LOW
GLUCOSE BLDC GLUCOMTR-MCNC: 81 MG/DL — SIGNIFICANT CHANGE UP (ref 70–99)
GLUCOSE BLDC GLUCOMTR-MCNC: 90 MG/DL — SIGNIFICANT CHANGE UP (ref 70–99)
GLUCOSE BLDC GLUCOMTR-MCNC: 91 MG/DL — SIGNIFICANT CHANGE UP (ref 70–99)
GLUCOSE SERPL-MCNC: 95 MG/DL — SIGNIFICANT CHANGE UP (ref 70–99)
HCT VFR BLD CALC: 33.9 % — LOW (ref 34.5–45)
HGB BLD-MCNC: 11.4 G/DL — LOW (ref 11.5–15.5)
MAGNESIUM SERPL-MCNC: 1.5 MG/DL — LOW (ref 1.6–2.6)
MCHC RBC-ENTMCNC: 31.8 PG — SIGNIFICANT CHANGE UP (ref 27–34)
MCHC RBC-ENTMCNC: 33.6 G/DL — SIGNIFICANT CHANGE UP (ref 32–36)
MCV RBC AUTO: 94.7 FL — SIGNIFICANT CHANGE UP (ref 80–100)
NRBC # BLD: 0 /100 WBCS — SIGNIFICANT CHANGE UP (ref 0–0)
NRBC BLD-RTO: 0 /100 WBCS — SIGNIFICANT CHANGE UP (ref 0–0)
PHOSPHATE SERPL-MCNC: 2.2 MG/DL — LOW (ref 2.5–4.5)
PLATELET # BLD AUTO: 252 K/UL — SIGNIFICANT CHANGE UP (ref 150–400)
POTASSIUM SERPL-MCNC: 3.6 MMOL/L — SIGNIFICANT CHANGE UP (ref 3.5–5.3)
POTASSIUM SERPL-SCNC: 3.6 MMOL/L — SIGNIFICANT CHANGE UP (ref 3.5–5.3)
RBC # BLD: 3.58 M/UL — LOW (ref 3.8–5.2)
RBC # FLD: 15.9 % — HIGH (ref 10.3–14.5)
SODIUM SERPL-SCNC: 132 MMOL/L — LOW (ref 135–145)
WBC # BLD: 5.93 K/UL — SIGNIFICANT CHANGE UP (ref 3.8–10.5)
WBC # FLD AUTO: 5.93 K/UL — SIGNIFICANT CHANGE UP (ref 3.8–10.5)

## 2025-02-04 PROCEDURE — 74176 CT ABD & PELVIS W/O CONTRAST: CPT | Mod: 26

## 2025-02-04 RX ORDER — SOD PHOSPHATE,MONOBASIC-DIBAS 3MMOL/ML
15 VIAL (ML) INTRAVENOUS ONCE
Refills: 0 | Status: COMPLETED | OUTPATIENT
Start: 2025-02-04 | End: 2025-02-04

## 2025-02-04 RX ORDER — MAGNESIUM SULFATE 0.8 MEQ/ML
1 AMPUL (ML) INJECTION ONCE
Refills: 0 | Status: COMPLETED | OUTPATIENT
Start: 2025-02-04 | End: 2025-02-04

## 2025-02-04 RX ADMIN — Medication 40 MILLIGRAM(S): at 05:52

## 2025-02-04 RX ADMIN — ANTISEPTIC SURGICAL SCRUB 1 APPLICATION(S): 0.04 SOLUTION TOPICAL at 06:48

## 2025-02-04 RX ADMIN — Medication 5 MILLIGRAM(S): at 11:59

## 2025-02-04 RX ADMIN — IPRATROPIUM BROMIDE AND ALBUTEROL SULFATE 3 MILLILITER(S): .5; 2.5 SOLUTION RESPIRATORY (INHALATION) at 17:22

## 2025-02-04 RX ADMIN — Medication 4 MILLILITER(S): at 17:22

## 2025-02-04 RX ADMIN — Medication 5000 UNIT(S): at 17:21

## 2025-02-04 RX ADMIN — Medication 5000 UNIT(S): at 05:52

## 2025-02-04 RX ADMIN — LEVOTHYROXINE SODIUM 60 MICROGRAM(S): 25 TABLET ORAL at 23:45

## 2025-02-04 RX ADMIN — Medication 63.75 MILLIMOLE(S): at 12:33

## 2025-02-04 RX ADMIN — IPRATROPIUM BROMIDE AND ALBUTEROL SULFATE 3 MILLILITER(S): .5; 2.5 SOLUTION RESPIRATORY (INHALATION) at 06:48

## 2025-02-04 RX ADMIN — Medication 5 MILLIGRAM(S): at 23:45

## 2025-02-04 RX ADMIN — Medication 100 GRAM(S): at 10:41

## 2025-02-04 RX ADMIN — IPRATROPIUM BROMIDE AND ALBUTEROL SULFATE 3 MILLILITER(S): .5; 2.5 SOLUTION RESPIRATORY (INHALATION) at 11:59

## 2025-02-04 RX ADMIN — DEXTROSE MONOHYDRATE, SODIUM CHLORIDE, AND POTASSIUM CHLORIDE 50 MILLILITER(S): 50; 2.25; 2.24 INJECTION, SOLUTION INTRAVENOUS at 10:36

## 2025-02-04 RX ADMIN — Medication 5 MILLIGRAM(S): at 17:21

## 2025-02-04 RX ADMIN — Medication 5 MILLIGRAM(S): at 05:52

## 2025-02-04 NOTE — PROGRESS NOTE ADULT - PROBLEM SELECTOR PLAN 3
CT chest 1/23 with moderate b/l pl effusions  -CXR 1/25 with b/l pleural effusions/atelectasis L>R, L pleural effusion now appears mod-large  -Holding off on thoracentesis for now as O2 requirements improving with diuresis  -Keep O>I as tolerated.    2/1: repeat cxr in AM  2/2: cxr ordered:  pt has been on lasix  2/3; the cxr looks better to me:  the left sided pleural effusion is less:  still congested but better then before:  2/4: the cxr looks much better to me then  before

## 2025-02-04 NOTE — PROGRESS NOTE ADULT - PROBLEM SELECTOR PLAN 5
-TF via NGT  -Speech and swallow f/u noted, recommending NPO  -Aspiration precautions  -Oral care  -Palliative care f/u.    2/3 seems to be doing  ok :  no sob:  on room air:  cxr is much better: : keeps NPO: per speech and swallow:  the gi does not want to put the peg:  option remains for pleasure feeds:    2/4; now pt for ir guided peg placement  given her general clinical condition :  she relatively at high risk for any procedure under sedtion or anesthesia:  but pulmonary wise she looks much better:  effusions have decreased and she has been on room air for days:  check ABG in am

## 2025-02-04 NOTE — PROGRESS NOTE ADULT - PROBLEM SELECTOR PLAN 2
-Likely aspiration related. Pt also s/p flu   -Completed ABX, then resumed as pt spiked temp. CXR with bibasilar opacities   -Repeat CT chest 1/23 with b/l pleural effusions/atelectasis, b/l GGO. Likely combination of fluid overload + PNA  -Completed additional 10 day course of ABX   -Continue bronchodilators/mucolytics   -Trend leukocytosis/fever curve   -Suction PRN, chest PT  2/2: resolved  2/3: resolved  2/4: resolved

## 2025-02-04 NOTE — PROGRESS NOTE ADULT - SUBJECTIVE AND OBJECTIVE BOX
Cardiovascular Disease Progress Note  Date of service: 02-04-25 @ 08:51    Overnight events: No acute events overnight.  Patient is in no distress.   Otherwise review of systems negative    Objective Findings:  T(C): 36.3 (02-04-25 @ 04:49), Max: 36.6 (02-03-25 @ 13:00)  HR: 78 (02-04-25 @ 04:49) (73 - 84)  BP: 150/77 (02-04-25 @ 04:49) (146/72 - 169/76)  RR: 18 (02-04-25 @ 04:49) (18 - 18)  SpO2: 95% (02-04-25 @ 04:49) (93% - 98%)  Wt(kg): --  Daily     Daily       Physical Exam:  Gen: NAD; Patient resting comfortably  HEENT: EOMI, Normocephalic/ atraumatic  CV: RRR, normal S1 + S2, no m/r/g  Lungs:  Normal respiratory effort; clear to auscultation bilaterally  Abd: soft, non-tender; bowel sounds present  Ext: No edema; warm and well perfused     Telemetry: Sinus     Laboratory Data:                        10.8   5.53  )-----------( 243      ( 03 Feb 2025 07:14 )             32.7     02-03    130[L]  |  91[L]  |  10  ----------------------------<  90  3.8   |  27  |  1.13    Ca    9.7      03 Feb 2025 07:14                Inpatient Medications:  MEDICATIONS  (STANDING):  albuterol/ipratropium for Nebulization 3 milliLiter(s) Nebulizer every 6 hours  albuterol/ipratropium for Nebulization. 3 milliLiter(s) Nebulizer once  ascorbic acid 500 milliGRAM(s) Oral daily  bisacodyl Suppository 10 milliGRAM(s) Rectal daily  chlorhexidine 2% Cloths 1 Application(s) Topical <User Schedule>  dextrose 5% with potassium chloride 20 mEq/L 1000 milliLiter(s) (50 mL/Hr) IV Continuous <Continuous>  furosemide   Injectable 40 milliGRAM(s) IV Push daily  heparin   Injectable 5000 Unit(s) SubCutaneous every 12 hours  levothyroxine Injectable 60 MICROGram(s) IV Push at bedtime  metoprolol tartrate Injectable 5 milliGRAM(s) IV Push every 6 hours  mirtazapine 15 milliGRAM(s) Oral at bedtime  multivitamin 1 Tablet(s) Oral daily  QUEtiapine 12.5 milliGRAM(s) Oral at bedtime  sodium chloride 3%  Inhalation 4 milliLiter(s) Inhalation every 12 hours      Assessment:  89y old Female with stated hx significant for dementia, s/p brain tumor resection 1961, HTN, hypothyroidism colon Ca s/p Rt hemicolectomy ('09), 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    Plan of Care:    #Afib RVR  - HR now improved  - Currently maintaining sinus.   - BP now stable. Would continue with IV lopressor standing dose.   - PO metoprolol tartrate 50mg BID if able to tolerate PO medication for rate control  - Patient is anemic and high bleeding risk, would refrain from AC at this time.     #Hypoxic respiratory failure  - IV diuresis  as needed. Consider transitioning to PO diuretics for maintenance.   - Pulm input appreciated.   - Volume status improving  - Replenish lytes as needed.     Overall poor prognosis.       #ACP (advance care planning)-  Advanced care planning was discussed.  30 additional minutes spent addressing advance care plans.      Over 55 minutes spent on total encounter; more than 50% of the visit was spent counseling and/or coordinating care by the attending physician.      Bobby Montilla DO [facility]  Cardiovascular Disease  [phone]

## 2025-02-04 NOTE — PROGRESS NOTE ADULT - SUBJECTIVE AND OBJECTIVE BOX
Date of Service: 02-04-25 @ 16:06    Patient is a 89y old  Female who presents with a chief complaint of Hypoxic/hypercapnic resp failure (04 Feb 2025 13:39)      Any change in ROS:  doing same:  on room ai r:     MEDICATIONS  (STANDING):  albuterol/ipratropium for Nebulization 3 milliLiter(s) Nebulizer every 6 hours  albuterol/ipratropium for Nebulization. 3 milliLiter(s) Nebulizer once  ascorbic acid 500 milliGRAM(s) Oral daily  bisacodyl Suppository 10 milliGRAM(s) Rectal daily  chlorhexidine 2% Cloths 1 Application(s) Topical <User Schedule>  dextrose 5% with potassium chloride 20 mEq/L 1000 milliLiter(s) (50 mL/Hr) IV Continuous <Continuous>  heparin   Injectable 5000 Unit(s) SubCutaneous every 12 hours  levothyroxine Injectable 60 MICROGram(s) IV Push at bedtime  metoprolol tartrate Injectable 5 milliGRAM(s) IV Push every 6 hours  mirtazapine 15 milliGRAM(s) Oral at bedtime  multivitamin 1 Tablet(s) Oral daily  QUEtiapine 12.5 milliGRAM(s) Oral at bedtime  sodium chloride 3%  Inhalation 4 milliLiter(s) Inhalation every 12 hours    MEDICATIONS  (PRN):  acetaminophen   Oral Liquid .. 650 milliGRAM(s) Oral every 6 hours PRN Temp greater or equal to 38C (100.4F), Moderate Pain (4 - 6)  haloperidol    Injectable 1 milliGRAM(s) IntraMuscular every 6 hours PRN Agitation    Vital Signs Last 24 Hrs  T(C): 36.4 (04 Feb 2025 11:53), Max: 36.4 (03 Feb 2025 16:28)  T(F): 97.6 (04 Feb 2025 11:53), Max: 97.6 (04 Feb 2025 11:53)  HR: 75 (04 Feb 2025 11:53) (73 - 84)  BP: 157/73 (04 Feb 2025 11:53) (146/72 - 162/75)  BP(mean): --  RR: 18 (04 Feb 2025 11:53) (18 - 18)  SpO2: 94% (04 Feb 2025 11:53) (93% - 98%)    Parameters below as of 04 Feb 2025 11:53  Patient On (Oxygen Delivery Method): room air        I&O's Summary    03 Feb 2025 07:01  -  04 Feb 2025 07:00  --------------------------------------------------------  IN: 0 mL / OUT: 700 mL / NET: -700 mL    04 Feb 2025 07:01  -  04 Feb 2025 16:06  --------------------------------------------------------  IN: 0 mL / OUT: 850 mL / NET: -850 mL          Physical Exam:   GENERAL: NAD, well-groomed, well-developed  HEENT: LUANN/   Atraumatic, Normocephalic  ENMT: No tonsillar erythema, exudates, or enlargement; Moist mucous membranes, Good dentition, No lesions  NECK: Supple, No JVD, Normal thyroid  CHEST/LUNG: Clear to auscultaion  CVS: Regular rate and rhythm; No murmurs, rubs, or gallops  GI: : Soft, Nontender, Nondistended; Bowel sounds present  NERVOUS SYSTEM:  Alert & awake  EXTREMITIES:-edema  LYMPH: No lymphadenopathy noted  SKIN: No rashes or lesions  ENDOCRINOLOGY: No Thyromegaly  PSYCH: Appropriate    Labs:                              11.4   5.93  )-----------( 252      ( 04 Feb 2025 09:35 )             33.9                         10.8   5.53  )-----------( 243      ( 03 Feb 2025 07:14 )             32.7                         9.7    5.63  )-----------( 237      ( 02 Feb 2025 07:22 )             29.4                         8.7    5.60  )-----------( 209      ( 01 Feb 2025 06:56 )             27.2     02-04    132[L]  |  89[L]  |  10  ----------------------------<  95  3.6   |  29  |  1.17  02-03    130[L]  |  91[L]  |  10  ----------------------------<  90  3.8   |  27  |  1.13  02-02    132[L]  |  91[L]  |  11  ----------------------------<  75  3.1[L]   |  28  |  1.20  02-01    131[L]  |  91[L]  |  12  ----------------------------<  79  2.8[LL]   |  29  |  1.25    Ca    9.7      04 Feb 2025 09:32  Ca    9.7      03 Feb 2025 07:14  Phos  2.2     02-04  Mg     1.5     02-04      CAPILLARY BLOOD GLUCOSE      POCT Blood Glucose.: 81 mg/dL (04 Feb 2025 12:19)  POCT Blood Glucose.: 91 mg/dL (04 Feb 2025 06:14)  POCT Blood Glucose.: 109 mg/dL (03 Feb 2025 23:39)  POCT Blood Glucose.: 98 mg/dL (03 Feb 2025 17:57)          Urinalysis Basic - ( 04 Feb 2025 09:32 )    Color: x / Appearance: x / SG: x / pH: x  Gluc: 95 mg/dL / Ketone: x  / Bili: x / Urobili: x   Blood: x / Protein: x / Nitrite: x   Leuk Esterase: x / RBC: x / WBC x   Sq Epi: x / Non Sq Epi: x / Bacteria: x        rd< from: Xray Chest 1 View- PORTABLE-Urgent (Xray Chest 1 View- PORTABLE-Urgent .) (02.02.25 @ 17:31) >    TECHNIQUE: Single frontal, portable view of the chest was obtained.    COMPARISON: Chest x-ray 1/25/2025.    FINDINGS:    The heart is normal in size.  Elevated left hemidiaphragm.  No focal consolidations.  Bilateral small pleural effusions, decreased in size compared to prior   chest x-ray 1/25/2025.  There is no pneumothorax.  Diffuse bony demineralization. No acute osseous abnormalities.    IMPRESSION:    Bilateral small pleural effusions, decreased in size compared to prior   chest x-ray 1/25/2025.    --- End of Report ---          HIPOLITO BOLES MD; Resident Radiologist  This document has been electronically signed.  CLAIRE DUMONT MD; Attending Radiologist  This document has been electronically signed. Feb  3 2025  8:33PM    < end of copied text >      RECENT CULTURES:        RESPIRATORY CULTURES:          Studies  Chest X-RAY  CT SCAN Chest   Venous Dopplers: LE:   CT Abdomen  Others

## 2025-02-04 NOTE — PROGRESS NOTE ADULT - SUBJECTIVE AND OBJECTIVE BOX
Patient is a 89y old  Female who presents with a chief complaint of Hypoxic/hypercapnic resp failure (04 Feb 2025 10:16)      DATE OF SERVICE: 02-04-25 @ 13:31    SUBJECTIVE / OVERNIGHT EVENTS: overnight events noted    ROS:  not available         MEDICATIONS  (STANDING):  albuterol/ipratropium for Nebulization 3 milliLiter(s) Nebulizer every 6 hours  albuterol/ipratropium for Nebulization. 3 milliLiter(s) Nebulizer once  ascorbic acid 500 milliGRAM(s) Oral daily  bisacodyl Suppository 10 milliGRAM(s) Rectal daily  chlorhexidine 2% Cloths 1 Application(s) Topical <User Schedule>  dextrose 5% with potassium chloride 20 mEq/L 1000 milliLiter(s) (50 mL/Hr) IV Continuous <Continuous>  heparin   Injectable 5000 Unit(s) SubCutaneous every 12 hours  levothyroxine Injectable 60 MICROGram(s) IV Push at bedtime  metoprolol tartrate Injectable 5 milliGRAM(s) IV Push every 6 hours  mirtazapine 15 milliGRAM(s) Oral at bedtime  multivitamin 1 Tablet(s) Oral daily  QUEtiapine 12.5 milliGRAM(s) Oral at bedtime  sodium chloride 3%  Inhalation 4 milliLiter(s) Inhalation every 12 hours    MEDICATIONS  (PRN):  acetaminophen   Oral Liquid .. 650 milliGRAM(s) Oral every 6 hours PRN Temp greater or equal to 38C (100.4F), Moderate Pain (4 - 6)  haloperidol    Injectable 1 milliGRAM(s) IntraMuscular every 6 hours PRN Agitation        CAPILLARY BLOOD GLUCOSE      POCT Blood Glucose.: 91 mg/dL (04 Feb 2025 06:14)  POCT Blood Glucose.: 109 mg/dL (03 Feb 2025 23:39)  POCT Blood Glucose.: 98 mg/dL (03 Feb 2025 17:57)    I&O's Summary    03 Feb 2025 07:01  -  04 Feb 2025 07:00  --------------------------------------------------------  IN: 0 mL / OUT: 700 mL / NET: -700 mL        Vital Signs Last 24 Hrs  T(C): 36.4 (04 Feb 2025 11:53), Max: 36.4 (03 Feb 2025 16:28)  T(F): 97.6 (04 Feb 2025 11:53), Max: 97.6 (04 Feb 2025 11:53)  HR: 75 (04 Feb 2025 11:53) (73 - 84)  BP: 157/73 (04 Feb 2025 11:53) (146/72 - 162/75)  BP(mean): --  RR: 18 (04 Feb 2025 11:53) (18 - 18)  SpO2: 94% (04 Feb 2025 11:53) (93% - 98%)      PHYSICAL EXAM:   CHEST/LUNG: decreased breath sounds   HEART: S1 S2; no murmurs   ABDOMEN: Soft, Nontender  EXTREMITIES: no edema  NEUROLOGY: non-focal    LABS:                        11.4   5.93  )-----------( 252      ( 04 Feb 2025 09:35 )             33.9     02-04    132[L]  |  89[L]  |  10  ----------------------------<  95  3.6   |  29  |  1.17    Ca    9.7      04 Feb 2025 09:32  Phos  2.2     02-04  Mg     1.5     02-04            Urinalysis Basic - ( 04 Feb 2025 09:32 )    Color: x / Appearance: x / SG: x / pH: x  Gluc: 95 mg/dL / Ketone: x  / Bili: x / Urobili: x   Blood: x / Protein: x / Nitrite: x   Leuk Esterase: x / RBC: x / WBC x   Sq Epi: x / Non Sq Epi: x / Bacteria: x          All consultant(s) notes reviewed and care discussed with other providers        Contact Number, Dr Link [phone]

## 2025-02-04 NOTE — PROGRESS NOTE ADULT - SUBJECTIVE AND OBJECTIVE BOX
[facility]      Zaid Garcia NP    [address] [place] [address]  [phone]    INTERVAL HPI/ OVERNIGHT EVENTS:  pt s/e RN at bedside,   on RA       Allergies    clindamycin (Unknown)  shellfish (Unknown)  strawberry (Rash)  penicillin (Unknown)  clindamycin (Other)  strawberry (Unknown)  IV Contrast (Other)  IV Contrast (Unknown)    Intolerances      unable to obtain      PHYSICAL EXAM:   Vital Signs Last 24 Hrs  T(C): 36.4 (02 Feb 2025 12:00), Max: 36.6 (02 Feb 2025 05:07)  T(F): 97.5 (02 Feb 2025 12:00), Max: 97.8 (02 Feb 2025 05:07)  HR: 69 (02 Feb 2025 12:00) (64 - 73)  BP: 151/71 (02 Feb 2025 12:00) (112/72 - 157/79)  BP(mean): 97 (02 Feb 2025 12:00) (97 - 97)  RR: 18 (02 Feb 2025 12:00) (18 - 18)  SpO2: 95% (02 Feb 2025 12:00) (93% - 100%)    Parameters below as of 02 Feb 2025 12:00  Patient On (Oxygen Delivery Method): room air    Daily     Daily I&O's Summary    19 Jan 2025 07:01  -  19 Jan 2025 15:01  --------------------------------------------------------  IN: 0 mL / OUT: 150 mL / NET: -150 mL      nad  confused  frail  non toxic  soft, nt  no edema        LABS:                        9.7    5.63  )-----------( 237      ( 02 Feb 2025 07:22 )             29.4      02-02    132[L]  |  91[L]  |  11  ----------------------------<  75  3.1[L]   |  28  |  1.20    Ca    9.4      02 Feb 2025 07:25                      Urinalysis Basic - ( 19 Jan 2025 07:30 )    Color: x / Appearance: x / SG: x / pH: x  Gluc: 114 mg/dL / Ketone: x  / Bili: x / Urobili: x   Blood: x / Protein: x / Nitrite: x   Leuk Esterase: x / RBC: x / WBC x   Sq Epi: x / Non Sq Epi: x / Bacteria: x      amylase   lipase  RADIOLOGY & ADDITIONAL TESTS:

## 2025-02-04 NOTE — PROGRESS NOTE ADULT - PROBLEM SELECTOR PLAN 1
continue to follow pulmonary recommendations  discussed with pulmonary   she is cleared for PEG  patient's son is consistently requesting PEG  GI Recommend IR place PEG

## 2025-02-04 NOTE — PROGRESS NOTE ADULT - ASSESSMENT
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

## 2025-02-04 NOTE — PROGRESS NOTE ADULT - PROBLEM SELECTOR PLAN 1
-Initially 2nd to PNA   -Hypoxia had been improving, then with increase in O2 requirements requiring HFNC 1/24  -Repeat CT chest 1/23 with moderate b/l pleural effusions/atelectasis, b/l GGO. Likely combination of fluid overload + PNA  -Completed 10 day course of ABX   -Continue bronchodilators. S/p Mucomyst   -Keep O>I with diuresis as tolerated  -Continue HFNC, wean as tolerated, keep sats >90%. O2 requirements improving, on 40L/40% this AM   -Suggest Bipap 10/5 PRN if worsening hypoxia or increased WOB  -Prognosis guarded. C DNR/DNI/trial NIV  -Palliative care f/u.    2/1: seems better; on room air:  repeat cxr in AM to evaluate for pleural effusion : she is not in any resp distress  2/2: clinically she looks the best today  : off oxygen : no resp distress:  alert and awake and is responsive  2/3: son at bedside:  pt is on room air:  doing pretty well : no sob:  2/4: seems OK: cxr with improvement:  on room air :  she looks comfortable

## 2025-02-04 NOTE — PROGRESS NOTE ADULT - SUBJECTIVE AND OBJECTIVE BOX
NEPHROLOGY-[facility] [phone]        Patient seen and examined in bed.  She was off oxygen         MEDICATIONS  (STANDING):  albuterol/ipratropium for Nebulization 3 milliLiter(s) Nebulizer every 6 hours  albuterol/ipratropium for Nebulization. 3 milliLiter(s) Nebulizer once  ascorbic acid 500 milliGRAM(s) Oral daily  bisacodyl Suppository 10 milliGRAM(s) Rectal daily  chlorhexidine 2% Cloths 1 Application(s) Topical <User Schedule>  dextrose 5% with potassium chloride 20 mEq/L 1000 milliLiter(s) (50 mL/Hr) IV Continuous <Continuous>  furosemide   Injectable 40 milliGRAM(s) IV Push daily  heparin   Injectable 5000 Unit(s) SubCutaneous every 12 hours  levothyroxine Injectable 60 MICROGram(s) IV Push at bedtime  metoprolol tartrate Injectable 5 milliGRAM(s) IV Push every 6 hours  mirtazapine 15 milliGRAM(s) Oral at bedtime  multivitamin 1 Tablet(s) Oral daily  QUEtiapine 12.5 milliGRAM(s) Oral at bedtime  sodium chloride 3%  Inhalation 4 milliLiter(s) Inhalation every 12 hours      VITAL:  T(C): , Max: 36.6 (02-03-25 @ 13:00)  T(F): , Max: 97.9 (02-03-25 @ 13:00)  HR: 78 (02-04-25 @ 04:49)  BP: 150/77 (02-04-25 @ 04:49)  BP(mean): --  RR: 18 (02-04-25 @ 04:49)  SpO2: 95% (02-04-25 @ 04:49)  Wt(kg): --    I and O's:    02-03 @ 07:01  -  02-04 @ 07:00  --------------------------------------------------------  IN: 0 mL / OUT: 700 mL / NET: -700 mL          PHYSICAL EXAM:    Constitutional: NAD  Neck:  No JVD  Respiratory: CTAB/L  Cardiovascular: S1 and S2  Gastrointestinal: BS+, soft, NT/ND  Extremities: No peripheral edema  Neurological:    : No Crooks  Skin: No rashes  Access: Not applicable    LABS:                        11.4   5.93  )-----------( 252      ( 04 Feb 2025 09:35 )             33.9     02-04    132[L]  |  89[L]  |  10  ----------------------------<  95  3.6   |  29  |  1.17    Ca    9.7      04 Feb 2025 09:32  Phos  2.2     02-04  Mg     1.5     02-04            Urine Studies:  Urinalysis Basic - ( 04 Feb 2025 09:32 )    Color: x / Appearance: x / SG: x / pH: x  Gluc: 95 mg/dL / Ketone: x  / Bili: x / Urobili: x   Blood: x / Protein: x / Nitrite: x   Leuk Esterase: x / RBC: x / WBC x   Sq Epi: x / Non Sq Epi: x / Bacteria: x            RADIOLOGY & ADDITIONAL STUDIES:

## 2025-02-04 NOTE — PROGRESS NOTE ADULT - ASSESSMENT
resp failure  aspiration    peg cancelled previously due to worsening respiratory status  she is not a candidate for PEG/sedation at present, i do not think she will tolerate sedation  dnr/dni  prognosis guarded   goc noted  son is adamant of feeding tube  IR eval for G tube to be placed to minimize sedation that will be used    Advanced care planning was discussed with patient and family.  Advanced care planning forms were reviewed and discussed.  Risks, benefits and alternatives of gastroenterologic procedures were discussed in detail and all questions were answered.    30 minutes spent.

## 2025-02-05 LAB
ANION GAP SERPL CALC-SCNC: 12 MMOL/L — SIGNIFICANT CHANGE UP (ref 5–17)
BASE EXCESS BLDA CALC-SCNC: 7.6 MMOL/L — HIGH (ref -2–3)
BUN SERPL-MCNC: 10 MG/DL — SIGNIFICANT CHANGE UP (ref 7–23)
CALCIUM SERPL-MCNC: 9.4 MG/DL — SIGNIFICANT CHANGE UP (ref 8.4–10.5)
CHLORIDE SERPL-SCNC: 88 MMOL/L — LOW (ref 96–108)
CO2 BLDA-SCNC: 33 MMOL/L — HIGH (ref 19–24)
CO2 SERPL-SCNC: 27 MMOL/L — SIGNIFICANT CHANGE UP (ref 22–31)
CREAT SERPL-MCNC: 1.22 MG/DL — SIGNIFICANT CHANGE UP (ref 0.5–1.3)
EGFR: 41 ML/MIN/1.73M2 — LOW
GAS PNL BLDA: SIGNIFICANT CHANGE UP
GLUCOSE BLDC GLUCOMTR-MCNC: 74 MG/DL — SIGNIFICANT CHANGE UP (ref 70–99)
GLUCOSE BLDC GLUCOMTR-MCNC: 76 MG/DL — SIGNIFICANT CHANGE UP (ref 70–99)
GLUCOSE BLDC GLUCOMTR-MCNC: 90 MG/DL — SIGNIFICANT CHANGE UP (ref 70–99)
GLUCOSE SERPL-MCNC: 70 MG/DL — SIGNIFICANT CHANGE UP (ref 70–99)
HCO3 BLDA-SCNC: 32 MMOL/L — HIGH (ref 21–28)
HCT VFR BLD CALC: 32.3 % — LOW (ref 34.5–45)
HGB BLD-MCNC: 10.8 G/DL — LOW (ref 11.5–15.5)
HOROWITZ INDEX BLDA+IHG-RTO: 21 — SIGNIFICANT CHANGE UP
MAGNESIUM SERPL-MCNC: 1.8 MG/DL — SIGNIFICANT CHANGE UP (ref 1.6–2.6)
MCHC RBC-ENTMCNC: 31.4 PG — SIGNIFICANT CHANGE UP (ref 27–34)
MCHC RBC-ENTMCNC: 33.4 G/DL — SIGNIFICANT CHANGE UP (ref 32–36)
MCV RBC AUTO: 93.9 FL — SIGNIFICANT CHANGE UP (ref 80–100)
NRBC # BLD: 0 /100 WBCS — SIGNIFICANT CHANGE UP (ref 0–0)
NRBC BLD-RTO: 0 /100 WBCS — SIGNIFICANT CHANGE UP (ref 0–0)
PCO2 BLDA: 43 MMHG — SIGNIFICANT CHANGE UP (ref 32–45)
PH BLDA: 7.48 — HIGH (ref 7.35–7.45)
PHOSPHATE SERPL-MCNC: 3 MG/DL — SIGNIFICANT CHANGE UP (ref 2.5–4.5)
PLATELET # BLD AUTO: 223 K/UL — SIGNIFICANT CHANGE UP (ref 150–400)
PO2 BLDA: 67 MMHG — LOW (ref 83–108)
POTASSIUM SERPL-MCNC: 3.5 MMOL/L — SIGNIFICANT CHANGE UP (ref 3.5–5.3)
POTASSIUM SERPL-SCNC: 3.5 MMOL/L — SIGNIFICANT CHANGE UP (ref 3.5–5.3)
RBC # BLD: 3.44 M/UL — LOW (ref 3.8–5.2)
RBC # FLD: 16.3 % — HIGH (ref 10.3–14.5)
SAO2 % BLDA: 96.7 % — SIGNIFICANT CHANGE UP (ref 94–98)
SODIUM SERPL-SCNC: 127 MMOL/L — LOW (ref 135–145)
WBC # BLD: 4.76 K/UL — SIGNIFICANT CHANGE UP (ref 3.8–10.5)
WBC # FLD AUTO: 4.76 K/UL — SIGNIFICANT CHANGE UP (ref 3.8–10.5)

## 2025-02-05 RX ORDER — BACTERIOSTATIC SODIUM CHLORIDE 0.9 %
1000 VIAL (ML) INJECTION
Refills: 0 | Status: DISCONTINUED | OUTPATIENT
Start: 2025-02-05 | End: 2025-02-06

## 2025-02-05 RX ORDER — SODIUM CHLORIDE 5 % 5 %
500 INTRAVENOUS SOLUTION INTRAVENOUS
Refills: 0 | Status: DISCONTINUED | OUTPATIENT
Start: 2025-02-05 | End: 2025-02-06

## 2025-02-05 RX ORDER — TOLVAPTAN 15 MG/1
15 TABLET ORAL ONCE
Refills: 0 | Status: COMPLETED | OUTPATIENT
Start: 2025-02-05 | End: 2025-02-05

## 2025-02-05 RX ADMIN — LEVOTHYROXINE SODIUM 60 MICROGRAM(S): 25 TABLET ORAL at 22:37

## 2025-02-05 RX ADMIN — Medication 20 MILLIGRAM(S): at 06:10

## 2025-02-05 RX ADMIN — Medication 5 MILLIGRAM(S): at 17:54

## 2025-02-05 RX ADMIN — Medication 10 MILLIGRAM(S): at 22:37

## 2025-02-05 RX ADMIN — Medication 40 MILLILITER(S): at 23:44

## 2025-02-05 RX ADMIN — Medication 40 MILLILITER(S): at 18:48

## 2025-02-05 RX ADMIN — TOLVAPTAN 15 MILLIGRAM(S): 15 TABLET ORAL at 14:05

## 2025-02-05 RX ADMIN — Medication 5000 UNIT(S): at 06:10

## 2025-02-05 RX ADMIN — Medication 5000 UNIT(S): at 17:54

## 2025-02-05 RX ADMIN — Medication 40 MILLILITER(S): at 10:30

## 2025-02-05 RX ADMIN — IPRATROPIUM BROMIDE AND ALBUTEROL SULFATE 3 MILLILITER(S): .5; 2.5 SOLUTION RESPIRATORY (INHALATION) at 17:55

## 2025-02-05 RX ADMIN — ANTISEPTIC SURGICAL SCRUB 1 APPLICATION(S): 0.04 SOLUTION TOPICAL at 07:36

## 2025-02-05 RX ADMIN — IPRATROPIUM BROMIDE AND ALBUTEROL SULFATE 3 MILLILITER(S): .5; 2.5 SOLUTION RESPIRATORY (INHALATION) at 14:06

## 2025-02-05 RX ADMIN — Medication 500 MILLIGRAM(S): at 14:06

## 2025-02-05 RX ADMIN — IPRATROPIUM BROMIDE AND ALBUTEROL SULFATE 3 MILLILITER(S): .5; 2.5 SOLUTION RESPIRATORY (INHALATION) at 23:44

## 2025-02-05 RX ADMIN — Medication 5 MILLIGRAM(S): at 14:07

## 2025-02-05 RX ADMIN — Medication 1 TABLET(S): at 14:06

## 2025-02-05 RX ADMIN — Medication 5 MILLIGRAM(S): at 23:45

## 2025-02-05 RX ADMIN — Medication 4 MILLILITER(S): at 17:55

## 2025-02-05 RX ADMIN — Medication 5 MILLIGRAM(S): at 06:10

## 2025-02-05 NOTE — PROGRESS NOTE ADULT - ASSESSMENT
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

## 2025-02-05 NOTE — CONSULT NOTE ADULT - CONSULT REQUESTED DATE/TIME
11-Jan-2025 10:32
24-Jan-2025 20:01
15-Alberto-2025 12:24
15-Alberto-2025 16:45
05-Feb-2025 12:05
25-Jan-2025 10:47
25-Jan-2025 15:52
17-Jan-2025 19:56

## 2025-02-05 NOTE — PROGRESS NOTE ADULT - ASSESSMENT
resp failure  aspiration    peg cancelled previously due to worsening respiratory status  she is not a candidate for PEG/sedation at present, i do not think she will tolerate sedation  dnr/dni  prognosis guarded   goc noted  son is adamant of feeding tube  CHAVA ana noted  tentative peg on friday pm    Advanced care planning was discussed with patient and family.  Advanced care planning forms were reviewed and discussed.  Risks, benefits and alternatives of gastroenterologic procedures were discussed in detail and all questions were answered.    30 minutes spent.

## 2025-02-05 NOTE — CONSULT NOTE ADULT - CONSULT REASON
failed feest
Cardiac evaluation
Hypercapnic resp failure
Hyponatremia
Hypoxemic Respiratory Failure
goc peg
Hypoxic resp failure
almaz w IR

## 2025-02-05 NOTE — CONSULT NOTE ADULT - REASON FOR ADMISSION
Hypoxic/hypercapnic resp failure

## 2025-02-05 NOTE — PROGRESS NOTE ADULT - PROBLEM SELECTOR PLAN 5
-TF via NGT  -Speech and swallow f/u noted, recommending NPO  -Aspiration precautions  -Oral care  -Palliative care f/u.    2/3 seems to be doing  ok :  no sob:  on room air:  cxr is much better: : keeps NPO: per speech and swallow:  the gi does not want to put the peg:  option remains for pleasure feeds:    2/4; now pt for ir guided peg placement  given her general clinical condition :  she relatively at high risk for any procedure under sedtion or anesthesia:  but pulmonary wise she looks much better:  effusions have decreased and she has been on room air for days:  check ABG in am    2/5: ABG today with mild metabolic alkalosis: no intervention; at this time

## 2025-02-05 NOTE — PROGRESS NOTE ADULT - PROBLEM SELECTOR PLAN 1
-Initially 2nd to PNA   -Hypoxia had been improving, then with increase in O2 requirements requiring HFNC 1/24  -Repeat CT chest 1/23 with moderate b/l pleural effusions/atelectasis, b/l GGO. Likely combination of fluid overload + PNA  -Completed 10 day course of ABX   -Continue bronchodilators. S/p Mucomyst   -Keep O>I with diuresis as tolerated  -Continue HFNC, wean as tolerated, keep sats >90%. O2 requirements improving, on 40L/40% this AM   -Suggest Bipap 10/5 PRN if worsening hypoxia or increased WOB  -Prognosis guarded. GOC DNR/DNI/trial NIV  -Palliative care f/u.    2/1: seems better; on room air:  repeat cxr in AM to evaluate for pleural effusion : she is not in any resp distress  2/2: clinically she looks the best today  : off oxygen : no resp distress:  alert and awake and is responsive  2/3: son at bedside:  pt is on room air:  doing pretty well : no sob:  2/4: seems OK: cxr with improvement:  on room air :  she looks comfortable  2/4: resolved

## 2025-02-05 NOTE — PROGRESS NOTE ADULT - SUBJECTIVE AND OBJECTIVE BOX
[facility]      Zaid Garcia NP    [address] [place] [address]  [phone]    INTERVAL HPI/ OVERNIGHT EVENTS:  pt s/e RN at bedside,   on RA       Allergies    clindamycin (Unknown)  shellfish (Unknown)  strawberry (Rash)  penicillin (Unknown)  clindamycin (Other)  strawberry (Unknown)  IV Contrast (Other)  IV Contrast (Unknown)    Intolerances      unable to obtain      PHYSICAL EXAM:   Vital Signs Last 24 Hrs  T(C): 36.4 (02 Feb 2025 12:00), Max: 36.6 (02 Feb 2025 05:07)  T(F): 97.5 (02 Feb 2025 12:00), Max: 97.8 (02 Feb 2025 05:07)  HR: 69 (02 Feb 2025 12:00) (64 - 73)  BP: 151/71 (02 Feb 2025 12:00) (112/72 - 157/79)  BP(mean): 97 (02 Feb 2025 12:00) (97 - 97)  RR: 18 (02 Feb 2025 12:00) (18 - 18)  SpO2: 95% (02 Feb 2025 12:00) (93% - 100%)    Parameters below as of 02 Feb 2025 12:00  Patient On (Oxygen Delivery Method): room air    Daily     Daily I&O's Summary    19 Jan 2025 07:01  -  19 Jan 2025 15:01  --------------------------------------------------------  IN: 0 mL / OUT: 150 mL / NET: -150 mL      nad  confused  frail  non toxic  soft, nt  no edema        LABS:                        9.7    5.63  )-----------( 237      ( 02 Feb 2025 07:22 )             29.4      02-02    132[L]  |  91[L]  |  11  ----------------------------<  75  3.1[L]   |  28  |  1.20    Ca    9.4      02 Feb 2025 07:25                      Urinalysis Basic - ( 19 Jan 2025 07:30 )    Color: x / Appearance: x / SG: x / pH: x  Gluc: 114 mg/dL / Ketone: x  / Bili: x / Urobili: x   Blood: x / Protein: x / Nitrite: x   Leuk Esterase: x / RBC: x / WBC x   Sq Epi: x / Non Sq Epi: x / Bacteria: x      amylase   lipase  RADIOLOGY & ADDITIONAL TESTS:

## 2025-02-05 NOTE — CONSULT NOTE ADULT - SUBJECTIVE AND OBJECTIVE BOX
Interventional Radiology    Evaluate for Procedure: g tube for nutrition    HPI: 89 yr old female with PMHx dementia, s/p brain tumor resection  with residual Lt sided facial droop, HTN, Hypothyroidism colon Ca s/p Rt hemicolectomy (), stercoral colitis (2021), who presented to E.D. from nursing home via EMS with hypoxic resp failure. Admitted to MICU with hypercapnic/hypoxic resp failure/septic shock in the setting of RLL PNA and UTI. Also found to have Influenza.     IR consulted on  for g tube placement for nutrition.        Allergies: clindamycin (Unknown)  penicillin (Unknown)  clindamycin (Other)  IV Contrast (Other)  IV Contrast (Unknown)    Medications (Abx/Cardiac/Anticoagulation/Blood Products)    furosemide   Injectable: 20 milliGRAM(s) IV Push ( @ 06:10)  furosemide   Injectable: 40 milliGRAM(s) IV Push ( @ 05:52)  heparin   Injectable: 5000 Unit(s) SubCutaneous ( @ 06:10)  metoprolol tartrate Injectable: 5 milliGRAM(s) IV Push ( @ 06:10)    Data:    T(C): 36.4  HR: 73  BP: 144/71  RR: 18  SpO2: 94%    -WBC 4.76 / HgB 10.8 / Hct 32.3 / Plt 223  -Na 127 / Cl 88 / BUN 10 / Glucose 70  -K 3.5 / CO2 27 / Cr 1.22  -ALT -- / Alk Phos -- / T.Bili --  -INR 1.14 / PTT 26.6    Radiology: reviewed    Assessment/Plan: 89 yr old female with PMHx dementia, s/p brain tumor resection  with residual Lt sided facial droop, HTN, Hypothyroidism colon Ca s/p Rt hemicolectomy (), stercoral colitis (2021), who presented to E.D. from nursing home via EMS with hypoxic resp failure. Admitted to MICU with hypercapnic/hypoxic resp failure/septic shock in the setting of RLL PNA and UTI. Also found to have Influenza.     IR consulted on  for g tube placement for nutrition.      - Pulm following; Patients respiratory status has improved  no longer requiring hi-flow.  now on room air.   - Recommend re-eval for GI approach peg placement.  - PEG placement determined to be a safer approach with GI versus percutaneous G-tube placement by IR. Sedation use in IR is variable and patient-specific. Recommend PEG placement with GI. If unsuccessful, IR will proceed with percutaneous G-tube placement.     - Above d/w primary team  - case reviewed with IR Attending Dr. Sandhu  - IR will sign off     Any questions or concerns regarding above please reach out to IR:   -Available on [employer] teams  -During working hours (7a-5p): call [phone]  -Emergent issues after 5pm: page: [phone]  -Non-emergent consults: Please place a [place] order "IR Consult" with an appropriate callback number  -Scheduling questions: [phone]  -Clinic/Outpatient [phone]

## 2025-02-05 NOTE — PROGRESS NOTE ADULT - SUBJECTIVE AND OBJECTIVE BOX
Cardiovascular Disease Progress Note  Date of service: 02-05-25 @ 08:31    Overnight events: No acute events overnight.  Patient is in no distress.   Otherwise review of systems negative    Objective Findings:  T(C): 36.4 (02-05-25 @ 04:28), Max: 36.4 (02-04-25 @ 11:53)  HR: 73 (02-05-25 @ 04:28) (73 - 85)  BP: 144/71 (02-05-25 @ 04:28) (129/67 - 157/73)  RR: 18 (02-05-25 @ 04:28) (18 - 18)  SpO2: 94% (02-05-25 @ 04:28) (94% - 98%)  Wt(kg): --  Daily     Daily       Physical Exam:  Gen: NAD; Patient resting comfortably  HEENT: EOMI, Normocephalic/ atraumatic  CV: RRR, normal S1 + S2, no m/r/g  Lungs:  Normal respiratory effort; clear to auscultation bilaterally  Abd: soft, non-tender; bowel sounds present  Ext: No edema; warm and well perfused    Telemetry: Sinus     Laboratory Data:                        10.8   4.76  )-----------( 223      ( 05 Feb 2025 07:09 )             32.3     02-05    127[L]  |  88[L]  |  10  ----------------------------<  70  3.5   |  27  |  1.22    Ca    9.4      05 Feb 2025 07:06  Phos  3.0     02-05  Mg     1.8     02-05                Inpatient Medications:  MEDICATIONS  (STANDING):  albuterol/ipratropium for Nebulization 3 milliLiter(s) Nebulizer every 6 hours  albuterol/ipratropium for Nebulization. 3 milliLiter(s) Nebulizer once  ascorbic acid 500 milliGRAM(s) Oral daily  bisacodyl Suppository 10 milliGRAM(s) Rectal daily  chlorhexidine 2% Cloths 1 Application(s) Topical <User Schedule>  dextrose 5% with potassium chloride 20 mEq/L 1000 milliLiter(s) (50 mL/Hr) IV Continuous <Continuous>  furosemide    Tablet 40 milliGRAM(s) Oral daily  heparin   Injectable 5000 Unit(s) SubCutaneous every 12 hours  levothyroxine Injectable 60 MICROGram(s) IV Push at bedtime  metoprolol tartrate Injectable 5 milliGRAM(s) IV Push every 6 hours  mirtazapine 15 milliGRAM(s) Oral at bedtime  multivitamin 1 Tablet(s) Oral daily  QUEtiapine 12.5 milliGRAM(s) Oral at bedtime  sodium chloride 3%  Inhalation 4 milliLiter(s) Inhalation every 12 hours      Assessment:  89y old Female with stated hx significant for dementia, s/p brain tumor resection 1961, HTN, hypothyroidism colon Ca s/p Rt hemicolectomy ('09), 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    Plan of Care:    #Afib RVR  - HR now improved  - Currently maintaining sinus.   - BP now stable. Would continue with IV lopressor standing dose.   - PO metoprolol tartrate 50mg BID if able to tolerate PO medication for rate control  - Patient is anemic and high bleeding risk, would refrain from AC at this time.     #Hypoxic respiratory failure  - IPO diuretics as needed.   - Pulm input appreciated.   - Volume status improving  - Replenish lytes as needed.     Overall poor prognosis.       #ACP (advance care planning)-  Advanced care planning was discussed.  30 additional minutes spent addressing advance care plans.              Over 55 minutes spent on total encounter; more than 50% of the visit was spent counseling and/or coordinating care by the attending physician.      Bobby Montilla DO [facility]  Cardiovascular Disease  [phone]

## 2025-02-05 NOTE — PROGRESS NOTE ADULT - SUBJECTIVE AND OBJECTIVE BOX
NEPHROLOGY-[facility] [phone]        Patient seen and examined in bed.  She was the same         MEDICATIONS  (STANDING):  albuterol/ipratropium for Nebulization 3 milliLiter(s) Nebulizer every 6 hours  albuterol/ipratropium for Nebulization. 3 milliLiter(s) Nebulizer once  ascorbic acid 500 milliGRAM(s) Oral daily  bisacodyl Suppository 10 milliGRAM(s) Rectal daily  chlorhexidine 2% Cloths 1 Application(s) Topical <User Schedule>  furosemide    Tablet 40 milliGRAM(s) Oral daily  heparin   Injectable 5000 Unit(s) SubCutaneous every 12 hours  levothyroxine Injectable 60 MICROGram(s) IV Push at bedtime  metoprolol tartrate Injectable 5 milliGRAM(s) IV Push every 6 hours  mirtazapine 15 milliGRAM(s) Oral at bedtime  multivitamin 1 Tablet(s) Oral daily  QUEtiapine 12.5 milliGRAM(s) Oral at bedtime  sodium chloride 0.9%. 1000 milliLiter(s) (40 mL/Hr) IV Continuous <Continuous>  sodium chloride 3%  Inhalation 4 milliLiter(s) Inhalation every 12 hours      VITAL:  T(C): , Max: 36.4 (02-04-25 @ 23:56)  T(F): , Max: 97.5 (02-04-25 @ 23:56)  HR: 73 (02-05-25 @ 04:28)  BP: 144/71 (02-05-25 @ 04:28)  BP(mean): --  RR: 18 (02-05-25 @ 04:28)  SpO2: 94% (02-05-25 @ 04:28)  Wt(kg): --    I and O's:    02-04 @ 07:01  -  02-05 @ 07:00  --------------------------------------------------------  IN: 0 mL / OUT: 950 mL / NET: -950 mL          PHYSICAL EXAM:    Constitutional: cachetic   Neck:  No JVD  Respiratory: poor effor t  Cardiovascular: S1 and S2  Gastrointestinal: BS+, soft, NT/ND  Extremities: No peripheral edema  Neurological:  no focal deficits  Psychiatric:    : No Crooks  Skin: No rashes  Access: Not applicable    LABS:                        10.8   4.76  )-----------( 223      ( 05 Feb 2025 07:09 )             32.3     02-05    127[L]  |  88[L]  |  10  ----------------------------<  70  3.5   |  27  |  1.22    Ca    9.4      05 Feb 2025 07:06  Phos  3.0     02-05  Mg     1.8     02-05            Urine Studies:  Urinalysis Basic - ( 05 Feb 2025 07:06 )    Color: x / Appearance: x / SG: x / pH: x  Gluc: 70 mg/dL / Ketone: x  / Bili: x / Urobili: x   Blood: x / Protein: x / Nitrite: x   Leuk Esterase: x / RBC: x / WBC x   Sq Epi: x / Non Sq Epi: x / Bacteria: x            RADIOLOGY & ADDITIONAL STUDIES:

## 2025-02-05 NOTE — PROGRESS NOTE ADULT - PROBLEM SELECTOR PLAN 3
CT chest 1/23 with moderate b/l pl effusions  -CXR 1/25 with b/l pleural effusions/atelectasis L>R, L pleural effusion now appears mod-large  -Holding off on thoracentesis for now as O2 requirements improving with diuresis  -Keep O>I as tolerated.    2/1: repeat cxr in AM  2/2: cxr ordered:  pt has been on lasix  2/3; the cxr looks better to me:  the left sided pleural effusion is less:  still congested but better then before:  2/4: the cxr looks much better to me then  before  2/5: xray is better:  she is not in any resp distess

## 2025-02-05 NOTE — PROGRESS NOTE ADULT - SUBJECTIVE AND OBJECTIVE BOX
Patient is a 89y old  Female who presents with a chief complaint of Hypoxic/hypercapnic resp failure (05 Feb 2025 12:14)      DATE OF SERVICE: 02-05-25 @ 13:41    SUBJECTIVE / OVERNIGHT EVENTS: overnight events noted    ROS:  not available         MEDICATIONS  (STANDING):  albuterol/ipratropium for Nebulization 3 milliLiter(s) Nebulizer every 6 hours  albuterol/ipratropium for Nebulization. 3 milliLiter(s) Nebulizer once  ascorbic acid 500 milliGRAM(s) Oral daily  bisacodyl Suppository 10 milliGRAM(s) Rectal daily  chlorhexidine 2% Cloths 1 Application(s) Topical <User Schedule>  furosemide    Tablet 40 milliGRAM(s) Oral daily  heparin   Injectable 5000 Unit(s) SubCutaneous every 12 hours  levothyroxine Injectable 60 MICROGram(s) IV Push at bedtime  metoprolol tartrate Injectable 5 milliGRAM(s) IV Push every 6 hours  mirtazapine 15 milliGRAM(s) Oral at bedtime  multivitamin 1 Tablet(s) Oral daily  QUEtiapine 12.5 milliGRAM(s) Oral at bedtime  sodium chloride 0.9%. 1000 milliLiter(s) (40 mL/Hr) IV Continuous <Continuous>  sodium chloride 3%  Inhalation 4 milliLiter(s) Inhalation every 12 hours  tolvaptan 15 milliGRAM(s) Oral once    MEDICATIONS  (PRN):  acetaminophen   Oral Liquid .. 650 milliGRAM(s) Oral every 6 hours PRN Temp greater or equal to 38C (100.4F), Moderate Pain (4 - 6)  haloperidol    Injectable 1 milliGRAM(s) IntraMuscular every 6 hours PRN Agitation        CAPILLARY BLOOD GLUCOSE      POCT Blood Glucose.: 74 mg/dL (05 Feb 2025 06:49)  POCT Blood Glucose.: 90 mg/dL (05 Feb 2025 00:00)  POCT Blood Glucose.: 90 mg/dL (04 Feb 2025 17:19)    I&O's Summary    04 Feb 2025 07:01  -  05 Feb 2025 07:00  --------------------------------------------------------  IN: 0 mL / OUT: 950 mL / NET: -950 mL        Vital Signs Last 24 Hrs  T(C): 36.4 (05 Feb 2025 04:28), Max: 36.4 (04 Feb 2025 23:56)  T(F): 97.5 (05 Feb 2025 04:28), Max: 97.5 (04 Feb 2025 23:56)  HR: 73 (05 Feb 2025 04:28) (73 - 85)  BP: 144/71 (05 Feb 2025 04:28) (129/67 - 144/71)  BP(mean): --  RR: 18 (05 Feb 2025 04:28) (18 - 18)  SpO2: 94% (05 Feb 2025 04:28) (94% - 98%)      PHYSICAL EXAM:   CHEST/LUNG: decreased breath sounds   HEART: S1 S2; no murmurs   ABDOMEN: Soft, Nontender  EXTREMITIES: no edema  NEUROLOGY: non-focal    LABS:                        10.8   4.76  )-----------( 223      ( 05 Feb 2025 07:09 )             32.3     02-05    127[L]  |  88[L]  |  10  ----------------------------<  70  3.5   |  27  |  1.22    Ca    9.4      05 Feb 2025 07:06  Phos  3.0     02-05  Mg     1.8     02-05            Urinalysis Basic - ( 05 Feb 2025 07:06 )    Color: x / Appearance: x / SG: x / pH: x  Gluc: 70 mg/dL / Ketone: x  / Bili: x / Urobili: x   Blood: x / Protein: x / Nitrite: x   Leuk Esterase: x / RBC: x / WBC x   Sq Epi: x / Non Sq Epi: x / Bacteria: x          All consultant(s) notes reviewed and care discussed with other providers        Contact Number, Dr Link [phone]

## 2025-02-05 NOTE — PROGRESS NOTE ADULT - PROBLEM SELECTOR PLAN 1
continue to follow pulmonary recommendations  discussed with pulmonary   she is cleared for PEG  patient's son is consistently requesting PEG  IR note appreciated  likely PEG by GI Fri

## 2025-02-05 NOTE — PROGRESS NOTE ADULT - SUBJECTIVE AND OBJECTIVE BOX
Date of Service: 02-05-25 @ 16:44    Patient is a 89y old  Female who presents with a chief complaint of Hypoxic/hypercapnic resp failure (05 Feb 2025 16:31)      Any change in ROS: She looks pretty good:  no sob:  on room air no abd pain     MEDICATIONS  (STANDING):  albuterol/ipratropium for Nebulization 3 milliLiter(s) Nebulizer every 6 hours  albuterol/ipratropium for Nebulization. 3 milliLiter(s) Nebulizer once  ascorbic acid 500 milliGRAM(s) Oral daily  bisacodyl Suppository 10 milliGRAM(s) Rectal daily  chlorhexidine 2% Cloths 1 Application(s) Topical <User Schedule>  heparin   Injectable 5000 Unit(s) SubCutaneous every 12 hours  levothyroxine Injectable 60 MICROGram(s) IV Push at bedtime  metoprolol tartrate Injectable 5 milliGRAM(s) IV Push every 6 hours  mirtazapine 15 milliGRAM(s) Oral at bedtime  multivitamin 1 Tablet(s) Oral daily  QUEtiapine 12.5 milliGRAM(s) Oral at bedtime  sodium chloride 0.9%. 1000 milliLiter(s) (40 mL/Hr) IV Continuous <Continuous>  sodium chloride 1.5%. 500 milliLiter(s) (40 mL/Hr) IV Continuous <Continuous>  sodium chloride 3%  Inhalation 4 milliLiter(s) Inhalation every 12 hours    MEDICATIONS  (PRN):  acetaminophen   Oral Liquid .. 650 milliGRAM(s) Oral every 6 hours PRN Temp greater or equal to 38C (100.4F), Moderate Pain (4 - 6)  haloperidol    Injectable 1 milliGRAM(s) IntraMuscular every 6 hours PRN Agitation    Vital Signs Last 24 Hrs  T(C): 36.3 (05 Feb 2025 12:00), Max: 36.4 (04 Feb 2025 23:56)  T(F): 97.3 (05 Feb 2025 12:00), Max: 97.5 (04 Feb 2025 23:56)  HR: 79 (05 Feb 2025 12:00) (73 - 85)  BP: 138/76 (05 Feb 2025 12:00) (129/67 - 144/71)  BP(mean): --  RR: 18 (05 Feb 2025 12:00) (18 - 18)  SpO2: 98% (05 Feb 2025 12:00) (94% - 98%)    Parameters below as of 05 Feb 2025 12:00  Patient On (Oxygen Delivery Method): room air        I&O's Summary    04 Feb 2025 07:01  -  05 Feb 2025 07:00  --------------------------------------------------------  IN: 0 mL / OUT: 950 mL / NET: -950 mL          Physical Exam:   GENERAL: NAD, well-groomed, well-developed  HEENT: LUANN/   Atraumatic, Normocephalic  ENMT: No tonsillar erythema, exudates, or enlargement; Moist mucous membranes, Good dentition, No lesions  NECK: Supple, No JVD, Normal thyroid  CHEST/LUNG: Clear to auscultaion  CVS: Regular rate and rhythm; No murmurs, rubs, or gallops  GI: : Soft, Nontender, Nondistended; Bowel sounds present  NERVOUS SYSTEM:  Alert & Oriented X3  EXTREMITIES:  2+ Peripheral Pulses, No clubbing, cyanosis, or edema  LYMPH: No lymphadenopathy noted  SKIN: No rashes or lesions  ENDOCRINOLOGY: No Thyromegaly  PSYCH: calm     Labs:  ABG - ( 05 Feb 2025 06:17 )  pH, Arterial: 7.48  pH, Blood: x     /  pCO2: 43    /  pO2: 67    / HCO3: 32    / Base Excess: 7.6   /  SaO2: 96.7                                        10.8   4.76  )-----------( 223      ( 05 Feb 2025 07:09 )             32.3                         11.4   5.93  )-----------( 252      ( 04 Feb 2025 09:35 )             33.9                         10.8   5.53  )-----------( 243      ( 03 Feb 2025 07:14 )             32.7                         9.7    5.63  )-----------( 237      ( 02 Feb 2025 07:22 )             29.4     02-05    127[L]  |  88[L]  |  10  ----------------------------<  70  3.5   |  27  |  1.22  02-04    132[L]  |  89[L]  |  10  ----------------------------<  95  3.6   |  29  |  1.17  02-03    130[L]  |  91[L]  |  10  ----------------------------<  90  3.8   |  27  |  1.13  02-02    132[L]  |  91[L]  |  11  ----------------------------<  75  3.1[L]   |  28  |  1.20    Ca    9.4      05 Feb 2025 07:06  Ca    9.7      04 Feb 2025 09:32  Phos  3.0     02-05  Phos  2.2     02-04  Mg     1.8     02-05  Mg     1.5     02-04      CAPILLARY BLOOD GLUCOSE      POCT Blood Glucose.: 74 mg/dL (05 Feb 2025 06:49)  POCT Blood Glucose.: 90 mg/dL (05 Feb 2025 00:00)  POCT Blood Glucose.: 90 mg/dL (04 Feb 2025 17:19)          Urinalysis Basic - ( 05 Feb 2025 07:06 )    Color: x / Appearance: x / SG: x / pH: x  Gluc: 70 mg/dL / Ketone: x  / Bili: x / Urobili: x   Blood: x / Protein: x / Nitrite: x   Leuk Esterase: x / RBC: x / WBC x   Sq Epi: x / Non Sq Epi: x / Bacteria: x    rad< from: Xray Chest 1 View- PORTABLE-Urgent (Xray Chest 1 View- PORTABLE-Urgent .) (02.02.25 @ 17:31) >  TECHNIQUE: Single frontal, portable view of the chest was obtained.    COMPARISON: Chest x-ray 1/25/2025.    FINDINGS:    The heart is normal in size.  Elevated left hemidiaphragm.  No focal consolidations.  Bilateral small pleural effusions, decreased in size compared to prior   chest x-ray 1/25/2025.  There is no pneumothorax.  Diffuse bony demineralization. No acute osseous abnormalities.    IMPRESSION:    Bilateral small pleural effusions, decreased in size compared to prior   chest x-ray 1/25/2025.    --- End of Report ---          HIPOLITO BOLES MD; Resident Radiologist  This document has been electronically signed.  CLAIRE DUMONT MD; Attending Radiologist  This document has been electronically signed. Feb  3 2025  8:33PM    < end of copied text >          RECENT CULTURES:        RESPIRATORY CULTURES:          Studies  Chest X-RAY  CT SCAN Chest   Venous Dopplers: LE:   CT Abdomen  Others               Date of Service: 02-05-25 @ 16:44    Patient is a 89y old  Female who presents with a chief complaint of Hypoxic/hypercapnic resp failure (05 Feb 2025 16:31)      Any change in ROS: She looks pretty good:  no sob:  on room air no abd pain     MEDICATIONS  (STANDING):  albuterol/ipratropium for Nebulization 3 milliLiter(s) Nebulizer every 6 hours  albuterol/ipratropium for Nebulization. 3 milliLiter(s) Nebulizer once  ascorbic acid 500 milliGRAM(s) Oral daily  bisacodyl Suppository 10 milliGRAM(s) Rectal daily  chlorhexidine 2% Cloths 1 Application(s) Topical <User Schedule>  heparin   Injectable 5000 Unit(s) SubCutaneous every 12 hours  levothyroxine Injectable 60 MICROGram(s) IV Push at bedtime  metoprolol tartrate Injectable 5 milliGRAM(s) IV Push every 6 hours  mirtazapine 15 milliGRAM(s) Oral at bedtime  multivitamin 1 Tablet(s) Oral daily  QUEtiapine 12.5 milliGRAM(s) Oral at bedtime  sodium chloride 0.9%. 1000 milliLiter(s) (40 mL/Hr) IV Continuous <Continuous>  sodium chloride 1.5%. 500 milliLiter(s) (40 mL/Hr) IV Continuous <Continuous>  sodium chloride 3%  Inhalation 4 milliLiter(s) Inhalation every 12 hours    MEDICATIONS  (PRN):  acetaminophen   Oral Liquid .. 650 milliGRAM(s) Oral every 6 hours PRN Temp greater or equal to 38C (100.4F), Moderate Pain (4 - 6)  haloperidol    Injectable 1 milliGRAM(s) IntraMuscular every 6 hours PRN Agitation    Vital Signs Last 24 Hrs  T(C): 36.3 (05 Feb 2025 12:00), Max: 36.4 (04 Feb 2025 23:56)  T(F): 97.3 (05 Feb 2025 12:00), Max: 97.5 (04 Feb 2025 23:56)  HR: 79 (05 Feb 2025 12:00) (73 - 85)  BP: 138/76 (05 Feb 2025 12:00) (129/67 - 144/71)  BP(mean): --  RR: 18 (05 Feb 2025 12:00) (18 - 18)  SpO2: 98% (05 Feb 2025 12:00) (94% - 98%)    Parameters below as of 05 Feb 2025 12:00  Patient On (Oxygen Delivery Method): room air        I&O's Summary    04 Feb 2025 07:01  -  05 Feb 2025 07:00  --------------------------------------------------------  IN: 0 mL / OUT: 950 mL / NET: -950 mL          Physical Exam:   GENERAL: NAD, well-groomed, well-developed  HEENT: LUANN/   Atraumatic, Normocephalic  ENMT: No tonsillar erythema, exudates, or enlargement; Moist mucous membranes, Good dentition, No lesions  NECK: Supple, No JVD, Normal thyroid  CHEST/LUNG: decreased air entry bilateral bases  CVS: Regular rate and rhythm; No murmurs, rubs, or gallops  GI: : Soft, Nontender, Nondistended; Bowel sounds present  NERVOUS SYSTEM:  Alert & awake  EXTREMITIES: -edema  LYMPH: No lymphadenopathy noted  SKIN: No rashes or lesions  ENDOCRINOLOGY: No Thyromegaly  PSYCH: calm     Labs:  ABG - ( 05 Feb 2025 06:17 )  pH, Arterial: 7.48  pH, Blood: x     /  pCO2: 43    /  pO2: 67    / HCO3: 32    / Base Excess: 7.6   /  SaO2: 96.7                                        10.8   4.76  )-----------( 223      ( 05 Feb 2025 07:09 )             32.3                         11.4   5.93  )-----------( 252      ( 04 Feb 2025 09:35 )             33.9                         10.8   5.53  )-----------( 243      ( 03 Feb 2025 07:14 )             32.7                         9.7    5.63  )-----------( 237      ( 02 Feb 2025 07:22 )             29.4     02-05    127[L]  |  88[L]  |  10  ----------------------------<  70  3.5   |  27  |  1.22  02-04    132[L]  |  89[L]  |  10  ----------------------------<  95  3.6   |  29  |  1.17  02-03    130[L]  |  91[L]  |  10  ----------------------------<  90  3.8   |  27  |  1.13  02-02    132[L]  |  91[L]  |  11  ----------------------------<  75  3.1[L]   |  28  |  1.20    Ca    9.4      05 Feb 2025 07:06  Ca    9.7      04 Feb 2025 09:32  Phos  3.0     02-05  Phos  2.2     02-04  Mg     1.8     02-05  Mg     1.5     02-04      CAPILLARY BLOOD GLUCOSE      POCT Blood Glucose.: 74 mg/dL (05 Feb 2025 06:49)  POCT Blood Glucose.: 90 mg/dL (05 Feb 2025 00:00)  POCT Blood Glucose.: 90 mg/dL (04 Feb 2025 17:19)          Urinalysis Basic - ( 05 Feb 2025 07:06 )    Color: x / Appearance: x / SG: x / pH: x  Gluc: 70 mg/dL / Ketone: x  / Bili: x / Urobili: x   Blood: x / Protein: x / Nitrite: x   Leuk Esterase: x / RBC: x / WBC x   Sq Epi: x / Non Sq Epi: x / Bacteria: x    rad< from: Xray Chest 1 View- PORTABLE-Urgent (Xray Chest 1 View- PORTABLE-Urgent .) (02.02.25 @ 17:31) >  TECHNIQUE: Single frontal, portable view of the chest was obtained.    COMPARISON: Chest x-ray 1/25/2025.    FINDINGS:    The heart is normal in size.  Elevated left hemidiaphragm.  No focal consolidations.  Bilateral small pleural effusions, decreased in size compared to prior   chest x-ray 1/25/2025.  There is no pneumothorax.  Diffuse bony demineralization. No acute osseous abnormalities.    IMPRESSION:    Bilateral small pleural effusions, decreased in size compared to prior   chest x-ray 1/25/2025.    --- End of Report ---          HIPOLITO BOLES MD; Resident Radiologist  This document has been electronically signed.  CLAIRE DUMONT MD; Attending Radiologist  This document has been electronically signed. Feb  3 2025  8:33PM    < end of copied text >          RECENT CULTURES:        RESPIRATORY CULTURES:          Studies  Chest X-RAY  CT SCAN Chest   Venous Dopplers: LE:   CT Abdomen  Others

## 2025-02-06 LAB
ANION GAP SERPL CALC-SCNC: 11 MMOL/L — SIGNIFICANT CHANGE UP (ref 5–17)
ANION GAP SERPL CALC-SCNC: 13 MMOL/L — SIGNIFICANT CHANGE UP (ref 5–17)
BUN SERPL-MCNC: 11 MG/DL — SIGNIFICANT CHANGE UP (ref 7–23)
BUN SERPL-MCNC: 12 MG/DL — SIGNIFICANT CHANGE UP (ref 7–23)
CALCIUM SERPL-MCNC: 9 MG/DL — SIGNIFICANT CHANGE UP (ref 8.4–10.5)
CALCIUM SERPL-MCNC: 9.1 MG/DL — SIGNIFICANT CHANGE UP (ref 8.4–10.5)
CHLORIDE SERPL-SCNC: 92 MMOL/L — LOW (ref 96–108)
CHLORIDE SERPL-SCNC: 95 MMOL/L — LOW (ref 96–108)
CO2 SERPL-SCNC: 22 MMOL/L — SIGNIFICANT CHANGE UP (ref 22–31)
CO2 SERPL-SCNC: 27 MMOL/L — SIGNIFICANT CHANGE UP (ref 22–31)
CREAT SERPL-MCNC: 1.13 MG/DL — SIGNIFICANT CHANGE UP (ref 0.5–1.3)
CREAT SERPL-MCNC: 1.15 MG/DL — SIGNIFICANT CHANGE UP (ref 0.5–1.3)
EGFR: 44 ML/MIN/1.73M2 — LOW
EGFR: 45 ML/MIN/1.73M2 — LOW
GLUCOSE BLDC GLUCOMTR-MCNC: 121 MG/DL — HIGH (ref 70–99)
GLUCOSE BLDC GLUCOMTR-MCNC: 64 MG/DL — LOW (ref 70–99)
GLUCOSE BLDC GLUCOMTR-MCNC: 65 MG/DL — LOW (ref 70–99)
GLUCOSE BLDC GLUCOMTR-MCNC: 65 MG/DL — LOW (ref 70–99)
GLUCOSE BLDC GLUCOMTR-MCNC: 67 MG/DL — LOW (ref 70–99)
GLUCOSE BLDC GLUCOMTR-MCNC: 77 MG/DL — SIGNIFICANT CHANGE UP (ref 70–99)
GLUCOSE BLDC GLUCOMTR-MCNC: 77 MG/DL — SIGNIFICANT CHANGE UP (ref 70–99)
GLUCOSE BLDC GLUCOMTR-MCNC: 86 MG/DL — SIGNIFICANT CHANGE UP (ref 70–99)
GLUCOSE BLDC GLUCOMTR-MCNC: 91 MG/DL — SIGNIFICANT CHANGE UP (ref 70–99)
GLUCOSE SERPL-MCNC: 41 MG/DL — CRITICAL LOW (ref 70–99)
GLUCOSE SERPL-MCNC: 78 MG/DL — SIGNIFICANT CHANGE UP (ref 70–99)
MAGNESIUM SERPL-MCNC: 1.8 MG/DL — SIGNIFICANT CHANGE UP (ref 1.6–2.6)
PHOSPHATE SERPL-MCNC: 3.4 MG/DL — SIGNIFICANT CHANGE UP (ref 2.5–4.5)
POTASSIUM SERPL-MCNC: 3 MMOL/L — LOW (ref 3.5–5.3)
POTASSIUM SERPL-MCNC: 4.9 MMOL/L — SIGNIFICANT CHANGE UP (ref 3.5–5.3)
POTASSIUM SERPL-SCNC: 3 MMOL/L — LOW (ref 3.5–5.3)
POTASSIUM SERPL-SCNC: 4.9 MMOL/L — SIGNIFICANT CHANGE UP (ref 3.5–5.3)
SODIUM SERPL-SCNC: 127 MMOL/L — LOW (ref 135–145)
SODIUM SERPL-SCNC: 133 MMOL/L — LOW (ref 135–145)

## 2025-02-06 RX ORDER — MAGNESIUM SULFATE 0.8 MEQ/ML
1 AMPUL (ML) INJECTION ONCE
Refills: 0 | Status: COMPLETED | OUTPATIENT
Start: 2025-02-06 | End: 2025-02-06

## 2025-02-06 RX ORDER — DM/PSEUDOEPHED/ACETAMINOPHEN 10-30-250
12.5 CAPSULE ORAL ONCE
Refills: 0 | Status: COMPLETED | OUTPATIENT
Start: 2025-02-06 | End: 2025-02-06

## 2025-02-06 RX ORDER — POTASSIUM CHLORIDE 750 MG/1
10 TABLET, EXTENDED RELEASE ORAL
Refills: 0 | Status: COMPLETED | OUTPATIENT
Start: 2025-02-06 | End: 2025-02-06

## 2025-02-06 RX ORDER — SODIUM CHLORIDE 5 % 5 %
100 INTRAVENOUS SOLUTION INTRAVENOUS ONCE
Refills: 0 | Status: COMPLETED | OUTPATIENT
Start: 2025-02-06 | End: 2025-02-06

## 2025-02-06 RX ORDER — SODIUM CHLORIDE 9 G/ML
1000 INJECTION, SOLUTION INTRAVENOUS
Refills: 0 | Status: DISCONTINUED | OUTPATIENT
Start: 2025-02-06 | End: 2025-02-08

## 2025-02-06 RX ADMIN — Medication 5 MILLIGRAM(S): at 18:04

## 2025-02-06 RX ADMIN — Medication 5000 UNIT(S): at 05:46

## 2025-02-06 RX ADMIN — Medication 4 MILLILITER(S): at 05:50

## 2025-02-06 RX ADMIN — ANTISEPTIC SURGICAL SCRUB 1 APPLICATION(S): 0.04 SOLUTION TOPICAL at 05:50

## 2025-02-06 RX ADMIN — IPRATROPIUM BROMIDE AND ALBUTEROL SULFATE 3 MILLILITER(S): .5; 2.5 SOLUTION RESPIRATORY (INHALATION) at 05:46

## 2025-02-06 RX ADMIN — POTASSIUM CHLORIDE 100 MILLIEQUIVALENT(S): 750 TABLET, EXTENDED RELEASE ORAL at 19:54

## 2025-02-06 RX ADMIN — Medication 5000 UNIT(S): at 18:04

## 2025-02-06 RX ADMIN — Medication 12.5 MILLILITER(S): at 00:36

## 2025-02-06 RX ADMIN — Medication 12.5 MILLILITER(S): at 07:25

## 2025-02-06 RX ADMIN — IPRATROPIUM BROMIDE AND ALBUTEROL SULFATE 3 MILLILITER(S): .5; 2.5 SOLUTION RESPIRATORY (INHALATION) at 18:05

## 2025-02-06 RX ADMIN — Medication 10 MILLIGRAM(S): at 21:12

## 2025-02-06 RX ADMIN — IPRATROPIUM BROMIDE AND ALBUTEROL SULFATE 3 MILLILITER(S): .5; 2.5 SOLUTION RESPIRATORY (INHALATION) at 11:52

## 2025-02-06 RX ADMIN — Medication 4 MILLILITER(S): at 18:05

## 2025-02-06 RX ADMIN — POTASSIUM CHLORIDE 100 MILLIEQUIVALENT(S): 750 TABLET, EXTENDED RELEASE ORAL at 21:57

## 2025-02-06 RX ADMIN — LEVOTHYROXINE SODIUM 60 MICROGRAM(S): 25 TABLET ORAL at 21:13

## 2025-02-06 RX ADMIN — POTASSIUM CHLORIDE 100 MILLIEQUIVALENT(S): 750 TABLET, EXTENDED RELEASE ORAL at 20:55

## 2025-02-06 RX ADMIN — Medication 5 MILLIGRAM(S): at 11:53

## 2025-02-06 RX ADMIN — SODIUM CHLORIDE 40 MILLILITER(S): 9 INJECTION, SOLUTION INTRAVENOUS at 08:48

## 2025-02-06 RX ADMIN — Medication 600 MILLILITER(S): at 10:25

## 2025-02-06 RX ADMIN — Medication 5 MILLIGRAM(S): at 05:46

## 2025-02-06 RX ADMIN — Medication 100 GRAM(S): at 08:49

## 2025-02-06 NOTE — PROGRESS NOTE ADULT - SUBJECTIVE AND OBJECTIVE BOX
Date of Service: 02-06-25 @ 16:23    Patient is a 89y old  Female who presents with a chief complaint of Hypoxic/hypercapnic resp failure (06 Feb 2025 13:11)      Any change in ROS: seems comfortable:     MEDICATIONS  (STANDING):  albuterol/ipratropium for Nebulization 3 milliLiter(s) Nebulizer every 6 hours  albuterol/ipratropium for Nebulization. 3 milliLiter(s) Nebulizer once  ascorbic acid 500 milliGRAM(s) Oral daily  bisacodyl Suppository 10 milliGRAM(s) Rectal daily  chlorhexidine 2% Cloths 1 Application(s) Topical <User Schedule>  dextrose 5% + sodium chloride 0.9%. 1000 milliLiter(s) (40 mL/Hr) IV Continuous <Continuous>  heparin   Injectable 5000 Unit(s) SubCutaneous every 12 hours  levothyroxine Injectable 60 MICROGram(s) IV Push at bedtime  metoprolol tartrate Injectable 5 milliGRAM(s) IV Push every 6 hours  mirtazapine 15 milliGRAM(s) Oral at bedtime  multivitamin 1 Tablet(s) Oral daily  QUEtiapine 12.5 milliGRAM(s) Oral at bedtime  sodium chloride 3%  Inhalation 4 milliLiter(s) Inhalation every 12 hours    MEDICATIONS  (PRN):  acetaminophen   Oral Liquid .. 650 milliGRAM(s) Oral every 6 hours PRN Temp greater or equal to 38C (100.4F), Moderate Pain (4 - 6)  haloperidol    Injectable 1 milliGRAM(s) IntraMuscular every 6 hours PRN Agitation    Vital Signs Last 24 Hrs  T(C): 36.7 (06 Feb 2025 11:39), Max: 36.7 (06 Feb 2025 11:39)  T(F): 98 (06 Feb 2025 11:39), Max: 98 (06 Feb 2025 11:39)  HR: 77 (06 Feb 2025 11:39) (77 - 87)  BP: 144/65 (06 Feb 2025 11:39) (118/63 - 159/75)  BP(mean): --  RR: 18 (06 Feb 2025 11:39) (18 - 18)  SpO2: 96% (06 Feb 2025 11:39) (96% - 97%)    Parameters below as of 06 Feb 2025 11:39  Patient On (Oxygen Delivery Method): room air        I&O's Summary    05 Feb 2025 07:01  -  06 Feb 2025 07:00  --------------------------------------------------------  IN: 0 mL / OUT: 250 mL / NET: -250 mL          Physical Exam:   GENERAL: NAD, well-groomed, well-developed  HEENT: LUANN/   Atraumatic, Normocephalic  ENMT: No tonsillar erythema, exudates, or enlargement; Moist mucous membranes, Good dentition, No lesions  NECK: Supple, No JVD, Normal thyroid  CHEST/LUNG: Clear to auscultaion  CVS: Regular rate and rhythm; No murmurs, rubs, or gallops  GI: : Soft, Nontender, Nondistended; Bowel sounds present  NERVOUS SYSTEM:  open eyes on stimulation  EXTREMITIES: -edema  LYMPH: No lymphadenopathy noted  SKIN: No rashes or lesions  ENDOCRINOLOGY: No Thyromegaly  PSYCH: Appropriate    Labs:  ABG - ( 05 Feb 2025 06:17 )  pH, Arterial: 7.48  pH, Blood: x     /  pCO2: 43    /  pO2: 67    / HCO3: 32    / Base Excess: 7.6   /  SaO2: 96.7                                        10.8   4.76  )-----------( 223      ( 05 Feb 2025 07:09 )             32.3                         11.4   5.93  )-----------( 252      ( 04 Feb 2025 09:35 )             33.9                         10.8   5.53  )-----------( 243      ( 03 Feb 2025 07:14 )             32.7     02-06    127[L]  |  92[L]  |  12  ----------------------------<  41[LL]  4.9   |  22  |  1.15  02-05    127[L]  |  88[L]  |  10  ----------------------------<  70  3.5   |  27  |  1.22  02-04    132[L]  |  89[L]  |  10  ----------------------------<  95  3.6   |  29  |  1.17  02-03    130[L]  |  91[L]  |  10  ----------------------------<  90  3.8   |  27  |  1.13    Ca    9.0      06 Feb 2025 07:38  Ca    9.4      05 Feb 2025 07:06  Phos  3.4     02-06  Phos  3.0     02-05  Mg     1.8     02-06  Mg     1.8     02-05      CAPILLARY BLOOD GLUCOSE      POCT Blood Glucose.: 77 mg/dL (06 Feb 2025 11:44)  POCT Blood Glucose.: 91 mg/dL (06 Feb 2025 08:18)  POCT Blood Glucose.: 64 mg/dL (06 Feb 2025 07:01)  POCT Blood Glucose.: 65 mg/dL (06 Feb 2025 06:59)  POCT Blood Glucose.: 86 mg/dL (06 Feb 2025 01:36)  POCT Blood Glucose.: 121 mg/dL (06 Feb 2025 00:59)  POCT Blood Glucose.: 65 mg/dL (06 Feb 2025 00:19)  POCT Blood Glucose.: 67 mg/dL (06 Feb 2025 00:18)  POCT Blood Glucose.: 76 mg/dL (05 Feb 2025 18:29)          Urinalysis Basic - ( 06 Feb 2025 07:38 )    Color: x / Appearance: x / SG: x / pH: x  Gluc: 41 mg/dL / Ketone: x  / Bili: x / Urobili: x   Blood: x / Protein: x / Nitrite: x   Leuk Esterase: x / RBC: x / WBC x   Sq Epi: x / Non Sq Epi: x / Bacteria: x    < from: Xray Chest 1 View- PORTABLE-Urgent (Xray Chest 1 View- PORTABLE-Urgent .) (02.02.25 @ 17:31) >    PROCEDURE DATE:  02/02/2025          INTERPRETATION:  EXAMINATION: XR CHEST URGENT    CLINICAL INDICATION: Follow-up pleural effusion    TECHNIQUE: Single frontal, portable view of the chest was obtained.    COMPARISON: Chest x-ray 1/25/2025.    FINDINGS:    The heart is normal in size.  Elevated left hemidiaphragm.  No focal consolidations.  Bilateral small pleural effusions, decreased in size compared to prior   chest x-ray 1/25/2025.  There is no pneumothorax.  Diffuse bony demineralization. No acute osseous abnormalities.    IMPRESSION:    Bilateral small pleural effusions, decreased in size compared to prior   chest x-ray 1/25/2025.    --- End of Report ---          HIPOLITO BOLES MD; Resident Radiologist  This document has been electronically signed.  CLAIRE DUMONT MD; Attending Radiologist  This document has been electronically signed. Feb  3 2025  8:33PM    < end of copied text >          RECENT CULTURES:        RESPIRATORY CULTURES:          Studies  Chest X-RAY  CT SCAN Chest   Venous Dopplers: LE:   CT Abdomen  Others

## 2025-02-06 NOTE — PROVIDER CONTACT NOTE (HYPOGLYCEMIA EVENT) - NS PROVIDER CONTACT CONTRIBUTING FACTORS OF EPISODE
Poor oral intake within the last 24 hours/Patient NPO greater than 8 hours/Previous finger stick less than 100 mg/dL
Poor oral intake within the last 24 hours/Patient NPO greater than 8 hours/Previous finger stick less than 100 mg/dL

## 2025-02-06 NOTE — PROGRESS NOTE ADULT - PROBLEM SELECTOR PLAN 3
CT chest 1/23 with moderate b/l pl effusions  -CXR 1/25 with b/l pleural effusions/atelectasis L>R, L pleural effusion now appears mod-large  -Holding off on thoracentesis for now as O2 requirements improving with diuresis  -Keep O>I as tolerated.    2/1: repeat cxr in AM  2/2: cxr ordered:  pt has been on lasix  2/3; the cxr looks better to me:  the left sided pleural effusion is less:  still congested but better then before:  2/4: the cxr looks much better to me then  before  2/5: xray is better:  she is not in any resp distress  2/6: pulm wise remains stable:  defer to [facility] team   for peg placement:

## 2025-02-06 NOTE — PROGRESS NOTE ADULT - PROBLEM SELECTOR PLAN 5
-TF via NGT  -Speech and swallow f/u noted, recommending NPO  -Aspiration precautions  -Oral care  -Palliative care f/u.    2/3 seems to be doing  ok :  no sob:  on room air:  cxr is much better: : keeps NPO: per speech and swallow:  the gi does not want to put the peg:  option remains for pleasure feeds:    2/4; now pt for ir guided peg placement  given her general clinical condition :  she relatively at high risk for any procedure under sedtion or anesthesia:  but pulmonary wise she looks much better:  effusions have decreased and she has been on room air for days:  check ABG in am    2/5: ABG today with mild metabolic alkalosis: no intervention; at this time  2/6: for peg?

## 2025-02-06 NOTE — PROGRESS NOTE ADULT - SUBJECTIVE AND OBJECTIVE BOX
[facility] [phone]        Patient seen and examined in bed.  SHe was the same   Was hypoglycemic earlier       MEDICATIONS  (STANDING):  albuterol/ipratropium for Nebulization 3 milliLiter(s) Nebulizer every 6 hours  albuterol/ipratropium for Nebulization. 3 milliLiter(s) Nebulizer once  ascorbic acid 500 milliGRAM(s) Oral daily  bisacodyl Suppository 10 milliGRAM(s) Rectal daily  chlorhexidine 2% Cloths 1 Application(s) Topical <User Schedule>  dextrose 5% + sodium chloride 0.9%. 1000 milliLiter(s) (40 mL/Hr) IV Continuous <Continuous>  heparin   Injectable 5000 Unit(s) SubCutaneous every 12 hours  levothyroxine Injectable 60 MICROGram(s) IV Push at bedtime  metoprolol tartrate Injectable 5 milliGRAM(s) IV Push every 6 hours  mirtazapine 15 milliGRAM(s) Oral at bedtime  multivitamin 1 Tablet(s) Oral daily  QUEtiapine 12.5 milliGRAM(s) Oral at bedtime  sodium chloride 3%  Inhalation 4 milliLiter(s) Inhalation every 12 hours      VITAL:  T(C): , Max: 36.5 (02-05-25 @ 21:40)  T(F): , Max: 97.7 (02-05-25 @ 21:40)  HR: 87 (02-06-25 @ 05:00)  BP: 159/75 (02-06-25 @ 05:00)  BP(mean): --  RR: 18 (02-06-25 @ 05:00)  SpO2: 97% (02-06-25 @ 05:00)  Wt(kg): --    I and O's:    02-05 @ 07:01  -  02-06 @ 07:00  --------------------------------------------------------  IN: 0 mL / OUT: 250 mL / NET: -250 mL          PHYSICAL EXAM:    Constitutional: NAD  Neck:  No JVD  Respiratory: CTAB/L  Cardiovascular: S1 and S2  Gastrointestinal: BS+, soft, NT/ND  Extremities: No peripheral edema  Neurological: A/O x 3, no focal deficits  Psychiatric: Normal mood, normal affect  : No Crooks  Skin: No rashes  Access: Not applicable    LABS:                        10.8   4.76  )-----------( 223      ( 05 Feb 2025 07:09 )             32.3     02-06    127[L]  |  92[L]  |  12  ----------------------------<  41[LL]  4.9   |  22  |  1.15    Ca    9.0      06 Feb 2025 07:38  Phos  3.4     02-06  Mg     1.8     02-06            Urine Studies:  Urinalysis Basic - ( 06 Feb 2025 07:38 )    Color: x / Appearance: x / SG: x / pH: x  Gluc: 41 mg/dL / Ketone: x  / Bili: x / Urobili: x   Blood: x / Protein: x / Nitrite: x   Leuk Esterase: x / RBC: x / WBC x   Sq Epi: x / Non Sq Epi: x / Bacteria: x            RADIOLOGY & ADDITIONAL STUDIES:

## 2025-02-06 NOTE — PROVIDER CONTACT NOTE (OTHER) - SITUATION
NG tube having resistance when flushing, unable to irrigate.
minimal output in cruz, possible clog?
Pt NPO, could not take PM PO medications.
pt manual BP 88/62
pt ripped out NG tube
Day PA and night PA came to bedside to put NGT in. Upon reassessment of pt, NGT dislodged in mouth.
1.5sec run of PATs into 170s as per tele tech
Pt converted to afib on tele
Upon assessment of pt, noticed a bulge on pts lower right abdomen. When pt coughs, area protrudes out.
pt O2 sat 84%

## 2025-02-06 NOTE — PROVIDER CONTACT NOTE (CRITICAL VALUE NOTIFICATION) - ACTION/TREATMENT ORDERED:
ACP aware. POCT 65 Dextrose 12.5 IV given, repeat 91. D5NS @40ml/hr ordered continous. Will continue to monitor.

## 2025-02-06 NOTE — PROGRESS NOTE ADULT - SUBJECTIVE AND OBJECTIVE BOX
Patient is a 89y old  Female who presents with a chief complaint of Hypoxic/hypercapnic resp failure     DATE OF SERVICE: 02-06-25    SUBJECTIVE / OVERNIGHT EVENTS: overnight events noted    ROS:  not available   awake and alert however       MEDICATIONS  (STANDING):  albuterol/ipratropium for Nebulization 3 milliLiter(s) Nebulizer every 6 hours  albuterol/ipratropium for Nebulization. 3 milliLiter(s) Nebulizer once  ascorbic acid 500 milliGRAM(s) Oral daily  bisacodyl Suppository 10 milliGRAM(s) Rectal daily  chlorhexidine 2% Cloths 1 Application(s) Topical <User Schedule>  dextrose 5% + sodium chloride 0.9%. 1000 milliLiter(s) (40 mL/Hr) IV Continuous <Continuous>  heparin   Injectable 5000 Unit(s) SubCutaneous every 12 hours  levothyroxine Injectable 60 MICROGram(s) IV Push at bedtime  metoprolol tartrate Injectable 5 milliGRAM(s) IV Push every 6 hours  mirtazapine 15 milliGRAM(s) Oral at bedtime  multivitamin 1 Tablet(s) Oral daily  QUEtiapine 12.5 milliGRAM(s) Oral at bedtime  sodium chloride 3%  Inhalation 4 milliLiter(s) Inhalation every 12 hours    MEDICATIONS  (PRN):  acetaminophen   Oral Liquid .. 650 milliGRAM(s) Oral every 6 hours PRN Temp greater or equal to 38C (100.4F), Moderate Pain (4 - 6)  haloperidol    Injectable 1 milliGRAM(s) IntraMuscular every 6 hours PRN Agitation    T(C): 36.5 (02-06-25 @ 05:00), Max: 36.5 (02-05-25 @ 21:40)  HR: 87 (02-06-25 @ 05:00) (78 - 87)  BP: 159/75 (02-06-25 @ 05:00) (118/63 - 159/75)  RR: 18 (02-06-25 @ 05:00) (18 - 18)  SpO2: 97% (02-06-25 @ 05:00) (97% - 98%)      PHYSICAL EXAM:   CHEST/LUNG: decreased breath sounds bases  HEART: S1 S2; no murmurs   ABDOMEN: Soft, Nontender  EXTREMITIES: no edema  NEUROLOGY: non-focal    LABS:                        10.0   5.65  )-----------( 203      ( 07 Feb 2025 07:27 )             30.4     02-07    133[L]  |  97  |  12  ----------------------------<  56[L]  3.8   |  24  |  1.08    Ca    9.2      07 Feb 2025 07:25  Phos  2.1     02-07  Mg     2.1     02-07      PT/INR - ( 07 Feb 2025 07:27 )   PT: 11.5 sec;   INR: 1.00 ratio         PTT - ( 07 Feb 2025 07:27 )  PTT:28.7 sec      Urinalysis Basic - ( 07 Feb 2025 07:25 )    Color: x / Appearance: x / SG: x / pH: x  Gluc: 56 mg/dL / Ketone: x  / Bili: x / Urobili: x   Blood: x / Protein: x / Nitrite: x   Leuk Esterase: x / RBC: x / WBC x   Sq Epi: x / Non Sq Epi: x / Bacteria: x          All consultant(s) notes reviewed and care discussed with other providers        Contact Number, Dr Link [phone] Patient is a 89y old  Female who presents with a chief complaint of Hypoxic/hypercapnic resp failure     DATE OF SERVICE: 02-06-25    SUBJECTIVE / OVERNIGHT EVENTS: overnight events noted    ROS:  not available   awake and alert however       MEDICATIONS  (STANDING):  albuterol/ipratropium for Nebulization 3 milliLiter(s) Nebulizer every 6 hours  albuterol/ipratropium for Nebulization. 3 milliLiter(s) Nebulizer once  ascorbic acid 500 milliGRAM(s) Oral daily  bisacodyl Suppository 10 milliGRAM(s) Rectal daily  chlorhexidine 2% Cloths 1 Application(s) Topical <User Schedule>  dextrose 5% + sodium chloride 0.9%. 1000 milliLiter(s) (40 mL/Hr) IV Continuous <Continuous>  heparin   Injectable 5000 Unit(s) SubCutaneous every 12 hours  levothyroxine Injectable 60 MICROGram(s) IV Push at bedtime  metoprolol tartrate Injectable 5 milliGRAM(s) IV Push every 6 hours  mirtazapine 15 milliGRAM(s) Oral at bedtime  multivitamin 1 Tablet(s) Oral daily  QUEtiapine 12.5 milliGRAM(s) Oral at bedtime  sodium chloride 3%  Inhalation 4 milliLiter(s) Inhalation every 12 hours    MEDICATIONS  (PRN):  acetaminophen   Oral Liquid .. 650 milliGRAM(s) Oral every 6 hours PRN Temp greater or equal to 38C (100.4F), Moderate Pain (4 - 6)  haloperidol    Injectable 1 milliGRAM(s) IntraMuscular every 6 hours PRN Agitation    T(C): 36.5 (02-06-25 @ 05:00), Max: 36.5 (02-05-25 @ 21:40)  HR: 87 (02-06-25 @ 05:00) (78 - 87)  BP: 159/75 (02-06-25 @ 05:00) (118/63 - 159/75)  RR: 18 (02-06-25 @ 05:00) (18 - 18)  SpO2: 97% (02-06-25 @ 05:00) (97% - 98%)      PHYSICAL EXAM:   CHEST/LUNG: decreased breath sounds bases  HEART: S1 S2; no murmurs   ABDOMEN: Soft, Nontender  EXTREMITIES: no edema  NEUROLOGY: non-focal    LABS:                        10.8   4.76  )-----------( 223      ( 05 Feb 2025 07:09 )             32.3     02-06    127[L]  |  92[L]  |  12  ----------------------------<  41[LL]  4.9   |  22  |  1.15    Ca    9.0      06 Feb 2025 07:38  Phos  3.4     02-06  Mg     1.8     02-06          All consultant(s) notes reviewed and care discussed with other providers        Contact Number, Dr Link [phone]

## 2025-02-06 NOTE — PROGRESS NOTE ADULT - PROBLEM SELECTOR PLAN 1
continue to follow pulmonary recommendations  discussed with pulmonary   she is cleared for PEG  PEG by GI tomorrow

## 2025-02-06 NOTE — PROGRESS NOTE ADULT - ASSESSMENT
resp failure  aspiration    peg cancelled previously due to worsening respiratory status  she is not a candidate for PEG/sedation at present, i do not think she will tolerate sedation  dnr/dni  prognosis guarded   goc noted  son is adamant of feeding tube  CHAVA perez noted  tentative peg on friday pm  nephrology following for mild hyponatremia  montior fsbg  discussed with np    Advanced care planning was discussed with patient and family.  Advanced care planning forms were reviewed and discussed.  Risks, benefits and alternatives of gastroenterologic procedures were discussed in detail and all questions were answered.    30 minutes spent.   resp failure  aspiration    peg cancelled previously due to worsening respiratory status  dnr/dni  prognosis guarded   goc noted  son is adamant of feeding tube  IR eval noted  tentative peg on friday pm  nephrology following for mild hyponatremia  dominique fs  discussed with np    Advanced care planning was discussed with patient and family.  Advanced care planning forms were reviewed and discussed.  Risks, benefits and alternatives of gastroenterologic procedures were discussed in detail and all questions were answered.    30 minutes spent.

## 2025-02-06 NOTE — PROVIDER CONTACT NOTE (OTHER) - RECOMMENDATIONS
Notify provider.
Provider made aware orders in place
notify provider
Contact provider
Contact provider
notify provider

## 2025-02-06 NOTE — PROVIDER CONTACT NOTE (CRITICAL VALUE NOTIFICATION) - BACKGROUND
Pt admitted with sepsis, AHRF
89 y/o F PMH dementia s/p brain tumor resection 1961, HTN, hypothyroidism, colon CA s/p R hemocolectomy, presents to ED with hypoxic RF.
Pt NPO

## 2025-02-06 NOTE — PROVIDER CONTACT NOTE (OTHER) - ASSESSMENT
Pt A/ox1 alert, VSS, no s/s of distress
AOx0. NPO on tube feeds pending confirmation of Xray. Bilateral mittens in place.
bp 116/59, hr 87, O2 96 on 4L
pt manual bp 88/62, HR 70, O2 sat 99 on 4L
Pt AOx0, VS as charted. No s.s distress noted.
BP: 102/54, Temp 99.3 axiliary, 84% on 2L NC, HR 90s
urine present in cruz, urine found on purple pad. Resistance met when pulling on catheter, in place.
Pt is AOx1, VSS. Pt shows no S&S of chest pain, SOB, or discomfort.
Pt AOx0, VS as charted. No s/s distress noted.
AOx0. Pt VS as charted on room air. No signs or symptoms of distress.

## 2025-02-06 NOTE — PROVIDER CONTACT NOTE (OTHER) - NAME OF MD/NP/PA/DO NOTIFIED:
Miriam Oneill MD
Aiden Peraza
VEGA Vitale
Rafat Bailey
PA Ezrin, Rafat
Rafat Ezring
VEGA Mead
VEGA Vitale
Rafat Ezring
VEGA Mead.

## 2025-02-06 NOTE — PROGRESS NOTE ADULT - SUBJECTIVE AND OBJECTIVE BOX
Cardiovascular Disease Progress Note  Date of service: 02-06-25 @ 08:20    Overnight events: No acute events overnight.  Patient is in no distress  Otherwise review of systems negative    Objective Findings:  T(C): 36.5 (02-06-25 @ 05:00), Max: 36.5 (02-05-25 @ 21:40)  HR: 87 (02-06-25 @ 05:00) (78 - 87)  BP: 159/75 (02-06-25 @ 05:00) (118/63 - 159/75)  RR: 18 (02-06-25 @ 05:00) (18 - 18)  SpO2: 97% (02-06-25 @ 05:00) (97% - 98%)  Wt(kg): --  Daily     Daily       Physical Exam:  Gen: NAD; Patient resting comfortably  HEENT: EOMI, Normocephalic/ atraumatic  CV: RRR, normal S1 + S2, no m/r/g  Lungs:  Normal respiratory effort; clear to auscultation bilaterally  Abd: soft, non-tender; bowel sounds present  Ext: No edema; warm and well perfused    Telemetry:  Sinus     Laboratory Data:                        10.8   4.76  )-----------( 223      ( 05 Feb 2025 07:09 )             32.3     02-06    127[L]  |  92[L]  |  12  ----------------------------<  41[LL]  4.9   |  22  |  1.15    Ca    9.0      06 Feb 2025 07:38  Phos  3.4     02-06  Mg     1.8     02-06                Inpatient Medications:  MEDICATIONS  (STANDING):  albuterol/ipratropium for Nebulization 3 milliLiter(s) Nebulizer every 6 hours  albuterol/ipratropium for Nebulization. 3 milliLiter(s) Nebulizer once  ascorbic acid 500 milliGRAM(s) Oral daily  bisacodyl Suppository 10 milliGRAM(s) Rectal daily  chlorhexidine 2% Cloths 1 Application(s) Topical <User Schedule>  heparin   Injectable 5000 Unit(s) SubCutaneous every 12 hours  levothyroxine Injectable 60 MICROGram(s) IV Push at bedtime  metoprolol tartrate Injectable 5 milliGRAM(s) IV Push every 6 hours  mirtazapine 15 milliGRAM(s) Oral at bedtime  multivitamin 1 Tablet(s) Oral daily  QUEtiapine 12.5 milliGRAM(s) Oral at bedtime  sodium chloride 0.9%. 1000 milliLiter(s) (40 mL/Hr) IV Continuous <Continuous>  sodium chloride 1.5%. 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    Plan of Care:    #Afib RVR  - HR now improved  - Currently maintaining sinus.   - BP now stable. Would continue with IV lopressor standing dose.   - PT unable to take PO meds overnight   - Patient is anemic and high bleeding risk, would refrain from AC at this time.     #Hypoxic respiratory failure  - Diuretics as needed.   - Pulm input appreciated.   - Volume status improving  - Replenish lytes as needed.     Overall poor prognosis.             Over 55 minutes spent on total encounter; more than 50% of the visit was spent counseling and/or coordinating care by the attending physician.      Bobby Montilla DO [facility]  Cardiovascular Disease  [phone]

## 2025-02-06 NOTE — PROGRESS NOTE ADULT - SUBJECTIVE AND OBJECTIVE BOX
[facility]      Zaid Garcia NP    [address] [place] [address] [phone]    INTERVAL HPI/ OVERNIGHT EVENTS:  pt s/e   on RA       Allergies    clindamycin (Unknown)  shellfish (Unknown)  strawberry (Rash)  penicillin (Unknown)  clindamycin (Other)  strawberry (Unknown)  IV Contrast (Other)  IV Contrast (Unknown)    Intolerances      unable to obtain      PHYSICAL EXAM:   Vital Signs Last 24 Hrs  T(C): 36.4 (02 Feb 2025 12:00), Max: 36.6 (02 Feb 2025 05:07)  T(F): 97.5 (02 Feb 2025 12:00), Max: 97.8 (02 Feb 2025 05:07)  HR: 69 (02 Feb 2025 12:00) (64 - 73)  BP: 151/71 (02 Feb 2025 12:00) (112/72 - 157/79)  BP(mean): 97 (02 Feb 2025 12:00) (97 - 97)  RR: 18 (02 Feb 2025 12:00) (18 - 18)  SpO2: 95% (02 Feb 2025 12:00) (93% - 100%)    Parameters below as of 02 Feb 2025 12:00  Patient On (Oxygen Delivery Method): room air    Daily     Daily I&O's Summary    19 Jan 2025 07:01  -  19 Jan 2025 15:01  --------------------------------------------------------  IN: 0 mL / OUT: 150 mL / NET: -150 mL      nad  confused  frail  non toxic  soft, nt  no edema        LABS:                        9.7    5.63  )-----------( 237      ( 02 Feb 2025 07:22 )             29.4      02-02    132[L]  |  91[L]  |  11  ----------------------------<  75  3.1[L]   |  28  |  1.20    Ca    9.4      02 Feb 2025 07:25                      Urinalysis Basic - ( 19 Jan 2025 07:30 )    Color: x / Appearance: x / SG: x / pH: x  Gluc: 114 mg/dL / Ketone: x  / Bili: x / Urobili: x   Blood: x / Protein: x / Nitrite: x   Leuk Esterase: x / RBC: x / WBC x   Sq Epi: x / Non Sq Epi: x / Bacteria: x      amylase   lipase  RADIOLOGY & ADDITIONAL TESTS:

## 2025-02-06 NOTE — PROVIDER CONTACT NOTE (OTHER) - DATE AND TIME:
18-Jan-2025 21:08
06-Feb-2025 08:52
14-Jan-2025 15:26
25-Jan-2025 18:36
15-Alberto-2025 22:49
19-Jan-2025 04:26
14-Jan-2025 09:50
19-Jan-2025 20:15
17-Jan-2025 00:00
05-Feb-2025 23:07

## 2025-02-06 NOTE — PROGRESS NOTE ADULT - PROBLEM SELECTOR PLAN 1
-Initially 2nd to PNA   -Hypoxia had been improving, then with increase in O2 requirements requiring HFNC 1/24  -Repeat CT chest 1/23 with moderate b/l pleural effusions/atelectasis, b/l GGO. Likely combination of fluid overload + PNA  -Completed 10 day course of ABX   -Continue bronchodilators. S/p Mucomyst   -Keep O>I with diuresis as tolerated  -Continue HFNC, wean as tolerated, keep sats >90%. O2 requirements improving, on 40L/40% this AM   -Suggest Bipap 10/5 PRN if worsening hypoxia or increased WOB  -Prognosis guarded. GOC DNR/DNI/trial NIV  -Palliative care f/u.    2/1: seems better; on room air:  repeat cxr in AM to evaluate for pleural effusion : she is not in any resp distress  2/2: clinically she looks the best today  : off oxygen : no resp distress:  alert and awake and is responsive  2/3: son at bedside:  pt is on room air:  doing pretty well : no sob:  2/4: seems OK: cxr with improvement:  on room air :  she looks comfortable  2/4: resolved  2/6: seems OK:  on room air:  last ABG was pretty decent  : for possible peg in am

## 2025-02-06 NOTE — PROVIDER CONTACT NOTE (HYPOGLYCEMIA EVENT) - NS PROVIDER CONTACT BACKGROUND-HYPO
Age: 89y    Gender: Female    POCT Blood Glucose:  86 mg/dL (02-06-25 @ 01:36)  121 mg/dL (02-06-25 @ 00:59)  65 mg/dL (02-06-25 @ 00:19)  67 mg/dL (02-06-25 @ 00:18)  76 mg/dL (02-05-25 @ 18:29)  74 mg/dL (02-05-25 @ 06:49)      eMAR:dextrose 50% Injectable   12.5 milliLiter(s) IV Push (02-06-25 @ 00:36)    levothyroxine Injectable   60 MICROGram(s) IV Push (02-05-25 @ 22:37)    
Age: 89y    Gender: Female    POCT Blood Glucose:  64 mg/dL (02-06-25 @ 07:01)  65 mg/dL (02-06-25 @ 06:59)  86 mg/dL (02-06-25 @ 01:36)  121 mg/dL (02-06-25 @ 00:59)  65 mg/dL (02-06-25 @ 00:19)  67 mg/dL (02-06-25 @ 00:18)  76 mg/dL (02-05-25 @ 18:29)      eMAR:dextrose 50% Injectable   12.5 milliLiter(s) IV Push (02-06-25 @ 00:36)    dextrose 50% Injectable   12.5 milliLiter(s) IV Push (02-06-25 @ 07:25)    levothyroxine Injectable   60 MICROGram(s) IV Push (02-05-25 @ 22:37)

## 2025-02-06 NOTE — CHART NOTE - NSCHARTNOTEFT_GEN_A_CORE
As per GI unable to place PEG.  IR to attempt PEG on Friday as per family wishes.  Palliative care will sign off as goals are established.    Adriana Lau, ANP-BC  Please contact me via Teams  between 6am-2pm. If not answering, please call the palliative care pager [phone]    After 2pm and on weekends, please see the contact information below:    In the event of newly developing, evolving, or worsening symptoms between 2pm and 5pm please contact the Palliative Medicine via extension [phone] for assistance.  After 5pm please contact team via pager (if the patient is at [facility] #[phone] or if the patient is at [facility] #[phone]) The Geriatric and Palliative Medicine service has coverage 24 hours a day/ 7 days a week to provide medical recommendations regarding symptom management needs via telephone

## 2025-02-06 NOTE — PROVIDER CONTACT NOTE (HYPOGLYCEMIA EVENT) - NS PROVIDER CONTACT RECOMMEND-HYPO
Notify provider of patient's blood sugar. 
Notify the provider and request Dextrose 50% 12.5 Grams IVP.

## 2025-02-06 NOTE — PROGRESS NOTE ADULT - ASSESSMENT
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

## 2025-02-06 NOTE — PROVIDER CONTACT NOTE (OTHER) - BACKGROUND
pt admitted for Sepsis 2/2 to PNA
89 y/o F PMH dementia s/p brain tumor resection 1961, HTN, hypothyroidism, colon CA, stercoral colitis admitted for AHRF with sepsis,
Pt admitted AHRF with sepsis secondary to PNA
Can be found in H&P.
hx afib
pt admitted for sepsis 2/2 to PNA
Pt admitted for sepsis with acute hypoxic respiratory failure.
89 y/o F PMH dementia s/p brain tumor resection, HTN, hypothyroidism, colon CA, admitted for AHRF w sepsis.
pt admitted for sepsis 2/2 to PNA

## 2025-02-06 NOTE — PROVIDER CONTACT NOTE (OTHER) - REASON
pt O2 sat 84%
1.5sec run of PATs into 170s as per tele tech
Afib on tele
minimal output in cruz, possible clog?
NGT dislodged
Abnormal bulge on pt
pt manual BP 88/62
NG tube difficulty flushing
Pt NPO, could not take PM PO medications
pt ripped out NG tube

## 2025-02-06 NOTE — PROVIDER CONTACT NOTE (OTHER) - ACTION/TREATMENT ORDERED:
Provider made aware at bedside placing a new one.
VEGA Vitale notified. EKG completed as ordered. Will continue to monitor.
Provider notified and came to bedside. No interventions at this time. Continue to monitor.
Provider made aware. Pt pending possible PEG tube on 2/7.
ACP aware. Irrigation ordered. No resistance met with irrigate, able to aspirate with no resistance. No output at this time.
VEGA Mead aware. Mag repletion ordered. Will continue to monitor.
VEGA Vitale notified, will continue to monitor.
provider made aware 500ml LR bolus given
Provider notified. Provider to come to bedside to replace NGT. No other interventions at this time.
blood cultures, rectal temp, IV tylenol, and CX ray ordered

## 2025-02-06 NOTE — PROVIDER CONTACT NOTE (HYPOGLYCEMIA EVENT) - NS PROVIDER CONTACT ASSESS-HYPO
Patient discharged at this time with wife and daughter from [facility].   
Pt AOX1, VSS, Pt NPO. Pt shows no S&S of chest pain, SOB, dizziness, or discomfort. 
Pt AOx1, VSS. Pt shows no S&S of chest pain, SOB, dizziness, or discomfort.

## 2025-02-07 LAB
ANION GAP SERPL CALC-SCNC: 12 MMOL/L — SIGNIFICANT CHANGE UP (ref 5–17)
APTT BLD: 28.7 SEC — SIGNIFICANT CHANGE UP (ref 24.5–35.6)
BLD GP AB SCN SERPL QL: NEGATIVE — SIGNIFICANT CHANGE UP
BUN SERPL-MCNC: 12 MG/DL — SIGNIFICANT CHANGE UP (ref 7–23)
CALCIUM SERPL-MCNC: 9.2 MG/DL — SIGNIFICANT CHANGE UP (ref 8.4–10.5)
CHLORIDE SERPL-SCNC: 97 MMOL/L — SIGNIFICANT CHANGE UP (ref 96–108)
CO2 SERPL-SCNC: 24 MMOL/L — SIGNIFICANT CHANGE UP (ref 22–31)
CREAT SERPL-MCNC: 1.08 MG/DL — SIGNIFICANT CHANGE UP (ref 0.5–1.3)
EGFR: 47 ML/MIN/1.73M2 — LOW
GLUCOSE BLDC GLUCOMTR-MCNC: 74 MG/DL — SIGNIFICANT CHANGE UP (ref 70–99)
GLUCOSE BLDC GLUCOMTR-MCNC: 76 MG/DL — SIGNIFICANT CHANGE UP (ref 70–99)
GLUCOSE BLDC GLUCOMTR-MCNC: 77 MG/DL — SIGNIFICANT CHANGE UP (ref 70–99)
GLUCOSE BLDC GLUCOMTR-MCNC: 77 MG/DL — SIGNIFICANT CHANGE UP (ref 70–99)
GLUCOSE BLDC GLUCOMTR-MCNC: 85 MG/DL — SIGNIFICANT CHANGE UP (ref 70–99)
GLUCOSE SERPL-MCNC: 56 MG/DL — LOW (ref 70–99)
HCT VFR BLD CALC: 30.4 % — LOW (ref 34.5–45)
HGB BLD-MCNC: 10 G/DL — LOW (ref 11.5–15.5)
INR BLD: 1 RATIO — SIGNIFICANT CHANGE UP (ref 0.85–1.16)
MAGNESIUM SERPL-MCNC: 2.1 MG/DL — SIGNIFICANT CHANGE UP (ref 1.6–2.6)
MCHC RBC-ENTMCNC: 31.3 PG — SIGNIFICANT CHANGE UP (ref 27–34)
MCHC RBC-ENTMCNC: 32.9 G/DL — SIGNIFICANT CHANGE UP (ref 32–36)
MCV RBC AUTO: 95 FL — SIGNIFICANT CHANGE UP (ref 80–100)
NRBC # BLD: 0 /100 WBCS — SIGNIFICANT CHANGE UP (ref 0–0)
NRBC BLD-RTO: 0 /100 WBCS — SIGNIFICANT CHANGE UP (ref 0–0)
PHOSPHATE SERPL-MCNC: 2.1 MG/DL — LOW (ref 2.5–4.5)
PLATELET # BLD AUTO: 203 K/UL — SIGNIFICANT CHANGE UP (ref 150–400)
POTASSIUM SERPL-MCNC: 3.8 MMOL/L — SIGNIFICANT CHANGE UP (ref 3.5–5.3)
POTASSIUM SERPL-SCNC: 3.8 MMOL/L — SIGNIFICANT CHANGE UP (ref 3.5–5.3)
PROTHROM AB SERPL-ACNC: 11.5 SEC — SIGNIFICANT CHANGE UP (ref 9.9–13.4)
RBC # BLD: 3.2 M/UL — LOW (ref 3.8–5.2)
RBC # FLD: 16.3 % — HIGH (ref 10.3–14.5)
RH IG SCN BLD-IMP: POSITIVE — SIGNIFICANT CHANGE UP
SODIUM SERPL-SCNC: 133 MMOL/L — LOW (ref 135–145)
WBC # BLD: 5.65 K/UL — SIGNIFICANT CHANGE UP (ref 3.8–10.5)
WBC # FLD AUTO: 5.65 K/UL — SIGNIFICANT CHANGE UP (ref 3.8–10.5)

## 2025-02-07 DEVICE — KIT ENDO SAFTEY PEG PULL STD 20 FR ENDOVIVE: Type: IMPLANTABLE DEVICE | Status: FUNCTIONAL

## 2025-02-07 RX ORDER — SOD PHOSPHATE,MONOBASIC-DIBAS 3MMOL/ML
15 VIAL (ML) INTRAVENOUS ONCE
Refills: 0 | Status: COMPLETED | OUTPATIENT
Start: 2025-02-07 | End: 2025-02-07

## 2025-02-07 RX ORDER — SOD PHOSPHATE,MONOBASIC-DIBAS 3MMOL/ML
15 VIAL (ML) INTRAVENOUS ONCE
Refills: 0 | Status: DISCONTINUED | OUTPATIENT
Start: 2025-02-07 | End: 2025-02-07

## 2025-02-07 RX ADMIN — IPRATROPIUM BROMIDE AND ALBUTEROL SULFATE 3 MILLILITER(S): .5; 2.5 SOLUTION RESPIRATORY (INHALATION) at 23:23

## 2025-02-07 RX ADMIN — IPRATROPIUM BROMIDE AND ALBUTEROL SULFATE 3 MILLILITER(S): .5; 2.5 SOLUTION RESPIRATORY (INHALATION) at 05:19

## 2025-02-07 RX ADMIN — IPRATROPIUM BROMIDE AND ALBUTEROL SULFATE 3 MILLILITER(S): .5; 2.5 SOLUTION RESPIRATORY (INHALATION) at 11:23

## 2025-02-07 RX ADMIN — Medication 5 MILLIGRAM(S): at 23:23

## 2025-02-07 RX ADMIN — LEVOTHYROXINE SODIUM 60 MICROGRAM(S): 25 TABLET ORAL at 22:35

## 2025-02-07 RX ADMIN — Medication 5 MILLIGRAM(S): at 11:23

## 2025-02-07 RX ADMIN — Medication 5 MILLIGRAM(S): at 05:38

## 2025-02-07 RX ADMIN — Medication 4 MILLILITER(S): at 05:19

## 2025-02-07 RX ADMIN — SODIUM CHLORIDE 40 MILLILITER(S): 9 INJECTION, SOLUTION INTRAVENOUS at 22:35

## 2025-02-07 RX ADMIN — Medication 5000 UNIT(S): at 05:19

## 2025-02-07 RX ADMIN — Medication 10 MILLIGRAM(S): at 22:34

## 2025-02-07 RX ADMIN — ANTISEPTIC SURGICAL SCRUB 1 APPLICATION(S): 0.04 SOLUTION TOPICAL at 05:19

## 2025-02-07 RX ADMIN — Medication 63.75 MILLIMOLE(S): at 09:34

## 2025-02-07 RX ADMIN — Medication 5 MILLIGRAM(S): at 00:39

## 2025-02-07 RX ADMIN — IPRATROPIUM BROMIDE AND ALBUTEROL SULFATE 3 MILLILITER(S): .5; 2.5 SOLUTION RESPIRATORY (INHALATION) at 00:39

## 2025-02-07 NOTE — PRE-ANESTHESIA EVALUATION ADULT - NSANTHOSAYNRD_GEN_A_CORE
No. MALINI screening performed.  STOP BANG Legend: 0-2 = LOW Risk; 3-4 = INTERMEDIATE Risk; 5-8 = HIGH Risk

## 2025-02-07 NOTE — PROGRESS NOTE ADULT - SUBJECTIVE AND OBJECTIVE BOX
NEPHROLOGY-[facility] [phone]        Patient seen and examined in bed.  She was the same         MEDICATIONS  (STANDING):  albuterol/ipratropium for Nebulization 3 milliLiter(s) Nebulizer every 6 hours  albuterol/ipratropium for Nebulization. 3 milliLiter(s) Nebulizer once  ascorbic acid 500 milliGRAM(s) Oral daily  bisacodyl Suppository 10 milliGRAM(s) Rectal daily  chlorhexidine 2% Cloths 1 Application(s) Topical <User Schedule>  dextrose 5% + sodium chloride 0.9%. 1000 milliLiter(s) (40 mL/Hr) IV Continuous <Continuous>  heparin   Injectable 5000 Unit(s) SubCutaneous every 12 hours  levothyroxine Injectable 60 MICROGram(s) IV Push at bedtime  metoprolol tartrate Injectable 5 milliGRAM(s) IV Push every 6 hours  mirtazapine 15 milliGRAM(s) Oral at bedtime  multivitamin 1 Tablet(s) Oral daily  QUEtiapine 12.5 milliGRAM(s) Oral at bedtime  sodium chloride 3%  Inhalation 4 milliLiter(s) Inhalation every 12 hours      VITAL:  T(C): , Max: 36.7 (02-06-25 @ 11:39)  T(F): , Max: 98.1 (02-07-25 @ 04:32)  HR: 76 (02-07-25 @ 04:32)  BP: 162/73 (02-07-25 @ 04:32)  BP(mean): --  RR: 18 (02-07-25 @ 04:32)  SpO2: 96% (02-07-25 @ 04:32)  Wt(kg): --    I and O's:        PHYSICAL EXAM:    Constitutional: NAD; cachetic   Neck:  No JVD  Respiratory: poor effort   Cardiovascular: S1 and S2  Gastrointestinal: BS+, soft, NT/ND  Extremities: No peripheral edema  Neurological:    : No Crooks  Skin: No rashes  Access: Not applicable    LABS:                        10.0   5.65  )-----------( 203      ( 07 Feb 2025 07:27 )             30.4     02-07    133[L]  |  97  |  12  ----------------------------<  56[L]  3.8   |  24  |  1.08    Ca    9.2      07 Feb 2025 07:25  Phos  2.1     02-07  Mg     2.1     02-07            Urine Studies:  Urinalysis Basic - ( 07 Feb 2025 07:25 )    Color: x / Appearance: x / SG: x / pH: x  Gluc: 56 mg/dL / Ketone: x  / Bili: x / Urobili: x   Blood: x / Protein: x / Nitrite: x   Leuk Esterase: x / RBC: x / WBC x   Sq Epi: x / Non Sq Epi: x / Bacteria: x            RADIOLOGY & ADDITIONAL STUDIES:

## 2025-02-07 NOTE — PROGRESS NOTE ADULT - SUBJECTIVE AND OBJECTIVE BOX
Patient is a 89y old  Female who presents with a chief complaint of Hypoxic/hypercapnic resp failure (07 Feb 2025 11:00)      DATE OF SERVICE: 02-07-25 @ 12:56    SUBJECTIVE / OVERNIGHT EVENTS: overnight events noted    ROS:  not available  "I want to get up"          MEDICATIONS  (STANDING):  albuterol/ipratropium for Nebulization 3 milliLiter(s) Nebulizer every 6 hours  albuterol/ipratropium for Nebulization. 3 milliLiter(s) Nebulizer once  ascorbic acid 500 milliGRAM(s) Oral daily  bisacodyl Suppository 10 milliGRAM(s) Rectal daily  chlorhexidine 2% Cloths 1 Application(s) Topical <User Schedule>  dextrose 5% + sodium chloride 0.9%. 1000 milliLiter(s) (40 mL/Hr) IV Continuous <Continuous>  heparin   Injectable 5000 Unit(s) SubCutaneous every 12 hours  levothyroxine Injectable 60 MICROGram(s) IV Push at bedtime  metoprolol tartrate Injectable 5 milliGRAM(s) IV Push every 6 hours  mirtazapine 15 milliGRAM(s) Oral at bedtime  multivitamin 1 Tablet(s) Oral daily  QUEtiapine 12.5 milliGRAM(s) Oral at bedtime  sodium chloride 3%  Inhalation 4 milliLiter(s) Inhalation every 12 hours    MEDICATIONS  (PRN):  acetaminophen   Oral Liquid .. 650 milliGRAM(s) Oral every 6 hours PRN Temp greater or equal to 38C (100.4F), Moderate Pain (4 - 6)  haloperidol    Injectable 1 milliGRAM(s) IntraMuscular every 6 hours PRN Agitation        CAPILLARY BLOOD GLUCOSE      POCT Blood Glucose.: 85 mg/dL (07 Feb 2025 12:22)  POCT Blood Glucose.: 74 mg/dL (07 Feb 2025 08:28)  POCT Blood Glucose.: 76 mg/dL (07 Feb 2025 06:19)  POCT Blood Glucose.: 74 mg/dL (07 Feb 2025 02:34)  POCT Blood Glucose.: 77 mg/dL (06 Feb 2025 23:59)  POCT Blood Glucose.: 77 mg/dL (06 Feb 2025 17:31)    I&O's Summary      Vital Signs Last 24 Hrs  T(C): 36.7 (07 Feb 2025 11:16), Max: 36.7 (07 Feb 2025 04:32)  T(F): 98 (07 Feb 2025 11:16), Max: 98.1 (07 Feb 2025 04:32)  HR: 71 (07 Feb 2025 11:16) (71 - 95)  BP: 153/69 (07 Feb 2025 11:16) (129/63 - 163/73)  BP(mean): --  RR: 18 (07 Feb 2025 11:16) (18 - 18)  SpO2: 96% (07 Feb 2025 11:16) (96% - 97%)    PHYSICAL EXAM:   CHEST/LUNG: clear  HEART: S1 S2; no murmurs   ABDOMEN: Soft, Nontender  EXTREMITIES: no edema  NEUROLOGY: non-focal    LABS:                        10.0   5.65  )-----------( 203      ( 07 Feb 2025 07:27 )             30.4     02-07    133[L]  |  97  |  12  ----------------------------<  56[L]  3.8   |  24  |  1.08    Ca    9.2      07 Feb 2025 07:25  Phos  2.1     02-07  Mg     2.1     02-07      PT/INR - ( 07 Feb 2025 07:27 )   PT: 11.5 sec;   INR: 1.00 ratio         PTT - ( 07 Feb 2025 07:27 )  PTT:28.7 sec      Urinalysis Basic - ( 07 Feb 2025 07:25 )    Color: x / Appearance: x / SG: x / pH: x  Gluc: 56 mg/dL / Ketone: x  / Bili: x / Urobili: x   Blood: x / Protein: x / Nitrite: x   Leuk Esterase: x / RBC: x / WBC x   Sq Epi: x / Non Sq Epi: x / Bacteria: x          All consultant(s) notes reviewed and care discussed with other providers        Contact Number, Dr Link [phone]

## 2025-02-07 NOTE — PRE PROCEDURE NOTE - PRE PROCEDURE EVALUATION
Attending Physician:                            Procedure: egd/peg    Indication for Procedure: dysphagia/family request  ________________________________________________________  PAST MEDICAL & SURGICAL HISTORY:  Hypertension      Hypothyroidism      HTN (hypertension)      Dementia      Other dementia      History of colon cancer  s/p Resection in 2009, Dr. David      H/O brain tumor  History of brain tumor resection in 1961.        ALLERGIES:  clindamycin (Unknown)  shellfish (Unknown)  strawberry (Rash)  penicillin (Unknown)  clindamycin (Other)  strawberry (Unknown)  IV Contrast (Other)  IV Contrast (Unknown)    HOME MEDICATIONS:  levothyroxine 88 mcg (0.088 mg) oral tablet: 1 tab(s) orally once a day  mirtazapine 15 mg oral tablet: 1 tab(s) orally once a day (at bedtime)    AICD/PPM: [ ] yes   [x ] no    PERTINENT LAB DATA:                        10.0   5.65  )-----------( 203      ( 07 Feb 2025 07:27 )             30.4     02-07    133[L]  |  97  |  12  ----------------------------<  56[L]  3.8   |  24  |  1.08    Ca    9.2      07 Feb 2025 07:25  Phos  2.1     02-07  Mg     2.1     02-07      PT/INR - ( 07 Feb 2025 07:27 )   PT: 11.5 sec;   INR: 1.00 ratio         PTT - ( 07 Feb 2025 07:27 )  PTT:28.7 sec            PHYSICAL EXAMINATION:    T(C): 36.4  HR: 66  BP: 167/75  RR: 12  SpO2: 95%    Constitutional: NAD  HEENT: PERRLA, EOMI,    Neck:  No JVD  Respiratory: CTAB/L  Cardiovascular: S1 and S2  Gastrointestinal: BS+, soft, NT/ND  Extremities: No peripheral edema  Neurological: A/O x 0  Psychiatric: Normal mood, normal affect  Skin: No rashes    ASA Class: I [ ]  II [ ]  III [ ]  IV [x ]    COMMENTS:    The patient is a suitable candidate for the planned procedure unless box checked [ ]  No, explain:      Risks/benefits/Alternatives including, but not limited to, Bleeding, infection, perforation requiring surgery, and anesthesia risk (see anesthesia consent) all discussed with individual consenting on patients behalf, all questions answered regard procedure to the best of my ability

## 2025-02-07 NOTE — PROGRESS NOTE ADULT - ASSESSMENT
89 yo F with dementia, s/p brain tumor resection 1961, HTN, hypothyroidism colon Ca s/p Rt hemicolectomy ('09), 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), Presented from nursing home with hypoxic/hypercapnic resp failure, was found to Influenza A +, UTI + DWAIN and hyponatremia     Hyponatremia- DWAIN - pre renal -  renal fxn intact  Anemia- hgb improving  Blood pressure - stable   Functional quadriplegia  Hypophosphatemia     RECOMMEND:   1 Renal- D5WNS at 50cc/hr (hypoglycemic) n sp 3 % yesterday  Not taking any PO and did not get lasix or samsca    IV NaPhos 15mmol x 1   2 GI- Eventual peg tentative at  3pm   3 Pulm- Nasal canula as needed        Full DNR      Sayed [facility]   [phone]

## 2025-02-07 NOTE — PROGRESS NOTE ADULT - SUBJECTIVE AND OBJECTIVE BOX
Cardiovascular Disease Progress Note  Date of service: 02-07-25 @ 07:52    Overnight events: No acute events overnight.  Patient is in no distress.   Otherwise review of systems negative    Objective Findings:  T(C): 36.7 (02-07-25 @ 04:32), Max: 36.7 (02-06-25 @ 11:39)  HR: 76 (02-07-25 @ 04:32) (76 - 95)  BP: 162/73 (02-07-25 @ 04:32) (129/63 - 163/73)  RR: 18 (02-07-25 @ 04:32) (18 - 18)  SpO2: 96% (02-07-25 @ 04:32) (96% - 97%)  Wt(kg): --  Daily     Daily       Physical Exam:  Gen: NAD; Patient resting comfortably  HEENT: EOMI, Normocephalic/ atraumatic  CV: RRR, normal S1 + S2, no m/r/g  Lungs:  Normal respiratory effort; clear to auscultation bilaterally  Abd: soft, non-tender; bowel sounds present  Ext: No edema; warm and well perfused    Telemetry: Sinus     Laboratory Data:    02-06    133[L]  |  95[L]  |  11  ----------------------------<  78  3.0[L]   |  27  |  1.13    Ca    9.1      06 Feb 2025 17:51  Phos  3.4     02-06  Mg     1.8     02-06                Inpatient Medications:  MEDICATIONS  (STANDING):  albuterol/ipratropium for Nebulization 3 milliLiter(s) Nebulizer every 6 hours  albuterol/ipratropium for Nebulization. 3 milliLiter(s) Nebulizer once  ascorbic acid 500 milliGRAM(s) Oral daily  bisacodyl Suppository 10 milliGRAM(s) Rectal daily  chlorhexidine 2% Cloths 1 Application(s) Topical <User Schedule>  dextrose 5% + sodium chloride 0.9%. 1000 milliLiter(s) (40 mL/Hr) IV Continuous <Continuous>  heparin   Injectable 5000 Unit(s) SubCutaneous every 12 hours  levothyroxine Injectable 60 MICROGram(s) IV Push at bedtime  metoprolol tartrate Injectable 5 milliGRAM(s) IV Push every 6 hours  mirtazapine 15 milliGRAM(s) Oral at bedtime  multivitamin 1 Tablet(s) Oral daily  QUEtiapine 12.5 milliGRAM(s) Oral at bedtime  sodium chloride 3%  Inhalation 4 milliLiter(s) Inhalation every 12 hours       Assessment:  89y old Female with stated hx significant for dementia, s/p brain tumor resection 1961, HTN, hypothyroidism colon Ca s/p Rt hemicolectomy ('09), 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    Plan of Care:    #Afib RVR  - HR now improved  - Currently maintaining sinus.   - Episode of PAT noted 2/6/2025. Likely exacerbated in setting of electrolyte imbalance.   - Maintain K>4 and Mg >2  - BP now stable. Would continue with IV lopressor standing dose.   - PT unable to take PO meds overnight   - Patient is anemic and high bleeding risk, would refrain from AC at this time.     #Hypoxic respiratory failure  - Volume status improved.   - Diuretics as needed.   - Pulm input appreciated.       Overall poor prognosis.          Over 55 minutes spent on total encounter; more than 50% of the visit was spent counseling and/or coordinating care by the attending physician.      Bobby Montilla DO [facility]  Cardiovascular Disease  [phone]

## 2025-02-08 LAB
ALBUMIN SERPL ELPH-MCNC: 2.6 G/DL — LOW (ref 3.3–5)
ALP SERPL-CCNC: 110 U/L — SIGNIFICANT CHANGE UP (ref 40–120)
ALT FLD-CCNC: 8 U/L — LOW (ref 10–45)
ANION GAP SERPL CALC-SCNC: 13 MMOL/L — SIGNIFICANT CHANGE UP (ref 5–17)
AST SERPL-CCNC: 12 U/L — SIGNIFICANT CHANGE UP (ref 10–40)
BILIRUB SERPL-MCNC: 0.7 MG/DL — SIGNIFICANT CHANGE UP (ref 0.2–1.2)
BUN SERPL-MCNC: 13 MG/DL — SIGNIFICANT CHANGE UP (ref 7–23)
CALCIUM SERPL-MCNC: 8.8 MG/DL — SIGNIFICANT CHANGE UP (ref 8.4–10.5)
CHLORIDE SERPL-SCNC: 99 MMOL/L — SIGNIFICANT CHANGE UP (ref 96–108)
CO2 SERPL-SCNC: 21 MMOL/L — LOW (ref 22–31)
CREAT SERPL-MCNC: 0.94 MG/DL — SIGNIFICANT CHANGE UP (ref 0.5–1.3)
EGFR: 56 ML/MIN/1.73M2 — LOW
GLUCOSE BLDC GLUCOMTR-MCNC: 103 MG/DL — HIGH (ref 70–99)
GLUCOSE BLDC GLUCOMTR-MCNC: 90 MG/DL — SIGNIFICANT CHANGE UP (ref 70–99)
GLUCOSE BLDC GLUCOMTR-MCNC: 93 MG/DL — SIGNIFICANT CHANGE UP (ref 70–99)
GLUCOSE BLDC GLUCOMTR-MCNC: 93 MG/DL — SIGNIFICANT CHANGE UP (ref 70–99)
GLUCOSE SERPL-MCNC: 97 MG/DL — SIGNIFICANT CHANGE UP (ref 70–99)
HCT VFR BLD CALC: 31.1 % — LOW (ref 34.5–45)
HGB BLD-MCNC: 10.3 G/DL — LOW (ref 11.5–15.5)
MAGNESIUM SERPL-MCNC: 2 MG/DL — SIGNIFICANT CHANGE UP (ref 1.6–2.6)
MCHC RBC-ENTMCNC: 32.1 PG — SIGNIFICANT CHANGE UP (ref 27–34)
MCHC RBC-ENTMCNC: 33.1 G/DL — SIGNIFICANT CHANGE UP (ref 32–36)
MCV RBC AUTO: 96.9 FL — SIGNIFICANT CHANGE UP (ref 80–100)
NRBC # BLD: 0 /100 WBCS — SIGNIFICANT CHANGE UP (ref 0–0)
NRBC BLD-RTO: 0 /100 WBCS — SIGNIFICANT CHANGE UP (ref 0–0)
PHOSPHATE SERPL-MCNC: 2.7 MG/DL — SIGNIFICANT CHANGE UP (ref 2.5–4.5)
PLATELET # BLD AUTO: 207 K/UL — SIGNIFICANT CHANGE UP (ref 150–400)
POTASSIUM SERPL-MCNC: 3.9 MMOL/L — SIGNIFICANT CHANGE UP (ref 3.5–5.3)
POTASSIUM SERPL-SCNC: 3.9 MMOL/L — SIGNIFICANT CHANGE UP (ref 3.5–5.3)
PROT SERPL-MCNC: 6.5 G/DL — SIGNIFICANT CHANGE UP (ref 6–8.3)
RBC # BLD: 3.21 M/UL — LOW (ref 3.8–5.2)
RBC # FLD: 16.2 % — HIGH (ref 10.3–14.5)
SODIUM SERPL-SCNC: 133 MMOL/L — LOW (ref 135–145)
WBC # BLD: 9.8 K/UL — SIGNIFICANT CHANGE UP (ref 3.8–10.5)
WBC # FLD AUTO: 9.8 K/UL — SIGNIFICANT CHANGE UP (ref 3.8–10.5)

## 2025-02-08 RX ADMIN — Medication 5 MILLIGRAM(S): at 15:31

## 2025-02-08 RX ADMIN — Medication 4 MILLILITER(S): at 05:34

## 2025-02-08 RX ADMIN — ANTISEPTIC SURGICAL SCRUB 1 APPLICATION(S): 0.04 SOLUTION TOPICAL at 05:35

## 2025-02-08 RX ADMIN — Medication 5000 UNIT(S): at 18:28

## 2025-02-08 RX ADMIN — IPRATROPIUM BROMIDE AND ALBUTEROL SULFATE 3 MILLILITER(S): .5; 2.5 SOLUTION RESPIRATORY (INHALATION) at 15:29

## 2025-02-08 RX ADMIN — Medication 500 MILLIGRAM(S): at 15:29

## 2025-02-08 RX ADMIN — Medication 10 MILLIGRAM(S): at 22:42

## 2025-02-08 RX ADMIN — Medication 5000 UNIT(S): at 05:34

## 2025-02-08 RX ADMIN — IPRATROPIUM BROMIDE AND ALBUTEROL SULFATE 3 MILLILITER(S): .5; 2.5 SOLUTION RESPIRATORY (INHALATION) at 05:34

## 2025-02-08 RX ADMIN — Medication 5 MILLIGRAM(S): at 18:28

## 2025-02-08 RX ADMIN — LEVOTHYROXINE SODIUM 60 MICROGRAM(S): 25 TABLET ORAL at 22:41

## 2025-02-08 RX ADMIN — Medication 5 MILLIGRAM(S): at 05:34

## 2025-02-08 RX ADMIN — QUETIAPINE FUMARATE 12.5 MILLIGRAM(S): 300 TABLET ORAL at 22:42

## 2025-02-08 RX ADMIN — MIRTAZAPINE 15 MILLIGRAM(S): 30 TABLET, FILM COATED ORAL at 22:43

## 2025-02-08 RX ADMIN — Medication 4 MILLILITER(S): at 18:28

## 2025-02-08 RX ADMIN — IPRATROPIUM BROMIDE AND ALBUTEROL SULFATE 3 MILLILITER(S): .5; 2.5 SOLUTION RESPIRATORY (INHALATION) at 18:28

## 2025-02-08 RX ADMIN — Medication 1 TABLET(S): at 15:29

## 2025-02-08 NOTE — PROGRESS NOTE ADULT - SUBJECTIVE AND OBJECTIVE BOX
Patient is a 89y old  Female who presents with a chief complaint of Hypoxic/hypercapnic resp failure (08 Feb 2025 10:33)      DATE OF SERVICE: 02-08-25 @ 14:55    SUBJECTIVE / OVERNIGHT EVENTS: overnight events noted    ROS:  not available          MEDICATIONS  (STANDING):  albuterol/ipratropium for Nebulization 3 milliLiter(s) Nebulizer every 6 hours  albuterol/ipratropium for Nebulization. 3 milliLiter(s) Nebulizer once  ascorbic acid 500 milliGRAM(s) Oral daily  bisacodyl Suppository 10 milliGRAM(s) Rectal daily  chlorhexidine 2% Cloths 1 Application(s) Topical <User Schedule>  heparin   Injectable 5000 Unit(s) SubCutaneous every 12 hours  levothyroxine Injectable 60 MICROGram(s) IV Push at bedtime  metoprolol tartrate Injectable 5 milliGRAM(s) IV Push every 6 hours  mirtazapine 15 milliGRAM(s) Oral at bedtime  multivitamin 1 Tablet(s) Oral daily  QUEtiapine 12.5 milliGRAM(s) Oral at bedtime  sodium chloride 3%  Inhalation 4 milliLiter(s) Inhalation every 12 hours    MEDICATIONS  (PRN):  acetaminophen   Oral Liquid .. 650 milliGRAM(s) Oral every 6 hours PRN Temp greater or equal to 38C (100.4F), Moderate Pain (4 - 6)  haloperidol    Injectable 1 milliGRAM(s) IntraMuscular every 6 hours PRN Agitation        CAPILLARY BLOOD GLUCOSE      POCT Blood Glucose.: 90 mg/dL (08 Feb 2025 12:24)  POCT Blood Glucose.: 103 mg/dL (08 Feb 2025 06:58)  POCT Blood Glucose.: 93 mg/dL (08 Feb 2025 00:37)  POCT Blood Glucose.: 74 mg/dL (07 Feb 2025 17:47)    I&O's Summary      Vital Signs Last 24 Hrs  T(C): 36.6 (08 Feb 2025 12:44), Max: 36.9 (07 Feb 2025 23:10)  T(F): 97.8 (08 Feb 2025 12:44), Max: 98.5 (08 Feb 2025 05:00)  HR: 78 (08 Feb 2025 12:44) (66 - 83)  BP: 152/85 (08 Feb 2025 12:44) (144/78 - 178/79)  BP(mean): 97 (07 Feb 2025 16:05) (97 - 97)  RR: 18 (08 Feb 2025 12:44) (11 - 20)  SpO2: 97% (08 Feb 2025 12:44) (95% - 97%)    PHYSICAL EXAM:   CHEST/LUNG: clear  HEART: S1 S2; no murmurs   ABDOMEN: Soft, Nontender  PEG in place  EXTREMITIES: no edema  NEUROLOGY: non-focal    LABS:                        10.3   9.80  )-----------( 207      ( 08 Feb 2025 07:17 )             31.1     02-08    133[L]  |  99  |  13  ----------------------------<  97  3.9   |  21[L]  |  0.94    Ca    8.8      08 Feb 2025 07:15  Phos  2.7     02-08  Mg     2.0     02-08    TPro  6.5  /  Alb  2.6[L]  /  TBili  0.7  /  DBili  x   /  AST  12  /  ALT  8[L]  /  AlkPhos  110  02-08    PT/INR - ( 07 Feb 2025 07:27 )   PT: 11.5 sec;   INR: 1.00 ratio         PTT - ( 07 Feb 2025 07:27 )  PTT:28.7 sec      Urinalysis Basic - ( 08 Feb 2025 07:15 )    Color: x / Appearance: x / SG: x / pH: x  Gluc: 97 mg/dL / Ketone: x  / Bili: x / Urobili: x   Blood: x / Protein: x / Nitrite: x   Leuk Esterase: x / RBC: x / WBC x   Sq Epi: x / Non Sq Epi: x / Bacteria: x          All consultant(s) notes reviewed and care discussed with other providers        Contact Number, Dr Link [phone]

## 2025-02-08 NOTE — PROGRESS NOTE ADULT - SUBJECTIVE AND OBJECTIVE BOX
Date of Service: 02-08-25 @ 14:57    Patient is a 89y old  Female who presents with a chief complaint of Hypoxic/hypercapnic resp failure (08 Feb 2025 10:33)      Any change in ROS: s/p pEG: sheis alert and awe: on rooma ir:  nosob:       MEDICATIONS  (STANDING):  albuterol/ipratropium for Nebulization 3 milliLiter(s) Nebulizer every 6 hours  albuterol/ipratropium for Nebulization. 3 milliLiter(s) Nebulizer once  ascorbic acid 500 milliGRAM(s) Oral daily  bisacodyl Suppository 10 milliGRAM(s) Rectal daily  chlorhexidine 2% Cloths 1 Application(s) Topical <User Schedule>  heparin   Injectable 5000 Unit(s) SubCutaneous every 12 hours  levothyroxine Injectable 60 MICROGram(s) IV Push at bedtime  metoprolol tartrate Injectable 5 milliGRAM(s) IV Push every 6 hours  mirtazapine 15 milliGRAM(s) Oral at bedtime  multivitamin 1 Tablet(s) Oral daily  QUEtiapine 12.5 milliGRAM(s) Oral at bedtime  sodium chloride 3%  Inhalation 4 milliLiter(s) Inhalation every 12 hours    MEDICATIONS  (PRN):  acetaminophen   Oral Liquid .. 650 milliGRAM(s) Oral every 6 hours PRN Temp greater or equal to 38C (100.4F), Moderate Pain (4 - 6)  haloperidol    Injectable 1 milliGRAM(s) IntraMuscular every 6 hours PRN Agitation    Vital Signs Last 24 Hrs  T(C): 36.6 (08 Feb 2025 12:44), Max: 36.9 (07 Feb 2025 23:10)  T(F): 97.8 (08 Feb 2025 12:44), Max: 98.5 (08 Feb 2025 05:00)  HR: 78 (08 Feb 2025 12:44) (66 - 83)  BP: 152/85 (08 Feb 2025 12:44) (144/78 - 178/79)  BP(mean): 97 (07 Feb 2025 16:05) (97 - 97)  RR: 18 (08 Feb 2025 12:44) (11 - 20)  SpO2: 97% (08 Feb 2025 12:44) (95% - 97%)    Parameters below as of 08 Feb 2025 12:44  Patient On (Oxygen Delivery Method): room air        I&O's Summary        Physical Exam:   GENERAL: NAD, well-groomed, well-developed  HEENT: LUANN/   Atraumatic, Normocephalic  ENMT: No tonsillar erythema, exudates, or enlargement; Moist mucous membranes, Good dentition, No lesions  NECK: Supple, No JVD, Normal thyroid  CHEST/LUNG: Clear to auscultaion  CVS: Regular rate and rhythm; No murmurs, rubs, or gallops  GI: : Soft, Nontender, Nondistended; Bowel sounds present  NERVOUS SYSTEM:  Alert & awake  EXTREMITIES: - edema  LYMPH: No lymphadenopathy noted  SKIN: No rashes or lesions  ENDOCRINOLOGY: No Thyromegaly  PSYCH: demented    Labs:                              10.3   9.80  )-----------( 207      ( 08 Feb 2025 07:17 )             31.1                         10.0   5.65  )-----------( 203      ( 07 Feb 2025 07:27 )             30.4                         10.8   4.76  )-----------( 223      ( 05 Feb 2025 07:09 )             32.3     02-08    133[L]  |  99  |  13  ----------------------------<  97  3.9   |  21[L]  |  0.94  02-07    133[L]  |  97  |  12  ----------------------------<  56[L]  3.8   |  24  |  1.08  02-06    133[L]  |  95[L]  |  11  ----------------------------<  78  3.0[L]   |  27  |  1.13  02-06    127[L]  |  92[L]  |  12  ----------------------------<  41[LL]  4.9   |  22  |  1.15  02-05    127[L]  |  88[L]  |  10  ----------------------------<  70  3.5   |  27  |  1.22    Ca    8.8      08 Feb 2025 07:15  Ca    9.2      07 Feb 2025 07:25  Ca    9.1      06 Feb 2025 17:51  Phos  2.7     02-08  Phos  2.1     02-07  Mg     2.0     02-08  Mg     2.1     02-07    TPro  6.5  /  Alb  2.6[L]  /  TBili  0.7  /  DBili  x   /  AST  12  /  ALT  8[L]  /  AlkPhos  110  02-08    CAPILLARY BLOOD GLUCOSE      POCT Blood Glucose.: 90 mg/dL (08 Feb 2025 12:24)  POCT Blood Glucose.: 103 mg/dL (08 Feb 2025 06:58)  POCT Blood Glucose.: 93 mg/dL (08 Feb 2025 00:37)  POCT Blood Glucose.: 74 mg/dL (07 Feb 2025 17:47)      LIVER FUNCTIONS - ( 08 Feb 2025 07:15 )  Alb: 2.6 g/dL / Pro: 6.5 g/dL / ALK PHOS: 110 U/L / ALT: 8 U/L / AST: 12 U/L / GGT: x           PT/INR - ( 07 Feb 2025 07:27 )   PT: 11.5 sec;   INR: 1.00 ratio         PTT - ( 07 Feb 2025 07:27 )  PTT:28.7 sec  Urinalysis Basic - ( 08 Feb 2025 07:15 )    Color: x / Appearance: x / SG: x / pH: x  Gluc: 97 mg/dL / Ketone: x  / Bili: x / Urobili: x   Blood: x / Protein: x / Nitrite: x   Leuk Esterase: x / RBC: x / WBC x   Sq Epi: x / Non Sq Epi: x / Bacteria: x    rad< from: CT Abdomen and Pelvis No Cont (02.04.25 @ 19:29) >  IMPRESSION:  Large rectal stool burden with wall thickening and perirectal edema,   which could represent stercoral colitis.  Moderate bilateral pleural effusions with associated compressive lower   lobe atelectasis.      < end of copied text >          RECENT CULTURES:        RESPIRATORY CULTURES:          Studies  Chest X-RAY  CT SCAN Chest   Venous Dopplers: LE:   CT Abdomen  Others

## 2025-02-08 NOTE — PROGRESS NOTE ADULT - PROBLEM SELECTOR PLAN 3
CT chest 1/23 with moderate b/l pl effusions  -CXR 1/25 with b/l pleural effusions/atelectasis L>R, L pleural effusion now appears mod-large  -Holding off on thoracentesis for now as O2 requirements improving with diuresis  -Keep O>I as tolerated.  2/1: repeat cxr in AM  2/2: cxr ordered:  pt has been on lasix  2/3; the cxr looks better to me:  the left sided pleural effusion is less:  still congested but better then before:  2/4: the cxr looks much better to me then  before  2/5: xray is better:  she is not in any resp distress  2/6: pulm wise remains stable:  defer to primary team   for peg placement:  2/8: still with small effusions:  but no resp distress:  now off diuretics

## 2025-02-08 NOTE — PROGRESS NOTE ADULT - PROBLEM SELECTOR PLAN 1
-Initially 2nd to PNA   -Hypoxia had been improving, then with increase in O2 requirements requiring HFNC 1/24  -Repeat CT chest 1/23 with moderate b/l pleural effusions/atelectasis, b/l GGO. Likely combination of fluid overload + PNA  -Completed 10 day course of ABX   -Continue bronchodilators. S/p Mucomyst   -Keep O>I with diuresis as tolerated  -Continue HFNC, wean as tolerated, keep sats >90%. O2 requirements improving, on 40L/40% this AM   -Suggest Bipap 10/5 PRN if worsening hypoxia or increased WOB  -Prognosis guarded. GOC DNR/DNI/trial NIV  -Palliative care f/u.    2/1: seems better; on room air:  repeat cxr in AM to evaluate for pleural effusion : she is not in any resp distress  2/2: clinically she looks the best today  : off oxygen : no resp distress:  alert and awake and is responsive  2/3: son at bedside:  pt is on room air:  doing pretty well : no sob:  2/4: seems OK: cxr with improvement:  on room air :  she looks comfortable  2/4: resolved  2/6: seems OK:  on room air:  last ABG was pretty decent  : for possible peg in am  /8: s/p p eg:  tolerated well : on room air:  no resp distress:  feeds pe gi

## 2025-02-08 NOTE — PROGRESS NOTE ADULT - PROBLEM SELECTOR PLAN 5
-TF via NGT  -Speech and swallow f/u noted, recommending NPO  -Aspiration precautions  -Oral care  -Palliative care f/u.  2/3 seems to be doing  ok :  no sob:  on room air:  cxr is much better: : keeps NPO: per speech and swallow:  the gi does not want to put the peg:  option remains for pleasure feeds:  2/4; now pt for ir guided peg placement  given her general clinical condition :  she relatively at high risk for any procedure under sedtion or anesthesia:  but pulmonary wise she looks much better:  effusions have decreased and she has been on room air for days:  check ABG in am    2/5: ABG today with mild metabolic alkalosis: no intervention; at this time  2/6: for peg?  2/8: now s/p peg:

## 2025-02-08 NOTE — PROGRESS NOTE ADULT - SUBJECTIVE AND OBJECTIVE BOX
Cardiovascular Disease Progress Note  Date of service: 25 @ 10:34    Overnight events: No acute events overnight. Patient is in no distress.    Otherwise review of systems negative    Objective Findings:  T(C): 36.9 (25 @ 05:00), Max: 36.9 (25 @ 23:10)  HR: 71 (25 @ 05:00) (66 - 83)  BP: 147/75 (25 @ 07:00) (144/78 - 178/79)  RR: 18 (25 @ 05:00) (11 - 20)  SpO2: 95% (25 @ 05:00) (95% - 98%)  Wt(kg): --  Daily Height in cm: 160.02 (2025 16:05)    Daily Weight in k.4 (2025 05:00)      Physical Exam:  Gen: NAD; Patient resting comfortably  HEENT: EOMI, Normocephalic/ atraumatic  CV: RRR, normal S1 + S2, no m/r/g  Lungs:  Normal respiratory effort; clear to auscultation bilaterally  Abd: soft, non-tender; bowel sounds present  Ext: No edema; warm and well perfused    Telemetry: Sinus     Laboratory Data:                        10.3   9.80  )-----------( 207      ( 2025 07:17 )             31.1     02-08    133[L]  |  99  |  13  ----------------------------<  97  3.9   |  21[L]  |  0.94    Ca    8.8      2025 07:15  Phos  2.7     02-08  Mg     2.0     02-08    TPro  6.5  /  Alb  2.6[L]  /  TBili  0.7  /  DBili  x   /  AST  12  /  ALT  8[L]  /  AlkPhos  110  02-08    PT/INR - ( 2025 07:27 )   PT: 11.5 sec;   INR: 1.00 ratio         PTT - ( 2025 07:27 )  PTT:28.7 sec          Inpatient Medications:  MEDICATIONS  (STANDING):  albuterol/ipratropium for Nebulization 3 milliLiter(s) Nebulizer every 6 hours  albuterol/ipratropium for Nebulization. 3 milliLiter(s) Nebulizer once  ascorbic acid 500 milliGRAM(s) Oral daily  bisacodyl Suppository 10 milliGRAM(s) Rectal daily  chlorhexidine 2% Cloths 1 Application(s) Topical <User Schedule>  dextrose 5% + sodium chloride 0.9%. 1000 milliLiter(s) (40 mL/Hr) IV Continuous <Continuous>  heparin   Injectable 5000 Unit(s) SubCutaneous every 12 hours  levothyroxine Injectable 60 MICROGram(s) IV Push at bedtime  metoprolol tartrate Injectable 5 milliGRAM(s) IV Push every 6 hours  mirtazapine 15 milliGRAM(s) Oral at bedtime  multivitamin 1 Tablet(s) Oral daily  QUEtiapine 12.5 milliGRAM(s) Oral at bedtime  sodium chloride 3%  Inhalation 4 milliLiter(s) Inhalation every 12 hours      Assessment:  89y old Female with stated hx significant for dementia, s/p brain tumor resection , HTN, hypothyroidism colon Ca s/p Rt hemicolectomy ('09), stercoral colitis (2021), Presented from nursing home with hypoxic/hypercapnic resp failure.    Plan of Care:    #Afib RVR  - HR now improved  - Currently maintaining sinus.   - Episode of PAT noted 2025. Likely exacerbated in setting of electrolyte imbalance.   - Maintain K>4 and Mg >2  - BP now stable. Would continue with IV lopressor standing dose.   - PT unable to take PO meds overnight   - Patient is anemic and high bleeding risk, would refrain from AC at this time.     #Dysphagia  - S/p PEG tube placement 2025  - Defer management to GI    #Hypoxic respiratory failure  - Volume status improved.   - Diuretics as needed.   - Pulm input appreciated.         Over 55 minutes spent on total encounter; more than 50% of the visit was spent counseling and/or coordinating care by the attending physician.      Bobby Montilla DO [facility]  Cardiovascular Disease  [phone] Patient in [place] dept at this time    Cardiovascular Disease Progress Note  Date of service: 25 @ 10:34    Overnight events: No acute events overnight. Patient is in no distress.    Otherwise review of systems negative    Objective Findings:  T(C): 36.9 (25 @ 05:00), Max: 36.9 (25 @ 23:10)  HR: 71 (25 @ 05:00) (66 - 83)  BP: 147/75 (25 @ 07:00) (144/78 - 178/79)  RR: 18 (25 @ 05:00) (11 - 20)  SpO2: 95% (25 @ 05:00) (95% - 98%)  Wt(kg): --  Daily Height in cm: 160.02 (2025 16:05)    Daily Weight in k.4 (2025 05:00)      Physical Exam:  Gen: NAD; Patient resting comfortably  HEENT: EOMI, Normocephalic/ atraumatic  CV: RRR, normal S1 + S2, no m/r/g  Lungs:  Normal respiratory effort; clear to auscultation bilaterally  Abd: soft, non-tender; bowel sounds present  Ext: No edema; warm and well perfused    Telemetry: Sinus     Laboratory Data:                        10.3   9.80  )-----------( 207      ( 2025 07:17 )             31.1     02-08    133[L]  |  99  |  13  ----------------------------<  97  3.9   |  21[L]  |  0.94    Ca    8.8      2025 07:15  Phos  2.7     02-08  Mg     2.0     02-08    TPro  6.5  /  Alb  2.6[L]  /  TBili  0.7  /  DBili  x   /  AST  12  /  ALT  8[L]  /  AlkPhos  110  02-08    PT/INR - ( 2025 07:27 )   PT: 11.5 sec;   INR: 1.00 ratio         PTT - ( 2025 07:27 )  PTT:28.7 sec          Inpatient Medications:  MEDICATIONS  (STANDING):  albuterol/ipratropium for Nebulization 3 milliLiter(s) Nebulizer every 6 hours  albuterol/ipratropium for Nebulization. 3 milliLiter(s) Nebulizer once  ascorbic acid 500 milliGRAM(s) Oral daily  bisacodyl Suppository 10 milliGRAM(s) Rectal daily  chlorhexidine 2% Cloths 1 Application(s) Topical <User Schedule>  dextrose 5% + sodium chloride 0.9%. 1000 milliLiter(s) (40 mL/Hr) IV Continuous <Continuous>  heparin   Injectable 5000 Unit(s) SubCutaneous every 12 hours  levothyroxine Injectable 60 MICROGram(s) IV Push at bedtime  metoprolol tartrate Injectable 5 milliGRAM(s) IV Push every 6 hours  mirtazapine 15 milliGRAM(s) Oral at bedtime  multivitamin 1 Tablet(s) Oral daily  QUEtiapine 12.5 milliGRAM(s) Oral at bedtime  sodium chloride 3%  Inhalation 4 milliLiter(s) Inhalation every 12 hours      Assessment:  89y old Female with stated hx significant for dementia, s/p brain tumor resection , HTN, hypothyroidism colon Ca s/p Rt hemicolectomy ('09), stercoral colitis (2021), Presented from nursing home with hypoxic/hypercapnic resp failure.    Plan of Care:    #Afib RVR  - HR now improved  - Currently maintaining sinus.   - Episode of PAT noted 2025. Likely exacerbated in setting of electrolyte imbalance.   - Maintain K>4 and Mg >2  - BP now stable. Would continue with IV lopressor standing dose.   - PT unable to take PO meds overnight   - Patient is anemic and high bleeding risk, would refrain from AC at this time.     #Dysphagia  - S/p PEG tube placement 2025  - Defer management to GI    #Hypoxic respiratory failure  - Volume status improved.   - Diuretics as needed.   - Pulm input appreciated.     #ACP (advance care planning)-  Advanced care planning was discussed. 30 additional minutes spent addressing advance care plans.    Over 55 minutes spent on total encounter; more than 50% of the visit was spent counseling and/or coordinating care by the attending physician.      Bobby Montilla DO [facility]  Cardiovascular Disease  [phone]

## 2025-02-08 NOTE — CHART NOTE - NSCHARTNOTEFT_GEN_A_CORE
NUTRITION FOLLOW UP NOTE    PATIENT SEEN FOR:   Consult - Assessment, Tube Feeding     SOURCE: [] Patient  [x] Current Medical Record  [] RN  [] Family/support person at bedside  [x] Patient unavailable/inappropriate  [] Other:  - Pt sleeping at the time of RD visit.     CHART REVIEWED/EVENTS NOTED.  [] No changes to nutrition care plan to note  [x] Nutrition Status:  - Pt underwent swallow evaluation on 2/3, Speech Language Pathologist noted "Suggest MD makes dietary decision in accordance with Pt/family/HCP wishes as NPO is recommended based upon the results of this examination however Palliative Care consult in progress and may guide decision" (per chart).   - Hypoglycemia noted 2/6, IV Dextrose 5% + Sodium Chloride 0.9% initiated (per orders).   - Pt underwent EGD/PEG placement on 2/7 (per chart).   - Enteral feeds via PEG initiated today, 2/8 (per orders).     DIET ORDER:   Diet, NPO with Tube Feed:   Tube Feeding Modality: Gastrostomy  Jevity 1.5 [place] (JEVITY1.5RTH)  Total Volume for 24 Hours (mL): 240  Continuous  Starting Tube Feed Rate {mL per Hour}: 5  Until Goal Tube Feed Rate (mL per Hour): 10  Tube Feed Duration (in Hours): 24  Tube Feed Start Time: 12:00    Start Time: 00:00 (02-08-25)    Current tube feeding regimen of Jevity 1.5 at goal rate of 10 mL/hr x 24 hours provides: 240 mL formula, 360 calories, 15 g protein and 182 mL free water. This does not meet pt's estimated protein-energy needs, however feeds noted to have been initiated today.     CURRENT DIET ORDER IS:  [] Appropriate:  [x] Inadequate: See recommendations below for further details.   [] Other:    NUTRITION INTAKE/PROVISION:  [x] PO: Pt has been NPO in-house from 1/26-2/8 (14 days).   [x] Enteral Nutrition: No feeds running at the time of RD visit. Trickle feeds of Jevity 1.5 ordered this afternoon (per orders).   [] Parenteral Nutrition:    ANTHROPOMETRICS:  Drug Dosing Weight  Height (cm): 160 (07 Feb 2025 16:05)  Weight (kg): 53 (07 Feb 2025 16:05)  BMI (kg/m2): 20.7 (07 Feb 2025 16:05)    Weights:  - Dosing Weight (per chart): 116.8 pounds (53 kg)(1/11)(bed)  - Daily Weights (per flow sheets): 122.1 pounds (2/8), 139.5 pounds (1/14), 117 pounds (1/13), 119.2 pounds (1/12)  - Bed Scale Weight (taken by RD): 51.0 kg (112.2 pounds)(2/8)(bed), 51.6 kg (113.5 pounds)(2/3)(bed)  Pt noted with 1% wt loss x 1 week since RD bed weight on 2/3. Noted 4% wt loss since 1/11 (~1 month) using current RD bed weight. RD to continue to monitor weights and trends as able.     MEDICATIONS:  MEDICATIONS  (STANDING):  albuterol/ipratropium for Nebulization 3 milliLiter(s) Nebulizer every 6 hours  albuterol/ipratropium for Nebulization. 3 milliLiter(s) Nebulizer once  ascorbic acid 500 milliGRAM(s) Oral daily  bisacodyl Suppository 10 milliGRAM(s) Rectal daily  chlorhexidine 2% Cloths 1 Application(s) Topical <User Schedule>  dextrose 5% + sodium chloride 0.9%. 1000 milliLiter(s) (40 mL/Hr) IV Continuous <Continuous>  heparin   Injectable 5000 Unit(s) SubCutaneous every 12 hours  levothyroxine Injectable 60 MICROGram(s) IV Push at bedtime  metoprolol tartrate Injectable 5 milliGRAM(s) IV Push every 6 hours  mirtazapine 15 milliGRAM(s) Oral at bedtime  multivitamin 1 Tablet(s) Oral daily  QUEtiapine 12.5 milliGRAM(s) Oral at bedtime  sodium chloride 3%  Inhalation 4 milliLiter(s) Inhalation every 12 hours    MEDICATIONS  (PRN):  acetaminophen   Oral Liquid .. 650 milliGRAM(s) Oral every 6 hours PRN Temp greater or equal to 38C (100.4F), Moderate Pain (4 - 6)  haloperidol    Injectable 1 milliGRAM(s) IntraMuscular every 6 hours PRN Agitation      NUTRITIONALLY PERTINENT LABS:  02-08 Na133 mmol/L[L] Glu 97 mg/dL K+ 3.9 mmol/L Cr  0.94 mg/dL BUN 13 mg/dL 02-08 Phos 2.7 mg/dL 02-08 Alb 2.6 g/dL[L]02-08 ALT 8 U/L[L] AST 12 U/L Alkaline Phosphatase 110 U/L    Finger Sticks:  POCT Blood Glucose.: 90 mg/dL (02-08 @ 12:24)  POCT Blood Glucose.: 103 mg/dL (02-08 @ 06:58)  POCT Blood Glucose.: 93 mg/dL (02-08 @ 00:37)  POCT Blood Glucose.: 74 mg/dL (02-07 @ 17:47)  POCT Blood Glucose.: 77 mg/dL (02-07 @ 14:42)    NUTRITIONALLY PERTINENT MEDICATIONS/LABS:  [x] Reviewed  [x] Relevant notes on medications/labs: (per orders)  - Ordered for IV Dextrose 5% + Sodium Chloride 0.9%   - Ordered for IV Synthroid.  - Ordered for Remeron.  - Ordered for Multivitamin and Vitamin C.   - Potassium low 1/7, phosphorous low 1/6, s/p repletion. Magnesium repleted 1/6. Within normal limits today, 1/8 (per chart).     EDEMA:  [x] Reviewed  [x] Relevant notes: 1+ generalized (per flow sheets)     GI/ I&O:  [x] Reviewed  [x] Relevant notes: BM noted today, 1/8 (per flow sheets). Ordered for Dulcolax (per orders).   [x] Other: Continue to monitor bowel movements and bowel movement regularity. Pt previously NPO x 14 days.     SKIN:   [] No pressure injuries documented, per nursing flowsheet  [x] Pressure injury previously noted (per nursing flow sheets)   - Sacrum, Suspected deep tissue injury  - Left Heel, Suspected deep tissue injury  - Right Heel, Suspected deep tissue injury  [] Change in pressure injury documentation:  [x] Other:  Per Wound Care note on 1/13:  "B/L heel bogginess  B/L heel hyperpigmentation, cannot rule out a deep tissue injury present on admission   B/L buttocks/sacral hyperpigmentation, cannot rule out a deep tissue injury present on admission"     ESTIMATED NEEDS:  [] No change:  [x] Updated:  Energy:  0643-1653 kcal/day (30-35cal/kg)  Protein:  64-79 g/day (1.2-1.5g/kg)  Fluid:   ml/day or [x] defer to team  Based on: dosing weight of 53 kg (116.8 pounds) (2/7) with consideration for pressure injuries, malnutrition.     NUTRITION DIAGNOSIS:  [x] Prior Dx:   1) Increased Nutrient Needs  2) Moderate Acute Malnutrition   [] New Dx:    EDUCATION:  [] Yes:  [x] Not appropriate/warranted    NUTRITION CARE PLAN:  1) If enteral feeds remain within GOC, recommend initiating Jevity 1.5 at 10 mL/hr, advancing 10 mL Q24 hours (refeeding risk) as tolerated and as electrolytes within normal limits until goal rate of 45ml/hr x 24hrs to provide: 1,080 mL of formula, 1,620 kcal/day (31 kcal/kg), 69 g protein/day (1.3 g/kg), 821 mL free water - based on dosing weight of 53 kg (2/7). Defer additional free-water to medical team.   ---> Once pt is tolerating regimen above at goal and electrolytes are within normal limits, can consider switching to Jevity 1.5 at 55 mL/hr x 20 hours to provide: 1,100 mL formula, 1,650 calories, 70 g protein and 836 mL of free water to allow for Synthroid administration (IV synthroid noted at this time).  2) Monitor GI tolerance to EN regimen, RD remains available to adjust rate/formula PRN.   3) REFEEDING RISK - Monitor electrolytes daily (Potassium, Magnesium, Phosphorous) and replete as needed.   4) Recommend continue Multivitamin and Vitamin C daily pending no contraindications for wound healing and micronutrient coverage, add Thiamine daily for refeeding risk.   5) Monitor nutritional intake, tolerance to diet prescription, bowel movements, weights, labs, and skin integrity.   6) Nutrition care plan to remain aligned with pt/family wishes/GOC.    [] Achieved - Continue current nutrition intervention(s)  [] Current medical condition precludes nutrition intervention at this time.    MONITORING AND EVALUATION:   RD remains available upon request and will follow up per protocol.    Zora Lopez, RADHA, CDN  TEAMS

## 2025-02-09 LAB
GLUCOSE BLDC GLUCOMTR-MCNC: 100 MG/DL — HIGH (ref 70–99)
GLUCOSE BLDC GLUCOMTR-MCNC: 106 MG/DL — HIGH (ref 70–99)
GLUCOSE BLDC GLUCOMTR-MCNC: 111 MG/DL — HIGH (ref 70–99)
GLUCOSE BLDC GLUCOMTR-MCNC: 95 MG/DL — SIGNIFICANT CHANGE UP (ref 70–99)

## 2025-02-09 RX ORDER — LEVOTHYROXINE SODIUM 25 UG/1
88 TABLET ORAL DAILY
Refills: 0 | Status: DISCONTINUED | OUTPATIENT
Start: 2025-02-10 | End: 2025-02-12

## 2025-02-09 RX ORDER — METOPROLOL SUCCINATE 25 MG
25 TABLET, EXTENDED RELEASE 24 HR ORAL
Refills: 0 | Status: DISCONTINUED | OUTPATIENT
Start: 2025-02-09 | End: 2025-02-11

## 2025-02-09 RX ADMIN — IPRATROPIUM BROMIDE AND ALBUTEROL SULFATE 3 MILLILITER(S): .5; 2.5 SOLUTION RESPIRATORY (INHALATION) at 12:34

## 2025-02-09 RX ADMIN — Medication 5 MILLIGRAM(S): at 07:02

## 2025-02-09 RX ADMIN — ACETAMINOPHEN 650 MILLIGRAM(S): 160 SUSPENSION ORAL at 21:13

## 2025-02-09 RX ADMIN — Medication 5000 UNIT(S): at 18:09

## 2025-02-09 RX ADMIN — Medication 1 TABLET(S): at 12:34

## 2025-02-09 RX ADMIN — ANTISEPTIC SURGICAL SCRUB 1 APPLICATION(S): 0.04 SOLUTION TOPICAL at 05:34

## 2025-02-09 RX ADMIN — Medication 4 MILLILITER(S): at 05:33

## 2025-02-09 RX ADMIN — Medication 5000 UNIT(S): at 05:32

## 2025-02-09 RX ADMIN — IPRATROPIUM BROMIDE AND ALBUTEROL SULFATE 3 MILLILITER(S): .5; 2.5 SOLUTION RESPIRATORY (INHALATION) at 07:01

## 2025-02-09 RX ADMIN — Medication 5 MILLIGRAM(S): at 01:26

## 2025-02-09 RX ADMIN — Medication 5 MILLIGRAM(S): at 14:01

## 2025-02-09 RX ADMIN — Medication 500 MILLIGRAM(S): at 12:34

## 2025-02-09 RX ADMIN — Medication 25 MILLIGRAM(S): at 18:09

## 2025-02-09 RX ADMIN — Medication 4 MILLILITER(S): at 18:09

## 2025-02-09 RX ADMIN — MIRTAZAPINE 15 MILLIGRAM(S): 30 TABLET, FILM COATED ORAL at 21:14

## 2025-02-09 RX ADMIN — IPRATROPIUM BROMIDE AND ALBUTEROL SULFATE 3 MILLILITER(S): .5; 2.5 SOLUTION RESPIRATORY (INHALATION) at 01:05

## 2025-02-09 RX ADMIN — IPRATROPIUM BROMIDE AND ALBUTEROL SULFATE 3 MILLILITER(S): .5; 2.5 SOLUTION RESPIRATORY (INHALATION) at 18:09

## 2025-02-09 RX ADMIN — QUETIAPINE FUMARATE 12.5 MILLIGRAM(S): 300 TABLET ORAL at 21:14

## 2025-02-09 NOTE — PROGRESS NOTE ADULT - SUBJECTIVE AND OBJECTIVE BOX
NEPHROLOGY     Patient seen and examined resting comfortably on room air, no sob, in no acute distress.     MEDICATIONS  (STANDING):  albuterol/ipratropium for Nebulization 3 milliLiter(s) Nebulizer every 6 hours  albuterol/ipratropium for Nebulization. 3 milliLiter(s) Nebulizer once  ascorbic acid 500 milliGRAM(s) Oral daily  bisacodyl Suppository 10 milliGRAM(s) Rectal daily  chlorhexidine 2% Cloths 1 Application(s) Topical <User Schedule>  heparin   Injectable 5000 Unit(s) SubCutaneous every 12 hours  levothyroxine Injectable 60 MICROGram(s) IV Push at bedtime  metoprolol tartrate Injectable 5 milliGRAM(s) IV Push every 6 hours  mirtazapine 15 milliGRAM(s) Oral at bedtime  multivitamin 1 Tablet(s) Oral daily  QUEtiapine 12.5 milliGRAM(s) Oral at bedtime  sodium chloride 3%  Inhalation 4 milliLiter(s) Inhalation every 12 hours    VITALS:  T(C): , Max: 36.6 (02-08-25 @ 12:44)  T(F): , Max: 97.8 (02-08-25 @ 12:44)  HR: 80 (02-09-25 @ 05:00)  BP: 133/60 (02-09-25 @ 05:00)  RR: 18 (02-09-25 @ 05:00)  SpO2: 96% (02-09-25 @ 05:00)    I and O's:    02-08 @ 07:01  -  02-09 @ 07:00  --------------------------------------------------------  IN: 280 mL / OUT: 0 mL / NET: 280 mL    PHYSICAL EXAM:  Constitutional: NAD; cachetic   Neck:  No JVD  Respiratory: poor effort   Cardiovascular: S1 and S2  Gastrointestinal: BS+, soft, NT/ND,+ PEG  Extremities: No peripheral edema  Neurological: limited   : No Crooks  Skin: No rashes    LABS:                        10.3   9.80  )-----------( 207      ( 08 Feb 2025 07:17 )             31.1     02-08    133[L]  |  99  |  13  ----------------------------<  97  3.9   |  21[L]  |  0.94    Ca    8.8      08 Feb 2025 07:15  Phos  2.7     02-08  Mg     2.0     02-08    TPro  6.5  /  Alb  2.6[L]  /  TBili  0.7  /  DBili  x   /  AST  12  /  ALT  8[L]  /  AlkPhos  110  02-08    89 yo F with dementia, s/p brain tumor resection 1961, HTN, hypothyroidism colon Ca s/p Rt hemicolectomy ('09), 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), Presented from nursing home with hypoxic/hypercapnic resp failure, was found to Influenza A +, UTI + DWAIN and hyponatremia     Hyponatremia- DWAIN - pre renal -  renal fxn intact as of late   Anemia- hgb improving as of late   Blood pressure - stable   Functional quadriplegia  Hypophosphatemia - improved     RECOMMEND:   1 Renal- s/p D5WNS at 50cc/hr (hypoglycemic) and sp 3 %   Not taking any PO and did not get lasix or samsca    s/p IV NaPhos 15mmol x 1   2 GI- s/p PEG, now on TFs  3 Pulm- Nasal canula as needed        Samantha De Guzman, CECILIO  [facility]  [phone]

## 2025-02-09 NOTE — PROGRESS NOTE ADULT - PROBLEM SELECTOR PLAN 5
-TF via NGT  -Speech and swallow f/u noted, recommending NPO  -Aspiration precautions  -Oral care  -Palliative care f/u.  2/3 seems to be doing  ok :  no sob:  on room air:  cxr is much better: : keeps NPO: per speech and swallow:  the gi does not want to put the peg:  option remains for pleasure feeds:  ; now pt for ir guided peg placement  given her general clinical condition :  she relatively at high risk for any procedure under sedtion or anesthesia:  but pulmonary wise she looks much better:  effusions have decreased and she has been on room air for days:  check ABG in am    : ABG today with mild metabolic alkalosis: no intervention; at this time  : for peg?  : now s/p pe/9: cont peg tube feeds

## 2025-02-09 NOTE — PROGRESS NOTE ADULT - PROBLEM SELECTOR PLAN 3
CT chest 1/23 with moderate b/l pl effusions  -CXR 1/25 with b/l pleural effusions/atelectasis L>R, L pleural effusion now appears mod-large  -Holding off on thoracentesis for now as O2 requirements improving with diuresis  -Keep O>I as tolerated.  2/1: repeat cxr in AM  2/2: cxr ordered:  pt has been on lasix  2/3; the cxr looks better to me:  the left sided pleural effusion is less:  still congested but better then before:  2/4: the cxr looks much better to me then  before  2/5: xray is better:  she is not in any resp distress  2/6: pulm wise remains stable:  defer to primary team   for peg placement:  2/8: still with small effusions:  but no resp distress:  now off diuretics  2/9: samll effusions on last chest xray

## 2025-02-09 NOTE — PROGRESS NOTE ADULT - SUBJECTIVE AND OBJECTIVE BOX
Date of Service: 02-09-25 @ 15:31    Patient is a 89y old  Female who presents with a chief complaint of Hypoxic/hypercapnic resp failure (09 Feb 2025 13:08)      Any change in ROS: seems stable pulm wise:   on room air     MEDICATIONS  (STANDING):  albuterol/ipratropium for Nebulization 3 milliLiter(s) Nebulizer every 6 hours  albuterol/ipratropium for Nebulization. 3 milliLiter(s) Nebulizer once  ascorbic acid 500 milliGRAM(s) Oral daily  bisacodyl Suppository 10 milliGRAM(s) Rectal daily  chlorhexidine 2% Cloths 1 Application(s) Topical <User Schedule>  heparin   Injectable 5000 Unit(s) SubCutaneous every 12 hours  metoprolol tartrate 25 milliGRAM(s) Enteral Tube two times a day  mirtazapine 15 milliGRAM(s) Oral at bedtime  multivitamin 1 Tablet(s) Oral daily  QUEtiapine 12.5 milliGRAM(s) Oral at bedtime  sodium chloride 3%  Inhalation 4 milliLiter(s) Inhalation every 12 hours    MEDICATIONS  (PRN):  acetaminophen   Oral Liquid .. 650 milliGRAM(s) Oral every 6 hours PRN Temp greater or equal to 38C (100.4F), Moderate Pain (4 - 6)  haloperidol    Injectable 1 milliGRAM(s) IntraMuscular every 6 hours PRN Agitation    Vital Signs Last 24 Hrs  T(C): 36.6 (09 Feb 2025 13:20), Max: 36.6 (09 Feb 2025 13:20)  T(F): 97.9 (09 Feb 2025 13:20), Max: 97.9 (09 Feb 2025 13:20)  HR: 76 (09 Feb 2025 14:00) (67 - 84)  BP: 164/75 (09 Feb 2025 14:00) (112/69 - 171/72)  BP(mean): --  RR: 18 (09 Feb 2025 14:00) (18 - 18)  SpO2: 95% (09 Feb 2025 14:00) (95% - 99%)    Parameters below as of 09 Feb 2025 14:00  Patient On (Oxygen Delivery Method): room air        I&O's Summary    08 Feb 2025 07:01  -  09 Feb 2025 07:00  --------------------------------------------------------  IN: 280 mL / OUT: 0 mL / NET: 280 mL    09 Feb 2025 07:01  -  09 Feb 2025 15:31  --------------------------------------------------------  IN: 360 mL / OUT: 0 mL / NET: 360 mL          Physical Exam:   GENERAL: NAD, well-groomed, well-developed  HEENT: LUANN/   Atraumatic, Normocephalic  ENMT: No tonsillar erythema, exudates, or enlargement; Moist mucous membranes, Good dentition, No lesions  NECK: Supple, No JVD, Normal thyroid  CHEST/LUNG: Clear to auscultaion  CVS: Regular rate and rhythm; No murmurs, rubs, or gallops  GI: : Soft, Nontender, Nondistended; Bowel sounds present  NERVOUS SYSTEM:  awake:  demented  EXTREMITIES:  2+ Peripheral Pulses, No clubbing, cyanosis, or edema  LYMPH: No lymphadenopathy noted  SKIN: No rashes or lesions  ENDOCRINOLOGY: No Thyromegaly  PSYCH: demented    Labs:                              10.3   9.80  )-----------( 207      ( 08 Feb 2025 07:17 )             31.1                         10.0   5.65  )-----------( 203      ( 07 Feb 2025 07:27 )             30.4     02-08    133[L]  |  99  |  13  ----------------------------<  97  3.9   |  21[L]  |  0.94  02-07    133[L]  |  97  |  12  ----------------------------<  56[L]  3.8   |  24  |  1.08  02-06    133[L]  |  95[L]  |  11  ----------------------------<  78  3.0[L]   |  27  |  1.13  02-06    127[L]  |  92[L]  |  12  ----------------------------<  41[LL]  4.9   |  22  |  1.15    Ca    8.8      08 Feb 2025 07:15  Phos  2.7     02-08  Mg     2.0     02-08    TPro  6.5  /  Alb  2.6[L]  /  TBili  0.7  /  DBili  x   /  AST  12  /  ALT  8[L]  /  AlkPhos  110  02-08    CAPILLARY BLOOD GLUCOSE      POCT Blood Glucose.: 95 mg/dL (09 Feb 2025 12:50)  POCT Blood Glucose.: 111 mg/dL (09 Feb 2025 06:18)  POCT Blood Glucose.: 100 mg/dL (09 Feb 2025 00:38)  POCT Blood Glucose.: 93 mg/dL (08 Feb 2025 17:14)      LIVER FUNCTIONS - ( 08 Feb 2025 07:15 )  Alb: 2.6 g/dL / Pro: 6.5 g/dL / ALK PHOS: 110 U/L / ALT: 8 U/L / AST: 12 U/L / GGT: x             Urinalysis Basic - ( 08 Feb 2025 07:15 )    Color: x / Appearance: x / SG: x / pH: x  Gluc: 97 mg/dL / Ketone: x  / Bili: x / Urobili: x   Blood: x / Protein: x / Nitrite: x   Leuk Esterase: x / RBC: x / WBC x   Sq Epi: x / Non Sq Epi: x / Bacteria: x        rad< from: Xray Chest 1 View- PORTABLE-Urgent (Xray Chest 1 View- PORTABLE-Urgent .) (02.02.25 @ 17:31) >  METWAN     PROCEDURE DATE:  02/02/2025          INTERPRETATION:  EXAMINATION: XR CHEST URGENT    CLINICAL INDICATION: Follow-up pleural effusion    TECHNIQUE: Single frontal, portable view of the chest was obtained.    COMPARISON: Chest x-ray 1/25/2025.    FINDINGS:    The heart is normal in size.  Elevated left hemidiaphragm.  No focal consolidations.  Bilateral small pleural effusions, decreased in size compared to prior   chest x-ray 1/25/2025.  There is no pneumothorax.  Diffuse bony demineralization. No acute osseous abnormalities.    IMPRESSION:    Bilateral small pleural effusions, decreased in size compared to prior   chest x-ray 1/25/2025.    --- End of Report ---          HIPOLITO BOLES MD; Resident Radiologist  This document has been electronically signed.  CLAIRE DUMONT MD; Attending Radiologist  This document has been electronically signed. Feb  3 2025  8:33PM    < end of copied text >      RECENT CULTURES:        RESPIRATORY CULTURES:          Studies  Chest X-RAY  CT SCAN Chest   Venous Dopplers: LE:   CT Abdomen  Others

## 2025-02-09 NOTE — PROGRESS NOTE ADULT - SUBJECTIVE AND OBJECTIVE BOX
Patient is a 89y old  Female who presents with a chief complaint of Hypoxic/hypercapnic resp failure (08 Feb 2025 14:57)      DATE OF SERVICE: 02-09-25 @ 11:22    SUBJECTIVE / OVERNIGHT EVENTS: overnight events noted    ROS:  not available     MEDICATIONS  (STANDING):  albuterol/ipratropium for Nebulization 3 milliLiter(s) Nebulizer every 6 hours  albuterol/ipratropium for Nebulization. 3 milliLiter(s) Nebulizer once  ascorbic acid 500 milliGRAM(s) Oral daily  bisacodyl Suppository 10 milliGRAM(s) Rectal daily  chlorhexidine 2% Cloths 1 Application(s) Topical <User Schedule>  heparin   Injectable 5000 Unit(s) SubCutaneous every 12 hours  levothyroxine Injectable 60 MICROGram(s) IV Push at bedtime  metoprolol tartrate Injectable 5 milliGRAM(s) IV Push every 6 hours  mirtazapine 15 milliGRAM(s) Oral at bedtime  multivitamin 1 Tablet(s) Oral daily  QUEtiapine 12.5 milliGRAM(s) Oral at bedtime  sodium chloride 3%  Inhalation 4 milliLiter(s) Inhalation every 12 hours    MEDICATIONS  (PRN):  acetaminophen   Oral Liquid .. 650 milliGRAM(s) Oral every 6 hours PRN Temp greater or equal to 38C (100.4F), Moderate Pain (4 - 6)  haloperidol    Injectable 1 milliGRAM(s) IntraMuscular every 6 hours PRN Agitation        CAPILLARY BLOOD GLUCOSE      POCT Blood Glucose.: 111 mg/dL (09 Feb 2025 06:18)  POCT Blood Glucose.: 100 mg/dL (09 Feb 2025 00:38)  POCT Blood Glucose.: 93 mg/dL (08 Feb 2025 17:14)  POCT Blood Glucose.: 90 mg/dL (08 Feb 2025 12:24)    I&O's Summary    08 Feb 2025 07:01  -  09 Feb 2025 07:00  --------------------------------------------------------  IN: 280 mL / OUT: 0 mL / NET: 280 mL    09 Feb 2025 07:01  -  09 Feb 2025 11:22  --------------------------------------------------------  IN: 180 mL / OUT: 0 mL / NET: 180 mL        Vital Signs Last 24 Hrs  T(C): 36.2 (09 Feb 2025 05:00), Max: 36.6 (08 Feb 2025 12:44)  T(F): 97.2 (09 Feb 2025 05:00), Max: 97.8 (08 Feb 2025 12:44)  HR: 80 (09 Feb 2025 05:00) (67 - 84)  BP: 133/60 (09 Feb 2025 05:00) (112/69 - 171/72)  BP(mean): --  RR: 18 (09 Feb 2025 05:00) (18 - 18)  SpO2: 96% (09 Feb 2025 05:00) (96% - 99%)    PHYSICAL EXAM:   CHEST/LUNG: clear  HEART: S1 S2; no murmurs   ABDOMEN: Soft, Nontender  PEG in place  EXTREMITIES: no edema  NEUROLOGY: non-focal    LABS:                        10.3   9.80  )-----------( 207      ( 08 Feb 2025 07:17 )             31.1     02-08    133[L]  |  99  |  13  ----------------------------<  97  3.9   |  21[L]  |  0.94    Ca    8.8      08 Feb 2025 07:15  Phos  2.7     02-08  Mg     2.0     02-08    TPro  6.5  /  Alb  2.6[L]  /  TBili  0.7  /  DBili  x   /  AST  12  /  ALT  8[L]  /  AlkPhos  110  02-08          Urinalysis Basic - ( 08 Feb 2025 07:15 )    Color: x / Appearance: x / SG: x / pH: x  Gluc: 97 mg/dL / Ketone: x  / Bili: x / Urobili: x   Blood: x / Protein: x / Nitrite: x   Leuk Esterase: x / RBC: x / WBC x   Sq Epi: x / Non Sq Epi: x / Bacteria: x          All consultant(s) notes reviewed and care discussed with other providers        Contact Number, Dr Link [phone]

## 2025-02-09 NOTE — PROGRESS NOTE ADULT - SUBJECTIVE AND OBJECTIVE BOX
Cardiovascular Disease Progress Note  Date of service: 02-09-25 @ 13:08    Overnight events: No acute events overnight.  Patient is in no distress.   Otherwise review of systems negative    Objective Findings:  T(C): 36.2 (02-09-25 @ 05:00), Max: 36.4 (02-08-25 @ 20:21)  HR: 80 (02-09-25 @ 05:00) (67 - 84)  BP: 133/60 (02-09-25 @ 05:00) (112/69 - 171/72)  RR: 18 (02-09-25 @ 05:00) (18 - 18)  SpO2: 96% (02-09-25 @ 05:00) (96% - 99%)  Wt(kg): --  Daily     Daily       Physical Exam:  Gen: NAD; Patient resting comfortably  HEENT: EOMI, Normocephalic/ atraumatic  CV: RRR, normal S1 + S2, no m/r/g  Lungs:  Normal respiratory effort; clear to auscultation bilaterally  Abd: soft, non-tender; bowel sounds present  Ext: No edema; warm and well perfused     Telemetry: Sinus     Laboratory Data:                        10.3   9.80  )-----------( 207      ( 08 Feb 2025 07:17 )             31.1     02-08    133[L]  |  99  |  13  ----------------------------<  97  3.9   |  21[L]  |  0.94    Ca    8.8      08 Feb 2025 07:15  Phos  2.7     02-08  Mg     2.0     02-08    TPro  6.5  /  Alb  2.6[L]  /  TBili  0.7  /  DBili  x   /  AST  12  /  ALT  8[L]  /  AlkPhos  110  02-08              Inpatient Medications:  MEDICATIONS  (STANDING):  albuterol/ipratropium for Nebulization 3 milliLiter(s) Nebulizer every 6 hours  albuterol/ipratropium for Nebulization. 3 milliLiter(s) Nebulizer once  ascorbic acid 500 milliGRAM(s) Oral daily  bisacodyl Suppository 10 milliGRAM(s) Rectal daily  chlorhexidine 2% Cloths 1 Application(s) Topical <User Schedule>  heparin   Injectable 5000 Unit(s) SubCutaneous every 12 hours  levothyroxine Injectable 60 MICROGram(s) IV Push at bedtime  metoprolol tartrate Injectable 5 milliGRAM(s) IV Push every 6 hours  mirtazapine 15 milliGRAM(s) Oral at bedtime  multivitamin 1 Tablet(s) Oral daily  QUEtiapine 12.5 milliGRAM(s) Oral at bedtime  sodium chloride 3%  Inhalation 4 milliLiter(s) Inhalation every 12 hours      Assessment: 89y old Female with stated hx significant for dementia, s/p brain tumor resection 1961, HTN, hypothyroidism colon Ca s/p Rt hemicolectomy ('09), 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    Plan of Care:    #Afib RVR  - HR now improved  - Currently maintaining sinus.   - Episode of PAT noted 2/6/2025. Likely exacerbated in setting of electrolyte imbalance.   - Maintain K>4 and Mg >2  - BP now stable. Would continue with IV lopressor standing dose.   - PT unable to take PO meds overnight   - Patient is anemic and high bleeding risk, would refrain from AC at this time.     #Dysphagia  - S/p PEG tube placement 2/7/2025  - Defer management to GI    #Hypoxic respiratory failure  - Volume status improved.   - Diuretics as needed.   - Pulm input appreciated.           Over 55 minutes spent on total encounter; more than 50% of the visit was spent counseling and/or coordinating care by the attending physician.      Bobby Montilla DO [facility]  Cardiovascular Disease  [phone]

## 2025-02-09 NOTE — PROGRESS NOTE ADULT - PROBLEM SELECTOR PLAN 1
-Initially 2nd to PNA   -Hypoxia had been improving, then with increase in O2 requirements requiring HFNC 1/24  -Repeat CT chest 1/23 with moderate b/l pleural effusions/atelectasis, b/l GGO. Likely combination of fluid overload + PNA  -Completed 10 day course of ABX   -Continue bronchodilators. S/p Mucomyst   -Keep O>I with diuresis as tolerated  -Continue HFNC, wean as tolerated, keep sats >90%. O2 requirements improving, on 40L/40% this AM   -Suggest Bipap 10/5 PRN if worsening hypoxia or increased WOB  -Prognosis guarded. GOC DNR/DNI/trial NIV  -Palliative care f/u.    2/1: seems better; on room air:  repeat cxr in AM to evaluate for pleural effusion : she is not in any resp distress  2/2: clinically she looks the best today  : off oxygen : no resp distress:  alert and awake and is responsive  2/3: son at bedside:  pt is on room air:  doing pretty well : no sob:  2/4: seems OK: cxr with improvement:  on room air :  she looks comfortable  2/4: resolved  2/6: seems OK:  on room air:  last ABG was pretty decent  : for possible peg in am  /8: s/p p eg:  tolerated well : on room air:  no resp distress:  feeds pe gi  1/9: seems OIK: pulm wise:  no sob:  on room air:  o wheezing

## 2025-02-10 LAB
GLUCOSE BLDC GLUCOMTR-MCNC: 102 MG/DL — HIGH (ref 70–99)
GLUCOSE BLDC GLUCOMTR-MCNC: 110 MG/DL — HIGH (ref 70–99)
GLUCOSE BLDC GLUCOMTR-MCNC: 110 MG/DL — HIGH (ref 70–99)
GLUCOSE BLDC GLUCOMTR-MCNC: 116 MG/DL — HIGH (ref 70–99)
GLUCOSE BLDC GLUCOMTR-MCNC: 96 MG/DL — SIGNIFICANT CHANGE UP (ref 70–99)

## 2025-02-10 RX ADMIN — Medication 5000 UNIT(S): at 17:13

## 2025-02-10 RX ADMIN — ACETAMINOPHEN 650 MILLIGRAM(S): 160 SUSPENSION ORAL at 06:00

## 2025-02-10 RX ADMIN — QUETIAPINE FUMARATE 12.5 MILLIGRAM(S): 300 TABLET ORAL at 21:37

## 2025-02-10 RX ADMIN — LEVOTHYROXINE SODIUM 88 MICROGRAM(S): 25 TABLET ORAL at 05:59

## 2025-02-10 RX ADMIN — IPRATROPIUM BROMIDE AND ALBUTEROL SULFATE 3 MILLILITER(S): .5; 2.5 SOLUTION RESPIRATORY (INHALATION) at 11:45

## 2025-02-10 RX ADMIN — Medication 25 MILLIGRAM(S): at 05:59

## 2025-02-10 RX ADMIN — ACETAMINOPHEN 650 MILLIGRAM(S): 160 SUSPENSION ORAL at 16:52

## 2025-02-10 RX ADMIN — Medication 5000 UNIT(S): at 06:00

## 2025-02-10 RX ADMIN — Medication 10 MILLIGRAM(S): at 21:37

## 2025-02-10 RX ADMIN — ACETAMINOPHEN 650 MILLIGRAM(S): 160 SUSPENSION ORAL at 15:33

## 2025-02-10 RX ADMIN — IPRATROPIUM BROMIDE AND ALBUTEROL SULFATE 3 MILLILITER(S): .5; 2.5 SOLUTION RESPIRATORY (INHALATION) at 17:13

## 2025-02-10 RX ADMIN — ACETAMINOPHEN 650 MILLIGRAM(S): 160 SUSPENSION ORAL at 01:57

## 2025-02-10 RX ADMIN — MIRTAZAPINE 15 MILLIGRAM(S): 30 TABLET, FILM COATED ORAL at 21:37

## 2025-02-10 RX ADMIN — Medication 1 TABLET(S): at 11:45

## 2025-02-10 RX ADMIN — Medication 4 MILLILITER(S): at 17:13

## 2025-02-10 RX ADMIN — Medication 25 MILLIGRAM(S): at 17:12

## 2025-02-10 RX ADMIN — ACETAMINOPHEN 650 MILLIGRAM(S): 160 SUSPENSION ORAL at 21:36

## 2025-02-10 RX ADMIN — Medication 500 MILLIGRAM(S): at 11:45

## 2025-02-10 RX ADMIN — ACETAMINOPHEN 650 MILLIGRAM(S): 160 SUSPENSION ORAL at 07:02

## 2025-02-10 RX ADMIN — ANTISEPTIC SURGICAL SCRUB 1 APPLICATION(S): 0.04 SOLUTION TOPICAL at 07:02

## 2025-02-10 RX ADMIN — ACETAMINOPHEN 650 MILLIGRAM(S): 160 SUSPENSION ORAL at 22:44

## 2025-02-10 NOTE — PHYSICAL THERAPY INITIAL EVALUATION ADULT - PERTINENT HX OF CURRENT PROBLEM, REHAB EVAL
Pt is a 89 yr old female admitted to 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 from nursing home via EMS with hypoxic resp failure since 1/9/25 secondary to suspected pneum, treated with HFNC, son endorsed pt was ordered for antibx treatment at time as well. EMS endorsed nursing home facility O2 tank found off, and placed pt on NRB. Hospital course: In E.D. pt found to be obtunded/unresponsive and to have hypoxic/hypercapnic resp failure with SPO2 of 86-88, Vph of 7.07, VPCO2 90, placed on HFNC with FIO2 of 100%, 50 liter flow with improvement to 92-94% . In addition found to have Influenza A positive, DWAIN with sCr of 1.62 on CKD2 (baseline 1.08-1.23 [8/2024]), transaminitis - alk-phos 577, , , +UA with large leuk-est, neg nitrite. SBP of 98/46 - 107/46. Pt received vanco/zosyn, 1 liter NS IVF bolus, pt placed on norepi gtt. Pt received narcan 0.2 mg IVP x1, albuterol nebs x3. upon consult POCUS with A line predominant, few focal B lines mostly in Left lung field, small area of consolidation in RLL field, mod/large Lt pleural effusion with LLL field atelectasis. Pt with initial norepi at 0.13 mcg/min, titrated down to 0.05 mcg/min over 35 minutes (130/64 -> 112/64), Consult called for and pt admitted to MICU for hypercapnic/hypoxic resp failure/septic shock in the setting of possible RLL pneum and UTI. CTH:  Mildly degraded by motion artifact. A tiny acute intracranial hemorrhage is not excluded. No findings suspicious for an acute intracranial abnormality otherwise. Chronic intracranial findings as above. CXR: Lung opacities may represent pneumonia. Small effusions.
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 from nursing home via EMS morning of 1/11/25 with hypoxic resp failure since 1/9/25 secondary to suspected pneum, treated with HFNC, son endorsed pt was ordered for antibx treatment at time as well.   Pt admitted to MICU for hypercapnic/hypoxic resp failure/septic shock in the setting of possible RLL pneum and UTI.     MICU course, pt with improved hypoxic/hypercapnic resp failure, improved mental status, c/b development of recurrent Afib with RVR, (1/12/25) received amiodarone 150 IV/ metoprolol 5 mg IVP x2 separate episodes with conversion back to NSR. RRT called for hypoxemic respiratory failure.

## 2025-02-10 NOTE — PROGRESS NOTE ADULT - PROBLEM SELECTOR PLAN 3
CT chest 1/23 with moderate b/l pl effusions  -CXR 1/25 with b/l pleural effusions/atelectasis L>R, L pleural effusion now appears mod-large  -Holding off on thoracentesis for now as O2 requirements improving with diuresis  -Keep O>I as tolerated.  2/1: repeat cxr in AM  2/2: cxr ordered:  pt has been on lasix  2/3; the cxr looks better to me:  the left sided pleural effusion is less:  still congested but better then before:  2/4: the cxr looks much better to me then  before  2/5: xray is better:  she is not in any resp distress  2/6: pulm wise remains stable:  defer to primary team   for peg placement:  2/8: still with small effusions:  but no resp distress:  now off diuretics  2/9: small effusions on last chest xray  2/10: no new change in respiration

## 2025-02-10 NOTE — PROGRESS NOTE ADULT - SUBJECTIVE AND OBJECTIVE BOX
NEPHROLOGY-[facility] [phone]        Patient seen and examined in bed.  She was the same         MEDICATIONS  (STANDING):  albuterol/ipratropium for Nebulization 3 milliLiter(s) Nebulizer every 6 hours  albuterol/ipratropium for Nebulization. 3 milliLiter(s) Nebulizer once  ascorbic acid 500 milliGRAM(s) Oral daily  bisacodyl Suppository 10 milliGRAM(s) Rectal daily  chlorhexidine 2% Cloths 1 Application(s) Topical <User Schedule>  heparin   Injectable 5000 Unit(s) SubCutaneous every 12 hours  levothyroxine 88 MICROGram(s) Enteral Tube daily  metoprolol tartrate 25 milliGRAM(s) Enteral Tube two times a day  mirtazapine 15 milliGRAM(s) Oral at bedtime  multivitamin 1 Tablet(s) Oral daily  QUEtiapine 12.5 milliGRAM(s) Oral at bedtime  sodium chloride 3%  Inhalation 4 milliLiter(s) Inhalation every 12 hours      VITAL:  T(C): , Max: 36.6 (02-09-25 @ 13:20)  T(F): , Max: 97.9 (02-09-25 @ 13:20)  HR: 78 (02-10-25 @ 10:48)  BP: 141/65 (02-10-25 @ 10:48)  BP(mean): --  RR: 17 (02-10-25 @ 10:48)  SpO2: 95% (02-10-25 @ 10:48)  Wt(kg): --    I and O's:    02-09 @ 07:01  -  02-10 @ 07:00  --------------------------------------------------------  IN: 540 mL / OUT: 0 mL / NET: 540 mL          PHYSICAL EXAM:    Constitutional: NAD  Neck:  No JVD  Respiratory: CTAB/L  Cardiovascular: S1 and S2  Gastrointestinal: BS+, soft, NT/ND  Extremities: No peripheral edema  Neurological: A/O x 3, no focal deficits  Psychiatric: Normal mood, normal affect  : No Crooks  Skin: No rashes  Access: Not applicable    LABS:                Urine Studies:          RADIOLOGY & ADDITIONAL STUDIES:

## 2025-02-10 NOTE — PROGRESS NOTE ADULT - PROBLEM SELECTOR PLAN 1
-Initially 2nd to PNA   -Hypoxia had been improving, then with increase in O2 requirements requiring HFNC 1/24  -Repeat CT chest 1/23 with moderate b/l pleural effusions/atelectasis, b/l GGO. Likely combination of fluid overload + PNA  -Completed 10 day course of ABX   -Continue bronchodilators. S/p Mucomyst   -Keep O>I with diuresis as tolerated  -Continue HFNC, wean as tolerated, keep sats >90%. O2 requirements improving, on 40L/40% this AM   -Suggest Bipap 10/5 PRN if worsening hypoxia or increased WOB  -Prognosis guarded. GOC DNR/DNI/trial NIV  -Palliative care f/u.    2/1: seems better; on room air:  repeat cxr in AM to evaluate for pleural effusion : she is not in any resp distress  2/2: clinically she looks the best today  : off oxygen : no resp distress:  alert and awake and is responsive  2/3: son at bedside:  pt is on room air:  doing pretty well : no sob:  2/4: seems OK: cxr with improvement:  on room air :  she looks comfortable  2/4: resolved  2/6: seems OK:  on room air:  last ABG was pretty decent  : for possible peg in am  /8: s/p p eg:  tolerated well : on room air:  no resp distress:  feeds pe gi  1/9: seems OIK: pulm wise:  no sob:  on room air:  o wheezing  210: stable:  no sob: on room air:  no events:  tolerating peg tube feeds

## 2025-02-10 NOTE — PROGRESS NOTE ADULT - ASSESSMENT
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

## 2025-02-10 NOTE — PROGRESS NOTE ADULT - ASSESSMENT
resp failure  aspiration    s/p peg  tolerating tf  keep binder in place to prevent inadvertent removal   dc planning per primary     Advanced care planning was discussed with patient and family.  Advanced care planning forms were reviewed and discussed.  Risks, benefits and alternatives of gastroenterologic procedures were discussed in detail and all questions were answered.    30 minutes spent.

## 2025-02-10 NOTE — PHYSICAL THERAPY INITIAL EVALUATION ADULT - PLANNED THERAPY INTERVENTIONS, PT EVAL
bed mobility training/transfer training
balance training/bed mobility training/strengthening/transfer training

## 2025-02-10 NOTE — PROGRESS NOTE ADULT - SUBJECTIVE AND OBJECTIVE BOX
[facility]      Zaid Garcia NP    [address], [place] [id number] [phone]    INTERVAL HPI/ OVERNIGHT EVENTS:  pt s/e   tolerating tf    Allergies    clindamycin (Unknown)  shellfish (Unknown)  strawberry (Rash)  penicillin (Unknown)  clindamycin (Other)  strawberry (Unknown)  IV Contrast (Other)  IV Contrast (Unknown)    Intolerances      unable to obtain      PHYSICAL EXAM:   Vital Signs Last 24 Hrs  T(C): 36.4 (02 Feb 2025 12:00), Max: 36.6 (02 Feb 2025 05:07)  T(F): 97.5 (02 Feb 2025 12:00), Max: 97.8 (02 Feb 2025 05:07)  HR: 69 (02 Feb 2025 12:00) (64 - 73)  BP: 151/71 (02 Feb 2025 12:00) (112/72 - 157/79)  BP(mean): 97 (02 Feb 2025 12:00) (97 - 97)  RR: 18 (02 Feb 2025 12:00) (18 - 18)  SpO2: 95% (02 Feb 2025 12:00) (93% - 100%)    Parameters below as of 02 Feb 2025 12:00  Patient On (Oxygen Delivery Method): room air    Daily     Daily I&O's Summary    19 Jan 2025 07:01  -  19 Jan 2025 15:01  --------------------------------------------------------  IN: 0 mL / OUT: 150 mL / NET: -150 mL      nad  confused  frail  non toxic  soft, nt  no edema        LABS:                        9.7    5.63  )-----------( 237      ( 02 Feb 2025 07:22 )             29.4      02-02    132[L]  |  91[L]  |  11  ----------------------------<  75  3.1[L]   |  28  |  1.20    Ca    9.4      02 Feb 2025 07:25                      Urinalysis Basic - ( 19 Jan 2025 07:30 )    Color: x / Appearance: x / SG: x / pH: x  Gluc: 114 mg/dL / Ketone: x  / Bili: x / Urobili: x   Blood: x / Protein: x / Nitrite: x   Leuk Esterase: x / RBC: x / WBC x   Sq Epi: x / Non Sq Epi: x / Bacteria: x      amylase   lipase  RADIOLOGY & ADDITIONAL TESTS:

## 2025-02-10 NOTE — PROGRESS NOTE ADULT - PROBLEM SELECTOR PLAN 1
resolved    s/p PEG  nutrition consultation appreciated  continue to follow recommendations  at goal rate feeding Jevity

## 2025-02-10 NOTE — PHYSICAL THERAPY INITIAL EVALUATION ADULT - TRANSFER TRAINING, PT EVAL
GOAL: Pt will perform sit to/from stand ; bed/chair transfers Min Assist  with RW within 2-3 weeks.
GOAL: pt will complete sit to stand transfer/stand pivot transfer maxA in 4wks.

## 2025-02-10 NOTE — PHYSICAL THERAPY INITIAL EVALUATION ADULT - BED MOBILITY TRAINING, PT EVAL
GOAL: pt will complete all bed mob maxAx1 in 4wks.
GOAL: Pt will perform all bed mobility Min Assist  within 2-3 wks.

## 2025-02-10 NOTE — PHYSICAL THERAPY INITIAL EVALUATION ADULT - GENERAL OBSERVATIONS, REHAB EVAL
received supine in bed in NAD.
Pt a/w hypoxic resp failure, +suspected PNA, +fluA, +afib episode on 1/12, hx dementia. Cleared by covering RN John for PT services Pt received supine in bed, +ICU monitoring, +NGT, +b/l hand mittens, +cruz, +IVL. Please see PT eval for assessment. Pt is A&Ox0, +eyes opened, poor command follow, +garbled/illogical speech.

## 2025-02-10 NOTE — PROGRESS NOTE ADULT - SUBJECTIVE AND OBJECTIVE BOX
Cardiovascular Disease Progress Note  Date of service: 02-10-25 @ 09:15    Overnight events: No acute events overnight.  Patient is in no distress.   Otherwise review of systems negative    Objective Findings:  T(C): 36.3 (02-10-25 @ 05:10), Max: 36.6 (02-09-25 @ 13:20)  HR: 78 (02-10-25 @ 05:10) (73 - 91)  BP: 167/85 (02-10-25 @ 05:10) (131/74 - 167/85)  RR: 18 (02-10-25 @ 05:10) (18 - 18)  SpO2: 95% (02-10-25 @ 05:10) (95% - 99%)  Wt(kg): --  Daily     Daily       Physical Exam:  Gen: NAD; Patient resting comfortably  HEENT: EOMI, Normocephalic/ atraumatic  CV: RRR, normal S1 + S2, no m/r/g  Lungs:  Normal respiratory effort; clear to auscultation bilaterally  Abd: soft, non-tender; bowel sounds present  Ext: No edema; warm and well perfused    Telemetry: Sinus     Laboratory Data:                    Inpatient Medications:  MEDICATIONS  (STANDING):  albuterol/ipratropium for Nebulization 3 milliLiter(s) Nebulizer every 6 hours  albuterol/ipratropium for Nebulization. 3 milliLiter(s) Nebulizer once  ascorbic acid 500 milliGRAM(s) Oral daily  bisacodyl Suppository 10 milliGRAM(s) Rectal daily  chlorhexidine 2% Cloths 1 Application(s) Topical <User Schedule>  heparin   Injectable 5000 Unit(s) SubCutaneous every 12 hours  levothyroxine 88 MICROGram(s) Enteral Tube daily  metoprolol tartrate 25 milliGRAM(s) Enteral Tube two times a day  mirtazapine 15 milliGRAM(s) Oral at bedtime  multivitamin 1 Tablet(s) Oral daily  QUEtiapine 12.5 milliGRAM(s) Oral at bedtime  sodium chloride 3%  Inhalation 4 milliLiter(s) Inhalation every 12 hours      Assessment:  89y old Female with stated hx significant for dementia, s/p brain tumor resection 1961, HTN, hypothyroidism colon Ca s/p Rt hemicolectomy ('09), 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    Plan of Care:    #Afib RVR  - HR now improved  - Currently maintaining sinus.   - Episode of PAT noted 2/6/2025. Likely exacerbated in setting of electrolyte imbalance.   - Maintain K>4 and Mg >2  - BP now stable. Would continue with IV lopressor standing dose.   - PT unable to take PO meds overnight   - Patient is anemic and high bleeding risk, would refrain from AC at this time.     #Dysphagia  - S/p PEG tube placement 2/7/2025  - Defer management to GI        Over 55 minutes spent on total encounter; more than 50% of the visit was spent counseling and/or coordinating care by the attending physician.      Bobby Montilla,  [facility]  Cardiovascular Disease  [phone]

## 2025-02-10 NOTE — PROGRESS NOTE ADULT - PROBLEM SELECTOR PLAN 4
-By hx.   -S/p cardioversion 1/25  -Amio drip   -Cards f/u, rates now more controlled.  2/10:now on metoprolol

## 2025-02-10 NOTE — PHYSICAL THERAPY INITIAL EVALUATION ADULT - MANUAL MUSCLE TESTING RESULTS, REHAB EVAL
grossly 3/5 b/l UE and LE extremities/grossly assessed due to
unable to formally assess 2* poor cognition

## 2025-02-10 NOTE — PROGRESS NOTE ADULT - SUBJECTIVE AND OBJECTIVE BOX
Patient is a 89y old  Female who presents with a chief complaint of Hypoxic/hypercapnic resp failure (10 Feb 2025 12:06)      DATE OF SERVICE: 02-10-25 @ 13:41    SUBJECTIVE / OVERNIGHT EVENTS: overnight events noted    ROS:  not available         MEDICATIONS  (STANDING):  albuterol/ipratropium for Nebulization 3 milliLiter(s) Nebulizer every 6 hours  albuterol/ipratropium for Nebulization. 3 milliLiter(s) Nebulizer once  ascorbic acid 500 milliGRAM(s) Oral daily  bisacodyl Suppository 10 milliGRAM(s) Rectal daily  chlorhexidine 2% Cloths 1 Application(s) Topical <User Schedule>  heparin   Injectable 5000 Unit(s) SubCutaneous every 12 hours  levothyroxine 88 MICROGram(s) Enteral Tube daily  metoprolol tartrate 25 milliGRAM(s) Enteral Tube two times a day  mirtazapine 15 milliGRAM(s) Oral at bedtime  multivitamin 1 Tablet(s) Oral daily  QUEtiapine 12.5 milliGRAM(s) Oral at bedtime  sodium chloride 3%  Inhalation 4 milliLiter(s) Inhalation every 12 hours    MEDICATIONS  (PRN):  acetaminophen   Oral Liquid .. 650 milliGRAM(s) Oral every 6 hours PRN Temp greater or equal to 38C (100.4F), Moderate Pain (4 - 6)  haloperidol    Injectable 1 milliGRAM(s) IntraMuscular every 6 hours PRN Agitation        CAPILLARY BLOOD GLUCOSE      POCT Blood Glucose.: 96 mg/dL (10 Feb 2025 12:15)  POCT Blood Glucose.: 110 mg/dL (10 Feb 2025 05:58)  POCT Blood Glucose.: 116 mg/dL (10 Feb 2025 00:30)  POCT Blood Glucose.: 106 mg/dL (09 Feb 2025 18:05)    I&O's Summary    09 Feb 2025 07:01  -  10 Feb 2025 07:00  --------------------------------------------------------  IN: 540 mL / OUT: 0 mL / NET: 540 mL        Vital Signs Last 24 Hrs  T(C): 36.6 (10 Feb 2025 10:48), Max: 36.6 (09 Feb 2025 16:20)  T(F): 97.9 (10 Feb 2025 10:48), Max: 97.9 (09 Feb 2025 16:20)  HR: 78 (10 Feb 2025 10:48) (73 - 91)  BP: 141/65 (10 Feb 2025 10:48) (131/74 - 167/85)  BP(mean): --  RR: 17 (10 Feb 2025 10:48) (17 - 18)  SpO2: 95% (10 Feb 2025 10:48) (95% - 99%)    PHYSICAL EXAM:   CHEST/LUNG: clear  HEART: S1 S2; no murmurs   ABDOMEN: Soft, Nontender  PEG in place  EXTREMITIES: no edema  NEUROLOGY: non-focal    LABS:                      All consultant(s) notes reviewed and care discussed with other providers        Contact Number, Dr Link [phone]

## 2025-02-10 NOTE — PHYSICAL THERAPY INITIAL EVALUATION ADULT - NSPTDISCHREC_GEN_A_CORE
Sub acute rehab./Sub-acute Rehab
DC return to LTC facility with assist for ALL mobility/ADLs, +hospital bed and WC; recommend use of patient lift device for safe OOB transfers, CHUCK Tilley aware./Long Term Care/SNF

## 2025-02-10 NOTE — PHYSICAL THERAPY INITIAL EVALUATION ADULT - IMPAIRMENTS CONTRIBUTING IMPAIRED BED MOBILITY, REHAB EVAL
impaired balance/cognition/impaired postural control/decreased strength
impaired balance/cognition/pain/impaired postural control/decreased strength

## 2025-02-10 NOTE — PROGRESS NOTE ADULT - SUBJECTIVE AND OBJECTIVE BOX
Date of Service: 02-10-25 @ 16:56    Patient is a 89y old  Female who presents with a chief complaint of Hypoxic/hypercapnic resp failure (10 Feb 2025 13:41)      Any change in ROS: seems OK:  doing same:  no sob:  on rooma ir     MEDICATIONS  (STANDING):  albuterol/ipratropium for Nebulization 3 milliLiter(s) Nebulizer every 6 hours  albuterol/ipratropium for Nebulization. 3 milliLiter(s) Nebulizer once  ascorbic acid 500 milliGRAM(s) Oral daily  bisacodyl Suppository 10 milliGRAM(s) Rectal daily  chlorhexidine 2% Cloths 1 Application(s) Topical <User Schedule>  heparin   Injectable 5000 Unit(s) SubCutaneous every 12 hours  levothyroxine 88 MICROGram(s) Enteral Tube daily  metoprolol tartrate 25 milliGRAM(s) Enteral Tube two times a day  mirtazapine 15 milliGRAM(s) Oral at bedtime  multivitamin 1 Tablet(s) Oral daily  QUEtiapine 12.5 milliGRAM(s) Oral at bedtime  sodium chloride 3%  Inhalation 4 milliLiter(s) Inhalation every 12 hours    MEDICATIONS  (PRN):  acetaminophen   Oral Liquid .. 650 milliGRAM(s) Oral every 6 hours PRN Temp greater or equal to 38C (100.4F), Moderate Pain (4 - 6)  haloperidol    Injectable 1 milliGRAM(s) IntraMuscular every 6 hours PRN Agitation    Vital Signs Last 24 Hrs  T(C): 36.6 (10 Feb 2025 16:31), Max: 36.6 (10 Feb 2025 10:48)  T(F): 97.8 (10 Feb 2025 16:31), Max: 97.9 (10 Feb 2025 10:48)  HR: 83 (10 Feb 2025 16:31) (73 - 91)  BP: 167/80 (10 Feb 2025 16:31) (131/74 - 167/85)  BP(mean): --  RR: 18 (10 Feb 2025 16:31) (17 - 18)  SpO2: 96% (10 Feb 2025 16:31) (95% - 99%)    Parameters below as of 10 Feb 2025 16:31  Patient On (Oxygen Delivery Method): room air        I&O's Summary    09 Feb 2025 07:01  -  10 Feb 2025 07:00  --------------------------------------------------------  IN: 540 mL / OUT: 0 mL / NET: 540 mL          Physical Exam:   GENERAL: NAD, well-groomed, well-developed  HEENT: LUANN/   Atraumatic, Normocephalic  ENMT: No tonsillar erythema, exudates, or enlargement; Moist mucous membranes, Good dentition, No lesions  NECK: Supple, No JVD, Normal thyroid  CHEST/LUNG: Clear to auscultaion, ; No rales, rhonchi, wheezing, or rubs  CVS: Regular rate and rhythm; No murmurs, rubs, or gallops  GI: : Soft, Nontender, Nondistended; Bowel sounds present  NERVOUS SYSTEM:  awake and demented  EXTREMITIES:  2+ Peripheral Pulses  LYMPH: No lymphadenopathy noted  SKIN: No rashes or lesions  ENDOCRINOLOGY: No Thyromegaly  PSYCH: demented    Labs:                              10.3   9.80  )-----------( 207      ( 08 Feb 2025 07:17 )             31.1                         10.0   5.65  )-----------( 203      ( 07 Feb 2025 07:27 )             30.4     02-08    133[L]  |  99  |  13  ----------------------------<  97  3.9   |  21[L]  |  0.94  02-07    133[L]  |  97  |  12  ----------------------------<  56[L]  3.8   |  24  |  1.08  02-06    133[L]  |  95[L]  |  11  ----------------------------<  78  3.0[L]   |  27  |  1.13      TPro  6.5  /  Alb  2.6[L]  /  TBili  0.7  /  DBili  x   /  AST  12  /  ALT  8[L]  /  AlkPhos  110  02-08    CAPILLARY BLOOD GLUCOSE      POCT Blood Glucose.: 96 mg/dL (10 Feb 2025 12:15)  POCT Blood Glucose.: 110 mg/dL (10 Feb 2025 05:58)  POCT Blood Glucose.: 116 mg/dL (10 Feb 2025 00:30)  POCT Blood Glucose.: 106 mg/dL (09 Feb 2025 18:05)      rad< from: Xray Chest 1 View- PORTABLE-Urgent (Xray Chest 1 View- PORTABLE-Urgent .) (02.02.25 @ 17:31) >  TECHNIQUE: Single frontal, portable view of the chest was obtained.    COMPARISON: Chest x-ray 1/25/2025.    FINDINGS:    The heart is normal in size.  Elevated left hemidiaphragm.  No focal consolidations.  Bilateral small pleural effusions, decreased in size compared to prior   chest x-ray 1/25/2025.  There is no pneumothorax.  Diffuse bony demineralization. No acute osseous abnormalities.    IMPRESSION:    Bilateral small pleural effusions, decreased in size compared to prior   chest x-ray 1/25/2025.    --- End of Report ---          HIPOLITO BOLES MD; Resident Radiologist  This document has been electronically signed.  CLAIRE DUMONT MD; Attending Radiologist    < end of copied text >              RECENT CULTURES:        RESPIRATORY CULTURES:          Studies  Chest X-RAY  CT SCAN Chest   Venous Dopplers: LE:   CT Abdomen  Others

## 2025-02-10 NOTE — PHYSICAL THERAPY INITIAL EVALUATION ADULT - ADDITIONAL COMMENTS
Per SW notes, Pt was living at [facility] prior to admission.  Pt gets assistance with adls, showering, dressing, getting in and out of bed.  Pt is able to feed herself.  Pt uses a wheelchair.
Per SW notes, Pt was living at [facility] prior to admission.  Pt gets assistance with adls, showering, dressing, getting in and out of bed.  Pt is able to feed herself.  Pt uses a wheelchair.

## 2025-02-10 NOTE — PHYSICAL THERAPY INITIAL EVALUATION ADULT - FUNCTIONAL LIMITATIONS, PT EVAL
"    The patient was counseled and encouraged to consider modifying their diet and eating habits. He was provided with information on recommended healthy diet options.  Answers for HPI/ROS submitted by the patient on 1/12/2021   Annual Exam:  In general, how would you rate your overall physical health?: good  Frequency of exercise:: 4-5 days/week  Do you usually eat at least 4 servings of fruit and vegetables a day, include whole grains & fiber, and avoid regularly eating high fat or \"junk\" foods? : No  Taking medications regularly:: Yes  Medication side effects:: None  Activities of Daily Living: no assistance needed  Home safety: no safety concerns identified  Hearing Impairment:: no hearing concerns  In the past 6 months, have you been bothered by leaking of urine?: No  abdominal pain: No  Blood in stool: No  Blood in urine: No  chest pain: No  chills: No  congestion: No  constipation: No  cough: No  diarrhea: No  dizziness: No  ear pain: No  eye pain: No  nervous/anxious: No  fever: No  frequency: No  genital sores: No  headaches: No  hearing loss: No  heartburn: No  arthralgias: Yes  joint swelling: No  peripheral edema: No  mood changes: No  myalgias: No  nausea: No  dysuria: No  palpitations: No  Skin sensation changes: No  sore throat: No  urgency: No  rash: No  shortness of breath: No  visual disturbance: No  weakness: No  impotence: No  penile discharge: No  In general, how would you rate your overall mental or emotional health?: good  Additional concerns today:: No  Duration of exercise:: 15-30 minutes      "
self-care
self-care/home management

## 2025-02-11 LAB
GLUCOSE BLDC GLUCOMTR-MCNC: 109 MG/DL — HIGH (ref 70–99)
GLUCOSE BLDC GLUCOMTR-MCNC: 109 MG/DL — HIGH (ref 70–99)
GLUCOSE BLDC GLUCOMTR-MCNC: 92 MG/DL — SIGNIFICANT CHANGE UP (ref 70–99)

## 2025-02-11 RX ORDER — METOPROLOL SUCCINATE 25 MG
25 TABLET, EXTENDED RELEASE 24 HR ORAL
Refills: 0 | Status: DISCONTINUED | OUTPATIENT
Start: 2025-02-11 | End: 2025-02-12

## 2025-02-11 RX ADMIN — ANTISEPTIC SURGICAL SCRUB 1 APPLICATION(S): 0.04 SOLUTION TOPICAL at 07:03

## 2025-02-11 RX ADMIN — Medication 25 MILLIGRAM(S): at 05:59

## 2025-02-11 RX ADMIN — Medication 5000 UNIT(S): at 05:59

## 2025-02-11 RX ADMIN — Medication 4 MILLILITER(S): at 18:04

## 2025-02-11 RX ADMIN — IPRATROPIUM BROMIDE AND ALBUTEROL SULFATE 3 MILLILITER(S): .5; 2.5 SOLUTION RESPIRATORY (INHALATION) at 11:56

## 2025-02-11 RX ADMIN — IPRATROPIUM BROMIDE AND ALBUTEROL SULFATE 3 MILLILITER(S): .5; 2.5 SOLUTION RESPIRATORY (INHALATION) at 18:04

## 2025-02-11 RX ADMIN — Medication 5000 UNIT(S): at 18:04

## 2025-02-11 RX ADMIN — Medication 25 MILLIGRAM(S): at 18:04

## 2025-02-11 RX ADMIN — MIRTAZAPINE 15 MILLIGRAM(S): 30 TABLET, FILM COATED ORAL at 22:00

## 2025-02-11 RX ADMIN — QUETIAPINE FUMARATE 12.5 MILLIGRAM(S): 300 TABLET ORAL at 22:00

## 2025-02-11 RX ADMIN — Medication 1 TABLET(S): at 11:56

## 2025-02-11 RX ADMIN — LEVOTHYROXINE SODIUM 88 MICROGRAM(S): 25 TABLET ORAL at 05:59

## 2025-02-11 RX ADMIN — Medication 500 MILLIGRAM(S): at 11:56

## 2025-02-11 NOTE — PROGRESS NOTE ADULT - PROBLEM SELECTOR PLAN 4
Pt has been withdrawn and anxious during this shift. Pt has been spending most of her evening inside of her room but occasionally did come out to the milieu to watch televiision but retreat back to room. Pt appears exhausted. Pt attended one group activity during this shift. Pt did not really say much as she keeps to herself for the most part. Pt ADLs are fair and mood is depressed. Pt denies HI. Pt reported having some visual hallucinations of \"seeing things on the wall.\" Pt is medication compliant. Pt appears to not have any withdrawal symptoms. Pt has not been a management issue but does require encouragement to partake in milieu activities. Continue to monitor Pt for safety checks Q15 minutes per MD.    supportive care  fall precautions

## 2025-02-11 NOTE — PROGRESS NOTE ADULT - SUBJECTIVE AND OBJECTIVE BOX
Patient is a 89y old  Female who presents with a chief complaint of Hypoxic/hypercapnic resp failure (11 Feb 2025 12:10)      DATE OF SERVICE: 02-11-25 @ 14:40    SUBJECTIVE / OVERNIGHT EVENTS: overnight events noted    ROS:  not available     MEDICATIONS  (STANDING):  albuterol/ipratropium for Nebulization 3 milliLiter(s) Nebulizer every 6 hours  albuterol/ipratropium for Nebulization. 3 milliLiter(s) Nebulizer once  ascorbic acid 500 milliGRAM(s) Oral daily  bisacodyl Suppository 10 milliGRAM(s) Rectal daily  chlorhexidine 2% Cloths 1 Application(s) Topical <User Schedule>  heparin   Injectable 5000 Unit(s) SubCutaneous every 12 hours  levothyroxine 88 MICROGram(s) Enteral Tube daily  metoprolol tartrate 25 milliGRAM(s) Enteral Tube two times a day  mirtazapine 15 milliGRAM(s) Oral at bedtime  multivitamin 1 Tablet(s) Oral daily  QUEtiapine 12.5 milliGRAM(s) Oral at bedtime  sodium chloride 3%  Inhalation 4 milliLiter(s) Inhalation every 12 hours    MEDICATIONS  (PRN):  acetaminophen   Oral Liquid .. 650 milliGRAM(s) Oral every 6 hours PRN Temp greater or equal to 38C (100.4F), Moderate Pain (4 - 6)  haloperidol    Injectable 1 milliGRAM(s) IntraMuscular every 6 hours PRN Agitation        CAPILLARY BLOOD GLUCOSE      POCT Blood Glucose.: 92 mg/dL (11 Feb 2025 11:50)  POCT Blood Glucose.: 109 mg/dL (11 Feb 2025 06:22)  POCT Blood Glucose.: 102 mg/dL (10 Feb 2025 23:58)  POCT Blood Glucose.: 110 mg/dL (10 Feb 2025 17:04)    I&O's Summary    10 Feb 2025 07:01  -  11 Feb 2025 07:00  --------------------------------------------------------  IN: 740 mL / OUT: 200 mL / NET: 540 mL        Vital Signs Last 24 Hrs  T(C): 36.3 (11 Feb 2025 10:54), Max: 36.6 (10 Feb 2025 16:31)  T(F): 97.3 (11 Feb 2025 10:54), Max: 97.8 (10 Feb 2025 16:31)  HR: 83 (11 Feb 2025 10:54) (72 - 90)  BP: 165/79 (11 Feb 2025 10:54) (154/72 - 179/96)  BP(mean): --  RR: 18 (11 Feb 2025 10:54) (18 - 18)  SpO2: 95% (11 Feb 2025 10:54) (95% - 97%)    PHYSICAL EXAM:   CHEST/LUNG: clear  HEART: S1 S2; no murmurs   ABDOMEN: Soft, Nontender  NEUROLOGY: non-focal    LABS:                      All consultant(s) notes reviewed and care discussed with other providers        Contact Number, Dr Link [phone]

## 2025-02-11 NOTE — PROGRESS NOTE ADULT - SUBJECTIVE AND OBJECTIVE BOX
Date of Service: 02-11-25 @ 15:12    Patient is a 89y old  Female who presents with a chief complaint of Hypoxic/hypercapnic resp failure (11 Feb 2025 14:39)      Any change in ROS: doing pretty good:  no sob:  alert  : off oxygen     MEDICATIONS  (STANDING):  albuterol/ipratropium for Nebulization 3 milliLiter(s) Nebulizer every 6 hours  albuterol/ipratropium for Nebulization. 3 milliLiter(s) Nebulizer once  ascorbic acid 500 milliGRAM(s) Oral daily  bisacodyl Suppository 10 milliGRAM(s) Rectal daily  chlorhexidine 2% Cloths 1 Application(s) Topical <User Schedule>  heparin   Injectable 5000 Unit(s) SubCutaneous every 12 hours  levothyroxine 88 MICROGram(s) Enteral Tube daily  metoprolol tartrate 25 milliGRAM(s) Enteral Tube two times a day  mirtazapine 15 milliGRAM(s) Oral at bedtime  multivitamin 1 Tablet(s) Oral daily  QUEtiapine 12.5 milliGRAM(s) Oral at bedtime  sodium chloride 3%  Inhalation 4 milliLiter(s) Inhalation every 12 hours    MEDICATIONS  (PRN):  acetaminophen   Oral Liquid .. 650 milliGRAM(s) Oral every 6 hours PRN Temp greater or equal to 38C (100.4F), Moderate Pain (4 - 6)  haloperidol    Injectable 1 milliGRAM(s) IntraMuscular every 6 hours PRN Agitation    Vital Signs Last 24 Hrs  T(C): 36.3 (11 Feb 2025 10:54), Max: 36.6 (10 Feb 2025 16:31)  T(F): 97.3 (11 Feb 2025 10:54), Max: 97.8 (10 Feb 2025 16:31)  HR: 83 (11 Feb 2025 10:54) (72 - 90)  BP: 165/79 (11 Feb 2025 10:54) (154/72 - 179/96)  BP(mean): --  RR: 18 (11 Feb 2025 10:54) (18 - 18)  SpO2: 95% (11 Feb 2025 10:54) (95% - 97%)    Parameters below as of 11 Feb 2025 10:54  Patient On (Oxygen Delivery Method): room air        I&O's Summary    10 Feb 2025 07:01  -  11 Feb 2025 07:00  --------------------------------------------------------  IN: 740 mL / OUT: 200 mL / NET: 540 mL          Physical Exam:   GENERAL: NAD, well-groomed, well-developed  HEENT: LUANN/   Atraumatic, Normocephalic  ENMT: No tonsillar erythema, exudates, or enlargement; Moist mucous membranes, Good dentition, No lesions  NECK: Supple, No JVD, Normal thyroid  CHEST/LUNG: Clear to auscultaion  CVS: Regular rate and rhythm; No murmurs, rubs, or gallops  GI: : Soft, Nontender, Nondistended; Bowel sounds present  NERVOUS SYSTEM:  awake  EXTREMITIES: - edema  LYMPH: No lymphadenopathy noted  SKIN: No rashes or lesions  ENDOCRINOLOGY: No Thyromegaly  PSYCH: dementia    Labs:                              10.3   9.80  )-----------( 207      ( 08 Feb 2025 07:17 )             31.1     02-08    133[L]  |  99  |  13  ----------------------------<  97  3.9   |  21[L]  |  0.94      TPro  6.5  /  Alb  2.6[L]  /  TBili  0.7  /  DBili  x   /  AST  12  /  ALT  8[L]  /  AlkPhos  110  02-08    CAPILLARY BLOOD GLUCOSE      POCT Blood Glucose.: 92 mg/dL (11 Feb 2025 11:50)  POCT Blood Glucose.: 109 mg/dL (11 Feb 2025 06:22)  POCT Blood Glucose.: 102 mg/dL (10 Feb 2025 23:58)  POCT Blood Glucose.: 110 mg/dL (10 Feb 2025 17:04)          < from: CT Abdomen and Pelvis No Cont (02.04.25 @ 19:29) >  IMPRESSION:  Large rectal stool burden with wall thickening and perirectal edema,   which could represent stercoral colitis.  Moderate bilateral pleural effusions with associated compressive lower   lobe atelectasis.      --- End of Report ---    < end of copied text >          RECENT CULTURES:        RESPIRATORY CULTURES:          Studies  Chest X-RAY  CT SCAN Chest   Venous Dopplers: LE:   CT Abdomen  Others

## 2025-02-11 NOTE — PROGRESS NOTE ADULT - SUBJECTIVE AND OBJECTIVE BOX
[facility]      Zaid Garcia NP    [address], [place] [id number] [phone]    INTERVAL HPI/ OVERNIGHT EVENTS:  pt s/e   tolerating tf    Allergies    clindamycin (Unknown)  shellfish (Unknown)  strawberry (Rash)  penicillin (Unknown)  clindamycin (Other)  strawberry (Unknown)  IV Contrast (Other)  IV Contrast (Unknown)    Intolerances      unable to obtain      PHYSICAL EXAM:   Vital Signs Last 24 Hrs  T(C): 36.4 (02 Feb 2025 12:00), Max: 36.6 (02 Feb 2025 05:07)  T(F): 97.5 (02 Feb 2025 12:00), Max: 97.8 (02 Feb 2025 05:07)  HR: 69 (02 Feb 2025 12:00) (64 - 73)  BP: 151/71 (02 Feb 2025 12:00) (112/72 - 157/79)  BP(mean): 97 (02 Feb 2025 12:00) (97 - 97)  RR: 18 (02 Feb 2025 12:00) (18 - 18)  SpO2: 95% (02 Feb 2025 12:00) (93% - 100%)    Parameters below as of 02 Feb 2025 12:00  Patient On (Oxygen Delivery Method): room air    Daily     Daily I&O's Summary    19 Jan 2025 07:01  -  19 Jan 2025 15:01  --------------------------------------------------------  IN: 0 mL / OUT: 150 mL / NET: -150 mL      nad  confused  frail  non toxic  soft, nt  no edema        LABS:                        9.7    5.63  )-----------( 237      ( 02 Feb 2025 07:22 )             29.4      02-02    132[L]  |  91[L]  |  11  ----------------------------<  75  3.1[L]   |  28  |  1.20    Ca    9.4      02 Feb 2025 07:25                      Urinalysis Basic - ( 19 Jan 2025 07:30 )    Color: x / Appearance: x / SG: x / pH: x  Gluc: 114 mg/dL / Ketone: x  / Bili: x / Urobili: x   Blood: x / Protein: x / Nitrite: x   Leuk Esterase: x / RBC: x / WBC x   Sq Epi: x / Non Sq Epi: x / Bacteria: x      amylase   lipase  RADIOLOGY & ADDITIONAL TESTS:

## 2025-02-11 NOTE — PROGRESS NOTE ADULT - PROBLEM SELECTOR PLAN 4
-By hx.   -S/p cardioversion 1/25  -Amio drip   -Cards f/u, rates now more controlled.  2/10:now on metoprolol  2/11: cont current rx:"

## 2025-02-11 NOTE — PROGRESS NOTE ADULT - PROBLEM SELECTOR PLAN 5
-TF via NGT  -Speech and swallow f/u noted, recommending NPO  -Aspiration precautions  -Oral care  -Palliative care f/u.  2/3 seems to be doing  ok :  no sob:  on room air:  cxr is much better: : keeps NPO: per speech and swallow:  the gi does not want to put the peg:  option remains for pleasure feeds:  ; now pt for ir guided peg placement  given her general clinical condition :  she relatively at high risk for any procedure under sedtion or anesthesia:  but pulmonary wise she looks much better:  effusions have decreased and she has been on room air for days:  check ABG in am    : ABG today with mild metabolic alkalosis: no intervention; at this time  : for peg?  : now s/p pe/9: cont peg tube feeds  : cont peg tube feeds

## 2025-02-11 NOTE — PROGRESS NOTE ADULT - SUBJECTIVE AND OBJECTIVE BOX
Cardiovascular Disease Progress Note  Date of service: 02-11-25 @ 10:28    Overnight events: No acute events overnight.  Patient is in no distress.   Otherwise review of systems negative    Objective Findings:  T(C): 36.2 (02-11-25 @ 04:52), Max: 36.6 (02-10-25 @ 10:48)  HR: 90 (02-11-25 @ 04:52) (72 - 90)  BP: 179/96 (02-11-25 @ 04:52) (141/65 - 179/96)  RR: 18 (02-11-25 @ 04:52) (17 - 18)  SpO2: 97% (02-11-25 @ 04:52) (95% - 97%)  Wt(kg): --  Daily     Daily       Physical Exam:  Gen: NAD; Patient resting comfortably  HEENT: EOMI, Normocephalic/ atraumatic  CV: RRR, normal S1 + S2, no m/r/g  Lungs:  Normal respiratory effort; clear to auscultation bilaterally  Abd: soft, non-tender; bowel sounds present  Ext: No edema; warm and well perfused    Telemetry: Sinus     Laboratory Data:                    Inpatient Medications:  MEDICATIONS  (STANDING):  albuterol/ipratropium for Nebulization 3 milliLiter(s) Nebulizer every 6 hours  albuterol/ipratropium for Nebulization. 3 milliLiter(s) Nebulizer once  ascorbic acid 500 milliGRAM(s) Oral daily  bisacodyl Suppository 10 milliGRAM(s) Rectal daily  chlorhexidine 2% Cloths 1 Application(s) Topical <User Schedule>  heparin   Injectable 5000 Unit(s) SubCutaneous every 12 hours  levothyroxine 88 MICROGram(s) Enteral Tube daily  metoprolol tartrate 25 milliGRAM(s) Enteral Tube two times a day  mirtazapine 15 milliGRAM(s) Oral at bedtime  multivitamin 1 Tablet(s) Oral daily  QUEtiapine 12.5 milliGRAM(s) Oral at bedtime  sodium chloride 3%  Inhalation 4 milliLiter(s) Inhalation every 12 hours      Assessment:  89y old Female with stated hx significant for dementia, s/p brain tumor resection 1961, HTN, hypothyroidism colon Ca s/p Rt hemicolectomy ('09), 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    Plan of Care:    #Afib RVR  - HR now improved  - Currently maintaining sinus.   - Maintain K>4 and Mg >2  - BP now stable. Would continue with IV lopressor standing dose.   - PT unable to take PO meds overnight   - Patient is anemic and high bleeding risk, would refrain from AC at this time.     #Dysphagia  - S/p PEG tube placement 2/7/2025  - Defer management to GI      Over 55 minutes spent on total encounter; more than 50% of the visit was spent counseling and/or coordinating care by the attending physician.      Bobby Montilla,  [facility]  Cardiovascular Disease  [phone]

## 2025-02-12 ENCOUNTER — TRANSCRIPTION ENCOUNTER (OUTPATIENT)
Age: 89
End: 2025-02-12

## 2025-02-12 VITALS
OXYGEN SATURATION: 95 % | SYSTOLIC BLOOD PRESSURE: 158 MMHG | DIASTOLIC BLOOD PRESSURE: 73 MMHG | TEMPERATURE: 97 F | RESPIRATION RATE: 18 BRPM | HEART RATE: 81 BPM

## 2025-02-12 LAB
ANION GAP SERPL CALC-SCNC: 10 MMOL/L — SIGNIFICANT CHANGE UP (ref 5–17)
BUN SERPL-MCNC: 28 MG/DL — HIGH (ref 7–23)
CALCIUM SERPL-MCNC: 9.7 MG/DL — SIGNIFICANT CHANGE UP (ref 8.4–10.5)
CHLORIDE SERPL-SCNC: 97 MMOL/L — SIGNIFICANT CHANGE UP (ref 96–108)
CO2 SERPL-SCNC: 25 MMOL/L — SIGNIFICANT CHANGE UP (ref 22–31)
CREAT SERPL-MCNC: 0.79 MG/DL — SIGNIFICANT CHANGE UP (ref 0.5–1.3)
EGFR: 68 ML/MIN/1.73M2 — SIGNIFICANT CHANGE UP
GLUCOSE BLDC GLUCOMTR-MCNC: 144 MG/DL — HIGH (ref 70–99)
GLUCOSE SERPL-MCNC: 118 MG/DL — HIGH (ref 70–99)
HCT VFR BLD CALC: 29.6 % — LOW (ref 34.5–45)
HGB BLD-MCNC: 9.4 G/DL — LOW (ref 11.5–15.5)
MCHC RBC-ENTMCNC: 30.7 PG — SIGNIFICANT CHANGE UP (ref 27–34)
MCHC RBC-ENTMCNC: 31.8 G/DL — LOW (ref 32–36)
MCV RBC AUTO: 96.7 FL — SIGNIFICANT CHANGE UP (ref 80–100)
NRBC BLD AUTO-RTO: 0 /100 WBCS — SIGNIFICANT CHANGE UP (ref 0–0)
PLATELET # BLD AUTO: 149 K/UL — LOW (ref 150–400)
POTASSIUM SERPL-MCNC: 4.2 MMOL/L — SIGNIFICANT CHANGE UP (ref 3.5–5.3)
POTASSIUM SERPL-SCNC: 4.2 MMOL/L — SIGNIFICANT CHANGE UP (ref 3.5–5.3)
RBC # BLD: 3.06 M/UL — LOW (ref 3.8–5.2)
RBC # FLD: 16.9 % — HIGH (ref 10.3–14.5)
SODIUM SERPL-SCNC: 132 MMOL/L — LOW (ref 135–145)
WBC # BLD: 5.93 K/UL — SIGNIFICANT CHANGE UP (ref 3.8–10.5)
WBC # FLD AUTO: 5.93 K/UL — SIGNIFICANT CHANGE UP (ref 3.8–10.5)

## 2025-02-12 PROCEDURE — 86900 BLOOD TYPING SEROLOGIC ABO: CPT

## 2025-02-12 PROCEDURE — 96375 TX/PRO/DX INJ NEW DRUG ADDON: CPT

## 2025-02-12 PROCEDURE — 85730 THROMBOPLASTIN TIME PARTIAL: CPT

## 2025-02-12 PROCEDURE — 82962 GLUCOSE BLOOD TEST: CPT

## 2025-02-12 PROCEDURE — 86850 RBC ANTIBODY SCREEN: CPT

## 2025-02-12 PROCEDURE — 82040 ASSAY OF SERUM ALBUMIN: CPT

## 2025-02-12 PROCEDURE — 97164 PT RE-EVAL EST PLAN CARE: CPT

## 2025-02-12 PROCEDURE — 92526 ORAL FUNCTION THERAPY: CPT

## 2025-02-12 PROCEDURE — 96374 THER/PROPH/DIAG INJ IV PUSH: CPT

## 2025-02-12 PROCEDURE — 71250 CT THORAX DX C-: CPT | Mod: MC

## 2025-02-12 PROCEDURE — 83605 ASSAY OF LACTIC ACID: CPT

## 2025-02-12 PROCEDURE — 87637 SARSCOV2&INF A&B&RSV AMP PRB: CPT

## 2025-02-12 PROCEDURE — 84145 PROCALCITONIN (PCT): CPT

## 2025-02-12 PROCEDURE — 87086 URINE CULTURE/COLONY COUNT: CPT

## 2025-02-12 PROCEDURE — 71045 X-RAY EXAM CHEST 1 VIEW: CPT

## 2025-02-12 PROCEDURE — 82746 ASSAY OF FOLIC ACID SERUM: CPT

## 2025-02-12 PROCEDURE — 85027 COMPLETE CBC AUTOMATED: CPT

## 2025-02-12 PROCEDURE — L8699: CPT

## 2025-02-12 PROCEDURE — 97161 PT EVAL LOW COMPLEX 20 MIN: CPT

## 2025-02-12 PROCEDURE — 84443 ASSAY THYROID STIM HORMONE: CPT

## 2025-02-12 PROCEDURE — 86901 BLOOD TYPING SEROLOGIC RH(D): CPT

## 2025-02-12 PROCEDURE — 93005 ELECTROCARDIOGRAM TRACING: CPT

## 2025-02-12 PROCEDURE — 74176 CT ABD & PELVIS W/O CONTRAST: CPT | Mod: MC

## 2025-02-12 PROCEDURE — 99291 CRITICAL CARE FIRST HOUR: CPT | Mod: 25

## 2025-02-12 PROCEDURE — 82553 CREATINE MB FRACTION: CPT

## 2025-02-12 PROCEDURE — 87641 MR-STAPH DNA AMP PROBE: CPT

## 2025-02-12 PROCEDURE — 84132 ASSAY OF SERUM POTASSIUM: CPT

## 2025-02-12 PROCEDURE — 84100 ASSAY OF PHOSPHORUS: CPT

## 2025-02-12 PROCEDURE — 85014 HEMATOCRIT: CPT

## 2025-02-12 PROCEDURE — 85025 COMPLETE CBC W/AUTO DIFF WBC: CPT

## 2025-02-12 PROCEDURE — 83735 ASSAY OF MAGNESIUM: CPT

## 2025-02-12 PROCEDURE — 36600 WITHDRAWAL OF ARTERIAL BLOOD: CPT

## 2025-02-12 PROCEDURE — 80048 BASIC METABOLIC PNL TOTAL CA: CPT

## 2025-02-12 PROCEDURE — 81001 URINALYSIS AUTO W/SCOPE: CPT

## 2025-02-12 PROCEDURE — 82947 ASSAY GLUCOSE BLOOD QUANT: CPT

## 2025-02-12 PROCEDURE — 92610 EVALUATE SWALLOWING FUNCTION: CPT

## 2025-02-12 PROCEDURE — 82803 BLOOD GASES ANY COMBINATION: CPT

## 2025-02-12 PROCEDURE — 84480 ASSAY TRIIODOTHYRONINE (T3): CPT

## 2025-02-12 PROCEDURE — 97110 THERAPEUTIC EXERCISES: CPT

## 2025-02-12 PROCEDURE — 97530 THERAPEUTIC ACTIVITIES: CPT

## 2025-02-12 PROCEDURE — 80053 COMPREHEN METABOLIC PANEL: CPT

## 2025-02-12 PROCEDURE — 94660 CPAP INITIATION&MGMT: CPT

## 2025-02-12 PROCEDURE — 82550 ASSAY OF CK (CPK): CPT

## 2025-02-12 PROCEDURE — 94640 AIRWAY INHALATION TREATMENT: CPT

## 2025-02-12 PROCEDURE — 84439 ASSAY OF FREE THYROXINE: CPT

## 2025-02-12 PROCEDURE — 0241U: CPT

## 2025-02-12 PROCEDURE — 97129 THER IVNTJ 1ST 15 MIN: CPT

## 2025-02-12 PROCEDURE — 97165 OT EVAL LOW COMPLEX 30 MIN: CPT

## 2025-02-12 PROCEDURE — 92612 ENDOSCOPY SWALLOW (FEES) VID: CPT

## 2025-02-12 PROCEDURE — P9047: CPT

## 2025-02-12 PROCEDURE — 97535 SELF CARE MNGMENT TRAINING: CPT

## 2025-02-12 PROCEDURE — 82607 VITAMIN B-12: CPT

## 2025-02-12 PROCEDURE — 84484 ASSAY OF TROPONIN QUANT: CPT

## 2025-02-12 PROCEDURE — 87040 BLOOD CULTURE FOR BACTERIA: CPT

## 2025-02-12 PROCEDURE — 84436 ASSAY OF TOTAL THYROXINE: CPT

## 2025-02-12 PROCEDURE — 82330 ASSAY OF CALCIUM: CPT

## 2025-02-12 PROCEDURE — 36415 COLL VENOUS BLD VENIPUNCTURE: CPT

## 2025-02-12 PROCEDURE — 85610 PROTHROMBIN TIME: CPT

## 2025-02-12 PROCEDURE — 84295 ASSAY OF SERUM SODIUM: CPT

## 2025-02-12 PROCEDURE — 82435 ASSAY OF BLOOD CHLORIDE: CPT

## 2025-02-12 PROCEDURE — 85018 HEMOGLOBIN: CPT

## 2025-02-12 PROCEDURE — 70450 CT HEAD/BRAIN W/O DYE: CPT | Mod: MC

## 2025-02-12 PROCEDURE — 87640 STAPH A DNA AMP PROBE: CPT

## 2025-02-12 RX ORDER — METOPROLOL SUCCINATE 25 MG
1 TABLET, EXTENDED RELEASE 24 HR ORAL
Qty: 0 | Refills: 0 | DISCHARGE
Start: 2025-02-12

## 2025-02-12 RX ORDER — IPRATROPIUM BROMIDE AND ALBUTEROL SULFATE .5; 2.5 MG/3ML; MG/3ML
3 SOLUTION RESPIRATORY (INHALATION)
Qty: 0 | Refills: 0 | DISCHARGE
Start: 2025-02-12

## 2025-02-12 RX ORDER — MECOBAL/LEVOMEFOLAT CA/B6 PHOS 2-3-35 MG
1 TABLET ORAL
Qty: 0 | Refills: 0 | DISCHARGE
Start: 2025-02-12

## 2025-02-12 RX ORDER — QUETIAPINE FUMARATE 300 MG/1
0.5 TABLET ORAL
Qty: 15 | Refills: 0 | DISCHARGE
Start: 2025-02-12 | End: 2025-03-13

## 2025-02-12 RX ORDER — BISACODYL 5 MG
1 TABLET, DELAYED RELEASE (ENTERIC COATED) ORAL
Qty: 0 | Refills: 0 | DISCHARGE
Start: 2025-02-12

## 2025-02-12 RX ORDER — ASCORBIC ACID 500 MG/ML
1 VIAL (ML) INJECTION
Qty: 0 | Refills: 0 | DISCHARGE
Start: 2025-02-12

## 2025-02-12 RX ADMIN — LEVOTHYROXINE SODIUM 88 MICROGRAM(S): 25 TABLET ORAL at 05:05

## 2025-02-12 RX ADMIN — Medication 25 MILLIGRAM(S): at 05:05

## 2025-02-12 RX ADMIN — Medication 5000 UNIT(S): at 05:05

## 2025-02-12 RX ADMIN — ANTISEPTIC SURGICAL SCRUB 1 APPLICATION(S): 0.04 SOLUTION TOPICAL at 04:27

## 2025-02-12 RX ADMIN — ACETAMINOPHEN 650 MILLIGRAM(S): 160 SUSPENSION ORAL at 05:05

## 2025-02-12 NOTE — DISCHARGE NOTE NURSING/CASE MANAGEMENT/SOCIAL WORK - NSDCPEFALRISK_GEN_ALL_CORE
For information on Fall & Injury Prevention, visit: [email or web address] OR  [email or web address] OR  https://www.cdc.gov/steadi/patient.html

## 2025-02-12 NOTE — DISCHARGE NOTE PROVIDER - NSDCCPCAREPLAN_GEN_ALL_CORE_FT
PRINCIPAL DISCHARGE DIAGNOSIS  Diagnosis: Sepsis with acute hypoxic respiratory failure  Assessment and Plan of Treatment: Take all antibiotics as ordered.  Call you Health care provider upon arrival home to make a one week follow up appointment.  If you develop fever, chills, malaise, or change in mental status call your Health Care Provider or go to the Emergency Department.  Nutrition is important, eat small frequent meals to help ensure you get adequate calories.  Do not stay in bed all day!  Increase your activity daily as tolerated.        SECONDARY DISCHARGE DIAGNOSES  Diagnosis: Atrial fibrillation and flutter  Assessment and Plan of Treatment: Atrial fibrillation is the most common heart rhythm problem & has the risk of stroke & heart attack  It helps if you control your blood pressure, not drink more than 1-2 alcohol drinks per day, cut down on caffeine, getting treatment for over active thyroid gland, & getting exercise  Call your doctor if you feel your heart racing or beating unusually, chest tightness or pain, lightheaded, faint, shortness of breath especially with exercise  It is important to take your heart medication as prescribed  You may be on anticoagulation which is very important to take as directed - you may need blood work to monitor drug levels      Diagnosis: Acute kidney injury superimposed on CKD  Assessment and Plan of Treatment: Avoid taking (NSAIDs) - (ex: Ibuprofen, Advil, Celebrex, Naprosyn)  Avoid taking any nephrotoxic agents (can harm kidneys) - Intravenous contrast for diagnostic testing, combination cold medications.  Have all medications adjusted for your renal function by your Health Care Provider.  Blood pressure control is important.  Take all medication as prescribed.

## 2025-02-12 NOTE — PROGRESS NOTE ADULT - PROBLEM SELECTOR PROBLEM 1
Acute hypoxic respiratory failure
Septic shock
Acute hypoxic respiratory failure
Dementia
Acute hypoxic respiratory failure
Dementia
Septic shock
Acute hypoxic respiratory failure
Septic shock
Acute hypoxic respiratory failure
Septic shock
Acute hypoxic respiratory failure
Septic shock
Septic shock
Dementia
Septic shock
Acute hypoxic respiratory failure
Acute hypoxic respiratory failure
Septic shock
Septic shock
Acute hypoxic respiratory failure
Septic shock

## 2025-02-12 NOTE — DISCHARGE NOTE NURSING/CASE MANAGEMENT/SOCIAL WORK - FINANCIAL ASSISTANCE
[facility] provides services at a reduced cost to those who are determined to be eligible through [facility]’s financial assistance program. Information regarding [facility]’s financial assistance program can be found by going to [email or web address] or by calling [phone].

## 2025-02-12 NOTE — PROGRESS NOTE ADULT - REASON FOR ADMISSION
Hypoxic/hypercapnic resp failure
Hypoxic/hypercapnic respiratory failure
Hypoxic/hypercapnic resp failure
Hypoxic/hypercapnic respiratory failure
Hypoxic/hypercapnic resp failure
Hypoxic/hypercapnic resp failure
Hypoxic/hypercapnic respiratory failure
Hypoxic/hypercapnic resp failure
Hypoxic/hypercapnic respiratory failure
Hypoxic/hypercapnic resp failure
Hypoxic/hypercapnic respiratory failure
Hypoxic/hypercapnic resp failure
Hypoxic/hypercapnic respiratory failure
Hypoxic/hypercapnic resp failure
Hypoxic/hypercapnic respiratory failure
Hypoxic/hypercapnic resp failure
Hypoxic/hypercapnic resp failure
Hypoxic/hypercapnic respiratory failure
Hypoxic/hypercapnic resp failure
Hypoxic/hypercapnic respiratory failure
Hypoxic/hypercapnic resp failure
Hypoxic/hypercapnic resp failure
Hypoxic/hypercapnic respiratory failure
Hypoxic/hypercapnic resp failure
Hypoxic/hypercapnic respiratory failure
Hypoxic/hypercapnic resp failure

## 2025-02-12 NOTE — PROGRESS NOTE ADULT - NUTRITIONAL ASSESSMENT
This patient has been assessed with a concern for Malnutrition and has been determined to have a diagnosis/diagnoses of Moderate protein-calorie malnutrition.    This patient is being managed with:   Diet NPO-  Entered: Jan 26 2025  4:34PM  
This patient has been assessed with a concern for Malnutrition and has been determined to have a diagnosis/diagnoses of Moderate protein-calorie malnutrition.    This patient is being managed with:   Diet NPO with Tube Feed-  Tube Feeding Modality: Gastrostomy  Jevity 1.5 Casper (JEVITY1.5RTH)  Total Volume for 24 Hours (mL): 1080  Continuous  Starting Tube Feed Rate {mL per Hour}: 10  Increase Tube Feed Rate by (mL): 10     Every 24 hours  Until Goal Tube Feed Rate (mL per Hour): 45  Tube Feed Duration (in Hours): 24  Tube Feed Start Time: 12:00    Start Time: 00:00  Entered: Feb 8 2025  2:27PM  
This patient has been assessed with a concern for Malnutrition and has been determined to have a diagnosis/diagnoses of Moderate protein-calorie malnutrition.    This patient is being managed with:   Diet NPO-  Entered: Jan 26 2025  4:34PM  
This patient has been assessed with a concern for Malnutrition and has been determined to have a diagnosis/diagnoses of Moderate protein-calorie malnutrition.    This patient is being managed with:   Diet NPO with Tube Feed-  Tube Feeding Modality: Gastrostomy  Jevity 1.5 Casper (JEVITY1.5RTH)  Total Volume for 24 Hours (mL): 1080  Continuous  Starting Tube Feed Rate {mL per Hour}: 10  Increase Tube Feed Rate by (mL): 10     Every 24 hours  Until Goal Tube Feed Rate (mL per Hour): 45  Tube Feed Duration (in Hours): 24  Tube Feed Start Time: 12:00    Start Time: 00:00  Entered: Feb 8 2025  2:27PM  
This patient has been assessed with a concern for Malnutrition and has been determined to have a diagnosis/diagnoses of Moderate protein-calorie malnutrition.    This patient is being managed with:   Diet NPO-  Entered: Jan 26 2025  4:34PM  

## 2025-02-12 NOTE — PROGRESS NOTE ADULT - PROVIDER SPECIALTY LIST ADULT
Cardiology
Gastroenterology
Internal Medicine
Nephrology
Nephrology
Pulmonology
Cardiology
Gastroenterology
Internal Medicine
MICU
MICU
Nephrology
Pulmonology
Cardiology
Gastroenterology
Internal Medicine
Internal Medicine
Nephrology
Pulmonology
Gastroenterology
Internal Medicine
Nephrology
Pulmonology
Internal Medicine
Internal Medicine
Palliative Care
Pulmonology
Pulmonology
Internal Medicine
Internal Medicine
Palliative Care
Pulmonology
Internal Medicine
Palliative Care
Internal Medicine
Pulmonology
Pulmonology
Internal Medicine
Pulmonology
Palliative Care
Pulmonology
Internal Medicine
Palliative Care
Pulmonology
Internal Medicine
Internal Medicine
Pulmonology
Internal Medicine
Pulmonology
Internal Medicine
Pulmonology
Pulmonology
Internal Medicine

## 2025-02-12 NOTE — DISCHARGE NOTE PROVIDER - NSDCMRMEDTOKEN_GEN_ALL_CORE_FT
ascorbic acid 500 mg oral tablet: 1 tab(s) orally once a day  bisacodyl 10 mg rectal suppository: 1 suppository(ies) rectal once a day  ipratropium-albuterol 0.5 mg-2.5 mg/3 mL inhalation solution: 3 milliliter(s) inhaled every 6 hours  levothyroxine 88 mcg (0.088 mg) oral tablet: 1 tab(s) orally once a day  metoprolol tartrate 25 mg oral tablet: 1 tab(s) orally 2 times a day  mirtazapine 15 mg oral tablet: 1 tab(s) orally once a day (at bedtime)  Multiple Vitamins oral tablet: 1 tab(s) orally once a day  QUEtiapine: 12.5 mg via PEG at bedtime   ascorbic acid 500 mg oral tablet: 1 tab(s) orally once a day  bisacodyl 10 mg rectal suppository: 1 suppository(ies) rectal once a day  ipratropium-albuterol 0.5 mg-2.5 mg/3 mL inhalation solution: 3 milliliter(s) inhaled every 6 hours  levothyroxine 88 mcg (0.088 mg) oral tablet: 1 tab(s) orally once a day  metoprolol tartrate 25 mg oral tablet: 1 tab(s) orally 2 times a day  mirtazapine 15 mg oral tablet: 1 tab(s) orally once a day (at bedtime)  Multiple Vitamins oral tablet: 1 tab(s) orally once a day

## 2025-02-12 NOTE — DISCHARGE NOTE PROVIDER - HOSPITAL COURSE
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 from nursing home via EMS with hypoxic resp failure since 1/9/25 secondary to suspected pneum, treated with HFNC, son endorsed pt was ordered for antibx treatment at time as well. EMS endorsed nursing home facility O2 tank found off, and placed pt on NRB.        In E.D. pt found to be obtunded/unresponsive and to have hypoxic/hypercapnic resp failure with SPO2 of 86-88, Vph of 7.07, VPCO2 90, placed on HFNC with FIO2 of 100%, 50 liter flow with improvement to 92-94% . In addition found to have Influenza A positive, DWAIN with sCr of 1.62 on CKD2 (baseline 1.08-1.23 [8/2024]), transaminitis - alk-phos 577, , , +UA with large leuk-est, neg nitrite. SBP of 98/46 - 107/46. Pt received vanco/zosyn, 1 liter NS IVF bolus, pt placed on norepi gtt. Pt received narcan 0.2 mg IVP x1, albuterol nebs x3. Upon consult POCUS with A line predominant, few focal B lines mostly in Left lung field, small area of consolidation in RLL field, mod/large Lt pleural effusion with LLL field atelectasis. Pt with initial norepi at 0.13 mcg/min, titrated down to 0.05 mcg/min over 35 minutes (130/64 -> 112/64),  Consult called for and pt admitted to MICU for hypercapnic/hypoxic resp failure/septic shock in the setting of possible RLL pneum and UTI    Hospital Course:  MICU admit Flu/pneumonia, required pressors  S/p PEG tube placement 2/7/2025, keep binder in place to prevent inadvertent removal     Important Medication Changes and Reason:  see MAR    Active or Pending Issues Requiring Follow-up:  PCP  Pulmonology    Advanced Directives:   [ ] Full code  [ X] DNR  [ ] Hospice    Discharge Diagnoses:  Sepsis/Pneumonia  Afib RvR  DWAIN on CKD

## 2025-02-12 NOTE — DISCHARGE NOTE PROVIDER - CARE PROVIDER_API CALL
Ayo Link [facility]  Internal Medicine  [address] [place] [address]  Phone: [phone]  Fax: [phone]  Follow Up Time: 2 weeks

## 2025-02-12 NOTE — PROGRESS NOTE ADULT - SUBJECTIVE AND OBJECTIVE BOX
[facility]      Zaid Garcia NP    [address] [place], [place] [id number] [phone]    INTERVAL HPI/ OVERNIGHT EVENTS:  pt s/e   tolerating tf    Allergies    clindamycin (Unknown)  shellfish (Unknown)  strawberry (Rash)  penicillin (Unknown)  clindamycin (Other)  strawberry (Unknown)  IV Contrast (Other)  IV Contrast (Unknown)    Intolerances      unable to obtain      PHYSICAL EXAM:   Vital Signs Last 24 Hrs  T(C): 36.4 (02 Feb 2025 12:00), Max: 36.6 (02 Feb 2025 05:07)  T(F): 97.5 (02 Feb 2025 12:00), Max: 97.8 (02 Feb 2025 05:07)  HR: 69 (02 Feb 2025 12:00) (64 - 73)  BP: 151/71 (02 Feb 2025 12:00) (112/72 - 157/79)  BP(mean): 97 (02 Feb 2025 12:00) (97 - 97)  RR: 18 (02 Feb 2025 12:00) (18 - 18)  SpO2: 95% (02 Feb 2025 12:00) (93% - 100%)    Parameters below as of 02 Feb 2025 12:00  Patient On (Oxygen Delivery Method): room air    Daily     Daily I&O's Summary    19 Jan 2025 07:01  -  19 Jan 2025 15:01  --------------------------------------------------------  IN: 0 mL / OUT: 150 mL / NET: -150 mL      nad  confused  frail  non toxic  soft, nt  no edema        LABS:                        9.7    5.63  )-----------( 237      ( 02 Feb 2025 07:22 )             29.4      02-02    132[L]  |  91[L]  |  11  ----------------------------<  75  3.1[L]   |  28  |  1.20    Ca    9.4      02 Feb 2025 07:25                      Urinalysis Basic - ( 19 Jan 2025 07:30 )    Color: x / Appearance: x / SG: x / pH: x  Gluc: 114 mg/dL / Ketone: x  / Bili: x / Urobili: x   Blood: x / Protein: x / Nitrite: x   Leuk Esterase: x / RBC: x / WBC x   Sq Epi: x / Non Sq Epi: x / Bacteria: x      amylase   lipase  RADIOLOGY & ADDITIONAL TESTS:

## 2025-02-12 NOTE — DISCHARGE NOTE PROVIDER - DETAILS OF MALNUTRITION DIAGNOSIS/DIAGNOSES
This patient has been assessed with a concern for Malnutrition and was treated during this hospitalization for the following Nutrition diagnosis/diagnoses:     -  02/03/2025: Moderate protein-calorie malnutrition

## 2025-02-12 NOTE — PROGRESS NOTE ADULT - PROBLEM SELECTOR PROBLEM 2
Atrial fibrillation and flutter
Atrial fibrillation and flutter
Pneumonia
Pneumonia
Acute hypoxic respiratory failure
Atrial fibrillation and flutter
Pneumonia
Acute hypoxic respiratory failure
Pneumonia
Acute hypoxic respiratory failure
Atrial fibrillation and flutter
Pneumonia
Atrial fibrillation and flutter
Pneumonia
Acute hypoxic respiratory failure
Pneumonia
Pneumonia
Acute hypoxic respiratory failure
Atrial fibrillation and flutter
Pneumonia
Acute hypoxic respiratory failure
Atrial fibrillation and flutter
Acute hypoxic respiratory failure
Atrial fibrillation and flutter
Pneumonia
Pneumonia
Atrial fibrillation and flutter
Atrial fibrillation and flutter
Pneumonia
Atrial fibrillation and flutter
Atrial fibrillation and flutter
Metabolic encephalopathy
Acute hypoxic respiratory failure
Atrial fibrillation and flutter
Atrial fibrillation and flutter
Metabolic encephalopathy
Pneumonia
Pneumonia
Acute hypoxic respiratory failure
Acute hypoxic respiratory failure
Atrial fibrillation and flutter
Metabolic encephalopathy
Pneumonia
Pneumonia
Atrial fibrillation and flutter
Acute hypoxic respiratory failure
Acute hypoxic respiratory failure
Metabolic encephalopathy
Metabolic encephalopathy

## 2025-02-12 NOTE — PROGRESS NOTE ADULT - SUBJECTIVE AND OBJECTIVE BOX
Cardiovascular Disease Progress Note  Date of service: 02-12-25 @ 08:33    Overnight events: No acute events overnight.  Patient is in no distress  Otherwise review of systems negative    Objective Findings:  T(C): 36.3 (02-12-25 @ 05:00), Max: 36.5 (02-11-25 @ 21:02)  HR: 83 (02-12-25 @ 05:00) (76 - 85)  BP: 166/89 (02-12-25 @ 05:00) (124/70 - 166/89)  RR: 18 (02-12-25 @ 05:00) (18 - 18)  SpO2: 98% (02-12-25 @ 05:00) (94% - 98%)  Wt(kg): --  Daily     Daily       Physical Exam:  Gen: NAD; Patient resting comfortably  HEENT: EOMI, Normocephalic/ atraumatic  CV: RRR, normal S1 + S2, no m/r/g  Lungs:  Normal respiratory effort; clear to auscultation bilaterally  Abd: soft, non-tender; bowel sounds present  Ext: No edema; warm and well perfused    Telemetry: Sinus     Laboratory Data:                        9.4    5.93  )-----------( 149      ( 12 Feb 2025 07:16 )             29.6     02-12    132[L]  |  97  |  28[H]  ----------------------------<  118[H]  4.2   |  25  |  0.79    Ca    9.7      12 Feb 2025 07:16                Inpatient Medications:  MEDICATIONS  (STANDING):  albuterol/ipratropium for Nebulization 3 milliLiter(s) Nebulizer every 6 hours  albuterol/ipratropium for Nebulization. 3 milliLiter(s) Nebulizer once  ascorbic acid 500 milliGRAM(s) Oral daily  bisacodyl Suppository 10 milliGRAM(s) Rectal daily  chlorhexidine 2% Cloths 1 Application(s) Topical <User Schedule>  heparin   Injectable 5000 Unit(s) SubCutaneous every 12 hours  levothyroxine 88 MICROGram(s) Enteral Tube daily  metoprolol tartrate 25 milliGRAM(s) Enteral Tube two times a day  mirtazapine 15 milliGRAM(s) Oral at bedtime  multivitamin 1 Tablet(s) Oral daily  QUEtiapine 12.5 milliGRAM(s) Oral at bedtime  sodium chloride 3%  Inhalation 4 milliLiter(s) Inhalation every 12 hours      Assessment:  89y old Female with stated hx significant for dementia, s/p brain tumor resection 1961, HTN, hypothyroidism colon Ca s/p Rt hemicolectomy ('09), 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    Plan of Care:    #Afib RVR  - HR now improved  - Currently maintaining sinus.   - Maintain K>4 and Mg >2  - BP now stable. Would continue with IV lopressor standing dose.   - PT unable to take PO meds overnight   - Patient is anemic and high bleeding risk, would refrain from AC at this time.     #Dysphagia  - S/p PEG tube placement 2/7/2025  - Defer management to GI    #ACP (advance care planning)-  Advanced care planning was discussed. 30 additional minutes spent addressing advance care plans.          Over 55 minutes spent on total encounter; more than 50% of the visit was spent counseling and/or coordinating care by the attending physician.      Bobby Montilla,  [facility]  Cardiovascular Disease  [phone]

## 2025-02-12 NOTE — DISCHARGE NOTE NURSING/CASE MANAGEMENT/SOCIAL WORK - PATIENT PORTAL LINK FT
You can access the FollowMyHealth Patient Portal offered by [facility] by registering at the following website: [email or web address]. By joining [employer]’s FollowMyHealth portal, you will also be able to view your health information using other applications (apps) compatible with our system.

## 2025-02-12 NOTE — CHART NOTE - NSCHARTNOTESELECT_GEN_ALL_CORE
Event Note
MICU Transfer Note/Event Note
Nutrition Services
POCUS NOTE/Event Note
Palliative care/Event Note
RRT/Event Note
Speech & Swallow
logged NGT/Event Note
Event Note
Event Note
Nutrition Services
RRT, NGT removal/Event Note
Speech & Swallow

## 2025-02-12 NOTE — PROGRESS NOTE ADULT - SUBJECTIVE AND OBJECTIVE BOX
Patient is a 89y old  Female who presents with a chief complaint of Hypoxic/hypercapnic resp failure (12 Feb 2025 10:48)      DATE OF SERVICE: 02-12-25 @ 13:43    SUBJECTIVE / OVERNIGHT EVENTS: overnight events noted    ROS: not available   no new events        MEDICATIONS  (STANDING):  albuterol/ipratropium for Nebulization 3 milliLiter(s) Nebulizer every 6 hours  albuterol/ipratropium for Nebulization. 3 milliLiter(s) Nebulizer once  ascorbic acid 500 milliGRAM(s) Oral daily  bisacodyl Suppository 10 milliGRAM(s) Rectal daily  chlorhexidine 2% Cloths 1 Application(s) Topical <User Schedule>  heparin   Injectable 5000 Unit(s) SubCutaneous every 12 hours  levothyroxine 88 MICROGram(s) Enteral Tube daily  metoprolol tartrate 25 milliGRAM(s) Enteral Tube two times a day  mirtazapine 15 milliGRAM(s) Oral at bedtime  multivitamin 1 Tablet(s) Oral daily  QUEtiapine 12.5 milliGRAM(s) Oral at bedtime  sodium chloride 3%  Inhalation 4 milliLiter(s) Inhalation every 12 hours    MEDICATIONS  (PRN):  acetaminophen   Oral Liquid .. 650 milliGRAM(s) Oral every 6 hours PRN Temp greater or equal to 38C (100.4F), Moderate Pain (4 - 6)  haloperidol    Injectable 1 milliGRAM(s) IntraMuscular every 6 hours PRN Agitation        CAPILLARY BLOOD GLUCOSE      POCT Blood Glucose.: 144 mg/dL (12 Feb 2025 01:25)  POCT Blood Glucose.: 109 mg/dL (11 Feb 2025 18:37)    I&O's Summary    11 Feb 2025 07:01  -  12 Feb 2025 07:00  --------------------------------------------------------  IN: 0 mL / OUT: 50 mL / NET: -50 mL        Vital Signs Last 24 Hrs  T(C): 36.3 (12 Feb 2025 10:57), Max: 36.5 (11 Feb 2025 21:02)  T(F): 97.4 (12 Feb 2025 10:57), Max: 97.7 (11 Feb 2025 21:02)  HR: 81 (12 Feb 2025 10:57) (76 - 85)  BP: 158/73 (12 Feb 2025 10:57) (124/70 - 166/89)  BP(mean): --  RR: 18 (12 Feb 2025 10:57) (18 - 18)  SpO2: 95% (12 Feb 2025 10:57) (94% - 98%)      PHYSICAL EXAM:   CHEST/LUNG: clear  HEART: S1 S2; no murmurs   ABDOMEN: Soft, Nontender  NEUROLOGY: non-focal    LABS:                        9.4    5.93  )-----------( 149      ( 12 Feb 2025 07:16 )             29.6     02-12    132[L]  |  97  |  28[H]  ----------------------------<  118[H]  4.2   |  25  |  0.79    Ca    9.7      12 Feb 2025 07:16            Urinalysis Basic - ( 12 Feb 2025 07:16 )    Color: x / Appearance: x / SG: x / pH: x  Gluc: 118 mg/dL / Ketone: x  / Bili: x / Urobili: x   Blood: x / Protein: x / Nitrite: x   Leuk Esterase: x / RBC: x / WBC x   Sq Epi: x / Non Sq Epi: x / Bacteria: x          All consultant(s) notes reviewed and care discussed with other providers        Contact Number, Dr Link [phone]

## 2025-02-12 NOTE — CHART NOTE - NSCHARTNOTEFT_GEN_A_CORE
NUTRITION FOLLOW UP NOTE    PATIENT SEEN FOR: nutrition follow-up    SOURCE: [] Patient  [x] Current Medical Record  [x] RN  [] Family/support person at bedside  [x] Patient unavailable/inappropriate  [] Other:    CHART REVIEWED/EVENTS NOTED.  [] No changes to nutrition care plan to note  [x] Nutrition Status:  - Pt underwent swallow evaluation on 2/3, Speech Language Pathologist noted "Suggest MD makes dietary decision in accordance with Pt/family/HCP wishes as NPO is recommended based upon the results of this examination however Palliative Care consult in progress and may guide decision" (per chart).   - s/p EGD/PEG placement on , feeds initiated on   -switched from IV synthroid to synthroid via PEG on    -discharge planning      DIET ORDER:   Diet, NPO with Tube Feed:   Tube Feeding Modality: Gastrostomy  Jevity 1.5 [place] (JEVITY1.5RTH)  Total Volume for 24 Hours (mL): 1080  Continuous  Starting Tube Feed Rate {mL per Hour}: 10  Increase Tube Feed Rate by (mL): 10     Every 24 hours  Until Goal Tube Feed Rate (mL per Hour): 45  Tube Feed Duration (in Hours): 24  Tube Feed Start Time: 12:00    Start Time: 00:00 (25)      CURRENT DIET ORDER IS:  [] Appropriate:  [] Inadequate:  [x] Other: recommend change to 20hr feeds in setting of synthroid     NUTRITION INTAKE/PROVISION:  [] PO:  [x] Enteral Nutrition: pt observed with tube feeds running at goal of 45ml/hr x 24hrs. Per RN no issues with intolerance note.   [] Parenteral Nutrition:    ANTHROPOMETRICS:  Drug Dosing Weight  Height (cm): 160 (2025 16:05)  Weight (kg): 53 (2025 16:05)  BMI (kg/m2): 20.7 (2025 16:05)  BSA (m2): 1.54 (2025 16:05)  Weights:   Daily Weight in k.4 ()     MEDICATIONS:  MEDICATIONS  (STANDING):  albuterol/ipratropium for Nebulization 3 milliLiter(s) Nebulizer every 6 hours  albuterol/ipratropium for Nebulization. 3 milliLiter(s) Nebulizer once  ascorbic acid 500 milliGRAM(s) Oral daily  bisacodyl Suppository 10 milliGRAM(s) Rectal daily  chlorhexidine 2% Cloths 1 Application(s) Topical <User Schedule>  heparin   Injectable 5000 Unit(s) SubCutaneous every 12 hours  levothyroxine 88 MICROGram(s) Enteral Tube daily  metoprolol tartrate 25 milliGRAM(s) Enteral Tube two times a day  mirtazapine 15 milliGRAM(s) Oral at bedtime  multivitamin 1 Tablet(s) Oral daily  QUEtiapine 12.5 milliGRAM(s) Oral at bedtime  sodium chloride 3%  Inhalation 4 milliLiter(s) Inhalation every 12 hours    MEDICATIONS  (PRN):  acetaminophen   Oral Liquid .. 650 milliGRAM(s) Oral every 6 hours PRN Temp greater or equal to 38C (100.4F), Moderate Pain (4 - 6)  haloperidol    Injectable 1 milliGRAM(s) IntraMuscular every 6 hours PRN Agitation      NUTRITIONALLY PERTINENT LABS:   Na132 mmol/L[L] Glu 118 mg/dL[H] K+ 4.2 mmol/L Cr  0.79 mg/dL BUN 28 mg/dL[H]  Phos 2.7 mg/dL  Alb 2.6 g/dL[L] ALT 8 U/L[L] AST 12 U/L Alkaline Phosphatase 110 U/L            Finger Sticks:  POCT Blood Glucose.: 144 mg/dL ( @ 01:25)  POCT Blood Glucose.: 109 mg/dL ( @ 18:37)  POCT Blood Glucose.: 92 mg/dL ( @ 11:50)      NUTRITIONALLY PERTINENT MEDICATIONS/LABS:  [x] Reviewed  [x] Relevant notes on medications/labs: hyponatremia noted     EDEMA:  [x] Reviewed  [] Relevant notes:    GI/ I&O:  [x] Reviewed  [] Relevant notes:  [] Other:    SKIN:   [] No pressure injuries documented, per nursing flowsheet  [x] Pressure injury previously noted  -bilateral buttocks, sacrum suspected deep tissue injury   -left heel suspected deep tissue injury   -right heel suspected deep tissue injury     [] Change in pressure injury documentation:  [] Other:    ESTIMATED NEEDS:  [x] No change:  [] Updated:  Energy:  1945-9293 kcal/day (30-35cal/kg)  Protein:  64-79 g/day (1.2-1.5g/kg)  Fluid:   ml/day or [x] defer to team  Based on: dosing weight of 53 kg (116.8 pounds) () with consideration for pressure injuries, malnutrition.     NUTRITION DIAGNOSIS:  [x] Prior Dx:  1) Increased Nutrient Needs  2) Moderate Acute Malnutrition   [] New Dx:    EDUCATION:  [] Yes:  [x] Not appropriate/warranted    NUTRITION CARE PLAN:  1. Recommend change feeds to Jevity 1.5 at 55 mL/hr x 20 hours to provide: 1,100 mL formula, 1,650 calories, 70 g protein and 836 mL of free water to allow for Synthroid administration.   2.  Monitor GI tolerance to EN regimen, RD remains available to adjust rate/formula PRN.   3. Multivitamin/mineral supplementation: continue multivitamin, ascorbic acid daily     MONITORING AND EVALUATION:   RD remains available upon request and will follow up per protocol.    Rachele Mar RD, CDN, CDCES, Available on Teams

## 2025-02-23 ENCOUNTER — INPATIENT (INPATIENT)
Facility: HOSPITAL | Age: 89
LOS: 4 days | Discharge: NURSING, REHAB OR LONG-TERM CARE | DRG: 871 | End: 2025-02-28
Attending: INTERNAL MEDICINE | Admitting: INTERNAL MEDICINE
Payer: MEDICARE

## 2025-02-23 VITALS
DIASTOLIC BLOOD PRESSURE: 65 MMHG | WEIGHT: 110.01 LBS | RESPIRATION RATE: 22 BRPM | SYSTOLIC BLOOD PRESSURE: 109 MMHG | HEART RATE: 92 BPM | TEMPERATURE: 98 F | OXYGEN SATURATION: 93 % | HEIGHT: 63 IN

## 2025-02-23 DIAGNOSIS — Z87.898 PERSONAL HISTORY OF OTHER SPECIFIED CONDITIONS: Chronic | ICD-10-CM

## 2025-02-23 DIAGNOSIS — A41.9 SEPSIS, UNSPECIFIED ORGANISM: ICD-10-CM

## 2025-02-23 DIAGNOSIS — Z85.038 PERSONAL HISTORY OF OTHER MALIGNANT NEOPLASM OF LARGE INTESTINE: Chronic | ICD-10-CM

## 2025-02-23 LAB
ALBUMIN SERPL ELPH-MCNC: 2.6 G/DL — LOW (ref 3.3–5)
ALP SERPL-CCNC: 154 U/L — HIGH (ref 40–120)
ALT FLD-CCNC: 37 U/L — SIGNIFICANT CHANGE UP (ref 10–45)
ANION GAP SERPL CALC-SCNC: 10 MMOL/L — SIGNIFICANT CHANGE UP (ref 5–17)
APTT BLD: 27.8 SEC — SIGNIFICANT CHANGE UP (ref 24.5–35.6)
AST SERPL-CCNC: 30 U/L — SIGNIFICANT CHANGE UP (ref 10–40)
BASOPHILS # BLD AUTO: 0.05 K/UL — SIGNIFICANT CHANGE UP (ref 0–0.2)
BASOPHILS NFR BLD AUTO: 0.4 % — SIGNIFICANT CHANGE UP (ref 0–2)
BILIRUB SERPL-MCNC: 0.4 MG/DL — SIGNIFICANT CHANGE UP (ref 0.2–1.2)
BUN SERPL-MCNC: 35 MG/DL — HIGH (ref 7–23)
CALCIUM SERPL-MCNC: 9.5 MG/DL — SIGNIFICANT CHANGE UP (ref 8.4–10.5)
CHLORIDE SERPL-SCNC: 95 MMOL/L — LOW (ref 96–108)
CO2 SERPL-SCNC: 24 MMOL/L — SIGNIFICANT CHANGE UP (ref 22–31)
CREAT SERPL-MCNC: 0.99 MG/DL — SIGNIFICANT CHANGE UP (ref 0.5–1.3)
EGFR: 52 ML/MIN/1.73M2 — LOW
EOSINOPHIL # BLD AUTO: 0.09 K/UL — SIGNIFICANT CHANGE UP (ref 0–0.5)
EOSINOPHIL NFR BLD AUTO: 0.8 % — SIGNIFICANT CHANGE UP (ref 0–6)
FLUAV AG NPH QL: SIGNIFICANT CHANGE UP
FLUBV AG NPH QL: SIGNIFICANT CHANGE UP
GAS PNL BLDV: SIGNIFICANT CHANGE UP
GLUCOSE SERPL-MCNC: 106 MG/DL — HIGH (ref 70–99)
HCT VFR BLD CALC: 27.2 % — LOW (ref 34.5–45)
HGB BLD-MCNC: 8.9 G/DL — LOW (ref 11.5–15.5)
IMM GRANULOCYTES NFR BLD AUTO: 0.7 % — SIGNIFICANT CHANGE UP (ref 0–0.9)
INR BLD: 1.03 RATIO — SIGNIFICANT CHANGE UP (ref 0.85–1.16)
LYMPHOCYTES # BLD AUTO: 0.78 K/UL — LOW (ref 1–3.3)
LYMPHOCYTES # BLD AUTO: 6.9 % — LOW (ref 13–44)
MCHC RBC-ENTMCNC: 31.9 PG — SIGNIFICANT CHANGE UP (ref 27–34)
MCHC RBC-ENTMCNC: 32.7 G/DL — SIGNIFICANT CHANGE UP (ref 32–36)
MCV RBC AUTO: 97.5 FL — SIGNIFICANT CHANGE UP (ref 80–100)
MONOCYTES # BLD AUTO: 0.53 K/UL — SIGNIFICANT CHANGE UP (ref 0–0.9)
MONOCYTES NFR BLD AUTO: 4.7 % — SIGNIFICANT CHANGE UP (ref 2–14)
NEUTROPHILS # BLD AUTO: 9.75 K/UL — HIGH (ref 1.8–7.4)
NEUTROPHILS NFR BLD AUTO: 86.5 % — HIGH (ref 43–77)
NRBC BLD AUTO-RTO: 0 /100 WBCS — SIGNIFICANT CHANGE UP (ref 0–0)
PLATELET # BLD AUTO: 292 K/UL — SIGNIFICANT CHANGE UP (ref 150–400)
POTASSIUM SERPL-MCNC: 4.6 MMOL/L — SIGNIFICANT CHANGE UP (ref 3.5–5.3)
POTASSIUM SERPL-SCNC: 4.6 MMOL/L — SIGNIFICANT CHANGE UP (ref 3.5–5.3)
PROT SERPL-MCNC: 7.4 G/DL — SIGNIFICANT CHANGE UP (ref 6–8.3)
PROTHROM AB SERPL-ACNC: 11.8 SEC — SIGNIFICANT CHANGE UP (ref 9.9–13.4)
RBC # BLD: 2.79 M/UL — LOW (ref 3.8–5.2)
RBC # FLD: 16.6 % — HIGH (ref 10.3–14.5)
RSV RNA NPH QL NAA+NON-PROBE: SIGNIFICANT CHANGE UP
SARS-COV-2 RNA SPEC QL NAA+PROBE: SIGNIFICANT CHANGE UP
SODIUM SERPL-SCNC: 129 MMOL/L — LOW (ref 135–145)
TROPONIN T, HIGH SENSITIVITY RESULT: 67 NG/L — HIGH (ref 0–51)
TROPONIN T, HIGH SENSITIVITY RESULT: 67 NG/L — HIGH (ref 0–51)
WBC # BLD: 11.28 K/UL — HIGH (ref 3.8–10.5)
WBC # FLD AUTO: 11.28 K/UL — HIGH (ref 3.8–10.5)

## 2025-02-23 PROCEDURE — 71045 X-RAY EXAM CHEST 1 VIEW: CPT | Mod: 26

## 2025-02-23 PROCEDURE — 99223 1ST HOSP IP/OBS HIGH 75: CPT

## 2025-02-23 PROCEDURE — 99497 ADVNCD CARE PLAN 30 MIN: CPT | Mod: 25

## 2025-02-23 PROCEDURE — 99285 EMERGENCY DEPT VISIT HI MDM: CPT | Mod: FS

## 2025-02-23 RX ORDER — ACETAMINOPHEN 500 MG/5ML
750 LIQUID (ML) ORAL ONCE
Refills: 0 | Status: COMPLETED | OUTPATIENT
Start: 2025-02-23 | End: 2025-02-23

## 2025-02-23 RX ORDER — CEFTRIAXONE 500 MG/1
1000 INJECTION, POWDER, FOR SOLUTION INTRAMUSCULAR; INTRAVENOUS ONCE
Refills: 0 | Status: COMPLETED | OUTPATIENT
Start: 2025-02-23 | End: 2025-02-23

## 2025-02-23 RX ORDER — ACETAMINOPHEN 500 MG/5ML
650 LIQUID (ML) ORAL EVERY 6 HOURS
Refills: 0 | Status: DISCONTINUED | OUTPATIENT
Start: 2025-02-23 | End: 2025-02-28

## 2025-02-23 RX ORDER — SODIUM CHLORIDE 9 G/1000ML
500 INJECTION, SOLUTION INTRAVENOUS ONCE
Refills: 0 | Status: COMPLETED | OUTPATIENT
Start: 2025-02-23 | End: 2025-02-23

## 2025-02-23 RX ADMIN — Medication 300 MILLIGRAM(S): at 16:35

## 2025-02-23 RX ADMIN — SODIUM CHLORIDE 500 MILLILITER(S): 9 INJECTION, SOLUTION INTRAVENOUS at 16:35

## 2025-02-23 RX ADMIN — CEFTRIAXONE 100 MILLIGRAM(S): 500 INJECTION, POWDER, FOR SOLUTION INTRAMUSCULAR; INTRAVENOUS at 20:53

## 2025-02-23 NOTE — H&P ADULT - NSHPPHYSICALEXAM_GEN_ALL_CORE
Vital Signs Last 24 Hrs  T(C): 36.3 (23 Feb 2025 22:46), Max: 37.8 (23 Feb 2025 15:45)  T(F): 97.3 (23 Feb 2025 22:46), Max: 100 (23 Feb 2025 15:45)  HR: 80 (23 Feb 2025 22:46) (80 - 92)  BP: 128/74 (23 Feb 2025 22:46) (94/54 - 161/74)  BP(mean): 67 (23 Feb 2025 15:45) (67 - 67)  RR: 18 (23 Feb 2025 22:46) (16 - 24)  SpO2: 100% (23 Feb 2025 22:46) (93% - 100%)    Parameters below as of 23 Feb 2025 22:46  Patient On (Oxygen Delivery Method): nasal cannula  O2 Flow (L/min): 3      CONSTITUTIONAL: NAD, thin elderly woman, lethargic-appearing, resting in bed  EYES: sclera clear  ENMT: Moist oral mucosa, L facial droop noted; NC in place  NECK: Supple  RESPIRATORY: Decreased breath sounds in lower lung fields b/l; no wheezes or rales appreciates  CARDIOVASCULAR: Regular rate and rhythm; Normal S1 and S2; No murmurs, rubs, or gallops; No lower extremity edema; Peripheral pulses are palpable bilaterally  ABDOMEN: Soft, Nontender, Nondistended; Bowel sounds present  MUSCULOSKELETAL:  No cyanosis of digits  PSYCH: Appears least AAOx1 (oriented to self), otherwise was non-participatory with orientation questions  NEUROLOGY: moving all extremities spontaneously  SKIN: No rashes Vital Signs Last 24 Hrs  T(C): 36.3 (23 Feb 2025 22:46), Max: 37.8 (23 Feb 2025 15:45)  T(F): 97.3 (23 Feb 2025 22:46), Max: 100 (23 Feb 2025 15:45)  HR: 80 (23 Feb 2025 22:46) (80 - 92)  BP: 128/74 (23 Feb 2025 22:46) (94/54 - 161/74)  BP(mean): 67 (23 Feb 2025 15:45) (67 - 67)  RR: 18 (23 Feb 2025 22:46) (16 - 24)  SpO2: 100% (23 Feb 2025 22:46) (93% - 100%)    Parameters below as of 23 Feb 2025 22:46  Patient On (Oxygen Delivery Method): nasal cannula  O2 Flow (L/min): 3      CONSTITUTIONAL: NAD, thin elderly woman, lethargic-appearing, resting in bed  EYES: sclera clear  ENMT: Moist oral mucosa, L facial droop noted; NC in place  NECK: Supple  RESPIRATORY: Decreased breath sounds in lower lung fields b/l; no wheezes or rales appreciates  CARDIOVASCULAR: Regular rate and rhythm; Normal S1 and S2; No murmurs, rubs, or gallops; No lower extremity edema; Peripheral pulses are palpable bilaterally  ABDOMEN: Soft, Nontender, Nondistended; Bowel sounds present  MUSCULOSKELETAL:  No cyanosis of digits  PSYCH: Appears at least AAOx1 (oriented to self), otherwise was non-participatory with orientation questions  NEUROLOGY: moving extremities spontaneously  SKIN: No rashes

## 2025-02-23 NOTE — H&P ADULT - ASSESSMENT
89F w/ hx of Afib (s/p unsuccessful DCCV 1/2025, not on AC), dementia, brain tumor (s/p resection, c/b residual L-sided facial droop and L ear deafness), hypothyroidism, HTN, colon ca (s/p R hemicolectomy), constipation/stercoral colitis, recent long hospitalization (1/11/25-2/12/25) for hypoxic/hypercapnic resp failure and septic shock 2/2 influenza/asp PNA/UTI c/b dysphagia s/p PEG placement 2/7/25, now presenting from the [facility] rehab for acute hypoxemic respiratory failure. Possibly i/s/o bilateral pleffs  +/- PNA meeting sepsis criteria.

## 2025-02-23 NOTE — ED PROCEDURE NOTE - PROCEDURE ADDITIONAL DETAILS
US evaluation of bladder to evaluate cruz catheter placement revealed distended bladder without evidence of cruz balloon present.
POCUS: Emergency Department Focused Ultrasound performed at patient's bedside.  The complete report will be available in PACS.

## 2025-02-23 NOTE — H&P ADULT - NSHPLABSRESULTS_GEN_ALL_CORE
LABS:                        8.9    11.28 )-----------( 292      ( 23 Feb 2025 16:48 )             27.2     02-23    129[L]  |  95[L]  |  35[H]  ----------------------------<  106[H]  4.6   |  24  |  0.99    Ca    9.5      23 Feb 2025 16:48    TPro  7.4  /  Alb  2.6[L]  /  TBili  0.4  /  DBili  x   /  AST  30  /  ALT  37  /  AlkPhos  154[H]  02-23    PT/INR - ( 23 Feb 2025 16:48 )   PT: 11.8 sec;   INR: 1.03 ratio         PTT - ( 23 Feb 2025 16:48 )  PTT:27.8 sec      Urinalysis Basic - ( 23 Feb 2025 16:48 )    Color: x / Appearance: x / SG: x / pH: x  Gluc: 106 mg/dL / Ketone: x  / Bili: x / Urobili: x   Blood: x / Protein: x / Nitrite: x   Leuk Esterase: x / RBC: x / WBC x   Sq Epi: x / Non Sq Epi: x / Bacteria: x      IMAGING/ADDITIONAL TESTS:    CXR (2/23/25) independent read: b/l pleffs LABS:                        8.9    11.28 )-----------( 292      ( 23 Feb 2025 16:48 )             27.2     02-23    129[L]  |  95[L]  |  35[H]  ----------------------------<  106[H]  4.6   |  24  |  0.99    Ca    9.5      23 Feb 2025 16:48    TPro  7.4  /  Alb  2.6[L]  /  TBili  0.4  /  DBili  x   /  AST  30  /  ALT  37  /  AlkPhos  154[H]  02-23    PT/INR - ( 23 Feb 2025 16:48 )   PT: 11.8 sec;   INR: 1.03 ratio         PTT - ( 23 Feb 2025 16:48 )  PTT:27.8 sec      Urinalysis Basic - ( 23 Feb 2025 16:48 )    Color: x / Appearance: x / SG: x / pH: x  Gluc: 106 mg/dL / Ketone: x  / Bili: x / Urobili: x   Blood: x / Protein: x / Nitrite: x   Leuk Esterase: x / RBC: x / WBC x   Sq Epi: x / Non Sq Epi: x / Bacteria: x      IMAGING/ADDITIONAL TESTS:    CXR (2/23/25) independent read: b/l pleffs    EKG (2/23/25) independent read: sinus w/ PACs, HR 93, QTc 457

## 2025-02-23 NOTE — H&P ADULT - PROBLEM SELECTOR PLAN 3
Concern for possible PNA, suspect may be aspiration given son's reported gurgling sound with breathing prior to presentation and pt's hx of aspiration. Procal elevated to 0.31 suggesting bacterial infection.  - c/w empiric CTX (2/23/25- )  - f/u CT chest for further eval

## 2025-02-23 NOTE — ED PROVIDER NOTE - OTHER FINDINGS
ECG recorded at 3:47 PM independently interpreted by me , Dr Sergio Vang,  at 356 shows sinus rhythm with PACs normal axis normal intervals no acute diagnostic ischemic ST-T changes

## 2025-02-23 NOTE — ED CLERICAL - NS ED CLERK NOTE PRE-ARRIVAL INFORMATION; ADDITIONAL PRE-ARRIVAL INFORMATION
This patient is enrolled in the COPD or PNA STARS readmission program and has active care navigation.  Please call the contact number above to speak with the Care navigation team to obtain additional clinical information regarding this patient and/or to arrange close clinical follow up within 24 hours. Between the hours of 8A and 5P, Monday through Friday, a Hospital Medicine attending will meet with the ED care team to provide relevant clinical information, and expedite appropriate follow-up if there is an opportunity for discharge from the ED. Off hours, you may contact the Hospitalist-In-Charge at pager [phone] for assistance. For expedited pulmonary follow-up appointments, you may email [email or web address]. Please include in this email: patient name, date of birth, MRN and contact information.

## 2025-02-23 NOTE — PATIENT PROFILE ADULT - FALL HARM RISK - HARM RISK INTERVENTIONS

## 2025-02-23 NOTE — ED PROVIDER NOTE - PROGRESS NOTE DETAILS
Jacquelin Willson (spouse)
s/w Dr. quiroga covering for Dr. Ayo escalante who is on vacation, requesting admission to dr. rosa service. -MITALI SmithC

## 2025-02-23 NOTE — H&P ADULT - CONVERSATION DETAILS
Pt with baseline dementia and presenting with acute hypoxemic respiratory failure. Previous code status noted to be DNR/DNI with trial of NIV. Spoke to son over phone to discuss current presentation, concerns of infection, need for supplemental O2, and wishes in case of potential worsening. Confirmed with son that pt's code status remains DNR/DNI with trial of NIV. He hopes for pt to get over infection, go back to rehab to Pt with baseline dementia and presenting with acute hypoxemic respiratory failure. Previous code status noted to be DNR/DNI with trial of NIV. Spoke to son over phone to discuss current presentation, concerns of infection, need for supplemental O2, and wishes in case of potential worsening. Confirmed with son that pt's code status remains DNR/DNI with trial of NIV. He hopes for pt to get over infection, go back to rehab to get stronger with PT/OT/speech therapy in order to possibly eat orally again. Unable to locate prior MOLST in "Patient Window," new MOLST completed indicating code status.

## 2025-02-23 NOTE — H&P ADULT - PROBLEM SELECTOR PLAN 2
Met sepsis criteria on presentation (RR>20s, HR >90, low grade temp, mild leukocytosis, suspected source of PNA)  - s/p 500cc IVF in ED  - c/w empiric abx as below  - f/u BCx, UA/UCx (pending collection), strep pneumo urine Ag, urine legionella  - monitor WBC and for fevers

## 2025-02-23 NOTE — H&P ADULT - REASON FOR ADMISSION
hypoxemic resp failure, possible PNA hypoxemic resp failure, possible CHF exacerbation +/- PNA hypoxemic resp failure, pleff +/- PNA

## 2025-02-23 NOTE — H&P ADULT - PROBLEM SELECTOR PLAN 7
Hx of dementia (baseline (AAOx1-2, sometimes confused). Also with hx of  brain tumor (s/p resection, c/b residual L-sided facial droop and L ear deafness).  - per son, at baseline mental status   - c/w home mirtazapine for associated depression/behavioral disturbances  - delirium precautions as able

## 2025-02-23 NOTE — H&P ADULT - PROBLEM SELECTOR PLAN 9
- DVT ppx: Lovenox  - Diet: NPO with TFs  - Dispo: pending further improvement/workup  - Code status: DNR/DNI, trial of NIV

## 2025-02-23 NOTE — H&P ADULT - NSICDXPASTSURGICALHX_GEN_ALL_CORE_FT
PAST SURGICAL HISTORY:  H/O brain tumor History of brain tumor resection in 1961.    History of colon cancer s/p Resection in 2009, Dr. David    PEG (percutaneous endoscopic gastrostomy) status     S/P right hemicolectomy

## 2025-02-23 NOTE — H&P ADULT - HISTORY OF PRESENT ILLNESS
89F w/ hx of Afib (s/p unsuccessful DCCV 1/2025, on amio), dementia, brain tumor (s/p resection, c/b residual L-sided facial droop), hypothyroidism, HTN, colon ca (s/p R hemicolectomy), constipation/stercoral colitis, recent long hospitalization (1/11/25-2/12/25) for hypoxic/hypercapnic resp failure and septic shock 2/2 influenza/asp PNA/UTI c/b dysphagia s/p PEG placement 2/7/25, now presenting from LTC facility for hypoxia requiring supplemental O2.    INCOMPLETE    In ED: Tmax 100F rectal, HR 80s-90s, SBP 90s-160s, RR 16-24, sating % on 3-4L NC. Labs notable for WBC 11.28k, Hgb 8.9, Na 129, procal 0.31, hsTrop 67->67, pro-BNP 3.8k, albumin 2.6. CXR (prelim) showed "bilateral lower lung hazy opacities suggestive of pleural effusions with passive atelectasis; underlying pneumonia cannot be excluded." Received ofirmev 750mg, CTX 1g, LR 500cc bolus. Admitted to Medicine for further management.  89F w/ hx of Afib (s/p unsuccessful DCCV 1/2025, on amio), dementia, brain tumor (s/p resection, c/b residual L-sided facial droop), hypothyroidism, HTN, colon ca (s/p R hemicolectomy), constipation/stercoral colitis, recent long hospitalization (1/11/25-2/12/25) for hypoxic/hypercapnic resp failure and septic shock 2/2 influenza/asp PNA/UTI c/b dysphagia s/p PEG placement 2/7/25, now presenting from LTC facility for hypoxia requiring supplemental O2. Limited hx obtainable from pt given baseline dementia and non-participatory during encounter for admission. Responsive to name, but otherwise did not answer orientation questions. Endorsed she felt cold and sleepy, otherwise was not answering further questions. Pt coming from the [facility] rehab, but no accompanying paperwork was found in chart at time of admission. History obtained per chart review. Pt reportedly coming to hospital for eval of  hypoxic respiratory failure requiring oxygen today and also appearing "clammy lethargic warm to touch." Was initially placed on 2L NC and was increased to 4L NC by EMS.    In ED: Tmax 100F rectal, HR 80s-90s, SBP 90s-160s, RR 16-24, sating % on 3-4L NC. Labs notable for WBC 11.28k, Hgb 8.9, Na 129, procal 0.31, hsTrop 67->67, pro-BNP 3.8k, albumin 2.6. CXR (prelim) showed "bilateral lower lung hazy opacities suggestive of pleural effusions with passive atelectasis; underlying pneumonia cannot be excluded." Received ofirmev 750mg, CTX 1g, LR 500cc bolus. Admitted to Medicine for further management.  89F w/ hx of Afib (s/p unsuccessful DCCV 1/2025, not on AC), dementia (baseline AAOx1-2, confused at times), brain tumor (s/p resection, c/b residual L-sided facial droop and L ear deafness), hypothyroidism, HTN, colon ca (s/p R hemicolectomy), constipation/stercoral colitis, recent long hospitalization (1/11/25-2/12/25) for hypoxic/hypercapnic resp failure and septic shock 2/2 influenza/asp PNA/UTI c/b dysphagia s/p PEG placement 2/7/25, now presenting from the [facility] rehab for hypoxia requiring supplemental O2.     Limited hx obtainable from pt given baseline dementia and non-participatory during encounter for admission. Responsive to name, but otherwise did not answer orientation questions. Endorsed she felt cold and sleepy, otherwise was not answering further questions. No accompanying paperwork was found in chart at time of admission. Per chart review of ED documentation, pt reportedly coming to hospital for eval of  hypoxic respiratory failure requiring oxygen today and also appearing "clammy lethargic warm to touch." Was initially placed on 2L NC and was increased to 4L NC by EMS. Spoke to son (Maykel) over the phone for collateral history. He corroborated the above, noted that pt he saw pt earlier today and found pt to have gurgling sounds with her breathing, which prompted request for further evaluation. Noted pt reported feeling nauseous, but did not vomit. He stated O2 sat was initially down to 88% on RA and ~91% on 2L at the rehab before it improved further on 4L. He noted pt is deaf at baseline in one ear, has difficulty with her speech particularly because her dentures are out, and was at her baseline mental status today (AAOx1-2, confused at times). He also noted that pt has had issues with aspiration for a majority of his life and he suspects it may have been a complication of the brain tumor surgery. Currently has been getting feeds and meds through PEG only, nothing by mouth. Stated pt was given a dose of Lasix 20mg SQ at the rehab prior to coming to hospital. No known hx of CHF.    In ED: Tmax 100F rectal, HR 80s-90s, SBP 90s-160s, RR 16-24, sating % on 3-4L NC. Labs notable for WBC 11.28k, Hgb 8.9, Na 129, procal 0.31, hsTrop 67->67, pro-BNP 3.8k, albumin 2.6. CXR (prelim) showed "bilateral lower lung hazy opacities suggestive of pleural effusions with passive atelectasis; underlying pneumonia cannot be excluded." Received ofirmev 750mg, CTX 1g, LR 500cc bolus. Admitted to Medicine for further management.

## 2025-02-23 NOTE — H&P ADULT - TIME BILLING
reviewing prior documentation, attempting to independently obtaining a history from patient, obtaining collateral performing a physical examination, reviewing and independently interpreting tests/imaging, discussing the plan with patient's son, ordering medications/tests, documenting clinical information in the electronic health record. The time spent on encounter excludes separately reported services.

## 2025-02-23 NOTE — ED PROVIDER NOTE - CLINICAL SUMMARY MEDICAL DECISION MAKING FREE TEXT BOX
Attending MD Vang:  89-year-old woman with a history of dementia, hypertension, recent PEG tube placement is present for evaluation from long-term care facility for evaluation of hypoxic respiratory failure requiring oxygen today also appearing "clammy lethargic warm to the touch".  Patient is placed on 2 L nasal cannula increased to 4 L nasal cannula with EMS and looks better per son at bedside.  Patient reportedly was nauseous but not vomiting.  Receives all nutrition through feeding tube and not by mouth.  Remainder of history is limited as patient has baseline dementia and does not contribute to history.    Patient's vital signs are notable for blood pressure 94/54 rectal temperature of 100 oxygen saturation 95% on 3 to 4 L nasal cannula.  Patient opens her eyes to voice and pushes examiner's hand away but does not follow commands or verbalize.  Patient is chronically ill-appearing.  Extremities are warm to the touch distal pulses are palpable.  The abdomen is soft nondistended no obvious focal tenderness to palpation.    ED POCUS reveals grossly preserved ejection fraction small pericardial effusion IVC is small and fully collapsible.  Bilateral pleural effusions noted.    Considerations in this patient include but not fully limited to infectious etiology for presentation such as pneumonia UTI or other metabolic derangements.  Presentation seems less consistent with acute heart failure given small IVC that fully collapsible, more likely pleural effusions could be related to other etiologies such as hypoalbuminemia.  Plan at this time will be for panculture chest x-ray urinalysis small fluid challenge continue supplemental oxygen and admission to hospital.  I did consider pulmonary embolism in this patient however given presence of effusions this seems like this could explain hypoxic respiratory failure sufficiently.  Would reconsider possibility of pulmonary embolism pending clinical course and results of chest film and lab work.          *The above represents an initial assessment/impression. Please refer to progress notes for potential changes in patient clinical course*

## 2025-02-23 NOTE — H&P ADULT - PROBLEM SELECTOR PLAN 4
Presented initially with Na 129. Seemed to have persistent mild hyponatremia on last admission. 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  - s/p 500cc IVF in ED  - f/u repeat Na, if not improving add on urine studies

## 2025-02-23 NOTE — ED ADULT NURSE NOTE - OBJECTIVE STATEMENT
89-year-old woman with a history of dementia, hypertension, recent PEG tube placement is present for evaluation from long-term care facility for evaluation of hypoxic respiratory failure requiring oxygen today also appearing "clammy lethargic warm to the touch".  Patient is placed on 2 L nasal cannula increased to 4 L nasal cannula with EMS.

## 2025-02-23 NOTE — H&P ADULT - PROBLEM SELECTOR PLAN 5
Hx of Afib s/p unsuccessful DCCV 1/2025, s/p course of amio with subsequent return to sinus on last admission. Admission EKG remained sinus.  - c/w home metoprolol with holding parameters for rate control  - Per prior Cardiology note, "patient is anemic and high bleeding risk, would refrain from AC at this time."

## 2025-02-23 NOTE — ED PROCEDURE NOTE - CPROC ED INFORMED CONSENT1
Benefits, risks, and possible complications of procedure explained to patient/caregiver who verbalized understanding and gave verbal consent. None

## 2025-02-23 NOTE — ED PROVIDER NOTE - ATTENDING APP SHARED VISIT CONTRIBUTION OF CARE
Attending MD Vang: I personally made/approved the management plan and take responsibility for the patient management.          *The above represents an initial assessment/impression. Please refer to progress notes for potential changes in patient clinical course*

## 2025-02-23 NOTE — H&P ADULT - NSHPPOAPULMEMBOLUS_GEN_A_CORE
no
Patient had  section.   1) Please take Ibuprofen and Tylenol as needed for pain control  2) Nothing in the vagina for 6 weeks (including no sex, no tampons, and no douching).  3) Please call your doctor for a follow up your postpartum appointment in 1-2 weeks for incision check, then another appointment 4 weeks after that.  4) Please continue taking vitamins postpartum.   5) Please call the office sooner if you have heavy vaginal bleeding, severe abdominal pain, or fever > 100.4F.  6) You may resume regular activity as tolerated

## 2025-02-23 NOTE — H&P ADULT - PROBLEM SELECTOR PLAN 1
Presented after O2 sat reportedly down to 88% on RA at rehab facility. Procal 3.8k, but does not appear overloaded on exam. COVID-19/RSV/Flu neg. CXR (prelim) showed "bilateral lower lung hazy opacities suggestive of pleural effusions with passive atelectasis; underlying pneumonia cannot be excluded."  - currently on 3-4L O2NC (baseline not on home O2), wean as tolerated  - monitor respiratory status and O2 sat, if worsens, transfer pt to continuous pulse ox unit  - c/w empiric treatment for PNA as below  - f/u CT chest for further eval of possible PNA vs other pulm etiology  - f/u TTE to assess for cardiac etiology of b/l pleffs  - Pulm consult in AM

## 2025-02-24 ENCOUNTER — RESULT REVIEW (OUTPATIENT)
Age: 89
End: 2025-02-24

## 2025-02-24 DIAGNOSIS — J18.9 PNEUMONIA, UNSPECIFIED ORGANISM: ICD-10-CM

## 2025-02-24 DIAGNOSIS — Z85.038 PERSONAL HISTORY OF OTHER MALIGNANT NEOPLASM OF LARGE INTESTINE: ICD-10-CM

## 2025-02-24 DIAGNOSIS — Z90.49 ACQUIRED ABSENCE OF OTHER SPECIFIED PARTS OF DIGESTIVE TRACT: Chronic | ICD-10-CM

## 2025-02-24 DIAGNOSIS — Z93.1 GASTROSTOMY STATUS: Chronic | ICD-10-CM

## 2025-02-24 DIAGNOSIS — Z29.9 ENCOUNTER FOR PROPHYLACTIC MEASURES, UNSPECIFIED: ICD-10-CM

## 2025-02-24 DIAGNOSIS — A41.9 SEPSIS, UNSPECIFIED ORGANISM: ICD-10-CM

## 2025-02-24 DIAGNOSIS — J96.01 ACUTE RESPIRATORY FAILURE WITH HYPOXIA: ICD-10-CM

## 2025-02-24 DIAGNOSIS — I10 ESSENTIAL (PRIMARY) HYPERTENSION: ICD-10-CM

## 2025-02-24 DIAGNOSIS — I48.20 CHRONIC ATRIAL FIBRILLATION, UNSPECIFIED: ICD-10-CM

## 2025-02-24 DIAGNOSIS — E87.1 HYPO-OSMOLALITY AND HYPONATREMIA: ICD-10-CM

## 2025-02-24 DIAGNOSIS — E03.9 HYPOTHYROIDISM, UNSPECIFIED: ICD-10-CM

## 2025-02-24 LAB
ALBUMIN SERPL ELPH-MCNC: 2.6 G/DL — LOW (ref 3.3–5)
ALP SERPL-CCNC: 141 U/L — HIGH (ref 40–120)
ALT FLD-CCNC: 30 U/L — SIGNIFICANT CHANGE UP (ref 10–45)
ANION GAP SERPL CALC-SCNC: 11 MMOL/L — SIGNIFICANT CHANGE UP (ref 5–17)
AST SERPL-CCNC: 23 U/L — SIGNIFICANT CHANGE UP (ref 10–40)
BASOPHILS # BLD AUTO: 0.05 K/UL — SIGNIFICANT CHANGE UP (ref 0–0.2)
BASOPHILS NFR BLD AUTO: 0.4 % — SIGNIFICANT CHANGE UP (ref 0–2)
BILIRUB SERPL-MCNC: 0.4 MG/DL — SIGNIFICANT CHANGE UP (ref 0.2–1.2)
BUN SERPL-MCNC: 33 MG/DL — HIGH (ref 7–23)
CALCIUM SERPL-MCNC: 9.6 MG/DL — SIGNIFICANT CHANGE UP (ref 8.4–10.5)
CHLORIDE SERPL-SCNC: 96 MMOL/L — SIGNIFICANT CHANGE UP (ref 96–108)
CO2 SERPL-SCNC: 26 MMOL/L — SIGNIFICANT CHANGE UP (ref 22–31)
CREAT SERPL-MCNC: 0.99 MG/DL — SIGNIFICANT CHANGE UP (ref 0.5–1.3)
EGFR: 52 ML/MIN/1.73M2 — LOW
EOSINOPHIL # BLD AUTO: 0.36 K/UL — SIGNIFICANT CHANGE UP (ref 0–0.5)
EOSINOPHIL NFR BLD AUTO: 3.1 % — SIGNIFICANT CHANGE UP (ref 0–6)
GLUCOSE SERPL-MCNC: 70 MG/DL — SIGNIFICANT CHANGE UP (ref 70–99)
HCT VFR BLD CALC: 26.3 % — LOW (ref 34.5–45)
HGB BLD-MCNC: 8.5 G/DL — LOW (ref 11.5–15.5)
IMM GRANULOCYTES NFR BLD AUTO: 0.6 % — SIGNIFICANT CHANGE UP (ref 0–0.9)
LYMPHOCYTES # BLD AUTO: 0.76 K/UL — LOW (ref 1–3.3)
LYMPHOCYTES # BLD AUTO: 6.6 % — LOW (ref 13–44)
MAGNESIUM SERPL-MCNC: 2.2 MG/DL — SIGNIFICANT CHANGE UP (ref 1.6–2.6)
MCHC RBC-ENTMCNC: 31.5 PG — SIGNIFICANT CHANGE UP (ref 27–34)
MCHC RBC-ENTMCNC: 32.3 G/DL — SIGNIFICANT CHANGE UP (ref 32–36)
MCV RBC AUTO: 97.4 FL — SIGNIFICANT CHANGE UP (ref 80–100)
MONOCYTES # BLD AUTO: 0.59 K/UL — SIGNIFICANT CHANGE UP (ref 0–0.9)
MONOCYTES NFR BLD AUTO: 5.1 % — SIGNIFICANT CHANGE UP (ref 2–14)
NEUTROPHILS # BLD AUTO: 9.66 K/UL — HIGH (ref 1.8–7.4)
NEUTROPHILS NFR BLD AUTO: 84.2 % — HIGH (ref 43–77)
NRBC BLD AUTO-RTO: 0 /100 WBCS — SIGNIFICANT CHANGE UP (ref 0–0)
PHOSPHATE SERPL-MCNC: 3.2 MG/DL — SIGNIFICANT CHANGE UP (ref 2.5–4.5)
PLATELET # BLD AUTO: 282 K/UL — SIGNIFICANT CHANGE UP (ref 150–400)
POTASSIUM SERPL-MCNC: 4.7 MMOL/L — SIGNIFICANT CHANGE UP (ref 3.5–5.3)
POTASSIUM SERPL-SCNC: 4.7 MMOL/L — SIGNIFICANT CHANGE UP (ref 3.5–5.3)
PROT SERPL-MCNC: 7.1 G/DL — SIGNIFICANT CHANGE UP (ref 6–8.3)
RBC # BLD: 2.7 M/UL — LOW (ref 3.8–5.2)
RBC # FLD: 16.7 % — HIGH (ref 10.3–14.5)
SODIUM SERPL-SCNC: 133 MMOL/L — LOW (ref 135–145)
T4 FREE SERPL-MCNC: 1.1 NG/DL — SIGNIFICANT CHANGE UP (ref 0.9–1.7)
TSH SERPL-MCNC: 11.2 UIU/ML — HIGH (ref 0.27–4.2)
WBC # BLD: 11.49 K/UL — HIGH (ref 3.8–10.5)
WBC # FLD AUTO: 11.49 K/UL — HIGH (ref 3.8–10.5)

## 2025-02-24 PROCEDURE — 71250 CT THORAX DX C-: CPT | Mod: 26

## 2025-02-24 PROCEDURE — 93306 TTE W/DOPPLER COMPLETE: CPT | Mod: 26

## 2025-02-24 RX ORDER — FUROSEMIDE 10 MG/ML
40 INJECTION INTRAMUSCULAR; INTRAVENOUS DAILY
Refills: 0 | Status: DISCONTINUED | OUTPATIENT
Start: 2025-02-25 | End: 2025-02-26

## 2025-02-24 RX ORDER — LEVOTHYROXINE SODIUM 300 MCG
88 TABLET ORAL DAILY
Refills: 0 | Status: DISCONTINUED | OUTPATIENT
Start: 2025-02-24 | End: 2025-02-28

## 2025-02-24 RX ORDER — IPRATROPIUM BROMIDE AND ALBUTEROL SULFATE .5; 2.5 MG/3ML; MG/3ML
3 SOLUTION RESPIRATORY (INHALATION) EVERY 6 HOURS
Refills: 0 | Status: DISCONTINUED | OUTPATIENT
Start: 2025-02-24 | End: 2025-02-28

## 2025-02-24 RX ORDER — SENNA 187 MG
2 TABLET ORAL AT BEDTIME
Refills: 0 | Status: DISCONTINUED | OUTPATIENT
Start: 2025-02-24 | End: 2025-02-28

## 2025-02-24 RX ORDER — FUROSEMIDE 10 MG/ML
20 INJECTION INTRAMUSCULAR; INTRAVENOUS ONCE
Refills: 0 | Status: COMPLETED | OUTPATIENT
Start: 2025-02-24 | End: 2025-02-24

## 2025-02-24 RX ORDER — ENOXAPARIN SODIUM 100 MG/ML
30 INJECTION SUBCUTANEOUS EVERY 24 HOURS
Refills: 0 | Status: DISCONTINUED | OUTPATIENT
Start: 2025-02-24 | End: 2025-02-28

## 2025-02-24 RX ORDER — METOPROLOL SUCCINATE 50 MG/1
25 TABLET, EXTENDED RELEASE ORAL
Refills: 0 | Status: DISCONTINUED | OUTPATIENT
Start: 2025-02-24 | End: 2025-02-28

## 2025-02-24 RX ORDER — ENOXAPARIN SODIUM 100 MG/ML
40 INJECTION SUBCUTANEOUS EVERY 24 HOURS
Refills: 0 | Status: DISCONTINUED | OUTPATIENT
Start: 2025-02-24 | End: 2025-02-24

## 2025-02-24 RX ORDER — MIRTAZAPINE 30 MG/1
15 TABLET, FILM COATED ORAL AT BEDTIME
Refills: 0 | Status: DISCONTINUED | OUTPATIENT
Start: 2025-02-24 | End: 2025-02-28

## 2025-02-24 RX ORDER — BISACODYL 5 MG
10 TABLET, DELAYED RELEASE (ENTERIC COATED) ORAL DAILY
Refills: 0 | Status: DISCONTINUED | OUTPATIENT
Start: 2025-02-24 | End: 2025-02-28

## 2025-02-24 RX ORDER — CEFTRIAXONE 500 MG/1
1000 INJECTION, POWDER, FOR SOLUTION INTRAMUSCULAR; INTRAVENOUS EVERY 24 HOURS
Refills: 0 | Status: COMPLETED | OUTPATIENT
Start: 2025-02-24 | End: 2025-02-27

## 2025-02-24 RX ORDER — B1/B2/B3/B5/B6/B12/VIT C/FOLIC 500-0.5 MG
1 TABLET ORAL DAILY
Refills: 0 | Status: DISCONTINUED | OUTPATIENT
Start: 2025-02-24 | End: 2025-02-28

## 2025-02-24 RX ADMIN — Medication 500 MILLIGRAM(S): at 13:25

## 2025-02-24 RX ADMIN — ENOXAPARIN SODIUM 30 MILLIGRAM(S): 100 INJECTION SUBCUTANEOUS at 05:23

## 2025-02-24 RX ADMIN — CEFTRIAXONE 100 MILLIGRAM(S): 500 INJECTION, POWDER, FOR SOLUTION INTRAMUSCULAR; INTRAVENOUS at 21:29

## 2025-02-24 RX ADMIN — MIRTAZAPINE 15 MILLIGRAM(S): 30 TABLET, FILM COATED ORAL at 22:29

## 2025-02-24 RX ADMIN — Medication 10 MILLIGRAM(S): at 13:25

## 2025-02-24 RX ADMIN — FUROSEMIDE 20 MILLIGRAM(S): 10 INJECTION INTRAMUSCULAR; INTRAVENOUS at 19:03

## 2025-02-24 RX ADMIN — METOPROLOL SUCCINATE 25 MILLIGRAM(S): 50 TABLET, EXTENDED RELEASE ORAL at 05:23

## 2025-02-24 RX ADMIN — IPRATROPIUM BROMIDE AND ALBUTEROL SULFATE 3 MILLILITER(S): .5; 2.5 SOLUTION RESPIRATORY (INHALATION) at 05:23

## 2025-02-24 RX ADMIN — Medication 1 TABLET(S): at 13:25

## 2025-02-24 RX ADMIN — IPRATROPIUM BROMIDE AND ALBUTEROL SULFATE 3 MILLILITER(S): .5; 2.5 SOLUTION RESPIRATORY (INHALATION) at 23:14

## 2025-02-24 RX ADMIN — Medication 88 MICROGRAM(S): at 05:23

## 2025-02-24 RX ADMIN — METOPROLOL SUCCINATE 25 MILLIGRAM(S): 50 TABLET, EXTENDED RELEASE ORAL at 18:34

## 2025-02-24 RX ADMIN — IPRATROPIUM BROMIDE AND ALBUTEROL SULFATE 3 MILLILITER(S): .5; 2.5 SOLUTION RESPIRATORY (INHALATION) at 13:24

## 2025-02-24 RX ADMIN — IPRATROPIUM BROMIDE AND ALBUTEROL SULFATE 3 MILLILITER(S): .5; 2.5 SOLUTION RESPIRATORY (INHALATION) at 18:34

## 2025-02-24 RX ADMIN — Medication 2 TABLET(S): at 22:29

## 2025-02-24 NOTE — DIETITIAN INITIAL EVALUATION ADULT - OTHER INFO
- Wt hx:         - UBW: unable to obtain.         - Dosing wt: 110 pounds (2/23)         - Bed scale wt per RD (2/24): 116 pounds          - Wt hx per [facility] HIE / chart review in pounds: 116 (2/07), 130 (1/11) - ? accuracy as previous RD notes dosing wt 116 lb on 2/03.   	-- Possible 6 lb (5%) wt loss x <1 month indicated - ? accuracy of current dosing wt (2/23) versus RD obtained wt (2/24), unable to confirm accuracy of possible wt loss at this time.          - RD to continue to monitor weight trends as able.   - Nutritionally Pertinent Meds in-house: Abx, synthroid, Remeron (possible appetite stimulant).  - Micronutrient supplementation: Multivitamin, vitamin C.   - Nutritionally Pertinent Labs: Hyponatremic.

## 2025-02-24 NOTE — CONSULT NOTE ADULT - ASSESSMENT
89F w/ hx of Afib (s/p unsuccessful DCCV 1/2025, not on AC), dementia (baseline AAOx1-2, confused at times), brain tumor (s/p resection, c/b residual L-sided facial droop and L ear deafness), hypothyroidism, HTN, colon ca (s/p R hemicolectomy), constipation/stercoral colitis, recent long hospitalization (1/11/25-2/12/25) for hypoxic/hypercapnic resp failure and septic shock 2/2 influenza/asp PNA/UTI c/b dysphagia s/p PEG placement 2/7/25, now presenting from the [facility] rehab for hypoxia requiring supplemental O2.   Limited hx obtainable from pt given baseline dementia and non-participatory during encounter for admission. Responsive to name, but otherwise did not answer orientation questions. Endorsed she felt cold and sleepy, otherwise was not answering further questions. No accompanying paperwork was found in chart at time of admission. Per chart review of ED documentation, pt reportedly coming to hospital for eval of  hypoxic respiratory failure requiring oxygen today and also appearing "clammy lethargic warm to touch." Was initially placed on 2L NC and was increased to 4L NC by EMS. Spoke to son (Maykel) over the phone for collateral history. He corroborated the above, noted that pt he saw pt earlier today and found pt to have gurgling sounds with her breathing, which prompted request for further evaluation. Noted pt reported feeling nauseous, but did not vomit. He stated O2 sat was initially down to 88% on RA and ~91% on 2L at the rehab before it improved further on 4L. He noted pt is deaf at baseline in one ear, has difficulty with her speech particularly because her dentures are out, and was at her baseline mental status today (AAOx1-2, confused at times). He also noted that pt has had issues with aspiration for a majority of his life and he suspects it may have been a complication of the brain tumor surgery. Currently has been getting feeds and meds through PEG only, nothing by mouth. Stated pt was given a dose of Lasix 20mg SQ at the rehab prior to coming to hospital. No known hx of CHF.  In ED: Tmax 100F rectal, HR 80s-90s, SBP 90s-160s, RR 16-24, sating % on 3-4L NC. Labs notable for WBC 11.28k, Hgb 8.9, Na 129, procal 0.31, hsTrop 67->67, pro-BNP 3.8k, albumin 2.6. CXR (prelim) showed "bilateral lower lung hazy opacities suggestive of pleural effusions with passive atelectasis; underlying pneumonia cannot be excluded." Received ofirmev 750mg, CTX 1g, LR 500cc bolus. Admitted to Medicine for further management.  (23 Feb 2025 23:28)      possible pneumonia vs chf exacerbation;   A fibrillation:   dementia    Hypothyroidism :  HTN:     possible pneumonia vs chf exacerbation;   to me more so then pneumonia,  she seems to be in chf exacerbation   her procal is also  not very high :  last admission she was aggressively diuresed and she responded to lasix very well : would recommend to start olasix 40 mg q daily  echo noted:   she was recently adm to hospital  ; may need to change the antibiotics  but allergic to zosyn :  will monitor for now as mor likely chf exacerbation to me   A fibrillation:   HR CONTROLLED:  MONITOR  dementia    SUPPORTIVE CARE   Hypothyroidism :  ob levothyroxine  HTN:   controlled:     dw acp   
89 y/o female pt with bilateral heel and toe DTIs   - pt seen and evaluated  - bilateral 5th toes, left dorsal hallux, bilateral heels blanching erythema c/w DTIs secondary to pressure present on admission, no open lesions  - no signs of infection noted  - rec z flow boots in bed at all times  - rec cavilon to heels daily and leave open to air   - no further intervention from podiatry standpoint, reconsult sooner as needed

## 2025-02-24 NOTE — DIETITIAN INITIAL EVALUATION ADULT - ENERGY INTAKE
- RD observed tube feeds running below goal rate during time of visit.   - Current EN regimen at GOAL will provide: 1080 ml formula, 1620 kcal, 69 g pro, 820 ml free water daily.  Enteral feed NOT at goal rate

## 2025-02-24 NOTE — DIETITIAN INITIAL EVALUATION ADULT - ORAL INTAKE PTA/DIET HISTORY
No intake hx PTA noted per paper chart (pt from [facility]). PEG tube placed 2/07 per chart. Per patient profile, pt with allergy to strawberry and shellfish. Pt taking Multivitamin, vitamin C PTA per outpatient medications list. Pt NPO with dysphagia per chart.

## 2025-02-24 NOTE — PHYSICAL THERAPY INITIAL EVALUATION ADULT - IMPAIRMENTS FOUND, PT EVAL
I put on a 48 hour holter per Dr Cipriano Mcnamara, pt understood instructions. aerobic capacity/endurance/gait, locomotion, and balance/muscle strength

## 2025-02-24 NOTE — DIETITIAN INITIAL EVALUATION ADULT - NS FNS DIET ORDER
Diet, NPO with Tube Feed:   Tube Feeding Modality: Gastrostomy  Jevity 1.5 [place] (JEVITY1.5RTH)  Total Volume for 24 Hours (mL): 1080  Continuous  Starting Tube Feed Rate {mL per Hour}: 10  Increase Tube Feed Rate by (mL): 10     Every 24 hours  Until Goal Tube Feed Rate (mL per Hour): 45  Tube Feed Duration (in Hours): 24  Tube Feed Start Time: 12:00    Start Time: 00:00 (02-24-25 @ 02:20)

## 2025-02-24 NOTE — CONSULT NOTE ADULT - SUBJECTIVE AND OBJECTIVE BOX
Patient is a 89y old  Female who presents with a chief complaint of hypoxemic resp failure, pleff +/- PNA (24 Feb 2025 16:58)      HPI:  89F w/ hx of Afib (s/p unsuccessful DCCV 1/2025, not on AC), dementia (baseline AAOx1-2, confused at times), brain tumor (s/p resection, c/b residual L-sided facial droop and L ear deafness), hypothyroidism, HTN, colon ca (s/p R hemicolectomy), constipation/stercoral colitis, recent long hospitalization (1/11/25-2/12/25) for hypoxic/hypercapnic resp failure and septic shock 2/2 influenza/asp PNA/UTI c/b dysphagia s/p PEG placement 2/7/25, now presenting [facility] for hypoxia requiring supplemental O2.     Limited hx obtainable from pt given baseline dementia and non-participatory during encounter for admission. Responsive to name, but otherwise did not answer orientation questions. Endorsed she felt cold and sleepy, otherwise was not answering further questions. No accompanying paperwork was found in chart at time of admission. Per chart review of ED documentation, pt reportedly coming to hospital for eval of  hypoxic respiratory failure requiring oxygen today and also appearing "clammy lethargic warm to touch." Was initially placed on 2L NC and was increased to 4L NC by EMS. Spoke to son (Maykel) over the phone for collateral history. He corroborated the above, noted that pt he saw pt earlier today and found pt to have gurgling sounds with her breathing, which prompted request for further evaluation. Noted pt reported feeling nauseous, but did not vomit. He stated O2 sat was initially down to 88% on RA and ~91% on 2L at the rehab before it improved further on 4L. He noted pt is deaf at baseline in one ear, has difficulty with her speech particularly because her dentures are out, and was at her baseline mental status today (AAOx1-2, confused at times). He also noted that pt has had issues with aspiration for a majority of his life and he suspects it may have been a complication of the brain tumor surgery. Currently has been getting feeds and meds through PEG only, nothing by mouth. Stated pt was given a dose of Lasix 20mg SQ at the rehab prior to coming to hospital. No known hx of CHF.    In ED: Tmax 100F rectal, HR 80s-90s, SBP 90s-160s, RR 16-24, sating % on 3-4L NC. Labs notable for WBC 11.28k, Hgb 8.9, Na 129, procal 0.31, hsTrop 67->67, pro-BNP 3.8k, albumin 2.6. CXR (prelim) showed "bilateral lower lung hazy opacities suggestive of pleural effusions with passive atelectasis; underlying pneumonia cannot be excluded." Received ofirmev 750mg, CTX 1g, LR 500cc bolus. Admitted to Medicine for further management.  (23 Feb 2025 23:28)      PAST MEDICAL & SURGICAL HISTORY:  Hypertension      Hypothyroidism      HTN (hypertension)      Dementia      Other dementia      History of colon cancer  s/p Resection in 2009, Dr. David      H/O brain tumor  History of brain tumor resection in 1961.      S/P right hemicolectomy      PEG (percutaneous endoscopic gastrostomy) status          MEDICATIONS  (STANDING):  albuterol/ipratropium for Nebulization 3 milliLiter(s) Nebulizer every 6 hours  ascorbic acid 500 milliGRAM(s) Oral daily  bisacodyl Suppository 10 milliGRAM(s) Rectal daily  cefTRIAXone   IVPB 1000 milliGRAM(s) IV Intermittent every 24 hours  enoxaparin Injectable 30 milliGRAM(s) SubCutaneous every 24 hours  furosemide    Tablet 40 milliGRAM(s) Oral daily  levothyroxine 88 MICROGram(s) Oral daily  metoprolol tartrate 25 milliGRAM(s) Oral two times a day  mirtazapine 15 milliGRAM(s) Oral at bedtime  multivitamin 1 Tablet(s) Oral daily  senna 2 Tablet(s) Oral at bedtime    MEDICATIONS  (PRN):  acetaminophen     Tablet .. 650 milliGRAM(s) Oral every 6 hours PRN Temp greater or equal to 38C (100.4F), Mild Pain (1 - 3)      Allergies    shellfish (Unknown)  penicillin (Unknown)  clindamycin (Other)  strawberry (Unknown)  clindamycin (Unknown)  IV Contrast (Unknown)  strawberry (Rash)  IV Contrast (Other)    Intolerances        VITALS:    Vital Signs Last 24 Hrs  T(C): 36.6 (24 Feb 2025 21:30), Max: 36.8 (24 Feb 2025 12:10)  T(F): 97.8 (24 Feb 2025 21:30), Max: 98.3 (24 Feb 2025 12:10)  HR: 89 (24 Feb 2025 21:30) (85 - 89)  BP: 127/65 (24 Feb 2025 21:30) (117/69 - 132/69)  BP(mean): --  RR: 18 (24 Feb 2025 21:30) (18 - 18)  SpO2: 96% (24 Feb 2025 21:30) (95% - 100%)    Parameters below as of 24 Feb 2025 21:30  Patient On (Oxygen Delivery Method): nasal cannula  O2 Flow (L/min): 3      LABS:                          8.5    11.49 )-----------( 282      ( 24 Feb 2025 07:06 )             26.3       02-24    133[L]  |  96  |  33[H]  ----------------------------<  70  4.7   |  26  |  0.99    Ca    9.6      24 Feb 2025 07:05  Phos  3.2     02-24  Mg     2.2     02-24    TPro  7.1  /  Alb  2.6[L]  /  TBili  0.4  /  DBili  x   /  AST  23  /  ALT  30  /  AlkPhos  141[H]  02-24      CAPILLARY BLOOD GLUCOSE          PT/INR - ( 23 Feb 2025 16:48 )   PT: 11.8 sec;   INR: 1.03 ratio         PTT - ( 23 Feb 2025 16:48 )  PTT:27.8 sec    LOWER EXTREMITY PHYSICAL EXAM:    Vascular: DP 1/4, PT 0/4 B/L, CFT <3 seconds B/L, Temperature gradient WNL, B/L.   Neuro: unable to assess  Musculoskeletal/Ortho: hammertoes 1-5 bilaterally  Skin: bilateral 5th toes, left dorsal hallux, bilateral heels blanching erythema c/w DTIs secondary to pressure present on admission, no open lesions, no signs of infection noted  
  02-24-25 @ 16:58    Patient is a 89y old  Female who presents with a chief complaint of hypoxemic resp failure, pleff +/- PNA (24 Feb 2025 14:40)      HPI:  89F w/ hx of Afib (s/p unsuccessful DCCV 1/2025, not on AC), dementia (baseline AAOx1-2, confused at times), brain tumor (s/p resection, c/b residual L-sided facial droop and L ear deafness), hypothyroidism, HTN, colon ca (s/p R hemicolectomy), constipation/stercoral colitis, recent long hospitalization (1/11/25-2/12/25) for hypoxic/hypercapnic resp failure and septic shock 2/2 influenza/asp PNA/UTI c/b dysphagia s/p PEG placement 2/7/25, now presenting [facility] for hypoxia requiring supplemental O2.     Limited hx obtainable from pt given baseline dementia and non-participatory during encounter for admission. Responsive to name, but otherwise did not answer orientation questions. Endorsed she felt cold and sleepy, otherwise was not answering further questions. No accompanying paperwork was found in chart at time of admission. Per chart review of ED documentation, pt reportedly coming to hospital for eval of  hypoxic respiratory failure requiring oxygen today and also appearing "clammy lethargic warm to touch." Was initially placed on 2L NC and was increased to 4L NC by EMS. Spoke to son (Maykel) over the phone for collateral history. He corroborated the above, noted that pt he saw pt earlier today and found pt to have gurgling sounds with her breathing, which prompted request for further evaluation. Noted pt reported feeling nauseous, but did not vomit. He stated O2 sat was initially down to 88% on RA and ~91% on 2L at the rehab before it improved further on 4L. He noted pt is deaf at baseline in one ear, has difficulty with her speech particularly because her dentures are out, and was at her baseline mental status today (AAOx1-2, confused at times). He also noted that pt has had issues with aspiration for a majority of his life and he suspects it may have been a complication of the brain tumor surgery. Currently has been getting feeds and meds through PEG only, nothing by mouth. Stated pt was given a dose of Lasix 20mg SQ at the rehab prior to coming to hospital. No known hx of CHF.    In ED: Tmax 100F rectal, HR 80s-90s, SBP 90s-160s, RR 16-24, sating % on 3-4L NC. Labs notable for WBC 11.28k, Hgb 8.9, Na 129, procal 0.31, hsTrop 67->67, pro-BNP 3.8k, albumin 2.6. CXR (prelim) showed "bilateral lower lung hazy opacities suggestive of pleural effusions with passive atelectasis; underlying pneumonia cannot be excluded." Received ofirmev 750mg, CTX 1g, LR 500cc bolus. Admitted to Medicine for further management.  (23 Feb 2025 23:28)    pt at this ti me   is alert and awake:   ?FOLLOWING PRESENT  [x ] Hx of PE/DVT, [x ] Hx COPD, x ] Hx of Asthma, [y ] Hx of Hospitalization, [ x]  Hx of BiPAP/CPAP use, [x ] Hx of MALINI    Allergies    shellfish (Unknown)  penicillin (Unknown)  clindamycin (Other)  strawberry (Unknown)  clindamycin (Unknown)  IV Contrast (Unknown)  strawberry (Rash)  IV Contrast (Other)    Intolerances        PAST MEDICAL & SURGICAL HISTORY:  Hypertension      Hypothyroidism      HTN (hypertension)      Dementia      Other dementia      History of colon cancer  s/p Resection in 2009, Dr. David      H/O brain tumor  History of brain tumor resection in 1961.      S/P right hemicolectomy      PEG (percutaneous endoscopic gastrostomy) status          FAMILY HISTORY:      Social History: [x  ] TOBACCO                  [ x ] ETOH                                 [  x] IVDA/DRUGS    REVIEW OF SYSTEMS      General:	x    Skin/Breast:x  	  Ophthalmologic:x  	  ENMT:	x    Respiratory and Thorax: o sob:   	  Cardiovascular:	x    Gastrointestinal:	x    Genitourinary:	x    Musculoskeletal:	x    Neurological:	x    Psychiatric:	x    Hematology/Lymphatics:	x    Endocrine:	x    Allergic/Immunologic:x	    MEDICATIONS  (STANDING):  albuterol/ipratropium for Nebulization 3 milliLiter(s) Nebulizer every 6 hours  ascorbic acid 500 milliGRAM(s) Oral daily  bisacodyl Suppository 10 milliGRAM(s) Rectal daily  cefTRIAXone   IVPB 1000 milliGRAM(s) IV Intermittent every 24 hours  enoxaparin Injectable 30 milliGRAM(s) SubCutaneous every 24 hours  levothyroxine 88 MICROGram(s) Oral daily  metoprolol tartrate 25 milliGRAM(s) Oral two times a day  mirtazapine 15 milliGRAM(s) Oral at bedtime  multivitamin 1 Tablet(s) Oral daily  senna 2 Tablet(s) Oral at bedtime    MEDICATIONS  (PRN):  acetaminophen     Tablet .. 650 milliGRAM(s) Oral every 6 hours PRN Temp greater or equal to 38C (100.4F), Mild Pain (1 - 3)       Vital Signs Last 24 Hrs  T(C): 36.8 (24 Feb 2025 12:10), Max: 36.8 (24 Feb 2025 12:10)  T(F): 98.3 (24 Feb 2025 12:10), Max: 98.3 (24 Feb 2025 12:10)  HR: 85 (24 Feb 2025 16:12) (80 - 88)  BP: 132/69 (24 Feb 2025 16:12) (117/69 - 161/74)  BP(mean): --  RR: 18 (24 Feb 2025 12:10) (16 - 18)  SpO2: 100% (24 Feb 2025 16:12) (95% - 100%)    Parameters below as of 24 Feb 2025 16:12  Patient On (Oxygen Delivery Method): nasal cannula  O2 Flow (L/min): 3  Orthostatic VS          I&O's Summary      Physical Exam:   GENERAL: NAD, well-groomed, well-developed  HEENT: LUANN/   Atraumatic, Normocephalic  ENMT: No tonsillar erythema, exudates, or enlargement; Moist mucous membranes, Good dentition, No lesions  NECK: Supple, No JVD, Normal thyroid  CHEST/LUNG: roberto effusions  CVS: Regular rate and rhythm; No murmurs, rubs, or gallops  GI: : Soft, Nontender, Nondistended; Bowel sounds present  NERVOUS SYSTEM:  Alert & awake  EXTREMITIES:  mild  edema  LYMPH: No lymphadenopathy noted  SKIN: No rashes or lesions  ENDOCRINOLOGY: No Thyromegaly  PSYCH: dementia    Labs:  Venous<44<4>>59<<7.425>>Venous<<3><<4><<5<<599>>                            8.5    11.49 )-----------( 282      ( 24 Feb 2025 07:06 )             26.3                         8.9    11.28 )-----------( 292      ( 23 Feb 2025 16:48 )             27.2     02-24    133[L]  |  96  |  33[H]  ----------------------------<  70  4.7   |  26  |  0.99  02-23    129[L]  |  95[L]  |  35[H]  ----------------------------<  106[H]  4.6   |  24  |  0.99    Ca    9.6      24 Feb 2025 07:05  Ca    9.5      23 Feb 2025 16:48  Phos  3.2     02-24  Mg     2.2     02-24    TPro  7.1  /  Alb  2.6[L]  /  TBili  0.4  /  DBili  x   /  AST  23  /  ALT  30  /  AlkPhos  141[H]  02-24  TPro  7.4  /  Alb  2.6[L]  /  TBili  0.4  /  DBili  x   /  AST  30  /  ALT  37  /  AlkPhos  154[H]  02-23    CAPILLARY BLOOD GLUCOSE        LIVER FUNCTIONS - ( 24 Feb 2025 07:05 )  Alb: 2.6 g/dL / Pro: 7.1 g/dL / ALK PHOS: 141 U/L / ALT: 30 U/L / AST: 23 U/L / GGT: x           PT/INR - ( 23 Feb 2025 16:48 )   PT: 11.8 sec;   INR: 1.03 ratio         PTT - ( 23 Feb 2025 16:48 )  PTT:27.8 sec  Urinalysis Basic - ( 24 Feb 2025 07:05 )    Color: x / Appearance: x / SG: x / pH: x  Gluc: 70 mg/dL / Ketone: x  / Bili: x / Urobili: x   Blood: x / Protein: x / Nitrite: x   Leuk Esterase: x / RBC: x / WBC x   Sq Epi: x / Non Sq Epi: x / Bacteria: x      D DImer  Procalcitonin: 0.31 ng/mL (02-23 @ 16:48)      Studies  Chest X-RAY  CT SCAN Chest   CT Abdomen  Venous Dopplers: LE:   Others        rad< from: CT Chest No Cont (02.24.25 @ 08:55) >  IMPRESSION:    Similar moderate size bilateral pleural effusions with associated   complete atelectasis of the lower lobes.    Newly apparent patchy right upper lobe ground-glass opacity may be   secondary to edema or be infectious/inflammatory etiology.    --- End of Report ---          DOTTY VILLAFANA MD; Resident Radiologist  This document has been electronically signed.   TRIXIE BOJORQUEZ MD; Attending Radiologist  This document has been electronically signed. Feb 24 2025  9:24AM    < end of copied text >  < from: TTE W or WO Ultrasound Enhancing Agent (02.24.25 @ 10:31) >   1. Left ventricular systolic function is normal with an ejection fraction of 55 % by Reyes's method of disks.   2. Probably normal right ventricular systolic function.   3. Left ventricular endocardium is not well visualized; however, the left ventricular systolic function appears grossly normal.   4. Moderate left ventricular hypertrophy.   5. No prior echocardiogram is available for comparison.   6. Technically difficult image quality.   7. There is increased LV mass and concentric hypertrophy.    < end of copied text >

## 2025-02-24 NOTE — PHYSICAL THERAPY INITIAL EVALUATION ADULT - GENERAL OBSERVATIONS, REHAB EVAL
Pt rec'd semisupine in bed, +G tube, +IVL, +primafit, +NC x 3L, in NAD.  Pt cleared for PT session by CARINA Joyce.

## 2025-02-24 NOTE — DIETITIAN INITIAL EVALUATION ADULT - NSFNSPHYEXAMSKINFT_GEN_A_CORE
Per Flowsheets:  - Pressure Injury 1: sacrum, Suspected deep tissue injury  - Pressure Injury 2: Right:, heel, Stage I  - Pressure Injury 3: Left:, heel, Stage I

## 2025-02-24 NOTE — PHYSICAL THERAPY INITIAL EVALUATION ADULT - ADDITIONAL COMMENTS
Pt is a long term care resident at [facility].  Pt is dependent in ADLs, transfers from bed to wheelchair for ambulation.

## 2025-02-24 NOTE — PHYSICAL THERAPY INITIAL EVALUATION ADULT - PERTINENT HX OF CURRENT PROBLEM, REHAB EVAL
Pt is a 89 year old female with PMH significant for Afib (s/p unsuccessful DCCV 1/2025, not on AC), dementia (baseline AAOx1-2, confused at times), brain tumor (s/p resection, c/b residual L-sided facial droop and L ear deafness), hypothyroidism, HTN, colon ca (s/p R hemicolectomy), constipation/stercoral colitis, recent long hospitalization (1/11/25-2/12/25) for hypoxic/hypercapnic resp failure and septic shock 2/2 influenza/asp PNA/UTI c/b dysphagia s/p PEG placement 2/7/25.  Pt presents from the [facility] rehab for hypoxia requiring supplemental O2.  Pt admitted for acute hypoxemic respiratory failure. Possibly i/s/o bilateral pleffs  +/- PNA meeting sepsis criteria.  CXR(2/23): Bilateral lower lung hazy opacities with obscuration of the hemidiaphragm suggestive of pleural effusions with passive atelectasis. Underlying pneumonia cannot be excluded.  CT Chest(2/24): Similar moderate size bilateral pleural effusions with associated complete atelectasis of the lower lobes. Newly apparent patchy right upper lobe ground-glass opacity may be   secondary to edema or be infectious/inflammatory etiology.

## 2025-02-24 NOTE — DIETITIAN INITIAL EVALUATION ADULT - ADD RECOMMEND
1) Continue Multivitamin, vitamin C daily to promote wound healing pending no medical contraindications.  2) RD remains available to adjust EN regimen PRN.   3) Monitor for Malnutrition risk.

## 2025-02-24 NOTE — ADVANCED PRACTICE NURSE CONSULT - ASSESSMENT
Patient encountered on 4 DSU. When wound care RN arrived on unit, patient was found lying in a low air loss pressure redistribution support surface style bed. Tube feeding infusing, paused for consult. Patient lethargic, does not speak with WOC at bedside, unable to turn independently and staff assistance x 1 was provided. Once turned, urinary incontinence noted and perineal care provided. The wound care RN was able to visualize an area of persistent nonblanchable purple hued discoloration over B/L buttocks/sacral skin, area measures approximately 4cm x 4cm x 0cm - presentation is consistent with a deep tissue injury with incontinence involvement, present on admission. B/L heels with bogginess, dark erythema measuring approximately 5cm x 5cm x 0cm - cannot rule out a deep tissue injury present on admission. B/L 5th toes with dark pigmentation measuring approximately 2cm x 1cm x 0cm - cannot rule out a deep tissue injury present on admission. Dorsal aspect of left great toe with wound/abrasion- consider podiatry for treatment recommendations. Once consult was complete, patient was placed in a right side-lying position utilizing pillow positioner assistive devices. Education regarding the need for routine turning and positioning to prevent pressure injuries not appropriate at this time.

## 2025-02-24 NOTE — PHYSICAL THERAPY INITIAL EVALUATION ADULT - ACTIVE RANGE OF MOTION EXAMINATION, REHAB EVAL
BLE AROM limited by weakness/bilateral upper extremity Active ROM was WFL (within functional limits)

## 2025-02-24 NOTE — DIETITIAN INITIAL EVALUATION ADULT - NSFNSGIIOFT_GEN_A_CORE
No current N/V/D/C per chart. Per chart, no BM x admission documented. Bowel regimen: dulcolax, senna.

## 2025-02-24 NOTE — DIETITIAN INITIAL EVALUATION ADULT - PERTINENT LABORATORY DATA
02-24    133[L]  |  96  |  33[H]  ----------------------------<  70  4.7   |  26  |  0.99    Ca    9.6      24 Feb 2025 07:05  Phos  3.2     02-24  Mg     2.2     02-24    TPro  7.1  /  Alb  2.6[L]  /  TBili  0.4  /  DBili  x   /  AST  23  /  ALT  30  /  AlkPhos  141[H]  02-24

## 2025-02-24 NOTE — PROGRESS NOTE ADULT - SUBJECTIVE AND OBJECTIVE BOX
Date of Service  : 02-24-25    INTERVAL HPI/OVERNIGHT EVENTS: Seen and examined with son in room.   Vital Signs Last 24 Hrs  T(C): 36.8 (24 Feb 2025 12:10), Max: 37.8 (23 Feb 2025 15:45)  T(F): 98.3 (24 Feb 2025 12:10), Max: 100 (23 Feb 2025 15:45)  HR: 88 (24 Feb 2025 12:10) (80 - 92)  BP: 117/77 (24 Feb 2025 12:10) (94/54 - 161/74)  BP(mean): 67 (23 Feb 2025 15:45) (67 - 67)  RR: 18 (24 Feb 2025 12:10) (16 - 24)  SpO2: 96% (24 Feb 2025 12:10) (93% - 100%)    Parameters below as of 24 Feb 2025 12:10  Patient On (Oxygen Delivery Method): nasal cannula  O2 Flow (L/min): 3    I&O's Summary    MEDICATIONS  (STANDING):  albuterol/ipratropium for Nebulization 3 milliLiter(s) Nebulizer every 6 hours  ascorbic acid 500 milliGRAM(s) Oral daily  bisacodyl Suppository 10 milliGRAM(s) Rectal daily  cefTRIAXone   IVPB 1000 milliGRAM(s) IV Intermittent every 24 hours  enoxaparin Injectable 30 milliGRAM(s) SubCutaneous every 24 hours  levothyroxine 88 MICROGram(s) Oral daily  metoprolol tartrate 25 milliGRAM(s) Oral two times a day  mirtazapine 15 milliGRAM(s) Oral at bedtime  multivitamin 1 Tablet(s) Oral daily  senna 2 Tablet(s) Oral at bedtime    MEDICATIONS  (PRN):  acetaminophen     Tablet .. 650 milliGRAM(s) Oral every 6 hours PRN Temp greater or equal to 38C (100.4F), Mild Pain (1 - 3)    LABS:                        8.5    11.49 )-----------( 282      ( 24 Feb 2025 07:06 )             26.3     02-24    133[L]  |  96  |  33[H]  ----------------------------<  70  4.7   |  26  |  0.99    Ca    9.6      24 Feb 2025 07:05  Phos  3.2     02-24  Mg     2.2     02-24    TPro  7.1  /  Alb  2.6[L]  /  TBili  0.4  /  DBili  x   /  AST  23  /  ALT  30  /  AlkPhos  141[H]  02-24    PT/INR - ( 23 Feb 2025 16:48 )   PT: 11.8 sec;   INR: 1.03 ratio         PTT - ( 23 Feb 2025 16:48 )  PTT:27.8 sec  Urinalysis Basic - ( 24 Feb 2025 07:05 )    Color: x / Appearance: x / SG: x / pH: x  Gluc: 70 mg/dL / Ketone: x  / Bili: x / Urobili: x   Blood: x / Protein: x / Nitrite: x   Leuk Esterase: x / RBC: x / WBC x   Sq Epi: x / Non Sq Epi: x / Bacteria: x      CAPILLARY BLOOD GLUCOSE            Urinalysis Basic - ( 24 Feb 2025 07:05 )    Color: x / Appearance: x / SG: x / pH: x  Gluc: 70 mg/dL / Ketone: x  / Bili: x / Urobili: x   Blood: x / Protein: x / Nitrite: x   Leuk Esterase: x / RBC: x / WBC x   Sq Epi: x / Non Sq Epi: x / Bacteria: x        Consultant(s) Notes Reviewed:  [x ] YES  [ ] NO    PHYSICAL EXAM:  GENERAL: NAD, well-groomed, well-developed,not in any distress ,  HEAD:  Atraumatic, Normocephalic  NECK: Supple, No JVD, Normal thyroid  NERVOUS SYSTEM:  sleeping   CHEST/LUNG: Good air entry bilateral with no  rales, rhonchi, wheezing, or rubs  HEART: Regular rate and rhythm; No murmurs, rubs, or gallops  ABDOMEN: Soft, Nontender, Nondistended; Bowel sounds present  EXTREMITIES:  2+ Peripheral Pulses, No clubbing, cyanosis, or edema    Care Discussed with Consultants/Other Providers [ x] YES  [ ] NO

## 2025-02-24 NOTE — DIETITIAN INITIAL EVALUATION ADULT - REASON INDICATOR FOR ASSESSMENT
Nutrition Consult for Tube feeding / assessment / pressure injury stage 2 or >.   Source: Previous RD notes, Electronic Medical Record, team. Pt AMS / inappropriate to interview.   Chart reviewed, events noted.

## 2025-02-24 NOTE — DIETITIAN INITIAL EVALUATION ADULT - PERTINENT MEDS FT
MEDICATIONS  (STANDING):  albuterol/ipratropium for Nebulization 3 milliLiter(s) Nebulizer every 6 hours  ascorbic acid 500 milliGRAM(s) Oral daily  bisacodyl Suppository 10 milliGRAM(s) Rectal daily  cefTRIAXone   IVPB 1000 milliGRAM(s) IV Intermittent every 24 hours  enoxaparin Injectable 30 milliGRAM(s) SubCutaneous every 24 hours  levothyroxine 88 MICROGram(s) Oral daily  metoprolol tartrate 25 milliGRAM(s) Oral two times a day  mirtazapine 15 milliGRAM(s) Oral at bedtime  multivitamin 1 Tablet(s) Oral daily  senna 2 Tablet(s) Oral at bedtime    MEDICATIONS  (PRN):  acetaminophen     Tablet .. 650 milliGRAM(s) Oral every 6 hours PRN Temp greater or equal to 38C (100.4F), Mild Pain (1 - 3)

## 2025-02-24 NOTE — ADVANCED PRACTICE NURSE CONSULT - REASON FOR CONSULT
Wound care consult initiated by RN to assess patient's skin for a possible sacral deep tissue injury and B/L heel stage 1 pressure injuries present on admission       Reason for Admission: hypoxemic resp failure, pleff +/- PNA  History of Present Illness:   89F w/ hx of Afib (s/p unsuccessful DCCV 1/2025, not on AC), dementia (baseline AAOx1-2, confused at times), brain tumor (s/p resection, c/b residual L-sided facial droop and L ear deafness), hypothyroidism, HTN, colon ca (s/p R hemicolectomy), constipation/stercoral colitis, recent long hospitalization (1/11/25-2/12/25) for hypoxic/hypercapnic resp failure and septic shock 2/2 influenza/asp PNA/UTI c/b dysphagia s/p PEG placement 2/7/25, now presenting [facility] for hypoxia requiring supplemental O2.     Limited hx obtainable from pt given baseline dementia and non-participatory during encounter for admission. Responsive to name, but otherwise did not answer orientation questions. Endorsed she felt cold and sleepy, otherwise was not answering further questions. No accompanying paperwork was found in chart at time of admission. Per chart review of ED documentation, pt reportedly coming to hospital for eval of  hypoxic respiratory failure requiring oxygen today and also appearing "clammy lethargic warm to touch." Was initially placed on 2L NC and was increased to 4L NC by EMS. Spoke to son (Maykel) over the phone for collateral history. He corroborated the above, noted that pt he saw pt earlier today and found pt to have gurgling sounds with her breathing, which prompted request for further evaluation. Noted pt reported feeling nauseous, but did not vomit. He stated O2 sat was initially down to 88% on RA and ~91% on 2L at the rehab before it improved further on 4L. He noted pt is deaf at baseline in one ear, has difficulty with her speech particularly because her dentures are out, and was at her baseline mental status today (AAOx1-2, confused at times). He also noted that pt has had issues with aspiration for a majority of his life and he suspects it may have been a complication of the brain tumor surgery. Currently has been getting feeds and meds through PEG only, nothing by mouth. Stated pt was given a dose of Lasix 20mg SQ at the rehab prior to coming to hospital. No known hx of CHF.    In ED: Tmax 100F rectal, HR 80s-90s, SBP 90s-160s, RR 16-24, sating % on 3-4L NC. Labs notable for WBC 11.28k, Hgb 8.9, Na 129, procal 0.31, hsTrop 67->67, pro-BNP 3.8k, albumin 2.6. CXR (prelim) showed "bilateral lower lung hazy opacities suggestive of pleural effusions with passive atelectasis; underlying pneumonia cannot be excluded." Received ofirmev 750mg, CTX 1g, LR 500cc bolus. Admitted to Medicine for further management.

## 2025-02-24 NOTE — ADVANCED PRACTICE NURSE CONSULT - RECOMMEDATIONS
Impression:    B/L 5th toes with dark pigmentation, cannot rule out a deep tissue injury present on admission   great toe wound - consider podiatry for treatment recommendations   B/L heel hyperpigmentation, cannot rule out a deep tissue injury present on admission  B/L heel bogginess  B/L buttocks/sacral deep tissue injury present on admission  urinary incontinence  fecal incontinence    Recommendations:    1) turn and position q2 and PRN utilizing offloading assistive devices  2) routine pericare daily and PRN soiling  3) encourage optimal nutrition  4) waffle cushion when oob to chair  5) B/L LE complete cair air fluidized boots or max-lock pillow to offload heels/feet  6) triad protective barrier cream to B/L buttocks/sacrum daily and PRN soiling  7) incontinence management - consider external urinary catheter to divert urine from skin if incontinent  8) sween 24 moisturizer to dry skin daily     Plan discussed with CARINA Joyce on unit      For questions/comments regarding the recommendations in this consult, please contact Valerie Quintero via [employer] Teams. Wound care will not actively follow. For new concerns, please enter new consult. Thank you!

## 2025-02-24 NOTE — DIETITIAN INITIAL EVALUATION ADULT - OTHER CALCULATIONS
Fluid needs deferred to team.  Estimated needs using IBW with consideration for multiple pressure injuries/suspected deep tissue injuries, advanced age.

## 2025-02-24 NOTE — DIETITIAN INITIAL EVALUATION ADULT - ENTERAL
Continue current EN regimen as tolerated:   	-- EN regimen at GOAL will provide: 1080 ml formula, 1620 kcal (31 kcal/kg), 69 g pro (1.3 g pro/kg), 820 ml free water daily based on IBW 52.1 kg.

## 2025-02-25 DIAGNOSIS — J90 PLEURAL EFFUSION, NOT ELSEWHERE CLASSIFIED: ICD-10-CM

## 2025-02-25 DIAGNOSIS — J96.01 ACUTE RESPIRATORY FAILURE WITH HYPOXIA: ICD-10-CM

## 2025-02-25 LAB
ANION GAP SERPL CALC-SCNC: 11 MMOL/L — SIGNIFICANT CHANGE UP (ref 5–17)
BUN SERPL-MCNC: 29 MG/DL — HIGH (ref 7–23)
CALCIUM SERPL-MCNC: 9.6 MG/DL — SIGNIFICANT CHANGE UP (ref 8.4–10.5)
CHLORIDE SERPL-SCNC: 95 MMOL/L — LOW (ref 96–108)
CO2 SERPL-SCNC: 27 MMOL/L — SIGNIFICANT CHANGE UP (ref 22–31)
CREAT SERPL-MCNC: 0.93 MG/DL — SIGNIFICANT CHANGE UP (ref 0.5–1.3)
EGFR: 56 ML/MIN/1.73M2 — LOW
GLUCOSE SERPL-MCNC: 80 MG/DL — SIGNIFICANT CHANGE UP (ref 70–99)
HCT VFR BLD CALC: 26.3 % — LOW (ref 34.5–45)
HGB BLD-MCNC: 8.3 G/DL — LOW (ref 11.5–15.5)
MCHC RBC-ENTMCNC: 31.1 PG — SIGNIFICANT CHANGE UP (ref 27–34)
MCHC RBC-ENTMCNC: 31.6 G/DL — LOW (ref 32–36)
MCV RBC AUTO: 98.5 FL — SIGNIFICANT CHANGE UP (ref 80–100)
NRBC BLD AUTO-RTO: 0 /100 WBCS — SIGNIFICANT CHANGE UP (ref 0–0)
PLATELET # BLD AUTO: 295 K/UL — SIGNIFICANT CHANGE UP (ref 150–400)
POTASSIUM SERPL-MCNC: 4.2 MMOL/L — SIGNIFICANT CHANGE UP (ref 3.5–5.3)
POTASSIUM SERPL-SCNC: 4.2 MMOL/L — SIGNIFICANT CHANGE UP (ref 3.5–5.3)
RBC # BLD: 2.67 M/UL — LOW (ref 3.8–5.2)
RBC # FLD: 16.2 % — HIGH (ref 10.3–14.5)
SODIUM SERPL-SCNC: 133 MMOL/L — LOW (ref 135–145)
WBC # BLD: 5.56 K/UL — SIGNIFICANT CHANGE UP (ref 3.8–10.5)
WBC # FLD AUTO: 5.56 K/UL — SIGNIFICANT CHANGE UP (ref 3.8–10.5)

## 2025-02-25 RX ADMIN — Medication 10 MILLIGRAM(S): at 12:28

## 2025-02-25 RX ADMIN — ENOXAPARIN SODIUM 30 MILLIGRAM(S): 100 INJECTION SUBCUTANEOUS at 05:55

## 2025-02-25 RX ADMIN — IPRATROPIUM BROMIDE AND ALBUTEROL SULFATE 3 MILLILITER(S): .5; 2.5 SOLUTION RESPIRATORY (INHALATION) at 05:55

## 2025-02-25 RX ADMIN — IPRATROPIUM BROMIDE AND ALBUTEROL SULFATE 3 MILLILITER(S): .5; 2.5 SOLUTION RESPIRATORY (INHALATION) at 23:42

## 2025-02-25 RX ADMIN — METOPROLOL SUCCINATE 25 MILLIGRAM(S): 50 TABLET, EXTENDED RELEASE ORAL at 17:38

## 2025-02-25 RX ADMIN — Medication 88 MICROGRAM(S): at 05:55

## 2025-02-25 RX ADMIN — Medication 500 MILLIGRAM(S): at 12:28

## 2025-02-25 RX ADMIN — METOPROLOL SUCCINATE 25 MILLIGRAM(S): 50 TABLET, EXTENDED RELEASE ORAL at 05:55

## 2025-02-25 RX ADMIN — IPRATROPIUM BROMIDE AND ALBUTEROL SULFATE 3 MILLILITER(S): .5; 2.5 SOLUTION RESPIRATORY (INHALATION) at 12:28

## 2025-02-25 RX ADMIN — MIRTAZAPINE 15 MILLIGRAM(S): 30 TABLET, FILM COATED ORAL at 21:24

## 2025-02-25 RX ADMIN — FUROSEMIDE 40 MILLIGRAM(S): 10 INJECTION INTRAMUSCULAR; INTRAVENOUS at 05:55

## 2025-02-25 RX ADMIN — Medication 2 TABLET(S): at 21:24

## 2025-02-25 RX ADMIN — IPRATROPIUM BROMIDE AND ALBUTEROL SULFATE 3 MILLILITER(S): .5; 2.5 SOLUTION RESPIRATORY (INHALATION) at 17:39

## 2025-02-25 RX ADMIN — Medication 1 TABLET(S): at 12:28

## 2025-02-25 RX ADMIN — CEFTRIAXONE 100 MILLIGRAM(S): 500 INJECTION, POWDER, FOR SOLUTION INTRAMUSCULAR; INTRAVENOUS at 20:24

## 2025-02-25 NOTE — PROGRESS NOTE ADULT - SUBJECTIVE AND OBJECTIVE BOX
Date of Service: 02-25-25 @ 10:37    Patient is a 89y old  Female who presents with a chief complaint of hypoxemic resp failure, pleff +/- PNA (24 Feb 2025 16:58)      Any change in ROS:           MEDICATIONS  (STANDING):  albuterol/ipratropium for Nebulization 3 milliLiter(s) Nebulizer every 6 hours  ascorbic acid 500 milliGRAM(s) Oral daily  bisacodyl Suppository 10 milliGRAM(s) Rectal daily  cefTRIAXone   IVPB 1000 milliGRAM(s) IV Intermittent every 24 hours  enoxaparin Injectable 30 milliGRAM(s) SubCutaneous every 24 hours  furosemide    Tablet 40 milliGRAM(s) Oral daily  levothyroxine 88 MICROGram(s) Oral daily  metoprolol tartrate 25 milliGRAM(s) Oral two times a day  mirtazapine 15 milliGRAM(s) Oral at bedtime  multivitamin 1 Tablet(s) Oral daily  senna 2 Tablet(s) Oral at bedtime    MEDICATIONS  (PRN):  acetaminophen     Tablet .. 650 milliGRAM(s) Oral every 6 hours PRN Temp greater or equal to 38C (100.4F), Mild Pain (1 - 3)    Vital Signs Last 24 Hrs  T(C): 36.3 (25 Feb 2025 04:54), Max: 36.8 (24 Feb 2025 12:10)  T(F): 97.4 (25 Feb 2025 04:54), Max: 98.3 (24 Feb 2025 12:10)  HR: 77 (25 Feb 2025 09:34) (77 - 89)  BP: 133/68 (25 Feb 2025 04:54) (117/77 - 133/68)  BP(mean): --  RR: 18 (25 Feb 2025 09:34) (18 - 18)  SpO2: 93% (25 Feb 2025 09:34) (93% - 100%)    Parameters below as of 25 Feb 2025 09:34  Patient On (Oxygen Delivery Method): room air        I&O's Summary    24 Feb 2025 07:01  -  25 Feb 2025 07:00  --------------------------------------------------------  IN: 230 mL / OUT: 1000 mL / NET: -770 mL          Physical Exam:   GENERAL: NAD, well-groomed, well-developed  HEENT: LUANN/   Atraumatic, Normocephalic  ENMT: No tonsillar erythema, exudates, or enlargement; Moist mucous membranes, Good dentition, No lesions  NECK: Supple, No JVD, Normal thyroid  CHEST/LUNG:   CVS: Regular rate and rhythm; No murmurs, rubs, or gallops  GI: : Soft, Nontender, Nondistended; Bowel sounds present  NERVOUS SYSTEM:  Alert & Oriented X3, Good concentration; Motor Strength 5/5 B/L upper and lower extremities; DTRs 2+ intact and symmetric  EXTREMITIES:  2+ Peripheral Pulses, No clubbing, cyanosis, or edema  LYMPH: No lymphadenopathy noted  SKIN: No rashes or lesions  ENDOCRINOLOGY: No Thyromegaly  PSYCH: Appropriate    Labs:  28                            8.3    5.56  )-----------( 295      ( 25 Feb 2025 07:09 )             26.3                         8.5    11.49 )-----------( 282      ( 24 Feb 2025 07:06 )             26.3                         8.9    11.28 )-----------( 292      ( 23 Feb 2025 16:48 )             27.2     02-25    133[L]  |  95[L]  |  29[H]  ----------------------------<  80  4.2   |  27  |  0.93  02-24    133[L]  |  96  |  33[H]  ----------------------------<  70  4.7   |  26  |  0.99  02-23    129[L]  |  95[L]  |  35[H]  ----------------------------<  106[H]  4.6   |  24  |  0.99    Ca    9.6      25 Feb 2025 07:05  Ca    9.6      24 Feb 2025 07:05  Ca    9.5      23 Feb 2025 16:48  Phos  3.2     02-24  Mg     2.2     02-24    TPro  7.1  /  Alb  2.6[L]  /  TBili  0.4  /  DBili  x   /  AST  23  /  ALT  30  /  AlkPhos  141[H]  02-24  TPro  7.4  /  Alb  2.6[L]  /  TBili  0.4  /  DBili  x   /  AST  30  /  ALT  37  /  AlkPhos  154[H]  02-23    CAPILLARY BLOOD GLUCOSE          LIVER FUNCTIONS - ( 24 Feb 2025 07:05 )  Alb: 2.6 g/dL / Pro: 7.1 g/dL / ALK PHOS: 141 U/L / ALT: 30 U/L / AST: 23 U/L / GGT: x           PT/INR - ( 23 Feb 2025 16:48 )   PT: 11.8 sec;   INR: 1.03 ratio         PTT - ( 23 Feb 2025 16:48 )  PTT:27.8 sec  Urinalysis Basic - ( 25 Feb 2025 07:05 )    Color: x / Appearance: x / SG: x / pH: x  Gluc: 80 mg/dL / Ketone: x  / Bili: x / Urobili: x   Blood: x / Protein: x / Nitrite: x   Leuk Esterase: x / RBC: x / WBC x   Sq Epi: x / Non Sq Epi: x / Bacteria: x      Procalcitonin: 0.31 ng/mL (02-23 @ 16:48)        RECENT CULTURES:  02-23 @ 16:40 .Blood Blood-Peripheral                No growth at 24 hours    02-23 @ 16:35 .Blood Blood-Peripheral                No growth at 24 hours          Studies      < from: CT Chest No Cont (02.24.25 @ 08:55) >    ACC: [id number] EXAM:  CT CHEST   ORDERED BY:  MARVEL COTA     PROCEDURE DATE:  02/24/2025          INTERPRETATION:  CLINICAL INFORMATION: Hypoxic respiratory failure.   History of atrial fibrillation.    COMPARISON: CT chest 1/23/2025, CT abdomen andpelvis 2/4/2025    CONTRAST/COMPLICATIONS:  IV Contrast: NONE  Oral Contrast: NONE    PROCEDURE:  CT scan of the chest was obtained without intravenous contrast.    FINDINGS:    AIRWAYS/LUNGS/PLEURA: Patent central airways. Similar moderate size   bilateral pleural effusions with associated complete atelectasis of the   lower lobes. Partial lingular atelectasis. Newly apparent patchy right   upper lobe groundglass opacity compared to prior (series 301, image 44).    MEDIASTINUM AND SWETHA: No lymphadenopathy.    VESSELS: Aortic calcifications. Coronary artery calcifications.    HEART: Heart size is normal. No pericardial effusion. Aortic valve   calcifications.    VISUALIZED UPPER ABDOMEN: Interval placement of percutaneous gastrostomy   tube. Cholelithiasis. Mild subcutaneous soft tissue edema.    CHEST WALL AND BONES: Mild degenerative changes of thoracic spine.    IMPRESSION:    Similar moderate size bilateral pleural effusions with associated   complete atelectasis of the lower lobes.    Newly apparent patchy right upper lobe ground-glass opacity may be   secondary to edema or be infectious/inflammatory etiology.    --- End of Report ---      < end of copied text >      < from: TTE W or WO Ultrasound Enhancing Agent (02.24.25 @ 10:31) >  _______________________________________________________________________________________     CONCLUSIONS:      1. Left ventricular systolic function is normal with an ejection fraction of 55 % by Reyes's method of disks.   2. Probably normal right ventricular systolic function.   3. Left ventricular endocardium is not well visualized; however, the left ventricular systolic function appears grossly normal.   4. Moderate left ventricular hypertrophy.   5. No prior echocardiogram is available for comparison.   6. Technically difficult image quality.   7. There is increased LV mass and concentric hypertrophy.    ________________________________________________________________________________________  FINDINGS:     Left Ventricle:  The left ventricular cavity is normal in size. Left ventricular systolic function is normal with a calculated ejection fraction of 55 % by the Reyes's biplane method of disks. Moderate left ventricular hypertrophy. There is increased LV mass and concentric hypertrophy. Left ventricular endocardium is not well visualized; however, the left ventricular systolic function appears grossly normal. The left ventricular diastolic function is indeterminate.     Right Ventricle:  The right ventricle is not well visualized. Right ventricular systolic function is probably normal.     Left Atrium:  The left atrium is normal in size with an indexed volume of 32.91 ml/m².     Right Atrium:  The right atrium was not well visualized.     Aortic Valve:  The aortic valve appears trileaflet with normal systolic excursion. There is focal calcification of the aortic valve leaflets.     Tricuspid Valve:  Structurally normal tricuspid valve with normal leaflet excursion.     Pulmonic Valve:  Structurally normal pulmonic valve with normal leaflet excursion. There is trace pulmonic regurgitation.  ____________________________________________________________________  QUANTITATIVE DATA:  Left Ventricle Measurements: (Indexed to BSA)     IVSd (2D):   1.2 cm  LVPWd (2D):  1.2 cm  LVIDd (2D):  4.1 cm  LVIDs (2D):  2.9 cm  LV Mass:     172 g 116.3 g/m²  BiPlane LV EF%: 55 %     MV E Vmax:    0.70 m/s  MV A Vmax:    0.74 m/s  MV E/A:       0.94  e' lateral:   5.77 cm/s  e' medial:    4.46 cm/s  E/e' lateral: 12.10  E/e' medial:  15.65  E/e' Average: 13.65  MV DT:        278 msec    AortaMeasurements: (Normal range) (Indexed to BSA)     Ao Root d 2.80 cm (2.7 - 3.3 cm) 1.90 cm/m²            Left Atrium Measurements: (Indexed to BSA)  LA Diam 2D: 3.10 cm         Right Ventricle Measurements:     RV S' Vmax: 7.40 cm/s       LVOT / RVOT/ Qp/Qs Data: (Indexed to BSA)  LVOT Vmax:      0.63 m/s  LVOT Vmn:       0.498 m/s  LVOT VTI:       14.50 cm  LVOT peak grad: 2 mmHg  LVOT mean grad: 1.0 mmHg    Mitral Valve Measurements:     MV E Vmax: 0.7 m/s  MV A Vmax: 0.7 m/s  MV E/A:    0.9    < end of copied text >           Date of Service: 02-25-25 @ 10:37    Patient is a 89y old  Female who presents with a chief complaint of hypoxemic resp failure, pleff +/- PNA (24 Feb 2025 16:58)      Any change in ROS:     Lethargic but arousable  Breathing nonlabored      MEDICATIONS  (STANDING):  albuterol/ipratropium for Nebulization 3 milliLiter(s) Nebulizer every 6 hours  ascorbic acid 500 milliGRAM(s) Oral daily  bisacodyl Suppository 10 milliGRAM(s) Rectal daily  cefTRIAXone   IVPB 1000 milliGRAM(s) IV Intermittent every 24 hours  enoxaparin Injectable 30 milliGRAM(s) SubCutaneous every 24 hours  furosemide    Tablet 40 milliGRAM(s) Oral daily  levothyroxine 88 MICROGram(s) Oral daily  metoprolol tartrate 25 milliGRAM(s) Oral two times a day  mirtazapine 15 milliGRAM(s) Oral at bedtime  multivitamin 1 Tablet(s) Oral daily  senna 2 Tablet(s) Oral at bedtime    MEDICATIONS  (PRN):  acetaminophen     Tablet .. 650 milliGRAM(s) Oral every 6 hours PRN Temp greater or equal to 38C (100.4F), Mild Pain (1 - 3)    Vital Signs Last 24 Hrs  T(C): 36.3 (25 Feb 2025 04:54), Max: 36.8 (24 Feb 2025 12:10)  T(F): 97.4 (25 Feb 2025 04:54), Max: 98.3 (24 Feb 2025 12:10)  HR: 77 (25 Feb 2025 09:34) (77 - 89)  BP: 133/68 (25 Feb 2025 04:54) (117/77 - 133/68)  BP(mean): --  RR: 18 (25 Feb 2025 09:34) (18 - 18)  SpO2: 93% (25 Feb 2025 09:34) (93% - 100%)    Parameters below as of 25 Feb 2025 09:34  Patient On (Oxygen Delivery Method): room air        I&O's Summary    24 Feb 2025 07:01  -  25 Feb 2025 07:00  --------------------------------------------------------  IN: 230 mL / OUT: 1000 mL / NET: -770 mL          Physical Exam:   GENERAL: NAD, well-groomed, well-developed  HEENT: LUANN/   Atraumatic, Normocephalic  ENMT: No tonsillar erythema, exudates, or enlargement  NECK: Supple, No JVD, Normal thyroid  CHEST/LUNG: Decreased BS at bases; few scattered crackles   CVS: Regular rate and rhythm; No murmurs, rubs, or gallops  GI: : Soft, Nontender, Nondistended; Bowel sounds present  NERVOUS SYSTEM:  Lethargic but arousable   EXTREMITIES:  2+ Peripheral Pulses, No clubbing, cyanosis, or edema  LYMPH: No lymphadenopathy noted  SKIN: No rashes or lesions  ENDOCRINOLOGY: No Thyromegaly  PSYCH: Appropriate    Labs:  28                            8.3    5.56  )-----------( 295      ( 25 Feb 2025 07:09 )             26.3                         8.5    11.49 )-----------( 282      ( 24 Feb 2025 07:06 )             26.3                         8.9    11.28 )-----------( 292      ( 23 Feb 2025 16:48 )             27.2     02-25    133[L]  |  95[L]  |  29[H]  ----------------------------<  80  4.2   |  27  |  0.93  02-24    133[L]  |  96  |  33[H]  ----------------------------<  70  4.7   |  26  |  0.99  02-23    129[L]  |  95[L]  |  35[H]  ----------------------------<  106[H]  4.6   |  24  |  0.99    Ca    9.6      25 Feb 2025 07:05  Ca    9.6      24 Feb 2025 07:05  Ca    9.5      23 Feb 2025 16:48  Phos  3.2     02-24  Mg     2.2     02-24    TPro  7.1  /  Alb  2.6[L]  /  TBili  0.4  /  DBili  x   /  AST  23  /  ALT  30  /  AlkPhos  141[H]  02-24  TPro  7.4  /  Alb  2.6[L]  /  TBili  0.4  /  DBili  x   /  AST  30  /  ALT  37  /  AlkPhos  154[H]  02-23    CAPILLARY BLOOD GLUCOSE          LIVER FUNCTIONS - ( 24 Feb 2025 07:05 )  Alb: 2.6 g/dL / Pro: 7.1 g/dL / ALK PHOS: 141 U/L / ALT: 30 U/L / AST: 23 U/L / GGT: x           PT/INR - ( 23 Feb 2025 16:48 )   PT: 11.8 sec;   INR: 1.03 ratio         PTT - ( 23 Feb 2025 16:48 )  PTT:27.8 sec  Urinalysis Basic - ( 25 Feb 2025 07:05 )    Color: x / Appearance: x / SG: x / pH: x  Gluc: 80 mg/dL / Ketone: x  / Bili: x / Urobili: x   Blood: x / Protein: x / Nitrite: x   Leuk Esterase: x / RBC: x / WBC x   Sq Epi: x / Non Sq Epi: x / Bacteria: x      Procalcitonin: 0.31 ng/mL (02-23 @ 16:48)        RECENT CULTURES:  02-23 @ 16:40 .Blood Blood-Peripheral                No growth at 24 hours    02-23 @ 16:35 .Blood Blood-Peripheral                No growth at 24 hours          Studies      < from: CT Chest No Cont (02.24.25 @ 08:55) >    ACC: [id number] EXAM:  CT CHEST   ORDERED BY:  MARVEL COTA     PROCEDURE DATE:  02/24/2025          INTERPRETATION:  CLINICAL INFORMATION: Hypoxic respiratory failure.   History of atrial fibrillation.    COMPARISON: CT chest 1/23/2025, CT abdomen andpelvis 2/4/2025    CONTRAST/COMPLICATIONS:  IV Contrast: NONE  Oral Contrast: NONE    PROCEDURE:  CT scan of the chest was obtained without intravenous contrast.    FINDINGS:    AIRWAYS/LUNGS/PLEURA: Patent central airways. Similar moderate size   bilateral pleural effusions with associated complete atelectasis of the   lower lobes. Partial lingular atelectasis. Newly apparent patchy right   upper lobe groundglass opacity compared to prior (series 301, image 44).    MEDIASTINUM AND SWETHA: No lymphadenopathy.    VESSELS: Aortic calcifications. Coronary artery calcifications.    HEART: Heart size is normal. No pericardial effusion. Aortic valve   calcifications.    VISUALIZED UPPER ABDOMEN: Interval placement of percutaneous gastrostomy   tube. Cholelithiasis. Mild subcutaneous soft tissue edema.    CHEST WALL AND BONES: Mild degenerative changes of thoracic spine.    IMPRESSION:    Similar moderate size bilateral pleural effusions with associated   complete atelectasis of the lower lobes.    Newly apparent patchy right upper lobe ground-glass opacity may be   secondary to edema or be infectious/inflammatory etiology.    --- End of Report ---      < end of copied text >      < from: TTE W or WO Ultrasound Enhancing Agent (02.24.25 @ 10:31) >  _______________________________________________________________________________________     CONCLUSIONS:      1. Left ventricular systolic function is normal with an ejection fraction of 55 % by Reyes's method of disks.   2. Probably normal right ventricular systolic function.   3. Left ventricular endocardium is not well visualized; however, the left ventricular systolic function appears grossly normal.   4. Moderate left ventricular hypertrophy.   5. No prior echocardiogram is available for comparison.   6. Technically difficult image quality.   7. There is increased LV mass and concentric hypertrophy.    ________________________________________________________________________________________  FINDINGS:     Left Ventricle:  The left ventricular cavity is normal in size. Left ventricular systolic function is normal with a calculated ejection fraction of 55 % by the Reyes's biplane method of disks. Moderate left ventricular hypertrophy. There is increased LV mass and concentric hypertrophy. Left ventricular endocardium is not well visualized; however, the left ventricular systolic function appears grossly normal. The left ventricular diastolic function is indeterminate.     Right Ventricle:  The right ventricle is not well visualized. Right ventricular systolic function is probably normal.     Left Atrium:  The left atrium is normal in size with an indexed volume of 32.91 ml/m².     Right Atrium:  The right atrium was not well visualized.     Aortic Valve:  The aortic valve appears trileaflet with normal systolic excursion. There is focal calcification of the aortic valve leaflets.     Tricuspid Valve:  Structurally normal tricuspid valve with normal leaflet excursion.     Pulmonic Valve:  Structurally normal pulmonic valve with normal leaflet excursion. There is trace pulmonic regurgitation.  ____________________________________________________________________  QUANTITATIVE DATA:  Left Ventricle Measurements: (Indexed to BSA)     IVSd (2D):   1.2 cm  LVPWd (2D):  1.2 cm  LVIDd (2D):  4.1 cm  LVIDs (2D):  2.9 cm  LV Mass:     172 g 116.3 g/m²  BiPlane LV EF%: 55 %     MV E Vmax:    0.70 m/s  MV A Vmax:    0.74 m/s  MV E/A:       0.94  e' lateral:   5.77 cm/s  e' medial:    4.46 cm/s  E/e' lateral: 12.10  E/e' medial:  15.65  E/e' Average: 13.65  MV DT:        278 msec    AortaMeasurements: (Normal range) (Indexed to BSA)     Ao Root d 2.80 cm (2.7 - 3.3 cm) 1.90 cm/m²            Left Atrium Measurements: (Indexed to BSA)  LA Diam 2D: 3.10 cm         Right Ventricle Measurements:     RV S' Vmax: 7.40 cm/s       LVOT / RVOT/ Qp/Qs Data: (Indexed to BSA)  LVOT Vmax:      0.63 m/s  LVOT Vmn:       0.498 m/s  LVOT VTI:       14.50 cm  LVOT peak grad: 2 mmHg  LVOT mean grad: 1.0 mmHg    Mitral Valve Measurements:     MV E Vmax: 0.7 m/s  MV A Vmax: 0.7 m/s  MV E/A:    0.9    < end of copied text >

## 2025-02-25 NOTE — PROGRESS NOTE ADULT - PROBLEM SELECTOR PLAN 1
Likely 2nd to b/l pl effusions/atelectasis +/- underlying PNA   -CT chest with b/l moderate pleural effusions/atelectasis, RUL GGO   -Favor symptoms 2nd to fluid overload > PNA but cannot completely r/o underlying infiltrate, can continue empiric ABX  -Keep O>I as tolerated  -Keep sats >90% with O2, wean O2 as tolerated. Likely 2nd to b/l pl effusions/atelectasis +/- underlying PNA   -CT chest with b/l moderate pleural effusions/atelectasis, RUL GGO   -Favor symptoms 2nd to fluid overload > PNA but cannot completely r/o underlying infiltrate, suggest continue empiric ABX  -Keep O>I as tolerated  -Keep sats >90% with O2, wean O2 as tolerated.

## 2025-02-25 NOTE — PROGRESS NOTE ADULT - SUBJECTIVE AND OBJECTIVE BOX
Date of Service  : 02-25-25     INTERVAL HPI/OVERNIGHT EVENTS: More alert .  Vital Signs Last 24 Hrs  T(C): 36.4 (25 Feb 2025 20:45), Max: 36.4 (25 Feb 2025 12:45)  T(F): 97.5 (25 Feb 2025 20:45), Max: 97.5 (25 Feb 2025 12:45)  HR: 89 (25 Feb 2025 20:45) (77 - 89)  BP: 125/69 (25 Feb 2025 20:45) (125/69 - 133/68)  BP(mean): --  RR: 18 (25 Feb 2025 20:45) (18 - 18)  SpO2: 95% (25 Feb 2025 20:45) (93% - 97%)    Parameters below as of 25 Feb 2025 20:45  Patient On (Oxygen Delivery Method): room air      I&O's Summary    24 Feb 2025 07:01  -  25 Feb 2025 07:00  --------------------------------------------------------  IN: 230 mL / OUT: 1000 mL / NET: -770 mL      MEDICATIONS  (STANDING):  albuterol/ipratropium for Nebulization 3 milliLiter(s) Nebulizer every 6 hours  ascorbic acid 500 milliGRAM(s) Oral daily  bisacodyl Suppository 10 milliGRAM(s) Rectal daily  cefTRIAXone   IVPB 1000 milliGRAM(s) IV Intermittent every 24 hours  enoxaparin Injectable 30 milliGRAM(s) SubCutaneous every 24 hours  furosemide    Tablet 40 milliGRAM(s) Oral daily  levothyroxine 88 MICROGram(s) Oral daily  metoprolol tartrate 25 milliGRAM(s) Oral two times a day  mirtazapine 15 milliGRAM(s) Oral at bedtime  multivitamin 1 Tablet(s) Oral daily  senna 2 Tablet(s) Oral at bedtime    MEDICATIONS  (PRN):  acetaminophen     Tablet .. 650 milliGRAM(s) Oral every 6 hours PRN Temp greater or equal to 38C (100.4F), Mild Pain (1 - 3)    LABS:                        8.3    5.56  )-----------( 295      ( 25 Feb 2025 07:09 )             26.3     02-25    133[L]  |  95[L]  |  29[H]  ----------------------------<  80  4.2   |  27  |  0.93    Ca    9.6      25 Feb 2025 07:05  Phos  3.2     02-24  Mg     2.2     02-24    TPro  7.1  /  Alb  2.6[L]  /  TBili  0.4  /  DBili  x   /  AST  23  /  ALT  30  /  AlkPhos  141[H]  02-24      Urinalysis Basic - ( 25 Feb 2025 07:05 )    Color: x / Appearance: x / SG: x / pH: x  Gluc: 80 mg/dL / Ketone: x  / Bili: x / Urobili: x   Blood: x / Protein: x / Nitrite: x   Leuk Esterase: x / RBC: x / WBC x   Sq Epi: x / Non Sq Epi: x / Bacteria: x      CAPILLARY BLOOD GLUCOSE            Urinalysis Basic - ( 25 Feb 2025 07:05 )    Color: x / Appearance: x / SG: x / pH: x  Gluc: 80 mg/dL / Ketone: x  / Bili: x / Urobili: x   Blood: x / Protein: x / Nitrite: x   Leuk Esterase: x / RBC: x / WBC x   Sq Epi: x / Non Sq Epi: x / Bacteria: x          RADIOLOGY & ADDITIONAL TESTS:    Consultant(s) Notes Reviewed:  [x ] YES  [ ] NO    PHYSICAL EXAM:  GENERAL: NAD, well-groomed, well-developed,not in any distress ,  HEAD:  Atraumatic, Normocephalic  NECK: Supple, No JVD, Normal thyroid  NERVOUS SYSTEM:  Alert  CHEST/LUNG: Good air entry bilateral with no  rales, rhonchi, wheezing, or rubs  HEART: Regular rate and rhythm; No murmurs, rubs, or gallops  ABDOMEN: Soft, Nontender, Nondistended; Bowel sounds present ,PEG +  EXTREMITIES:  2+ Peripheral Pulses, No clubbing, cyanosis, or edema    Care Discussed with Consultants/Other Providers [ x] YES  [ ] NO

## 2025-02-26 LAB
APPEARANCE UR: CLEAR — SIGNIFICANT CHANGE UP
BACTERIA # UR AUTO: NEGATIVE /HPF — SIGNIFICANT CHANGE UP
BILIRUB UR-MCNC: NEGATIVE — SIGNIFICANT CHANGE UP
CAST: 0 /LPF — SIGNIFICANT CHANGE UP (ref 0–4)
COLOR SPEC: YELLOW — SIGNIFICANT CHANGE UP
DIFF PNL FLD: NEGATIVE — SIGNIFICANT CHANGE UP
GLUCOSE UR QL: NEGATIVE MG/DL — SIGNIFICANT CHANGE UP
KETONES UR-MCNC: NEGATIVE MG/DL — SIGNIFICANT CHANGE UP
LEGIONELLA AG UR QL: NEGATIVE — SIGNIFICANT CHANGE UP
LEUKOCYTE ESTERASE UR-ACNC: ABNORMAL
NITRITE UR-MCNC: NEGATIVE — SIGNIFICANT CHANGE UP
PH UR: 7 — SIGNIFICANT CHANGE UP (ref 5–8)
PROT UR-MCNC: NEGATIVE MG/DL — SIGNIFICANT CHANGE UP
RBC CASTS # UR COMP ASSIST: 6 /HPF — HIGH (ref 0–4)
REVIEW: SIGNIFICANT CHANGE UP
SP GR SPEC: 1.01 — SIGNIFICANT CHANGE UP (ref 1–1.03)
SQUAMOUS # UR AUTO: 5 /HPF — SIGNIFICANT CHANGE UP (ref 0–5)
UROBILINOGEN FLD QL: 0.2 MG/DL — SIGNIFICANT CHANGE UP (ref 0.2–1)
WBC UR QL: 3 /HPF — SIGNIFICANT CHANGE UP (ref 0–5)

## 2025-02-26 RX ORDER — MELATONIN 5 MG
3 TABLET ORAL ONCE
Refills: 0 | Status: DISCONTINUED | OUTPATIENT
Start: 2025-02-26 | End: 2025-02-26

## 2025-02-26 RX ORDER — MELATONIN 5 MG
3 TABLET ORAL ONCE
Refills: 0 | Status: COMPLETED | OUTPATIENT
Start: 2025-02-26 | End: 2025-02-26

## 2025-02-26 RX ORDER — FUROSEMIDE 10 MG/ML
40 INJECTION INTRAMUSCULAR; INTRAVENOUS DAILY
Refills: 0 | Status: DISCONTINUED | OUTPATIENT
Start: 2025-02-26 | End: 2025-02-28

## 2025-02-26 RX ADMIN — FUROSEMIDE 40 MILLIGRAM(S): 10 INJECTION INTRAMUSCULAR; INTRAVENOUS at 17:29

## 2025-02-26 RX ADMIN — Medication 2 TABLET(S): at 21:12

## 2025-02-26 RX ADMIN — MIRTAZAPINE 15 MILLIGRAM(S): 30 TABLET, FILM COATED ORAL at 21:12

## 2025-02-26 RX ADMIN — Medication 10 MILLIGRAM(S): at 11:42

## 2025-02-26 RX ADMIN — IPRATROPIUM BROMIDE AND ALBUTEROL SULFATE 3 MILLILITER(S): .5; 2.5 SOLUTION RESPIRATORY (INHALATION) at 23:23

## 2025-02-26 RX ADMIN — METOPROLOL SUCCINATE 25 MILLIGRAM(S): 50 TABLET, EXTENDED RELEASE ORAL at 17:27

## 2025-02-26 RX ADMIN — CEFTRIAXONE 100 MILLIGRAM(S): 500 INJECTION, POWDER, FOR SOLUTION INTRAMUSCULAR; INTRAVENOUS at 20:56

## 2025-02-26 RX ADMIN — IPRATROPIUM BROMIDE AND ALBUTEROL SULFATE 3 MILLILITER(S): .5; 2.5 SOLUTION RESPIRATORY (INHALATION) at 05:34

## 2025-02-26 RX ADMIN — IPRATROPIUM BROMIDE AND ALBUTEROL SULFATE 3 MILLILITER(S): .5; 2.5 SOLUTION RESPIRATORY (INHALATION) at 17:27

## 2025-02-26 RX ADMIN — Medication 88 MICROGRAM(S): at 05:34

## 2025-02-26 RX ADMIN — Medication 1 TABLET(S): at 11:42

## 2025-02-26 RX ADMIN — ENOXAPARIN SODIUM 30 MILLIGRAM(S): 100 INJECTION SUBCUTANEOUS at 05:34

## 2025-02-26 RX ADMIN — Medication 500 MILLIGRAM(S): at 11:42

## 2025-02-26 RX ADMIN — FUROSEMIDE 40 MILLIGRAM(S): 10 INJECTION INTRAMUSCULAR; INTRAVENOUS at 05:34

## 2025-02-26 RX ADMIN — METOPROLOL SUCCINATE 25 MILLIGRAM(S): 50 TABLET, EXTENDED RELEASE ORAL at 05:34

## 2025-02-26 RX ADMIN — Medication 3 MILLIGRAM(S): at 01:24

## 2025-02-26 RX ADMIN — IPRATROPIUM BROMIDE AND ALBUTEROL SULFATE 3 MILLILITER(S): .5; 2.5 SOLUTION RESPIRATORY (INHALATION) at 11:41

## 2025-02-26 NOTE — PROGRESS NOTE ADULT - SUBJECTIVE AND OBJECTIVE BOX
Date of Service: 25 @ 16:18    Patient is a 89y old  Female who presents with a chief complaint of hypoxemic resp failure, pleff +/- PNA (2025 10:37)      Any change in ROS: seems OK;  not in any resp distress:  on room air     MEDICATIONS  (STANDING):  albuterol/ipratropium for Nebulization 3 milliLiter(s) Nebulizer every 6 hours  ascorbic acid 500 milliGRAM(s) Oral daily  bisacodyl Suppository 10 milliGRAM(s) Rectal daily  cefTRIAXone   IVPB 1000 milliGRAM(s) IV Intermittent every 24 hours  enoxaparin Injectable 30 milliGRAM(s) SubCutaneous every 24 hours  furosemide    Tablet 40 milliGRAM(s) Oral daily  levothyroxine 88 MICROGram(s) Oral daily  metoprolol tartrate 25 milliGRAM(s) Oral two times a day  mirtazapine 15 milliGRAM(s) Oral at bedtime  multivitamin 1 Tablet(s) Oral daily  senna 2 Tablet(s) Oral at bedtime    MEDICATIONS  (PRN):  acetaminophen     Tablet .. 650 milliGRAM(s) Oral every 6 hours PRN Temp greater or equal to 38C (100.4F), Mild Pain (1 - 3)    Vital Signs Last 24 Hrs  T(C): 36.3 (2025 12:37), Max: 36.4 (2025 20:45)  T(F): 97.4 (2025 12:37), Max: 97.5 (2025 20:45)  HR: 82 (2025 12:37) (82 - 89)  BP: 156/76 (2025 12:37) (125/69 - 156/76)  BP(mean): --  RR: 18 (2025 12:37) (18 - 18)  SpO2: 98% (2025 12:37) (95% - 98%)    Parameters below as of 2025 12:37  Patient On (Oxygen Delivery Method): room air        I&O's Summary    2025 07:01  -  2025 07:00  --------------------------------------------------------  IN: 340 mL / OUT: 600 mL / NET: -260 mL          Physical Exam:   GENERAL: NAD, well-groomed, well-developed  HEENT: LUANN/   Atraumatic, Normocephalic  ENMT: No tonsillar erythema, exudates, or enlargement; Moist mucous membranes, Good dentition, No lesions  NECK: Supple, No JVD, Normal thyroid  CHEST/LUNG: decreased air entry and bilaterally:   CVS: Regular rate and rhythm; No murmurs, rubs, or gallops  GI: : Soft, Nontender, Nondistended; Bowel sounds present  NERVOUS SYSTEM:  Alert & awake:  dementia  EXTREMITIES:  - edema  LYMPH: No lymphadenopathy noted  SKIN: No rashes or lesions  ENDOCRINOLOGY: No Thyromegaly  PSYCH: dementia    Labs:  28                            8.3    5.56  )-----------( 295      ( 2025 07:09 )             26.3                         8.5    11.49 )-----------( 282      ( 2025 07:06 )             26.3                         8.9    11.28 )-----------( 292      ( 2025 16:48 )             27.2     02-25    133[L]  |  95[L]  |  29[H]  ----------------------------<  80  4.2   |  27  |  0.93  02-24    133[L]  |  96  |  33[H]  ----------------------------<  70  4.7   |  26  |  0.99  02-23    129[L]  |  95[L]  |  35[H]  ----------------------------<  106[H]  4.6   |  24  |  0.99    Ca    9.6      2025 07:05    TPro  7.1  /  Alb  2.6[L]  /  TBili  0.4  /  DBili  x   /  AST  23  /  ALT  30  /  AlkPhos  141[H]  02-24  TPro  7.4  /  Alb  2.6[L]  /  TBili  0.4  /  DBili  x   /  AST  30  /  ALT  37  /  AlkPhos  154[H]      CAPILLARY BLOOD GLUCOSE              Urinalysis Basic - ( 2025 00:38 )    Color: Yellow / Appearance: Clear / S.013 / pH: x  Gluc: x / Ketone: Negative mg/dL  / Bili: Negative / Urobili: 0.2 mg/dL   Blood: x / Protein: Negative mg/dL / Nitrite: Negative   Leuk Esterase: Small / RBC: 6 /HPF / WBC 3 /HPF   Sq Epi: x / Non Sq Epi: 5 /HPF / Bacteria: Negative /HPF      Procalcitonin: 0.31 ng/mL ( @ 16:48)        RECENT CULTURES:   @ 16:40 .Blood Blood-Peripheral                No growth at 48 Hours     @ 16:35 .Blood Blood-Peripheral       rad         No growth at 48 Hours          RESPIRATORY CULTURES:          Studies  Chest X-RAY  CT SCAN Chest   Venous Dopplers: LE:   CT Abdomen  Others

## 2025-02-26 NOTE — PROGRESS NOTE ADULT - PROBLEM SELECTOR PLAN 1
Likely 2nd to b/l pl effusions/atelectasis +/- underlying PNA   -CT chest with b/l moderate pleural effusions/atelectasis, RUL GGO   -Favor symptoms 2nd to fluid overload > PNA but cannot completely r/o underlying infiltrate, suggest continue empiric ABX  -Keep O>I as tolerated  -Keep sats >90% with O2, wean O2 as tolerated.  2/26: seems OK:  would suggest to change to iv lasix:  tenzin acp  : she is on room air and is not in a ny resp distress

## 2025-02-26 NOTE — PROGRESS NOTE ADULT - SUBJECTIVE AND OBJECTIVE BOX
Date of Service  : 25    INTERVAL HPI/OVERNIGHT EVENTS: More alert .   Vital Signs Last 24 Hrs  T(C): 36.3 (2025 12:37), Max: 36.4 (2025 20:45)  T(F): 97.4 (2025 12:37), Max: 97.5 (2025 20:45)  HR: 82 (2025 12:37) (82 - 89)  BP: 156/76 (2025 12:37) (125/69 - 156/76)  BP(mean): --  RR: 18 (2025 12:37) (18 - 18)  SpO2: 98% (2025 12:37) (95% - 98%)    Parameters below as of 2025 12:37  Patient On (Oxygen Delivery Method): room air      I&O's Summary    2025 07:01  -  2025 07:00  --------------------------------------------------------  IN: 340 mL / OUT: 600 mL / NET: -260 mL      MEDICATIONS  (STANDING):  albuterol/ipratropium for Nebulization 3 milliLiter(s) Nebulizer every 6 hours  ascorbic acid 500 milliGRAM(s) Oral daily  bisacodyl Suppository 10 milliGRAM(s) Rectal daily  cefTRIAXone   IVPB 1000 milliGRAM(s) IV Intermittent every 24 hours  enoxaparin Injectable 30 milliGRAM(s) SubCutaneous every 24 hours  furosemide   Injectable 40 milliGRAM(s) IV Push daily  levothyroxine 88 MICROGram(s) Oral daily  metoprolol tartrate 25 milliGRAM(s) Oral two times a day  mirtazapine 15 milliGRAM(s) Oral at bedtime  multivitamin 1 Tablet(s) Oral daily  senna 2 Tablet(s) Oral at bedtime    MEDICATIONS  (PRN):  acetaminophen     Tablet .. 650 milliGRAM(s) Oral every 6 hours PRN Temp greater or equal to 38C (100.4F), Mild Pain (1 - 3)    LABS:                        8.3    5.56  )-----------( 295      ( 2025 07:09 )             26.3     02-25    133[L]  |  95[L]  |  29[H]  ----------------------------<  80  4.2   |  27  |  0.93    Ca    9.6      2025 07:05        Urinalysis Basic - ( 2025 00:38 )    Color: Yellow / Appearance: Clear / S.013 / pH: x  Gluc: x / Ketone: Negative mg/dL  / Bili: Negative / Urobili: 0.2 mg/dL   Blood: x / Protein: Negative mg/dL / Nitrite: Negative   Leuk Esterase: Small / RBC: 6 /HPF / WBC 3 /HPF   Sq Epi: x / Non Sq Epi: 5 /HPF / Bacteria: Negative /HPF      CAPILLARY BLOOD GLUCOSE            Urinalysis Basic - ( 2025 00:38 )    Color: Yellow / Appearance: Clear / S.013 / pH: x  Gluc: x / Ketone: Negative mg/dL  / Bili: Negative / Urobili: 0.2 mg/dL   Blood: x / Protein: Negative mg/dL / Nitrite: Negative   Leuk Esterase: Small / RBC: 6 /HPF / WBC 3 /HPF   Sq Epi: x / Non Sq Epi: 5 /HPF / Bacteria: Negative /HPF          Consultant(s) Notes Reviewed:  [x ] YES  [ ] NO    PHYSICAL EXAM:  GENERAL: NAD, well-groomed, well-developed,not in any distress ,  HEAD:  Atraumatic, Normocephalic  NECK: Supple, No JVD, Normal thyroid  NERVOUS SYSTEM:  Alert   CHEST/LUNG: Good air entry bilateral with no  rales, rhonchi, wheezing, or rubs  HEART: Regular rate and rhythm; No murmurs, rubs, or gallops  ABDOMEN: Soft, Nontender, Nondistended; Bowel sounds present  EXTREMITIES:  2+ Peripheral Pulses, No clubbing, cyanosis, or edema    Care Discussed with Consultants/Other Providers [ x] YES  [ ] NO

## 2025-02-27 LAB
ANION GAP SERPL CALC-SCNC: 12 MMOL/L — SIGNIFICANT CHANGE UP (ref 5–17)
BUN SERPL-MCNC: 25 MG/DL — HIGH (ref 7–23)
CALCIUM SERPL-MCNC: 9.8 MG/DL — SIGNIFICANT CHANGE UP (ref 8.4–10.5)
CHLORIDE SERPL-SCNC: 91 MMOL/L — LOW (ref 96–108)
CO2 SERPL-SCNC: 30 MMOL/L — SIGNIFICANT CHANGE UP (ref 22–31)
CREAT SERPL-MCNC: 0.92 MG/DL — SIGNIFICANT CHANGE UP (ref 0.5–1.3)
EGFR: 57 ML/MIN/1.73M2 — LOW
GLUCOSE SERPL-MCNC: 105 MG/DL — HIGH (ref 70–99)
HCT VFR BLD CALC: 30.1 % — LOW (ref 34.5–45)
HGB BLD-MCNC: 9.4 G/DL — LOW (ref 11.5–15.5)
MCHC RBC-ENTMCNC: 30.9 PG — SIGNIFICANT CHANGE UP (ref 27–34)
MCHC RBC-ENTMCNC: 31.2 G/DL — LOW (ref 32–36)
MCV RBC AUTO: 99 FL — SIGNIFICANT CHANGE UP (ref 80–100)
NRBC BLD AUTO-RTO: 0 /100 WBCS — SIGNIFICANT CHANGE UP (ref 0–0)
PLATELET # BLD AUTO: 310 K/UL — SIGNIFICANT CHANGE UP (ref 150–400)
POTASSIUM SERPL-MCNC: 4.2 MMOL/L — SIGNIFICANT CHANGE UP (ref 3.5–5.3)
POTASSIUM SERPL-SCNC: 4.2 MMOL/L — SIGNIFICANT CHANGE UP (ref 3.5–5.3)
RBC # BLD: 3.04 M/UL — LOW (ref 3.8–5.2)
RBC # FLD: 15.6 % — HIGH (ref 10.3–14.5)
S PNEUM AG UR QL: NEGATIVE — SIGNIFICANT CHANGE UP
SODIUM SERPL-SCNC: 133 MMOL/L — LOW (ref 135–145)
WBC # BLD: 5.09 K/UL — SIGNIFICANT CHANGE UP (ref 3.8–10.5)
WBC # FLD AUTO: 5.09 K/UL — SIGNIFICANT CHANGE UP (ref 3.8–10.5)

## 2025-02-27 RX ADMIN — MIRTAZAPINE 15 MILLIGRAM(S): 30 TABLET, FILM COATED ORAL at 20:59

## 2025-02-27 RX ADMIN — Medication 3 MILLIGRAM(S): at 00:01

## 2025-02-27 RX ADMIN — Medication 500 MILLIGRAM(S): at 08:40

## 2025-02-27 RX ADMIN — Medication 2 TABLET(S): at 21:01

## 2025-02-27 RX ADMIN — IPRATROPIUM BROMIDE AND ALBUTEROL SULFATE 3 MILLILITER(S): .5; 2.5 SOLUTION RESPIRATORY (INHALATION) at 17:22

## 2025-02-27 RX ADMIN — CEFTRIAXONE 100 MILLIGRAM(S): 500 INJECTION, POWDER, FOR SOLUTION INTRAMUSCULAR; INTRAVENOUS at 20:57

## 2025-02-27 RX ADMIN — FUROSEMIDE 40 MILLIGRAM(S): 10 INJECTION INTRAMUSCULAR; INTRAVENOUS at 05:39

## 2025-02-27 RX ADMIN — Medication 650 MILLIGRAM(S): at 01:00

## 2025-02-27 RX ADMIN — METOPROLOL SUCCINATE 25 MILLIGRAM(S): 50 TABLET, EXTENDED RELEASE ORAL at 05:39

## 2025-02-27 RX ADMIN — Medication 650 MILLIGRAM(S): at 00:00

## 2025-02-27 RX ADMIN — Medication 1 TABLET(S): at 08:40

## 2025-02-27 RX ADMIN — METOPROLOL SUCCINATE 25 MILLIGRAM(S): 50 TABLET, EXTENDED RELEASE ORAL at 17:22

## 2025-02-27 RX ADMIN — IPRATROPIUM BROMIDE AND ALBUTEROL SULFATE 3 MILLILITER(S): .5; 2.5 SOLUTION RESPIRATORY (INHALATION) at 11:04

## 2025-02-27 RX ADMIN — Medication 10 MILLIGRAM(S): at 08:39

## 2025-02-27 RX ADMIN — ENOXAPARIN SODIUM 30 MILLIGRAM(S): 100 INJECTION SUBCUTANEOUS at 05:08

## 2025-02-27 RX ADMIN — Medication 88 MICROGRAM(S): at 05:08

## 2025-02-27 RX ADMIN — IPRATROPIUM BROMIDE AND ALBUTEROL SULFATE 3 MILLILITER(S): .5; 2.5 SOLUTION RESPIRATORY (INHALATION) at 05:08

## 2025-02-27 NOTE — PROGRESS NOTE ADULT - SUBJECTIVE AND OBJECTIVE BOX
Date of Service  : 02-27-25     INTERVAL HPI/OVERNIGHT EVENTS: No new concerns per family   Vital Signs Last 24 Hrs  T(C): 36.3 (27 Feb 2025 11:44), Max: 36.6 (26 Feb 2025 21:00)  T(F): 97.4 (27 Feb 2025 11:44), Max: 97.9 (26 Feb 2025 21:00)  HR: 87 (27 Feb 2025 11:44) (86 - 87)  BP: 151/79 (27 Feb 2025 11:44) (138/78 - 151/79)  BP(mean): --  RR: 18 (27 Feb 2025 11:44) (18 - 18)  SpO2: 95% (27 Feb 2025 11:44) (95% - 97%)    Parameters below as of 27 Feb 2025 11:44  Patient On (Oxygen Delivery Method): room air      I&O's Summary    26 Feb 2025 07:01  -  27 Feb 2025 07:00  --------------------------------------------------------  IN: 0 mL / OUT: 500 mL / NET: -500 mL    27 Feb 2025 07:01  -  27 Feb 2025 17:56  --------------------------------------------------------  IN: 0 mL / OUT: 700 mL / NET: -700 mL      MEDICATIONS  (STANDING):  albuterol/ipratropium for Nebulization 3 milliLiter(s) Nebulizer every 6 hours  ascorbic acid 500 milliGRAM(s) Oral daily  bisacodyl Suppository 10 milliGRAM(s) Rectal daily  cefTRIAXone   IVPB 1000 milliGRAM(s) IV Intermittent every 24 hours  enoxaparin Injectable 30 milliGRAM(s) SubCutaneous every 24 hours  furosemide   Injectable 40 milliGRAM(s) IV Push daily  levothyroxine 88 MICROGram(s) Oral daily  metoprolol tartrate 25 milliGRAM(s) Oral two times a day  mirtazapine 15 milliGRAM(s) Oral at bedtime  multivitamin 1 Tablet(s) Oral daily  senna 2 Tablet(s) Oral at bedtime    MEDICATIONS  (PRN):  acetaminophen     Tablet .. 650 milliGRAM(s) Oral every 6 hours PRN Temp greater or equal to 38C (100.4F), Mild Pain (1 - 3)    LABS:                        9.4    5.09  )-----------( 310      ( 27 Feb 2025 07:02 )             30.1     02-27    133[L]  |  91[L]  |  25[H]  ----------------------------<  105[H]  4.2   |  30  |  0.92    Ca    9.8      27 Feb 2025 07:06        Urinalysis Basic - ( 27 Feb 2025 07:06 )    Color: x / Appearance: x / SG: x / pH: x  Gluc: 105 mg/dL / Ketone: x  / Bili: x / Urobili: x   Blood: x / Protein: x / Nitrite: x   Leuk Esterase: x / RBC: x / WBC x   Sq Epi: x / Non Sq Epi: x / Bacteria: x      CAPILLARY BLOOD GLUCOSE            Urinalysis Basic - ( 27 Feb 2025 07:06 )    Color: x / Appearance: x / SG: x / pH: x  Gluc: 105 mg/dL / Ketone: x  / Bili: x / Urobili: x   Blood: x / Protein: x / Nitrite: x   Leuk Esterase: x / RBC: x / WBC x   Sq Epi: x / Non Sq Epi: x / Bacteria: x      REVIEW OF SYSTEMS:  CONSTITUTIONAL: No fever, weight loss, or fatigue  EYES: No eye pain, visual disturbances, or discharge  ENMT:  No difficulty hearing, tinnitus, vertigo; No sinus or throat pain  NECK: No pain or stiffness  RESPIRATORY: No cough, wheezing, chills or hemoptysis; No shortness of breath  CARDIOVASCULAR: No chest pain, palpitations, dizziness, or leg swelling  GASTROINTESTINAL: No abdominal or epigastric pain. No nausea, vomiting, or hematemesis; No diarrhea or constipation. No melena or hematochezia.  GENITOURINARY: No dysuria, frequency, hematuria, or incontinence  NEUROLOGICAL: No headaches, memory loss, loss of strength, numbness, or tremors      Consultant(s) Notes Reviewed:  [x ] YES  [ ] NO    PHYSICAL EXAM:  GENERAL: NAD,not in any distress ,  HEAD:  Atraumatic, Normocephalic  NECK: Supple, No JVD, Normal thyroid  NERVOUS SYSTEM:  Alert   CHEST/LUNG: Good air entry bilateral with no  rales, rhonchi, wheezing, or rubs  HEART: Regular rate and rhythm; No murmurs, rubs, or gallops  ABDOMEN: Soft, Nontender, Nondistended; Bowel sounds present  EXTREMITIES:  2+ Peripheral Pulses, No clubbing, cyanosis, or edema    Care Discussed with Consultants/Other Providers [ x] YES  [ ] NO

## 2025-02-27 NOTE — PROGRESS NOTE ADULT - REASON FOR ADMISSION
hypoxemic resp failure, pleff +/- PNA

## 2025-02-27 NOTE — PROGRESS NOTE ADULT - PROVIDER SPECIALTY LIST ADULT
Internal Medicine
Pulmonology

## 2025-02-27 NOTE — PROGRESS NOTE ADULT - PROBLEM SELECTOR PROBLEM 1
Acute respiratory failure with hypoxia
Liv Mcdonald (Blanche)

## 2025-02-27 NOTE — PROGRESS NOTE ADULT - SUBJECTIVE AND OBJECTIVE BOX
Date of Service: 02-27-25 @ 14:05    Patient is a 89y old  Female who presents with a chief complaint of hypoxemic resp failure, pleff +/- PNA (26 Feb 2025 16:17)      Any change in ROS:   No new respiratory events overnight. Denies SOB/CP.      MEDICATIONS  (STANDING):  albuterol/ipratropium for Nebulization 3 milliLiter(s) Nebulizer every 6 hours  ascorbic acid 500 milliGRAM(s) Oral daily  bisacodyl Suppository 10 milliGRAM(s) Rectal daily  cefTRIAXone   IVPB 1000 milliGRAM(s) IV Intermittent every 24 hours  enoxaparin Injectable 30 milliGRAM(s) SubCutaneous every 24 hours  furosemide   Injectable 40 milliGRAM(s) IV Push daily  levothyroxine 88 MICROGram(s) Oral daily  metoprolol tartrate 25 milliGRAM(s) Oral two times a day  mirtazapine 15 milliGRAM(s) Oral at bedtime  multivitamin 1 Tablet(s) Oral daily  senna 2 Tablet(s) Oral at bedtime    MEDICATIONS  (PRN):  acetaminophen     Tablet .. 650 milliGRAM(s) Oral every 6 hours PRN Temp greater or equal to 38C (100.4F), Mild Pain (1 - 3)    Vital Signs Last 24 Hrs  T(C): 36.3 (27 Feb 2025 11:44), Max: 36.6 (26 Feb 2025 21:00)  T(F): 97.4 (27 Feb 2025 11:44), Max: 97.9 (26 Feb 2025 21:00)  HR: 87 (27 Feb 2025 11:44) (86 - 87)  BP: 151/79 (27 Feb 2025 11:44) (138/78 - 151/79)  BP(mean): --  RR: 18 (27 Feb 2025 11:44) (18 - 18)  SpO2: 95% (27 Feb 2025 11:44) (95% - 97%)    Parameters below as of 27 Feb 2025 11:44  Patient On (Oxygen Delivery Method): room air        I&O's Summary    26 Feb 2025 07:01  -  27 Feb 2025 07:00  --------------------------------------------------------  IN: 0 mL / OUT: 500 mL / NET: -500 mL    27 Feb 2025 07:01  -  27 Feb 2025 14:05  --------------------------------------------------------  IN: 0 mL / OUT: 700 mL / NET: -700 mL          Physical Exam:   GENERAL: NAD, well-groomed, well-developed  HEENT: LUANN/   Atraumatic, Normocephalic  ENMT: No tonsillar erythema, exudates, or enlargement; Moist mucous membranes, Good dentition, No lesions  NECK: Supple, No JVD, Normal thyroid  CHEST/LUNG: Decreased at bases   CVS: Regular rate and rhythm; No murmurs, rubs, or gallops  GI: : Soft, Nontender, Nondistended; Bowel sounds present  NERVOUS SYSTEM:  Alert & Oriented x1-2  EXTREMITIES:  2+ Peripheral Pulses  LYMPH: No lymphadenopathy noted  SKIN: No rashes or lesions  ENDOCRINOLOGY: No Thyromegaly  PSYCH: Appropriate    Labs:  28                            9.4    5.09  )-----------( 310      ( 27 Feb 2025 07:02 )             30.1                         8.3    5.56  )-----------( 295      ( 25 Feb 2025 07:09 )             26.3                         8.5    11.49 )-----------( 282      ( 24 Feb 2025 07:06 )             26.3                         8.9    11.28 )-----------( 292      ( 23 Feb 2025 16:48 )             27.2     02-27    133[L]  |  91[L]  |  25[H]  ----------------------------<  105[H]  4.2   |  30  |  0.92  02-25    133[L]  |  95[L]  |  29[H]  ----------------------------<  80  4.2   |  27  |  0.93  02-24    133[L]  |  96  |  33[H]  ----------------------------<  70  4.7   |  26  |  0.99  02-23    129[L]  |  95[L]  |  35[H]  ----------------------------<  106[H]  4.6   |  24  |  0.99    Ca    9.8      27 Feb 2025 07:06    TPro  7.1  /  Alb  2.6[L]  /  TBili  0.4  /  DBili  x   /  AST  23  /  ALT  30  /  AlkPhos  141[H]  02-24  TPro  7.4  /  Alb  2.6[L]  /  TBili  0.4  /  DBili  x   /  AST  30  /  ALT  37  /  AlkPhos  154[H]  02-23    CAPILLARY BLOOD GLUCOSE              Urinalysis Basic - ( 27 Feb 2025 07:06 )    Color: x / Appearance: x / SG: x / pH: x  Gluc: 105 mg/dL / Ketone: x  / Bili: x / Urobili: x   Blood: x / Protein: x / Nitrite: x   Leuk Esterase: x / RBC: x / WBC x   Sq Epi: x / Non Sq Epi: x / Bacteria: x      Procalcitonin: 0.31 ng/mL (02-23 @ 16:48)        RECENT CULTURES:  02-23 @ 16:40 .Blood Blood-Peripheral                No growth at 72 Hours    02-23 @ 16:35 .Blood Blood-Peripheral                No growth at 72 Hours      Studies  < from: CT Chest No Cont (02.24.25 @ 08:55) >    ACC: [id number] EXAM:  CT CHEST   ORDERED BY:  MARVEL COTA     PROCEDURE DATE:  02/24/2025          INTERPRETATION:  CLINICAL INFORMATION: Hypoxic respiratory failure.   History of atrial fibrillation.    COMPARISON: CT chest 1/23/2025, CT abdomen andpelvis 2/4/2025    CONTRAST/COMPLICATIONS:  IV Contrast: NONE  Oral Contrast: NONE    PROCEDURE:  CT scan of the chest was obtained without intravenous contrast.    FINDINGS:    AIRWAYS/LUNGS/PLEURA: Patent central airways. Similar moderate size   bilateral pleural effusions with associated complete atelectasis of the   lower lobes. Partial lingular atelectasis. Newly apparent patchy right   upper lobe groundglass opacity compared to prior (series 301, image 44).    MEDIASTINUM AND SWETHA: No lymphadenopathy.    VESSELS: Aortic calcifications. Coronary artery calcifications.    HEART: Heart size is normal. No pericardial effusion. Aortic valve   calcifications.    VISUALIZED UPPER ABDOMEN: Interval placement of percutaneous gastrostomy   tube. Cholelithiasis. Mild subcutaneous soft tissue edema.    CHEST WALL AND BONES: Mild degenerative changes of thoracic spine.    IMPRESSION:    Similar moderate size bilateral pleural effusions with associated   complete atelectasis of the lower lobes.    Newly apparent patchy right upper lobe ground-glass opacity may be   secondary to edema or be infectious/inflammatory etiology.    --- End of Report ---        < end of copied text >

## 2025-02-27 NOTE — PROGRESS NOTE ADULT - PROBLEM SELECTOR PLAN 5
-CT chest with b/l moderate pleural effusions/atelectasis, RUL GGO   -Favor symptoms 2nd to fluid overload > PNA but cannot completely r/o underlying infiltrate  -Continue empiric ABX.

## 2025-02-27 NOTE — PROGRESS NOTE ADULT - ASSESSMENT
89F w/ hx of Afib (s/p unsuccessful DCCV 1/2025, not on AC), dementia, brain tumor (s/p resection, c/b residual L-sided facial droop and L ear deafness), hypothyroidism, HTN, colon ca (s/p R hemicolectomy), constipation/stercoral colitis, recent long hospitalization (1/11/25-2/12/25) for hypoxic/hypercapnic resp failure and septic shock 2/2 influenza/asp PNA/UTI c/b dysphagia s/p PEG placement 2/7/25, now presenting from the [facility] rehab for acute hypoxemic respiratory failure. Possibly i/s/o bilateral pleffs  +/- PNA meeting sepsis criteria.           Problem/Plan - 1:  ·  Problem: Acute hypoxemic respiratory failure.   ·  Plan: Weaned off oxygen,   Presented after O2 sat reportedly down to 88% on RA at rehab facility. Procal 3.8k, but does not appear overloaded on exam. COVID-19/RSV/Flu neg. CXR (prelim) showed "bilateral lower lung hazy opacities suggestive of pleural effusions with passive atelectasis; underlying pneumonia cannot be excluded."  - currently on 3-4L O2NC (baseline not on home O2), wean as tolerated  - monitor respiratory status and O2 sat, if worsens, transfer pt to continuous pulse ox unit  - c/w empiric treatment for PNA as below  - f/u CT chest for further eval of possible PNA vs other pulm etiology  - f/u TTE to assess for cardiac etiology of b/l pleffs  - Pulm helping.      Problem/Plan - 2:  ·  Problem: Sepsis.   ·  Plan: Resolved .   Met sepsis criteria on presentation (RR>20s, HR >90, low grade temp, mild leukocytosis, suspected source of PNA)  - s/p 500cc IVF in ED  - c/w empiric abx as below  - f/u BCx, UA/UCx (pending collection), strep pneumo urine Ag, urine legionella  - monitor WBC and for fevers.  -   Problem/Plan - 3:  ·  Problem: PNA (pneumonia).   ·  Plan: Concern for possible PNA, suspect may be aspiration given son's reported gurgling sound with breathing prior to presentation and pt's hx of aspiration. Procal elevated to 0.31 suggesting bacterial infection.  - c/w empiric CTX (2/23/25- )  < from: CT Chest No Cont (02.24.25 @ 08:55) >  IMPRESSION:    Similar moderate size bilateral pleural effusions with associated   complete atelectasis of the lower lobes.    Newly apparent patchy right upper lobe ground-glass opacity may be   secondary to edema or be infectious/inflammatory etiology.    < end of copied text >     Problem/Plan - 4:  ·  Problem: Hyponatremia.   ·  Plan: Improved.   -Presented initially with Na 129. Seemed to have persistent mild hyponatremia on last admission. 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  - s/p 500cc IVF in ED  - f/u repeat Na, if not improving add on urine studies.     Problem/Plan - 5:  ·  Problem: Chronic atrial fibrillation.   ·  Plan: Hx of Afib s/p unsuccessful DCCV 1/2025, s/p course of amio with subsequent return to sinus on last admission. Admission EKG remained sinus.  - c/w home metoprolol with holding parameters for rate control  - Per prior Cardiology note, "patient is anemic and high bleeding risk, would refrain from AC at this time.".     Problem/Plan - 6:  ·  Problem: Hypothyroidism.   ·  Plan: - c/w home levothyroxine  - f/u TSH/FT4.     Problem/Plan - 7:  ·  Problem: Dementia.   ·  Plan: Hx of dementia (baseline (AAOx1-2, sometimes confused). Also with hx of  brain tumor (s/p resection, c/b residual L-sided facial droop and L ear deafness).  - per son, at baseline mental status   - c/w home mirtazapine for associated depression/behavioral disturbances  - delirium precautions as able.     Problem/Plan - 8:  ·  Problem: History of colon cancer.   ·  Plan: Hx of colon cancer s/p R hemicolectomy  - no active issues  - outpt follow-up.     Problem/Plan - 9:  ·  Problem: Pleural Effusion    ·  Plan: - Pulmonary helping.     - Code status: DNR/DNI, trial of NIV.      Dispo : DC planning . 
89F w/ hx of Afib (s/p unsuccessful DCCV 1/2025, not on AC), dementia, brain tumor (s/p resection, c/b residual L-sided facial droop and L ear deafness), hypothyroidism, HTN, colon ca (s/p R hemicolectomy), constipation/stercoral colitis, recent long hospitalization (1/11/25-2/12/25) for hypoxic/hypercapnic resp failure and septic shock 2/2 influenza/asp PNA/UTI c/b dysphagia s/p PEG placement 2/7/25, now presenting from the [facility] rehab for acute hypoxemic respiratory failure. Possibly i/s/o bilateral pleffs  +/- PNA meeting sepsis criteria.           Problem/Plan - 1:  ·  Problem: Acute hypoxemic respiratory failure.   ·  Plan: Weaned off oxygen,   Presented after O2 sat reportedly down to 88% on RA at rehab facility. Procal 3.8k, but does not appear overloaded on exam. COVID-19/RSV/Flu neg. CXR (prelim) showed "bilateral lower lung hazy opacities suggestive of pleural effusions with passive atelectasis; underlying pneumonia cannot be excluded."  - currently on 3-4L O2NC (baseline not on home O2), wean as tolerated  - monitor respiratory status and O2 sat, if worsens, transfer pt to continuous pulse ox unit  - c/w empiric treatment for PNA as below  - f/u CT chest for further eval of possible PNA vs other pulm etiology  - f/u TTE to assess for cardiac etiology of b/l pleffs  - Pulm helping.      Problem/Plan - 2:  ·  Problem: Sepsis.   ·  Plan: Met sepsis criteria on presentation (RR>20s, HR >90, low grade temp, mild leukocytosis, suspected source of PNA)  - s/p 500cc IVF in ED  - c/w empiric abx as below  - f/u BCx, UA/UCx (pending collection), strep pneumo urine Ag, urine legionella  - monitor WBC and for fevers.  -   Problem/Plan - 3:  ·  Problem: PNA (pneumonia).   ·  Plan: Concern for possible PNA, suspect may be aspiration given son's reported gurgling sound with breathing prior to presentation and pt's hx of aspiration. Procal elevated to 0.31 suggesting bacterial infection.  - c/w empiric CTX (2/23/25- )  < from: CT Chest No Cont (02.24.25 @ 08:55) >  IMPRESSION:    Similar moderate size bilateral pleural effusions with associated   complete atelectasis of the lower lobes.    Newly apparent patchy right upper lobe ground-glass opacity may be   secondary to edema or be infectious/inflammatory etiology.    < end of copied text >     Problem/Plan - 4:  ·  Problem: Hyponatremia.   ·  Plan: Presented initially with Na 129. Seemed to have persistent mild hyponatremia on last admission. 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  - s/p 500cc IVF in ED  - f/u repeat Na, if not improving add on urine studies.     Problem/Plan - 5:  ·  Problem: Chronic atrial fibrillation.   ·  Plan: Hx of Afib s/p unsuccessful DCCV 1/2025, s/p course of amio with subsequent return to sinus on last admission. Admission EKG remained sinus.  - c/w home metoprolol with holding parameters for rate control  - Per prior Cardiology note, "patient is anemic and high bleeding risk, would refrain from AC at this time.".     Problem/Plan - 6:  ·  Problem: Hypothyroidism.   ·  Plan: - c/w home levothyroxine  - f/u TSH/FT4.     Problem/Plan - 7:  ·  Problem: Dementia.   ·  Plan: Hx of dementia (baseline (AAOx1-2, sometimes confused). Also with hx of  brain tumor (s/p resection, c/b residual L-sided facial droop and L ear deafness).  - per son, at baseline mental status   - c/w home mirtazapine for associated depression/behavioral disturbances  - delirium precautions as able.     Problem/Plan - 8:  ·  Problem: History of colon cancer.   ·  Plan: Hx of colon cancer s/p R hemicolectomy  - no active issues  - outpt follow-up.     Problem/Plan - 9:  ·  Problem: Pleural Effusion    ·  Plan: - Pulmonary helping.     - Code status: DNR/DNI, trial of NIV.      
89F w/ hx of Afib (s/p unsuccessful DCCV 1/2025, not on AC), dementia, brain tumor (s/p resection, c/b residual L-sided facial droop and L ear deafness), hypothyroidism, HTN, colon ca (s/p R hemicolectomy), constipation/stercoral colitis, recent long hospitalization (1/11/25-2/12/25) for hypoxic/hypercapnic resp failure and septic shock 2/2 influenza/asp PNA/UTI c/b dysphagia s/p PEG placement 2/7/25, now presenting from the [facility] rehab for acute hypoxemic respiratory failure. Possibly i/s/o bilateral pleffs  +/- PNA meeting sepsis criteria.           Problem/Plan - 1:  ·  Problem: Acute hypoxemic respiratory failure.   ·  Plan: Presented after O2 sat reportedly down to 88% on RA at rehab facility. Procal 3.8k, but does not appear overloaded on exam. COVID-19/RSV/Flu neg. CXR (prelim) showed "bilateral lower lung hazy opacities suggestive of pleural effusions with passive atelectasis; underlying pneumonia cannot be excluded."  - currently on 3-4L O2NC (baseline not on home O2), wean as tolerated  - monitor respiratory status and O2 sat, if worsens, transfer pt to continuous pulse ox unit  - c/w empiric treatment for PNA as below  - f/u CT chest for further eval of possible PNA vs other pulm etiology  - f/u TTE to assess for cardiac etiology of b/l pleffs  - Pulm consulted .     Problem/Plan - 2:  ·  Problem: Sepsis.   ·  Plan: Met sepsis criteria on presentation (RR>20s, HR >90, low grade temp, mild leukocytosis, suspected source of PNA)  - s/p 500cc IVF in ED  - c/w empiric abx as below  - f/u BCx, UA/UCx (pending collection), strep pneumo urine Ag, urine legionella  - monitor WBC and for fevers.  -   Problem/Plan - 3:  ·  Problem: PNA (pneumonia).   ·  Plan: Concern for possible PNA, suspect may be aspiration given son's reported gurgling sound with breathing prior to presentation and pt's hx of aspiration. Procal elevated to 0.31 suggesting bacterial infection.  - c/w empiric CTX (2/23/25- )  - f/u CT chest for further eval.     Problem/Plan - 4:  ·  Problem: Hyponatremia.   ·  Plan: Presented initially with Na 129. Seemed to have persistent mild hyponatremia on last admission. 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  - s/p 500cc IVF in ED  - f/u repeat Na, if not improving add on urine studies.     Problem/Plan - 5:  ·  Problem: Chronic atrial fibrillation.   ·  Plan: Hx of Afib s/p unsuccessful DCCV 1/2025, s/p course of amio with subsequent return to sinus on last admission. Admission EKG remained sinus.  - c/w home metoprolol with holding parameters for rate control  - Per prior Cardiology note, "patient is anemic and high bleeding risk, would refrain from AC at this time.".     Problem/Plan - 6:  ·  Problem: Hypothyroidism.   ·  Plan: - c/w home levothyroxine  - f/u TSH/FT4.     Problem/Plan - 7:  ·  Problem: Dementia.   ·  Plan: Hx of dementia (baseline (AAOx1-2, sometimes confused). Also with hx of  brain tumor (s/p resection, c/b residual L-sided facial droop and L ear deafness).  - per son, at baseline mental status   - c/w home mirtazapine for associated depression/behavioral disturbances  - delirium precautions as able.     Problem/Plan - 8:  ·  Problem: History of colon cancer.   ·  Plan: Hx of colon cancer s/p R hemicolectomy  - no active issues  - outpt follow-up.     Problem/Plan - 9:  ·  Problem: Prophylactic measure.   ·  Plan: - DVT ppx: Lovenox  - Diet: NPO with TFs  - Dispo: pending further improvement/workup  - Code status: DNR/DNI, trial of NIV.    D/W Son in detail. 
89F w/ hx of Afib (s/p unsuccessful DCCV 1/2025, not on AC), dementia, brain tumor (s/p resection, c/b residual L-sided facial droop and L ear deafness), hypothyroidism, HTN, colon ca (s/p R hemicolectomy), constipation/stercoral colitis, recent long hospitalization (1/11/25-2/12/25) for hypoxic/hypercapnic resp failure and septic shock 2/2 influenza/asp PNA/UTI c/b dysphagia s/p PEG placement 2/7/25, now presenting from the [facility] rehab for acute hypoxemic respiratory failure. Possibly i/s/o bilateral pleffs  +/- PNA meeting sepsis criteria.           Problem/Plan - 1:  ·  Problem: Acute hypoxemic respiratory failure.   ·  Plan: Presented after O2 sat reportedly down to 88% on RA at rehab facility. Procal 3.8k, but does not appear overloaded on exam. COVID-19/RSV/Flu neg. CXR (prelim) showed "bilateral lower lung hazy opacities suggestive of pleural effusions with passive atelectasis; underlying pneumonia cannot be excluded."  - currently on 3-4L O2NC (baseline not on home O2), wean as tolerated  - monitor respiratory status and O2 sat, if worsens, transfer pt to continuous pulse ox unit  - c/w empiric treatment for PNA as below  - f/u CT chest for further eval of possible PNA vs other pulm etiology  - f/u TTE to assess for cardiac etiology of b/l pleffs  - Pulm consulted .     Problem/Plan - 2:  ·  Problem: Sepsis.   ·  Plan: Met sepsis criteria on presentation (RR>20s, HR >90, low grade temp, mild leukocytosis, suspected source of PNA)  - s/p 500cc IVF in ED  - c/w empiric abx as below  - f/u BCx, UA/UCx (pending collection), strep pneumo urine Ag, urine legionella  - monitor WBC and for fevers.  -   Problem/Plan - 3:  ·  Problem: PNA (pneumonia).   ·  Plan: Concern for possible PNA, suspect may be aspiration given son's reported gurgling sound with breathing prior to presentation and pt's hx of aspiration. Procal elevated to 0.31 suggesting bacterial infection.  - c/w empiric CTX (2/23/25- )  < from: CT Chest No Cont (02.24.25 @ 08:55) >  IMPRESSION:    Similar moderate size bilateral pleural effusions with associated   complete atelectasis of the lower lobes.    Newly apparent patchy right upper lobe ground-glass opacity may be   secondary to edema or be infectious/inflammatory etiology.    < end of copied text >     Problem/Plan - 4:  ·  Problem: Hyponatremia.   ·  Plan: Presented initially with Na 129. Seemed to have persistent mild hyponatremia on last admission. 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  - s/p 500cc IVF in ED  - f/u repeat Na, if not improving add on urine studies.     Problem/Plan - 5:  ·  Problem: Chronic atrial fibrillation.   ·  Plan: Hx of Afib s/p unsuccessful DCCV 1/2025, s/p course of amio with subsequent return to sinus on last admission. Admission EKG remained sinus.  - c/w home metoprolol with holding parameters for rate control  - Per prior Cardiology note, "patient is anemic and high bleeding risk, would refrain from AC at this time.".     Problem/Plan - 6:  ·  Problem: Hypothyroidism.   ·  Plan: - c/w home levothyroxine  - f/u TSH/FT4.     Problem/Plan - 7:  ·  Problem: Dementia.   ·  Plan: Hx of dementia (baseline (AAOx1-2, sometimes confused). Also with hx of  brain tumor (s/p resection, c/b residual L-sided facial droop and L ear deafness).  - per son, at baseline mental status   - c/w home mirtazapine for associated depression/behavioral disturbances  - delirium precautions as able.     Problem/Plan - 8:  ·  Problem: History of colon cancer.   ·  Plan: Hx of colon cancer s/p R hemicolectomy  - no active issues  - outpt follow-up.     Problem/Plan - 9:  ·  Problem: Pleural Effusion    ·  Plan: - Pulmonary helping.     - Code status: DNR/DNI, trial of NIV.    D/W Son in detail.         
89 y/o F w/ PMH of Afib (s/p unsuccessful DCCV 1/2025, not on AC), dementia (baseline AAOx1-2, confused at times), brain tumor (s/p resection, c/b residual L-sided facial droop and L ear deafness), hypothyroidism, HTN, colon ca (s/p R hemicolectomy), constipation/stercoral colitis, recent long hospitalization (1/11/25-2/12/25) for hypoxic/hypercapnic resp failure and septic shock 2/2 influenza/asp PNA/UTI c/b dysphagia s/p PEG placement 2/7/25, now presenting from the [facility] rehab for hypoxia requiring supplemental O2. Pulmonary called to consult for hypoxia, r/o PNA       
89 y/o F w/ PMH of Afib (s/p unsuccessful DCCV 1/2025, not on AC), dementia (baseline AAOx1-2, confused at times), brain tumor (s/p resection, c/b residual L-sided facial droop and L ear deafness), hypothyroidism, HTN, colon ca (s/p R hemicolectomy), constipation/stercoral colitis, recent long hospitalization (1/11/25-2/12/25) for hypoxic/hypercapnic resp failure and septic shock 2/2 influenza/asp PNA/UTI c/b dysphagia s/p PEG placement 2/7/25, now presenting from the [facility] rehab for hypoxia requiring supplemental O2. Pulmonary called to consult for hypoxia, r/o PNA       
89 y/o F w/ PMH of Afib (s/p unsuccessful DCCV 1/2025, not on AC), dementia (baseline AAOx1-2, confused at times), brain tumor (s/p resection, c/b residual L-sided facial droop and L ear deafness), hypothyroidism, HTN, colon ca (s/p R hemicolectomy), constipation/stercoral colitis, recent long hospitalization (1/11/25-2/12/25) for hypoxic/hypercapnic resp failure and septic shock 2/2 influenza/asp PNA/UTI c/b dysphagia s/p PEG placement 2/7/25, now presenting from the [facility] rehab for hypoxia requiring supplemental O2. Pulmonary called to consult for hypoxia, r/o PNA

## 2025-02-27 NOTE — PROGRESS NOTE ADULT - PROBLEM SELECTOR PLAN 2
-CT chest with b/l moderate pleural effusions/atelectasis, RUL GGO   -TTE noted  -Keep O>I as tolerated.  2/26: cont diuresis;  switch to iv lasix
-CT chest with b/l moderate pleural effusions/atelectasis, RUL GGO   -TTE noted  -Keep O>I as tolerated.
-CT chest with b/l moderate pleural effusions/atelectasis, RUL GGO   -TTE noted  -Keep O>I as tolerated, Lasix changed to IV 2/26

## 2025-02-27 NOTE — PROGRESS NOTE ADULT - PROBLEM SELECTOR PLAN 1
Likely 2nd to b/l pl effusions/atelectasis +/- underlying PNA   -CT chest with b/l moderate pleural effusions/atelectasis, RUL GGO   -Favor symptoms 2nd to fluid overload > PNA but cannot completely r/o underlying infiltrate, suggest continue empiric ABX  -Keep O>I as tolerated  -Keep sats >90% with O2, wean O2 as tolerated.

## 2025-02-27 NOTE — PROGRESS NOTE ADULT - NS ATTEND AMEND GEN_ALL_CORE FT
seems  OK:  alert and awake: not sob  on room ai r: antibiotics till tomorrow:
pt seems comfortable:  would prefer to do iv lasix rather then oral for few day s  keep o2 sao2 above90% all the time

## 2025-02-28 ENCOUNTER — TRANSCRIPTION ENCOUNTER (OUTPATIENT)
Age: 89
End: 2025-02-28

## 2025-02-28 VITALS
TEMPERATURE: 98 F | DIASTOLIC BLOOD PRESSURE: 72 MMHG | SYSTOLIC BLOOD PRESSURE: 126 MMHG | HEART RATE: 81 BPM | OXYGEN SATURATION: 97 % | RESPIRATION RATE: 18 BRPM

## 2025-02-28 LAB
-  AMPICILLIN: SIGNIFICANT CHANGE UP
-  CIPROFLOXACIN: SIGNIFICANT CHANGE UP
-  LEVOFLOXACIN: SIGNIFICANT CHANGE UP
-  NITROFURANTOIN: SIGNIFICANT CHANGE UP
-  TETRACYCLINE: SIGNIFICANT CHANGE UP
-  VANCOMYCIN: SIGNIFICANT CHANGE UP
CULTURE RESULTS: ABNORMAL
CULTURE RESULTS: SIGNIFICANT CHANGE UP
CULTURE RESULTS: SIGNIFICANT CHANGE UP
METHOD TYPE: SIGNIFICANT CHANGE UP
ORGANISM # SPEC MICROSCOPIC CNT: ABNORMAL
ORGANISM # SPEC MICROSCOPIC CNT: ABNORMAL
SPECIMEN SOURCE: SIGNIFICANT CHANGE UP

## 2025-02-28 PROCEDURE — 94640 AIRWAY INHALATION TREATMENT: CPT

## 2025-02-28 PROCEDURE — 97161 PT EVAL LOW COMPLEX 20 MIN: CPT

## 2025-02-28 PROCEDURE — 82435 ASSAY OF BLOOD CHLORIDE: CPT

## 2025-02-28 PROCEDURE — 80048 BASIC METABOLIC PNL TOTAL CA: CPT

## 2025-02-28 PROCEDURE — 85610 PROTHROMBIN TIME: CPT

## 2025-02-28 PROCEDURE — 85025 COMPLETE CBC W/AUTO DIFF WBC: CPT

## 2025-02-28 PROCEDURE — 87086 URINE CULTURE/COLONY COUNT: CPT

## 2025-02-28 PROCEDURE — 84145 PROCALCITONIN (PCT): CPT

## 2025-02-28 PROCEDURE — 87186 SC STD MICRODIL/AGAR DIL: CPT

## 2025-02-28 PROCEDURE — 96374 THER/PROPH/DIAG INJ IV PUSH: CPT

## 2025-02-28 PROCEDURE — 84132 ASSAY OF SERUM POTASSIUM: CPT

## 2025-02-28 PROCEDURE — 82330 ASSAY OF CALCIUM: CPT

## 2025-02-28 PROCEDURE — 84100 ASSAY OF PHOSPHORUS: CPT

## 2025-02-28 PROCEDURE — 87077 CULTURE AEROBIC IDENTIFY: CPT

## 2025-02-28 PROCEDURE — 82803 BLOOD GASES ANY COMBINATION: CPT

## 2025-02-28 PROCEDURE — 85027 COMPLETE CBC AUTOMATED: CPT

## 2025-02-28 PROCEDURE — 85014 HEMATOCRIT: CPT

## 2025-02-28 PROCEDURE — 83735 ASSAY OF MAGNESIUM: CPT

## 2025-02-28 PROCEDURE — 85018 HEMOGLOBIN: CPT

## 2025-02-28 PROCEDURE — 87899 AGENT NOS ASSAY W/OPTIC: CPT

## 2025-02-28 PROCEDURE — 93306 TTE W/DOPPLER COMPLETE: CPT

## 2025-02-28 PROCEDURE — 87449 NOS EACH ORGANISM AG IA: CPT

## 2025-02-28 PROCEDURE — 84439 ASSAY OF FREE THYROXINE: CPT

## 2025-02-28 PROCEDURE — 99285 EMERGENCY DEPT VISIT HI MDM: CPT | Mod: 25

## 2025-02-28 PROCEDURE — 87040 BLOOD CULTURE FOR BACTERIA: CPT

## 2025-02-28 PROCEDURE — 84484 ASSAY OF TROPONIN QUANT: CPT

## 2025-02-28 PROCEDURE — 0241U: CPT

## 2025-02-28 PROCEDURE — 84295 ASSAY OF SERUM SODIUM: CPT

## 2025-02-28 PROCEDURE — 93005 ELECTROCARDIOGRAM TRACING: CPT

## 2025-02-28 PROCEDURE — 80053 COMPREHEN METABOLIC PANEL: CPT

## 2025-02-28 PROCEDURE — 71045 X-RAY EXAM CHEST 1 VIEW: CPT

## 2025-02-28 PROCEDURE — 87637 SARSCOV2&INF A&B&RSV AMP PRB: CPT

## 2025-02-28 PROCEDURE — 85730 THROMBOPLASTIN TIME PARTIAL: CPT

## 2025-02-28 PROCEDURE — 81001 URINALYSIS AUTO W/SCOPE: CPT

## 2025-02-28 PROCEDURE — 84443 ASSAY THYROID STIM HORMONE: CPT

## 2025-02-28 PROCEDURE — 71250 CT THORAX DX C-: CPT | Mod: MC

## 2025-02-28 PROCEDURE — 83605 ASSAY OF LACTIC ACID: CPT

## 2025-02-28 PROCEDURE — 82947 ASSAY GLUCOSE BLOOD QUANT: CPT

## 2025-02-28 PROCEDURE — 83880 ASSAY OF NATRIURETIC PEPTIDE: CPT

## 2025-02-28 RX ORDER — LEVOTHYROXINE SODIUM 25 UG/1
1 TABLET ORAL
Qty: 0 | Refills: 0 | DISCHARGE

## 2025-02-28 RX ORDER — FUROSEMIDE 10 MG/ML
40 INJECTION INTRAMUSCULAR; INTRAVENOUS DAILY
Refills: 0 | Status: DISCONTINUED | OUTPATIENT
Start: 2025-03-01 | End: 2025-02-28

## 2025-02-28 RX ORDER — SENNA 187 MG
2 TABLET ORAL
Qty: 0 | Refills: 0 | DISCHARGE
Start: 2025-02-28

## 2025-02-28 RX ORDER — FUROSEMIDE 10 MG/ML
1 INJECTION INTRAMUSCULAR; INTRAVENOUS
Qty: 0 | Refills: 0 | DISCHARGE
Start: 2025-02-28

## 2025-02-28 RX ADMIN — Medication 88 MICROGRAM(S): at 06:03

## 2025-02-28 RX ADMIN — Medication 10 MILLIGRAM(S): at 08:25

## 2025-02-28 RX ADMIN — IPRATROPIUM BROMIDE AND ALBUTEROL SULFATE 3 MILLILITER(S): .5; 2.5 SOLUTION RESPIRATORY (INHALATION) at 00:05

## 2025-02-28 RX ADMIN — Medication 500 MILLIGRAM(S): at 08:26

## 2025-02-28 RX ADMIN — IPRATROPIUM BROMIDE AND ALBUTEROL SULFATE 3 MILLILITER(S): .5; 2.5 SOLUTION RESPIRATORY (INHALATION) at 11:08

## 2025-02-28 RX ADMIN — Medication 1 TABLET(S): at 08:26

## 2025-02-28 RX ADMIN — METOPROLOL SUCCINATE 25 MILLIGRAM(S): 50 TABLET, EXTENDED RELEASE ORAL at 06:03

## 2025-02-28 RX ADMIN — FUROSEMIDE 40 MILLIGRAM(S): 10 INJECTION INTRAMUSCULAR; INTRAVENOUS at 06:03

## 2025-02-28 RX ADMIN — ENOXAPARIN SODIUM 30 MILLIGRAM(S): 100 INJECTION SUBCUTANEOUS at 06:03

## 2025-02-28 RX ADMIN — IPRATROPIUM BROMIDE AND ALBUTEROL SULFATE 3 MILLILITER(S): .5; 2.5 SOLUTION RESPIRATORY (INHALATION) at 06:03

## 2025-02-28 NOTE — DISCHARGE NOTE PROVIDER - NSDCCPCAREPLAN_GEN_ALL_CORE_FT
PRINCIPAL DISCHARGE DIAGNOSIS  Diagnosis: Sepsis  Assessment and Plan of Treatment: Take all antibiotics as ordered.  Call you Health care provider upon arrival home to make a one week follow up appointment.  If you develop fever, chills, malaise, or change in mental status call your Health Care Provider or go to the Emergency Department.  Nutrition is important, eat small frequent meals to help ensure you get adequate calories.  Do not stay in bed all day!  Increase your activity daily as tolerated.        SECONDARY DISCHARGE DIAGNOSES  Diagnosis: Chronic atrial fibrillation  Assessment and Plan of Treatment: continue beta blocker    Diagnosis: Acute hypoxemic respiratory failure  Assessment and Plan of Treatment: stable    Diagnosis: Hypothyroidism  Assessment and Plan of Treatment: continue home meds    Diagnosis: Pleural effusion  Assessment and Plan of Treatment: improved    Diagnosis: PNA (pneumonia)  Assessment and Plan of Treatment: Pneumonia is a lung infection that can cause a fever, cough, and trouble breathing.  Continue all antibiotics as ordered until complete.  Nutrition is important, eat small frequent meals.  Get lots of rest and drink fluids.  Call your health care provider upon arrival home from hospital and make a follow up appointment for one week.  If your cough worsens, you develop fever greater than 101', you have shaking chills, a fast heartbeat, trouble breathing and/or feel your are breathing much faster than usual, call your healthcare provider.  Make sure you wash your hands frequently.       PRINCIPAL DISCHARGE DIAGNOSIS  Diagnosis: Sepsis  Assessment and Plan of Treatment: Call you Health care provider upon arrival home to make a one week follow up appointment.  If you develop fever, chills, malaise, or change in mental status call your Health Care Provider or go to the Emergency Department.  Nutrition is important, eat small frequent meals to help ensure you get adequate calories.  Do not stay in bed all day!  Increase your activity daily as tolerated.        SECONDARY DISCHARGE DIAGNOSES  Diagnosis: PNA (pneumonia)  Assessment and Plan of Treatment: Pneumonia is a lung infection that can cause a fever, cough, and trouble breathing.  Continue all antibiotics as ordered until complete.  Nutrition is important, eat small frequent meals.  Get lots of rest and drink fluids.  Call your health care provider upon arrival home from hospital and make a follow up appointment for one week.  If your cough worsens, you develop fever greater than 101', you have shaking chills, a fast heartbeat, trouble breathing and/or feel your are breathing much faster than usual, call your healthcare provider.  Make sure you wash your hands frequently.      Diagnosis: Hypothyroidism  Assessment and Plan of Treatment: you do not make enough thyroid hormone  signs & symptoms of low levels of thyroid hormone - tired, getting cold easily, coarse or thin hair, constipation, shortness of breath, swelling, irregular periods  your doctor will do thyroid hormone blood tests at least once a year to monitor if medication dose is adequate  take your thyroid medicine as directed by your doctor & on empty stomach      Diagnosis: Acute hypoxemic respiratory failure  Assessment and Plan of Treatment: Resolved.    Diagnosis: Chronic atrial fibrillation  Assessment and Plan of Treatment: Atrial fibrillation is the most common heart rhythm problem & has the risk of stroke & heart attack  It helps if you control your blood pressure, not drink more than 1-2 alcohol drinks per day, cut down on caffeine, getting treatment for over active thyroid gland, & getting exercise  Call your doctor if you feel your heart racing or beating unusually, chest tightness or pain, lightheaded, faint, shortness of breath especially with exercise  It is important to take your heart medication as prescribed  Cardiology has recommended to hold Anticoagulation (blood thining medication) due to history of anemia and high risk of bleeding. The lack of Anticoagulation in setting of Atrial Fibrillation can put you at risk for Stroke. Please maintain close follow up with outpatient cardiologist for continued evaluation, management and care.       Diagnosis: Pleural effusion  Assessment and Plan of Treatment: Seen by pulmonary. Treated with IV diuresis, continue PO diuresis on discharge. AHRF resolved. Breathing comfortably on room air.    Diagnosis: Hyponatremia  Assessment and Plan of Treatment: Stable.   HOME CARE INSTRUCTIONS  Only take medicines as directed by your caregiver. Many medicines can make hyponatremia worse. Discuss all your medicines with your caregiver.  Carefully follow any recommended diet, including any fluid restrictions.  You may be asked to repeat lab tests. Follow these directions.  Avoid alcohol and recreational drugs.  SEEK MEDICAL CARE IF:  You develop worsening nausea, fatigue, headache, confusion, or weakness.  Your original hyponatremia symptoms return.  You have problems following the recommended diet.   SEEK IMMEDIATE MEDICAL CARE IF:  You have a seizure.  You faint.  You have ongoing diarrhea or vomiting

## 2025-02-28 NOTE — DISCHARGE NOTE PROVIDER - CARE PROVIDER_API CALL
Eliud Torres  Pulmonary Disease  [address] [place] [address]  Phone: [phone]  Fax: [phone]  Follow Up Time: 1-3 days

## 2025-02-28 NOTE — DISCHARGE NOTE NURSING/CASE MANAGEMENT/SOCIAL WORK - PATIENT PORTAL LINK FT
You can access the FollowMyHealth Patient Portal offered by [facility] by registering at the following website: [email or web address]. By joining [facility]’s FollowMyHealth portal, you will also be able to view your health information using other applications (apps) compatible with our system.

## 2025-02-28 NOTE — DISCHARGE NOTE PROVIDER - NSFOLLOWUPCLINICS_GEN_ALL_ED_FT
[facility] - Cardiology  Cardiology  [place]   Phone:   Fax:   Follow Up Time: 1-3 days    Home Program  Pulmonary  [place]   Phone:   Fax:   Follow Up Time: 1-3 days    [facility] - Primary Care  Primary Care  [address] [place], [place] [address] [address]  Phone: [phone]  Fax:   Follow Up Time: 1 week

## 2025-02-28 NOTE — DISCHARGE NOTE NURSING/CASE MANAGEMENT/SOCIAL WORK - FINANCIAL ASSISTANCE
[facility] provides services at a reduced cost to those who are determined to be eligible through [facility]’s financial assistance program. Information regarding [facility]’s financial assistance program can be found by going to [email or web address] or by calling [phone].

## 2025-02-28 NOTE — DISCHARGE NOTE PROVIDER - NSDCFUADDAPPT_GEN_ALL_CORE_FT
Follow up with pulmonary on discharge  APPTS ARE READY TO BE MADE: [X] YES    Best Family or Patient Contact (if needed):    Additional Information about above appointments (if needed):    1: Pulmonary  2: Cardiology  3: PCP    Other comments or requests:    APPTS ARE READY TO BE MADE: [X] YES    Best Family or Patient Contact (if needed):    Additional Information about above appointments (if needed):    1: Pulmonary  2: Cardiology  3: PCP    Other comments or requests:     Patient is being discharged to LTC. Caregiver will arrange follow up.

## 2025-02-28 NOTE — DISCHARGE NOTE PROVIDER - NSDCMRMEDTOKEN_GEN_ALL_CORE_FT
ascorbic acid 500 mg oral tablet: 1 tab(s) orally once a day  bisacodyl 10 mg rectal suppository: 1 suppository(ies) rectal once a day  ipratropium-albuterol 0.5 mg-2.5 mg/3 mL inhalation solution: 3 milliliter(s) inhaled every 6 hours  levothyroxine 88 mcg (0.088 mg) oral tablet: 1 tab(s) orally once a day  metoprolol tartrate 25 mg oral tablet: 1 tab(s) orally 2 times a day  mirtazapine 15 mg oral tablet: 1 tab(s) orally once a day (at bedtime)  Multiple Vitamins oral tablet: 1 tab(s) orally once a day   ascorbic acid 500 mg oral tablet: 1 tab(s) by gastrostomy tube once a day  bisacodyl 10 mg rectal suppository: 1 suppository(ies) rectal once a day  furosemide 40 mg oral tablet: 1 tab(s) by gastrostomy tube once a day  ipratropium-albuterol 0.5 mg-2.5 mg/3 mL inhalation solution: 3 milliliter(s) inhaled every 6 hours  levothyroxine 88 mcg (0.088 mg) oral tablet: 1 tab(s) by gastrostomy tube once a day coordinate with tube feeding schedule  metoprolol tartrate 25 mg oral tablet: 1 tab(s) by gastrostomy tube 2 times a day  mirtazapine 15 mg oral tablet: 1 tab(s) by gastrostomy tube once a day (at bedtime)  Multiple Vitamins oral tablet: 1 tab(s) by gastrostomy tube once a day  senna leaf extract oral tablet: 2 tab(s) orally once a day (at bedtime)

## 2025-02-28 NOTE — DISCHARGE NOTE PROVIDER - HOSPITAL COURSE
HPI:  89F w/ hx of Afib (s/p unsuccessful DCCV 1/2025, not on AC), dementia (baseline AAOx1-2, confused at times), brain tumor (s/p resection, c/b residual L-sided facial droop and L ear deafness), hypothyroidism, HTN, colon ca (s/p R hemicolectomy), constipation/stercoral colitis, recent long hospitalization (1/11/25-2/12/25) for hypoxic/hypercapnic resp failure and septic shock 2/2 influenza/asp PNA/UTI c/b dysphagia s/p PEG placement 2/7/25, now presenting from the [facility] rehab for hypoxia requiring supplemental O2.     Limited hx obtainable from pt given baseline dementia and non-participatory during encounter for admission. Responsive to name, but otherwise did not answer orientation questions. Endorsed she felt cold and sleepy, otherwise was not answering further questions. No accompanying paperwork was found in chart at time of admission. Per chart review of ED documentation, pt reportedly coming to hospital for eval of  hypoxic respiratory failure requiring oxygen today and also appearing "clammy lethargic warm to touch." Was initially placed on 2L NC and was increased to 4L NC by EMS. Spoke to son (Maykel) over the phone for collateral history. He corroborated the above, noted that pt he saw pt earlier today and found pt to have gurgling sounds with her breathing, which prompted request for further evaluation. Noted pt reported feeling nauseous, but did not vomit. He stated O2 sat was initially down to 88% on RA and ~91% on 2L at the rehab before it improved further on 4L. He noted pt is deaf at baseline in one ear, has difficulty with her speech particularly because her dentures are out, and was at her baseline mental status today (AAOx1-2, confused at times). He also noted that pt has had issues with aspiration for a majority of his life and he suspects it may have been a complication of the brain tumor surgery. Currently has been getting feeds and meds through PEG only, nothing by mouth. Stated pt was given a dose of Lasix 20mg SQ at the rehab prior to coming to hospital. No known hx of CHF.    In ED: Tmax 100F rectal, HR 80s-90s, SBP 90s-160s, RR 16-24, sating % on 3-4L NC. Labs notable for WBC 11.28k, Hgb 8.9, Na 129, procal 0.31, hsTrop 67->67, pro-BNP 3.8k, albumin 2.6. CXR (prelim) showed "bilateral lower lung hazy opacities suggestive of pleural effusions with passive atelectasis; underlying pneumonia cannot be excluded." Received ofirmev 750mg, CTX 1g, LR 500cc bolus. Admitted to Medicine for further management.  (23 Feb 2025 23:28)    Hospital Course: Patient admitted with sepsis, pneumonia and acute hy[oxic respiratory failure.  >  Acute hypoxemic respiratory failure.   ·  Plan: Weaned off oxygen,   Presented after O2 sat reportedly down to 88% on RA at rehab facility. Procal 3.8k, but does not appear overloaded on exam. COVID-19/RSV/Flu neg. CXR (prelim) showed "bilateral lower lung hazy opacities suggestive of pleural effusions with passive atelectasis; underlying pneumonia cannot be excluded."  - c/w empiric treatment for PNA as below  -  CT chest noted with possible PNA vs other pulm etiology  - s/p TTE to assess for cardiac etiology of b/l pleural effusions  - Pulmonary consulted     >  Sepsis.   ·  Plan: Resolved .   Met sepsis criteria on admission(RR>20s, HR >90, low grade temp, mild leukocytosis, suspected source of PNA)  - s/p CTX x 5 days  - c/w empiric abx as below  - f/u BCx, UA/UCx (pending collection), strep pneumo urine Ag, urine legionella  - monitor WBC and for fevers.  -  >  PNA (pneumonia).   ·  Plan: Concern for possible PNA, suspect may be aspiration given son's reported gurgling sound with breathing prior to presentation and pt's hx of aspiration. Procal elevated to 0.31 suggesting bacterial infection.  - s/p CTX x 5 days  < from: CT Chest No Cont (02.24.25 @ 08:55) >  IMPRESSION:  Similar moderate size bilateral pleural effusions with associated   complete atelectasis of the lower lobes.  Newly apparent patchy right upper lobe ground-glass opacity may be   secondary to edema or be infectious/inflammatory etiology.  < end of copied text >    >  Hyponatremia.   ·  Plan: Improved.   -Presented initially with Na 129. Seemed to have persistent mild hyponatremia on last admission. 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  - s/p 500cc IVF in ED  - Na level stable at 133    >  Chronic atrial fibrillation.   ·  Plan: Hx of Afib s/p unsuccessful DCCV 1/2025, s/p course of amio with subsequent return to sinus on last admission. Admission EKG remained sinus.  - c/w home metoprolol with holding parameters for rate control  - Per prior Cardiology note, "patient is anemic and high bleeding risk, would refrain from AC at this time.".    >  Hypothyroidism.   ·  Plan: - c/w home levothyroxine  - f/u TSH/FT4.    >  Dementia.   ·  Plan: Hx of dementia (baseline (AAOx1-2, sometimes confused). Also with hx of  brain tumor (s/p resection, c/b residual L-sided facial droop and L ear deafness).  - per son, at baseline mental status   - c/w home mirtazapine for associated depression/behavioral disturbances  - delirium precautions as able.    >  History of colon cancer.   ·  Plan: Hx of colon cancer s/p R hemicolectomy      Important Medication Changes and Reason:    Active or Pending Issues Requiring Follow-up:    Advanced Directives:   [ ] Full code  [ X] DNR  [ ] Hospice    Discharge Diagnoses:  Sepsis  Pneumonia  hyponatremia  hypoxic respiratory xander;ure         HPI:  89F w/ hx of Afib (s/p unsuccessful DCCV 1/2025, not on AC), dementia (baseline AAOx1-2, confused at times), brain tumor (s/p resection, c/b residual L-sided facial droop and L ear deafness), hypothyroidism, HTN, colon ca (s/p R hemicolectomy), constipation/stercoral colitis, recent long hospitalization (1/11/25-2/12/25) for hypoxic/hypercapnic resp failure and septic shock 2/2 influenza/asp PNA/UTI c/b dysphagia s/p PEG placement 2/7/25, now presenting from the [facility] rehab for hypoxia requiring supplemental O2.     Limited hx obtainable from pt given baseline dementia and non-participatory during encounter for admission. Responsive to name, but otherwise did not answer orientation questions. Endorsed she felt cold and sleepy, otherwise was not answering further questions. No accompanying paperwork was found in chart at time of admission. Per chart review of ED documentation, pt reportedly coming to hospital for eval of  hypoxic respiratory failure requiring oxygen today and also appearing "clammy lethargic warm to touch." Was initially placed on 2L NC and was increased to 4L NC by EMS. Spoke to son (Maykel) over the phone for collateral history. He corroborated the above, noted that pt he saw pt earlier today and found pt to have gurgling sounds with her breathing, which prompted request for further evaluation. Noted pt reported feeling nauseous, but did not vomit. He stated O2 sat was initially down to 88% on RA and ~91% on 2L at the rehab before it improved further on 4L. He noted pt is deaf at baseline in one ear, has difficulty with her speech particularly because her dentures are out, and was at her baseline mental status today (AAOx1-2, confused at times). He also noted that pt has had issues with aspiration for a majority of his life and he suspects it may have been a complication of the brain tumor surgery. Currently has been getting feeds and meds through PEG only, nothing by mouth. Stated pt was given a dose of Lasix 20mg SQ at the rehab prior to coming to hospital. No known hx of CHF.    In ED: Tmax 100F rectal, HR 80s-90s, SBP 90s-160s, RR 16-24, sating % on 3-4L NC. Labs notable for WBC 11.28k, Hgb 8.9, Na 129, procal 0.31, hsTrop 67->67, pro-BNP 3.8k, albumin 2.6. CXR (prelim) showed "bilateral lower lung hazy opacities suggestive of pleural effusions with passive atelectasis; underlying pneumonia cannot be excluded." Received ofirmev 750mg, CTX 1g, LR 500cc bolus. Admitted to Medicine for further management.  (23 Feb 2025 23:28)    Hospital Course: Patient admitted with sepsis, pneumonia and acute hypoxic respiratory failure. Presented after O2 sat reportedly down to 88% on RA at rehab facility. Procal 3.8k, but does not appear overloaded on exam. COVID-19/RSV/Flu neg. CXR (prelim) showed "bilateral lower lung hazy opacities suggestive of pleural effusions with passive atelectasis; underlying pneumonia cannot be excluded." CT chest noted with possible PNA vs other pulm etiology. Completed empiric treatment for PNA, CTX x 5 days. s/p TTE to assess for cardiac etiology of b/l pleural effusions, received IV diuresis, now on RA. Transition to PO lasix on discharge. Hyponatremia, presented initially with Na 129. Seemed to have persistent mild hyponatremia on last admission. 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 Received 500cc IVF in ED. Na level stable at 133. Chronic atrial fibrillation. Hx of Afib s/p unsuccessful DCCV 1/2025, s/p course of amio with subsequent return to sinus on last admission. Admission EKG remained sinus. Continue home metoprolol with holding parameters for rate control. Per prior Cardiology note, "patient is anemic and high bleeding risk, would refrain from AC at this time.". Hx of dementia (baseline (AAOx1-2, sometimes confused). Also with hx of  brain tumor (s/p resection, c/b residual L-sided facial droop and L ear deafness). Per son, at baseline mental status, continue home mirtazapine for associated depression/behavioral disturbances. Delirium precautions as able.    Discharge/dispo/med rec discussed with Dr. Resendez, who determined patient stable and medically cleared for discharge back to LTC today and outpatient follow up for continued management and care.       Important Medication Changes and Reason:  See medication reconciliation    Active or Pending Issues Requiring Follow-up:  Home Pulmonary 1-3 days  [facility] 1-3 days (EF > 55%)   Advanced Directives:   [ ] Full code  [X] DNR  [ ] Hospice    Discharge Diagnoses:  Sepsis  Pneumonia  hyponatremia  hypoxic respiratory failure

## 2025-03-14 ENCOUNTER — NON-APPOINTMENT (OUTPATIENT)
Age: 89
End: 2025-03-14

## 2025-03-14 ENCOUNTER — APPOINTMENT (OUTPATIENT)
Dept: CARDIOLOGY | Facility: CLINIC | Age: 89
End: 2025-03-14
Payer: MEDICARE

## 2025-03-14 VITALS — SYSTOLIC BLOOD PRESSURE: 102 MMHG | OXYGEN SATURATION: 93 % | DIASTOLIC BLOOD PRESSURE: 55 MMHG | HEART RATE: 89 BPM

## 2025-03-14 DIAGNOSIS — Z98.890 OTHER SPECIFIED POSTPROCEDURAL STATES: ICD-10-CM

## 2025-03-14 DIAGNOSIS — Z93.1 GASTROSTOMY STATUS: ICD-10-CM

## 2025-03-14 DIAGNOSIS — Z86.03 OTHER SPECIFIED POSTPROCEDURAL STATES: ICD-10-CM

## 2025-03-14 DIAGNOSIS — F03.90 UNSPECIFIED DEMENTIA W/OUT BEHAVIORAL DISTURBANCE: ICD-10-CM

## 2025-03-14 DIAGNOSIS — J96.91 RESPIRATORY FAILURE, UNSPECIFIED WITH HYPOXIA: ICD-10-CM

## 2025-03-14 DIAGNOSIS — I48.0 PAROXYSMAL ATRIAL FIBRILLATION: ICD-10-CM

## 2025-03-14 PROCEDURE — G2211 COMPLEX E/M VISIT ADD ON: CPT

## 2025-03-14 PROCEDURE — 93000 ELECTROCARDIOGRAM COMPLETE: CPT

## 2025-03-14 PROCEDURE — 99205 OFFICE O/P NEW HI 60 MIN: CPT

## 2025-04-01 ENCOUNTER — APPOINTMENT (OUTPATIENT)
Dept: INTERNAL MEDICINE | Facility: CLINIC | Age: 89
End: 2025-04-01

## (undated) DEVICE — SENSOR O2 FINGER ADULT

## (undated) DEVICE — BALLOON US ENDO

## (undated) DEVICE — BITE BLOCK ADULT 20 X 27MM (GREEN)

## (undated) DEVICE — PACK IV START WITH CHG

## (undated) DEVICE — SOL INJ NS 0.9% 500ML 2 PORT

## (undated) DEVICE — SUCTION YANKAUER NO CONTROL VENT

## (undated) DEVICE — TUBING IV SET GRAVITY 3Y 100" MACRO

## (undated) DEVICE — FOLEY HOLDER STATLOCK 2 WAY ADULT

## (undated) DEVICE — TUBING SUCTION 20FT

## (undated) DEVICE — TUBING SUCTION CONN 6FT STERILE

## (undated) DEVICE — CATH IV SAFE BC 22G X 1" (BLUE)

## (undated) DEVICE — CATH IV SAFE BC 20G X 1.16" (PINK)

## (undated) DEVICE — SYR ALLIANCE II INFLATION 60ML